# Patient Record
Sex: MALE | Race: WHITE | NOT HISPANIC OR LATINO | Employment: OTHER | URBAN - NONMETROPOLITAN AREA
[De-identification: names, ages, dates, MRNs, and addresses within clinical notes are randomized per-mention and may not be internally consistent; named-entity substitution may affect disease eponyms.]

---

## 2017-02-14 ENCOUNTER — CLINICAL SUPPORT (OUTPATIENT)
Dept: CARDIOLOGY | Facility: CLINIC | Age: 82
End: 2017-02-14

## 2017-02-14 ENCOUNTER — OFFICE VISIT (OUTPATIENT)
Dept: CARDIOLOGY | Facility: CLINIC | Age: 82
End: 2017-02-14

## 2017-02-14 VITALS
BODY MASS INDEX: 20.28 KG/M2 | WEIGHT: 153 LBS | HEIGHT: 73 IN | SYSTOLIC BLOOD PRESSURE: 106 MMHG | DIASTOLIC BLOOD PRESSURE: 58 MMHG | OXYGEN SATURATION: 98 % | HEART RATE: 82 BPM

## 2017-02-14 DIAGNOSIS — I50.9 CONGESTIVE HEART FAILURE, UNSPECIFIED CONGESTIVE HEART FAILURE CHRONICITY, UNSPECIFIED CONGESTIVE HEART FAILURE TYPE: ICD-10-CM

## 2017-02-14 DIAGNOSIS — I10 ESSENTIAL HYPERTENSION: ICD-10-CM

## 2017-02-14 DIAGNOSIS — I49.5 SSS (SICK SINUS SYNDROME) (HCC): ICD-10-CM

## 2017-02-14 DIAGNOSIS — Z95.810 AICD (AUTOMATIC CARDIOVERTER/DEFIBRILLATOR) PRESENT: Primary | ICD-10-CM

## 2017-02-14 DIAGNOSIS — Z95.0 PACEMAKER: ICD-10-CM

## 2017-02-14 DIAGNOSIS — I25.10 CORONARY ARTERIOSCLEROSIS: Primary | ICD-10-CM

## 2017-02-14 PROCEDURE — 93000 ELECTROCARDIOGRAM COMPLETE: CPT | Performed by: INTERNAL MEDICINE

## 2017-02-14 PROCEDURE — 99214 OFFICE O/P EST MOD 30 MIN: CPT | Performed by: INTERNAL MEDICINE

## 2017-02-14 PROCEDURE — 93289 INTERROG DEVICE EVAL HEART: CPT | Performed by: INTERNAL MEDICINE

## 2017-02-14 RX ORDER — ATORVASTATIN CALCIUM 20 MG/1
TABLET, FILM COATED ORAL
Status: ON HOLD | COMMUNITY
Start: 2012-04-19 | End: 2017-09-09

## 2017-02-14 RX ORDER — ROPINIROLE 0.5 MG/1
TABLET, FILM COATED ORAL
Status: ON HOLD | COMMUNITY
End: 2017-09-09

## 2017-02-14 RX ORDER — LISINOPRIL 5 MG/1
TABLET ORAL
Status: ON HOLD | COMMUNITY
Start: 2012-04-14 | End: 2017-09-09

## 2017-02-14 RX ORDER — SPIRONOLACTONE 25 MG/1
TABLET ORAL
COMMUNITY
Start: 2016-01-31 | End: 2017-09-08

## 2017-02-14 RX ORDER — FUROSEMIDE 40 MG/1
40 TABLET ORAL DAILY PRN
COMMUNITY
Start: 2012-04-19 | End: 2017-09-12 | Stop reason: HOSPADM

## 2017-02-14 RX ORDER — CARVEDILOL 3.12 MG/1
TABLET ORAL
COMMUNITY
End: 2017-03-13 | Stop reason: SDUPTHER

## 2017-02-14 RX ORDER — TAMSULOSIN HYDROCHLORIDE 0.4 MG/1
CAPSULE ORAL
Status: ON HOLD | COMMUNITY
End: 2017-09-09

## 2017-02-14 RX ORDER — DUTASTERIDE 0.5 MG/1
CAPSULE, LIQUID FILLED ORAL
Status: ON HOLD | COMMUNITY
Start: 2012-04-19 | End: 2017-09-09

## 2017-02-14 NOTE — PROGRESS NOTES
Bi-V AICD Evaluation Report  In office    February 14, 2017    Primary Cardiologist: Renae  : Guidant Model: Cognis  Implant date: July 8, 2010    Reason for evaluation:routine  Indication for AICD: sick sinus syndrome and congestive heart failure    Measurements  Atrial sensing:  P wave: 1.2 mV  Atrial threshold: 0.9 V @ 0.5 ms  Atrial lead impedance: 490 ohms  Ventricular sensing:  R wave: 11.5 mV  RV Threshold:  1.3V @ 0.5 ms  RV Impedance:  383 ohms  Shock impedance:  52 ohms  LV Threshold:  0.7V @ 0.5ms  LV Impedance:  885ohms      Diagnostic Data  Atrial paced: 80 %  Ventricular paced: RV-91, LV-99 %  Other: 10 a-fib episodes- longest 3 hours 36 min,  V rate well controlled   10 NSVT episodes- longest 6 seconds, max v rate 228 bpm   PMT episodes appear to be sinus tach at max tracking, PMT could not be reproduced in office  Battery status: satisfactory   Est 3.5 years      Final Parameters  Mode: DDDR     Lower rate: 70 bpm   Upper rate: 125 bpm  AV Delay: 170-180 ms paced    115-120 ms sensed   Atrial - Amplitude: 2.4 V   Pulse width: 0.5 ms   Sensitivity: 0.25 mV   Ventricular:  RV Amplitude: 2.4 V @ 0.5 ms   Sensitivity: 0.6 mV            LV Amplitude:  1.8V @ 0.5ms  Sensitivity:  1.5 mV  Changes made: none  Conclusions: normal AICD function, no therapy delivered and stable pacing and sensing thresholds    Follow up: 3 months, pt to call with monitor serial number so we can enroll in [facility]    Note:  LV sensing off.  Turned on for testing and then back off before ending interrogation.

## 2017-02-14 NOTE — PROGRESS NOTES
Wild Bravo  [id number]  1934  82 y.o.  male    Referring Provider: Eliel Ames MD    Reason for Follow-up Visit: CHF    Overall doing well  Denies any chest pain  No excessive shortness of breath  Compliant with medications    History of present illness:  Wild Bravo is a 82 y.o. yo male with history of CHF who presents today for   Chief Complaint   Patient presents with   • Congestive Heart Failure     6 mo f/u   .    History  Past Medical History   Diagnosis Date   • Abdominal aortic aneurysm    • Anxiety    • Biventricular ICD (implantable cardioverter-defibrillator) in place    • BPH (benign prostatic hypertrophy)    • Carotid artery stenosis    • CHF (congestive heart failure)    • Chronic kidney disease      Chronic kidney disease, stage 4 (severe)   • Chronic systolic (congestive) heart failure      limited echo 7/2016 EF was 40-45%. Patient has BiV AICD   • Coronary arteriosclerosis      MI x2   • Iron deficiency anemia    • Ischemic cardiomyopathy    • Mixed hyperlipidemia    ,   Past Surgical History   Procedure Laterality Date   • Cardiac ablation     • Cardioversion     • Mitral valve replacement     • Cardiac pacemaker placement     • Carotid endarterectomy     • Total knee arthroplasty     • Coronary artery bypass graft     • Cardiac catheterization     ,   Family History   Problem Relation Age of Onset   • Stroke Mother    • Heart disease Mother    • Diabetes Father    ,   Social History   Substance Use Topics   • Smoking status: Former Smoker   • Smokeless tobacco: None   • Alcohol use No   ,     Medications  Current Outpatient Prescriptions   Medication Sig Dispense Refill   • aspirin 81 MG tablet Take 81 mg by mouth daily.       • atorvastatin (LIPITOR) 20 MG tablet Take 20 mg by mouth daily.       • carvedilol (COREG) 3.125 MG tablet Take 3.125 mg by mouth 2 times daily (with meals)     • dutasteride (AVODART) 0.5 MG capsule Take 0.5 mg by mouth daily.       • furosemide  "(LASIX) 40 MG tablet Daily.     • HYDROcodone-acetaminophen (VICODIN) 5-500 MG per tablet Every 6 (Six) Hours.     • lisinopril (PRINIVIL,ZESTRIL) 5 MG tablet Take 2.5 mg by mouth daily      • rOPINIRole (REQUIP) 0.5 MG tablet 2 mg bid     • spironolactone (ALDACTONE) 25 MG tablet Take 1 tablet by mouth daily     • tamsulosin (FLOMAX) 0.4 MG capsule 24 hr capsule Take 0.4 mg by mouth daily       No current facility-administered medications for this visit.        Allergies:  Keflex [cephalexin]    Review of Systems  Review of Systems   Constitution: Positive for malaise/fatigue and weight loss.   HENT: Negative.    Eyes: Negative.    Cardiovascular: Positive for dyspnea on exertion. Negative for chest pain, claudication, cyanosis, irregular heartbeat, leg swelling, near-syncope, orthopnea, palpitations, paroxysmal nocturnal dyspnea and syncope.   Respiratory: Negative.    Endocrine: Negative.    Hematologic/Lymphatic: Negative.    Skin: Negative.    Gastrointestinal: Negative for anorexia.   Genitourinary: Negative.    Neurological: Negative.    Psychiatric/Behavioral: Negative.        Objective     Physical Exam:  Visit Vitals   • /58   • Pulse 82   • Ht 73\" (185.4 cm)   • Wt 153 lb (69.4 kg)   • SpO2 98%   • BMI 20.19 kg/m2     Physical Exam   Constitutional: He appears well-developed.   HENT:   Head: Normocephalic.   Neck: Normal carotid pulses and no JVD present. No tracheal tenderness present. Carotid bruit is not present. No tracheal deviation and no edema present.   Cardiovascular: Regular rhythm and normal pulses.    Murmur heard.   Systolic murmur is present with a grade of 2/6   Pulmonary/Chest: Effort normal. No stridor.   Abdominal: Soft.   Neurological: He is alert. He has normal strength. No cranial nerve deficit or sensory deficit.   Skin: Skin is warm.   Psychiatric: He has a normal mood and affect. His speech is normal and behavior is normal.       Results Review:       ECG 12 Lead  Date/Time: " 2/14/2017 10:42 AM  Performed by: JUAN LUIS OLIVA  Authorized by: JUAN LUIS OLIVA   Comparison: compared with previous ECG from 3/10/2016  Comparison to previous ECG: PVCs new  Comments: V paced             Assessment/Plan   Patient Active Problem List   Diagnosis   • Coronary arteriosclerosis   • CHF (congestive heart failure)   • SSS (sick sinus syndrome)   • Pacemaker   • Essential hypertension       No palpitations. No significant pedal edema. Compliant with medications and diet. Latest labs and medications reviewed.  Does 1000 (1 thousand sit ups daily) without problems  Last echo 7/16 moderate pulmonary hypertension. EF 40%    Plan:  He will talk to Dr Ames regarding weight loss due to nausea and poor oral intake  Close follow up with you as scheduled.  Intensive factor modifications.  See order list.    Counseled regarding disease appropriate diet, fluid, caffeine, stimulants and sodium intake as well as importance of compliance to diet, exercise and regular follow up.  Avoid NSAIDS and COX2 inhibitors. Use Acetaminophen PRN.  Monitor BiVICD function    Return in about 10 months (around 12/14/2017).

## 2017-02-15 NOTE — PROGRESS NOTES
I have reviewed the notes, assessments, and/or procedures performed by Chely Scruggs, I concur with her documentation of Wild Bravo.

## 2017-03-13 RX ORDER — CARVEDILOL 6.25 MG/1
6.25 TABLET ORAL 2 TIMES DAILY WITH MEALS
Qty: 180 TABLET | Refills: 2 | Status: ON HOLD | OUTPATIENT
Start: 2017-03-13 | End: 2017-09-09 | Stop reason: DRUGHIGH

## 2017-03-13 RX ORDER — CARVEDILOL 3.12 MG/1
TABLET ORAL
Qty: 360 TABLET | Refills: 3 | OUTPATIENT
Start: 2017-03-13

## 2017-09-08 ENCOUNTER — TELEPHONE (OUTPATIENT)
Dept: CARDIOLOGY | Facility: CLINIC | Age: 82
End: 2017-09-08

## 2017-09-08 ENCOUNTER — APPOINTMENT (OUTPATIENT)
Dept: GENERAL RADIOLOGY | Facility: HOSPITAL | Age: 82
End: 2017-09-08

## 2017-09-08 ENCOUNTER — HOSPITAL ENCOUNTER (OUTPATIENT)
Dept: CARDIOLOGY | Facility: HOSPITAL | Age: 82
Discharge: HOME | End: 2017-09-08

## 2017-09-08 ENCOUNTER — APPOINTMENT (OUTPATIENT)
Dept: ULTRASOUND IMAGING | Facility: HOSPITAL | Age: 82
End: 2017-09-08

## 2017-09-08 ENCOUNTER — DOCUMENTATION (OUTPATIENT)
Dept: CARDIOLOGY | Facility: CLINIC | Age: 82
End: 2017-09-08

## 2017-09-08 ENCOUNTER — APPOINTMENT (OUTPATIENT)
Dept: LAB | Facility: HOSPITAL | Age: 82
End: 2017-09-08

## 2017-09-08 ENCOUNTER — TRANSCRIBE ORDERS (OUTPATIENT)
Dept: ADMINISTRATIVE | Facility: HOSPITAL | Age: 82
End: 2017-09-08

## 2017-09-08 ENCOUNTER — CLINICAL SUPPORT (OUTPATIENT)
Dept: CARDIOLOGY | Facility: CLINIC | Age: 82
End: 2017-09-08

## 2017-09-08 ENCOUNTER — HOSPITAL ENCOUNTER (INPATIENT)
Facility: HOSPITAL | Age: 82
LOS: 2 days | Discharge: PSYCHIATRIC OR SUBSTANCE USE FACILITY | End: 2017-09-12
Attending: INTERNAL MEDICINE | Admitting: INTERNAL MEDICINE

## 2017-09-08 ENCOUNTER — APPOINTMENT (OUTPATIENT)
Dept: LAB | Facility: HOSPITAL | Age: 82
End: 2017-09-08
Attending: INTERNAL MEDICINE

## 2017-09-08 ENCOUNTER — APPOINTMENT (OUTPATIENT)
Dept: CT IMAGING | Facility: HOSPITAL | Age: 82
End: 2017-09-08

## 2017-09-08 ENCOUNTER — OFFICE VISIT (OUTPATIENT)
Dept: CARDIOLOGY | Facility: CLINIC | Age: 82
End: 2017-09-08

## 2017-09-08 VITALS
WEIGHT: 155.2 LBS | SYSTOLIC BLOOD PRESSURE: 90 MMHG | HEART RATE: 88 BPM | OXYGEN SATURATION: 98 % | HEIGHT: 74 IN | BODY MASS INDEX: 19.92 KG/M2 | DIASTOLIC BLOOD PRESSURE: 60 MMHG

## 2017-09-08 DIAGNOSIS — I48.19 PERSISTENT ATRIAL FIBRILLATION (HCC): ICD-10-CM

## 2017-09-08 DIAGNOSIS — I73.9 PAD (PERIPHERAL ARTERY DISEASE) (HCC): ICD-10-CM

## 2017-09-08 DIAGNOSIS — N18.30 CHRONIC KIDNEY DISEASE, STAGE III (MODERATE) (HCC): Primary | ICD-10-CM

## 2017-09-08 DIAGNOSIS — I10 ESSENTIAL HYPERTENSION, MALIGNANT: ICD-10-CM

## 2017-09-08 DIAGNOSIS — R46.89 COGNITIVE AND BEHAVIORAL CHANGES: Primary | ICD-10-CM

## 2017-09-08 DIAGNOSIS — I10 ESSENTIAL HYPERTENSION: ICD-10-CM

## 2017-09-08 DIAGNOSIS — Z74.09 IMPAIRED FUNCTIONAL MOBILITY, BALANCE, GAIT, AND ENDURANCE: ICD-10-CM

## 2017-09-08 DIAGNOSIS — I49.5 SSS (SICK SINUS SYNDROME) (HCC): Primary | ICD-10-CM

## 2017-09-08 DIAGNOSIS — I50.22 CHRONIC SYSTOLIC CONGESTIVE HEART FAILURE (HCC): ICD-10-CM

## 2017-09-08 DIAGNOSIS — I25.10 CORONARY ARTERIOSCLEROSIS: ICD-10-CM

## 2017-09-08 DIAGNOSIS — I50.22 CHRONIC SYSTOLIC CONGESTIVE HEART FAILURE (HCC): Primary | ICD-10-CM

## 2017-09-08 DIAGNOSIS — Z98.890 H/O MITRAL VALVE REPAIR: ICD-10-CM

## 2017-09-08 DIAGNOSIS — Z95.810 BIVENTRICULAR ICD (IMPLANTABLE CARDIOVERTER-DEFIBRILLATOR) IN PLACE: ICD-10-CM

## 2017-09-08 DIAGNOSIS — R41.89 COGNITIVE AND BEHAVIORAL CHANGES: Primary | ICD-10-CM

## 2017-09-08 PROBLEM — R07.9 CHEST PAIN: Status: ACTIVE | Noted: 2017-09-08

## 2017-09-08 LAB
ALBUMIN SERPL-MCNC: 4.7 G/DL (ref 3.5–5)
ALBUMIN/GLOB SERPL: 1.6 G/DL (ref 1.1–2.5)
ALP SERPL-CCNC: 83 U/L (ref 24–120)
ALT SERPL W P-5'-P-CCNC: 47 U/L (ref 0–54)
AMYLASE SERPL-CCNC: 100 U/L (ref 30–110)
ANION GAP SERPL CALCULATED.3IONS-SCNC: 11 MMOL/L (ref 4–13)
ANION GAP SERPL CALCULATED.3IONS-SCNC: 13 MMOL/L (ref 4–13)
AST SERPL-CCNC: 40 U/L (ref 7–45)
BASOPHILS # BLD AUTO: 0.04 10*3/MM3 (ref 0–0.2)
BASOPHILS NFR BLD AUTO: 0.7 % (ref 0–2)
BILIRUB SERPL-MCNC: 1.1 MG/DL (ref 0.1–1)
BUN BLD-MCNC: 52 MG/DL (ref 5–21)
BUN BLD-MCNC: 52 MG/DL (ref 5–21)
BUN/CREAT SERPL: 26.8 (ref 7–25)
BUN/CREAT SERPL: 29.4 (ref 7–25)
CALCIUM SPEC-SCNC: 9.3 MG/DL (ref 8.4–10.4)
CALCIUM SPEC-SCNC: 9.8 MG/DL (ref 8.4–10.4)
CHLORIDE SERPL-SCNC: 96 MMOL/L (ref 98–110)
CHLORIDE SERPL-SCNC: 97 MMOL/L (ref 98–110)
CO2 SERPL-SCNC: 29 MMOL/L (ref 24–31)
CO2 SERPL-SCNC: 31 MMOL/L (ref 24–31)
CREAT BLD-MCNC: 1.77 MG/DL (ref 0.5–1.4)
CREAT BLD-MCNC: 1.94 MG/DL (ref 0.5–1.4)
DEPRECATED RDW RBC AUTO: 50.6 FL (ref 40–54)
EOSINOPHIL # BLD AUTO: 0.64 10*3/MM3 (ref 0–0.7)
EOSINOPHIL NFR BLD AUTO: 11.4 % (ref 0–4)
ERYTHROCYTE [DISTWIDTH] IN BLOOD BY AUTOMATED COUNT: 15.1 % (ref 12–15)
GFR SERPL CREATININE-BSD FRML MDRD: 33 ML/MIN/1.73
GFR SERPL CREATININE-BSD FRML MDRD: 37 ML/MIN/1.73
GLOBULIN UR ELPH-MCNC: 3 GM/DL
GLUCOSE BLD-MCNC: 150 MG/DL (ref 70–100)
GLUCOSE BLD-MCNC: 98 MG/DL (ref 70–100)
HCT VFR BLD AUTO: 41.3 % (ref 40–52)
HGB BLD-MCNC: 12.9 G/DL (ref 14–18)
IMM GRANULOCYTES # BLD: 0.01 10*3/MM3 (ref 0–0.03)
IMM GRANULOCYTES NFR BLD: 0.2 % (ref 0–5)
LIPASE SERPL-CCNC: 66 U/L (ref 23–203)
LYMPHOCYTES # BLD AUTO: 1.12 10*3/MM3 (ref 0.72–4.86)
LYMPHOCYTES NFR BLD AUTO: 20 % (ref 15–45)
MAGNESIUM SERPL-MCNC: 2.1 MG/DL (ref 1.4–2.2)
MCH RBC QN AUTO: 28.4 PG (ref 28–32)
MCHC RBC AUTO-ENTMCNC: 31.2 G/DL (ref 33–36)
MCV RBC AUTO: 91 FL (ref 82–95)
MONOCYTES # BLD AUTO: 0.51 10*3/MM3 (ref 0.19–1.3)
MONOCYTES NFR BLD AUTO: 9.1 % (ref 4–12)
NEUTROPHILS # BLD AUTO: 3.28 10*3/MM3 (ref 1.87–8.4)
NEUTROPHILS NFR BLD AUTO: 58.6 % (ref 39–78)
NT-PROBNP SERPL-MCNC: 3970 PG/ML (ref 0–1800)
PLATELET # BLD AUTO: 177 10*3/MM3 (ref 130–400)
PMV BLD AUTO: 12.6 FL (ref 6–12)
POTASSIUM BLD-SCNC: 4 MMOL/L (ref 3.5–5.3)
POTASSIUM BLD-SCNC: 4.8 MMOL/L (ref 3.5–5.3)
PROT SERPL-MCNC: 7.7 G/DL (ref 6.3–8.7)
RBC # BLD AUTO: 4.54 10*6/MM3 (ref 4.8–5.9)
SODIUM BLD-SCNC: 138 MMOL/L (ref 135–145)
SODIUM BLD-SCNC: 139 MMOL/L (ref 135–145)
TROPONIN I SERPL-MCNC: 0.05 NG/ML (ref 0–0.03)
TROPONIN I SERPL-MCNC: 0.06 NG/ML (ref 0–0.03)
TROPONIN I SERPL-MCNC: 0.06 NG/ML (ref 0–0.03)
TSH SERPL DL<=0.05 MIU/L-ACNC: 2.43 MIU/ML (ref 0.47–4.68)
WBC NRBC COR # BLD: 5.6 10*3/MM3 (ref 4.8–10.8)

## 2017-09-08 PROCEDURE — 93880 EXTRACRANIAL BILAT STUDY: CPT | Performed by: SURGERY

## 2017-09-08 PROCEDURE — 83880 ASSAY OF NATRIURETIC PEPTIDE: CPT | Performed by: NURSE PRACTITIONER

## 2017-09-08 PROCEDURE — G0378 HOSPITAL OBSERVATION PER HR: HCPCS

## 2017-09-08 PROCEDURE — 93880 EXTRACRANIAL BILAT STUDY: CPT

## 2017-09-08 PROCEDURE — 83690 ASSAY OF LIPASE: CPT | Performed by: INTERNAL MEDICINE

## 2017-09-08 PROCEDURE — 36415 COLL VENOUS BLD VENIPUNCTURE: CPT | Performed by: INTERNAL MEDICINE

## 2017-09-08 PROCEDURE — 93281 PM DEVICE PROGR EVAL MULTI: CPT | Performed by: INTERNAL MEDICINE

## 2017-09-08 PROCEDURE — 25010000002 ENOXAPARIN PER 10 MG: Performed by: INTERNAL MEDICINE

## 2017-09-08 PROCEDURE — 83735 ASSAY OF MAGNESIUM: CPT | Performed by: INTERNAL MEDICINE

## 2017-09-08 PROCEDURE — 85025 COMPLETE CBC W/AUTO DIFF WBC: CPT | Performed by: INTERNAL MEDICINE

## 2017-09-08 PROCEDURE — 71020 HC CHEST PA AND LATERAL: CPT

## 2017-09-08 PROCEDURE — 84484 ASSAY OF TROPONIN QUANT: CPT | Performed by: INTERNAL MEDICINE

## 2017-09-08 PROCEDURE — 93306 TTE W/DOPPLER COMPLETE: CPT

## 2017-09-08 PROCEDURE — 99223 1ST HOSP IP/OBS HIGH 75: CPT | Performed by: PSYCHIATRY & NEUROLOGY

## 2017-09-08 PROCEDURE — 99215 OFFICE O/P EST HI 40 MIN: CPT | Performed by: NURSE PRACTITIONER

## 2017-09-08 PROCEDURE — 80053 COMPREHEN METABOLIC PANEL: CPT | Performed by: NURSE PRACTITIONER

## 2017-09-08 PROCEDURE — 82150 ASSAY OF AMYLASE: CPT | Performed by: INTERNAL MEDICINE

## 2017-09-08 PROCEDURE — 82043 UR ALBUMIN QUANTITATIVE: CPT | Performed by: INTERNAL MEDICINE

## 2017-09-08 PROCEDURE — 93306 TTE W/DOPPLER COMPLETE: CPT | Performed by: INTERNAL MEDICINE

## 2017-09-08 PROCEDURE — 93000 ELECTROCARDIOGRAM COMPLETE: CPT | Performed by: NURSE PRACTITIONER

## 2017-09-08 PROCEDURE — 70450 CT HEAD/BRAIN W/O DYE: CPT

## 2017-09-08 PROCEDURE — 82570 ASSAY OF URINE CREATININE: CPT | Performed by: INTERNAL MEDICINE

## 2017-09-08 PROCEDURE — 84443 ASSAY THYROID STIM HORMONE: CPT | Performed by: INTERNAL MEDICINE

## 2017-09-08 RX ORDER — HYDROCODONE BITARTRATE AND ACETAMINOPHEN 7.5; 325 MG/1; MG/1
1 TABLET ORAL EVERY 6 HOURS PRN
Status: DISCONTINUED | OUTPATIENT
Start: 2017-09-08 | End: 2017-09-12 | Stop reason: HOSPADM

## 2017-09-08 RX ORDER — AMIODARONE HYDROCHLORIDE 200 MG/1
200 TABLET ORAL
Status: DISCONTINUED | OUTPATIENT
Start: 2017-09-08 | End: 2017-09-12 | Stop reason: HOSPADM

## 2017-09-08 RX ORDER — LISINOPRIL 2.5 MG/1
2.5 TABLET ORAL
Status: DISCONTINUED | OUTPATIENT
Start: 2017-09-08 | End: 2017-09-12 | Stop reason: HOSPADM

## 2017-09-08 RX ORDER — NITROGLYCERIN 0.4 MG/1
TABLET SUBLINGUAL
Qty: 30 TABLET | Refills: 0 | Status: ON HOLD | OUTPATIENT
Start: 2017-09-08 | End: 2017-09-09

## 2017-09-08 RX ORDER — LORAZEPAM 2 MG/1
2 TABLET ORAL EVERY 8 HOURS PRN
COMMUNITY
End: 2017-09-12 | Stop reason: HOSPADM

## 2017-09-08 RX ORDER — CARVEDILOL 3.12 MG/1
3.12 TABLET ORAL 2 TIMES DAILY WITH MEALS
Status: DISCONTINUED | OUTPATIENT
Start: 2017-09-08 | End: 2017-09-12 | Stop reason: HOSPADM

## 2017-09-08 RX ORDER — SODIUM CHLORIDE 0.9 % (FLUSH) 0.9 %
1-10 SYRINGE (ML) INJECTION AS NEEDED
Status: DISCONTINUED | OUTPATIENT
Start: 2017-09-08 | End: 2017-09-12 | Stop reason: HOSPADM

## 2017-09-08 RX ORDER — FUROSEMIDE 40 MG/1
40 TABLET ORAL DAILY
Status: DISCONTINUED | OUTPATIENT
Start: 2017-09-08 | End: 2017-09-09

## 2017-09-08 RX ORDER — GABAPENTIN 300 MG/1
300 CAPSULE ORAL 2 TIMES DAILY
COMMUNITY
End: 2017-09-12 | Stop reason: HOSPADM

## 2017-09-08 RX ORDER — ATORVASTATIN CALCIUM 10 MG/1
10 TABLET, FILM COATED ORAL NIGHTLY
Status: DISCONTINUED | OUTPATIENT
Start: 2017-09-08 | End: 2017-09-12 | Stop reason: HOSPADM

## 2017-09-08 RX ORDER — FINASTERIDE 5 MG/1
5 TABLET, FILM COATED ORAL DAILY
Status: DISCONTINUED | OUTPATIENT
Start: 2017-09-09 | End: 2017-09-12 | Stop reason: HOSPADM

## 2017-09-08 RX ORDER — CARVEDILOL 6.25 MG/1
6.25 TABLET ORAL 2 TIMES DAILY WITH MEALS
Status: DISCONTINUED | OUTPATIENT
Start: 2017-09-08 | End: 2017-09-08

## 2017-09-08 RX ORDER — NITROGLYCERIN 0.4 MG/1
0.4 TABLET SUBLINGUAL
Status: DISCONTINUED | OUTPATIENT
Start: 2017-09-08 | End: 2017-09-12 | Stop reason: HOSPADM

## 2017-09-08 RX ORDER — AMIODARONE HYDROCHLORIDE 200 MG/1
200 TABLET ORAL DAILY
Qty: 30 TABLET | Refills: 1 | Status: SHIPPED | OUTPATIENT
Start: 2017-09-08 | End: 2017-10-06 | Stop reason: SDUPTHER

## 2017-09-08 RX ORDER — HYDROCODONE BITARTRATE AND ACETAMINOPHEN 5; 325 MG/1; MG/1
1 TABLET ORAL EVERY 6 HOURS PRN
COMMUNITY

## 2017-09-08 RX ORDER — LORAZEPAM 1 MG/1
2 TABLET ORAL EVERY 8 HOURS PRN
Status: DISCONTINUED | OUTPATIENT
Start: 2017-09-08 | End: 2017-09-09

## 2017-09-08 RX ORDER — AMIODARONE HYDROCHLORIDE 200 MG/1
200 TABLET ORAL DAILY
Status: DISCONTINUED | OUTPATIENT
Start: 2017-09-08 | End: 2017-09-08 | Stop reason: SDUPTHER

## 2017-09-08 RX ORDER — TAMSULOSIN HYDROCHLORIDE 0.4 MG/1
0.4 CAPSULE ORAL DAILY
Status: DISCONTINUED | OUTPATIENT
Start: 2017-09-09 | End: 2017-09-11

## 2017-09-08 RX ORDER — ROPINIROLE 2 MG/1
2 TABLET, FILM COATED ORAL 2 TIMES DAILY
COMMUNITY
End: 2017-09-12 | Stop reason: HOSPADM

## 2017-09-08 RX ORDER — ROPINIROLE 1 MG/1
2 TABLET, FILM COATED ORAL NIGHTLY
Status: DISCONTINUED | OUTPATIENT
Start: 2017-09-08 | End: 2017-09-09

## 2017-09-08 RX ORDER — GABAPENTIN 300 MG/1
300 CAPSULE ORAL NIGHTLY
Status: DISCONTINUED | OUTPATIENT
Start: 2017-09-08 | End: 2017-09-10

## 2017-09-08 RX ADMIN — ATORVASTATIN CALCIUM 10 MG: 10 TABLET, FILM COATED ORAL at 20:50

## 2017-09-08 RX ADMIN — ENOXAPARIN SODIUM 30 MG: 30 INJECTION SUBCUTANEOUS at 17:57

## 2017-09-08 RX ADMIN — CARVEDILOL 3.12 MG: 3.12 TABLET, FILM COATED ORAL at 20:51

## 2017-09-08 RX ADMIN — GABAPENTIN 300 MG: 300 CAPSULE ORAL at 20:50

## 2017-09-08 RX ADMIN — AMIODARONE HYDROCHLORIDE 200 MG: 200 TABLET ORAL at 20:51

## 2017-09-08 RX ADMIN — LORAZEPAM 2 MG: 1 TABLET ORAL at 19:58

## 2017-09-08 NOTE — TELEPHONE ENCOUNTER
Mr Bravo's daughter Audra called to say she thinks her dad needs to be admitted today after seeing Waleska in the office. Addis Shankar took a note to have Waleska to call the daughter because they were still here having test done that Dr Macdonald recommended having done. Waleska asked me to return the call to the patients daughter and let her know that Dr Macdonald wanted these test done but if they felt they needed to be admitted that was something they would have to go through the ER. That is not what we were recommending.the test that was ordered was what Dr Macdonald and Waleska both recommended.

## 2017-09-08 NOTE — PROGRESS NOTES
"    Subjective:     Encounter Date:09/08/2017      Patient ID: Wild Bravo is a 83 y.o. male.    Chief Complaint:  HPI Comments: Pt reports he has been having more shortness of breath over the past several months. He admits intermittent abdominal distention and pain as well. He denies lower extremity edema. His weight fluctuates but is currently stable. He has been having right sided, non exertional chest pains lasting 1-2 minutes without other associated symptoms, over the past several months.     Congestive Heart Failure   Presents for follow-up visit. Associated symptoms include abdominal pain, chest pain, fatigue and shortness of breath. Pertinent negatives include no claudication, edema, near-syncope or palpitations. The symptoms have been worsening.       The following portions of the patient's history were reviewed and updated as appropriate: allergies, current medications, past family history, past medical history, past social history, past surgical history and problem list.  BP 90/60 (BP Location: Right arm, Patient Position: Sitting, Cuff Size: Adult)  Pulse 88  Ht 73.5\" (186.7 cm)  Wt 155 lb 3.2 oz (70.4 kg)  SpO2 98%  BMI 20.2 kg/m2  Allergies   Allergen Reactions   • Keflex [Cephalexin]      CEPHALOSPORINS       Current Outpatient Prescriptions:   •  aspirin 81 MG tablet, Take 81 mg by mouth daily.  , Disp: , Rfl:   •  atorvastatin (LIPITOR) 20 MG tablet, Take 20 mg by mouth daily.  , Disp: , Rfl:   •  carvedilol (COREG) 6.25 MG tablet, Take 1 tablet by mouth 2 (Two) Times a Day With Meals. (Patient taking differently: Take 3.125 mg by mouth 2 (Two) Times a Day With Meals.), Disp: 180 tablet, Rfl: 2  •  dutasteride (AVODART) 0.5 MG capsule, Take 0.5 mg by mouth daily.  , Disp: , Rfl:   •  furosemide (LASIX) 40 MG tablet, As Needed., Disp: , Rfl:   •  gabapentin (NEURONTIN) 300 MG capsule, Take 300 mg by mouth 3 (Three) Times a Day., Disp: , Rfl:   •  HYDROcodone-acetaminophen (VICODIN) 5-500 MG " per tablet, Every 6 (Six) Hours., Disp: , Rfl:   •  lisinopril (PRINIVIL,ZESTRIL) 5 MG tablet, Take 2.5 mg by mouth daily , Disp: , Rfl:   •  rOPINIRole (REQUIP) 0.5 MG tablet, 2 mg bid, Disp: , Rfl:   •  tamsulosin (FLOMAX) 0.4 MG capsule 24 hr capsule, Take 0.4 mg by mouth daily, Disp: , Rfl:   •  amiodarone (PACERONE) 200 MG tablet, Take 1 tablet by mouth Daily., Disp: 30 tablet, Rfl: 1  •  nitroglycerin (NITROSTAT) 0.4 MG SL tablet, 1 under the tongue as needed for angina, may repeat q5mins for up three doses, Disp: 30 tablet, Rfl: 0  Past Medical History:   Diagnosis Date   • Abdominal aortic aneurysm    • Anxiety    • Biventricular ICD (implantable cardioverter-defibrillator) in place    • BPH (benign prostatic hypertrophy)    • Carotid artery stenosis    • CHF (congestive heart failure)    • Chronic kidney disease     Chronic kidney disease, stage 4 (severe)   • Chronic systolic (congestive) heart failure     limited echo 7/2016 EF was 40-45%. Patient has BiV AICD   • Coronary arteriosclerosis     MI x2   • Iron deficiency anemia    • Ischemic cardiomyopathy    • Mixed hyperlipidemia    • Myocardial infarction    • Stroke      Past Surgical History:   Procedure Laterality Date   • CARDIAC ABLATION     • CARDIAC CATHETERIZATION     • CARDIAC PACEMAKER PLACEMENT     • CARDIOVERSION     • CAROTID ENDARTERECTOMY     • CORONARY ARTERY BYPASS GRAFT     • MITRAL VALVE REPLACEMENT     • TOTAL KNEE ARTHROPLASTY       Social History     Social History   • Marital status:      Spouse name: N/A   • Number of children: N/A   • Years of education: N/A     Occupational History   • Not on file.     Social History Main Topics   • Smoking status: Former Smoker   • Smokeless tobacco: Never Used   • Alcohol use No   • Drug use: No   • Sexual activity: Defer     Other Topics Concern   • Not on file     Social History Narrative     Family History   Problem Relation Age of Onset   • Stroke Mother    • Heart disease Mother     • Diabetes Father        Review of Systems   Constitution: Positive for fatigue and malaise/fatigue. Negative for chills, diaphoresis, fever and weakness.   HENT: Negative for nosebleeds.    Eyes: Negative for visual disturbance.   Cardiovascular: Positive for chest pain. Negative for claudication, cyanosis, dyspnea on exertion, irregular heartbeat, leg swelling, near-syncope, orthopnea, palpitations, paroxysmal nocturnal dyspnea and syncope.   Respiratory: Positive for shortness of breath. Negative for cough, hemoptysis, sputum production and wheezing.    Hematologic/Lymphatic: Negative for bleeding problem.   Skin: Negative for color change and flushing.   Musculoskeletal: Negative for falls.   Gastrointestinal: Positive for abdominal pain. Negative for bloating, hematemesis, hematochezia, melena, nausea and vomiting.   Genitourinary: Negative for hematuria.   Neurological: Negative for dizziness and light-headedness.   Psychiatric/Behavioral: Negative for altered mental status.         ECG 12 Lead  Date/Time: 9/8/2017 12:58 PM  Performed by: PITA JAUREGUI  Authorized by: PITA JAUREGUI   Comparison: compared with previous ECG from 3/10/2016  Similar to previous ECG  Comparison to previous ECG: afib with V pacing   Rhythm: atrial fibrillation and paced               Objective:     Physical Exam   Constitutional: He is oriented to person, place, and time. He appears well-developed and well-nourished. No distress.   HENT:   Head: Normocephalic and atraumatic.   Eyes: Pupils are equal, round, and reactive to light.   Neck: Normal range of motion. Neck supple. No JVD present. No thyromegaly present.   Cardiovascular: Normal rate, regular rhythm, normal heart sounds and intact distal pulses.  Exam reveals no gallop and no friction rub.    No murmur heard.  Pulses:       Dorsalis pedis pulses are 2+ on the right side, and 2+ on the left side.   Pulmonary/Chest: Effort normal and breath sounds normal. No respiratory  distress. He has no wheezes. He has no rales. He exhibits no tenderness.   Abdominal: Soft. Bowel sounds are normal. He exhibits no distension. There is no tenderness.   Musculoskeletal: Normal range of motion. He exhibits no edema.   Neurological: He is alert and oriented to person, place, and time. No cranial nerve deficit.   Skin: Skin is warm and dry. He is not diaphoretic.   Psychiatric: He has a normal mood and affect. His behavior is normal.       Lab Review:       Assessment:          Diagnosis Plan   1. Chronic systolic congestive heart failure  Currently euvolemic on exam. Stage C, class II. Has had recent dyspnea, chest pain weight fluctuations. Check BNP. Continue to take lasix 40 mg PRN for symptoms- he states he usually has abdominal distension with fluid retention. Repeat echo due to recent symptoms. LVEF 7/2016 40%, moderate pulmonary htn           2. Coronary arteriosclerosis  S/p CABG, MV repair, and MAZE 2/2008, negative stress with old inferior MI 1/2016. Pt states his chest pain is not bothersome at this point and he declines further stress testing    3. Biventricular ICD (implantable cardioverter-defibrillator) in place  Interrogated today    4. Persistent atrial fibrillation  History of PAF, previously on warfarin- was dc'd due to bleeding complications. Pt also reports he has had recent falls and hit his head, therefore no anticoagulation. Currently rate controlled on low dose coreg, but ICD interrogation reveals  Persistent afib since June with multiple episode of ATP- discussed with Dr. Macdonald- add amiodarone 200 mg daily    5. Essential hypertension  Hypotension today, sbp 90 ; unable to titrate coreg    6. PAD (peripheral artery disease)  S/p cardotid endarterectomy    7. H/O mitral valve repair  2008   8. CKD- followed by nephrology- aldactone recently stopped and lasix decreased to PRN due to KRISTY  9. AAA - do have have records of previous scans, will order abdominal US given recent  abdominal pain and AAA history      Plan:         Case discussed with patient and daughter at length. Discussed with Dr. Macdonald as well. Add amio, check BNP, abdominal US, and echo.   Close follow up in 2 weeks with Dr. Macdonald, sooner with new or worsening symptoms.  Discussed s/s to report and when to report to ER.   Optivol not available feature to check on his ICD.    Reviewed signs and symptoms of CHF and what to report with the patient. Patient instructed to restrict sodium and fluid and weigh daily. Report weight gain of greater than 2 lbs overnight or 5 lbs in 1 week. Pt verbalized understanding of instructions and plan of care.     Face to face encounter with patient , duration 60 minutes

## 2017-09-08 NOTE — PLAN OF CARE
Problem: Patient Care Overview (Adult)  Goal: Plan of Care Review  Outcome: Ongoing (interventions implemented as appropriate)    09/08/17 2494   Coping/Psychosocial Response Interventions   Plan Of Care Reviewed With patient;daughter   Patient Care Overview   Progress no change   Outcome Evaluation   Outcome Summary/Follow up Plan PATIENT DIRECT ADMIT TO  FROM DR OLIVA'S OFFICE WITH MULTIPLE ISSUES. NO C/O PAIN. REPORTS WEBSTER. A/O. ADMISSION COMPLETED.  80'S ON TELE. VSS. FALL RISK, UP WITH ASSIST. CONTINUE TO MONITOR, NOTIFY MD OF CHANGES.       Goal: Adult Individualization and Mutuality  Outcome: Ongoing (interventions implemented as appropriate)  Goal: Discharge Needs Assessment  Outcome: Ongoing (interventions implemented as appropriate)    Problem: Cardiac: Heart Failure (Adult)  Goal: Signs and Symptoms of Listed Potential Problems Will be Absent or Manageable (Cardiac: Heart Failure)  Outcome: Ongoing (interventions implemented as appropriate)    Problem: Fall Risk (Adult)  Goal: Identify Related Risk Factors and Signs and Symptoms  Outcome: Ongoing (interventions implemented as appropriate)  Goal: Absence of Falls  Outcome: Ongoing (interventions implemented as appropriate)

## 2017-09-08 NOTE — PROGRESS NOTES
Bi-V AICD Evaluation Report  In office     September 8, 2017    Primary Cardiologist: Renae  : Guidant Model: Cognis  Implant date: July 08,2010    Reason for evaluation: provider requested  Indication for AICD: sick sinus syndrome and congestive heart failure    Measurements  Atrial sensing:  P wave: 0.5 mV  Atrial threshold: N/R  V @ N/R ms  Atrial lead impedance: 601 ohms  Ventricular sensing:  R wave: 9.1 mV  RV Threshold:  1.1V @ 0.5 ms  RV Impedance:  364 ohms  Shock impedance:45 ohms   LV Threshold:  0.7V @ 0.5ms  LV Impedance:  839ohms      Diagnostic Data  Atrial paced: 34 %  Ventricular paced: RV:76 % LV:94%  Other: Pt Has been in A-fib 23%, Currently in a-fib numerous ventricular high rates-    all appear to be a-fib with RVR, ATP delivered on 3 sperate episodes all successful.   Battery status: satisfactory   Est 2.5 Years       Final Parameters  Mode: DDDR     Lower rate: 70 bpm   Upper rate: 125 bpm  AV Delay: 170-180 ms paced    115-120 ms sensed   Atrial - Amplitude: 2.4 V   Pulse width: 0.5 ms   Sensitivity: 0.25 mV   Ventricular:  RV Amplitude: 2.2 V @ 0.5 ms   Sensitivity: 0.6 mV            LV Amplitude:  1.8V @ 0.5ms      Sensitivity 1.5 mV   Changes made: Rv output to 2.2V    Conclusions: normal AICD function and stable pacing and sensing thresholds    Follow up: 3 month follow. Told patient to hook up monitor      Note:  LV sensing off.  Turned on for testing and then back off before ending interrogation.     Interrogation done by company device rep.

## 2017-09-09 ENCOUNTER — APPOINTMENT (OUTPATIENT)
Dept: ULTRASOUND IMAGING | Facility: HOSPITAL | Age: 82
End: 2017-09-09

## 2017-09-09 ENCOUNTER — APPOINTMENT (OUTPATIENT)
Dept: GENERAL RADIOLOGY | Facility: HOSPITAL | Age: 82
End: 2017-09-09

## 2017-09-09 LAB
AMMONIA BLD-SCNC: <9 UMOL/L (ref 9–33)
ANION GAP SERPL CALCULATED.3IONS-SCNC: 7 MMOL/L (ref 4–13)
ARTICHOKE IGE QN: 49 MG/DL (ref 0–99)
BH CV ECHO MEAS - AI DEC SLOPE: 430 CM/SEC^2
BH CV ECHO MEAS - AI MAX PG: 34.3 MMHG
BH CV ECHO MEAS - AI MAX VEL: 293 CM/SEC
BH CV ECHO MEAS - AI P1/2T: 199.6 MSEC
BH CV ECHO MEAS - AO MAX PG (FULL): 3.4 MMHG
BH CV ECHO MEAS - AO MAX PG: 5.4 MMHG
BH CV ECHO MEAS - AO MEAN PG (FULL): 2 MMHG
BH CV ECHO MEAS - AO MEAN PG: 3 MMHG
BH CV ECHO MEAS - AO ROOT AREA (BSA CORRECTED): 2.4
BH CV ECHO MEAS - AO ROOT AREA: 16.6 CM^2
BH CV ECHO MEAS - AO ROOT DIAM: 4.6 CM
BH CV ECHO MEAS - AO V2 MAX: 116 CM/SEC
BH CV ECHO MEAS - AO V2 MEAN: 86.9 CM/SEC
BH CV ECHO MEAS - AO V2 VTI: 20.8 CM
BH CV ECHO MEAS - AVA(I,A): 1.8 CM^2
BH CV ECHO MEAS - AVA(I,D): 1.8 CM^2
BH CV ECHO MEAS - AVA(V,A): 1.9 CM^2
BH CV ECHO MEAS - AVA(V,D): 1.9 CM^2
BH CV ECHO MEAS - BSA(HAYCOCK): 1.9 M^2
BH CV ECHO MEAS - BSA: 1.9 M^2
BH CV ECHO MEAS - BZI_BMI: 20.4 KILOGRAMS/M^2
BH CV ECHO MEAS - BZI_METRIC_HEIGHT: 185.4 CM
BH CV ECHO MEAS - BZI_METRIC_WEIGHT: 70.3 KG
BH CV ECHO MEAS - CONTRAST EF 4CH: 34.7 ML/M^2
BH CV ECHO MEAS - EDV(CUBED): 288.8 ML
BH CV ECHO MEAS - EDV(MOD-SP4): 199 ML
BH CV ECHO MEAS - EDV(TEICH): 224.4 ML
BH CV ECHO MEAS - EF(CUBED): 23.7 %
BH CV ECHO MEAS - EF(MOD-SP4): 34.7 %
BH CV ECHO MEAS - EF(TEICH): 18.6 %
BH CV ECHO MEAS - ESV(CUBED): 220.3 ML
BH CV ECHO MEAS - ESV(MOD-SP4): 130 ML
BH CV ECHO MEAS - ESV(TEICH): 182.8 ML
BH CV ECHO MEAS - FS: 8.6 %
BH CV ECHO MEAS - IVS/LVPW: 1
BH CV ECHO MEAS - IVSD: 0.92 CM
BH CV ECHO MEAS - LA DIMENSION: 4.5 CM
BH CV ECHO MEAS - LA/AO: 0.98
BH CV ECHO MEAS - LAT PEAK E' VEL: 3.5 CM/SEC
BH CV ECHO MEAS - LV DIASTOLIC VOL/BSA (35-75): 103.1 ML/M^2
BH CV ECHO MEAS - LV MASS(C)D: 261.2 GRAMS
BH CV ECHO MEAS - LV MASS(C)DI: 135.3 GRAMS/M^2
BH CV ECHO MEAS - LV MAX PG: 2 MMHG
BH CV ECHO MEAS - LV MEAN PG: 1 MMHG
BH CV ECHO MEAS - LV SYSTOLIC VOL/BSA (12-30): 67.3 ML/M^2
BH CV ECHO MEAS - LV V1 MAX: 70.9 CM/SEC
BH CV ECHO MEAS - LV V1 MEAN: 41.3 CM/SEC
BH CV ECHO MEAS - LV V1 VTI: 12 CM
BH CV ECHO MEAS - LVIDD: 6.6 CM
BH CV ECHO MEAS - LVIDS: 6 CM
BH CV ECHO MEAS - LVLD AP4: 8.7 CM
BH CV ECHO MEAS - LVLS AP4: 8.4 CM
BH CV ECHO MEAS - LVOT AREA (M): 3.1 CM^2
BH CV ECHO MEAS - LVOT AREA: 3.1 CM^2
BH CV ECHO MEAS - LVOT DIAM: 2 CM
BH CV ECHO MEAS - LVPWD: 0.91 CM
BH CV ECHO MEAS - MED PEAK E' VEL: 3.7 CM/SEC
BH CV ECHO MEAS - MV DEC TIME: 0.18 SEC
BH CV ECHO MEAS - MV E MAX VEL: 151 CM/SEC
BH CV ECHO MEAS - RAP SYSTOLE: 10 MMHG
BH CV ECHO MEAS - RVSP: 40.7 MMHG
BH CV ECHO MEAS - SI(AO): 179 ML/M^2
BH CV ECHO MEAS - SI(CUBED): 35.5 ML/M^2
BH CV ECHO MEAS - SI(LVOT): 19.5 ML/M^2
BH CV ECHO MEAS - SI(MOD-SP4): 35.7 ML/M^2
BH CV ECHO MEAS - SI(TEICH): 21.6 ML/M^2
BH CV ECHO MEAS - SV(AO): 345.7 ML
BH CV ECHO MEAS - SV(CUBED): 68.5 ML
BH CV ECHO MEAS - SV(LVOT): 37.7 ML
BH CV ECHO MEAS - SV(MOD-SP4): 69 ML
BH CV ECHO MEAS - SV(TEICH): 41.6 ML
BH CV ECHO MEAS - TR MAX VEL: 277 CM/SEC
BUN BLD-MCNC: 48 MG/DL (ref 5–21)
BUN/CREAT SERPL: 28.1 (ref 7–25)
CALCIUM SPEC-SCNC: 9 MG/DL (ref 8.4–10.4)
CHLORIDE SERPL-SCNC: 98 MMOL/L (ref 98–110)
CHOLEST SERPL-MCNC: 126 MG/DL (ref 130–200)
CO2 SERPL-SCNC: 33 MMOL/L (ref 24–31)
CREAT 24H UR-MCNC: 86.4 MG/DL
CREAT BLD-MCNC: 1.71 MG/DL (ref 0.5–1.4)
FOLATE SERPL-MCNC: >20 NG/ML
GFR SERPL CREATININE-BSD FRML MDRD: 38 ML/MIN/1.73
GLUCOSE BLD-MCNC: 97 MG/DL (ref 70–100)
HDLC SERPL-MCNC: 62 MG/DL
LDLC/HDLC SERPL: 0.66 {RATIO}
LEFT ATRIUM VOLUME INDEX: 58 ML/M2
LEFT ATRIUM VOLUME: 112 CM3
LV EF 2D ECHO EST: 35 %
MICROALB/CRT. RATIO UR: 110.8 MG/G CREAT (ref 0–30)
MICROALBUMIN UR-MCNC: 95.7 UG/ML
POTASSIUM BLD-SCNC: 3.9 MMOL/L (ref 3.5–5.3)
SODIUM BLD-SCNC: 138 MMOL/L (ref 135–145)
TRIGL SERPL-MCNC: 114 MG/DL (ref 0–149)
VIT B12 BLD-MCNC: 890 PG/ML (ref 239–931)

## 2017-09-09 PROCEDURE — 80048 BASIC METABOLIC PNL TOTAL CA: CPT | Performed by: INTERNAL MEDICINE

## 2017-09-09 PROCEDURE — G0378 HOSPITAL OBSERVATION PER HR: HCPCS

## 2017-09-09 PROCEDURE — G8979 MOBILITY GOAL STATUS: HCPCS

## 2017-09-09 PROCEDURE — 76700 US EXAM ABDOM COMPLETE: CPT

## 2017-09-09 PROCEDURE — 25010000002 ENOXAPARIN PER 10 MG: Performed by: INTERNAL MEDICINE

## 2017-09-09 PROCEDURE — 82607 VITAMIN B-12: CPT | Performed by: PSYCHIATRY & NEUROLOGY

## 2017-09-09 PROCEDURE — 97161 PT EVAL LOW COMPLEX 20 MIN: CPT

## 2017-09-09 PROCEDURE — 82746 ASSAY OF FOLIC ACID SERUM: CPT | Performed by: PSYCHIATRY & NEUROLOGY

## 2017-09-09 PROCEDURE — G8978 MOBILITY CURRENT STATUS: HCPCS

## 2017-09-09 PROCEDURE — 80061 LIPID PANEL: CPT | Performed by: INTERNAL MEDICINE

## 2017-09-09 PROCEDURE — 82140 ASSAY OF AMMONIA: CPT | Performed by: PSYCHIATRY & NEUROLOGY

## 2017-09-09 PROCEDURE — 99233 SBSQ HOSP IP/OBS HIGH 50: CPT | Performed by: INTERNAL MEDICINE

## 2017-09-09 RX ORDER — BUMETANIDE 0.25 MG/ML
0.5 INJECTION INTRAMUSCULAR; INTRAVENOUS 2 TIMES DAILY
Status: DISCONTINUED | OUTPATIENT
Start: 2017-09-09 | End: 2017-09-12

## 2017-09-09 RX ORDER — TAMSULOSIN HYDROCHLORIDE 0.4 MG/1
1 CAPSULE ORAL NIGHTLY
COMMUNITY
End: 2019-12-13

## 2017-09-09 RX ORDER — DUTASTERIDE 0.5 MG/1
0.5 CAPSULE, LIQUID FILLED ORAL DAILY
COMMUNITY

## 2017-09-09 RX ORDER — LISINOPRIL 5 MG/1
2.5 TABLET ORAL DAILY
COMMUNITY
End: 2018-10-16

## 2017-09-09 RX ORDER — NITROGLYCERIN 0.4 MG/1
0.4 TABLET SUBLINGUAL
COMMUNITY
End: 2018-05-01

## 2017-09-09 RX ORDER — ROPINIROLE 1 MG/1
1 TABLET, FILM COATED ORAL NIGHTLY
Status: DISCONTINUED | OUTPATIENT
Start: 2017-09-09 | End: 2017-09-12 | Stop reason: HOSPADM

## 2017-09-09 RX ORDER — LORAZEPAM 1 MG/1
1 TABLET ORAL 3 TIMES DAILY
Status: DISCONTINUED | OUTPATIENT
Start: 2017-09-09 | End: 2017-09-11

## 2017-09-09 RX ORDER — ASPIRIN 81 MG/1
81 TABLET ORAL DAILY
COMMUNITY

## 2017-09-09 RX ORDER — ATORVASTATIN CALCIUM 20 MG/1
20 TABLET, FILM COATED ORAL DAILY
COMMUNITY
End: 2017-09-12 | Stop reason: HOSPADM

## 2017-09-09 RX ORDER — CARVEDILOL 3.12 MG/1
3.12 TABLET ORAL DAILY
COMMUNITY
End: 2017-10-02 | Stop reason: SDUPTHER

## 2017-09-09 RX ADMIN — TAMSULOSIN HYDROCHLORIDE 0.4 MG: 0.4 CAPSULE ORAL at 08:07

## 2017-09-09 RX ADMIN — LORAZEPAM 2 MG: 1 TABLET ORAL at 12:32

## 2017-09-09 RX ADMIN — ATORVASTATIN CALCIUM 10 MG: 10 TABLET, FILM COATED ORAL at 20:35

## 2017-09-09 RX ADMIN — AMIODARONE HYDROCHLORIDE 200 MG: 200 TABLET ORAL at 08:07

## 2017-09-09 RX ADMIN — HYDROCODONE BITARTRATE AND ACETAMINOPHEN 1 TABLET: 7.5; 325 TABLET ORAL at 11:44

## 2017-09-09 RX ADMIN — LISINOPRIL 2.5 MG: 2.5 TABLET ORAL at 08:06

## 2017-09-09 RX ADMIN — HYDROCODONE BITARTRATE AND ACETAMINOPHEN 1 TABLET: 7.5; 325 TABLET ORAL at 17:56

## 2017-09-09 RX ADMIN — BUMETANIDE 0.5 MG: 0.25 INJECTION INTRAMUSCULAR; INTRAVENOUS at 13:19

## 2017-09-09 RX ADMIN — CARVEDILOL 3.12 MG: 3.12 TABLET, FILM COATED ORAL at 17:56

## 2017-09-09 RX ADMIN — HYDROCODONE BITARTRATE AND ACETAMINOPHEN 1 TABLET: 7.5; 325 TABLET ORAL at 23:22

## 2017-09-09 RX ADMIN — ENOXAPARIN SODIUM 30 MG: 30 INJECTION SUBCUTANEOUS at 17:56

## 2017-09-09 RX ADMIN — FINASTERIDE 5 MG: 5 TABLET, FILM COATED ORAL at 08:07

## 2017-09-09 RX ADMIN — CARVEDILOL 3.12 MG: 3.12 TABLET, FILM COATED ORAL at 08:07

## 2017-09-09 RX ADMIN — BUMETANIDE 0.5 MG: 0.25 INJECTION INTRAMUSCULAR; INTRAVENOUS at 17:55

## 2017-09-09 RX ADMIN — LORAZEPAM 1 MG: 1 TABLET ORAL at 20:35

## 2017-09-09 RX ADMIN — GABAPENTIN 300 MG: 300 CAPSULE ORAL at 20:35

## 2017-09-09 RX ADMIN — FUROSEMIDE 40 MG: 40 TABLET ORAL at 08:07

## 2017-09-09 NOTE — PROGRESS NOTES
I have reviewed the notes, assessments, and/or procedures performed by Audrey Osei CMA , I concur with her  documentation of  Wild Bravo.

## 2017-09-09 NOTE — THERAPY EVALUATION
Acute Care - Physical Therapy Initial Evaluation  [facility]     Patient Name: Wild Bravo  : 1934  MRN: [id number]  Today's Date: 2017   Onset of Illness/Injury or Date of Surgery Date: 17  Date of Referral to PT: 17  Referring Physician: Dr Macdonald      Admit Date: 2017     Visit Dx:    ICD-10-CM ICD-9-CM   1. Impaired functional mobility, balance, gait, and endurance Z74.09 V49.89     Patient Active Problem List   Diagnosis   • Coronary arteriosclerosis   • CHF (congestive heart failure)   • SSS (sick sinus syndrome)   • Pacemaker   • Essential hypertension   • Biventricular ICD (implantable cardioverter-defibrillator) in place   • Persistent atrial fibrillation   • PAD (peripheral artery disease)   • H/O mitral valve repair   • ERRONEOUS ENCOUNTER--DISREGARD   • Chest pain     Past Medical History:   Diagnosis Date   • Abdominal aortic aneurysm    • Anxiety    • Atrial fibrillation    • Biventricular ICD (implantable cardioverter-defibrillator) in place    • BPH (benign prostatic hypertrophy)    • Carotid artery stenosis    • CHF (congestive heart failure)    • Chronic kidney disease     Chronic kidney disease, stage 4 (severe)   • Chronic systolic (congestive) heart failure     limited echo 2016 EF was 40-45%. Patient has BiV AICD   • Coronary arteriosclerosis     MI x2   • Hearing loss    • Iron deficiency anemia    • Ischemic cardiomyopathy    • Mixed hyperlipidemia    • Myocardial infarction    • Stroke      Past Surgical History:   Procedure Laterality Date   • CARDIAC ABLATION     • CARDIAC CATHETERIZATION     • CARDIAC PACEMAKER PLACEMENT     • CARDIOVERSION     • CAROTID ENDARTERECTOMY     • CORONARY ARTERY BYPASS GRAFT     • MITRAL VALVE REPLACEMENT     • TOTAL KNEE ARTHROPLASTY            PT ASSESSMENT (last 72 hours)      PT Evaluation       17 1027 17 1004    Rehab Evaluation    Document Type  evaluation   see MAR  -PB    Subjective Information  agree to  therapy;complains of;dyspnea;pain  -PB    Patient Effort, Rehab Treatment  good  -PB    General Information    Patient Profile Review  yes  -PB    Onset of Illness/Injury or Date of Surgery Date  09/08/17  -PB    Referring Physician  Dr Macdonald  -PB    General Observations  awake and alert in bed  -PB    Pertinent History Of Current Problem  SOB, abd distention and pain, R sided chest pain, fatigue, AMS, falls  -PB    Precautions/Limitations  fall precautions  -PB    Prior Level of Function  independent:;community mobility;all household mobility;ADL's   sleeps in recliner; falls; usteady at baseline  -PB    Equipment Currently Used at Home none  -LN none  -PB    Plans/Goals Discussed With  patient and family;agreed upon  -PB    Risks Reviewed  patient and family:;LOB;nausea/vomiting;dizziness;change in vital signs;increased discomfort  -PB    Benefits Reviewed  patient and family:;improve function;increase strength;increase independence;increase balance  -PB    Barriers to Rehab  cognitive status;previous functional deficit  -PB    Living Environment    Lives With alone  -LN alone  -PB    Living Arrangements  house  -PB    Home Accessibility no concerns  -LN stairs to enter home;tub/shower is not walk in  -PB    Number of Stairs to Enter Home  15  -PB    Stair Railings at Home none  -LN outside, present at both sides  -PB    Type of Financial/Environmental Concern none  -LN     Transportation Available car;family or friend will provide  -LN     Clinical Impression    Date of Referral to PT  09/08/17  -PB    PT Diagnosis  impaired balance, functional mobility, edurance  -PB    Patient/Family Goals Statement  return home  -PB    Criteria for Skilled Therapeutic Interventions Met  yes;treatment indicated  -PB    Impairments Found (describe specific impairments)  gait, locomotion, and balance  -PB    Rehab Potential  good, to achieve stated therapy goals  -PB    Predicted Duration of Therapy Intervention (days/wks)  until  D/C  -PB    Vital Signs    Intratreatment Heart Rate (beats/min)  130  -PB    Posttreatment Heart Rate (beats/min)  103  -PB    Pre SpO2 (%)  97  -PB    O2 Delivery Pre Treatment  room air  -PB    Intra SpO2 (%)  85  -PB    O2 Delivery Intra Treatment  room air  -PB    Post SpO2 (%)  98  -PB    O2 Delivery Post Treatment  room air  -PB    Pre Patient Position  Sitting  -PB    Intra Patient Position  Standing  -PB    Post Patient Position  Sitting  -PB    Pain Assessment    Pain Assessment  0-10  -PB    Pain Score  4  -PB    Pain Type  Acute pain  -PB    Pain Location  Chest  -PB    Pain Descriptors  Sore  -PB    Pain Frequency  Constant/continuous  -PB    Pain Intervention(s)  Repositioned  -PB    Response to Interventions  tolerated  -PB    Cognitive Assessment/Intervention    Current Cognitive/Communication Assessment  impaired  -PB    Follows Commands/Answers Questions  able to follow single-step instructions;100% of the time  -PB    Personal Safety  decreased awareness, need for safety;decreased insight to deficits;impulsive  -PB    Personal Safety Interventions  fall prevention program maintained;gait belt;nonskid shoes/slippers when out of bed;supervised activity  -PB    ROM (Range of Motion)    General ROM Detail  BLE AROM WFL; R shoulder impaired chronically  -PB    MMT (Manual Muscle Testing)    General MMT Assessment Detail  BLE functionally 4+/5; UE 3/5 within range  -PB    Bed Mobility, Assessment/Treatment    Bed Mobility, Assistive Device  bed rails;head of bed elevated  -PB    Bed Mob, Supine to Sit, [place]  conditional independence  -PB    Bed Mob, Sit to Supine, [place]  conditional independence  -PB    Transfer Assessment/Treatment    Transfers, Sit-Stand [place]  independent  -PB    Transfers, Stand-Sit [place]  independent  -PB    Transfer, Safety Issues  balance decreased during turns;impulsivity  -PB    Transfer, Impairments  impaired balance  -PB    Gait  Assessment/Treatment    Gait, [place] Level  contact guard assist;stand by assist  -PB    Gait, Distance (Feet)  125   with CGA, stand rest, 125 feet SBA  -PB    Gait, Gait Deviations  narrow base   path deviation x1 with SBA, base narrowed with SBA  -PB    Gait, Safety Issues  balance decreased during turns  -PB    Gait, Impairments  impaired balance  -PB    Motor Skills/Interventions    Additional Documentation  Balance Skills Training (Group)  -PB    Balance Skills Training    Sitting-Level of Assistance  Independent  -PB    Sitting-Balance Support  Feet supported  -PB    Standing-Level of Assistance  Close supervision  -PB    Static Standing Balance Support  No upper extremity supported  -PB    Gait Balance-Level of Assistance  Contact guard;Close supervision  -PB    Gait Balance Support  No upper extremity supported  -PB    Positioning and Restraints    Pre-Treatment Position  in bed  -PB    Post Treatment Position  bed  -PB    In Bed  fowlers;call light within reach;encouraged to call for assist;with family/caregiver;side rails up x2  -PB      09/09/17 0732 09/08/17 1629    Rehab Evaluation    Document Type evaluation   see MAR  -PB     General Information    Patient Profile Review yes  -PB     Onset of Illness/Injury or Date of Surgery Date 09/08/17  -PB     Referring Physician Dr Macdonald  -PB     Pertinent History Of Current Problem SOB, abd distention and pain, R sided chest pain, fatigue, AMS, falls  -PB     Living Environment    Transportation Available  car;family or friend will provide  -KU    Clinical Impression    Date of Referral to PT 09/08/17  -PB       09/08/17 1600 09/08/17 1540    General Information    Equipment Currently Used at Home  none  -KU    Living Environment    Lives With child(cecile), adult  -KU     Living Arrangements house  -KU     Home Accessibility no concerns  -KU     Stair Railings at Home none  -KU     Type of Financial/Environmental Concern --   DAUGHTER CONCERNED WITH PATIENT  BEING ALONE  -KU     Transportation Available car;family or friend will provide  -KU       User Key  (r) = Recorded By, (t) = Taken By, (c) = Cosigned By    Initials Name Provider Type    MANPREET Hawley, RN Registered Nurse    DUNIA Hernandez, PT DPT Physical Therapist    BREN BurchW           Physical Therapy Education     Title: PT OT SLP Therapies (Active)     Topic: Physical Therapy (Active)     Point: Mobility training (Done)    Learning Progress Summary    Learner Readiness Method Response Comment Documented by Status   Patient Acceptance E NR,VU benefits of activity, safety concerns  09/09/17 1054 Done   Family Acceptance E NR,VU benefits of activity, safety concerns  09/09/17 1054 Done                      User Key     Initials Effective Dates Name Provider Type Discipline     08/02/16 -  Fernando Hernandez, PT DPT Physical Therapist PT                PT Recommendation and Plan  Anticipated Discharge Disposition: home with 24/7 care, assisted living  Planned Therapy Interventions: balance training, gait training, patient/family education, stair training  PT Frequency: daily, 2 times/day, per priority policy  Plan of Care Review  Plan Of Care Reviewed With: patient, daughter  Outcome Summary/Follow up Plan: PT eval complete. pt able to perform bedmobility and transfers Independently. pt c/o SOA with donning socks and ambulating. pt able to ambulate 125 feet at time with CGA/SBA. pt demonstrate increased path excursion and narrowing of step width with decreased support. pt would benefit from continued skilled PT to address decreased activity tolerance, impaired functional mobility, and impaired balance. Anticipate D/C home with 24/7 supervision.          IP PT Goals       09/09/17 1051          Gait Training PT LTG    Gait Training Goal PT LTG, Date Established 09/09/17  -PB      Gait Training Goal PT LTG, Time to Achieve by discharge  -PB      Gait Training Goal PT  LTG, [place] Level supervision required  -PB      Gait Training Goal PT LTG, Distance to Achieve 300  -PB      Gait Training Goal PT LTG, Additional Goal maintain SpO2 equal to or above 90%  -PB      Stair Training PT LTG    Stair Training Goal PT LTG, Date Established 09/09/17  -PB      Stair Training Goal PT LTG, Time to Achieve by discharge  -PB      Stair Training Goal PT LTG, Number of Steps 15  -PB      Stair Training Goal PT LTG, [place] Level contact guard assist  -PB      Stair Training Goal PT LTG, Assist Device 1 handrail  -PB        User Key  (r) = Recorded By, (t) = Taken By, (c) = Cosigned By    Initials Name Provider Type    DUNIA Hernandez, PT DPT Physical Therapist                Outcome Measures       09/09/17 1004          How much help from another person do you currently need...    Turning from your back to your side while in flat bed without using bedrails? 4  -PB      Moving from lying on back to sitting on the side of a flat bed without bedrails? 4  -PB      Moving to and from a bed to a chair (including a wheelchair)? 4  -PB      Standing up from a chair using your arms (e.g., wheelchair, bedside chair)? 4  -PB      Climbing 3-5 steps with a railing? 3  -PB      To walk in hospital room? 3  -PB      AM-PAC 6 Clicks Score 22  -PB      Functional Assessment    Outcome Measure Options AM-PAC 6 Clicks Basic Mobility (PT)  -PB        User Key  (r) = Recorded By, (t) = Taken By, (c) = Cosigned By    Initials Name Provider Type    DUNIA Hernandez, PT DPT Physical Therapist           Time Calculation:         PT Charges       09/09/17 1055          Time Calculation    Start Time 1004   additional time 732-750  -PB      Stop Time 1035  -PB      Time Calculation (min) 31 min  -PB      PT Received On 09/09/17  -PB      PT Goal Re-Cert Due Date 09/19/17  -PB        User Key  (r) = Recorded By, (t) = Taken By, (c) = Cosigned By    Initials Name Provider Type    DUNIA Hernandez, PT  DPT Physical Therapist          Therapy Charges for Today     Code Description Service Date Service Provider Modifiers Qty    51402352928 HC PT MOBILITY CURRENT 9/9/2017 Fernando Hernandez, PT DPT GP, CJ 1    [id number] HC PT MOBILITY PROJECTED 9/9/2017 Fernando Hernandez, PT DPT GP, CI 1    73583125873 HC PT EVAL LOW COMPLEXITY 3 9/9/2017 Fernando Hernadnez, PT DPT GP 1          PT G-Codes  Outcome Measure Options: AM-PAC 6 Clicks Basic Mobility (PT)  Score: 22  Functional Limitation: Mobility: Walking and moving around  Mobility: Walking and Moving Around Current Status (): At least 20 percent but less than 40 percent impaired, limited or restricted  Mobility: Walking and Moving Around Goal Status (): At least 1 percent but less than 20 percent impaired, limited or restricted      Fernando Hernandez, PT DPT  9/9/2017

## 2017-09-09 NOTE — PROGRESS NOTES
Discharge Planning Assessment  [facility]     Patient Name: Wild Bravo  MRN: [id number]  Today's Date: 9/9/2017    Admit Date: 9/8/2017          Discharge Needs Assessment       09/09/17 1027    Living Environment    Lives With alone    Home Accessibility no concerns    Stair Railings at Home none    Type of Financial/Environmental Concern none    Transportation Available car;family or friend will provide    Living Environment    Provides Primary Care For no one, unable/limited ability to care for self    Quality Of Family Relationships supportive;involved;helpful    Able to Return to Prior Living Arrangements no    Discharge Needs Assessment    Concerns To Be Addressed basic needs concerns;discharge planning concerns;decision making concerns;cognitive/perceptual concerns;financial/insurance concerns;home safety concerns    Readmission Within The Last 30 Days no previous admission in last 30 days    Anticipated Changes Related to Illness inability to care for self    Equipment Currently Used at Home none    Discharge Facility/Level Of Care Needs nursing facility, skilled;psychiatric facility    Current Discharge Risk cognitively impaired;financial support inadequate;lives alone    Discharge Planning Comments --   Spoke to pt's daughter Munira [phone].  She is currently working with [employer] due to pt being scammed $100,000 plus.  Pt is confused, frequent falls, sending money via [employer] to via680, etc.      [facility] psychiatric process initiated --   Once medically cleared, family wants pt placed at [facility].            Discharge Plan     None        Discharge Placement     No information found                Demographic Summary     None            Functional Status     None            Psychosocial     None            Abuse/Neglect     None            Legal     None            Substance Abuse     None            Patient Forms     None          Stephanie LUI  JAMES Kearns

## 2017-09-09 NOTE — PROGRESS NOTES
Continued Stay Note  [facility]     Patient Name: Wild Bravo  MRN: [id number]  Today's Date: 9/9/2017    Admit Date: 9/8/2017          Discharge Plan       09/09/17 1043    Case Management/Social Work Plan    Plan GRADY has faxed referral to [facility] [phone].  The pt has no H&P in system at this time.  Once this is entered, GRADY will fax to [facility].  GRADY spoke to Ann at [facility] [phone] and she stated that they currently do not have bed openings but should in the next few days.  JAMES Pardo.    Patient/Family In Agreement With Plan yes      09/09/17 1037    Case Management/Social Work Plan    Patient/Family In Agreement With Plan yes              Discharge Codes     None            JAMES Soto

## 2017-09-09 NOTE — CONSULTS
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 be related to his peripheral arterial disease.  He notes pain more when he moves and it is better when he rests which therefore would not be related to restless leg syndrome improves when you move.  Perhaps another medication such as Pletal may be added.  I would defer that to the vascular physicians or to primary care.  10.  Fall risk.  .  Plan  1.  Obtain ferritin level.  Goal of ferritin to be greater than 100.  This may reduce his need for medication such as gabapentin and Requip as it would help treat the his restless legs syndrome or at least reduce the symptoms  2.  Discontinue Requip.  He should never be on Sinemet or Mirapex either in terms of treatment for his restless legs.  3.  He will need close monitoring of his medications and we can begin then to slowly wean him off of these medications--in particular his Ativan and hydrocodone and whatever the outcome of that may have to be treated in another fashion.   4.  PT to help with his balance and reduce his risk for falls.  In addition he should have stretching exercises shown to him to be formed on a nightly basis to help reduce the his restless legs.  He could soak in a warm tub while doing them.  Massage of his legs may help before bedtime.   5.  Recommend psychiatric evaluation   6. Will send copy of this to his  Primary Care Physician Dr. Ames to help manage some of these suggestions.   7. Reportedly daughter is attempting power of  with a court hearing this Wednesday.    Of note the bulk of the history above was provided by the daughter Audra and Mr. Bravo did not always agree with her.         Jessica Rivas MD  09/09/17  3:21 PM

## 2017-09-09 NOTE — PLAN OF CARE
Problem: Patient Care Overview (Adult)  Goal: Plan of Care Review  Outcome: Ongoing (interventions implemented as appropriate)    09/09/17 1417   Coping/Psychosocial Response Interventions   Plan Of Care Reviewed With patient   Patient Care Overview   Progress no change   Outcome Evaluation   Outcome Summary/Follow up Plan Initial RDN assessment. Pt reports fair appetite, however is a poor historian about his diet and weight history. RDN encouraged intake and educated on Low Na diet recommendations. Will continue to FU.

## 2017-09-09 NOTE — PLAN OF CARE
Problem: Patient Care Overview (Adult)  Goal: Plan of Care Review  Outcome: Ongoing (interventions implemented as appropriate)    09/09/17 1711   Coping/Psychosocial Response Interventions   Plan Of Care Reviewed With patient;daughter   Patient Care Overview   Progress progress toward functional goals is gradual   Outcome Evaluation   Outcome Summary/Follow up Plan Pt had his US of the abdomen today due to c/o abdominal pain, results found gallstones. Neuro and Nephro consulted today also. VSS. Vpacing 81-93 per tele. Pt c/o gen pain given pain med per order. Changed Lasix to bumex IV bid for diuresing. Cont to monitor pt.          Problem: Cardiac: Heart Failure (Adult)  Goal: Signs and Symptoms of Listed Potential Problems Will be Absent or Manageable (Cardiac: Heart Failure)  Outcome: Ongoing (interventions implemented as appropriate)    Problem: Fall Risk (Adult)  Goal: Identify Related Risk Factors and Signs and Symptoms  Outcome: Outcome(s) achieved Date Met:  09/09/17  Goal: Absence of Falls  Outcome: Ongoing (interventions implemented as appropriate)

## 2017-09-09 NOTE — CONSULTS
Nephrology ([facility]) Consult Note      Patient:  Wild Bravo  YOB: 1934  Date of Service: 9/9/2017  MRN: [id number]   Acct: [id number]   Primary Care Physician: Eliel Ames MD  Advance Directive: Full Code  Admit Date: 9/8/2017       Hospital Day: 0  Referring Provider: No ref. provider found      Patient personally seen and examined.  Complete chart including Consults, Notes, Operative Reports, Labs, Cardiology, and Radiology studies reviewed as able.        Subjective:  Wild Bravo is a 83 y.o. male  whom we were consulted for CKD.  Pt of Dr. Crawford with CKD  Prior creat 2.68, then 1.8 with decreased diuresis.  Admitted with fall.  Evaluated by cardiology and neurology.  Pt denies specific c/o aside from mild soa.  No f/c/n/v.  Family notes poor compliance with diet.  Daughter Audra (APRN from [place]) present.  Family very concerned about self care.    Allergies:  Keflex [cephalexin]    Home Meds:  Prescriptions Prior to Admission   Medication Sig Dispense Refill Last Dose   • amiodarone (PACERONE) 200 MG tablet Take 1 tablet by mouth Daily. 30 tablet 1 Past Week at Unknown time   • carvedilol (COREG) 3.125 MG tablet Take 3.125 mg by mouth 2 (Two) Times a Day With Meals.   Past Week at Unknown time   • furosemide (LASIX) 40 MG tablet Take 40 mg by mouth Daily As Needed (edema).   Past Month at Unknown time   • gabapentin (NEURONTIN) 300 MG capsule Take 300 mg by mouth 2 (Two) Times a Day.   Past Week at Unknown time   • HYDROcodone-acetaminophen (NORCO) 7.5-325 MG per tablet Take 1 tablet by mouth Every 8 (Eight) Hours As Needed for Moderate Pain .   Past Week at Unknown time   • LORazepam (ATIVAN) 2 MG tablet Take 2 mg by mouth Every 8 (Eight) Hours As Needed for Anxiety.   Past Week at Unknown time   • aspirin 81 MG EC tablet Take 81 mg by mouth Daily.   Unknown at Unknown time   • atorvastatin (LIPITOR) 20 MG tablet Take 20 mg by mouth Daily.   Unknown at  Unknown time   • dutasteride (AVODART) 0.5 MG capsule Take 0.5 mg by mouth Daily.   Unknown at Unknown time   • lisinopril (PRINIVIL,ZESTRIL) 5 MG tablet Take 2.5 mg by mouth Daily.   Unknown at Unknown time   • nitroglycerin (NITROSTAT) 0.4 MG SL tablet Place 0.4 mg under the tongue Every 5 (Five) Minutes As Needed for Chest Pain. Take no more than 3 doses in 15 minutes.   Unknown at Unknown time   • rOPINIRole (REQUIP) 2 MG tablet Take 2 mg by mouth 2 (Two) Times a Day.   Unknown at Unknown time   • tamsulosin (FLOMAX) 0.4 MG capsule 24 hr capsule Take 1 capsule by mouth Every Night.   Unknown at Unknown time       Medicines:  Current Facility-Administered Medications   Medication Dose Route Frequency Provider Last Rate Last Dose   • amiodarone (PACERONE) tablet 200 mg  200 mg Oral Q24H Karthik Macdonald MD   200 mg at 09/09/17 0807   • atorvastatin (LIPITOR) tablet 10 mg  10 mg Oral Nightly Karthik Macdonald MD   10 mg at 09/08/17 2050   • bumetanide (BUMEX) injection 0.5 mg  0.5 mg Intravenous BID Karthik Macdonald MD   0.5 mg at 09/09/17 1755   • carvedilol (COREG) tablet 3.125 mg  3.125 mg Oral BID With Meals Karthik Macdonald MD   3.125 mg at 09/09/17 1756   • enoxaparin (LOVENOX) syringe 30 mg  30 mg Subcutaneous Daily Karthik Macdonald MD   30 mg at 09/09/17 1756   • finasteride (PROSCAR) tablet 5 mg  5 mg Oral Daily Karthik Macdonald MD   5 mg at 09/09/17 0807   • gabapentin (NEURONTIN) capsule 300 mg  300 mg Oral Nightly Karthik Macdonald MD   300 mg at 09/08/17 2050   • HYDROcodone-acetaminophen (NORCO) 7.5-325 MG per tablet 1 tablet  1 tablet Oral Q6H PRN Karthik Macdonald MD   1 tablet at 09/09/17 1756   • lisinopril (PRINIVIL,ZESTRIL) tablet 2.5 mg  2.5 mg Oral Q24H Karthik Macdonald MD   2.5 mg at 09/09/17 0806   • LORazepam (ATIVAN) tablet 2 mg  2 mg Oral Q8H PRN Karthik Macdonlad MD   2 mg at 09/09/17 1232   • nitroglycerin (NITROSTAT) SL tablet 0.4 mg  0.4 mg Sublingual Q5 Min PRN Karthik Macdonald MD       • rOPINIRole (REQUIP) tablet 2 mg  2 mg Oral  Nightly Karthik Macdonald MD       • sodium chloride 0.9 % flush 1-10 mL  1-10 mL Intravenous PRN Karthik Macdonald MD       • tamsulosin (FLOMAX) 24 hr capsule 0.4 mg  0.4 mg Oral Daily Karthik Macdonald MD   0.4 mg at 09/09/17 0807       Past Medical History:  Past Medical History:   Diagnosis Date   • Abdominal aortic aneurysm    • Anxiety    • Atrial fibrillation    • Biventricular ICD (implantable cardioverter-defibrillator) in place    • BPH (benign prostatic hypertrophy)    • Carotid artery stenosis    • CHF (congestive heart failure)    • Chronic kidney disease     Chronic kidney disease, stage 4 (severe)   • Chronic systolic (congestive) heart failure     limited echo 7/2016 EF was 40-45%. Patient has BiV AICD   • Coronary arteriosclerosis     MI x2   • Hearing loss    • Iron deficiency anemia    • Ischemic cardiomyopathy    • Mixed hyperlipidemia    • Myocardial infarction    • Stroke        Past Surgical History:  Past Surgical History:   Procedure Laterality Date   • CARDIAC ABLATION     • CARDIAC CATHETERIZATION     • CARDIAC PACEMAKER PLACEMENT     • CARDIOVERSION     • CAROTID ENDARTERECTOMY     • CORONARY ARTERY BYPASS GRAFT     • MITRAL VALVE REPLACEMENT     • TOTAL KNEE ARTHROPLASTY         Family History  Family History   Problem Relation Age of Onset   • Stroke Mother    • Heart disease Mother    • Diabetes Father        Social History  Social History     Social History   • Marital status:      Spouse name: N/A   • Number of children: N/A   • Years of education: N/A     Occupational History   • Not on file.     Social History Main Topics   • Smoking status: Former Smoker   • Smokeless tobacco: Never Used   • Alcohol use No   • Drug use: No   • Sexual activity: Defer     Other Topics Concern   • Not on file     Social History Narrative         Review of Systems:  History obtained from chart review and the patient  General ROS: No fever or chills  Respiratory ROS: No cough, +shortness of  "breath  Cardiovascular ROS: no chest pain or dyspnea on exertion  Gastrointestinal ROS: No abdominal pain or melena  Genito-Urinary ROS: No dysuria or hematuria  14 point ROS reviewed with the patient and negative except as noted above and in the HPI unless unable to obtain.    Objective:  /64 (BP Location: Left arm, Patient Position: Lying)  Pulse 80  Temp 98.3 °F (36.8 °C) (Temporal Artery )   Resp 20  Ht 73.5\" (186.7 cm)  Wt 153 lb 3 oz (69.5 kg)  SpO2 96%  BMI 19.94 kg/m2    Intake/Output Summary (Last 24 hours) at 09/09/17 1829  Last data filed at 09/09/17 1300   Gross per 24 hour   Intake              240 ml   Output             1175 ml   Net             -935 ml     General: awake/alert   Chest:  clear to auscultation bilaterally without respiratory distress  CVS: regular rate and rhythm  Abdominal: soft, nontender, normal bowel sounds  Extremities: ble edema  Skin: warm and dry without rash      Labs:    Results from last 7 days  Lab Units 09/08/17  1300   WBC 10*3/mm3 5.60   HEMOGLOBIN g/dL 12.9*   HEMATOCRIT % 41.3   PLATELETS 10*3/mm3 177           Results from last 7 days  Lab Units 09/09/17  0318 09/08/17  1607 09/08/17  1300   SODIUM mmol/L 138 138 139   POTASSIUM mmol/L 3.9 4.0 4.8   CHLORIDE mmol/L 98 96* 97*   CO2 mmol/L 33.0* 29.0 31.0   BUN mg/dL 48* 52* 52*   CREATININE mg/dL 1.71* 1.77* 1.94*   CALCIUM mg/dL 9.0 9.3 9.8   BILIRUBIN mg/dL  --   --  1.1*   ALK PHOS U/L  --   --  83   ALT (SGPT) U/L  --   --  47   AST (SGOT) U/L  --   --  40   GLUCOSE mg/dL 97 150* 98       Radiology:   Imaging Results (last 72 hours)     Procedure Component Value Units Date/Time    XR Chest PA & Lateral [[id number]] Collected:  09/08/17 1622     Updated:  09/08/17 1627    Narrative:       EXAMINATION:   XR CHEST PA AND LATERAL-  9/8/2017 3:22 PM CST     HISTORY: Short of breath     PA and lateral views the chest obtained. Hyperinflation increase in AP  diameter chest is noted consistent with COPD.   "   Cardiac silhouette is mildly enlarged.     Prosthetic cardiac valve is present in the mitral position.     Pacing device is present projecting over the soft tissues of the left  chest. Wires are present median sternotomy. Vascular calcifications  present in the aortic arch.     Benign calcifications in the milagros are present bilaterally.       Impression:       Mild cardiomegaly no evidence of pulmonary vascular  congestion.  2. COPD  This report was finalized on 09/08/2017 16:24 by Dr. Yg Bowden MD.    CT Head Without Contrast [[id number]] Collected:  09/08/17 1633     Updated:  09/08/17 1639    Narrative:       CT HEAD WO CONTRAST- 9/8/2017 5:21 PM EDT     HISTORY: falls, alt mental status       Technique:   Axial CT of the brain without IV contrast. Sagittal and coronal  reformations are also provided for review. Soft tissue and bone kernels  are available for interpretation.     Comparison: None.     Findings:      There is no evidence of acute large vascular distribution infarct, mass  lesion or hemorrhage.  The ventricles, cortical sulci and basal cisterns  are symmetric and age appropriate. Mild generalized symmetric volume  loss is identified. There is atheromatous calcification in the  intracranial vertebral arteries as well as the bilateral paraclinoid  ICAs.  Normal brainstem and posterior fossa.  The scalp and calvarium  are unremarkable.        Impression:       Impression:    1. No acute intracranial process  This report was finalized on 09/08/2017 16:36 by Dr Jem Carreno, .    US Carotid Bilateral [[id number]] Updated:  09/08/17 1702    US Abdomen Complete [[id number]] Collected:  09/09/17 0942     Updated:  09/09/17 0949    Narrative:       ULTRASOUND ABDOMEN COMPLETE 9/9/2017 8:20 AM CDT     REASON FOR EXAM: abdominal pain, hx aaa       COMPARISON: None      TECHNIQUE: Multiple longitudinal and transverse real-time sonographic  images of the abdomen are obtained.      FINDINGS:       LIVER:  Normal in size, smooth in contour, and homogeneous in  echogenicity. No focal lesion is seen.     BILIARY: Echoes are noted in the gallbladder with distal shadowing  consistent with cholelithiasis.. The common bile duct measures 0.7 cm in  diameter. There is no evidence of intrahepatic ductal dilatation.       KIDNEYS: The right and left kidneys are normal in size, shape,  orientation, and position measuring 5.8 x 7.3 x 14.9 cm and 5.2 x 6.2 x  12.2 cm, respectively. The corticomedullary differentiation is  maintained. There is no evidence of nephrolithiasis, hydronephrosis or  perinephric fluid collection. Cysts are present associated right kidney  the largest is 5.7 cm. The largest left renal cyst is at the upper pole  and is 6.4 cm No hydroureter is seen.     PANCREAS: No focal lesion or abnormality.      SPLEEN: Normal in size and appearance.      OTHER: No ascites is present. The aorta is aneurysmal and measures 39 mm  in maximal diameter. Inferior vena cava is visualized.     The prostate is enlarged. The prostate is 7.2 x 9 cm.        Impression:       1. Cholelithiasis.        2. Enlarged prostate Yuliana graph  3. Renal cyst     This report was finalized on 09/09/2017 09:46 by Dr. Yg Bowden MD.          Culture:         Assessment   CKD 3  Recent KRISTY from diuretic use  Bilateral renal cysts  Persistent proteinuria  HTN  cognitive impairment    Plan:  Long d/w pt/family  Agree with neuro eval/need for psych eval  Continue diuretics for now  Monitor labs  Happy to see at [facility] if pt transferred as needed      Thank you for the consult, we appreciate the opportunity to provide care to your patients.  Feel free to contact me if I can be of any further assistance.      Daniele Sharpe MD  9/9/2017  6:29 PM

## 2017-09-09 NOTE — PLAN OF CARE
Problem: Patient Care Overview (Adult)  Goal: Plan of Care Review  Outcome: Ongoing (interventions implemented as appropriate)    09/09/17 1051   Coping/Psychosocial Response Interventions   Plan Of Care Reviewed With patient;daughter   Outcome Evaluation   Outcome Summary/Follow up Plan PT eval complete. pt able to perform bedmobility and transfers Independently. pt c/o SOA with donning socks and ambulating. pt able to ambulate 125 feet at time with CGA/SBA. pt demonstrate increased path excursion and narrowing of step width with decreased support. pt would benefit from continued skilled PT to address decreased activity tolerance, impaired functional mobility, and impaired balance. Anticipate D/C home with 24/7 supervision.         Problem: Inpatient Physical Therapy  Goal: Gait Training Goal LTG- PT  Outcome: Ongoing (interventions implemented as appropriate)    09/09/17 1051   Gait Training PT LTG   Gait Training Goal PT LTG, Date Established 09/09/17   Gait Training Goal PT LTG, Time to Achieve by discharge   Gait Training Goal PT LTG, [place] Level supervision required   Gait Training Goal PT LTG, Distance to Achieve 300   Gait Training Goal PT LTG, Additional Goal maintain SpO2 equal to or above 90%       Goal: Stair Training Goal LTG- PT  Outcome: Ongoing (interventions implemented as appropriate)    09/09/17 1051   Stair Training PT LTG   Stair Training Goal PT LTG, Date Established 09/09/17   Stair Training Goal PT LTG, Time to Achieve by discharge   Stair Training Goal PT LTG, Number of Steps 15   Stair Training Goal PT LTG, [place] Level contact guard assist   Stair Training Goal PT LTG, Assist Device 1 handrail

## 2017-09-09 NOTE — DISCHARGE PLACEMENT REQUEST
"To:   [facility]  From:  JAMES Pardo, [facility]  [phone]      Wild Bravo (83 y.o. Male)     Date of Birth Social Security Number Address Home Phone MRN    1934  [address] [place] [address] [phone] [id number]    Shinto Marital Status          Roman Catholic        Admission Date Admission Type Admitting Provider Attending Provider Department, Room/Bed    9/8/17 Urgent Karthik Macdonald MD Bose, Sanjay, MD [facility] 4B, 409/1    Discharge Date Discharge Disposition Discharge Destination                      Attending Provider: Karthik Macdonald MD     Allergies:  Keflex [Cephalexin]    Isolation:  None   Infection:  None   Code Status:  FULL    Ht:  73.5\" (186.7 cm)   Wt:  153 lb 3 oz (69.5 kg)    Admission Cmt:  None   Principal Problem:  None                Active Insurance as of 9/8/2017     Primary Coverage     Payor Plan Insurance Group Employer/Plan Group    MEDICARE MEDICARE A & B      Payor Plan Address Payor Plan Phone Number Effective From Effective To    [address] [phone] 7/1/1999     [place] [address]       Subscriber Name Subscriber Birth Date Member ID       WILD BRAVO 1934 [id number]                 Emergency Contacts      (Rel.) Home Phone Work Phone Mobile Phone    Munira Cai (Daughter) [phone] -- --        OUSMANE Pretty Nurse Practitioner Signed  Progress Notes Date of Service: 9/8/2017 11:00 AM   Related encounter: Office Visit from 9/8/2017 in [facility]     Procedure Orders:     ECG 12 Lead [[id number]] ordered by OUSMANE Pretty at 09/08/17 1258         Post-procedure Diagnoses:     Chronic systolic congestive heart failure [I50.22]     Coronary arteriosclerosis [I25.10]     Biventricular ICD (implantable cardioverter-defibrillator) in place [Z95.810]     Persistent atrial fibrillation [I48.1]        " "  Expand All Collapse All    []Hide copied text  []Hover for attribution information        Subjective:      Encounter Date:09/08/2017        Subjective    Patient ID: Wild Bravo is a 83 y.o. male.     Chief Complaint:  HPI Comments: Pt reports he has been having more shortness of breath over the past several months. He admits intermittent abdominal distention and pain as well. He denies lower extremity edema. His weight fluctuates but is currently stable. He has been having right sided, non exertional chest pains lasting 1-2 minutes without other associated symptoms, over the past several months.      Congestive Heart Failure   Presents for follow-up visit. Associated symptoms include abdominal pain, chest pain, fatigue and shortness of breath. Pertinent negatives include no claudication, edema, near-syncope or palpitations. The symptoms have been worsening.         The following portions of the patient's history were reviewed and updated as appropriate: allergies, current medications, past family history, past medical history, past social history, past surgical history and problem list.  BP 90/60 (BP Location: Right arm, Patient Position: Sitting, Cuff Size: Adult)  Pulse 88  Ht 73.5\" (186.7 cm)  Wt 155 lb 3.2 oz (70.4 kg)  SpO2 98%  BMI 20.2 kg/m2        Allergies   Allergen Reactions   • Keflex [Cephalexin]         CEPHALOSPORINS         Current Outpatient Prescriptions:   •  aspirin 81 MG tablet, Take 81 mg by mouth daily.  , Disp: , Rfl:   •  atorvastatin (LIPITOR) 20 MG tablet, Take 20 mg by mouth daily.  , Disp: , Rfl:   •  carvedilol (COREG) 6.25 MG tablet, Take 1 tablet by mouth 2 (Two) Times a Day With Meals. (Patient taking differently: Take 3.125 mg by mouth 2 (Two) Times a Day With Meals.), Disp: 180 tablet, Rfl: 2  •  dutasteride (AVODART) 0.5 MG capsule, Take 0.5 mg by mouth daily.  , Disp: , Rfl:   •  furosemide (LASIX) 40 MG tablet, As Needed., Disp: , Rfl:   •  gabapentin (NEURONTIN) 300 " MG capsule, Take 300 mg by mouth 3 (Three) Times a Day., Disp: , Rfl:   •  HYDROcodone-acetaminophen (VICODIN) 5-500 MG per tablet, Every 6 (Six) Hours., Disp: , Rfl:   •  lisinopril (PRINIVIL,ZESTRIL) 5 MG tablet, Take 2.5 mg by mouth daily , Disp: , Rfl:   •  rOPINIRole (REQUIP) 0.5 MG tablet, 2 mg bid, Disp: , Rfl:   •  tamsulosin (FLOMAX) 0.4 MG capsule 24 hr capsule, Take 0.4 mg by mouth daily, Disp: , Rfl:   •  amiodarone (PACERONE) 200 MG tablet, Take 1 tablet by mouth Daily., Disp: 30 tablet, Rfl: 1  •  nitroglycerin (NITROSTAT) 0.4 MG SL tablet, 1 under the tongue as needed for angina, may repeat q5mins for up three doses, Disp: 30 tablet, Rfl: 0   Medical History         Past Medical History:   Diagnosis Date   • Abdominal aortic aneurysm     • Anxiety     • Biventricular ICD (implantable cardioverter-defibrillator) in place     • BPH (benign prostatic hypertrophy)     • Carotid artery stenosis     • CHF (congestive heart failure)     • Chronic kidney disease       Chronic kidney disease, stage 4 (severe)   • Chronic systolic (congestive) heart failure       limited echo 7/2016 EF was 40-45%. Patient has BiV AICD   • Coronary arteriosclerosis       MI x2   • Iron deficiency anemia     • Ischemic cardiomyopathy     • Mixed hyperlipidemia     • Myocardial infarction     • Stroke            Surgical History          Past Surgical History:   Procedure Laterality Date   • CARDIAC ABLATION       • CARDIAC CATHETERIZATION       • CARDIAC PACEMAKER PLACEMENT       • CARDIOVERSION       • CAROTID ENDARTERECTOMY       • CORONARY ARTERY BYPASS GRAFT       • MITRAL VALVE REPLACEMENT       • TOTAL KNEE ARTHROPLASTY              Social History    Social History            Social History   • Marital status:        Spouse name: N/A   • Number of children: N/A   • Years of education: N/A          Occupational History   • Not on file.           Social History Main Topics   • Smoking status: Former Smoker   •  Smokeless tobacco: Never Used   • Alcohol use No   • Drug use: No   • Sexual activity: Defer           Other Topics Concern   • Not on file      Social History Narrative               Family History   Problem Relation Age of Onset   • Stroke Mother     • Heart disease Mother     • Diabetes Father           Review of Systems   Constitution: Positive for fatigue and malaise/fatigue. Negative for chills, diaphoresis, fever and weakness.   HENT: Negative for nosebleeds.    Eyes: Negative for visual disturbance.   Cardiovascular: Positive for chest pain. Negative for claudication, cyanosis, dyspnea on exertion, irregular heartbeat, leg swelling, near-syncope, orthopnea, palpitations, paroxysmal nocturnal dyspnea and syncope.   Respiratory: Positive for shortness of breath. Negative for cough, hemoptysis, sputum production and wheezing.    Hematologic/Lymphatic: Negative for bleeding problem.   Skin: Negative for color change and flushing.   Musculoskeletal: Negative for falls.   Gastrointestinal: Positive for abdominal pain. Negative for bloating, hematemesis, hematochezia, melena, nausea and vomiting.   Genitourinary: Negative for hematuria.   Neurological: Negative for dizziness and light-headedness.   Psychiatric/Behavioral: Negative for altered mental status.           ECG 12 Lead  Date/Time: 9/8/2017 12:58 PM  Performed by: PITA JAUREGUI  Authorized by: PITA JAUREGUI   Comparison: compared with previous ECG from 3/10/2016  Similar to previous ECG  Comparison to previous ECG: afib with V pacing   Rhythm: atrial fibrillation and paced               Objective:      Objective    Physical Exam   Constitutional: He is oriented to person, place, and time. He appears well-developed and well-nourished. No distress.   HENT:   Head: Normocephalic and atraumatic.   Eyes: Pupils are equal, round, and reactive to light.   Neck: Normal range of motion. Neck supple. No JVD present. No thyromegaly present.   Cardiovascular: Normal  rate, regular rhythm, normal heart sounds and intact distal pulses.  Exam reveals no gallop and no friction rub.    No murmur heard.  Pulses:       Dorsalis pedis pulses are 2+ on the right side, and 2+ on the left side.   Pulmonary/Chest: Effort normal and breath sounds normal. No respiratory distress. He has no wheezes. He has no rales. He exhibits no tenderness.   Abdominal: Soft. Bowel sounds are normal. He exhibits no distension. There is no tenderness.   Musculoskeletal: Normal range of motion. He exhibits no edema.   Neurological: He is alert and oriented to person, place, and time. No cranial nerve deficit.   Skin: Skin is warm and dry. He is not diaphoretic.   Psychiatric: He has a normal mood and affect. His behavior is normal.         Lab Review:         Assessment:      Assessment            Diagnosis Plan   1. Chronic systolic congestive heart failure  Currently euvolemic on exam. Stage C, class II. Has had recent dyspnea, chest pain weight fluctuations. Check BNP. Continue to take lasix 40 mg PRN for symptoms- he states he usually has abdominal distension with fluid retention. Repeat echo due to recent symptoms. LVEF 7/2016 40%, moderate pulmonary htn               2. Coronary arteriosclerosis  S/p CABG, MV repair, and MAZE 2/2008, negative stress with old inferior MI 1/2016. Pt states his chest pain is not bothersome at this point and he declines further stress testing    3. Biventricular ICD (implantable cardioverter-defibrillator) in place  Interrogated today    4. Persistent atrial fibrillation  History of PAF, previously on warfarin- was dc'd due to bleeding complications. Pt also reports he has had recent falls and hit his head, therefore no anticoagulation. Currently rate controlled on low dose coreg, but ICD interrogation reveals  Persistent afib since June with multiple episode of ATP- discussed with Dr. Macdonald- add amiodarone 200 mg daily    5. Essential hypertension  Hypotension today, sbp 90  ; unable to titrate coreg    6. PAD (peripheral artery disease)  S/p cardotid endarterectomy    7. H/O mitral valve repair  2008   8. CKD- followed by nephrology- aldactone recently stopped and lasix decreased to PRN due to KRISTY  9. AAA - do have have records of previous scans, will order abdominal US given recent abdominal pain and AAA history      Plan:      Plan          Case discussed with patient and daughter at length. Discussed with Dr. Macdonald as well. Add amio, check BNP, abdominal US, and echo.   Close follow up in 2 weeks with Dr. Macdonald, sooner with new or worsening symptoms.  Discussed s/s to report and when to report to ER.   Optivol not available feature to check on his ICD.     Reviewed signs and symptoms of CHF and what to report with the patient. Patient instructed to restrict sodium and fluid and weigh daily. Report weight gain of greater than 2 lbs overnight or 5 lbs in 1 week. Pt verbalized understanding of instructions and plan of care.      Face to face encounter with patient , duration 60 minutes           Consults  Unsigned   Date of Service: 9/8/2017 7:42 PM  Jessiac Rivas MD   Neurology   Consult Orders:   1. Inpatient Consult to Neurology [[id number]] ordered by Karthik Macdonald MD at 09/08/17 1551   Expand All Collapse All     []Hide copied text       Neurology Consult Note     Patient:  Wild Bravo   YOB: 1934  MRN:  [id number]  Date of Admission:  9/8/2017  3:02 PM     Date: 9/8/2017     Referring Provider: Karthik Macdonald MD  Reason for Consultation: altered mental status, recent falls        History of present illness:      This is a 83 y.o. year old right handed male.who is a retired dentist with H/O previous tobacco use, and current H/O hypertension, CKD, coronary arteriosclerosis, chronic systolic congestive heart failure and sick sinus syndrome s/p pacemaker on 10/16/2016 AICD since 2010 atrial fibrillation (was on warfarin but having falls and this was d/c'd)  "  H/O mitral valve repair,PAD (peripheral artery disease) S/p cardotid endarterectomy evaluated for \"mental status changes\".  In addition he has been having increasing falls.     The patient freely admits that he is having short-term memory problems that he feels is of approximately 1 year duration but does not getting worse.  He minimizes his falling history saying that he fell tripping on the steps and that his walking is always off balance and has been that way all his life to even when he played ball.     While I was in the room discussing this with him his daughter Audra, who is a nurse practitioner, called and she filled in the gaps.  She states that he has short-term memory loss that has seemingly been getting worse.  She is concerned that the medications that he is taking in particular is concerned about the hydrocodone and and that he was on 10 mg/325 120 month.  He recently took his early this more than his normally does that he was out of his minutes for 5 days.  He has been losing weight.  Last year he was scanned with people overseas were asymptomatic $100,000 believing that he will get $5 million and return.  Sold all of his antiques.  He is now $50,000 in that.  Had \"5 fiancé's\" where he goes and to the airport to pick them up and no one shows up.  As not have hallucinations but he is very agitated and combative at times states that he tacks these people all throughout the night that he is always have problems sleeping.  She reports that he has had significant trauma in his life that has never really been addressed.  He has anxiety and he has treated this with 2 mg of Ativan 3 times a day for years.     Audra reports that he does not remember to take his meds correctly     In terms of his irritability he went over to his other daughters house and was pounding on the door when she apparently was trying to get a hold of the people texting him in an effort to get them to stop.  Because of the scam the " "FBI are now involved     Of note patient is on Norco and ativan  He is on Requip for restless legs and Audra, his daughter, reports that he has considerable amount of pain at night that keeps him up.  He sometimes will take extra gabapentin and has fallen out of his lounge chair where he sleeps head over heels more than one occasion.  He had a recent fall where he tripped up the steps and \"his head balance couple times on the cement\"         Medical History         Past Medical History:   Diagnosis Date   • Abdominal aortic aneurysm     • Anxiety     • Atrial fibrillation     • Biventricular ICD (implantable cardioverter-defibrillator) in place     • BPH (benign prostatic hypertrophy)     • Carotid artery stenosis     • CHF (congestive heart failure)     • Chronic kidney disease       Chronic kidney disease, stage 4 (severe)   • Chronic systolic (congestive) heart failure       limited echo 7/2016 EF was 40-45%. Patient has BiV AICD   • Coronary arteriosclerosis       MI x2   • Hearing loss     • Iron deficiency anemia     • Ischemic cardiomyopathy     • Mixed hyperlipidemia     • Myocardial infarction     • Stroke               Surgical History          Past Surgical History:   Procedure Laterality Date   • CARDIAC ABLATION       • CARDIAC CATHETERIZATION       • CARDIAC PACEMAKER PLACEMENT       • CARDIOVERSION       • CAROTID ENDARTERECTOMY       • CORONARY ARTERY BYPASS GRAFT       • MITRAL VALVE REPLACEMENT       • TOTAL KNEE ARTHROPLASTY                        Prior to Admission medications    Medication Sig Start Date End Date Taking? Authorizing Provider   aspirin 81 MG tablet Take 81 mg by mouth daily.   4/19/12   Yes Historical Provider, MD   atorvastatin (LIPITOR) 20 MG tablet Take 20 mg by mouth daily.   4/19/12   Yes Historical Provider, MD   carvedilol (COREG) 6.25 MG tablet Take 1 tablet by mouth 2 (Two) Times a Day With Meals.  Patient taking differently: Take 3.125 mg by mouth 2 (Two) Times a Day " With Meals. 3/13/17   Yes Karthik Macdonald MD   dutasteride (AVODART) 0.5 MG capsule Take 0.5 mg by mouth daily.   4/19/12   Yes Historical Provider, MD   furosemide (LASIX) 40 MG tablet 40 mg Daily As Needed (edema). 4/19/12   Yes Historical Provider, MD   gabapentin (NEURONTIN) 300 MG capsule Take 300 mg by mouth At Night As Needed.     Yes Historical Provider, MD   HYDROcodone-acetaminophen (NORCO) 7.5-325 MG per tablet Take 1 tablet by mouth Every 6 (Six) Hours As Needed for Moderate Pain .     Yes Historical Provider, MD   lisinopril (PRINIVIL,ZESTRIL) 5 MG tablet Take 2.5 mg by mouth daily  4/14/12   Yes Historical Provider, MD   LORazepam (ATIVAN) 2 MG tablet Take 2 mg by mouth Every 8 (Eight) Hours As Needed for Anxiety.     Yes Historical Provider, MD   nitroglycerin (NITROSTAT) 0.4 MG SL tablet 1 under the tongue as needed for angina, may repeat q5mins for up three doses 9/8/17   Yes OUSMANE Pretty   rOPINIRole (REQUIP) 2 MG tablet Take 2 mg by mouth Every Night.     Yes Historical Provider, MD   tamsulosin (FLOMAX) 0.4 MG capsule 24 hr capsule Take 0.4 mg by mouth daily     Yes Historical Provider, MD   amiodarone (PACERONE) 200 MG tablet Take 1 tablet by mouth Daily. 9/8/17     Waleska Sifuentes APRRUPA   HYDROcodone-acetaminophen (VICODIN) 5-500 MG per tablet Every 6 (Six) Hours. 4/19/12     Historical Provider, MD   rOPINIRole (REQUIP) 0.5 MG tablet 2 mg bid       Historical Provider, MD   spironolactone (ALDACTONE) 25 MG tablet Take 1 tablet by mouth daily 1/31/16 9/8/17   Historical Provider, MD         Hospital scheduled medications:      amiodarone 200 mg Oral Q24H   atorvastatin 10 mg Oral Nightly   carvedilol 3.125 mg Oral BID With Meals   enoxaparin 30 mg Subcutaneous Daily   [START ON 9/9/2017] finasteride 5 mg Oral Daily   furosemide 40 mg Oral Daily   gabapentin 300 mg Oral Nightly   lisinopril 2.5 mg Oral Q24H   rOPINIRole 2 mg Oral Nightly   [START ON 9/9/2017] tamsulosin 0.4 mg Oral Daily       Hospital PRN medications:  HYDROcodone-acetaminophen  •  LORazepam  •  nitroglycerin  •  sodium chloride           Allergies   Allergen Reactions   • Keflex [Cephalexin]         CEPHALOSPORINS          Social History    Social History            Social History   • Marital status:        Spouse name: N/A   • Number of children: N/A   • Years of education: N/A          Occupational History   • Not on file.           Social History Main Topics   • Smoking status: Former Smoker   • Smokeless tobacco: Never Used   • Alcohol use No   • Drug use: No   • Sexual activity: Defer           Other Topics Concern   • Not on file      Social History Narrative               Family History   Problem Relation Age of Onset   • Stroke Mother     • Heart disease Mother     • Diabetes Father           Review of Systems  A 14 point review of systems was reviewed and was negative except for      Vital Signs   Temp:  [97.2 °F (36.2 °C)] 97.2 °F (36.2 °C)  Heart Rate:  [73-88] 73  Resp:  [20] 20  BP: ()/(60-73) 112/73     General Exam:  Head:  Normal cephalic, atraumatic  HEENT:  Neck supple  Fundoscopic Exam:  No signs of disc edema  CVS:  Regular rate and rhythm.  No murmurs  Carotid Examination:  No bruits  Lungs:  Clear to auscultation  Abdomen:  Non-tender, Non-distended  Extremities:  No signs of peripheral edema  Skin:  No rashes     Neurologic Exam:     Mental Status:    -Awake, Alert, Oriented X 3  -No word finding difficulties  -No aphasia  -No dysarthria  -Follows simple and complex commands     CN II:  Visual fields full.  Pupils equally reactive to light  CN III, IV, VI:  Extraocular Muscles full with no signs of nystagmus  CN V:  Facial sensory is symmetric   CN VII:  Facial motor symmetric  CN VIII:  Gross hearing intact bilaterally  CN IX:  Palate elevates symmetrically  CN X:  Palate elevates symmetrically  CN XI:  Shoulder shrug symmetric  CN XII:  Tongue is midline on protrusion     Motor: (strength out  of 5:  1= minimal movement, 2 = movement in plane of gravity, 3 = movement against gravity, 4 = movement against some resistance, 5 = full strength)     -Right Upper Ext: Proximal: 5           Distal: 5  -Left Upper Ext: Proximal: 5             Distal: 5     -Right Lower Ext: Proximal: 5           Distal: 5  -Left Lower Ext: Proximal: 5             Distal: 5     DTR:  -Right                        Bicep: 2+                   Triceps: 2+                Brachioradialis: 2+                        Patella: 2+                 Ankle: 2+                   Neg Babinski  -Left                        Bicep: 2+                   Triceps: 2+                Brachioradialis: 2+                        Patella: 2+                 Ankle: 2+                   Neg Babinski     Sensory:  -Intact to light touch, pinprick, temperature, pain, and proprioception     Coordination:  -Finger to nose intact  -Heel to shin intact  -No ataxia     Gait  -No signs of ataxia  -ambulates unassisted        Results Review:  Lab Results (last 7 days)     Procedure Component Value Units Date/Time             Magnesium [[id number]]  (Normal) Collected:  09/08/17 1607     Specimen:  Blood Updated:  09/08/17 1623       Magnesium 2.1 mg/dL       Basic Metabolic Panel [[id number]]  (Abnormal) Collected:  09/08/17 1607     Specimen:  Blood Updated:  09/08/17 1623       Glucose 150 (H) mg/dL         BUN 52 (H) mg/dL         Creatinine 1.77 (H) mg/dL         Sodium 138 mmol/L         Potassium 4.0 mmol/L         Chloride 96 (L) mmol/L         CO2 29.0 mmol/L         Calcium 9.3 mg/dL         eGFR Non African Amer 37 (L) mL/min/1.73         BUN/Creatinine Ratio 29.4 (H)       Anion Gap 13.0 mmol/L       Narrative:        The MDRD GFR formula is only valid for adults with stable renal function between ages 18 and 70.     Lipase [[id number]]  (Normal) Collected:  09/08/17 1607     Specimen:  Blood Updated:  09/08/17 1623       Lipase 66 U/L       Amylase [[id number]]   (Normal) Collected:  09/08/17 1607     Specimen:  Blood Updated:  09/08/17 1623       Amylase 100 U/L       Troponin [[id number]]  (Abnormal) Collected:  09/08/17 1607     Specimen:  Blood Updated:  09/08/17 1634       Troponin I 0.052 (H) ng/mL       TSH [[id number]]  (Normal) Collected:  09/08/17 1607     Specimen:  Blood Updated:  09/08/17 1705       TSH 2.430 mIU/mL              .  Imaging Results (last 24 hours)     Procedure Component Value Units Date/Time     XR Chest PA & Lateral [[id number]] Collected:  09/08/17 1622       Updated:  09/08/17 1627     Narrative:        EXAMINATION:   XR CHEST PA AND LATERAL-  9/8/2017 3:22 PM CST      HISTORY: Short of breath      PA and lateral views the chest obtained. Hyperinflation increase in AP  diameter chest is noted consistent with COPD.      Cardiac silhouette is mildly enlarged.      Prosthetic cardiac valve is present in the mitral position.      Pacing device is present projecting over the soft tissues of the left  chest. Wires are present median sternotomy. Vascular calcifications  present in the aortic arch.      Benign calcifications in the milagros are present bilaterally.         Impression:        Mild cardiomegaly no evidence of pulmonary vascular  congestion.  2. COPD  This report was finalized on 09/08/2017 16:24 by Dr. Yg Bowden MD.     CT Head Without Contrast [[id number]] Collected:  09/08/17 1633       Updated:  09/08/17 1639     Narrative:        CT HEAD WO CONTRAST- 9/8/2017 5:21 PM EDT      HISTORY: falls, alt mental status        Technique:   Axial CT of the brain without IV contrast. Sagittal and coronal  reformations are also provided for review. Soft tissue and bone kernels  are available for interpretation.      Comparison: None.      Findings:       There is no evidence of acute large vascular distribution infarct, mass  lesion or hemorrhage.  The ventricles, cortical sulci and basal cisterns  are symmetric and age appropriate. Mild  generalized symmetric volume  loss is identified. There is atheromatous calcification in the  intracranial vertebral arteries as well as the bilateral paraclinoid  ICAs.  Normal brainstem and posterior fossa.  The scalp and calvarium  are unremarkable.          Impression:        Impression:    1. No acute intracranial process  This report was finalized on 09/08/2017 16:36 by Dr Jem Carreno, .     US Carotid Bilateral [[id number]] Updated:  09/08/17 1702         Will return tomorrow to do Cognitive assessment with [facility].         Impression          Plan          I discussed the patients findings and my recommendations with patient     Jessica Rivas MD  09/08/17  7:42 PM

## 2017-09-09 NOTE — PLAN OF CARE
Problem: Patient Care Overview (Adult)  Goal: Plan of Care Review  Outcome: Ongoing (interventions implemented as appropriate)    09/09/17 0233   Coping/Psychosocial Response Interventions   Plan Of Care Reviewed With patient   Patient Care Overview   Progress no change   Outcome Evaluation   Outcome Summary/Follow up Plan no soa reported. no edema noted. bnp elevated on prn lasix at home. hx of renal failure. family concerned with meds pt is taking at home. meds such as norco, ativan, and neurontin. states he is becoming more forgetful and has had recent falls.  on tele       Goal: Adult Individualization and Mutuality  Outcome: Ongoing (interventions implemented as appropriate)  Goal: Discharge Needs Assessment  Outcome: Ongoing (interventions implemented as appropriate)    Problem: Cardiac: Heart Failure (Adult)  Goal: Signs and Symptoms of Listed Potential Problems Will be Absent or Manageable (Cardiac: Heart Failure)  Outcome: Ongoing (interventions implemented as appropriate)    Problem: Fall Risk (Adult)  Goal: Identify Related Risk Factors and Signs and Symptoms  Outcome: Ongoing (interventions implemented as appropriate)  Goal: Absence of Falls  Outcome: Ongoing (interventions implemented as appropriate)

## 2017-09-10 ENCOUNTER — APPOINTMENT (OUTPATIENT)
Dept: GENERAL RADIOLOGY | Facility: HOSPITAL | Age: 82
End: 2017-09-10

## 2017-09-10 PROBLEM — Z74.09 IMPAIRED FUNCTIONAL MOBILITY, BALANCE, GAIT, AND ENDURANCE: Status: ACTIVE | Noted: 2017-09-10

## 2017-09-10 LAB
25(OH)D3 SERPL-MCNC: 46.4 NG/ML (ref 30–100)
ANION GAP SERPL CALCULATED.3IONS-SCNC: 8 MMOL/L (ref 4–13)
BUN BLD-MCNC: 48 MG/DL (ref 5–21)
BUN/CREAT SERPL: 25.1 (ref 7–25)
CALCIUM SPEC-SCNC: 8.6 MG/DL (ref 8.4–10.4)
CHLORIDE SERPL-SCNC: 99 MMOL/L (ref 98–110)
CO2 SERPL-SCNC: 30 MMOL/L (ref 24–31)
CREAT BLD-MCNC: 1.91 MG/DL (ref 0.5–1.4)
FERRITIN SERPL-MCNC: 21.4 NG/ML (ref 17.9–464)
GFR SERPL CREATININE-BSD FRML MDRD: 34 ML/MIN/1.73
GLUCOSE BLD-MCNC: 93 MG/DL (ref 70–100)
IRON 24H UR-MRATE: 65 MCG/DL (ref 42–180)
IRON SATN MFR SERPL: 18 % (ref 20–45)
MAGNESIUM SERPL-MCNC: 2.1 MG/DL (ref 1.4–2.2)
NT-PROBNP SERPL-MCNC: 2180 PG/ML (ref 0–1800)
PHOSPHATE SERPL-MCNC: 4.2 MG/DL (ref 2.5–4.5)
POTASSIUM BLD-SCNC: 3.9 MMOL/L (ref 3.5–5.3)
PTH-INTACT SERPL-MCNC: 181.6 PG/ML (ref 7.5–53.5)
SODIUM BLD-SCNC: 137 MMOL/L (ref 135–145)
TIBC SERPL-MCNC: 370 MCG/DL (ref 225–420)
URATE SERPL-MCNC: 8.6 MG/DL (ref 3.5–8.5)

## 2017-09-10 PROCEDURE — 82728 ASSAY OF FERRITIN: CPT | Performed by: PSYCHIATRY & NEUROLOGY

## 2017-09-10 PROCEDURE — 84550 ASSAY OF BLOOD/URIC ACID: CPT | Performed by: INTERNAL MEDICINE

## 2017-09-10 PROCEDURE — 84100 ASSAY OF PHOSPHORUS: CPT | Performed by: INTERNAL MEDICINE

## 2017-09-10 PROCEDURE — 25010000002 ENOXAPARIN PER 10 MG: Performed by: INTERNAL MEDICINE

## 2017-09-10 PROCEDURE — 80048 BASIC METABOLIC PNL TOTAL CA: CPT | Performed by: INTERNAL MEDICINE

## 2017-09-10 PROCEDURE — 83880 ASSAY OF NATRIURETIC PEPTIDE: CPT | Performed by: INTERNAL MEDICINE

## 2017-09-10 PROCEDURE — 83735 ASSAY OF MAGNESIUM: CPT | Performed by: INTERNAL MEDICINE

## 2017-09-10 PROCEDURE — 99232 SBSQ HOSP IP/OBS MODERATE 35: CPT | Performed by: INTERNAL MEDICINE

## 2017-09-10 PROCEDURE — 82306 VITAMIN D 25 HYDROXY: CPT | Performed by: INTERNAL MEDICINE

## 2017-09-10 PROCEDURE — 83970 ASSAY OF PARATHORMONE: CPT | Performed by: INTERNAL MEDICINE

## 2017-09-10 PROCEDURE — 83550 IRON BINDING TEST: CPT | Performed by: PSYCHIATRY & NEUROLOGY

## 2017-09-10 PROCEDURE — 97116 GAIT TRAINING THERAPY: CPT

## 2017-09-10 PROCEDURE — 99232 SBSQ HOSP IP/OBS MODERATE 35: CPT | Performed by: PSYCHIATRY & NEUROLOGY

## 2017-09-10 PROCEDURE — 74220 X-RAY XM ESOPHAGUS 1CNTRST: CPT

## 2017-09-10 PROCEDURE — 83540 ASSAY OF IRON: CPT | Performed by: PSYCHIATRY & NEUROLOGY

## 2017-09-10 PROCEDURE — 25010000002 IRON SUCROSE PER 1 MG: Performed by: PSYCHIATRY & NEUROLOGY

## 2017-09-10 RX ORDER — GABAPENTIN 400 MG/1
400 CAPSULE ORAL DAILY
Status: DISCONTINUED | OUTPATIENT
Start: 2017-09-10 | End: 2017-09-12 | Stop reason: HOSPADM

## 2017-09-10 RX ORDER — GABAPENTIN 400 MG/1
400 CAPSULE ORAL NIGHTLY
Status: DISCONTINUED | OUTPATIENT
Start: 2017-09-10 | End: 2017-09-10

## 2017-09-10 RX ORDER — CALCITRIOL 0.25 UG/1
0.25 CAPSULE, LIQUID FILLED ORAL DAILY
Status: DISCONTINUED | OUTPATIENT
Start: 2017-09-10 | End: 2017-09-12 | Stop reason: HOSPADM

## 2017-09-10 RX ORDER — SENNA AND DOCUSATE SODIUM 50; 8.6 MG/1; MG/1
2 TABLET, FILM COATED ORAL 2 TIMES DAILY PRN
Status: DISCONTINUED | OUTPATIENT
Start: 2017-09-10 | End: 2017-09-12 | Stop reason: HOSPADM

## 2017-09-10 RX ORDER — DOCUSATE SODIUM 100 MG/1
100 CAPSULE, LIQUID FILLED ORAL 2 TIMES DAILY
Status: DISCONTINUED | OUTPATIENT
Start: 2017-09-10 | End: 2017-09-12 | Stop reason: HOSPADM

## 2017-09-10 RX ADMIN — FINASTERIDE 5 MG: 5 TABLET, FILM COATED ORAL at 08:12

## 2017-09-10 RX ADMIN — DOCUSATE SODIUM 100 MG: 100 CAPSULE ORAL at 13:12

## 2017-09-10 RX ADMIN — CALCITRIOL 0.25 MCG: 0.25 CAPSULE ORAL at 19:59

## 2017-09-10 RX ADMIN — ATORVASTATIN CALCIUM 10 MG: 10 TABLET, FILM COATED ORAL at 20:49

## 2017-09-10 RX ADMIN — TAMSULOSIN HYDROCHLORIDE 0.4 MG: 0.4 CAPSULE ORAL at 08:13

## 2017-09-10 RX ADMIN — ROPINIROLE HYDROCHLORIDE 1 MG: 1 TABLET, FILM COATED ORAL at 23:19

## 2017-09-10 RX ADMIN — LORAZEPAM 1 MG: 1 TABLET ORAL at 16:42

## 2017-09-10 RX ADMIN — ENOXAPARIN SODIUM 30 MG: 30 INJECTION SUBCUTANEOUS at 16:42

## 2017-09-10 RX ADMIN — HYDROCODONE BITARTRATE AND ACETAMINOPHEN 1 TABLET: 7.5; 325 TABLET ORAL at 20:50

## 2017-09-10 RX ADMIN — IRON SUCROSE 200 MG: 20 INJECTION, SOLUTION INTRAVENOUS at 13:13

## 2017-09-10 RX ADMIN — AMIODARONE HYDROCHLORIDE 200 MG: 200 TABLET ORAL at 08:12

## 2017-09-10 RX ADMIN — HYDROCODONE BITARTRATE AND ACETAMINOPHEN 1 TABLET: 7.5; 325 TABLET ORAL at 08:17

## 2017-09-10 RX ADMIN — LORAZEPAM 1 MG: 1 TABLET ORAL at 20:49

## 2017-09-10 RX ADMIN — Medication 10 ML: at 20:49

## 2017-09-10 RX ADMIN — DOCUSATE SODIUM 100 MG: 100 CAPSULE ORAL at 18:18

## 2017-09-10 RX ADMIN — GABAPENTIN 400 MG: 400 CAPSULE ORAL at 19:59

## 2017-09-10 RX ADMIN — LORAZEPAM 1 MG: 1 TABLET ORAL at 08:12

## 2017-09-10 RX ADMIN — HYDROCODONE BITARTRATE AND ACETAMINOPHEN 1 TABLET: 7.5; 325 TABLET ORAL at 14:33

## 2017-09-10 RX ADMIN — BUMETANIDE 0.5 MG: 0.25 INJECTION INTRAMUSCULAR; INTRAVENOUS at 08:12

## 2017-09-10 NOTE — PROGRESS NOTES
Continued Stay Note   Isaias     Patient Name: Wild Bravo  MRN: [id number]  Today's Date: 9/10/2017    Admit Date: 9/8/2017          Discharge Plan       09/10/17 1107    Case Management/Social Work Plan    Plan Charge nurse, Li called stating that the  recommended pt be transferred to [facility].  She requested that SW speak to pt's daughter regarding this possibility.  SW went to pt's room and his daughter had left.  Li stated she will call SW back when daughter returns.  JAMES Pardo.    Patient/Family In Agreement With Plan yes              Discharge Codes     None            JAMES Soto

## 2017-09-10 NOTE — THERAPY TREATMENT NOTE
Acute Care - Physical Therapy Treatment Note  [facility]     Patient Name: Wild Bravo  : 1934  MRN: [id number]  Today's Date: 9/10/2017  Onset of Illness/Injury or Date of Surgery Date: 17  Date of Referral to PT: 17  Referring Physician: Dr Macdonald    Admit Date: 2017    Visit Dx:    ICD-10-CM ICD-9-CM   1. Impaired functional mobility, balance, gait, and endurance Z74.09 V49.89     Patient Active Problem List   Diagnosis   • Coronary arteriosclerosis   • CHF (congestive heart failure)   • SSS (sick sinus syndrome)   • Pacemaker   • Essential hypertension   • Biventricular ICD (implantable cardioverter-defibrillator) in place   • Persistent atrial fibrillation   • PAD (peripheral artery disease)   • H/O mitral valve repair   • ERRONEOUS ENCOUNTER--DISREGARD   • Chest pain   • Impaired functional mobility, balance, gait, and endurance               Adult Rehabilitation Note       09/10/17 1105          Rehab Assessment/Intervention    Discipline physical therapy assistant  -LYRIC      Document Type therapy note (daily note)  -LYRIC      Subjective Information no complaints;agree to therapy  -LYRIC      Precautions/Limitations fall precautions  -LYRIC      Recorded by [LYRIC] Salvatore Watkins PTA      Pain Assessment    Pain Assessment No/denies pain  -LYRIC      Recorded by [LYRIC] Salvatore Watkins PTA      Bed Mobility, Assessment/Treatment    Bed Mobility, Comment up in room independent  -LYRIC      Recorded by [LYRIC] Salvatore Watkins PTA      Transfer Assessment/Treatment    Transfers, Sit-Stand [place] independent  -LYRIC      Transfers, Stand-Sit [place] independent  -LYRIC      Recorded by [LYRIC] Salvatore Watkins PTA      Gait Assessment/Treatment    Gait, [place] Level verbal cues required;contact guard assist;minimum assist (75% patient effort)  -LYRIC      Gait, Distance (Feet) 500  -LYRIC      Gait, Gait Deviations narrow base  -LYRIC      Gait, Impairments impaired balance  -LYRIC      Gait, Comment pt  veered to the right several times, required min assist to correct to prevent pt from running into the wall  -LYRIC      Recorded by [LYRIC] Salvatore Watkins PTA      Stairs Assessment/Treatment    Number of Stairs 10  -LYRIC      Stairs, Handrail Location both sides  -LYRIC      Stairs, [place] Level verbal cues required;contact guard assist;minimum assist (75% patient effort)  -LYRIC      Stairs, Comment pt is very impulsive, cues to slow down and perform safely  -LYRIC      Recorded by [LYRIC] Salvatore Watkins PTA      Positioning and Restraints    Pre-Treatment Position in bed  -LYRIC      Post Treatment Position bed  -LYRIC      In Bed sitting EOB;call light within reach;encouraged to call for assist;notified nsg  -LYRIC      Recorded by [LYRIC] Salvatore Watkins PTA        User Key  (r) = Recorded By, (t) = Taken By, (c) = Cosigned By    Initials Name Effective Dates    LYRIC Watkins PTA 12/08/16 -                 IP PT Goals       09/09/17 1051          Gait Training PT LTG    Gait Training Goal PT LTG, Date Established 09/09/17  -PB      Gait Training Goal PT LTG, Time to Achieve by discharge  -PB      Gait Training Goal PT LTG, [place] Level supervision required  -PB      Gait Training Goal PT LTG, Distance to Achieve 300  -PB      Gait Training Goal PT LTG, Additional Goal maintain SpO2 equal to or above 90%  -PB      Stair Training PT LTG    Stair Training Goal PT LTG, Date Established 09/09/17  -PB      Stair Training Goal PT LTG, Time to Achieve by discharge  -PB      Stair Training Goal PT LTG, Number of Steps 15  -PB      Stair Training Goal PT LTG, [place] Level contact guard assist  -PB      Stair Training Goal PT LTG, Assist Device 1 handrail  -PB        User Key  (r) = Recorded By, (t) = Taken By, (c) = Cosigned By    Initials Name Provider Type    DUNIA Hernandez PT DPT Physical Therapist          Physical Therapy Education     Title: PT OT SLP Therapies (Active)     Topic: Physical Therapy (Active)      Point: Mobility training (Done)    Learning Progress Summary    Learner Readiness Method Response Comment Documented by Status   Patient Acceptance E VU,NR safety with gait and stairs  09/10/17 1105 Done    Acceptance E NR,VU benefits of activity, safety concerns  09/09/17 1054 Done   Family Acceptance E NR,VU benefits of activity, safety concerns  09/09/17 1054 Done                      User Key     Initials Effective Dates Name Provider Type Discipline     12/08/16 -  Salvatore Watkins PTA Physical Therapy Assistant PT     08/02/16 -  Fernando Hernandez, PT DPT Physical Therapist PT                    PT Recommendation and Plan  Anticipated Discharge Disposition: home with 24/7 care, assisted living  Planned Therapy Interventions: balance training, gait training, patient/family education, stair training  PT Frequency: daily, 2 times/day, per priority policy  Plan of Care Review  Plan Of Care Reviewed With: patient  Progress: progress toward functional goals as expected  Outcome Summary/Follow up Plan: Pt is independent with bed mobility and transfers.  Amb 400' with CGA/min assist.  Pt struggled with balance during walk, required min assist to correct.  Ascended and descended 10 steps with HR with CGA/min assist.  Pt was very impulsive and required cues to perform slower and safer.  Will continue to work with pt to assist with his balance and safety.          Outcome Measures       09/10/17 1105 09/09/17 1004       How much help from another person do you currently need...    Turning from your back to your side while in flat bed without using bedrails? 4  -LYRIC 4  -PB     Moving from lying on back to sitting on the side of a flat bed without bedrails? 4  -LYRIC 4  -PB     Moving to and from a bed to a chair (including a wheelchair)? 4  -LYRIC 4  -PB     Standing up from a chair using your arms (e.g., wheelchair, bedside chair)? 4  -LYRIC 4  -PB     Climbing 3-5 steps with a railing? 3  -LYRIC 3  -PB     To walk in  hospital room? 3  -LYRIC 3  -PB     AM-PAC 6 Clicks Score 22  -LYRIC 22  -PB     Functional Assessment    Outcome Measure Options AM-PAC 6 Clicks Basic Mobility (PT)  -LYRIC AM-PAC 6 Clicks Basic Mobility (PT)  -PB       User Key  (r) = Recorded By, (t) = Taken By, (c) = Cosigned By    Initials Name Provider Type    LYRIC Watkins PTA Physical Therapy Assistant    PB Fernando Hernandez, PT DPT Physical Therapist           Time Calculation:         PT Charges       09/10/17 1105          Time Calculation    Start Time 1105  -LYRIC      Stop Time 1130  -LYRIC      Time Calculation (min) 25 min  -LYRIC      PT Received On 09/10/17  -LYRIC      PT Goal Re-Cert Due Date 09/19/17  -LYRIC      Time Calculation- PT    Total Timed Code Minutes- PT 25 minute(s)  -LYRIC        User Key  (r) = Recorded By, (t) = Taken By, (c) = Cosigned By    Initials Name Provider Type    LYRIC Watkins PTA Physical Therapy Assistant          Therapy Charges for Today     Code Description Service Date Service Provider Modifiers Qty    [id number] HC GAIT TRAINING EA 15 MIN 9/10/2017 Salvatore Watkins PTA GP 2          PT G-Codes  Outcome Measure Options: AM-PAC 6 Clicks Basic Mobility (PT)  Score: 22  Functional Limitation: Mobility: Walking and moving around  Mobility: Walking and Moving Around Current Status (): At least 20 percent but less than 40 percent impaired, limited or restricted  Mobility: Walking and Moving Around Goal Status (): At least 1 percent but less than 20 percent impaired, limited or restricted    Salvatore Watkins PTA  9/10/2017

## 2017-09-10 NOTE — PROGRESS NOTES
Continued Stay Note   Isaias     Patient Name: Wild Bravo  MRN: [id number]  Today's Date: 9/10/2017    Admit Date: 9/8/2017          Discharge Plan       09/10/17 1527    Case Management/Social Work Plan    Plan Pt's daughter, Munira left a message for SW.  SW attempted to return her call.  She did not answer and her voicemail was full.  SW will try again later.  JAMES Pardo.     Patient/Family In Agreement With Plan yes              Discharge Codes     None            JAMES Soto

## 2017-09-10 NOTE — PLAN OF CARE
Problem: Patient Care Overview (Adult)  Goal: Plan of Care Review    09/09/17 1711 09/10/17 0528   Coping/Psychosocial Response Interventions   Plan Of Care Reviewed With patient;daughter --    Patient Care Overview   Progress progress toward functional goals is gradual --    Outcome Evaluation   Outcome Summary/Follow up Plan --  only c/o has been, cramping in feet, with norco given. upset about medication changes. remaines , with PVC's, and 7 beat run of VT.         Problem: Fall Risk (Adult)  Goal: Absence of Falls    09/09/17 1711   Fall Risk (Adult)   Absence of Falls making progress toward outcome

## 2017-09-10 NOTE — PROGRESS NOTES
Continued Stay Note   Sharon     Patient Name: Wild Bravo  MRN: [id number]  Today's Date: 9/10/2017    Admit Date: 9/8/2017          Discharge Plan       09/10/17 1622    Case Management/Social Work Plan    Plan GRADY and SHARON Cool met with pt's daughters, Audra and Munira in the hallway.  They do not want a referral made to [facility].  They want [facility].  SW has faxed referral and is waiting to see if they can accept/a bed open.  Pt's dtr has POA.  Pt is unaware of psych placement.  Ludy spoke to Dr. Cary who stated Dr. Macdonald will speak to pt in the AM about having to go to psych placement.  JAMES Pardo.    Patient/Family In Agreement With Plan yes      09/10/17 1527    Case Management/Social Work Plan    Plan Pt's daughter, Munira left a message for GRADY.  SW attempted to return her call.  She did not answer and her voicemail was full.  GRADY will try again later.  JAMES Padro.     Patient/Family In Agreement With Plan yes              Discharge Codes     None            JAMES Soto

## 2017-09-10 NOTE — PROGRESS NOTES
Continued Stay Note   Isaias     Patient Name: Wild Bravo  MRN: [id number]  Today's Date: 9/10/2017    Admit Date: 9/8/2017          Discharge Plan       09/10/17 1116    Case Management/Social Work Plan    Plan GRADY faxed updated notes to [facility] as well as spoke to Ann who stated she isn't sure an exact day when they will have a bed opening.  JAMES Pardo.    Patient/Family In Agreement With Plan yes      09/10/17 1107    Case Management/Social Work Plan    Plan Charge nurse, Li called stating that the  recommended pt be transferred to [facility].  She requested that GRADY speak to pt's daughter regarding this possibility.  SW went to pt's room and his daughter had left.  iL stated she will call GRADY back when daughter returns.  JAMES Pardo.    Patient/Family In Agreement With Plan yes              Discharge Codes     None            JAMES Soto

## 2017-09-10 NOTE — PROGRESS NOTES
Continued Stay Note   Aroda     Patient Name: Wild Bravo  MRN: [id number]  Today's Date: 9/10/2017    Admit Date: 9/8/2017          Discharge Plan       09/10/17 1147    Case Management/Social Work Plan    Plan GRADY attempted to call pt's daughter Munira [phone].  She did not answer and her voicemail was full.  GRADY will try again later.  JAMES Pardo.    Patient/Family In Agreement With Plan yes      09/10/17 1116    Case Management/Social Work Plan    Plan GRADY faxed updated notes to [facility] as well as spoke to Ann who stated she isn't sure an exact day when they will have a bed opening.  JAMES Pardo.    Patient/Family In Agreement With Plan yes      09/10/17 1107    Case Management/Social Work Plan    Plan Charge nurse, Li called stating that the  recommended pt be transferred to [facility].  She requested that GRADY speak to pt's daughter regarding this possibility.  GRADY went to pt's room and his daughter had left.  Li stated she will call GRADY back when daughter returns.  JAMES Pardo.    Patient/Family In Agreement With Plan yes              Discharge Codes     None            JAMES Soto

## 2017-09-10 NOTE — PROGRESS NOTES
LOS: 0 days   Patient Care Team:  Eliel Ames MD as PCP - General  Eliel Ames MD as PCP - Family Medicine  Karthik Macdonald MD as Cardiologist (Cardiology)    Chief Complaint: Shortness of breath  Orthopnea  LV dysfunction  Elevated BNP  Altered mental status  Fall likely due to polypharmacy  Resistant to adhering to medical advice   Wants to go home  Subjective  Feeling  better  No chest pain   No excessive shortness of breath  No new complaints  Epigastric chronic pain  Interval History:OK overall    Patient Complaints:   Denies chest pain currently. Denies excessive shortness of breath. Denies abdominal pain, nausea vomiting or diarrhoea.    Telemetry: no malignant arrhythmia. No significant pauses.    Review of Systems:    The following systems were reviewed and negative; ENT,  integument, hematologic / lymphatic, neurological, behavioral/psych and allergies / immunologic    Labs:    WBC WBC   Date Value Ref Range Status   09/08/2017 5.60 4.80 - 10.80 10*3/mm3 Final      HGB Hemoglobin   Date Value Ref Range Status   09/08/2017 12.9 (L) 14.0 - 18.0 g/dL Final      HCT Hematocrit   Date Value Ref Range Status   09/08/2017 41.3 40.0 - 52.0 % Final      Platlets Platelets   Date Value Ref Range Status   09/08/2017 177 130 - 400 10*3/mm3 Final      MCV MCV   Date Value Ref Range Status   09/08/2017 91.0 82.0 - 95.0 fL Final        Results from last 7 days  Lab Units 09/09/17  0318 09/08/17  1607 09/08/17  1300   SODIUM mmol/L 138 138 139   POTASSIUM mmol/L 3.9 4.0 4.8   CHLORIDE mmol/L 98 96* 97*   CO2 mmol/L 33.0* 29.0 31.0   BUN mg/dL 48* 52* 52*   CREATININE mg/dL 1.71* 1.77* 1.94*   CALCIUM mg/dL 9.0 9.3 9.8   BILIRUBIN mg/dL  --   --  1.1*   ALK PHOS U/L  --   --  83   ALT (SGPT) U/L  --   --  47   AST (SGOT) U/L  --   --  40   GLUCOSE mg/dL 97 150* 98     Lab Results   Component Value Date    TROPONINI 0.057 (H) 09/08/2017     PT/INR:  No results found for: PROTIME/No results found for:  INR    Imaging Results (last 72 hours)     Procedure Component Value Units Date/Time    XR Chest PA & Lateral [[id number]] Collected:  09/08/17 1622     Updated:  09/08/17 1627    Narrative:       EXAMINATION:   XR CHEST PA AND LATERAL-  9/8/2017 3:22 PM CST     HISTORY: Short of breath     PA and lateral views the chest obtained. Hyperinflation increase in AP  diameter chest is noted consistent with COPD.     Cardiac silhouette is mildly enlarged.     Prosthetic cardiac valve is present in the mitral position.     Pacing device is present projecting over the soft tissues of the left  chest. Wires are present median sternotomy. Vascular calcifications  present in the aortic arch.     Benign calcifications in the milagros are present bilaterally.       Impression:       Mild cardiomegaly no evidence of pulmonary vascular  congestion.  2. COPD  This report was finalized on 09/08/2017 16:24 by Dr. Yg Bowden MD.    CT Head Without Contrast [[id number]] Collected:  09/08/17 1633     Updated:  09/08/17 1639    Narrative:       CT HEAD WO CONTRAST- 9/8/2017 5:21 PM EDT     HISTORY: falls, alt mental status       Technique:   Axial CT of the brain without IV contrast. Sagittal and coronal  reformations are also provided for review. Soft tissue and bone kernels  are available for interpretation.     Comparison: None.     Findings:      There is no evidence of acute large vascular distribution infarct, mass  lesion or hemorrhage.  The ventricles, cortical sulci and basal cisterns  are symmetric and age appropriate. Mild generalized symmetric volume  loss is identified. There is atheromatous calcification in the  intracranial vertebral arteries as well as the bilateral paraclinoid  ICAs.  Normal brainstem and posterior fossa.  The scalp and calvarium  are unremarkable.        Impression:       Impression:    1. No acute intracranial process  This report was finalized on 09/08/2017 16:36 by Dr Jem Carreno, .    US Carotid  Bilateral [[id number]] Updated:  09/08/17 1702    US Abdomen Complete [[id number]] Collected:  09/09/17 0942     Updated:  09/09/17 0949    Narrative:       ULTRASOUND ABDOMEN COMPLETE 9/9/2017 8:20 AM CDT     REASON FOR EXAM: abdominal pain, hx aaa       COMPARISON: None      TECHNIQUE: Multiple longitudinal and transverse real-time sonographic  images of the abdomen are obtained.      FINDINGS:       LIVER: Normal in size, smooth in contour, and homogeneous in  echogenicity. No focal lesion is seen.     BILIARY: Echoes are noted in the gallbladder with distal shadowing  consistent with cholelithiasis.. The common bile duct measures 0.7 cm in  diameter. There is no evidence of intrahepatic ductal dilatation.       KIDNEYS: The right and left kidneys are normal in size, shape,  orientation, and position measuring 5.8 x 7.3 x 14.9 cm and 5.2 x 6.2 x  12.2 cm, respectively. The corticomedullary differentiation is  maintained. There is no evidence of nephrolithiasis, hydronephrosis or  perinephric fluid collection. Cysts are present associated right kidney  the largest is 5.7 cm. The largest left renal cyst is at the upper pole  and is 6.4 cm No hydroureter is seen.     PANCREAS: No focal lesion or abnormality.      SPLEEN: Normal in size and appearance.      OTHER: No ascites is present. The aorta is aneurysmal and measures 39 mm  in maximal diameter. Inferior vena cava is visualized.     The prostate is enlarged. The prostate is 7.2 x 9 cm.        Impression:       1. Cholelithiasis.        2. Enlarged prostate Yuliana graph  3. Renal cyst     This report was finalized on 09/09/2017 09:46 by Dr. Yg Bowden MD.          Objective     Medication Review:   Current Facility-Administered Medications   Medication Dose Route Frequency Provider Last Rate Last Dose   • amiodarone (PACERONE) tablet 200 mg  200 mg Oral Q24H Karthik Macdonald MD   200 mg at 09/09/17 0807   • atorvastatin (LIPITOR) tablet 10 mg  10 mg Oral Nightly  "Karthik Macdonald MD   10 mg at 09/08/17 2050   • bumetanide (BUMEX) injection 0.5 mg  0.5 mg Intravenous BID Karthik Macdonald MD   0.5 mg at 09/09/17 1755   • carvedilol (COREG) tablet 3.125 mg  3.125 mg Oral BID With Meals Karthik Macdonald MD   3.125 mg at 09/09/17 1756   • enoxaparin (LOVENOX) syringe 30 mg  30 mg Subcutaneous Daily Karthik Macdonald MD   30 mg at 09/09/17 1756   • finasteride (PROSCAR) tablet 5 mg  5 mg Oral Daily Karthik Macdonald MD   5 mg at 09/09/17 0807   • gabapentin (NEURONTIN) capsule 300 mg  300 mg Oral Nightly Karthik Macdonald MD   300 mg at 09/08/17 2050   • HYDROcodone-acetaminophen (NORCO) 7.5-325 MG per tablet 1 tablet  1 tablet Oral Q6H PRN Karthik Macdonald MD   1 tablet at 09/09/17 1756   • lisinopril (PRINIVIL,ZESTRIL) tablet 2.5 mg  2.5 mg Oral Q24H Karthik Macdonald MD   2.5 mg at 09/09/17 0806   • LORazepam (ATIVAN) tablet 1 mg  1 mg Oral TID Jessica Rivas MD       • nitroglycerin (NITROSTAT) SL tablet 0.4 mg  0.4 mg Sublingual Q5 Min PRN Karthik Macdonald MD       • rOPINIRole (REQUIP) tablet 1 mg  1 mg Oral Nightly Jessica Rivas MD       • sodium chloride 0.9 % flush 1-10 mL  1-10 mL Intravenous PRN Karthik Macdonald MD       • tamsulosin (FLOMAX) 24 hr capsule 0.4 mg  0.4 mg Oral Daily Karthik Macdonald MD   0.4 mg at 09/09/17 0807       Vital Sign Min/Max for last 24 hours  Temp  Min: 96.7 °F (35.9 °C)  Max: 98.3 °F (36.8 °C)   BP  Min: 92/61  Max: 123/75   Pulse  Min: 80  Max: 111   Resp  Min: 16  Max: 20   SpO2  Min: 94 %  Max: 97 %   No Data Recorded   No Data Recorded     Flowsheet Rows         First Filed Value    Admission Height  73.5\" (186.7 cm) Documented at 09/08/2017 1526    Admission Weight  153 lb 3 oz (69.5 kg) Documented at 09/08/2017 1526          Physical Exam:  Ears ears appear intact with no abnormalities noted  Nose nares normal, septum midline, mucosa normal and no drainage  Neck supple, trachea midline, no thyromegaly, no carotid bruit and no JVD  Back no kyphosis present, no " scoliosis present, no skin lesions, erythema, or scars, no tenderness to percussion or palpation and range of motion normal  Lungs respirations regular, respirations even and respirations unlabored  Heart normal S1, S2,  2/6 pansystolic murmur in the left sternal border, , no rub and no click  Abdomen soft, mild epigastric tenderness  Skin no bleeding, bruising or rash     Results Review:   I reviewed the patient's new clinical results.  I reviewed the patient's new imaging results and agree with the interpretation.  I reviewed the patient's other test results and agree with the interpretation  I personally viewed and interpreted the patient's EKG/Telemetry data  Discussed with patient and daughter    Medication Review: Performed    Assessment/Plan     Active Problems:    Chest pain  Dyspnea on exertion  Polypharmacy  LV Dysfunction  Epigastric pain  Cholelithiasis    Plan  Discussed with Dr Rivas, sobia Brunner in patient rehab for trying to get off multiple medications  Long term prognosis guarded  Barium swallow  Start Bumex 0.5 mg IV BID  Monitor BNP BMP  Renal consult  He declined surgical evaluation for cholelithiasis and epigastric pain  Supportive care  Telemetry  Optimal medical therapy  Deep vein thrombosis prophylaxis/precautions  Counseled regarding disease appropriate diet, fluid, sodium, caffeine, stimulants intake   Stressed compliance to diet and medications   Avoid NSAIDS    Karthik Macdonald MD  09/09/17  8:19 PM

## 2017-09-10 NOTE — DISCHARGE PLACEMENT REQUEST
"Quoc Bravo (83 y.o. Male)     Date of Birth Social Security Number Address Home Phone MRN    [id number]  [id number] [place] [address] [phone] [id number]    [employer] Marital Status          Oriental orthodox        Admission Date Admission Type Admitting Provider Attending Provider Department, Room/Bed    9/8/17 Urgent Karthik Macdoanld MD Bose, Sanjay, MD [facility] 4B, 409/1    Discharge Date Discharge Disposition Discharge Destination                      Attending Provider: Karthik Macdonald MD     Allergies:  Keflex [Cephalexin]    Isolation:  None   Infection:  None   Code Status:  FULL    Ht:  73.5\" (186.7 cm)   Wt:  155 lb 2 oz (70.4 kg)    Admission Cmt:  None   Principal Problem:  None                Active Insurance as of 9/8/2017     Primary Coverage     Payor Plan Insurance Group Employer/Plan Group    MEDICARE MEDICARE A & B      Payor Plan Address Payor Plan Phone Number Effective From Effective To    [address] [phone] 7/1/1999     [place] [address]       Subscriber Name Subscriber Birth Date Member ID       QUOC BRAVO 1934 [id number]                 Emergency Contacts      (Rel.) Home Phone Work Phone Mobile Phone    Munira Cai (Daughter) [phone] -- --            History & Physical     No notes of this type exist for this encounter.        Prior to Admission Medications     Prescriptions Last Dose Informant Patient Reported? Taking?    amiodarone (PACERONE) 200 MG tablet Past Week Pharmacy No Yes    Take 1 tablet by mouth Daily.    carvedilol (COREG) 3.125 MG tablet Past Week Pharmacy Yes Yes    Take 3.125 mg by mouth 2 (Two) Times a Day With Meals.    furosemide (LASIX) 40 MG tablet Past Month Pharmacy Yes Yes    Take 40 mg by mouth Daily As Needed (edema).    gabapentin (NEURONTIN) 300 MG capsule Past Week Pharmacy Yes Yes    Take 300 mg by mouth 2 (Two) Times a Day.    HYDROcodone-acetaminophen (NORCO) 7.5-325 MG per tablet " Past Week Pharmacy Yes Yes    Take 1 tablet by mouth Every 8 (Eight) Hours As Needed for Moderate Pain .    LORazepam (ATIVAN) 2 MG tablet Past Week Pharmacy Yes Yes    Take 2 mg by mouth Every 8 (Eight) Hours As Needed for Anxiety.    aspirin 81 MG EC tablet Unknown  Yes No    Take 81 mg by mouth Daily.    atorvastatin (LIPITOR) 20 MG tablet Unknown Pharmacy Yes No    Take 20 mg by mouth Daily.    dutasteride (AVODART) 0.5 MG capsule Unknown Pharmacy Yes No    Take 0.5 mg by mouth Daily.    lisinopril (PRINIVIL,ZESTRIL) 5 MG tablet Unknown Pharmacy Yes No    Take 2.5 mg by mouth Daily.    nitroglycerin (NITROSTAT) 0.4 MG SL tablet Unknown  Yes No    Place 0.4 mg under the tongue Every 5 (Five) Minutes As Needed for Chest Pain. Take no more than 3 doses in 15 minutes.    rOPINIRole (REQUIP) 2 MG tablet Unknown Pharmacy Yes No    Take 2 mg by mouth 2 (Two) Times a Day.    tamsulosin (FLOMAX) 0.4 MG capsule 24 hr capsule Unknown Pharmacy Yes No    Take 1 capsule by mouth Every Night.          Hospital Medications (active)       Dose Frequency Start End    amiodarone (PACERONE) tablet 200 mg 200 mg Every 24 Hours Scheduled 9/8/2017     Sig - Route: Take 1 tablet by mouth Daily. - Oral    atorvastatin (LIPITOR) tablet 10 mg 10 mg Nightly 9/8/2017     Sig - Route: Take 1 tablet by mouth Every Night. - Oral    bumetanide (BUMEX) injection 0.5 mg 0.5 mg 2 Times Daily 9/9/2017     Sig - Route: Infuse 2 mL into a venous catheter 2 (Two) Times a Day. - Intravenous    carvedilol (COREG) tablet 3.125 mg 3.125 mg 2 Times Daily With Meals 9/8/2017     Sig - Route: Take 1 tablet by mouth 2 (Two) Times a Day With Meals. - Oral    docusate sodium (COLACE) capsule 100 mg 100 mg 2 Times Daily 9/10/2017     Sig - Route: Take 1 capsule by mouth 2 (Two) Times a Day. - Oral    enoxaparin (LOVENOX) syringe 30 mg 30 mg Daily 9/8/2017     Sig - Route: Inject 0.3 mL under the skin Daily. - Subcutaneous    finasteride (PROSCAR) tablet 5 mg 5  mg Daily 9/9/2017     Sig - Route: Take 1 tablet by mouth Daily. - Oral    gabapentin (NEURONTIN) capsule 300 mg 300 mg Nightly 9/8/2017     Sig - Route: Take 1 capsule by mouth Every Night. - Oral    HYDROcodone-acetaminophen (NORCO) 7.5-325 MG per tablet 1 tablet 1 tablet Every 6 Hours PRN 9/8/2017     Sig - Route: Take 1 tablet by mouth Every 6 (Six) Hours As Needed for Moderate Pain . - Oral    lisinopril (PRINIVIL,ZESTRIL) tablet 2.5 mg 2.5 mg Every 24 Hours Scheduled 9/8/2017     Sig - Route: Take 1 tablet by mouth Daily. - Oral    LORazepam (ATIVAN) tablet 1 mg 1 mg 3 Times Daily 9/9/2017 9/19/2017    Sig - Route: Take 1 tablet by mouth 3 (Three) Times a Day. - Oral    nitroglycerin (NITROSTAT) SL tablet 0.4 mg 0.4 mg Every 5 Minutes PRN 9/8/2017     Sig - Route: Place 1 tablet under the tongue Every 5 (Five) Minutes As Needed for Chest Pain. - Sublingual    rOPINIRole (REQUIP) tablet 1 mg 1 mg Nightly 9/9/2017     Sig - Route: Take 1 tablet by mouth Every Night. - Oral    sennosides-docusate sodium (SENOKOT-S) 8.6-50 MG tablet 2 tablet 2 tablet 2 Times Daily PRN 9/10/2017     Sig - Route: Take 2 tablets by mouth 2 (Two) Times a Day As Needed for Constipation. - Oral    sodium chloride 0.9 % flush 1-10 mL 1-10 mL As Needed 9/8/2017     Sig - Route: Infuse 1-10 mL into a venous catheter As Needed for Line Care. - Intravenous    tamsulosin (FLOMAX) 24 hr capsule 0.4 mg 0.4 mg Daily 9/9/2017     Sig - Route: Take 1 capsule by mouth Daily. - Oral    furosemide (LASIX) tablet 40 mg (Discontinued) 40 mg Daily 9/8/2017 9/9/2017    Sig - Route: Take 1 tablet by mouth Daily. - Oral    LORazepam (ATIVAN) tablet 2 mg (Discontinued) 2 mg Every 8 Hours PRN 9/8/2017 9/9/2017    Sig - Route: Take 2 tablets by mouth Every 8 (Eight) Hours As Needed for Anxiety. - Oral    rOPINIRole (REQUIP) tablet 2 mg (Discontinued) 2 mg Nightly 9/8/2017 9/9/2017    Sig - Route: Take 2 tablets by mouth Every Night. - Oral            "  Physician Progress Notes (most recent note)      Lyric Garcia PA-C at 9/10/2017  8:16 AM  Version 2 of 2         [facility], [facility] Progress Note     LOS: 0 days   Patient Care Team:  Eliel Ames MD as PCP - General  Eliel Ames MD as PCP - Family Medicine  Karthik Macdonald MD as Cardiologist (Cardiology)    Chief Complaint:  SOB    Subjective   Patient continues to have abdominal pain in the mid epigastrium today    Review of Systems:   A 10-point review of systems is obtained and negative except for otherwise mentioned above.    Objective     Vital Sign Min/Max for last 24 hours  Temp  Min: 97.4 °F (36.3 °C)  Max: 98.7 °F (37.1 °C)   BP  Min: 83/49  Max: 103/62   Pulse  Min: 73  Max: 87   Resp  Min: 18  Max: 20   SpO2  Min: 92 %  Max: 99 %   No Data Recorded   Weight  Min: 155 lb 2 oz (70.4 kg)  Max: 155 lb 2 oz (70.4 kg)     Flowsheet Rows         First Filed Value    Admission Height  73.5\" (186.7 cm) Documented at 09/08/2017 1526    Admission Weight  153 lb 3 oz (69.5 kg) Documented at 09/08/2017 1526          Physical Exam:   General Appearance: alert, appears stated age and cooperative  Lungs: clear to auscultation, respirations regular, respirations even and respirations unlabored  Heart: regular rhythm & normal rate, normal S1, S2, no murmur, no magdiel, no rub and no click  Abdomen: normal bowel sounds, no masses, no hepatomegaly, no splenomegaly, soft non-tender, no guarding and no rebound tenderness  Extremities: moves extremities well, no edema, no cyanosis and no redness    Results Review:     I reviewed the patient's new clinical results.      Results from last 7 days  Lab Units 09/08/17  1300   WBC 10*3/mm3 5.60   HEMOGLOBIN g/dL 12.9*   HEMATOCRIT % 41.3   PLATELETS 10*3/mm3 177       Results from last 7 days  Lab Units 09/10/17  0523   SODIUM mmol/L 137   POTASSIUM mmol/L 3.9   CHLORIDE mmol/L 99   CO2 mmol/L 30.0   BUN mg/dL 48* "   CREATININE mg/dL 1.91*   GLUCOSE mg/dL 93   CALCIUM mg/dL 8.6       Results from last 7 days  Lab Units 09/10/17  0523  09/08/17  1300   SODIUM mmol/L 137  < > 139   POTASSIUM mmol/L 3.9  < > 4.8   CHLORIDE mmol/L 99  < > 97*   CO2 mmol/L 30.0  < > 31.0   BUN mg/dL 48*  < > 52*   CREATININE mg/dL 1.91*  < > 1.94*   CALCIUM mg/dL 8.6  < > 9.8   BILIRUBIN mg/dL  --   --  1.1*   ALK PHOS U/L  --   --  83   ALT (SGPT) U/L  --   --  47   AST (SGOT) U/L  --   --  40   GLUCOSE mg/dL 93  < > 98   < > = values in this interval not displayed.    Results from last 7 days  Lab Units 09/08/17  2153 09/08/17  2041 09/08/17  1607   TROPONIN I ng/mL 0.057* 0.055* 0.052*       Results from last 7 days  Lab Units 09/08/17  1607   TSH mIU/mL 2.430       Results from last 7 days  Lab Units 09/09/17  0318   CHOLESTEROL mg/dL 126*   TRIGLYCERIDES mg/dL 114   HDL CHOL mg/dL 62       Medication Review: yes    Assessment/Plan     1.  CP  2.  WEBSTER  3.  Polypharmacy  4.  LV dysfunction  5.  Epigastric pain  6.  Cholelithiasis  7.  KRISTY/CKD    Barium swallow today.  Monitor labs. Possible d/c to [facility] in pt rehab for trying to wean from multiple meds.    Lyric Garcia PA-C  09/10/17  8:18 AM           Electronically signed by Lyric Garcia PA-C at 9/10/2017  8:24 AM      Lyric Garcia PA-C at 9/10/2017  8:16 AM  Version 1 of 2         [facility], [facility] Progress Note     LOS: 0 days   Patient Care Team:  Eliel Ames MD as PCP - General  Eliel Ames MD as PCP - Family Medicine  Karthik Macdonald MD as Cardiologist (Cardiology)    Chief Complaint:  SOB    Subjective   Patient continues to have abdominal pain in the mid epigastrium today    Review of Systems:   A 10-point review of systems is obtained and negative except for otherwise mentioned above.    Objective     Vital Sign Min/Max for last 24 hours  Temp  Min: 97.4 °F (36.3 °C)  Max: 98.7 °F (37.1 °C)   BP   "Min: 83/49  Max: 103/62   Pulse  Min: 73  Max: 87   Resp  Min: 18  Max: 20   SpO2  Min: 92 %  Max: 99 %   No Data Recorded   Weight  Min: 155 lb 2 oz (70.4 kg)  Max: 155 lb 2 oz (70.4 kg)     Flowsheet Rows         First Filed Value    Admission Height  73.5\" (186.7 cm) Documented at 09/08/2017 1526    Admission Weight  153 lb 3 oz (69.5 kg) Documented at 09/08/2017 1526          Physical Exam:   General Appearance: alert, appears stated age and cooperative  Lungs: clear to auscultation, respirations regular, respirations even and respirations unlabored  Heart: regular rhythm & normal rate, normal S1, S2, no murmur, no magdiel, no rub and no click  Abdomen: normal bowel sounds, no masses, no hepatomegaly, no splenomegaly, soft non-tender, no guarding and no rebound tenderness  Extremities: moves extremities well, no edema, no cyanosis and no redness    Results Review:     I reviewed the patient's new clinical results.      Results from last 7 days  Lab Units 09/08/17  1300   WBC 10*3/mm3 5.60   HEMOGLOBIN g/dL 12.9*   HEMATOCRIT % 41.3   PLATELETS 10*3/mm3 177       Results from last 7 days  Lab Units 09/10/17  0523   SODIUM mmol/L 137   POTASSIUM mmol/L 3.9   CHLORIDE mmol/L 99   CO2 mmol/L 30.0   BUN mg/dL 48*   CREATININE mg/dL 1.91*   GLUCOSE mg/dL 93   CALCIUM mg/dL 8.6       Results from last 7 days  Lab Units 09/10/17  0523  09/08/17  1300   SODIUM mmol/L 137  < > 139   POTASSIUM mmol/L 3.9  < > 4.8   CHLORIDE mmol/L 99  < > 97*   CO2 mmol/L 30.0  < > 31.0   BUN mg/dL 48*  < > 52*   CREATININE mg/dL 1.91*  < > 1.94*   CALCIUM mg/dL 8.6  < > 9.8   BILIRUBIN mg/dL  --   --  1.1*   ALK PHOS U/L  --   --  83   ALT (SGPT) U/L  --   --  47   AST (SGOT) U/L  --   --  40   GLUCOSE mg/dL 93  < > 98   < > = values in this interval not displayed.    Results from last 7 days  Lab Units 09/08/17  2153 09/08/17  2041 09/08/17  1607   TROPONIN I ng/mL 0.057* 0.055* 0.052*       Results from last 7 days  Lab Units " 09/08/17  1607   TSH mIU/mL 2.430       Results from last 7 days  Lab Units 09/09/17  0318   CHOLESTEROL mg/dL 126*   TRIGLYCERIDES mg/dL 114   HDL CHOL mg/dL 62       Medication Review: yes    Assessment/Plan     1.  CP  2.  WEBSTER  3.  Polypharmacy  4.  LV dysfunction  5.  Epigastric pain  6.  Cholelithiasis    Barium swallow today.  Possible d/c to [facility] in pt rehab for trying to wean from multiple meds.    Lyric Garcia PA-C  09/10/17  8:18 AM           Electronically signed by Lyric Garcia PA-C at 9/10/2017  8:21 AM           Consult Notes (most recent note)      Daniele Sharpe MD at 9/9/2017  6:29 PM  Version 1 of 1     Consult Orders:    1. Inpatient Consult to Nephrology [[id number]] ordered by Karthik Macdonald MD at 09/09/17 1131                Nephrology ([facility]) Consult Note      Patient:  Wild Bravo  YOB: 1934  Date of Service: 9/9/2017  MRN: [id number]   Acct: [id number]   Primary Care Physician: Eliel Ames MD  Advance Directive: Full Code  Admit Date: 9/8/2017       Hospital Day: 0  Referring Provider: No ref. provider found      Patient personally seen and examined.  Complete chart including Consults, Notes, Operative Reports, Labs, Cardiology, and Radiology studies reviewed as able.        Subjective:  Wild Bravo is a 83 y.o. male  whom we were consulted for CKD.  Pt of Dr. Crawford with CKD  Prior creat 2.68, then 1.8 with decreased diuresis.  Admitted with fall.  Evaluated by cardiology and neurology.  Pt denies specific c/o aside from mild soa.  No f/c/n/v.  Family notes poor compliance with diet.  Daughter Audra (APRN from [place]) present.  Family very concerned about self care.    Allergies:  Keflex [cephalexin]    Home Meds:  Prescriptions Prior to Admission   Medication Sig Dispense Refill Last Dose   • amiodarone (PACERONE) 200 MG tablet Take 1 tablet by mouth Daily. 30 tablet 1 Past Week at Unknown time   •  carvedilol (COREG) 3.125 MG tablet Take 3.125 mg by mouth 2 (Two) Times a Day With Meals.   Past Week at Unknown time   • furosemide (LASIX) 40 MG tablet Take 40 mg by mouth Daily As Needed (edema).   Past Month at Unknown time   • gabapentin (NEURONTIN) 300 MG capsule Take 300 mg by mouth 2 (Two) Times a Day.   Past Week at Unknown time   • HYDROcodone-acetaminophen (NORCO) 7.5-325 MG per tablet Take 1 tablet by mouth Every 8 (Eight) Hours As Needed for Moderate Pain .   Past Week at Unknown time   • LORazepam (ATIVAN) 2 MG tablet Take 2 mg by mouth Every 8 (Eight) Hours As Needed for Anxiety.   Past Week at Unknown time   • aspirin 81 MG EC tablet Take 81 mg by mouth Daily.   Unknown at Unknown time   • atorvastatin (LIPITOR) 20 MG tablet Take 20 mg by mouth Daily.   Unknown at Unknown time   • dutasteride (AVODART) 0.5 MG capsule Take 0.5 mg by mouth Daily.   Unknown at Unknown time   • lisinopril (PRINIVIL,ZESTRIL) 5 MG tablet Take 2.5 mg by mouth Daily.   Unknown at Unknown time   • nitroglycerin (NITROSTAT) 0.4 MG SL tablet Place 0.4 mg under the tongue Every 5 (Five) Minutes As Needed for Chest Pain. Take no more than 3 doses in 15 minutes.   Unknown at Unknown time   • rOPINIRole (REQUIP) 2 MG tablet Take 2 mg by mouth 2 (Two) Times a Day.   Unknown at Unknown time   • tamsulosin (FLOMAX) 0.4 MG capsule 24 hr capsule Take 1 capsule by mouth Every Night.   Unknown at Unknown time       Medicines:  Current Facility-Administered Medications   Medication Dose Route Frequency Provider Last Rate Last Dose   • amiodarone (PACERONE) tablet 200 mg  200 mg Oral Q24H Karthik Macdonald MD   200 mg at 09/09/17 0807   • atorvastatin (LIPITOR) tablet 10 mg  10 mg Oral Nightly Karthik Macdonald MD   10 mg at 09/08/17 2050   • bumetanide (BUMEX) injection 0.5 mg  0.5 mg Intravenous BID Karthik Macdonald MD   0.5 mg at 09/09/17 1755   • carvedilol (COREG) tablet 3.125 mg  3.125 mg Oral BID With Meals Karthik Macdonald MD   3.125 mg at 09/09/17  1756   • enoxaparin (LOVENOX) syringe 30 mg  30 mg Subcutaneous Daily Karthik Macdonald MD   30 mg at 09/09/17 1756   • finasteride (PROSCAR) tablet 5 mg  5 mg Oral Daily Karthik Macdonald MD   5 mg at 09/09/17 0807   • gabapentin (NEURONTIN) capsule 300 mg  300 mg Oral Nightly Karthik Macdonald MD   300 mg at 09/08/17 2050   • HYDROcodone-acetaminophen (NORCO) 7.5-325 MG per tablet 1 tablet  1 tablet Oral Q6H PRN Karthik Macdonald MD   1 tablet at 09/09/17 1756   • lisinopril (PRINIVIL,ZESTRIL) tablet 2.5 mg  2.5 mg Oral Q24H Karthik Macdonald MD   2.5 mg at 09/09/17 0806   • LORazepam (ATIVAN) tablet 2 mg  2 mg Oral Q8H PRN Karthik Macdonald MD   2 mg at 09/09/17 1232   • nitroglycerin (NITROSTAT) SL tablet 0.4 mg  0.4 mg Sublingual Q5 Min PRN Karthik Macdonald MD       • rOPINIRole (REQUIP) tablet 2 mg  2 mg Oral Nightly Karthik Macdonald MD       • sodium chloride 0.9 % flush 1-10 mL  1-10 mL Intravenous PRN Karthik Macdonald MD       • tamsulosin (FLOMAX) 24 hr capsule 0.4 mg  0.4 mg Oral Daily Karthik Macdonald MD   0.4 mg at 09/09/17 0807       Past Medical History:  Past Medical History:   Diagnosis Date   • Abdominal aortic aneurysm    • Anxiety    • Atrial fibrillation    • Biventricular ICD (implantable cardioverter-defibrillator) in place    • BPH (benign prostatic hypertrophy)    • Carotid artery stenosis    • CHF (congestive heart failure)    • Chronic kidney disease     Chronic kidney disease, stage 4 (severe)   • Chronic systolic (congestive) heart failure     limited echo 7/2016 EF was 40-45%. Patient has BiV AICD   • Coronary arteriosclerosis     MI x2   • Hearing loss    • Iron deficiency anemia    • Ischemic cardiomyopathy    • Mixed hyperlipidemia    • Myocardial infarction    • Stroke        Past Surgical History:  Past Surgical History:   Procedure Laterality Date   • CARDIAC ABLATION     • CARDIAC CATHETERIZATION     • CARDIAC PACEMAKER PLACEMENT     • CARDIOVERSION     • CAROTID ENDARTERECTOMY     • CORONARY ARTERY BYPASS GRAFT     • MITRAL  "VALVE REPLACEMENT     • TOTAL KNEE ARTHROPLASTY         Family History  Family History   Problem Relation Age of Onset   • Stroke Mother    • Heart disease Mother    • Diabetes Father        Social History  Social History     Social History   • Marital status:      Spouse name: N/A   • Number of children: N/A   • Years of education: N/A     Occupational History   • Not on file.     Social History Main Topics   • Smoking status: Former Smoker   • Smokeless tobacco: Never Used   • Alcohol use No   • Drug use: No   • Sexual activity: Defer     Other Topics Concern   • Not on file     Social History Narrative         Review of Systems:  History obtained from chart review and the patient  General ROS: No fever or chills  Respiratory ROS: No cough, +shortness of breath  Cardiovascular ROS: no chest pain or dyspnea on exertion  Gastrointestinal ROS: No abdominal pain or melena  Genito-Urinary ROS: No dysuria or hematuria  14 point ROS reviewed with the patient and negative except as noted above and in the HPI unless unable to obtain.    Objective:  /64 (BP Location: Left arm, Patient Position: Lying)  Pulse 80  Temp 98.3 °F (36.8 °C) (Temporal Artery )   Resp 20  Ht 73.5\" (186.7 cm)  Wt 153 lb 3 oz (69.5 kg)  SpO2 96%  BMI 19.94 kg/m2    Intake/Output Summary (Last 24 hours) at 09/09/17 1829  Last data filed at 09/09/17 1300   Gross per 24 hour   Intake              240 ml   Output             1175 ml   Net             -935 ml     General: awake/alert   Chest:  clear to auscultation bilaterally without respiratory distress  CVS: regular rate and rhythm  Abdominal: soft, nontender, normal bowel sounds  Extremities: ble edema  Skin: warm and dry without rash      Labs:    Results from last 7 days  Lab Units 09/08/17  1300   WBC 10*3/mm3 5.60   HEMOGLOBIN g/dL 12.9*   HEMATOCRIT % 41.3   PLATELETS 10*3/mm3 177           Results from last 7 days  Lab Units 09/09/17  0318 09/08/17  1607 09/08/17  1300 "   SODIUM mmol/L 138 138 139   POTASSIUM mmol/L 3.9 4.0 4.8   CHLORIDE mmol/L 98 96* 97*   CO2 mmol/L 33.0* 29.0 31.0   BUN mg/dL 48* 52* 52*   CREATININE mg/dL 1.71* 1.77* 1.94*   CALCIUM mg/dL 9.0 9.3 9.8   BILIRUBIN mg/dL  --   --  1.1*   ALK PHOS U/L  --   --  83   ALT (SGPT) U/L  --   --  47   AST (SGOT) U/L  --   --  40   GLUCOSE mg/dL 97 150* 98       Radiology:   Imaging Results (last 72 hours)     Procedure Component Value Units Date/Time    XR Chest PA & Lateral [[id number]] Collected:  09/08/17 1622     Updated:  09/08/17 1627    Narrative:       EXAMINATION:   XR CHEST PA AND LATERAL-  9/8/2017 3:22 PM CST     HISTORY: Short of breath     PA and lateral views the chest obtained. Hyperinflation increase in AP  diameter chest is noted consistent with COPD.     Cardiac silhouette is mildly enlarged.     Prosthetic cardiac valve is present in the mitral position.     Pacing device is present projecting over the soft tissues of the left  chest. Wires are present median sternotomy. Vascular calcifications  present in the aortic arch.     Benign calcifications in the milagros are present bilaterally.       Impression:       Mild cardiomegaly no evidence of pulmonary vascular  congestion.  2. COPD  This report was finalized on 09/08/2017 16:24 by Dr. Yg Bowden MD.    CT Head Without Contrast [[id number]] Collected:  09/08/17 1633     Updated:  09/08/17 1639    Narrative:       CT HEAD WO CONTRAST- 9/8/2017 5:21 PM EDT     HISTORY: falls, alt mental status       Technique:   Axial CT of the brain without IV contrast. Sagittal and coronal  reformations are also provided for review. Soft tissue and bone kernels  are available for interpretation.     Comparison: None.     Findings:      There is no evidence of acute large vascular distribution infarct, mass  lesion or hemorrhage.  The ventricles, cortical sulci and basal cisterns  are symmetric and age appropriate. Mild generalized symmetric volume  loss is  identified. There is atheromatous calcification in the  intracranial vertebral arteries as well as the bilateral paraclinoid  ICAs.  Normal brainstem and posterior fossa.  The scalp and calvarium  are unremarkable.        Impression:       Impression:    1. No acute intracranial process  This report was finalized on 09/08/2017 16:36 by Dr Jem Carreno, .    US Carotid Bilateral [[id number]] Updated:  09/08/17 1702    US Abdomen Complete [[id number]] Collected:  09/09/17 0942     Updated:  09/09/17 0949    Narrative:       ULTRASOUND ABDOMEN COMPLETE 9/9/2017 8:20 AM CDT     REASON FOR EXAM: abdominal pain, hx aaa       COMPARISON: None      TECHNIQUE: Multiple longitudinal and transverse real-time sonographic  images of the abdomen are obtained.      FINDINGS:       LIVER: Normal in size, smooth in contour, and homogeneous in  echogenicity. No focal lesion is seen.     BILIARY: Echoes are noted in the gallbladder with distal shadowing  consistent with cholelithiasis.. The common bile duct measures 0.7 cm in  diameter. There is no evidence of intrahepatic ductal dilatation.       KIDNEYS: The right and left kidneys are normal in size, shape,  orientation, and position measuring 5.8 x 7.3 x 14.9 cm and 5.2 x 6.2 x  12.2 cm, respectively. The corticomedullary differentiation is  maintained. There is no evidence of nephrolithiasis, hydronephrosis or  perinephric fluid collection. Cysts are present associated right kidney  the largest is 5.7 cm. The largest left renal cyst is at the upper pole  and is 6.4 cm No hydroureter is seen.     PANCREAS: No focal lesion or abnormality.      SPLEEN: Normal in size and appearance.      OTHER: No ascites is present. The aorta is aneurysmal and measures 39 mm  in maximal diameter. Inferior vena cava is visualized.     The prostate is enlarged. The prostate is 7.2 x 9 cm.        Impression:       1. Cholelithiasis.        2. Enlarged prostate Yuliana graph  3. Renal cyst     This report  was finalized on 2017 09:46 by Dr. Yg Bowden MD.          Culture:         Assessment   CKD 3  Recent KRISTY from diuretic use  Bilateral renal cysts  Persistent proteinuria  HTN  cognitive impairment    Plan:  Long d/w pt/family  Agree with neuro eval/need for psych eval  Continue diuretics for now  Monitor labs  Happy to see at [facility] if pt transferred as needed      Thank you for the consult, we appreciate the opportunity to provide care to your patients.  Feel free to contact me if I can be of any further assistance.      Daniele Sharpe MD  2017  6:29 PM       Electronically signed by Daniele Sharpe MD at 2017 10:20 PM           Nutrition Notes (most recent note)      Karen Gr at 2017  2:18 PM  Version 1 of 1         Problem: Patient Care Overview (Adult)  Goal: Plan of Care Review  Outcome: Ongoing (interventions implemented as appropriate)    17 1417   Coping/Psychosocial Response Interventions   Plan Of Care Reviewed With patient   Patient Care Overview   Progress no change   Outcome Evaluation   Outcome Summary/Follow up Plan Initial RDN assessment. Pt reports fair appetite, however is a poor historian about his diet and weight history. RDN encouraged intake and educated on Low Na diet recommendations. Will continue to FU.              Electronically signed by Karen Gr at 2017  2:18 PM           Physical Therapy Notes (most recent note)      Fernando Hernandez, PT DPT at 2017 10:58 AM  Version 1 of 1         Acute Care - Physical Therapy Initial Evaluation   [place]     Patient Name: Wild Bravo  : 1934  MRN: [id number]  Today's Date: 2017   Onset of Illness/Injury or Date of Surgery Date: 17  Date of Referral to PT: 17  Referring Physician: Dr Macdonald      Admit Date: 2017     Visit Dx:    ICD-10-CM ICD-9-CM   1. Impaired functional mobility, balance, gait, and endurance Z74.09 V49.89     Patient Active  Problem List   Diagnosis   • Coronary arteriosclerosis   • CHF (congestive heart failure)   • SSS (sick sinus syndrome)   • Pacemaker   • Essential hypertension   • Biventricular ICD (implantable cardioverter-defibrillator) in place   • Persistent atrial fibrillation   • PAD (peripheral artery disease)   • H/O mitral valve repair   • ERRONEOUS ENCOUNTER--DISREGARD   • Chest pain     Past Medical History:   Diagnosis Date   • Abdominal aortic aneurysm    • Anxiety    • Atrial fibrillation    • Biventricular ICD (implantable cardioverter-defibrillator) in place    • BPH (benign prostatic hypertrophy)    • Carotid artery stenosis    • CHF (congestive heart failure)    • Chronic kidney disease     Chronic kidney disease, stage 4 (severe)   • Chronic systolic (congestive) heart failure     limited echo 7/2016 EF was 40-45%. Patient has BiV AICD   • Coronary arteriosclerosis     MI x2   • Hearing loss    • Iron deficiency anemia    • Ischemic cardiomyopathy    • Mixed hyperlipidemia    • Myocardial infarction    • Stroke      Past Surgical History:   Procedure Laterality Date   • CARDIAC ABLATION     • CARDIAC CATHETERIZATION     • CARDIAC PACEMAKER PLACEMENT     • CARDIOVERSION     • CAROTID ENDARTERECTOMY     • CORONARY ARTERY BYPASS GRAFT     • MITRAL VALVE REPLACEMENT     • TOTAL KNEE ARTHROPLASTY            PT ASSESSMENT (last 72 hours)      PT Evaluation       09/09/17 1027 09/09/17 1004    Rehab Evaluation    Document Type  evaluation   see MAR  -PB    Subjective Information  agree to therapy;complains of;dyspnea;pain  -PB    Patient Effort, Rehab Treatment  good  -PB    General Information    Patient Profile Review  yes  -PB    Onset of Illness/Injury or Date of Surgery Date  09/08/17  -PB    Referring Physician  Dr Macdonald  -PB    General Observations  awake and alert in bed  -PB    Pertinent History Of Current Problem  SOB, abd distention and pain, R sided chest pain, fatigue, AMS, falls  -PB     Precautions/Limitations  fall precautions  -PB    Prior Level of Function  independent:;community mobility;all household mobility;ADL's   sleeps in recliner; falls; usteady at baseline  -PB    Equipment Currently Used at Home none  -LN none  -PB    Plans/Goals Discussed With  patient and family;agreed upon  -PB    Risks Reviewed  patient and family:;LOB;nausea/vomiting;dizziness;change in vital signs;increased discomfort  -PB    Benefits Reviewed  patient and family:;improve function;increase strength;increase independence;increase balance  -PB    Barriers to Rehab  cognitive status;previous functional deficit  -PB    Living Environment    Lives With alone  -LN alone  -PB    Living Arrangements  house  -PB    Home Accessibility no concerns  -LN stairs to enter home;tub/shower is not walk in  -PB    Number of Stairs to Enter Home  15  -PB    Stair Railings at Home none  -LN outside, present at both sides  -PB    Type of Financial/Environmental Concern none  -LN     Transportation Available car;family or friend will provide  -LN     Clinical Impression    Date of Referral to PT  09/08/17  -PB    PT Diagnosis  impaired balance, functional mobility, edurance  -PB    Patient/Family Goals Statement  return home  -PB    Criteria for Skilled Therapeutic Interventions Met  yes;treatment indicated  -PB    Impairments Found (describe specific impairments)  gait, locomotion, and balance  -PB    Rehab Potential  good, to achieve stated therapy goals  -PB    Predicted Duration of Therapy Intervention (days/wks)  until D/C  -PB    Vital Signs    Intratreatment Heart Rate (beats/min)  130  -PB    Posttreatment Heart Rate (beats/min)  103  -PB    Pre SpO2 (%)  97  -PB    O2 Delivery Pre Treatment  room air  -PB    Intra SpO2 (%)  85  -PB    O2 Delivery Intra Treatment  room air  -PB    Post SpO2 (%)  98  -PB    O2 Delivery Post Treatment  room air  -PB    Pre Patient Position  Sitting  -PB    Intra Patient Position  Standing  -PB     Post Patient Position  Sitting  -PB    Pain Assessment    Pain Assessment  0-10  -PB    Pain Score  4  -PB    Pain Type  Acute pain  -PB    Pain Location  Chest  -PB    Pain Descriptors  Sore  -PB    Pain Frequency  Constant/continuous  -PB    Pain Intervention(s)  Repositioned  -PB    Response to Interventions  tolerated  -PB    Cognitive Assessment/Intervention    Current Cognitive/Communication Assessment  impaired  -PB    Follows Commands/Answers Questions  able to follow single-step instructions;100% of the time  -PB    Personal Safety  decreased awareness, need for safety;decreased insight to deficits;impulsive  -PB    Personal Safety Interventions  fall prevention program maintained;gait belt;nonskid shoes/slippers when out of bed;supervised activity  -PB    ROM (Range of Motion)    General ROM Detail  BLE AROM WFL; R shoulder impaired chronically  -PB    MMT (Manual Muscle Testing)    General MMT Assessment Detail  BLE functionally 4+/5; UE 3/5 within range  -PB    Bed Mobility, Assessment/Treatment    Bed Mobility, Assistive Device  bed rails;head of bed elevated  -PB    Bed Mob, Supine to Sit, [place]  conditional independence  -PB    Bed Mob, Sit to Supine, [place]  conditional independence  -PB    Transfer Assessment/Treatment    Transfers, Sit-Stand [place]  independent  -PB    Transfers, Stand-Sit [place]  independent  -PB    Transfer, Safety Issues  balance decreased during turns;impulsivity  -PB    Transfer, Impairments  impaired balance  -PB    Gait Assessment/Treatment    Gait, [place] Level  contact guard assist;stand by assist  -PB    Gait, Distance (Feet)  125   with CGA, stand rest, 125 feet SBA  -PB    Gait, Gait Deviations  narrow base   path deviation x1 with SBA, base narrowed with SBA  -PB    Gait, Safety Issues  balance decreased during turns  -PB    Gait, Impairments  impaired balance  -PB    Motor Skills/Interventions    Additional Documentation  Balance Skills  Training (Group)  -PB    Balance Skills Training    Sitting-Level of Assistance  Independent  -PB    Sitting-Balance Support  Feet supported  -PB    Standing-Level of Assistance  Close supervision  -PB    Static Standing Balance Support  No upper extremity supported  -PB    Gait Balance-Level of Assistance  Contact guard;Close supervision  -PB    Gait Balance Support  No upper extremity supported  -PB    Positioning and Restraints    Pre-Treatment Position  in bed  -PB    Post Treatment Position  bed  -PB    In Bed  fowlers;call light within reach;encouraged to call for assist;with family/caregiver;side rails up x2  -PB      09/09/17 0732 09/08/17 1629    Rehab Evaluation    Document Type evaluation   see MAR  -PB     General Information    Patient Profile Review yes  -PB     Onset of Illness/Injury or Date of Surgery Date 09/08/17  -PB     Referring Physician Dr Macdonald  -PB     Pertinent History Of Current Problem SOB, abd distention and pain, R sided chest pain, fatigue, AMS, falls  -PB     Living Environment    Transportation Available  car;family or friend will provide  -KU    Clinical Impression    Date of Referral to PT 09/08/17  -PB       09/08/17 1600 09/08/17 1540    General Information    Equipment Currently Used at Home  none  -KU    Living Environment    Lives With child(cecile), adult  -KU     Living Arrangements house  -KU     Home Accessibility no concerns  -KU     Stair Railings at Home none  -KU     Type of Financial/Environmental Concern --   DAUGHTER CONCERNED WITH PATIENT BEING ALONE  -KU     Transportation Available car;family or friend will provide  -KU       User Key  (r) = Recorded By, (t) = Taken By, (c) = Cosigned By    Initials Name Provider Type     Sangeeta Hawley RN Registered Nurse    DUNIA Hernandez, PT DPT Physical Therapist    BREN BurchW           Physical Therapy Education     Title: PT OT SLP Therapies (Active)     Topic: Physical Therapy (Active)      Point: Mobility training (Done)    Learning Progress Summary    Learner Readiness Method Response Comment Documented by Status   Patient Acceptance E NR,VU benefits of activity, safety concerns  09/09/17 1054 Done   Family Acceptance E NR,VU benefits of activity, safety concerns  09/09/17 1054 Done                      User Key     Initials Effective Dates Name Provider Type Discipline     08/02/16 -  Fernando Hernandez, PT DPT Physical Therapist PT                PT Recommendation and Plan  Anticipated Discharge Disposition: home with 24/7 care, assisted living  Planned Therapy Interventions: balance training, gait training, patient/family education, stair training  PT Frequency: daily, 2 times/day, per priority policy  Plan of Care Review  Plan Of Care Reviewed With: patient, daughter  Outcome Summary/Follow up Plan: PT eval complete. pt able to perform bedmobility and transfers Independently. pt c/o SOA with donning socks and ambulating. pt able to ambulate 125 feet at time with CGA/SBA. pt demonstrate increased path excursion and narrowing of step width with decreased support. pt would benefit from continued skilled PT to address decreased activity tolerance, impaired functional mobility, and impaired balance. Anticipate D/C home with 24/7 supervision.          IP PT Goals       09/09/17 1051          Gait Training PT LTG    Gait Training Goal PT LTG, Date Established 09/09/17  -PB      Gait Training Goal PT LTG, Time to Achieve by discharge  -PB      Gait Training Goal PT LTG, [place] Level supervision required  -PB      Gait Training Goal PT LTG, Distance to Achieve 300  -PB      Gait Training Goal PT LTG, Additional Goal maintain SpO2 equal to or above 90%  -PB      Stair Training PT LTG    Stair Training Goal PT LTG, Date Established 09/09/17  -PB      Stair Training Goal PT LTG, Time to Achieve by discharge  -PB      Stair Training Goal PT LTG, Number of Steps 15  -PB      Stair Training Goal PT  LTG, [place] Level contact guard assist  -PB      Stair Training Goal PT LTG, Assist Device 1 handrail  -PB        User Key  (r) = Recorded By, (t) = Taken By, (c) = Cosigned By    Initials Name Provider Type    DUNIA Hernandez, PT DPT Physical Therapist                Outcome Measures       09/09/17 1004          How much help from another person do you currently need...    Turning from your back to your side while in flat bed without using bedrails? 4  -PB      Moving from lying on back to sitting on the side of a flat bed without bedrails? 4  -PB      Moving to and from a bed to a chair (including a wheelchair)? 4  -PB      Standing up from a chair using your arms (e.g., wheelchair, bedside chair)? 4  -PB      Climbing 3-5 steps with a railing? 3  -PB      To walk in hospital room? 3  -PB      AM-PAC 6 Clicks Score 22  -PB      Functional Assessment    Outcome Measure Options AM-PAC 6 Clicks Basic Mobility (PT)  -PB        User Key  (r) = Recorded By, (t) = Taken By, (c) = Cosigned By    Initials Name Provider Type    DUNIA Hernandez, PT DPT Physical Therapist           Time Calculation:         PT Charges       09/09/17 1055          Time Calculation    Start Time 1004   additional time 732-750  -PB      Stop Time 1035  -PB      Time Calculation (min) 31 min  -PB      PT Received On 09/09/17  -PB      PT Goal Re-Cert Due Date 09/19/17  -PB        User Key  (r) = Recorded By, (t) = Taken By, (c) = Cosigned By    Initials Name Provider Type    DUNIA Hernandez PT DPYANI Physical Therapist          Therapy Charges for Today     Code Description Service Date Service Provider Modifiers Qty    94589850852 HC PT MOBILITY CURRENT 9/9/2017 Fernando Hernandez, PT DPT GP, CJ 1    14752552367 HC PT MOBILITY PROJECTED 9/9/2017 Fernando Hernandez, PT DPT GP, CI 1    78811059025 HC PT EVAL LOW COMPLEXITY 3 9/9/2017 Fernando Hernandez, PT DPT GP 1          PT G-Codes  Outcome Measure Options: AM-PAC 6 Clicks Basic  Mobility (PT)  Score: 22  Functional Limitation: Mobility: Walking and moving around  Mobility: Walking and Moving Around Current Status (): At least 20 percent but less than 40 percent impaired, limited or restricted  Mobility: Walking and Moving Around Goal Status (): At least 1 percent but less than 20 percent impaired, limited or restricted      Fernando Hernandez, PT DPT  9/9/2017            Electronically signed by Fernando Hernandez PT DPT at 9/9/2017 10:58 AM        Occupational Therapy Notes (most recent note)     No notes of this type exist for this encounter.        Speech Language Pathology Notes (most recent note)     No notes of this type exist for this encounter.        Respiratory Therapy Notes (most recent note)     No notes of this type exist for this encounter.        Karthik Macdonald MD Physician Signed Cardiology Progress Notes Date of Service: 9/9/2017 12:20 PM      Expand All Collapse All    []Hide copied text  []Hover for attribution information          LOS: 0 days   Patient Care Team:  Eliel Ames MD as PCP - General  Eliel Ames MD as PCP - Family Medicine  Karthik Macdonald MD as Cardiologist (Cardiology)     Chief Complaint: Shortness of breath  Orthopnea  LV dysfunction  Elevated BNP  Altered mental status  Fall likely due to polypharmacy  Resistant to adhering to medical advice   Wants to go home  Subjective  Feeling  better  No chest pain   No excessive shortness of breath  No new complaints  Epigastric chronic pain  Interval History:OK overall     Patient Complaints:   Denies chest pain currently. Denies excessive shortness of breath. Denies abdominal pain, nausea vomiting or diarrhoea.     Telemetry: no malignant arrhythmia. No significant pauses.     Review of Systems:    The following systems were reviewed and negative; ENT,  integument, hematologic / lymphatic, neurological, behavioral/psych and allergies / immunologic     Labs:     WBC       WBC   Date Value Ref  Range Status   09/08/2017 5.60 4.80 - 10.80 10*3/mm3 Final       HGB       Hemoglobin   Date Value Ref Range Status   09/08/2017 12.9 (L) 14.0 - 18.0 g/dL Final       HCT       Hematocrit   Date Value Ref Range Status   09/08/2017 41.3 40.0 - 52.0 % Final       Platlets       Platelets   Date Value Ref Range Status   09/08/2017 177 130 - 400 10*3/mm3 Final       MCV       MCV   Date Value Ref Range Status   09/08/2017 91.0 82.0 - 95.0 fL Final          Results from last 7 days  Lab Units 09/09/17  0318 09/08/17  1607 09/08/17  1300   SODIUM mmol/L 138 138 139   POTASSIUM mmol/L 3.9 4.0 4.8   CHLORIDE mmol/L 98 96* 97*   CO2 mmol/L 33.0* 29.0 31.0   BUN mg/dL 48* 52* 52*   CREATININE mg/dL 1.71* 1.77* 1.94*   CALCIUM mg/dL 9.0 9.3 9.8   BILIRUBIN mg/dL  --   --  1.1*   ALK PHOS U/L  --   --  83   ALT (SGPT) U/L  --   --  47   AST (SGOT) U/L  --   --  40   GLUCOSE mg/dL 97 150* 98            Lab Results   Component Value Date     TROPONINI 0.057 (H) 09/08/2017      PT/INR:  No results found for: PROTIME/No results found for: INR     Imaging Results (last 72 hours)     Procedure Component Value Units Date/Time     XR Chest PA & Lateral [[id number]] Collected:  09/08/17 1622       Updated:  09/08/17 1627     Narrative:        EXAMINATION:   XR CHEST PA AND LATERAL-  9/8/2017 3:22 PM CST      HISTORY: Short of breath      PA and lateral views the chest obtained. Hyperinflation increase in AP  diameter chest is noted consistent with COPD.      Cardiac silhouette is mildly enlarged.      Prosthetic cardiac valve is present in the mitral position.      Pacing device is present projecting over the soft tissues of the left  chest. Wires are present median sternotomy. Vascular calcifications  present in the aortic arch.      Benign calcifications in the milagros are present bilaterally.         Impression:        Mild cardiomegaly no evidence of pulmonary vascular  congestion.  2. COPD  This report was finalized on 09/08/2017 16:24  by Dr. Yg Bowden MD.     CT Head Without Contrast [[id number]] Collected:  09/08/17 1633       Updated:  09/08/17 1639     Narrative:        CT HEAD WO CONTRAST- 9/8/2017 5:21 PM EDT      HISTORY: falls, alt mental status        Technique:   Axial CT of the brain without IV contrast. Sagittal and coronal  reformations are also provided for review. Soft tissue and bone kernels  are available for interpretation.      Comparison: None.      Findings:       There is no evidence of acute large vascular distribution infarct, mass  lesion or hemorrhage.  The ventricles, cortical sulci and basal cisterns  are symmetric and age appropriate. Mild generalized symmetric volume  loss is identified. There is atheromatous calcification in the  intracranial vertebral arteries as well as the bilateral paraclinoid  ICAs.  Normal brainstem and posterior fossa.  The scalp and calvarium  are unremarkable.          Impression:        Impression:    1. No acute intracranial process  This report was finalized on 09/08/2017 16:36 by Dr Jem Carreno, .     US Carotid Bilateral [[id number]] Updated:  09/08/17 1702     US Abdomen Complete [[id number]] Collected:  09/09/17 0942       Updated:  09/09/17 0949     Narrative:        ULTRASOUND ABDOMEN COMPLETE 9/9/2017 8:20 AM CDT      REASON FOR EXAM: abdominal pain, hx aaa        COMPARISON: None       TECHNIQUE: Multiple longitudinal and transverse real-time sonographic  images of the abdomen are obtained.       FINDINGS:        LIVER: Normal in size, smooth in contour, and homogeneous in  echogenicity. No focal lesion is seen.      BILIARY: Echoes are noted in the gallbladder with distal shadowing  consistent with cholelithiasis.. The common bile duct measures 0.7 cm in  diameter. There is no evidence of intrahepatic ductal dilatation.        KIDNEYS: The right and left kidneys are normal in size, shape,  orientation, and position measuring 5.8 x 7.3 x 14.9 cm and 5.2 x 6.2 x  12.2 cm,  respectively. The corticomedullary differentiation is  maintained. There is no evidence of nephrolithiasis, hydronephrosis or  perinephric fluid collection. Cysts are present associated right kidney  the largest is 5.7 cm. The largest left renal cyst is at the upper pole  and is 6.4 cm No hydroureter is seen.      PANCREAS: No focal lesion or abnormality.       SPLEEN: Normal in size and appearance.       OTHER: No ascites is present. The aorta is aneurysmal and measures 39 mm  in maximal diameter. Inferior vena cava is visualized.      The prostate is enlarged. The prostate is 7.2 x 9 cm.          Impression:        1. Cholelithiasis.          2. Enlarged prostate Yuliana graph  3. Renal cyst      This report was finalized on 09/09/2017 09:46 by Dr. Yg Bowden MD.               Objective         Medication Review:    Current Medications              Current Facility-Administered Medications   Medication Dose Route Frequency Provider Last Rate Last Dose   • amiodarone (PACERONE) tablet 200 mg  200 mg Oral Q24H Karthik Macdonald MD    200 mg at 09/09/17 0807   • atorvastatin (LIPITOR) tablet 10 mg  10 mg Oral Nightly Karthik Macdonald MD    10 mg at 09/08/17 2050   • bumetanide (BUMEX) injection 0.5 mg  0.5 mg Intravenous BID Karthik Macdonald MD    0.5 mg at 09/09/17 1755   • carvedilol (COREG) tablet 3.125 mg  3.125 mg Oral BID With Meals Karthik Macdonald MD    3.125 mg at 09/09/17 1756   • enoxaparin (LOVENOX) syringe 30 mg  30 mg Subcutaneous Daily Karthik Macdonald MD    30 mg at 09/09/17 1756   • finasteride (PROSCAR) tablet 5 mg  5 mg Oral Daily Karthik Macdonald MD    5 mg at 09/09/17 0807   • gabapentin (NEURONTIN) capsule 300 mg  300 mg Oral Nightly Karthik Macdonald MD    300 mg at 09/08/17 2050   • HYDROcodone-acetaminophen (NORCO) 7.5-325 MG per tablet 1 tablet  1 tablet Oral Q6H PRN Karthik Macdonald MD    1 tablet at 09/09/17 1756   • lisinopril (PRINIVIL,ZESTRIL) tablet 2.5 mg  2.5 mg Oral Q24H Karthik Macdonald MD    2.5 mg at 09/09/17 0806  "  • LORazepam (ATIVAN) tablet 1 mg  1 mg Oral TID Jessica Rivas MD         • nitroglycerin (NITROSTAT) SL tablet 0.4 mg  0.4 mg Sublingual Q5 Min PRN Karthik Macdonald MD         • rOPINIRole (REQUIP) tablet 1 mg  1 mg Oral Nightly Jessica Rivas MD         • sodium chloride 0.9 % flush 1-10 mL  1-10 mL Intravenous PRN Karthik Macdonald MD         • tamsulosin (FLOMAX) 24 hr capsule 0.4 mg  0.4 mg Oral Daily Karthik Macdonald MD    0.4 mg at 09/09/17 0807            Vital Sign Min/Max for last 24 hours  Temp  Min: 96.7 °F (35.9 °C)  Max: 98.3 °F (36.8 °C)   BP  Min: 92/61  Max: 123/75   Pulse  Min: 80  Max: 111   Resp  Min: 16  Max: 20   SpO2  Min: 94 %  Max: 97 %   No Data Recorded   No Data Recorded      Flowsheet Rows           First Filed Value     Admission Height   73.5\" (186.7 cm) Documented at 09/08/2017 1526     Admission Weight   153 lb 3 oz (69.5 kg) Documented at 09/08/2017 1526            Physical Exam:  Ears ears appear intact with no abnormalities noted  Nose nares normal, septum midline, mucosa normal and no drainage  Neck supple, trachea midline, no thyromegaly, no carotid bruit and no JVD  Back no kyphosis present, no scoliosis present, no skin lesions, erythema, or scars, no tenderness to percussion or palpation and range of motion normal  Lungs respirations regular, respirations even and respirations unlabored  Heart normal S1, S2,  2/6 pansystolic murmur in the left sternal border, , no rub and no click  Abdomen soft, mild epigastric tenderness  Skin no bleeding, bruising or rash      Results Review:   I reviewed the patient's new clinical results.  I reviewed the patient's new imaging results and agree with the interpretation.  I reviewed the patient's other test results and agree with the interpretation  I personally viewed and interpreted the patient's EKG/Telemetry data  Discussed with patient and daughter     Medication Review: Performed        Assessment/Plan          Active Problems:    " Chest pain  Dyspnea on exertion  Polypharmacy  LV Dysfunction  Epigastric pain  Cholelithiasis     Plan  Discussed with Dr Rivas, suggest Myesha in patient rehab for trying to get off multiple medications  Long term prognosis guarded  Barium swallow  Start Bumex 0.5 mg IV BID  Monitor BNP BMP  Renal consult  He declined surgical evaluation for cholelithiasis and epigastric pain  Supportive care  Telemetry  Optimal medical therapy  Deep vein thrombosis prophylaxis/precautions  Counseled regarding disease appropriate diet, fluid, sodium, caffeine, stimulants intake   Stressed compliance to diet and medications   Avoid NSAIDS     Karthik Macdonald MD  09/09/17  8:19 PM           Jessica Rivas MD Physician Signed Neurology Consults Date of Service: 9/8/2017  7:42 PM     Consult Orders:     Inpatient Consult to Neurology [[id number]] ordered by Karthik Macdonald MD at 09/08/17 1551          Expand All Collapse All    []Hide copied text       Neurology Consult Note     Patient:  Wild Bravo   YOB: 1934  MRN:  [id number]  Date of Admission:  9/8/2017  3:02 PM     Date: 9/9/2017     Referring Provider: Karthik Macdonald MD  Reason for Consultation: altered mental status, recent falls        History of present illness:      This is a 83 y.o. year old right handed male.who is evaluated for mental status changes and recurrent falls.      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 Sinemet or Mirapex either in terms of treatment for his restless legs.  3.  He will need close monitoring of his medications and we can begin then to slowly wean him off of these medications--in particular his Ativan and opioids that is hydrocodone and whatever the outcome of that may have to be treated in another fashion.   4.  PT to help with his balance and reduce his risk for falls.  In addition he should have stretching exercises shown to him to be formed on a nightly basis to help reduce the his restless legs.  He could soak in a warm tub while doing them.  Massage of his legs may help before bedtime.   5.  Recommend psychiatric evaluation   6. Will send copy of this to his Primary Care Physician Dr. Ames to help manage some of these suggestions.   7. Reportedly daughter is attempting power of  with a court hearing this Wednesday.     Of note the bulk of the history above was provided by the daughter Audra and Mr. Bravo did not always agree with her.            Jessica Rivas MD  09/09/17  3:21 PM

## 2017-09-10 NOTE — PROGRESS NOTES
Neurology Progress Note      Date of admission: 9/8/2017  3:02 PM  Date of visit: 9/10/2017    Chief Complaint:  Mental status changes and falls    Subjective     Subjective:    Patient upset over lower Ativan and then agreed to this dose.  Had significant problems with pain in the right foot especially last night and stated he could not sleep after 3 AM. Based on studies at [facility] he has decreased blood flow to the right foot due to PAD. He noted symptoms of RLS. He is found to have a low ferritin which would contribute to his RLS.   Medications:  Current Facility-Administered Medications   Medication Dose Route Frequency Provider Last Rate Last Dose   • amiodarone (PACERONE) tablet 200 mg  200 mg Oral Q24H Karthik Macdonald MD   200 mg at 09/10/17 0812   • atorvastatin (LIPITOR) tablet 10 mg  10 mg Oral Nightly Karthik Macdonald MD   10 mg at 09/09/17 2035   • bumetanide (BUMEX) injection 0.5 mg  0.5 mg Intravenous BID Karthik Macdonald MD   0.5 mg at 09/10/17 0812   • carvedilol (COREG) tablet 3.125 mg  3.125 mg Oral BID With Meals Karthik Macdonald MD   3.125 mg at 09/09/17 1756   • docusate sodium (COLACE) capsule 100 mg  100 mg Oral BID Lyric Garcia PA-C       • enoxaparin (LOVENOX) syringe 30 mg  30 mg Subcutaneous Daily Karthik Macdonald MD   30 mg at 09/09/17 1756   • finasteride (PROSCAR) tablet 5 mg  5 mg Oral Daily Karthik Macdonald MD   5 mg at 09/10/17 0812   • gabapentin (NEURONTIN) capsule 300 mg  300 mg Oral Nightly Karthik Macdonald MD   300 mg at 09/09/17 2035   • HYDROcodone-acetaminophen (NORCO) 7.5-325 MG per tablet 1 tablet  1 tablet Oral Q6H PRN Karthik Macdonald MD   1 tablet at 09/10/17 0817   • lisinopril (PRINIVIL,ZESTRIL) tablet 2.5 mg  2.5 mg Oral Q24H Karthik Macdonald MD   2.5 mg at 09/09/17 0806   • LORazepam (ATIVAN) tablet 1 mg  1 mg Oral TID Jessica Rivas MD   1 mg at 09/10/17 0812   • nitroglycerin (NITROSTAT) SL tablet 0.4 mg  0.4 mg Sublingual Q5 Min PRN Karthik Macdonald MD       • rOPINIRole (REQUIP)  tablet 1 mg  1 mg Oral Nightly Jessica Rivas MD       • sennosides-docusate sodium (SENOKOT-S) 8.6-50 MG tablet 2 tablet  2 tablet Oral BID PRN Lyric Garcia PA-C       • sodium chloride 0.9 % flush 1-10 mL  1-10 mL Intravenous PRN Karthik Macdonald MD       • tamsulosin (FLOMAX) 24 hr capsule 0.4 mg  0.4 mg Oral Daily Karthik Macdonald MD   0.4 mg at 09/10/17 0813       Review of Systems:   -A 14 point review of systems is completed and is negative except for not sleeping well due to right foot pain    Objective     Objective      Vital Signs  Temp:  [97.4 °F (36.3 °C)-98.7 °F (37.1 °C)] 97.4 °F (36.3 °C)  Heart Rate:  [73-87] 87  Resp:  [18-20] 20  BP: ()/(49-68) 99/68    Physical Exam:    HEENT:  Neck supple  CVS:  Regular rate and rhythm.  No murmurs  Carotid Examination:  No bruits  Lungs:  Clear to auscultation  Abdomen:  Non-tender, Non-distended  Extremities:  No signs of peripheral edema    Neurologic Exam:    -Awake, Alert, Speaking fluently today-No word finding difficulties  -No aphasia  -No dysarthria  -Follows simple and complex commands    Cranial nerves II through XII intact.EOMI  No facial weakness. Decreased hearing AU but can hear to finger rub bilaterally.    Motor: (strength out of 5:  1= minimal movement, 2 = movement in plane of gravity, 3 = movement against gravity, 4 = movement against some resistance, 5 = full strength)    -Right Upper Ext: Proximal: 5 Distal: 5  -Left Upper Ext: Proximal: 5 Distal: 5    -Right Lower Ext: Proximal: 5 Distal: 5  -Left Lower Ext: Proximal: 5 Distal: 5    DTR:  2+ throughout in all four extremities       Results Review:    I reviewed the patient's new clinical results.    Lab Results (last 24 hours)     Procedure Component Value Units Date/Time    Magnesium [[id number]]  (Normal) Collected:  09/10/17 0523    Specimen:  Blood Updated:  09/10/17 0553     Magnesium 2.1 mg/dL     Basic Metabolic Panel [[id number]]  (Abnormal) Collected:  09/10/17 0523     Specimen:  Blood Updated:  09/10/17 0553     Glucose 93 mg/dL      BUN 48 (H) mg/dL      Creatinine 1.91 (H) mg/dL      Sodium 137 mmol/L      Potassium 3.9 mmol/L      Chloride 99 mmol/L      CO2 30.0 mmol/L      Calcium 8.6 mg/dL      eGFR Non African Amer 34 (L) mL/min/1.73      BUN/Creatinine Ratio 25.1 (H)     Anion Gap 8.0 mmol/L     Narrative:       The MDRD GFR formula is only valid for adults with stable renal function between ages 18 and 70.    Phosphorus [[id number]]  (Normal) Collected:  09/10/17 0523    Specimen:  Blood Updated:  09/10/17 0553     Phosphorus 4.2 mg/dL     Uric Acid [[id number]]  (Abnormal) Collected:  09/10/17 0523    Specimen:  Blood Updated:  09/10/17 0553     Uric Acid 8.6 (H) mg/dL     Iron Profile [[id number]]  (Abnormal) Collected:  09/10/17 0523    Specimen:  Blood Updated:  09/10/17 0601     Iron 65 mcg/dL      TIBC 370 mcg/dL      Iron Saturation 18 (L) %     BNP [[id number]]  (Abnormal) Collected:  09/10/17 0523    Specimen:  Blood Updated:  09/10/17 0604     proBNP 2180.0 (H) pg/mL     PTH, Intact [[id number]]  (Abnormal) Collected:  09/10/17 0523    Specimen:  Blood Updated:  09/10/17 0607     PTH, Intact 181.6 (H) pg/mL     Vitamin D 25 Hydroxy [[id number]]  (Normal) Collected:  09/10/17 0523    Specimen:  Blood Updated:  09/10/17 0610     25 Hydroxy, Vitamin D 46.4 ng/ml     Ferritin [[id number]]  (Normal) Collected:  09/10/17 0523    Specimen:  Blood Updated:  09/10/17 0627     Ferritin 21.40 ng/mL         Imaging Results (last 24 hours)     Procedure Component Value Units Date/Time    FL Esophagram Complete [[id number]] Collected:  09/10/17 1050     Updated:  09/10/17 1055    Narrative:       EXAMINATION:   FL ESOPHAGRAM COMPLETE-  9/10/2017 10:50 AM CDT     HISTORY: Epigastric pain     1.2 minutes of fluoroscopic time was used during this exam. 13 images  were obtained.     Barium was swallowed without difficulty. The esophagus initiates normal  peristalsis. Mild spasms  present in the proximal third of the esophagus.  There was some stasis of barium in the proximal third of the esophagus  but eventually swallowing caused the area of stasis to peristalse. There  was no reflux.     IMPRESSION mild spasm in the mid esophagus  This report was finalized on 09/10/2017 10:52 by Dr. Yg Bowden MD.          Assessment/Plan     Hospital Problem List    Active Problems:    Chest pain    Impaired functional mobility, balance, gait, and endurance    Impression/Plan::  1. Cognitive impairment that may have a metabolic component due to his medication.  In process of weaning these if he tolerates.  May need to go slower.  2. Psychiatric issues as outlined in previous consultation note. Planning inpatient treatment.  3. RLS with very low ferritin  Will begin Venofer. Will need to get his ferritin level to > 100. Goal is to treat RLS with out using dopamine agonist such as Requip due to his compulsive behaviors. Because of increased pain in right foot will try a slightly higher dose of gabapentin (400 mg) 2 hours before bedtime.          Jessica Rivas MD  09/10/17  11:28 AM

## 2017-09-10 NOTE — PROGRESS NOTES
"[facility] Progress Note     LOS: 0 days   Patient Care Team:  Eliel Ames MD as PCP - General  Eliel Ames MD as PCP - Family Medicine  Karthik Macdonald MD as Cardiologist (Cardiology)    Chief Complaint:  SOB    Subjective   Patient continues to have abdominal pain in the mid epigastrium today    Review of Systems:   A 10-point review of systems is obtained and negative except for otherwise mentioned above.    Objective     Vital Sign Min/Max for last 24 hours  Temp  Min: 97.4 °F (36.3 °C)  Max: 98.7 °F (37.1 °C)   BP  Min: 83/49  Max: 103/62   Pulse  Min: 73  Max: 87   Resp  Min: 18  Max: 20   SpO2  Min: 92 %  Max: 99 %   No Data Recorded   Weight  Min: 155 lb 2 oz (70.4 kg)  Max: 155 lb 2 oz (70.4 kg)     Flowsheet Rows         First Filed Value    Admission Height  73.5\" (186.7 cm) Documented at 09/08/2017 1526    Admission Weight  153 lb 3 oz (69.5 kg) Documented at 09/08/2017 1526          Physical Exam:   General Appearance: alert, appears stated age and cooperative  Lungs: clear to auscultation, respirations regular, respirations even and respirations unlabored  Heart: regular rhythm & normal rate, normal S1, S2, no murmur, no magdiel, no rub and no click  Abdomen: normal bowel sounds, no masses, no hepatomegaly, no splenomegaly, soft non-tender, no guarding and no rebound tenderness  Extremities: moves extremities well, no edema, no cyanosis and no redness    Results Review:     I reviewed the patient's new clinical results.      Results from last 7 days  Lab Units 09/08/17  1300   WBC 10*3/mm3 5.60   HEMOGLOBIN g/dL 12.9*   HEMATOCRIT % 41.3   PLATELETS 10*3/mm3 177       Results from last 7 days  Lab Units 09/10/17  0523   SODIUM mmol/L 137   POTASSIUM mmol/L 3.9   CHLORIDE mmol/L 99   CO2 mmol/L 30.0   BUN mg/dL 48*   CREATININE mg/dL 1.91*   GLUCOSE mg/dL 93   CALCIUM mg/dL 8.6       Results from last 7 days  Lab Units 09/10/17  0523  " 09/08/17  1300   SODIUM mmol/L 137  < > 139   POTASSIUM mmol/L 3.9  < > 4.8   CHLORIDE mmol/L 99  < > 97*   CO2 mmol/L 30.0  < > 31.0   BUN mg/dL 48*  < > 52*   CREATININE mg/dL 1.91*  < > 1.94*   CALCIUM mg/dL 8.6  < > 9.8   BILIRUBIN mg/dL  --   --  1.1*   ALK PHOS U/L  --   --  83   ALT (SGPT) U/L  --   --  47   AST (SGOT) U/L  --   --  40   GLUCOSE mg/dL 93  < > 98   < > = values in this interval not displayed.    Results from last 7 days  Lab Units 09/08/17  2153 09/08/17  2041 09/08/17  1607   TROPONIN I ng/mL 0.057* 0.055* 0.052*       Results from last 7 days  Lab Units 09/08/17  1607   TSH mIU/mL 2.430       Results from last 7 days  Lab Units 09/09/17  0318   CHOLESTEROL mg/dL 126*   TRIGLYCERIDES mg/dL 114   HDL CHOL mg/dL 62       Medication Review: yes    Assessment/Plan     1.  CP  2.  WEBSTER  3.  Polypharmacy  4.  LV dysfunction  5.  Epigastric pain  6.  Cholelithiasis  7.  KRISTY/CKD    Barium swallow today.  Monitor labs. Possible d/c to [facility] in pt rehab for trying to wean from multiple meds.    Lyric Garcia PA-C  09/10/17  8:18 AM

## 2017-09-10 NOTE — PLAN OF CARE
Problem: Patient Care Overview (Adult)  Goal: Plan of Care Review  Outcome: Ongoing (interventions implemented as appropriate)    09/10/17 1105   Coping/Psychosocial Response Interventions   Plan Of Care Reviewed With patient   Patient Care Overview   Progress progress toward functional goals as expected   Outcome Evaluation   Outcome Summary/Follow up Plan Pt is independent with bed mobility and transfers. Amb 400' with CGA/min assist. Pt struggled with balance during walk, required min assist to correct. Ascended and descended 10 steps with HR with CGA/min assist. Pt was very impulsive and required cues to perform slower and safer. Will continue to work with pt to assist with his balance and safety.

## 2017-09-11 ENCOUNTER — APPOINTMENT (OUTPATIENT)
Dept: PULMONOLOGY | Facility: HOSPITAL | Age: 82
End: 2017-09-11

## 2017-09-11 LAB
ANION GAP SERPL CALCULATED.3IONS-SCNC: 9 MMOL/L (ref 4–13)
BUN BLD-MCNC: 47 MG/DL (ref 5–21)
BUN/CREAT SERPL: 26.1 (ref 7–25)
CALCIUM SPEC-SCNC: 8.9 MG/DL (ref 8.4–10.4)
CHLORIDE SERPL-SCNC: 99 MMOL/L (ref 98–110)
CO2 SERPL-SCNC: 30 MMOL/L (ref 24–31)
CREAT BLD-MCNC: 1.8 MG/DL (ref 0.5–1.4)
GFR SERPL CREATININE-BSD FRML MDRD: 36 ML/MIN/1.73
GLUCOSE BLD-MCNC: 95 MG/DL (ref 70–100)
POTASSIUM BLD-SCNC: 4 MMOL/L (ref 3.5–5.3)
SODIUM BLD-SCNC: 138 MMOL/L (ref 135–145)

## 2017-09-11 PROCEDURE — 97116 GAIT TRAINING THERAPY: CPT

## 2017-09-11 PROCEDURE — 99233 SBSQ HOSP IP/OBS HIGH 50: CPT | Performed by: INTERNAL MEDICINE

## 2017-09-11 PROCEDURE — G9175 SPEECH LANG GOAL STATUS: HCPCS

## 2017-09-11 PROCEDURE — 94727 GAS DIL/WSHOT DETER LNG VOL: CPT

## 2017-09-11 PROCEDURE — 92523 SPEECH SOUND LANG COMPREHEN: CPT

## 2017-09-11 PROCEDURE — 94729 DIFFUSING CAPACITY: CPT

## 2017-09-11 PROCEDURE — 25010000002 ENOXAPARIN PER 10 MG: Performed by: INTERNAL MEDICINE

## 2017-09-11 PROCEDURE — 94762 N-INVAS EAR/PLS OXIMTRY CONT: CPT

## 2017-09-11 PROCEDURE — 25010000002 IRON SUCROSE PER 1 MG: Performed by: PSYCHIATRY & NEUROLOGY

## 2017-09-11 PROCEDURE — 99232 SBSQ HOSP IP/OBS MODERATE 35: CPT | Performed by: PSYCHIATRY & NEUROLOGY

## 2017-09-11 PROCEDURE — 94620 HC PULMONARY STRESS TEST SIMPLE: CPT

## 2017-09-11 PROCEDURE — 80048 BASIC METABOLIC PNL TOTAL CA: CPT | Performed by: INTERNAL MEDICINE

## 2017-09-11 PROCEDURE — 94060 EVALUATION OF WHEEZING: CPT

## 2017-09-11 RX ORDER — LORAZEPAM 1 MG/1
1 TABLET ORAL ONCE
Status: COMPLETED | OUTPATIENT
Start: 2017-09-11 | End: 2017-09-11

## 2017-09-11 RX ORDER — LORAZEPAM 1 MG/1
1 TABLET ORAL 4 TIMES DAILY
Status: DISCONTINUED | OUTPATIENT
Start: 2017-09-11 | End: 2017-09-12 | Stop reason: HOSPADM

## 2017-09-11 RX ORDER — ALBUTEROL SULFATE 2.5 MG/3ML
2.5 SOLUTION RESPIRATORY (INHALATION) ONCE
Status: COMPLETED | OUTPATIENT
Start: 2017-09-11 | End: 2017-09-11

## 2017-09-11 RX ORDER — TAMSULOSIN HYDROCHLORIDE 0.4 MG/1
0.4 CAPSULE ORAL NIGHTLY
Status: DISCONTINUED | OUTPATIENT
Start: 2017-09-11 | End: 2017-09-12 | Stop reason: HOSPADM

## 2017-09-11 RX ORDER — GABAPENTIN 100 MG/1
200 CAPSULE ORAL DAILY
Status: DISCONTINUED | OUTPATIENT
Start: 2017-09-11 | End: 2017-09-12 | Stop reason: HOSPADM

## 2017-09-11 RX ADMIN — ENOXAPARIN SODIUM 30 MG: 30 INJECTION SUBCUTANEOUS at 18:29

## 2017-09-11 RX ADMIN — TAMSULOSIN HYDROCHLORIDE 0.4 MG: 0.4 CAPSULE ORAL at 21:31

## 2017-09-11 RX ADMIN — HYDROCODONE BITARTRATE AND ACETAMINOPHEN 1 TABLET: 7.5; 325 TABLET ORAL at 06:01

## 2017-09-11 RX ADMIN — DOCUSATE SODIUM 100 MG: 100 CAPSULE ORAL at 18:28

## 2017-09-11 RX ADMIN — CARVEDILOL 3.12 MG: 3.12 TABLET, FILM COATED ORAL at 18:28

## 2017-09-11 RX ADMIN — AMIODARONE HYDROCHLORIDE 200 MG: 200 TABLET ORAL at 09:16

## 2017-09-11 RX ADMIN — ALBUTEROL SULFATE 2.5 MG: 2.5 SOLUTION RESPIRATORY (INHALATION) at 16:30

## 2017-09-11 RX ADMIN — IRON SUCROSE 200 MG: 20 INJECTION, SOLUTION INTRAVENOUS at 18:29

## 2017-09-11 RX ADMIN — LISINOPRIL 2.5 MG: 2.5 TABLET ORAL at 09:16

## 2017-09-11 RX ADMIN — ATORVASTATIN CALCIUM 10 MG: 10 TABLET, FILM COATED ORAL at 21:31

## 2017-09-11 RX ADMIN — LORAZEPAM 1 MG: 1 TABLET ORAL at 12:38

## 2017-09-11 RX ADMIN — FINASTERIDE 5 MG: 5 TABLET, FILM COATED ORAL at 09:16

## 2017-09-11 RX ADMIN — BUMETANIDE 0.5 MG: 0.25 INJECTION INTRAMUSCULAR; INTRAVENOUS at 09:16

## 2017-09-11 RX ADMIN — CALCITRIOL 0.25 MCG: 0.25 CAPSULE ORAL at 09:16

## 2017-09-11 RX ADMIN — DOCUSATE SODIUM 100 MG: 100 CAPSULE ORAL at 09:16

## 2017-09-11 RX ADMIN — LORAZEPAM 1 MG: 1 TABLET ORAL at 18:28

## 2017-09-11 RX ADMIN — CARVEDILOL 3.12 MG: 3.12 TABLET, FILM COATED ORAL at 11:19

## 2017-09-11 RX ADMIN — HYDROCODONE BITARTRATE AND ACETAMINOPHEN 1 TABLET: 7.5; 325 TABLET ORAL at 18:28

## 2017-09-11 RX ADMIN — BUMETANIDE 0.5 MG: 0.25 INJECTION INTRAMUSCULAR; INTRAVENOUS at 18:28

## 2017-09-11 RX ADMIN — LORAZEPAM 1 MG: 1 TABLET ORAL at 07:57

## 2017-09-11 NOTE — PLAN OF CARE
Problem: Patient Care Overview (Adult)  Goal: Plan of Care Review  Outcome: Ongoing (interventions implemented as appropriate)    09/11/17 1059   Coping/Psychosocial Response Interventions   Plan Of Care Reviewed With patient   Patient Care Overview   Progress progress towards functional goals is fair   Outcome Evaluation   Outcome Summary/Follow up Plan PT tx completed. Pt supine in bed. States he doesn't need PT. Reports he can walk and doesn't fall. I with bed mobility, when he first stood he lost his balance backwards and fell back onto bed. Cueing needed to take his time. Amb 500' SBA, but pt veered left and right bumping into things. Has decreased safety insight and is risk for falls.

## 2017-09-11 NOTE — PROGRESS NOTES
Continued Stay Note   Isaias     Patient Name: Wild Bravo  MRN: [id number]  Today's Date: 9/11/2017    Admit Date: 9/8/2017          Discharge Plan       09/11/17 1102    Case Management/Social Work Plan    Plan [facility] has spoke with PT's daughters regarding potential cathi psych placement. They continue to want PT to go to [facility]. [facility] has called to verify that the referral has been received and whether or not they can admit PT. [facility] has been advised that the referral has been received, but they have not yet reviewed it. SW will await return call regarding possible placement.[facility] has also discussed this with neurology and cardiology to ask that they reinforce with PT the benefit of going to the behavioral unit to get his medications adjusted properly. Will continue to follow.               Discharge Codes     None            ALEXANDER Fang

## 2017-09-11 NOTE — CONSULTS
"      PULMONARY & CRITICAL CARE CONSULT - [facility]    17, 3:49 PM  Patient Care Team:  Eliel Ames MD as PCP - General  Eliel Ames MD as PCP - Family Medicine  Karthik Macdonald MD as Cardiologist (Cardiology)  Name: Wild Bravo  : 1934  MRN: [id number]  Contact Serial Number [id number]    Chief complaint: Dyspnea    HPI:  We have been consulted by Karthik Macdonald MD to see this 83 y.o. year old male born on 1934.  Patient complains of dyspnea in the chest for several years. Severity: moderate.  Aggravating factors: none.   Alleviating factors: medication(s) (none) Associated symptoms: chest pain and poor exercise tolerance. Sputum is absent.  Patient currently is not on home oxygen therapy.. Respiratory history: COPD  Patient reports that he has had SOA off and on for years. The onset has been gradual and intermittent. He reports that he was admitted for \"bloating and edema and fluid on my lungs\". He report that the swelling and SOA is much better today than it was when he got here. His daughter whom is a NP with cardiology in [place] reports that he does have a hx of COPD with PFTs a few years ago. She reports that he used to be on inhalers but hasn't taken them in quite some time. He is a previous smoker for over 40 years. She reports that he now lives here with her sister, his daughter. She reports that he has had to sleep sitting up for over 8 months and frequently reports waking up gasping for air which has been better since his admission here. She also expresses concerns that he is on so many sedating medications and controlled substances. She has discussed this with the neurologist who has seen him today. They are attempting to get him placed in a geriatric psych facility to make some adjustments to his medications. Currently he appears to be breathing comfortably on room air with a saturation of 98% at rest. RT at the bedside to walk him and take him down " for PFTs. No other aggravating, alleviating factors or associated symptoms.    Past Medical History:  Past Medical History:   Diagnosis Date   • Abdominal aortic aneurysm    • Anxiety    • Atrial fibrillation    • Biventricular ICD (implantable cardioverter-defibrillator) in place    • BPH (benign prostatic hypertrophy)    • Carotid artery stenosis    • CHF (congestive heart failure)    • Chronic kidney disease     Chronic kidney disease, stage 4 (severe)   • Chronic systolic (congestive) heart failure     limited echo 7/2016 EF was 40-45%. Patient has BiV AICD   • Coronary arteriosclerosis     MI x2   • Hearing loss    • Iron deficiency anemia    • Ischemic cardiomyopathy    • Mixed hyperlipidemia    • Myocardial infarction    • Stroke      Past Surgical History:   Procedure Laterality Date   • CARDIAC ABLATION     • CARDIAC CATHETERIZATION     • CARDIAC PACEMAKER PLACEMENT     • CARDIOVERSION     • CAROTID ENDARTERECTOMY     • CORONARY ARTERY BYPASS GRAFT     • MITRAL VALVE REPLACEMENT     • TOTAL KNEE ARTHROPLASTY       Allergies   Allergen Reactions   • Keflex [Cephalexin]      CEPHALOSPORINS     Medications:    amiodarone 200 mg Oral Q24H   atorvastatin 10 mg Oral Nightly   bumetanide 0.5 mg Intravenous BID   calcitriol 0.25 mcg Oral Daily   carvedilol 3.125 mg Oral BID With Meals   docusate sodium 100 mg Oral BID   enoxaparin 30 mg Subcutaneous Daily   finasteride 5 mg Oral Daily   gabapentin 200 mg Oral Daily   gabapentin 400 mg Oral Daily   iron sucrose (VENOFER) IVPB 200 mg Intravenous Q24H   lisinopril 2.5 mg Oral Q24H   LORazepam 1 mg Oral 4x Daily   rOPINIRole 1 mg Oral Nightly   tamsulosin 0.4 mg Oral Nightly        Family History:  Family History   Problem Relation Age of Onset   • Stroke Mother    • Heart disease Mother    • Diabetes Father      Social History:   reports that he has quit smoking. He has never used smokeless tobacco. He reports that he does not drink alcohol or use illicit  drugs.  Review of Systems:  Review of Systems   Constitutional: Positive for activity change and fatigue. Negative for appetite change, chills and unexpected weight change.   HENT: Negative for congestion, postnasal drip, rhinorrhea, sore throat and trouble swallowing.    Eyes: Negative for pain, discharge and itching.   Respiratory: Positive for apnea, cough, chest tightness and shortness of breath. Negative for wheezing.    Cardiovascular: Positive for chest pain, palpitations and leg swelling.   Gastrointestinal: Positive for abdominal distention. Negative for constipation, diarrhea and nausea.   Endocrine: Negative for polydipsia, polyphagia and polyuria.   Genitourinary: Negative for difficulty urinating, dysuria, frequency, hematuria and urgency.   Musculoskeletal: Negative for arthralgias, back pain, joint swelling and myalgias.   Skin: Negative for color change, pallor and rash.   Allergic/Immunologic: Negative for environmental allergies, food allergies and immunocompromised state.   Neurological: Negative for dizziness, speech difficulty and numbness.   Hematological: Negative for adenopathy. Does not bruise/bleed easily.   Psychiatric/Behavioral: Negative for agitation and confusion. The patient is not nervous/anxious.    All other systems reviewed and are negative.     Physical Exam:  Temp:  [97.4 °F (36.3 °C)-98.6 °F (37 °C)] 98.3 °F (36.8 °C)  Heart Rate:  [72-85] 85  Resp:  [18-20] 18  BP: ()/(58-62) 100/62  Intake/Output Summary (Last 24 hours) at 09/11/17 1549  Last data filed at 09/11/17 1300   Gross per 24 hour   Intake              960 ml   Output             1325 ml   Net             -365 ml     Last 3 weights    09/08/17  1526 09/09/17  2000 09/10/17  1952   Weight: 153 lb 3 oz (69.5 kg) 155 lb 2 oz (70.4 kg) 153 lb 6.4 oz (69.6 kg)     SpO2 Readings from Last 3 Encounters:   09/11/17 96%   09/08/17 98%   02/14/17 98%     Physical Exam   Constitutional: He is oriented to person, place,  and time. He appears well-developed and well-nourished.  Non-toxic appearance. He does not have a sickly appearance. No distress.   HENT:   Head: Normocephalic and atraumatic.   Right Ear: External ear normal.   Left Ear: External ear normal.   Nose: Nose normal.   Mouth/Throat: Oropharynx is clear and moist.   Eyes: Conjunctivae and EOM are normal. Pupils are equal, round, and reactive to light. Right eye exhibits no discharge. Left eye exhibits no discharge.   Neck: Normal range of motion. Neck supple. No JVD present.   Cardiovascular: Normal rate.  An irregularly irregular rhythm present.   No murmur heard.  Pulmonary/Chest: Effort normal and breath sounds normal. No respiratory distress.   Forced expiratory phase   Abdominal: Soft. Bowel sounds are normal. He exhibits no distension.   Musculoskeletal: Normal range of motion. He exhibits no edema or deformity.   Neurological: He is alert and oriented to person, place, and time. He displays normal reflexes. Coordination normal.   Skin: Skin is warm and dry. No rash noted. He is not diaphoretic. No erythema.   Psychiatric: His behavior is normal. Thought content normal. His mood appears anxious.   Nursing note and vitals reviewed.      Results from last 7 days  Lab Units 09/08/17  1300   WBC 10*3/mm3 5.60   HEMOGLOBIN g/dL 12.9*   PLATELETS 10*3/mm3 177       Results from last 7 days  Lab Units 09/11/17  0555 09/10/17  0523 09/09/17  0318 09/08/17  1607   SODIUM mmol/L 138 137 138 138   POTASSIUM mmol/L 4.0 3.9 3.9 4.0   CO2 mmol/L 30.0 30.0 33.0* 29.0   BUN mg/dL 47* 48* 48* 52*   CREATININE mg/dL 1.80* 1.91* 1.71* 1.77*   MAGNESIUM mg/dL  --  2.1  --  2.1   PHOSPHORUS mg/dL  --  4.2  --   --    GLUCOSE mg/dL 95 93 97 150*         Lab Results   Component Value Date    PROBNP 2180.0 (H) 09/10/2017        Recent radiology:   Imaging Results (last 72 hours)     Procedure Component Value Units Date/Time    XR Chest PA & Lateral [[id number]] Collected:  09/08/17 5462      Updated:  09/08/17 1627    Narrative:       EXAMINATION:   XR CHEST PA AND LATERAL-  9/8/2017 3:22 PM CST  HISTORY: Short of breath  PA and lateral views the chest obtained. Hyperinflation increase in AP  diameter chest is noted consistent with COPD.  Cardiac silhouette is mildly enlarged. Prosthetic cardiac valve is present in the mitral position.  Pacing device is present projecting over the soft tissues of the left  chest. Wires are present median sternotomy. Vascular calcifications  present in the aortic arch.  Benign calcifications in the milagros are present bilaterally.    Impression:       Mild cardiomegaly no evidence of pulmonary vascular  congestion.  2. COPD  This report was finalized on 09/08/2017 16:24 by Dr. Yg Bowden MD.    CT Head Without Contrast [[id number]] Collected:  09/08/17 1633     Updated:  09/08/17 1639    Narrative:       CT HEAD WO CONTRAST- 9/8/2017 5:21 PM EDT  HISTORY: falls, alt mental status       Technique:   Axial CT of the brain without IV contrast. Sagittal and coronal  reformations are also provided for review. Soft tissue and bone kernels  are available for interpretation.   Comparison: None.     Findings: There is no evidence of acute large vascular distribution infarct, mass  lesion or hemorrhage.  The ventricles, cortical sulci and basal cisterns  are symmetric and age appropriate. Mild generalized symmetric volume  loss is identified. There is atheromatous calcification in the  intracranial vertebral arteries as well as the bilateral paraclinoid  ICAs.  Normal brainstem and posterior fossa.  The scalp and calvarium  are unremarkable.        Impression:       Impression:    1. No acute intracranial process  This report was finalized on 09/08/2017 16:36 by Dr Jem Carreno, .    US Abdomen Complete [[id number]] Collected:  09/09/17 0942     Updated:  09/09/17 0949    Narrative:       ULTRASOUND ABDOMEN COMPLETE 9/9/2017 8:20 AM CDT  REASON FOR EXAM: abdominal pain, hx aaa     COMPARISON: None    TECHNIQUE: Multiple longitudinal and transverse real-time sonographic  images of the abdomen are obtained.   FINDINGS:    LIVER: Normal in size, smooth in contour, and homogeneous in  echogenicity. No focal lesion is seen.  BILIARY: Echoes are noted in the gallbladder with distal shadowing  consistent with cholelithiasis.. The common bile duct measures 0.7 cm in  diameter. There is no evidence of intrahepatic ductal dilatation.    KIDNEYS: The right and left kidneys are normal in size, shape,  orientation, and position measuring 5.8 x 7.3 x 14.9 cm and 5.2 x 6.2 x  12.2 cm, respectively. The corticomedullary differentiation is  maintained. There is no evidence of nephrolithiasis, hydronephrosis or  perinephric fluid collection. Cysts are present associated right kidney  the largest is 5.7 cm. The largest left renal cyst is at the upper pole  and is 6.4 cm No hydroureter is seen.  PANCREAS: No focal lesion or abnormality.   SPLEEN: Normal in size and appearance.   OTHER: No ascites is present. The aorta is aneurysmal and measures 39 mm  in maximal diameter. Inferior vena cava is visualized.  The prostate is enlarged. The prostate is 7.2 x 9 cm.     Impression:       1. Cholelithiasis.  2. Enlarged prostate Yuliana graph  3. Renal cyst  This report was finalized on 09/09/2017 09:46 by Dr. gY Bowden MD.    FL Esophagram Complete [[id number]] Collected:  09/10/17 1050     Updated:  09/10/17 1055    Narrative:       EXAMINATION:   FL ESOPHAGRAM COMPLETE-  9/10/2017 10:50 AM CDT  HISTORY: Epigastric pain  1.2 minutes of fluoroscopic time was used during this exam. 13 images  were obtained.     Barium was swallowed without difficulty. The esophagus initiates normal  peristalsis. Mild spasms present in the proximal third of the esophagus.  There was some stasis of barium in the proximal third of the esophagus  but eventually swallowing caused the area of stasis to peristalse. There  was no reflux.      IMPRESSION mild spasm in the mid esophagus  This report was finalized on 09/10/2017 10:52 by Dr. Yg Bowden MD.    US Carotid Bilateral [[id number]] Collected:  09/11/17 0739     Updated:  09/11/17 0742    Narrative:       History: Carotid occlusive disease       Impression:       Impression:  1. There is less than 50% stenosis of the right internal carotid artery.  2. There is 50-69% stenosis of the left internal carotid artery.  3. Antegrade flow is demonstrated in bilateral vertebral arteries.     Comments: Bilateral carotid vertebral arterial duplex scan was  performed.     Grayscale imaging shows intimal thickening and calcified elements at the  carotid bifurcation. The right internal carotid artery peak systolic  velocity is 48.3 cm/sec. The end-diastolic velocity is 19.3 cm/sec. The  right ICA/CCA ratio is approximately 0.83 . These findings correlate  with less than 50% stenosis of the right internal carotid artery.     Grayscale imaging shows intimal thickening and calcified elements at the  carotid bifurcation. The left internal carotid artery peak systolic  velocity is 148 cm/sec. The end-diastolic velocity is 60.9 cm/sec. The  left ICA/CCA ratio is approximately 4.3 . These findings correlate with  50-69% stenosis of the left internal carotid artery.     Antegrade flow is demonstrated in bilateral vertebral arteries.       This report was finalized on 09/11/2017 07:39 by Dr. Epi Rogers MD.        My radiograph interpretation/independent review of imaging: lungs show hyperinflation, no acute infiltrates or effusions, mild cardiomegaly    Other test results (not lab or imaging): echo showed EF 35%, some diastolic dysfunction and mild pulmonary hypertension    Independent review of ekg: atrial fibrillation, rate 85, paced  Patient Active Problem List   Diagnosis   • Coronary arteriosclerosis   • CHF (congestive heart failure)   • SSS (sick sinus syndrome)   • Pacemaker   • Essential hypertension    • Biventricular ICD (implantable cardioverter-defibrillator) in place   • Persistent atrial fibrillation   • PAD (peripheral artery disease)   • H/O mitral valve repair   • ERRONEOUS ENCOUNTER--DISREGARD   • Chest pain   • Impaired functional mobility, balance, gait, and endurance     Pulmonary Assessment:    New problem (to me), with additional workup planned: Dyspnea.  Diagnoses to consider include chf, fluid overload from renal failure, COPD, pneumonia, arrhythmia.  Suspect this is multifactorial    New problem (to me), no additional workup planned: Generalized Anxiety Disorder, will defer to attending    Other problems either stable, failing to improve or worsenin. Paroxsymal atrial fibrillation  2. COPD, uncertain severity  3. Cardiomyopathy  4. CKD  5. Diastolic Dysfunction  6. Esophageal spasms  7. Renal Cysts  8. Gallstones    Recommend/plan:     · Overnight pulse oximetry  · PFTs pending  · Rest/exercise o2 saturation pending  · Bronchodilators as needed  · Diuresis per attending  · Adjustment to psychiatric medications to attending  · Supplemental oxygen as needed for O2 level below 90%    Electronically signed by OUSMANE Lindsay, 17, 3:49 PM     Physician substantive contribution:  Pertinent symptoms/interval history include: Shortness of breath, subacute with some fluid overload in setting of chf, copd, ckd.  Respiratory exam shows pertinent findings of:wheezing, crackles.  Plan includes: PFT, oxygen as needed.  Will add daily long acting inhaler if FEV1 <60 % predicted.  I have seen and examined patient personally, performing a face-to-face diagnostic evaluation with plan of care reviewed and developed with APRN and nursing staff. I have addended and/or modified the above history of present illness, physical examination, and assessment and plan to reflect my findings and impressions. Essential elements of the care plan were discussed with APRN above.  Agree with findings and  assessment/plan as documented above.    Electronically signed by Ed Zhu MD, on 9/11/2017, 6:15 PM

## 2017-09-11 NOTE — PROGRESS NOTES
Neurology Progress Note      Date of admission: 9/8/2017  3:02 PM  Date of visit: 9/11/2017    Chief Complaint: Mental status and behavior changes.     Subjective     Subjective:  Patient is very unhappy that his Ativan is 3 times a day instead of 4 times a day and 1 mg 3 times a day  is more than a 50% reduction since he had been on 2 mg 4 times a day so we will go ahead and increase his Ativan to 1 mg 4 times a day.    He still having trouble falling asleep he attributes to his restless legs but I think his the pain in his feet is multifactorial as outlined in previous notes.  He really notes the most relief with Requip but due to his compulsiveness this is really not an ideal medication.  We have reduced the Requip to 1 mg and that seems to be effective with this medication really needs to be discontinued.  In addition his gabapentin has been increased to 400 mg at bedtime the goal of increasing this further.  He seems to be tolerating that.  He does have kidney disease so we have to be careful on the increments of that as well.    Both daughters are in the room with him and they both wanted me to discuss his need to go to a geriatric psych [facility] and try to get him slowly weaned off of his opioids and Ativan-- to be replaced by something else that would help his pain.  He is adamantly opposed to this.  Ativan for example might be able to be tolerated better for was switched to Klonopin which could help restless legs as well as anxiety but even Klonopin can cause cognitive problems in his age group    Also a lot of his behaviors in the past suggests bipolar with his history of depression as well as what may be manic episodes  as he has had numerous issues with financial difficulties and bankruptcies etc. according to his daughters.  Reticular having shopping sprees or sending large sums of money to strangers.    He is disputing the findings of the MoCA test where he scored a 19 out of 30,  telling me it was  scored wrong and I should have accepted some of his incorrect answers.  He started to become agitated over this and had to be calmed down a bit and then he was fine.    Medications:  Current Facility-Administered Medications   Medication Dose Route Frequency Provider Last Rate Last Dose   • amiodarone (PACERONE) tablet 200 mg  200 mg Oral Q24H Karthik Macdonald MD   200 mg at 09/11/17 0916   • atorvastatin (LIPITOR) tablet 10 mg  10 mg Oral Nightly Karthik Macdonald MD   10 mg at 09/10/17 2049   • bumetanide (BUMEX) injection 0.5 mg  0.5 mg Intravenous BID Karthik Macdonald MD   0.5 mg at 09/11/17 0916   • calcitriol (ROCALTROL) capsule 0.25 mcg  0.25 mcg Oral Daily Daniele Sharpe MD   0.25 mcg at 09/11/17 0916   • carvedilol (COREG) tablet 3.125 mg  3.125 mg Oral BID With Meals Karthik Macdonald MD   3.125 mg at 09/09/17 1756   • docusate sodium (COLACE) capsule 100 mg  100 mg Oral BID Lyric Garcia PA-C   100 mg at 09/11/17 0916   • enoxaparin (LOVENOX) syringe 30 mg  30 mg Subcutaneous Daily Karthik Macdonald MD   30 mg at 09/10/17 1642   • finasteride (PROSCAR) tablet 5 mg  5 mg Oral Daily Karthik Macdonald MD   5 mg at 09/11/17 0916   • gabapentin (NEURONTIN) capsule 400 mg  400 mg Oral Daily Daniele Sharpe MD   400 mg at 09/10/17 1959   • HYDROcodone-acetaminophen (NORCO) 7.5-325 MG per tablet 1 tablet  1 tablet Oral Q6H PRN Karthik Macdonald MD   1 tablet at 09/11/17 0601   • iron sucrose (VENOFER) 200 mg in sodium chloride 0.9 % 100 mL IVPB  200 mg Intravenous Q24H Jessica Rivas MD   Stopped at 09/10/17 1415   • lisinopril (PRINIVIL,ZESTRIL) tablet 2.5 mg  2.5 mg Oral Q24H Karthik Macdonald MD   2.5 mg at 09/11/17 0916   • LORazepam (ATIVAN) tablet 1 mg  1 mg Oral TID Jessica Rivas MD   1 mg at 09/11/17 0757   • nitroglycerin (NITROSTAT) SL tablet 0.4 mg  0.4 mg Sublingual Q5 Min PRN Karthik Macdonald MD       • rOPINIRole (REQUIP) tablet 1 mg  1 mg Oral Nightly Jessica Rivas MD   1 mg at  09/10/17 2319   • sennosides-docusate sodium (SENOKOT-S) 8.6-50 MG tablet 2 tablet  2 tablet Oral BID PRN Lyric Garcia PA-C       • sodium chloride 0.9 % flush 1-10 mL  1-10 mL Intravenous PRN Karthik Macdonald MD   10 mL at 09/10/17 2049   • tamsulosin (FLOMAX) 24 hr capsule 0.4 mg  0.4 mg Oral Nightly Karthik Macdonald MD           Review of Systems:   -A 14 point review of systems is completed and is negative except for leg pain    Objective     Objective      Vital Signs  Temp:  [97.3 °F (36.3 °C)-98.6 °F (37 °C)] 97.9 °F (36.6 °C)  Heart Rate:  [72-85] 72  Resp:  [18-20] 18  BP: ()/(58-67) 115/58    Physical Exam:    HEENT:  Neck supple  CVS:  Regular rate and rhythm.  No murmurs  Carotid Examination:  No bruits  Lungs:  Clear to auscultation  Abdomen:  Non-tender, Non-distended  Extremities:  No signs of peripheral edema    Neurologic Exam:    -Awake, Alert, Some mild word finding difficulties.-Follows simple and complex commands    Cranial nerves II through XII intact.    Motor: He has normal tone and strength is 5 over 5       Results Review:    I reviewed the patient's new clinical results.    Lab Results (last 24 hours)     Procedure Component Value Units Date/Time    Basic Metabolic Panel [[id number]]  (Abnormal) Collected:  09/11/17 0555    Specimen:  Blood Updated:  09/11/17 0650     Glucose 95 mg/dL      BUN 47 (H) mg/dL      Creatinine 1.80 (H) mg/dL      Sodium 138 mmol/L      Potassium 4.0 mmol/L      Chloride 99 mmol/L      CO2 30.0 mmol/L      Calcium 8.9 mg/dL      eGFR Non African Amer 36 (L) mL/min/1.73      BUN/Creatinine Ratio 26.1 (H)     Anion Gap 9.0 mmol/L     Narrative:       The MDRD GFR formula is only valid for adults with stable renal function between ages 18 and 70.        Imaging Results (last 24 hours)     Procedure Component Value Units Date/Time    FL Esophagram Complete [[id number]] Collected:  09/10/17 1050     Updated:  09/10/17 1055    Narrative:       EXAMINATION:   FL  ESOPHAGRAM COMPLETE-  9/10/2017 10:50 AM CDT     HISTORY: Epigastric pain     1.2 minutes of fluoroscopic time was used during this exam. 13 images  were obtained.     Barium was swallowed without difficulty. The esophagus initiates normal  peristalsis. Mild spasms present in the proximal third of the esophagus.  There was some stasis of barium in the proximal third of the esophagus  but eventually swallowing caused the area of stasis to peristalse. There  was no reflux.     IMPRESSION mild spasm in the mid esophagus  This report was finalized on 09/10/2017 10:52 by Dr. Yg Bowden MD.    US Carotid Bilateral [[id number]] Collected:  09/11/17 0739     Updated:  09/11/17 0742    Narrative:       History: Carotid occlusive disease       Impression:       Impression:  1. There is less than 50% stenosis of the right internal carotid artery.  2. There is 50-69% stenosis of the left internal carotid artery.  3. Antegrade flow is demonstrated in bilateral vertebral arteries.     Comments: Bilateral carotid vertebral arterial duplex scan was  performed.     Grayscale imaging shows intimal thickening and calcified elements at the  carotid bifurcation. The right internal carotid artery peak systolic  velocity is 48.3 cm/sec. The end-diastolic velocity is 19.3 cm/sec. The  right ICA/CCA ratio is approximately 0.83 . These findings correlate  with less than 50% stenosis of the right internal carotid artery.     Grayscale imaging shows intimal thickening and calcified elements at the  carotid bifurcation. The left internal carotid artery peak systolic  velocity is 148 cm/sec. The end-diastolic velocity is 60.9 cm/sec. The  left ICA/CCA ratio is approximately 4.3 . These findings correlate with  50-69% stenosis of the left internal carotid artery.     Antegrade flow is demonstrated in bilateral vertebral arteries.       This report was finalized on 09/11/2017 07:39 by Dr. Epi Rogers MD.          Assessment/Plan      Hospital Problem List    Active Problems:    Chest pain    Impaired functional mobility, balance, gait, and endurance    Impression:  1.  Cognitive impairment.  There likely is a metabolic component to this with his kidney disease and the fact that he is  on multiple medications that can contribute including his opioids and benzo's.  His underlying psychiatric problems seem to be contributing as well. He is not sleeping well in addition which may be contributing  2. RLS.  He has a low ferritin and is now receiving Venofer.  3. Anxiety  In process of weaning down from Ativan but he feels this is too much too soon  .     Plan:  Increase Gabapentin to a total of 600 mg /day--200 mg at 5 PM and 400 mg at bedtime.   Increase Ativan to 1 mg 4 times a day  Goal of Ferritin to be >100.  Venofer today and check ferritin tomorrow.   Patient would benefit from geriatric psych stay if he would be agreeable to this.       Jessica Rivas MD  09/11/17  10:37 AM

## 2017-09-11 NOTE — THERAPY TREATMENT NOTE
Acute Care - Physical Therapy Treatment Note  [facility]     Patient Name: Wild Bravo  : 1934  MRN: [id number]  Today's Date: 2017  Onset of Illness/Injury or Date of Surgery Date: 17  Date of Referral to PT: 17  Referring Physician: Dr Macdonald    Admit Date: 2017    Visit Dx:    ICD-10-CM ICD-9-CM   1. Impaired functional mobility, balance, gait, and endurance Z74.09 V49.89     Patient Active Problem List   Diagnosis   • Coronary arteriosclerosis   • CHF (congestive heart failure)   • SSS (sick sinus syndrome)   • Pacemaker   • Essential hypertension   • Biventricular ICD (implantable cardioverter-defibrillator) in place   • Persistent atrial fibrillation   • PAD (peripheral artery disease)   • H/O mitral valve repair   • ERRONEOUS ENCOUNTER--DISREGARD   • Chest pain   • Impaired functional mobility, balance, gait, and endurance               Adult Rehabilitation Note       17 1059 09/10/17 1105       Rehab Assessment/Intervention    Discipline physical therapy assistant  -KJ physical therapy assistant  -LYRIC     Document Type therapy note (daily note)  -KJ therapy note (daily note)  -LYRIC     Subjective Information agree to therapy  -KJ no complaints;agree to therapy  -LYRIC     Precautions/Limitations fall precautions  -KJ fall precautions  -LYRIC     Recorded by [KJ] Audra Swift PTA [LYRIC] Salvatore Watkins PTA     Pain Assessment    Pain Assessment No/denies pain  -KJ No/denies pain  -LYRIC     Recorded by [KJ] Audra Swift PTA [LYRIC] Salvatore Watkins PTA     Cognitive Assessment/Intervention    Follows Commands/Answers Questions able to follow multi-step instructions;100% of the time  -KJ      Personal Safety decreased awareness, need for safety  -KJ      Recorded by [KJ] Audra Swift PTA      Bed Mobility, Assessment/Treatment    Bed Mob, Supine to Sit, [place] independent  -KJ      Bed Mob, Sit to Supine, [place] independent  -KJ      Bed Mobility, Comment  up in  room independent  -LYRIC     Recorded by [KJ] Audra Swift PTA [LYRIC] Salvatore Watkins PTA     Transfer Assessment/Treatment    Transfers, Sit-Stand [place] verbal cues required;stand by assist   fell backwards when he first stood, cueing to slow down  -KJ independent  -LYRIC     Transfers, Stand-Sit [place] independent  -KJ independent  -LYRIC     Recorded by [KJ] Audra Swift PTA [LYRIC] Salvatore Watkins PTA     Gait Assessment/Treatment    Gait, [place] Level verbal cues required;stand by assist  -KJ verbal cues required;contact guard assist;minimum assist (75% patient effort)  -LYRIC     Gait, Distance (Feet) 500  -  -LYRIC     Gait, Gait Deviations bilateral:   stride length increased, deviates from path  -KJ narrow base  -LYRIC     Gait, Safety Issues balance decreased during turns;loses balance backward  -KJ      Gait, Impairments impaired balance  -KJ impaired balance  -LYRIC     Gait, Comment veered all different directions, mainly with turns and if he got off task. Pt is impulsive and has decreased safety awarness  -KJ pt veered to the right several times, required min assist to correct to prevent pt from running into the wall  -LYRIC     Recorded by [KJ] Audra Swift PTA [LYRIC] Salvatore Watkins PTA     Stairs Assessment/Treatment    Number of Stairs  10  -LYRIC     Stairs, Handrail Location  both sides  -LYRIC     Stairs, [place] Level  verbal cues required;contact guard assist;minimum assist (75% patient effort)  -LYRIC     Stairs, Comment  pt is very impulsive, cues to slow down and perform safely  -LYRIC     Recorded by  [LYRIC] Salvatore Watkins PTA     Positioning and Restraints    Pre-Treatment Position in bed  -KJ in bed  -LYRIC     Post Treatment Position bed  -KJ bed  -LYRIC     In Bed  sitting EOB;call light within reach;encouraged to call for assist;notified nsg  -LYRIC     Recorded by [KJ] Audra Swift PTA [LYRIC] Salvatore Watkins PTA       User Key  (r) = Recorded By, (t) = Taken By, (c) = Cosigned By     Initials Name Effective Dates    FELICIANO Audra Swift, PTA 08/02/16 -     LYRIC Salvatore DAMASO Roland, PTA 12/08/16 -                 IP PT Goals       09/09/17 1051          Gait Training PT LTG    Gait Training Goal PT LTG, Date Established 09/09/17  -PB      Gait Training Goal PT LTG, Time to Achieve by discharge  -PB      Gait Training Goal PT LTG, [place] Level supervision required  -PB      Gait Training Goal PT LTG, Distance to Achieve 300  -PB      Gait Training Goal PT LTG, Additional Goal maintain SpO2 equal to or above 90%  -PB      Stair Training PT LTG    Stair Training Goal PT LTG, Date Established 09/09/17  -PB      Stair Training Goal PT LTG, Time to Achieve by discharge  -PB      Stair Training Goal PT LTG, Number of Steps 15  -PB      Stair Training Goal PT LTG, [place] Level contact guard assist  -PB      Stair Training Goal PT LTG, Assist Device 1 handrail  -PB        User Key  (r) = Recorded By, (t) = Taken By, (c) = Cosigned By    Initials Name Provider Type    DUNIA Hernandez, PT DPT Physical Therapist          Physical Therapy Education     Title: PT OT SLP Therapies (Active)     Topic: Physical Therapy (Active)     Point: Mobility training (Active)    Learning Progress Summary    Learner Readiness Method Response Comment Documented by Status   Patient Acceptance E NR benefits of activity, safety awareness,  09/11/17 1121 Active    Acceptance E VU,NR safety with gait and stairs  09/10/17 1105 Done    Acceptance E NR,VU benefits of activity, safety concerns PB 09/09/17 1054 Done   Family Acceptance E NR,VU benefits of activity, safety concerns PB 09/09/17 1054 Done               Point: Precautions (Active)    Learning Progress Summary    Learner Readiness Method Response Comment Documented by Status   Patient Acceptance E NR benefits of activity, safety awareness,  09/11/17 1121 Active                      User Key     Initials Effective Dates Name Provider Type Cora WIGGINS  08/02/16 -  Audra Swift PTA Physical Therapy Assistant PT    LYRIC 12/08/16 -  Salvatore Watkins PTA Physical Therapy Assistant PT    DUNIA 08/02/16 -  Fernando Hernandez, PT DPT Physical Therapist PT                    PT Recommendation and Plan  Anticipated Discharge Disposition: home with 24/7 care, assisted living  Planned Therapy Interventions: balance training, gait training, patient/family education, stair training  PT Frequency: daily, 2 times/day, per priority policy  Plan of Care Review  Plan Of Care Reviewed With: patient  Progress: progress towards functional goals is fair  Outcome Summary/Follow up Plan: PT tx completed. Pt supine in bed. States he doesn't need PT. Reports he can walk and doesn't fall. I with bed mobility, when he first stood he lost his balance backwards and fell back onto bed. Cueing needed to take his time. Amb 500' SBA, but pt veered left and right bumping into things. Has decreased safety insight and is risk for falls.          Outcome Measures       09/10/17 1105 09/09/17 1004       How much help from another person do you currently need...    Turning from your back to your side while in flat bed without using bedrails? 4  -LYRIC 4  -PB     Moving from lying on back to sitting on the side of a flat bed without bedrails? 4  -LYRIC 4  -PB     Moving to and from a bed to a chair (including a wheelchair)? 4  -LYRIC 4  -PB     Standing up from a chair using your arms (e.g., wheelchair, bedside chair)? 4  -LYRIC 4  -PB     Climbing 3-5 steps with a railing? 3  -LYRIC 3  -PB     To walk in hospital room? 3  -LYRIC 3  -PB     AM-PAC 6 Clicks Score 22  -LYRIC 22  -PB     Functional Assessment    Outcome Measure Options AM-PAC 6 Clicks Basic Mobility (PT)  -LYRIC AM-PAC 6 Clicks Basic Mobility (PT)  -PB       User Key  (r) = Recorded By, (t) = Taken By, (c) = Cosigned By    Initials Name Provider Type    LYRIC Watkins PTA Physical Therapy Assistant    DUNIA Hernandez, PT DPT Physical Therapist            Time Calculation:           PT G-Codes  Outcome Measure Options: AM-PAC 6 Clicks Basic Mobility (PT)  Score: 22  Functional Limitation: Mobility: Walking and moving around  Mobility: Walking and Moving Around Current Status (): At least 20 percent but less than 40 percent impaired, limited or restricted  Mobility: Walking and Moving Around Goal Status (): At least 1 percent but less than 20 percent impaired, limited or restricted    Audra Swift, PTA  9/11/2017

## 2017-09-11 NOTE — PROGRESS NOTES
Exercise Oximetry    Patient Name:Wild Bravo   MRN: [id number]   Date: 09/11/17             ROOM AIR BASELINE   SpO2% 98   Heart Rate 68   Blood Pressure      EXERCISE ON ROOM AIR SpO2% EXERCISE ON O2 @  LPM SpO2%   1 MINUTE  1 MINUTE    2 MINUTES  2 MINUTES    3 MINUTES  3 MINUTES    4 MINUTES  4 MINUTES    5 MINUTES  5 MINUTES    6 MINUTES  6 MINUTES               Distance Walked  275 ft. Distance Walked   Dyspnea (Omkar Scale)   Dyspnea (Omkar Scale)   Fatigue (Omkar Scale)   Fatigue (Omkar Scale)   SpO2% Post Exercise  99 SpO2% Post Exercise   HR Post Exercise  101 HR Post Exercise   Time to Recovery   Time to Recovery     Comments:

## 2017-09-11 NOTE — PROGRESS NOTES
[facility] -  Progress Note     LOS: 1 day   Patient Care Team:  Eliel Ames MD as PCP - General  Eliel Ames MD as PCP - Family Medicine  Karthik Macdonald MD as Cardiologist (Cardiology)    Chief Complaint: dyspnea, abdominal discomfort     Subjective     Interval History:     Patient Complaints: Pt reports he is feeling well overall. He denies pain. He had an episode of mild dyspnea this am. He reports also that he has abdominal discomfort and distension after eating, that may contributing to his dyspnea. He reports his RLS and anxiety/tapering of Ativan may have contributed to the episode of dyspnea as well.         Review of Systems:     Review of Systems   Constitutional: Negative for diaphoresis, fatigue, fever and unexpected weight change.   HENT: Negative for nosebleeds.    Respiratory: Positive for shortness of breath (mild episode this am ). Negative for apnea, cough, chest tightness and wheezing.    Cardiovascular: Negative for chest pain, palpitations and leg swelling.   Gastrointestinal: Positive for abdominal distention and abdominal pain. Negative for nausea and vomiting.   Genitourinary: Negative for hematuria.   Musculoskeletal: Negative for gait problem.   Skin: Negative for color change.   Neurological: Negative for dizziness, syncope, weakness and light-headedness.     Objective     Vital Sign Min/Max for last 24 hours  Temp  Min: 97.4 °F (36.3 °C)  Max: 98.6 °F (37 °C)   BP  Min: 96/59  Max: 115/58   Pulse  Min: 72  Max: 85   Resp  Min: 18  Max: 20   SpO2  Min: 96 %  Max: 100 %   No Data Recorded   Weight  Min: 153 lb 6.4 oz (69.6 kg)  Max: 153 lb 6.4 oz (69.6 kg)     Last Weight    09/10/17  1952   Weight: 153 lb 6.4 oz (69.6 kg)       Physical Exam:    Physical Exam   Constitutional: He is oriented to person, place, and time. He appears well-developed and well-nourished. No distress.   HENT:   Head: Normocephalic and atraumatic.   Eyes: Pupils are equal,  round, and reactive to light.   Neck: Normal range of motion. Neck supple. No JVD present. No thyromegaly present.   Cardiovascular: Normal rate, normal heart sounds and intact distal pulses.  An irregular rhythm present. Exam reveals no gallop and no friction rub.    No murmur heard.  Pulses:       Dorsalis pedis pulses are 2+ on the right side, and 2+ on the left side.   Pulmonary/Chest: Effort normal. No respiratory distress. He has decreased breath sounds. He has no wheezes. He has no rales. He exhibits no tenderness.   Abdominal: Soft. Bowel sounds are normal. He exhibits no distension. There is no tenderness.   Musculoskeletal: Normal range of motion. He exhibits no edema.   Neurological: He is alert and oriented to person, place, and time. No cranial nerve deficit.   Skin: Skin is warm and dry. He is not diaphoretic.   Psychiatric: He has a normal mood and affect. His behavior is normal.     Results Review:   Lab Results (last 72 hours)     Procedure Component Value Units Date/Time    Magnesium [[id number]]  (Normal) Collected:  09/08/17 1607    Specimen:  Blood Updated:  09/08/17 1623     Magnesium 2.1 mg/dL     Basic Metabolic Panel [[id number]]  (Abnormal) Collected:  09/08/17 1607    Specimen:  Blood Updated:  09/08/17 1623     Glucose 150 (H) mg/dL      BUN 52 (H) mg/dL      Creatinine 1.77 (H) mg/dL      Sodium 138 mmol/L      Potassium 4.0 mmol/L      Chloride 96 (L) mmol/L      CO2 29.0 mmol/L      Calcium 9.3 mg/dL      eGFR Non African Amer 37 (L) mL/min/1.73      BUN/Creatinine Ratio 29.4 (H)     Anion Gap 13.0 mmol/L     Narrative:       The MDRD GFR formula is only valid for adults with stable renal function between ages 18 and 70.    Lipase [[id number]]  (Normal) Collected:  09/08/17 1607    Specimen:  Blood Updated:  09/08/17 1623     Lipase 66 U/L     Amylase [[id number]]  (Normal) Collected:  09/08/17 1607    Specimen:  Blood Updated:  09/08/17 1623     Amylase 100 U/L     Troponin [[id number]]   (Abnormal) Collected:  09/08/17 1607    Specimen:  Blood Updated:  09/08/17 1634     Troponin I 0.052 (H) ng/mL     TSH [[id number]]  (Normal) Collected:  09/08/17 1607    Specimen:  Blood Updated:  09/08/17 1705     TSH 2.430 mIU/mL     Troponin [[id number]]  (Abnormal) Collected:  09/08/17 2041    Specimen:  Blood Updated:  09/08/17 2121     Troponin I 0.055 (H) ng/mL     Troponin [[id number]]  (Abnormal) Collected:  09/08/17 2153    Specimen:  Blood Updated:  09/08/17 2304     Troponin I 0.057 (H) ng/mL     Ammonia [[id number]]  (Abnormal) Collected:  09/09/17 0318    Specimen:  Blood Updated:  09/09/17 0401     Ammonia <9 (L) umol/L     Basic Metabolic Panel [[id number]]  (Abnormal) Collected:  09/09/17 0318    Specimen:  Blood Updated:  09/09/17 0403     Glucose 97 mg/dL      BUN 48 (H) mg/dL      Creatinine 1.71 (H) mg/dL      Sodium 138 mmol/L      Potassium 3.9 mmol/L      Chloride 98 mmol/L      CO2 33.0 (H) mmol/L      Calcium 9.0 mg/dL      eGFR Non African Amer 38 (L) mL/min/1.73      BUN/Creatinine Ratio 28.1 (H)     Anion Gap 7.0 mmol/L     Narrative:       The MDRD GFR formula is only valid for adults with stable renal function between ages 18 and 70.    Lipid Panel [[id number]]  (Abnormal) Collected:  09/09/17 0318    Specimen:  Blood Updated:  09/09/17 0414     Total Cholesterol 126 (L) mg/dL      Triglycerides 114 mg/dL      HDL Cholesterol 62 mg/dL      LDL Cholesterol  49 mg/dL      LDL/HDL Ratio 0.66    Vitamin B12 [[id number]]  (Normal) Collected:  09/09/17 0318    Specimen:  Blood Updated:  09/09/17 0509     Vitamin B-12 890 pg/mL     Folate [[id number]] Collected:  09/09/17 0318    Specimen:  Blood Updated:  09/09/17 0509     Folate >20.00 ng/mL     Magnesium [[id number]]  (Normal) Collected:  09/10/17 0523    Specimen:  Blood Updated:  09/10/17 0553     Magnesium 2.1 mg/dL     Basic Metabolic Panel [[id number]]  (Abnormal) Collected:  09/10/17 0523    Specimen:  Blood Updated:  09/10/17 0553      Glucose 93 mg/dL      BUN 48 (H) mg/dL      Creatinine 1.91 (H) mg/dL      Sodium 137 mmol/L      Potassium 3.9 mmol/L      Chloride 99 mmol/L      CO2 30.0 mmol/L      Calcium 8.6 mg/dL      eGFR Non African Amer 34 (L) mL/min/1.73      BUN/Creatinine Ratio 25.1 (H)     Anion Gap 8.0 mmol/L     Narrative:       The MDRD GFR formula is only valid for adults with stable renal function between ages 18 and 70.    Phosphorus [[id number]]  (Normal) Collected:  09/10/17 0523    Specimen:  Blood Updated:  09/10/17 0553     Phosphorus 4.2 mg/dL     Uric Acid [[id number]]  (Abnormal) Collected:  09/10/17 0523    Specimen:  Blood Updated:  09/10/17 0553     Uric Acid 8.6 (H) mg/dL     Iron Profile [[id number]]  (Abnormal) Collected:  09/10/17 0523    Specimen:  Blood Updated:  09/10/17 0601     Iron 65 mcg/dL      TIBC 370 mcg/dL      Iron Saturation 18 (L) %     BNP [[id number]]  (Abnormal) Collected:  09/10/17 0523    Specimen:  Blood Updated:  09/10/17 0604     proBNP 2180.0 (H) pg/mL     PTH, Intact [[id number]]  (Abnormal) Collected:  09/10/17 0523    Specimen:  Blood Updated:  09/10/17 0607     PTH, Intact 181.6 (H) pg/mL     Vitamin D 25 Hydroxy [[id number]]  (Normal) Collected:  09/10/17 0523    Specimen:  Blood Updated:  09/10/17 0610     25 Hydroxy, Vitamin D 46.4 ng/ml     Ferritin [[id number]]  (Normal) Collected:  09/10/17 0523    Specimen:  Blood Updated:  09/10/17 0627     Ferritin 21.40 ng/mL     Basic Metabolic Panel [[id number]]  (Abnormal) Collected:  09/11/17 0555    Specimen:  Blood Updated:  09/11/17 0650     Glucose 95 mg/dL      BUN 47 (H) mg/dL      Creatinine 1.80 (H) mg/dL      Sodium 138 mmol/L      Potassium 4.0 mmol/L      Chloride 99 mmol/L      CO2 30.0 mmol/L      Calcium 8.9 mg/dL      eGFR Non African Amer 36 (L) mL/min/1.73      BUN/Creatinine Ratio 26.1 (H)     Anion Gap 9.0 mmol/L     Narrative:       The MDRD GFR formula is only valid for adults with stable renal function between ages 18 and 70.               Echo EF Estimated  Lab Results   Component Value Date    ECHOEFEST 35 09/08/2017         Cath Ejection Fraction Quantitative  No results found for: CATHEF        Medication Review: yes  Current Facility-Administered Medications   Medication Dose Route Frequency Provider Last Rate Last Dose   • amiodarone (PACERONE) tablet 200 mg  200 mg Oral Q24H Karthik Macdonald MD   200 mg at 09/11/17 0916   • atorvastatin (LIPITOR) tablet 10 mg  10 mg Oral Nightly Karthik Macdonald MD   10 mg at 09/10/17 2049   • bumetanide (BUMEX) injection 0.5 mg  0.5 mg Intravenous BID Karthik Macdonald MD   0.5 mg at 09/11/17 0916   • calcitriol (ROCALTROL) capsule 0.25 mcg  0.25 mcg Oral Daily Daniele Sharpe MD   0.25 mcg at 09/11/17 0916   • carvedilol (COREG) tablet 3.125 mg  3.125 mg Oral BID With Meals Karthik Macdonald MD   3.125 mg at 09/11/17 1119   • docusate sodium (COLACE) capsule 100 mg  100 mg Oral BID Lyric Garcia PA-C   100 mg at 09/11/17 0916   • enoxaparin (LOVENOX) syringe 30 mg  30 mg Subcutaneous Daily Karthik Macdonald MD   30 mg at 09/10/17 1642   • finasteride (PROSCAR) tablet 5 mg  5 mg Oral Daily Karthik Macdonald MD   5 mg at 09/11/17 0916   • gabapentin (NEURONTIN) capsule 200 mg  200 mg Oral Daily Jessica Rivas MD       • gabapentin (NEURONTIN) capsule 400 mg  400 mg Oral Daily Daniele Sharpe MD   400 mg at 09/10/17 1959   • HYDROcodone-acetaminophen (NORCO) 7.5-325 MG per tablet 1 tablet  1 tablet Oral Q6H PRN Karthik Macdonald MD   1 tablet at 09/11/17 0601   • iron sucrose (VENOFER) 200 mg in sodium chloride 0.9 % 100 mL IVPB  200 mg Intravenous Q24H Jessica Rivas MD   Stopped at 09/10/17 1415   • lisinopril (PRINIVIL,ZESTRIL) tablet 2.5 mg  2.5 mg Oral Q24H Karthik Macdonald MD   2.5 mg at 09/11/17 0916   • LORazepam (ATIVAN) tablet 1 mg  1 mg Oral 4x Daily Jessica Rivas MD       • nitroglycerin (NITROSTAT) SL tablet 0.4 mg  0.4 mg Sublingual Q5 Min PRN Karthik Macdonald MD       •  rOPINIRole (REQUIP) tablet 1 mg  1 mg Oral Nightly Jessica Rivas MD   1 mg at 09/10/17 2310   • sennosides-docusate sodium (SENOKOT-S) 8.6-50 MG tablet 2 tablet  2 tablet Oral BID PRN Lyric Garcia PA-C       • sodium chloride 0.9 % flush 1-10 mL  1-10 mL Intravenous PRN Karthik Macdonald MD   10 mL at 09/10/17 2049   • tamsulosin (FLOMAX) 24 hr capsule 0.4 mg  0.4 mg Oral Nightly Karthik Macdonald MD             Assessment/Plan     Acute on chronic systolic CHF  Ischemic cardiomyopathy s/p BIV ICD  CKD 3  Recent KRISTY from diuretic use  Chest pain- resolved  Dyspnea   Persistent atrial fibrillation- heart rates 80s-90s, not anticoagulated secondary to history of falls, and bleeding on warfarin in past   Proteinuria   Cognitive impairment   Hypertension   RLS   Cholelithiasis, epigastric pain   Polypharmacy   Anxiety   CAD  PAD  Secondary hyperparathyroidism     Plan-  Discussed with Dr. Macdonald  Continue gentle diuresis- monitor intake, output, renal function, electrolytes, daily weights    Adjustment of gabapentin, ativan and venefor per neurology   Walking oximetry  PFTs  Consult pulmonology  Consult Dr. Ames   Possible transfer to geriatric psych unit in [place] for psych evaluation and continued adjustment of medications , however the patient it somewhat resistant to the this. The patient's daughter states her sister plans to obtain guardianship on Wednesday  Appreciate others assistance     Waleska Sifuentes, APRN  09/11/17  2:16 PM

## 2017-09-11 NOTE — PLAN OF CARE
Problem: Patient Care Overview (Adult)  Goal: Plan of Care Review  Outcome: Ongoing (interventions implemented as appropriate)    09/11/17 1419   Coping/Psychosocial Response Interventions   Plan Of Care Reviewed With patient;daughter   Patient Care Overview   Progress (Eval)   Outcome Evaluation   Outcome Summary/Follow up Plan Cognitive eval completed. Pt. was functional on the cognitive linguistic evaluation. Pt. had odd cognitive responses to problem solving: what would you do if questions. Pt. had difficutly formulating multiple solutions to verbalized problems. Pt.'s daughter verbalized concern with pt. ability to recognize problems and carry out problem solving scenarios adequately, as she stated pt. has been scammed several times and pt. has poor insight into problem. Pt.'s daughter verbalized concern with current medications.         Problem: Inpatient SLP  Goal: Cognitive-linguistic-Patient will improve Cognitive-linguistic skills to allow optimal participation in care  Outcome: Ongoing (interventions implemented as appropriate)    09/11/17 1419   Cognitive Linguistic- Optimal Participation in Care   Cognitive Linguistic Optimal Participation in Care- SLP, Date Established 09/11/17   Cognitive Linguistic Optimal Participation in Care- SLP, Time to Achieve by discharge   Cognitive Linguistic Optimal Participation in Care- SLP, Activity Level Patient will improve functional problem solving skills for increased safety in environment   Cognitive Linguistic Optimal Participation in Care- SLP, Date Goal Reviewed 09/11/17   Cognitive Linguistic Optimal Participation in Care- SLP, Reason Goal Not Met (Eval)

## 2017-09-11 NOTE — PLAN OF CARE
Problem: Patient Care Overview (Adult)  Goal: Plan of Care Review    09/10/17 1105 09/11/17 0444   Coping/Psychosocial Response Interventions   Plan Of Care Reviewed With patient --    Patient Care Overview   Progress progress toward functional goals as expected --    Outcome Evaluation   Outcome Summary/Follow up Plan --  has rested better tonight, c/o RLS, and was given his Requip, and Norco both this shift. cont to monitor patient.       Goal: Adult Individualization and Mutuality    09/08/17 1629   Individualization   Patient Specific Goals NO SOA       Goal: Discharge Needs Assessment    09/08/17 1629 09/09/17 1027   Discharge Needs Assessment   Concerns To Be Addressed --  basic needs concerns;discharge planning concerns;decision making concerns;cognitive/perceptual concerns;financial/insurance concerns;home safety concerns   Concerns Comments PATIENTS DAUGHTER CONCERNED WITH PATIENT BEING AT HOME BY HIMSELF-REPORTS MULTIPLE FALLS LATELY, CONFUSION, ETC. --    Readmission Within The Last 30 Days --  no previous admission in last 30 days   Discharge Facility/Level Of Care Needs --  nursing facility, skilled;psychiatric facility   Current Discharge Risk --  cognitively impaired;financial support inadequate;lives alone   Discharge Planning Comments --  (Spoke to pt's daughter Munira [phone]. She is currently working with [employer] due to pt being scammed $100,000 plus. Pt is confused, frequent falls, sending money via [employer] to [employer], etc. )   Living Environment   Transportation Available --  car;family or friend will provide   Current Health   Anticipated Changes Related to Illness --  inability to care for self   Self-Care   Equipment Currently Used at Home --  none         Problem: Cardiac: Heart Failure (Adult)  Goal: Signs and Symptoms of Listed Potential Problems Will be Absent or Manageable (Cardiac: Heart Failure)    09/09/17 1711   Cardiac: Heart Failure   Problems Assessed (Heart Failure) all          Problem: Fall Risk (Adult)  Goal: Absence of Falls    09/09/17 3151   Fall Risk (Adult)   Absence of Falls making progress toward outcome

## 2017-09-11 NOTE — PROGRESS NOTES
Nephrology ([facility]) Progress Note      Patient:  Wild Bravo  YOB: 1934  Date of Service: 9/10/2017  MRN: [id number]   Acct: [id number]   Primary Care Physician: Eliel Ames MD  Advance Directive: Full Code  Admit Date: 9/8/2017       Hospital Day: 0  Referring Provider: No ref. provider found      Patient personally seen and examined.  Complete chart including Consults, Notes, Operative Reports, Labs, Cardiology, and Radiology studies reviewed as able.        Subjective:  Wild Bravo is a 83 y.o. male  whom we were consulted for CKD.  Pt of Dr. Crawford with CKD  Prior creat 2.68, then 1.8 with decreased diuresis.  Admitted with fall.  Evaluated by cardiology and neurology.  Pt denies specific c/o aside from mild soa.  No f/c/n/v.  Family notes poor compliance with diet.  Daughter Audra (APRN from [place]) present.  Family very concerned about self care.  No overnight events.  RN present. Pt remains anxious for d/c.    Allergies:  Keflex [cephalexin]    Home Meds:  Prescriptions Prior to Admission   Medication Sig Dispense Refill Last Dose   • amiodarone (PACERONE) 200 MG tablet Take 1 tablet by mouth Daily. 30 tablet 1 Past Week at Unknown time   • carvedilol (COREG) 3.125 MG tablet Take 3.125 mg by mouth 2 (Two) Times a Day With Meals.   Past Week at Unknown time   • furosemide (LASIX) 40 MG tablet Take 40 mg by mouth Daily As Needed (edema).   Past Month at Unknown time   • gabapentin (NEURONTIN) 300 MG capsule Take 300 mg by mouth 2 (Two) Times a Day.   Past Week at Unknown time   • HYDROcodone-acetaminophen (NORCO) 7.5-325 MG per tablet Take 1 tablet by mouth Every 8 (Eight) Hours As Needed for Moderate Pain .   Past Week at Unknown time   • LORazepam (ATIVAN) 2 MG tablet Take 2 mg by mouth Every 8 (Eight) Hours As Needed for Anxiety.   Past Week at Unknown time   • aspirin 81 MG EC tablet Take 81 mg by mouth Daily.   Unknown at Unknown time   •  atorvastatin (LIPITOR) 20 MG tablet Take 20 mg by mouth Daily.   Unknown at Unknown time   • dutasteride (AVODART) 0.5 MG capsule Take 0.5 mg by mouth Daily.   Unknown at Unknown time   • lisinopril (PRINIVIL,ZESTRIL) 5 MG tablet Take 2.5 mg by mouth Daily.   Unknown at Unknown time   • nitroglycerin (NITROSTAT) 0.4 MG SL tablet Place 0.4 mg under the tongue Every 5 (Five) Minutes As Needed for Chest Pain. Take no more than 3 doses in 15 minutes.   Unknown at Unknown time   • rOPINIRole (REQUIP) 2 MG tablet Take 2 mg by mouth 2 (Two) Times a Day.   Unknown at Unknown time   • tamsulosin (FLOMAX) 0.4 MG capsule 24 hr capsule Take 1 capsule by mouth Every Night.   Unknown at Unknown time       Medicines:  Current Facility-Administered Medications   Medication Dose Route Frequency Provider Last Rate Last Dose   • amiodarone (PACERONE) tablet 200 mg  200 mg Oral Q24H Karthik Macdonald MD   200 mg at 09/10/17 0812   • atorvastatin (LIPITOR) tablet 10 mg  10 mg Oral Nightly Karthik Macdonald MD   10 mg at 09/10/17 2049   • bumetanide (BUMEX) injection 0.5 mg  0.5 mg Intravenous BID Karthik Macdonald MD   0.5 mg at 09/10/17 0812   • calcitriol (ROCALTROL) capsule 0.25 mcg  0.25 mcg Oral Daily Daniele Sharpe MD   0.25 mcg at 09/10/17 1959   • carvedilol (COREG) tablet 3.125 mg  3.125 mg Oral BID With Meals Karthik Macdonald MD   3.125 mg at 09/09/17 1756   • docusate sodium (COLACE) capsule 100 mg  100 mg Oral BID Lyric Garcia PA-C   100 mg at 09/10/17 1818   • enoxaparin (LOVENOX) syringe 30 mg  30 mg Subcutaneous Daily Karthik Macdonald MD   30 mg at 09/10/17 1642   • finasteride (PROSCAR) tablet 5 mg  5 mg Oral Daily Karthik Macdonald MD   5 mg at 09/10/17 0812   • gabapentin (NEURONTIN) capsule 400 mg  400 mg Oral Daily Daniele Sharpe MD   400 mg at 09/10/17 1959   • HYDROcodone-acetaminophen (NORCO) 7.5-325 MG per tablet 1 tablet  1 tablet Oral Q6H PRN Karthik Macdonald MD   1 tablet at 09/10/17 2050   • iron sucrose  (VENOFER) 200 mg in sodium chloride 0.9 % 100 mL IVPB  200 mg Intravenous Q24H Jessica Rivas MD   Stopped at 09/10/17 1415   • lisinopril (PRINIVIL,ZESTRIL) tablet 2.5 mg  2.5 mg Oral Q24H Karthik Macdonald MD   2.5 mg at 09/09/17 0806   • LORazepam (ATIVAN) tablet 1 mg  1 mg Oral TID Jessica Rivas MD   1 mg at 09/10/17 2049   • nitroglycerin (NITROSTAT) SL tablet 0.4 mg  0.4 mg Sublingual Q5 Min PRN Karthik Macdonald MD       • rOPINIRole (REQUIP) tablet 1 mg  1 mg Oral Nightly Jessica Rivas MD   1 mg at 09/10/17 2319   • sennosides-docusate sodium (SENOKOT-S) 8.6-50 MG tablet 2 tablet  2 tablet Oral BID PRN Lyric Garcia PA-C       • sodium chloride 0.9 % flush 1-10 mL  1-10 mL Intravenous PRN Karthik Macdonald MD   10 mL at 09/10/17 2049   • tamsulosin (FLOMAX) 24 hr capsule 0.4 mg  0.4 mg Oral Daily Karthik Macdonald MD   0.4 mg at 09/10/17 0813       Past Medical History:  Past Medical History:   Diagnosis Date   • Abdominal aortic aneurysm    • Anxiety    • Atrial fibrillation    • Biventricular ICD (implantable cardioverter-defibrillator) in place    • BPH (benign prostatic hypertrophy)    • Carotid artery stenosis    • CHF (congestive heart failure)    • Chronic kidney disease     Chronic kidney disease, stage 4 (severe)   • Chronic systolic (congestive) heart failure     limited echo 7/2016 EF was 40-45%. Patient has BiV AICD   • Coronary arteriosclerosis     MI x2   • Hearing loss    • Iron deficiency anemia    • Ischemic cardiomyopathy    • Mixed hyperlipidemia    • Myocardial infarction    • Stroke        Past Surgical History:  Past Surgical History:   Procedure Laterality Date   • CARDIAC ABLATION     • CARDIAC CATHETERIZATION     • CARDIAC PACEMAKER PLACEMENT     • CARDIOVERSION     • CAROTID ENDARTERECTOMY     • CORONARY ARTERY BYPASS GRAFT     • MITRAL VALVE REPLACEMENT     • TOTAL KNEE ARTHROPLASTY         Family History  Family History   Problem Relation Age of Onset   • Stroke  "Mother    • Heart disease Mother    • Diabetes Father        Social History  Social History     Social History   • Marital status:      Spouse name: N/A   • Number of children: N/A   • Years of education: N/A     Occupational History   • Not on file.     Social History Main Topics   • Smoking status: Former Smoker   • Smokeless tobacco: Never Used   • Alcohol use No   • Drug use: No   • Sexual activity: Defer     Other Topics Concern   • Not on file     Social History Narrative         Review of Systems:  History obtained from chart review and the patient  General ROS: No fever or chills  Respiratory ROS: No cough, + shortness of breath, wheezing  Cardiovascular ROS: no chest pain or dyspnea on exertion  Gastrointestinal ROS: No abdominal pain or melena  Genito-Urinary ROS: No dysuria or hematuria      Objective:  /59 (BP Location: Left arm, Patient Position: Sitting)  Pulse 85  Temp 98.1 °F (36.7 °C) (Temporal Artery )   Resp 18  Ht 73.5\" (186.7 cm)  Wt 153 lb 6.4 oz (69.6 kg)  SpO2 98%  BMI 19.96 kg/m2    Intake/Output Summary (Last 24 hours) at 09/10/17 2329  Last data filed at 09/10/17 1911   Gross per 24 hour   Intake              960 ml   Output             4050 ml   Net            -3090 ml     General: awake/alert   Chest:  clear to auscultation bilaterally without respiratory distress  CVS: regular rate and rhythm  Abdominal: soft, nontender, normal bowel sounds  Extremities: tr ble edema  Skin: warm and dry without rash      Labs:    Results from last 7 days  Lab Units 09/08/17  1300   WBC 10*3/mm3 5.60   HEMOGLOBIN g/dL 12.9*   HEMATOCRIT % 41.3   PLATELETS 10*3/mm3 177           Results from last 7 days  Lab Units 09/10/17  0523 09/09/17  0318 09/08/17  1607 09/08/17  1300   SODIUM mmol/L 137 138 138 139   POTASSIUM mmol/L 3.9 3.9 4.0 4.8   CHLORIDE mmol/L 99 98 96* 97*   CO2 mmol/L 30.0 33.0* 29.0 31.0   BUN mg/dL 48* 48* 52* 52*   CREATININE mg/dL 1.91* 1.71* 1.77* 1.94*   CALCIUM " mg/dL 8.6 9.0 9.3 9.8   BILIRUBIN mg/dL  --   --   --  1.1*   ALK PHOS U/L  --   --   --  83   ALT (SGPT) U/L  --   --   --  47   AST (SGOT) U/L  --   --   --  40   GLUCOSE mg/dL 93 97 150* 98       Radiology:   Imaging Results (last 72 hours)     Procedure Component Value Units Date/Time    XR Chest PA & Lateral [[id number]] Collected:  09/08/17 1622     Updated:  09/08/17 1627    Narrative:       EXAMINATION:   XR CHEST PA AND LATERAL-  9/8/2017 3:22 PM CST     HISTORY: Short of breath     PA and lateral views the chest obtained. Hyperinflation increase in AP  diameter chest is noted consistent with COPD.     Cardiac silhouette is mildly enlarged.     Prosthetic cardiac valve is present in the mitral position.     Pacing device is present projecting over the soft tissues of the left  chest. Wires are present median sternotomy. Vascular calcifications  present in the aortic arch.     Benign calcifications in the milagros are present bilaterally.       Impression:       Mild cardiomegaly no evidence of pulmonary vascular  congestion.  2. COPD  This report was finalized on 09/08/2017 16:24 by Dr. Yg Bowden MD.    CT Head Without Contrast [[id number]] Collected:  09/08/17 1633     Updated:  09/08/17 1639    Narrative:       CT HEAD WO CONTRAST- 9/8/2017 5:21 PM EDT     HISTORY: falls, alt mental status       Technique:   Axial CT of the brain without IV contrast. Sagittal and coronal  reformations are also provided for review. Soft tissue and bone kernels  are available for interpretation.     Comparison: None.     Findings:      There is no evidence of acute large vascular distribution infarct, mass  lesion or hemorrhage.  The ventricles, cortical sulci and basal cisterns  are symmetric and age appropriate. Mild generalized symmetric volume  loss is identified. There is atheromatous calcification in the  intracranial vertebral arteries as well as the bilateral paraclinoid  ICAs.  Normal brainstem and posterior fossa.   The scalp and calvarium  are unremarkable.        Impression:       Impression:    1. No acute intracranial process  This report was finalized on 09/08/2017 16:36 by Dr Jem Carreno, .    US Carotid Bilateral [[id number]] Updated:  09/08/17 1702    US Abdomen Complete [[id number]] Collected:  09/09/17 0942     Updated:  09/09/17 0949    Narrative:       ULTRASOUND ABDOMEN COMPLETE 9/9/2017 8:20 AM CDT     REASON FOR EXAM: abdominal pain, hx aaa       COMPARISON: None      TECHNIQUE: Multiple longitudinal and transverse real-time sonographic  images of the abdomen are obtained.      FINDINGS:       LIVER: Normal in size, smooth in contour, and homogeneous in  echogenicity. No focal lesion is seen.     BILIARY: Echoes are noted in the gallbladder with distal shadowing  consistent with cholelithiasis.. The common bile duct measures 0.7 cm in  diameter. There is no evidence of intrahepatic ductal dilatation.       KIDNEYS: The right and left kidneys are normal in size, shape,  orientation, and position measuring 5.8 x 7.3 x 14.9 cm and 5.2 x 6.2 x  12.2 cm, respectively. The corticomedullary differentiation is  maintained. There is no evidence of nephrolithiasis, hydronephrosis or  perinephric fluid collection. Cysts are present associated right kidney  the largest is 5.7 cm. The largest left renal cyst is at the upper pole  and is 6.4 cm No hydroureter is seen.     PANCREAS: No focal lesion or abnormality.      SPLEEN: Normal in size and appearance.      OTHER: No ascites is present. The aorta is aneurysmal and measures 39 mm  in maximal diameter. Inferior vena cava is visualized.     The prostate is enlarged. The prostate is 7.2 x 9 cm.        Impression:       1. Cholelithiasis.        2. Enlarged prostate Yuliana graph  3. Renal cyst     This report was finalized on 09/09/2017 09:46 by Dr. Yg Bowden MD.    FL Esophagram Complete [[id number]] Collected:  09/10/17 1050     Updated:  09/10/17 1055    Narrative:        EXAMINATION:   FL ESOPHAGRAM COMPLETE-  9/10/2017 10:50 AM CDT     HISTORY: Epigastric pain     1.2 minutes of fluoroscopic time was used during this exam. 13 images  were obtained.     Barium was swallowed without difficulty. The esophagus initiates normal  peristalsis. Mild spasms present in the proximal third of the esophagus.  There was some stasis of barium in the proximal third of the esophagus  but eventually swallowing caused the area of stasis to peristalse. There  was no reflux.     IMPRESSION mild spasm in the mid esophagus  This report was finalized on 09/10/2017 10:52 by Dr. Yg Bowden MD.          Culture:         Assessment   CKD 3  Recent KRISTY from diuretic use  Bilateral renal cysts  Persistent proteinuria  HTN  cognitive impairment  Secondary Hyperparathyoroidism     Plan:  Long d/w pt/family  Agree with neuro eval/need for psych eval  Continue diuretics for now, cr increased a bit as expected  Monitor labs  Calcitriol  Adjust timing of gabapentin dosing per pt request      Daniele Sharpe MD  9/10/2017  11:29 PM

## 2017-09-11 NOTE — THERAPY EVALUATION
Acute Care - Speech Language Pathology Initial Evaluation  [facility]     Patient Name: Wild Bravo  : 1934  MRN: [id number]  Today's Date: 2017  Onset of Illness/Injury or Date of Surgery Date: 17  Date of Referral to SLP: (P) 17         Admit Date: 2017   Cognitive eval completed. Pt. was functional on the cognitive linguistic evaluation. Pt. had odd cognitive responses to problem solving: what would you do if questions. Pt. had difficutly formulating multiple solutions to verbalized problems. Pt.'s daughter verbalized concern with pt. ability to recognize problems and carry out problem solving scenarios adequately, as she stated pt. has been scammed several times and pt. has poor insight into problem. Pt.'s daughter verbalized concern with current medications. ST to begin to follow and tx for above cognitive/reasoning concerns. Thanks!  Manisha Kowalski, Speech Therapy Student 2017 2:41 PM  Visit Dx:    ICD-10-CM ICD-9-CM   1. Cognitive and behavioral changes R41.89 799.59    R46.89 312.9   2. Impaired functional mobility, balance, gait, and endurance Z74.09 V49.89     Patient Active Problem List   Diagnosis   • Coronary arteriosclerosis   • CHF (congestive heart failure)   • SSS (sick sinus syndrome)   • Pacemaker   • Essential hypertension   • Biventricular ICD (implantable cardioverter-defibrillator) in place   • Persistent atrial fibrillation   • PAD (peripheral artery disease)   • H/O mitral valve repair   • ERRONEOUS ENCOUNTER--DISREGARD   • Chest pain   • Impaired functional mobility, balance, gait, and endurance     Past Medical History:   Diagnosis Date   • Abdominal aortic aneurysm    • Anxiety    • Atrial fibrillation    • Biventricular ICD (implantable cardioverter-defibrillator) in place    • BPH (benign prostatic hypertrophy)    • Carotid artery stenosis    • CHF (congestive heart failure)    • Chronic kidney disease     Chronic kidney disease, stage 4 (severe)    • Chronic systolic (congestive) heart failure     limited echo 7/2016 EF was 40-45%. Patient has BiV AICD   • Coronary arteriosclerosis     MI x2   • Hearing loss    • Iron deficiency anemia    • Ischemic cardiomyopathy    • Mixed hyperlipidemia    • Myocardial infarction    • Stroke      Past Surgical History:   Procedure Laterality Date   • CARDIAC ABLATION     • CARDIAC CATHETERIZATION     • CARDIAC PACEMAKER PLACEMENT     • CARDIOVERSION     • CAROTID ENDARTERECTOMY     • CORONARY ARTERY BYPASS GRAFT     • MITRAL VALVE REPLACEMENT     • TOTAL KNEE ARTHROPLASTY            SLP EVALUATION (last 72 hours)      SLP Evaluation       09/11/17 1245                Rehab Evaluation    Document Type (P)  evaluation  -AM        Subjective Information (P)  no complaints;agree to therapy  -AM        General Information    Patient Profile Review (P)  yes  -AM        Onset of Illness/Injury (P)  09/08/17  -AM        Subjective Patient Observations (P)  Pt. was alert and cooperative.  -AM        Pertinent History Of Current Problem (P)  CT of head 9-08-17, no evidence of   -AM        Precautions/Limitations, Vision (P)  WFL  -AM        Precautions/Limitations, Hearing (P)  hearing impairment, bilaterally  -AM        Prior Level of Function- Communication (P)  functional in all spheres  -AM        Plans/Goals Discussed With (P)  patient;family  -AM        Barriers to Rehab (P)  none identified  -AM        Living Environment    Lives With (P)  alone  -AM        Living Arrangements (P)  house  -AM        Clinical Impression    Date of Referral to SLP (P)  09/11/17  -AM        SLP Diagnosis (P)  mild cognitive impairment  -AM        Prognosis (P)  Good  -AM        Functional Level At Time Of Evaluation (P)  WFL  -AM        Patient's Goals For Discharge (P)  return home  -AM        Rehab Potential (P)  good, to achieve stated therapy goals  -AM        Therapy Frequency (P)  PRN  -AM        Predicted Duration of Therapy Intervention  "(days/wks) (P)  until discharge  -AM        Expected Duration of Therapy Session (min) (P)  15-30 minutes  -AM        Demonstrates Need for Referral to Another Service (P)  psychology services   Family voice concerns that may warrant an eval.   -AM        Cognitive Assessment/Intervention    Current Cognitive/Communication Assessment (P)  functional  -AM        Orientation Status (P)  oriented x 4  -AM        Communication Treatment Objective and Progress    Cognitive Linguistic Treatment Objectives (P)  Improve functional problem solving  -AM        Improve functional problem solving    Improve functional problem solving through: (P)  complete high level reasoning task;determine solutions to complex problems;answer \"what if\" questions  -AM        Status: Improve functional problem solving (P)  New  -AM        Functional Problem Solving Progress (P)  continue to address  -AM          User Key  (r) = Recorded By, (t) = Taken By, (c) = Cosigned By    Initials Name Effective Dates    AM Manisha Kowalski, Speech Therapy Student 08/08/17 -            EDUCATION  The patient has been educated in the following areas:   Cognitive Impairment.    SLP Recommendation and Plan  SLP Diagnosis: (P) mild cognitive impairment  Prognosis: (P) Good  Rehab Potential: (P) good, to achieve stated therapy goals        Demonstrates Need for Referral to Another Service: (P) psychology services (Family voice concerns that may warrant an eval. )  Therapy Frequency: (P) PRN  Predicted Duration of Therapy Intervention (days/wks): (P) until discharge  Expected Duration of Therapy Session (min): (P) 15-30 minutes    Plan of Care Review  Plan Of Care Reviewed With: (P) patient, daughter  Progress: (P)  (Eval)  Outcome Summary/Follow up Plan: (P) Cognitive eval completed. Pt. was functional on the cognitive linguistic evaluation without observed deficits.  Pt. had odd cognitive responses to problem solving: what would you do if questions.  Pt. had " difficutly formulating multipile solutions to verbalize problems. Pt.'s daughter verbalized concern with pt. ability to recognize problems and carry out problem solving scenarios adequately, as she stated pt. has been scammed several times and pt. has poor insite into problem.  Pt.'s daughter verbalized concern with current medications.            SLP Goals       09/11/17 1419          Cognitive Linguistic- Optimal Participation in Care    Cognitive Linguistic Optimal Participation in Care- SLP, Date Established (P)  09/11/17  -AM      Cognitive Linguistic Optimal Participation in Care- SLP, Time to Achieve (P)  by discharge  -AM      Cognitive Linguistic Optimal Participation in Care- SLP, Activity Level (P)  Patient will improve functional problem solving skills for increased safety in environment  -AM      Cognitive Linguistic Optimal Participation in Care- SLP, Date Goal Reviewed (P)  09/11/17  -AM      Cognitive Linguistic Optimal Participation in Care- SLP, Reason Goal Not Met (P)  --   Eval  -AM        User Key  (r) = Recorded By, (t) = Taken By, (c) = Cosigned By    Initials Name Provider Type    IRVING Kowalski Speech Therapy Student Speech Therapy Student             SLP Outcome Measures (last 72 hours)      SLP Outcome Measures       09/11/17 1400          SLP Outcome Measures    Outcome Measure Used? (P)  Adult NOMS  -AM      FCM Scores    FCM Chosen (P)  Problem Solving  -AM      Problem Solving FCM Score (P)  5  -AM        User Key  (r) = Recorded By, (t) = Taken By, (c) = Cosigned By    Initials Name Effective Dates    AM Manisha Kowalski, Speech Therapy Student 08/08/17 -           Time Calculation:         Time Calculation- SLP       09/11/17 1432          Time Calculation- SLP    SLP Start Time (P)  1255  -AM      SLP Stop Time (P)  1443  -AM      SLP Time Calculation (min) (P)  108 min  -AM        User Key  (r) = Recorded By, (t) = Taken By, (c) = Cosigned By    Initials Name Provider Type     AM Manisha Kowalski Speech Therapy Student Speech Therapy Student          Therapy Charges for Today     Code Description Service Date Service Provider Modifiers Qty    [id number] HC ST EVAL SPEECH AND PROD W LANG  7 9/11/2017 Manisha Kowalski Speech Therapy Student GN 1             ADULT NOMS (last 72 hours)      Adult NOMS       09/11/17 1400                FCM Scores    FCM Chosen (P)  Problem Solving  -AM        Problem Solving FCM Score (P)  5  -AM          User Key  (r) = Recorded By, (t) = Taken By, (c) = Cosigned By    Initials Name Effective Dates    AM Manisha Kowalski Speech Therapy Student 08/08/17 -         SLP G-Codes  SLP NOMS Used?: (P) Yes  Functional Limitations: (P) Other Speech Language Pathology (Problem solving)  Other Speech-Language Pathology Functional Limitation Goal Status (): (P) At least 60 percent but less than 80 percent impaired, limited or restricted      Manisha Kowalski Speech Therapy Student  9/11/2017

## 2017-09-11 NOTE — PROGRESS NOTES
Continued Stay Note  [facility]     Patient Name: Wild Bravo  MRN: [id number]  Today's Date: 9/11/2017    Admit Date: 9/8/2017          Discharge Plan       09/11/17 1545    Case Management/Social Work Plan    Plan SW has spoke with Shayy Penny, admissions coordinator for [facility]. She has advised that they can accept PT tomorrow, (9/12/17). It will be after 1pm before they can accept PT. [phone] phone [phone] fax.     Patient/Family In Agreement With Plan yes              Discharge Codes     None            ALEXANDER Fang

## 2017-09-12 VITALS
TEMPERATURE: 97.9 F | DIASTOLIC BLOOD PRESSURE: 61 MMHG | HEIGHT: 74 IN | BODY MASS INDEX: 19.71 KG/M2 | HEART RATE: 79 BPM | SYSTOLIC BLOOD PRESSURE: 102 MMHG | RESPIRATION RATE: 18 BRPM | WEIGHT: 153.56 LBS | OXYGEN SATURATION: 96 %

## 2017-09-12 LAB
ANION GAP SERPL CALCULATED.3IONS-SCNC: 8 MMOL/L (ref 4–13)
BUN BLD-MCNC: 41 MG/DL (ref 5–21)
BUN/CREAT SERPL: 24.8 (ref 7–25)
CALCIUM SPEC-SCNC: 9.1 MG/DL (ref 8.4–10.4)
CHLORIDE SERPL-SCNC: 102 MMOL/L (ref 98–110)
CO2 SERPL-SCNC: 29 MMOL/L (ref 24–31)
CREAT BLD-MCNC: 1.65 MG/DL (ref 0.5–1.4)
FERRITIN SERPL-MCNC: 111 NG/ML (ref 17.9–464)
GFR SERPL CREATININE-BSD FRML MDRD: 40 ML/MIN/1.73
GLUCOSE BLD-MCNC: 87 MG/DL (ref 70–100)
POTASSIUM BLD-SCNC: 3.9 MMOL/L (ref 3.5–5.3)
SODIUM BLD-SCNC: 139 MMOL/L (ref 135–145)

## 2017-09-12 PROCEDURE — 82728 ASSAY OF FERRITIN: CPT | Performed by: PSYCHIATRY & NEUROLOGY

## 2017-09-12 PROCEDURE — 25010000002 IRON SUCROSE PER 1 MG: Performed by: PSYCHIATRY & NEUROLOGY

## 2017-09-12 PROCEDURE — 99232 SBSQ HOSP IP/OBS MODERATE 35: CPT | Performed by: PSYCHIATRY & NEUROLOGY

## 2017-09-12 PROCEDURE — 94799 UNLISTED PULMONARY SVC/PX: CPT

## 2017-09-12 PROCEDURE — 92507 TX SP LANG VOICE COMM INDIV: CPT | Performed by: SPEECH-LANGUAGE PATHOLOGIST

## 2017-09-12 PROCEDURE — 80048 BASIC METABOLIC PNL TOTAL CA: CPT | Performed by: INTERNAL MEDICINE

## 2017-09-12 PROCEDURE — 99239 HOSP IP/OBS DSCHRG MGMT >30: CPT | Performed by: INTERNAL MEDICINE

## 2017-09-12 RX ORDER — LORAZEPAM 1 MG/1
1 TABLET ORAL 4 TIMES DAILY
Qty: 30 TABLET | Status: ON HOLD
Start: 2017-09-12 | End: 2020-01-27

## 2017-09-12 RX ORDER — GABAPENTIN 400 MG/1
400 CAPSULE ORAL DAILY
Status: ON HOLD
Start: 2017-09-12 | End: 2020-01-27

## 2017-09-12 RX ORDER — ALBUTEROL SULFATE 1.25 MG/3ML
1.25 SOLUTION RESPIRATORY (INHALATION) EVERY 6 HOURS PRN
Status: DISCONTINUED | OUTPATIENT
Start: 2017-09-12 | End: 2017-09-12 | Stop reason: HOSPADM

## 2017-09-12 RX ORDER — ATORVASTATIN CALCIUM 10 MG/1
10 TABLET, FILM COATED ORAL NIGHTLY
Start: 2017-09-12

## 2017-09-12 RX ORDER — BUMETANIDE 0.5 MG/1
0.5 TABLET ORAL 2 TIMES DAILY
Status: DISCONTINUED | OUTPATIENT
Start: 2017-09-12 | End: 2017-09-12 | Stop reason: HOSPADM

## 2017-09-12 RX ORDER — CALCITRIOL 0.25 UG/1
0.25 CAPSULE, LIQUID FILLED ORAL DAILY
Start: 2017-09-12 | End: 2017-10-02 | Stop reason: ALTCHOICE

## 2017-09-12 RX ORDER — GABAPENTIN 100 MG/1
200 CAPSULE ORAL DAILY
Start: 2017-09-12 | End: 2017-10-02 | Stop reason: SDUPTHER

## 2017-09-12 RX ORDER — ALBUTEROL SULFATE 1.25 MG/3ML
1.25 SOLUTION RESPIRATORY (INHALATION) EVERY 6 HOURS PRN
Refills: 12
Start: 2017-09-12 | End: 2019-12-11

## 2017-09-12 RX ORDER — ROPINIROLE 1 MG/1
1 TABLET, FILM COATED ORAL NIGHTLY
Start: 2017-09-12 | End: 2018-01-03

## 2017-09-12 RX ORDER — BUMETANIDE 1 MG/1
0.5 TABLET ORAL 2 TIMES DAILY
Qty: 30 TABLET | Refills: 11 | Status: ON HOLD
Start: 2017-09-12 | End: 2020-01-26

## 2017-09-12 RX ADMIN — LORAZEPAM 1 MG: 1 TABLET ORAL at 11:38

## 2017-09-12 RX ADMIN — BUMETANIDE 0.5 MG: 0.25 INJECTION INTRAMUSCULAR; INTRAVENOUS at 09:18

## 2017-09-12 RX ADMIN — GABAPENTIN 400 MG: 400 CAPSULE ORAL at 00:55

## 2017-09-12 RX ADMIN — LORAZEPAM 1 MG: 1 TABLET ORAL at 00:00

## 2017-09-12 RX ADMIN — IPRATROPIUM BROMIDE 0.5 MG: 0.5 SOLUTION RESPIRATORY (INHALATION) at 11:02

## 2017-09-12 RX ADMIN — CALCITRIOL 0.25 MCG: 0.25 CAPSULE ORAL at 09:18

## 2017-09-12 RX ADMIN — DOCUSATE SODIUM 100 MG: 100 CAPSULE ORAL at 09:18

## 2017-09-12 RX ADMIN — IRON SUCROSE 200 MG: 20 INJECTION, SOLUTION INTRAVENOUS at 13:22

## 2017-09-12 RX ADMIN — FINASTERIDE 5 MG: 5 TABLET, FILM COATED ORAL at 09:18

## 2017-09-12 RX ADMIN — HYDROCODONE BITARTRATE AND ACETAMINOPHEN 1 TABLET: 7.5; 325 TABLET ORAL at 06:20

## 2017-09-12 RX ADMIN — CARVEDILOL 3.12 MG: 3.12 TABLET, FILM COATED ORAL at 09:19

## 2017-09-12 RX ADMIN — GABAPENTIN 200 MG: 100 CAPSULE ORAL at 00:56

## 2017-09-12 RX ADMIN — LISINOPRIL 2.5 MG: 2.5 TABLET ORAL at 09:18

## 2017-09-12 RX ADMIN — HYDROCODONE BITARTRATE AND ACETAMINOPHEN 1 TABLET: 7.5; 325 TABLET ORAL at 11:38

## 2017-09-12 RX ADMIN — AMIODARONE HYDROCHLORIDE 200 MG: 200 TABLET ORAL at 09:18

## 2017-09-12 RX ADMIN — ROPINIROLE HYDROCHLORIDE 1 MG: 1 TABLET, FILM COATED ORAL at 00:00

## 2017-09-12 RX ADMIN — LORAZEPAM 1 MG: 1 TABLET ORAL at 06:21

## 2017-09-12 NOTE — PLAN OF CARE
Problem: Inpatient SLP  Goal: Cognitive-linguistic-Patient will improve Cognitive-linguistic skills to allow optimal participation in care  Outcome: Ongoing (interventions implemented as appropriate)    09/11/17 1419 09/12/17 1504   Cognitive Linguistic- Optimal Participation in Care   Cognitive Linguistic Optimal Participation in Care- SLP, Date Established 09/11/17 --    Cognitive Linguistic Optimal Participation in Care- SLP, Time to Achieve by discharge --    Cognitive Linguistic Optimal Participation in Care- SLP, Activity Level Patient will improve functional problem solving skills for increased safety in environment --    Cognitive Linguistic Optimal Participation in Care- SLP, Date Goal Reviewed --  09/12/17   Cognitive Linguistic Optimal Participation in Care- SLP, Outcome --  goal ongoing   Cognitive Linguistic Optimal Participation in Care- SLP, Reason Goal Not Met (Eval) --

## 2017-09-12 NOTE — PROGRESS NOTES
I stopped by to see patient twice this morning.  He and his daughter are both asleep in the room.  Per nursing he did not sleep well last night, so I did not awaken him.  I will attempt to see him at noon unless he has already been discharged to geriatric psych facility.    I had multiple phone conversations with the patient's family as well as an extended visit in my office in the last week to address issues of his involvement in a financial scam which has cost him around $100,000 per his daughter.  On that day we did a mini-Cog test and he recalled 2/3 items and had a perfect clock draw test.  He seemed cognitively appropriate other than the issue of the money he had given to people he had never met in hopes of receiving $5 million.  Even after extended discussion of this issue he still seemed to think it was possible that it might happen.    On that day we discussed stopping his Requip (which he stated he was not taking twice daily any longer) as well as decreasing his dose of hydrocodone to 3 times daily rather than 4 times daily.  We discussed gradually weaning down all of the meds that he had been on for years that may be adversely affecting him.  He has admitted since I first saw him that he felt he had some dependence but had been unsuccessful in attempts to wean off the meds.   We also discussed voluntary admission to a psychiatric unit for medication adjustment.  He was adamantly opposed to this.  I have completed paper work to help his daughter obtain court ordered guardianship.    At this point I would agree with inpatient psychiatric care if this can be arranged.  I think continued attempts at weaning his medications is ideal, but this is already proving difficult.

## 2017-09-12 NOTE — PLAN OF CARE
Problem: Patient Care Overview (Adult)  Goal: Plan of Care Review  Outcome: Ongoing (interventions implemented as appropriate)    09/12/17 1036   Coping/Psychosocial Response Interventions   Plan Of Care Reviewed With patient   Patient Care Overview   Progress improving   Outcome Evaluation   Outcome Summary/Follow up Plan Pt sleeping at time of RD visit. Intake has improved since last review, 80% of the last 5 meals. Weight has been stable in the hospital. Pt is likely to d/c to another facility today.

## 2017-09-12 NOTE — DISCHARGE SUMMARY
[facility] DISCHARGE    Date of Discharge:  9/12/2017    Discharge Diagnosis:     Acute on chronic systolic CHF- now compensated   Ischemic cardiomyopathy s/p BIV ICD  CKD 3  COPD   Persistent atrial fibrillation-rate controlled, not anticoagulated secondary to history of falls, and bleeding on warfarin in past   Proteinuria   Cognitive impairment   Hypertension - controlled   RLS   Cholelithiasis  Polypharmacy   Anxiety   CAD- stable, ACS ruled out   PAD- Moderate left internal carotid artery stenosis   Secondary hyperparathyroidism   Esophageal spasms   Renal cysts      Presenting Problem/History of Present Illness  Chest pain [R07.9]  Impaired functional mobility, balance, gait, and endurance [Z74.09]    This patient was admitted per Dr. Macdonald following an office visit with me on the 9/8 due to multiple complaints including shortness of breath, abdominal discomfort, and intermittent chest pain. Interrogation of his BIV ICD revealed persistent atrial fibrillation since June with multiple episodes of rapid ventricular rates and subsequent anti tachycardia pacing. The patient declined stress testing as part of further evaluation for his chest pain. The case was discussed with Dr. Macdonald. Amiodarone was added due to persistent afib with fast rates, BNP and echo were ordered for further evaluation of his dyspnea. Abdominal ultrasound was ordered due to his abdominal discomfort and history of AAA. The patient's daughter, an APRN from [place], who is in visiting, expressed her concerns regarding her father's recent behavior. She reported he has been involved in a financial scam that has cost him approximately $100, 000, in hopes of receiving 5 million. He underwent some cognitive testing in Dr. Ames's office recently as well. Dr. Ames has completed paperwork to his other daughter obtain court ordered guardianship. Per report from his daughter, guardianship will be obtained tomorrow. Dr. Macdonald  later saw the patient on the date of admission and discussed the case with his daughter as well, while he was having his echo completed, and the decision was made to admit him to [facility].     Hospital Course  ACS was ruled out. Amiodarone and coreg were continued for afib and he has been rate controlled with V-pacing. He is not anticoagulated due to history of falls/hitting his hit and reported history of bleeding on warfarin in the past. Repeat echo revealed an LVEF  35% (he has known ischemic cardiomyopathy) , grade 1 diastolic dysfunction, mild to moderate AR, and mild pulmonary hypertension.  Abdominal US revealed cholelithiasis (pt refused surgical evaluation for this), enlarged, prostate, renal cysts, and stable aneurysmal aorta at 39 mm. CT of the head was negative. CXR revealed COPD. Carotid US revealed moderate stenosis on the left. Esophagram revealed mild spasm in the mid esophagus. BNP was elevated on admission. He has been diuresed with IV bumex for CHF and his symptoms have improved, he has now been transitioned to PO bumex. Nephrology has been following the patient due to CKD, recent KRISTY with diuretic use, renal cysts, and secondary hyperparathyroidism. His creatinine is stable and has improved this admission. Pulmonology has also seen the patient. Oximetry testing did not qualify the patient for oxygen at this time. PFTs revealed severe COPD and he has been started on Spiriva. Neurology has followed the patient for cognitive impairment, RLS, and anxiety. His ativan has been decreased and his Neurontin dosing has been adjusted as well. He has received Venofer and his ferritin levels have been monitored by neurology. A combination of his polypharmacy, specifically his benzos and opioids, as well as psychiatric problems are felt to be contributing to his cognitive impairment and unusual behaviors. Ultimately, the decision has been made to transfer the patient to [facility], for  further care and psych evaluation. Again the patient's daughter has arranged to obtain guardianship tomorrow. The patient has been reluctant to agree to transfer, but he is now is agreeable to transfer today. Dr. Ames has also seen the patient and is in agreement with the plan to wean his benzos and opioids and inpatient psychiatric care.  The patient has been discussed with Dr. Macdonald and he is felt to be stable for transfer. His chest pain has resolved and his dyspnea and abdominal pain are significantly improved.    Addendum- After speaking with Dr. Rivas with neurology, it has been brought to my attention that the patient is being non compliant with his Neurontin dosing. Instead of taking the medication as prescribed he took 600 mg at 1 am and states the medication is not working for him.  He has been counseled on compliance. The ultimate goal is to increase the Neurontin for his RLS as his renal function will tolerate, and wean and discontinued the Requip, as this drug could be causing his compulsive and addictive behaviors. His goal ferritin, per Dr. Rivas, is greater than 100, as low ferritin could be contributing to his restless leg syndrome.          Consults:   Consults     Date and Time Order Name Status Description    9/11/2017 1451 Inpatient Consult to Pulmonology Completed     9/11/2017 1430 Inpatient Consult to Internal Medicine      9/9/2017 1131 Inpatient Consult to Nephrology Completed     9/8/2017 1551 Inpatient Consult to Neurology Completed           Lab Results (last 72 hours)     Procedure Component Value Units Date/Time    Magnesium [[id number]]  (Normal) Collected:  09/10/17 0523    Specimen:  Blood Updated:  09/10/17 0553     Magnesium 2.1 mg/dL     Basic Metabolic Panel [[id number]]  (Abnormal) Collected:  09/10/17 0523    Specimen:  Blood Updated:  09/10/17 0553     Glucose 93 mg/dL      BUN 48 (H) mg/dL      Creatinine 1.91 (H) mg/dL      Sodium 137 mmol/L      Potassium 3.9 mmol/L       Chloride 99 mmol/L      CO2 30.0 mmol/L      Calcium 8.6 mg/dL      eGFR Non African Amer 34 (L) mL/min/1.73      BUN/Creatinine Ratio 25.1 (H)     Anion Gap 8.0 mmol/L     Narrative:       The MDRD GFR formula is only valid for adults with stable renal function between ages 18 and 70.    Phosphorus [[id number]]  (Normal) Collected:  09/10/17 0523    Specimen:  Blood Updated:  09/10/17 0553     Phosphorus 4.2 mg/dL     Uric Acid [[id number]]  (Abnormal) Collected:  09/10/17 0523    Specimen:  Blood Updated:  09/10/17 0553     Uric Acid 8.6 (H) mg/dL     Iron Profile [[id number]]  (Abnormal) Collected:  09/10/17 0523    Specimen:  Blood Updated:  09/10/17 0601     Iron 65 mcg/dL      TIBC 370 mcg/dL      Iron Saturation 18 (L) %     BNP [[id number]]  (Abnormal) Collected:  09/10/17 0523    Specimen:  Blood Updated:  09/10/17 0604     proBNP 2180.0 (H) pg/mL     PTH, Intact [[id number]]  (Abnormal) Collected:  09/10/17 0523    Specimen:  Blood Updated:  09/10/17 0607     PTH, Intact 181.6 (H) pg/mL     Vitamin D 25 Hydroxy [[id number]]  (Normal) Collected:  09/10/17 0523    Specimen:  Blood Updated:  09/10/17 0610     25 Hydroxy, Vitamin D 46.4 ng/ml     Ferritin [[id number]]  (Normal) Collected:  09/10/17 0523    Specimen:  Blood Updated:  09/10/17 0627     Ferritin 21.40 ng/mL     Basic Metabolic Panel [[id number]]  (Abnormal) Collected:  09/11/17 0555    Specimen:  Blood Updated:  09/11/17 0650     Glucose 95 mg/dL      BUN 47 (H) mg/dL      Creatinine 1.80 (H) mg/dL      Sodium 138 mmol/L      Potassium 4.0 mmol/L      Chloride 99 mmol/L      CO2 30.0 mmol/L      Calcium 8.9 mg/dL      eGFR Non African Amer 36 (L) mL/min/1.73      BUN/Creatinine Ratio 26.1 (H)     Anion Gap 9.0 mmol/L     Narrative:       The MDRD GFR formula is only valid for adults with stable renal function between ages 18 and 70.    Basic Metabolic Panel [[id number]]  (Abnormal) Collected:  09/12/17 0708    Specimen:  Blood Updated:  09/12/17 0803      Glucose 87 mg/dL      BUN 41 (H) mg/dL      Creatinine 1.65 (H) mg/dL      Sodium 139 mmol/L      Potassium 3.9 mmol/L      Chloride 102 mmol/L      CO2 29.0 mmol/L      Calcium 9.1 mg/dL      eGFR Non African Amer 40 (L) mL/min/1.73      BUN/Creatinine Ratio 24.8     Anion Gap 8.0 mmol/L     Narrative:       The MDRD GFR formula is only valid for adults with stable renal function between ages 18 and 70.        Echo EF Estimated  Lab Results   Component Value Date    ECHOEFEST 35 09/08/2017       Condition on Discharge:  Stable     Physical Exam at Discharge  General: alert and oriented  Card: Irregular rhythm   Resp: CTA  Extrem: no edema     Vital Signs  Temp:  [97 °F (36.1 °C)-98.3 °F (36.8 °C)] 97.9 °F (36.6 °C)  Heart Rate:  [79-85] 79  Resp:  [18] 18  BP: (100-106)/(61-82) 102/61      Discharge Disposition  Psychiatric Hospital or Unit (DC - External)    Discharge Medications   Wild Bravo   Home Medication Instructions RENATE:[id number]    Printed on:09/12/17 1041   Medication Information                      albuterol (ACCUNEB) 1.25 MG/3ML nebulizer solution  Take 3 mL by nebulization Every 6 (Six) Hours As Needed for Wheezing or Shortness of Air.             amiodarone (PACERONE) 200 MG tablet  Take 1 tablet by mouth Daily.             aspirin 81 MG EC tablet  Take 81 mg by mouth Daily.             atorvastatin (LIPITOR) 10 MG tablet  Take 1 tablet by mouth Every Night.             bumetanide (BUMEX) 1 MG tablet  Take 0.5 tablets by mouth 2 (Two) Times a Day.             calcitriol (ROCALTROL) 0.25 MCG capsule  Take 1 capsule by mouth Daily.             carvedilol (COREG) 3.125 MG tablet  Take 3.125 mg by mouth 2 (Two) Times a Day With Meals.             dutasteride (AVODART) 0.5 MG capsule  Take 0.5 mg by mouth Daily.             gabapentin (NEURONTIN) 200 MG capsule  Take 1 capsules by mouth Daily at 1700.             gabapentin (NEURONTIN) 400 MG capsule  Take 1 capsule by mouth Daily at  bedtime.             HYDROcodone-acetaminophen (NORCO) 7.5-325 MG per tablet  Take 1 tablet by mouth Every 8 (Eight) Hours As Needed for Moderate Pain .             lisinopril (PRINIVIL,ZESTRIL) 5 MG tablet  Take 2.5 mg by mouth Daily.             LORazepam (ATIVAN) 1 MG tablet  Take 1 tablet by mouth 4 (Four) Times a Day.             nitroglycerin (NITROSTAT) 0.4 MG SL tablet  Place 0.4 mg under the tongue Every 5 (Five) Minutes As Needed for Chest Pain. Take no more than 3 doses in 15 minutes.             rOPINIRole (REQUIP) 1 MG tablet  Take 1 tablet by mouth Every Night. Take 1 hour before bedtime.             tamsulosin (FLOMAX) 0.4 MG capsule 24 hr capsule  Take 1 capsule by mouth Every Night.             tiotropium (SPIRIVA HANDIHALER) 18 MCG per inhalation capsule  Place 1 capsule into inhaler and inhale Daily.                 Discharge Diet: cardiac, low sodium     Activity at Discharge: gradually resume normal activity     Follow-up Appointments   Dr. Ames 1-2 weeks  Dr. Macdonald 4-6 weeks  Dr. Erazo per his recommendations  Dr. Zhu per his recommendations  Neurology follow up per Dr. Rivas's recommendations     Future Appointments  Date Time Provider Department Center   12/15/2017 10:00 AM Karthik Macdonald MD [facility] None       The patient has been instructed on the importance of compliance with medications and signs and symptoms to report. The patient voices understanding of these instructions.        Waleska Sifuentes, OUSMANE  09/12/17  10:41 AM

## 2017-09-12 NOTE — THERAPY TREATMENT NOTE
"Acute Care - Speech Language Pathology Treatment Note  [facility]     Patient Name: Wild Bravo  : 1934  MRN: [id number]  Today's Date: 2017         Admit Date: 2017  ST spoke with pt dtr. Pt is to get d/c this date. Pt is reluctant to go to rehab center but has agreed for 2 days. Pt dtr stated he will receive ongoing tx. Pt dtr feels pt was \"brainwashed\" and has impulsive behaviors.   Nathalie Martinez CCC-SLP 2017 3:07 PM      Visit Dx:      ICD-10-CM ICD-9-CM   1. Cognitive and behavioral changes R41.89 799.59    R46.89 312.9   2. Impaired functional mobility, balance, gait, and endurance Z74.09 V49.89     Patient Active Problem List   Diagnosis   • Coronary arteriosclerosis   • CHF (congestive heart failure)   • SSS (sick sinus syndrome)   • Pacemaker   • Essential hypertension   • Biventricular ICD (implantable cardioverter-defibrillator) in place   • Persistent atrial fibrillation   • PAD (peripheral artery disease)   • H/O mitral valve repair   • ERRONEOUS ENCOUNTER--DISREGARD   • Chest pain   • Impaired functional mobility, balance, gait, and endurance              Adult Rehabilitation Note       17 1430 17 1546 17 1059    Rehab Assessment/Intervention    Discipline speech language pathologist  -BN  physical therapy assistant  -KJ    Document Type therapy note (daily note)  -BN  therapy note (daily note)  -KJ    Subjective Information no complaints  -BN  agree to therapy  -KJ    Treatment Not Performed, Comment  amb with respiratory  -KJ     Precautions/Limitations   fall precautions  -KJ    Recorded by [BN] Nathalie Martinez CCC-SLP [KJ] Audra Swift PTA [KJ] Audra Swift PTA    Pain Assessment    Pain Assessment   No/denies pain  -KJ    Recorded by   [KJ] Audra Swift PTA    Cognitive Assessment/Intervention    Follows Commands/Answers Questions   able to follow multi-step instructions;100% of the time  -KJ    Personal Safety   decreased " "awareness, need for safety  -KJ    Recorded by   [KJ] Audra Swift PTA    Improve functional problem solving    Comments: Improve functional problem solving ST spoke with pt dtr. Pt is to get d/c this date. Pt is reluctant to go to rehab center but has agreed for 2 days. Pt dtr stated he will receive ongoing tx. Pt dtr feels pt was \"brainwashed\" and has impulsive behaviors.   -BN      Recorded by [BN] Nathalie Martinez, CCC-SLP      Bed Mobility, Assessment/Treatment    Bed Mob, Supine to Sit, [place]   independent  -KJ    Bed Mob, Sit to Supine, [place]   independent  -KJ    Recorded by   [KJ] Audra Swift PTA    Transfer Assessment/Treatment    Transfers, Sit-Stand [place]   verbal cues required;stand by assist   fell backwards when he first stood, cueing to slow down  -KJ    Transfers, Stand-Sit [place]   independent  -KJ    Recorded by   [KJ] Audra Swift PTA    Gait Assessment/Treatment    Gait, [place] Level   verbal cues required;stand by assist  -KJ    Gait, Distance (Feet)   500  -KJ    Gait, Gait Deviations   bilateral:   stride length increased, deviates from path  -KJ    Gait, Safety Issues   balance decreased during turns;loses balance backward  -KJ    Gait, Impairments   impaired balance  -KJ    Gait, Comment   veered all different directions, mainly with turns and if he got off task. Pt is impulsive and has decreased safety awarness  -KJ    Recorded by   [KJ] Audra Swift PTA    Positioning and Restraints    Pre-Treatment Position   in bed  -KJ    Post Treatment Position   bed  -KJ    Recorded by   [KJ] Audra Swift PTA      09/10/17 1105          Rehab Assessment/Intervention    Discipline physical therapy assistant  -LYRIC      Document Type therapy note (daily note)  -LYRIC      Subjective Information no complaints;agree to therapy  -LYRIC      Precautions/Limitations fall precautions  -LYRIC      Recorded by [LYRIC] Salvatore Watkins PTA      Pain Assessment    Pain " Assessment No/denies pain  -LYRIC      Recorded by [LYRIC] Salvatore Watkins PTA      Bed Mobility, Assessment/Treatment    Bed Mobility, Comment up in room independent  -LYRIC      Recorded by [LYRIC] Salvatore Watkins PTA      Transfer Assessment/Treatment    Transfers, Sit-Stand [place] independent  -LYRIC      Transfers, Stand-Sit [place] independent  -LYRIC      Recorded by [LYRIC] Salvatore Watkins PTA      Gait Assessment/Treatment    Gait, [place] Level verbal cues required;contact guard assist;minimum assist (75% patient effort)  -LYRIC      Gait, Distance (Feet) 500  -LYRIC      Gait, Gait Deviations narrow base  -LYRIC      Gait, Impairments impaired balance  -LYRIC      Gait, Comment pt veered to the right several times, required min assist to correct to prevent pt from running into the wall  -LYRIC      Recorded by [LYRIC] Salvatore Watkins PTA      Stairs Assessment/Treatment    Number of Stairs 10  -LYRIC      Stairs, Handrail Location both sides  -LYRIC      Stairs, [place] Level verbal cues required;contact guard assist;minimum assist (75% patient effort)  -LYRIC      Stairs, Comment pt is very impulsive, cues to slow down and perform safely  -LYRIC      Recorded by [LYRIC] Salvatore Watkins PTA      Positioning and Restraints    Pre-Treatment Position in bed  -LYRIC      Post Treatment Position bed  -LYRIC      In Bed sitting EOB;call light within reach;encouraged to call for assist;notified nsg  -LYRIC      Recorded by [LYRIC] Salvatore Watkins PTA        User Key  (r) = Recorded By, (t) = Taken By, (c) = Cosigned By    Initials Name Effective Dates    FELICIANO Swift, hospitals 08/02/16 -     LYRIC Salvatore Watkins, hospitals 12/08/16 -     BN Natahlie Martinez CCC-SLP 08/02/16 -               IP SLP Goals       09/12/17 1504 09/11/17 1419       Cognitive Linguistic- Optimal Participation in Care    Cognitive Linguistic Optimal Participation in Care- SLP, Date Established  09/11/17  -TM (r) AM (t) TM (c)     Cognitive Linguistic Optimal  Participation in Care- SLP, Time to Achieve  by discharge  -TM (r) AM (t) TM (c)     Cognitive Linguistic Optimal Participation in Care- SLP, Activity Level  Patient will improve functional problem solving skills for increased safety in environment  -TM (r) AM (t) TM (c)     Cognitive Linguistic Optimal Participation in Care- SLP, Date Goal Reviewed 09/12/17  -BN 09/11/17  -TM (r) AM (t) TM (c)     Cognitive Linguistic Optimal Participation in Care- SLP, Outcome goal ongoing  -BN      Cognitive Linguistic Optimal Participation in Care- SLP, Reason Goal Not Met  --   Eval  -TM (r) AM (t) TM (c)       User Key  (r) = Recorded By, (t) = Taken By, (c) = Cosigned By    Initials Name Provider Type    MAGDA Mendez CCC-SLP Speech and Language Pathologist    AMARI Martinez CCC-SLP Speech and Language Pathologist    IRVING Kowalski, Speech Therapy Student Speech Therapy Student          EDUCATION  The patient has been educated in the following areas:   Cognitive Impairment.    SLP Recommendation and Plan                                         SLP Outcome Measures (last 72 hours)      SLP Outcome Measures       09/11/17 1400          SLP Outcome Measures    Outcome Measure Used? Adult NOMS  -TM (r) AM (t) TM (c)      FCM Scores    FCM Chosen Problem Solving  -TM (r) AM (t) TM (c)      Problem Solving FCM Score 5  -TM (r) AM (t) TM (c)        User Key  (r) = Recorded By, (t) = Taken By, (c) = Cosigned By    Initials Name Effective Dates     Pratibha Mendez CCC-[facility] 08/02/16 -     IRVING Kowalski, Speech Therapy Student 08/08/17 -           Time Calculation:         Time Calculation- SLP       09/12/17 1506          Time Calculation- SLP    SLP Start Time 1430  -      SLP Stop Time 1455  -      SLP Time Calculation (min) 25 min  -      SLP Received On 09/12/17  -        User Key  (r) = Recorded By, (t) = Taken By, (c) = Cosigned By    Initials Name Provider Type    AMARI Martinez,  CCC-SLP Speech and Language Pathologist          Therapy Charges for Today     Code Description Service Date Service Provider Modifiers Qty    [id number]  ST TREATMENT SPEECH 2 9/12/2017 Nathalie Martinez CCC-SLP GN 1          SLP G-Codes  SLP NOMS Used?: Yes  Functional Limitations: Other Speech Language Pathology (Problem solving)  Other Speech-Language Pathology Functional Limitation Goal Status (): At least 60 percent but less than 80 percent impaired, limited or restricted    WENDY Mccarthy  9/12/2017

## 2017-09-12 NOTE — PLAN OF CARE
Problem: Patient Care Overview (Adult)  Goal: Plan of Care Review  Outcome: Ongoing (interventions implemented as appropriate)    09/12/17 0517   Coping/Psychosocial Response Interventions   Plan Of Care Reviewed With patient;daughter   Patient Care Overview   Progress no change   Outcome Evaluation   Outcome Summary/Follow up Plan denies pain. vss. pt daughter @ bedside. poss d/c today to ED [facility]. anxious @ x's. ativan tolerated.         Problem: Cardiac: Heart Failure (Adult)  Goal: Signs and Symptoms of Listed Potential Problems Will be Absent or Manageable (Cardiac: Heart Failure)  Outcome: Ongoing (interventions implemented as appropriate)    Problem: Fall Risk (Adult)  Goal: Absence of Falls  Outcome: Ongoing (interventions implemented as appropriate)

## 2017-09-12 NOTE — PROGRESS NOTES
PULMONARY AND CRITICAL CARE PROGRESS NOTE - [facility]    Patient: Wild Bravo  1934   MR# [id number]   Acct# [id number]  09/12/17   8:33 AM  Referring Provider: Karthik Macdonald MD    Chief Complaint: Dyspnea    Interval history: Patient is resting in the bed and appears to be breathing comfortably on room air. He was easily awakened and seems to be more calm today than the previous day. He complains that he did not rest well last night because his legs were bothering him. His daughter is present at the bedside this morning and indicated that he frequently sat up on the side of the bed during the night and didn't rest well. Overnight on room air indicated that his saturation stayed above 90% all night. He did not desaturate with walking yesterday and his PFTs indicated severe COPD with normal diffusion and appeared to be moderate post bronchodilator administration. No other aggravating, alleviating factors or associated symptoms.    Meds:    amiodarone 200 mg Oral Q24H   atorvastatin 10 mg Oral Nightly   bumetanide 0.5 mg Intravenous BID   calcitriol 0.25 mcg Oral Daily   carvedilol 3.125 mg Oral BID With Meals   docusate sodium 100 mg Oral BID   enoxaparin 30 mg Subcutaneous Daily   finasteride 5 mg Oral Daily   gabapentin 200 mg Oral Daily   gabapentin 400 mg Oral Daily   lisinopril 2.5 mg Oral Q24H   LORazepam 1 mg Oral 4x Daily   rOPINIRole 1 mg Oral Nightly   tamsulosin 0.4 mg Oral Nightly        Review of Systems:   Review of Systems:    Constitutional: Positive for activity change and fatigue. Negative for appetite change, chills and unexpected weight change.   HENT: Negative for congestion, postnasal drip, rhinorrhea, sore throat and trouble swallowing.    Eyes: Negative for pain, discharge and itching.   Respiratory: Positive for apnea, cough, chest tightness and shortness of breath. Negative for wheezing.    Cardiovascular: Positive for chest pain, palpitations and leg swelling.    Gastrointestinal: Positive for abdominal distention. Negative for constipation, diarrhea and nausea.   Endocrine: Negative for polydipsia, polyphagia and polyuria.   Genitourinary: Negative for difficulty urinating, dysuria, frequency, hematuria and urgency.   Musculoskeletal: Negative for arthralgias, back pain, joint swelling and myalgias.   Skin: Negative for color change, pallor and rash.   Allergic/Immunologic: Negative for environmental allergies, food allergies and immunocompromised state.   Neurological: Negative for dizziness, speech difficulty and numbness.   Hematological: Negative for adenopathy. Does not bruise/bleed easily.   Psychiatric/Behavioral: Negative for agitation and confusion. The patient is not nervous/anxious.    All other systems reviewed and are negative.    Physical Exam:  SpO2 Readings from Last 3 Encounters:   09/12/17 97%   09/08/17 98%   02/14/17 98%     Temp:  [97 °F (36.1 °C)-98.3 °F (36.8 °C)] 97.4 °F (36.3 °C)  Heart Rate:  [79-85] 79  Resp:  [18] 18  BP: (100-106)/(62-82) 104/64  Intake/Output Summary (Last 24 hours) at 09/12/17 0833  Last data filed at 09/12/17 0600   Gross per 24 hour   Intake             1190 ml   Output             1300 ml   Net             -110 ml     Physical Exam:    Constitutional: He is oriented to person, place, and time. He appears well-developed and well-nourished.  Non-toxic appearance. He does not have a sickly appearance. No distress.   HENT:   Head: Normocephalic and atraumatic.   Right Ear: External ear normal.   Left Ear: External ear normal.   Nose: Nose normal.   Mouth/Throat: Oropharynx is clear and moist.   Eyes: Conjunctivae and EOM are normal. Pupils are equal, round, and reactive to light. Right eye exhibits no discharge. Left eye exhibits no discharge.   Neck: Normal range of motion. Neck supple. No JVD present.   Cardiovascular: Normal rate.  An irregularly irregular rhythm present.   No murmur heard.  Pulmonary/Chest: Effort normal  "and breath sounds normal. No respiratory distress.   Forced expiratory phase   Abdominal: Soft. Bowel sounds are normal. He exhibits no distension.   Musculoskeletal: Normal range of motion. He exhibits no edema or deformity.   Neurological: He is alert and oriented to person, place, and time. He displays normal reflexes. Coordination normal.   Skin: Skin is warm and dry. No rash noted. He is not diaphoretic. No erythema.   Psychiatric: His behavior is normal. Thought content normal. His mood appears relaxed today   Nursing note and vitals reviewed.    Laboratory Data:    Results from last 7 days  Lab Units 09/08/17  1300   WBC 10*3/mm3 5.60   HEMOGLOBIN g/dL 12.9*   PLATELETS 10*3/mm3 177       Results from last 7 days  Lab Units 09/12/17  0708 09/11/17  0555 09/10/17  0523   SODIUM mmol/L 139 138 137   POTASSIUM mmol/L 3.9 4.0 3.9   BUN mg/dL 41* 47* 48*   CREATININE mg/dL 1.65* 1.80* 1.91*         Pulmonary Assessment:    1. Dyspnea, multi-factorial  2. Generalized anxiety disorder  3. Paroxsymal atrial fibrillation  4. COPD, uncertain severity  5. Cardiomyopathy  6. CKD  7. Diastolic Dysfunction  8. Esophageal spasms  9. Renal Cysts  10. Gallstones    Recommend:     · Apparently had a rough night and was \"up and down\" per the daughter. Not sure however reliable the overnight was but unfortunately he does not qualify for home oxygen at night, at rest or with ambulation  · Does have severe COPD per the PFTs, will start him on spiriva while he is here today and see how he tolerates it. Recommend that he continue this outpatient as well  · Will schedule f/u in the office in 4-6 weeks to reassess oxygen and make any changes to COPD meds at that point if needed.   · Ok for discharge from a pulmonary standpoint    Electronically signed by OUSMANE Lindsay, 09/12/17, 8:33 AM     Physician substantive contribution:  Pertinent symptoms/interval history include: bad night restless.    Respiratory exam shows pertinent " findings of:diminished breath sounds, wheezing.  Plan includes: pft reviewed showing severe obstruction.  Overnight sat ok but I don't think he was sleeping much.  Will add spiriva which is indicated given his spirometry and symptoms.  I have seen and examined patient personally, performing a face-to-face diagnostic evaluation with plan of care reviewed and developed with APRN and nursing staff. I have addended and/or modified the above history of present illness, physical examination, and assessment and plan to reflect my findings and impressions. Essential elements of the care plan were discussed with APRN above.  Agree with findings and assessment/plan as documented above.    Electronically signed by Ed Zhu MD, on 9/12/2017, 9:08 AM

## 2017-09-12 NOTE — PROGRESS NOTES
Neurology Progress Note      Date of admission: 9/8/2017  3:02 PM  Date of visit: 9/12/2017    Chief Complaint:  Altered mental status and RLS and LE pain    Subjective     Subjective:    Patient to be discharge [facility]. He refused venofer which was given to him due to low iron and low ferritin in an effort to reduce his RLS symptoms so he would not need Requip as this latter may be contributing to his behavioral issues since it can worsen addictive behavior and compulsiveness. .    He states he had a terrible night for his legs last night but he had refused the gabapentin and did not take any and tell about 1 AM.  is chances of side effects    Medications:  Current Facility-Administered Medications   Medication Dose Route Frequency Provider Last Rate Last Dose   • albuterol (PROVENTIL) nebulizer solution 0.042% 1.25 mg/3mL  1.25 mg Nebulization Q6H PRN OUSMANE Dsouza       • amiodarone (PACERONE) tablet 200 mg  200 mg Oral Q24H Karthik Macdonald MD   200 mg at 09/12/17 0918   • atorvastatin (LIPITOR) tablet 10 mg  10 mg Oral Nightly Karthik Macdonald MD   10 mg at 09/11/17 2131   • bumetanide (BUMEX) tablet 0.5 mg  0.5 mg Oral BID Helio Ariza, APRN       • calcitriol (ROCALTROL) capsule 0.25 mcg  0.25 mcg Oral Daily Daniele Sharpe MD   0.25 mcg at 09/12/17 0918   • carvedilol (COREG) tablet 3.125 mg  3.125 mg Oral BID With Meals Karthik Macdonald MD   3.125 mg at 09/12/17 0919   • docusate sodium (COLACE) capsule 100 mg  100 mg Oral BID Lyric Garcia PA-C   100 mg at 09/12/17 0918   • enoxaparin (LOVENOX) syringe 30 mg  30 mg Subcutaneous Daily Karthik Macdonald MD   30 mg at 09/11/17 1829   • finasteride (PROSCAR) tablet 5 mg  5 mg Oral Daily Karthik Macdonald MD   5 mg at 09/12/17 0918   • gabapentin (NEURONTIN) capsule 200 mg  200 mg Oral Daily Jessica Rivas MD   200 mg at 09/12/17 0056   • gabapentin (NEURONTIN) capsule 400 mg  400 mg Oral Daily Daniele  Amauri Sharpe MD   400 mg at 09/12/17 0055   • HYDROcodone-acetaminophen (NORCO) 7.5-325 MG per tablet 1 tablet  1 tablet Oral Q6H PRN Karthik Macdonald MD   1 tablet at 09/12/17 1138   • ipratropium (ATROVENT) nebulizer solution 0.5 mg  0.5 mg Nebulization 4x Daily - RT Karthik Macdonald MD   0.5 mg at 09/12/17 1102   • iron sucrose (VENOFER) 200 mg in sodium chloride 0.9 % 100 mL IVPB  200 mg Intravenous Once Jessica Rivas MD       • lisinopril (PRINIVIL,ZESTRIL) tablet 2.5 mg  2.5 mg Oral Q24H Karthik Macdonald MD   2.5 mg at 09/12/17 0918   • LORazepam (ATIVAN) tablet 1 mg  1 mg Oral 4x Daily Jessica Rivas MD   1 mg at 09/12/17 1138   • nitroglycerin (NITROSTAT) SL tablet 0.4 mg  0.4 mg Sublingual Q5 Min PRN Karthik Macdonald MD       • rOPINIRole (REQUIP) tablet 1 mg  1 mg Oral Nightly Jessica Rivas MD   1 mg at 09/12/17 0000   • sennosides-docusate sodium (SENOKOT-S) 8.6-50 MG tablet 2 tablet  2 tablet Oral BID PRN Lyric Garcia PA-C       • sodium chloride 0.9 % flush 1-10 mL  1-10 mL Intravenous PRN Karthik Macdonald MD   10 mL at 09/10/17 2049   • tamsulosin (FLOMAX) 24 hr capsule 0.4 mg  0.4 mg Oral Nightly Karthik Macdonald MD   0.4 mg at 09/11/17 2131       Review of Systems:   -A 14 point review of systems is completed and is negative except for Pain in his legs overnight this time the pain is worse of if he sits still better if he gets up and walks and worse if he wiggles his toes    Objective     Objective      Vital Signs  Temp:  [97 °F (36.1 °C)-97.9 °F (36.6 °C)] 97.9 °F (36.6 °C)  Heart Rate:  [79-80] 79  Resp:  [18] 18  BP: (102-106)/(61-82) 102/61    Physical Exam:    HEENT:  Neck supple  CVS:  Regular rate and rhythm.  No murmurs  Carotid Examination:  No bruits  Lungs:  Clear to auscultation  Abdomen:  Non-tender, Non-distended  Extremities:  No signs of peripheral edema    Neurologic Exam:    -Awake, Alert, Oriented  -No word finding difficulties  -No aphasia  -No  dysarthria  -Follows simple and complex commands    Cranial nerves II through XII intact.    Motor: (strength out of 5:  1= minimal movement, 2 = movement in plane of gravity, 3 = movement against gravity, 4 = movement against some resistance, 5 = full strength)    -Right Upper Ext: Proximal: 5 Distal: 5  -Left Upper Ext: Proximal: 5 Distal: 5    -Right Lower Ext: Proximal: 5 Distal: 5  -Left Lower Ext: Proximal: 5 Distal: 5       Results Review:    I reviewed the patient's new clinical results.    Lab Results (last 24 hours)     Procedure Component Value Units Date/Time    Basic Metabolic Panel [[id number]]  (Abnormal) Collected:  09/12/17 0708    Specimen:  Blood Updated:  09/12/17 0803     Glucose 87 mg/dL      BUN 41 (H) mg/dL      Creatinine 1.65 (H) mg/dL      Sodium 139 mmol/L      Potassium 3.9 mmol/L      Chloride 102 mmol/L      CO2 29.0 mmol/L      Calcium 9.1 mg/dL      eGFR Non African Amer 40 (L) mL/min/1.73      BUN/Creatinine Ratio 24.8     Anion Gap 8.0 mmol/L     Narrative:       The MDRD GFR formula is only valid for adults with stable renal function between ages 18 and 70.    Ferritin [[id number]] Collected:  09/12/17 1146    Specimen:  Blood Updated:  09/12/17 1201        Imaging Results (last 24 hours)     ** No results found for the last 24 hours. **          Assessment/Plan     Hospital Problem List    Active Problems:    Chest pain    Impaired functional mobility, balance, gait, and endurance    Impression:  1.  Cognitive impairment which may be a combination of metabolic plus underlying psychiatric issues.  He had a mocha of 19 out of 30  2.  Poor sleep because of lower extremity pain which is a combination of restless legs, peripheral arterial disease, and right knee pain in particular.  3.  Noncompliant with medications as they are prescribed.  4.  Restless leg syndrome.  Has a low ferritin level which may be a contributing factor.  In addition he is not taking his gabapentin as prescribed.  " For example last night he refused any gabapentin and did not take any all night until 1 AM this morning had a \"terrible night\" with his lower extremity pain and required Ativan and opioids this morning to treat it    Plan:  1. Very difficult patient to treat as he is noncompliant with his medications takes them  in a manner that would cause the maximum amount of side effects and in addition not allow them to work.  By not allowing his medications to work he then requires Ativan and opioids which in turn causes cognitive impairment.  He is also requiring Requip which causes compulsive side effects and makes addictive problems  worse    2. I will not change any of his medications at this point in time as there is really no reason to do so.  He needs to  take them properly in order to get them to work and to reduce the side effects    3.  Await ferritin level goal is to have this greater than 100.        Jessica Rivas MD  09/12/17  12:46 PM    "

## 2017-09-12 NOTE — PROGRESS NOTES
Nephrology ([facility]) Progress Note      Patient:  Wild Bravo  YOB: 1934  Date of Service: 9/12/2017  MRN: [id number]   Acct: [id number]   Primary Care Physician: Eliel Ames MD  Advance Directive: Full Code  Admit Date: 9/8/2017       Hospital Day: 2  Referring Provider: No ref. provider found      Patient personally seen and examined.  Complete chart including Consults, Notes, Operative Reports, Labs, Cardiology, and Radiology studies reviewed as able.        Subjective:  Wild Bravo is a 83 y.o. male  whom we were consulted for chronic kidney disease management.  Follows with Dr Crawford; creatinine had recently been 2.6 mg; improved to 1.8 mg after decreasing diuretic dose.  Admitted after a fall at home with acute on chronic diastolic heart failure.  Has been improving; feeling better today with minimal dyspnea.  Remains very anxious to go home.  Urine output nonoliguric.    Allergies:  Keflex [cephalexin]    Home Meds:  Prescriptions Prior to Admission   Medication Sig Dispense Refill Last Dose   • amiodarone (PACERONE) 200 MG tablet Take 1 tablet by mouth Daily. 30 tablet 1 Past Week at Unknown time   • carvedilol (COREG) 3.125 MG tablet Take 3.125 mg by mouth 2 (Two) Times a Day With Meals.   Past Week at Unknown time   • furosemide (LASIX) 40 MG tablet Take 40 mg by mouth Daily As Needed (edema).   Past Month at Unknown time   • gabapentin (NEURONTIN) 300 MG capsule Take 300 mg by mouth 2 (Two) Times a Day.   Past Week at Unknown time   • HYDROcodone-acetaminophen (NORCO) 7.5-325 MG per tablet Take 1 tablet by mouth Every 8 (Eight) Hours As Needed for Moderate Pain .   Past Week at Unknown time   • LORazepam (ATIVAN) 2 MG tablet Take 2 mg by mouth Every 8 (Eight) Hours As Needed for Anxiety.   Past Week at Unknown time   • aspirin 81 MG EC tablet Take 81 mg by mouth Daily.   Unknown at Unknown time   • atorvastatin (LIPITOR) 20 MG tablet Take 20 mg  by mouth Daily.   Unknown at Unknown time   • dutasteride (AVODART) 0.5 MG capsule Take 0.5 mg by mouth Daily.   Unknown at Unknown time   • lisinopril (PRINIVIL,ZESTRIL) 5 MG tablet Take 2.5 mg by mouth Daily.   Unknown at Unknown time   • nitroglycerin (NITROSTAT) 0.4 MG SL tablet Place 0.4 mg under the tongue Every 5 (Five) Minutes As Needed for Chest Pain. Take no more than 3 doses in 15 minutes.   Unknown at Unknown time   • rOPINIRole (REQUIP) 2 MG tablet Take 2 mg by mouth 2 (Two) Times a Day.   Unknown at Unknown time   • tamsulosin (FLOMAX) 0.4 MG capsule 24 hr capsule Take 1 capsule by mouth Every Night.   Unknown at Unknown time       Medicines:  Current Facility-Administered Medications   Medication Dose Route Frequency Provider Last Rate Last Dose   • albuterol (PROVENTIL) nebulizer solution 0.042% 1.25 mg/3mL  1.25 mg Nebulization Q6H PRN OUSMANE Dsouza       • amiodarone (PACERONE) tablet 200 mg  200 mg Oral Q24H Karthik Macdonald MD   200 mg at 09/12/17 0918   • atorvastatin (LIPITOR) tablet 10 mg  10 mg Oral Nightly Karthik Macdonald MD   10 mg at 09/11/17 2131   • bumetanide (BUMEX) injection 0.5 mg  0.5 mg Intravenous BID Karthik Macdonald MD   0.5 mg at 09/12/17 0918   • calcitriol (ROCALTROL) capsule 0.25 mcg  0.25 mcg Oral Daily Daniele Sharpe MD   0.25 mcg at 09/12/17 0918   • carvedilol (COREG) tablet 3.125 mg  3.125 mg Oral BID With Meals Karthik Macdonald MD   3.125 mg at 09/12/17 0919   • docusate sodium (COLACE) capsule 100 mg  100 mg Oral BID Lyric Garcia PA-C   100 mg at 09/12/17 0918   • enoxaparin (LOVENOX) syringe 30 mg  30 mg Subcutaneous Daily Karthik Macdonald MD   30 mg at 09/11/17 1829   • finasteride (PROSCAR) tablet 5 mg  5 mg Oral Daily Karthik Macdonald MD   5 mg at 09/12/17 0918   • gabapentin (NEURONTIN) capsule 200 mg  200 mg Oral Daily Jessica Rivas MD   200 mg at 09/12/17 0056   • gabapentin (NEURONTIN) capsule 400 mg  400 mg Oral Daily Daniele Sharpe,  MD   400 mg at 09/12/17 0055   • HYDROcodone-acetaminophen (NORCO) 7.5-325 MG per tablet 1 tablet  1 tablet Oral Q6H PRN Karthik Macdonald MD   1 tablet at 09/12/17 0620   • ipratropium (ATROVENT) nebulizer solution 0.5 mg  0.5 mg Nebulization 4x Daily - RT Karthik Macdonald MD       • lisinopril (PRINIVIL,ZESTRIL) tablet 2.5 mg  2.5 mg Oral Q24H Karthik Macdonald MD   2.5 mg at 09/12/17 0918   • LORazepam (ATIVAN) tablet 1 mg  1 mg Oral 4x Daily Jessica Rivas MD   1 mg at 09/12/17 0621   • nitroglycerin (NITROSTAT) SL tablet 0.4 mg  0.4 mg Sublingual Q5 Min PRN Karthik Macdonald MD       • rOPINIRole (REQUIP) tablet 1 mg  1 mg Oral Nightly Jessica Rivas MD   1 mg at 09/12/17 0000   • sennosides-docusate sodium (SENOKOT-S) 8.6-50 MG tablet 2 tablet  2 tablet Oral BID PRN Lyric Garcia PA-C       • sodium chloride 0.9 % flush 1-10 mL  1-10 mL Intravenous PRN Karthik Macdonald MD   10 mL at 09/10/17 2049   • tamsulosin (FLOMAX) 24 hr capsule 0.4 mg  0.4 mg Oral Nightly Karthik Macdonald MD   0.4 mg at 09/11/17 2131       Past Medical History:  Past Medical History:   Diagnosis Date   • Abdominal aortic aneurysm    • Anxiety    • Atrial fibrillation    • Biventricular ICD (implantable cardioverter-defibrillator) in place    • BPH (benign prostatic hypertrophy)    • Carotid artery stenosis    • CHF (congestive heart failure)    • Chronic kidney disease     Chronic kidney disease, stage 4 (severe)   • Chronic systolic (congestive) heart failure     limited echo 7/2016 EF was 40-45%. Patient has BiV AICD   • Coronary arteriosclerosis     MI x2   • Hearing loss    • Iron deficiency anemia    • Ischemic cardiomyopathy    • Mixed hyperlipidemia    • Myocardial infarction    • Stroke        Past Surgical History:  Past Surgical History:   Procedure Laterality Date   • CARDIAC ABLATION     • CARDIAC CATHETERIZATION     • CARDIAC PACEMAKER PLACEMENT     • CARDIOVERSION     • CAROTID ENDARTERECTOMY     • CORONARY ARTERY BYPASS  "GRAFT     • MITRAL VALVE REPLACEMENT     • TOTAL KNEE ARTHROPLASTY         Family History  Family History   Problem Relation Age of Onset   • Stroke Mother    • Heart disease Mother    • Diabetes Father        Social History  Social History     Social History   • Marital status:      Spouse name: N/A   • Number of children: N/A   • Years of education: N/A     Occupational History   • Not on file.     Social History Main Topics   • Smoking status: Former Smoker   • Smokeless tobacco: Never Used   • Alcohol use No   • Drug use: No   • Sexual activity: Defer     Other Topics Concern   • Not on file     Social History Narrative         Review of Systems:  History obtained from chart review and the patient  General ROS: No fever or chills  Respiratory ROS: No cough, shortness of breath, wheezing  Cardiovascular ROS: positive for - dyspnea on exertion  negative for - chest pain  Gastrointestinal ROS: No abdominal pain or melena  Genito-Urinary ROS: No dysuria or hematuria  14 point ROS reviewed with the patient and negative except as noted above and in the HPI unless unable to obtain.    Objective:  /61 (BP Location: Left arm, Patient Position: Lying)  Pulse 79  Temp 97.9 °F (36.6 °C) (Temporal Artery )   Resp 18  Ht 73.5\" (186.7 cm)  Wt 153 lb 9 oz (69.7 kg)  SpO2 96%  BMI 19.99 kg/m2    Intake/Output Summary (Last 24 hours) at 09/12/17 1102  Last data filed at 09/12/17 0900   Gross per 24 hour   Intake             1070 ml   Output             1300 ml   Net             -230 ml     General: awake/alert    Neck: supple, no JVD  Chest:  clear to auscultation bilaterally without respiratory distress  CVS: regular rate and rhythm  Abdominal: soft, nontender, normal bowel sounds  Extremities: no cyanosis or edema  Skin: warm and dry without rash      Labs:    Results from last 7 days  Lab Units 09/08/17  1300   WBC 10*3/mm3 5.60   HEMOGLOBIN g/dL 12.9*   HEMATOCRIT % 41.3   PLATELETS 10*3/mm3 177 "           Results from last 7 days  Lab Units 09/12/17  0708 09/11/17  0555 09/10/17  0523  09/08/17  1300   SODIUM mmol/L 139 138 137  < > 139   POTASSIUM mmol/L 3.9 4.0 3.9  < > 4.8   CHLORIDE mmol/L 102 99 99  < > 97*   CO2 mmol/L 29.0 30.0 30.0  < > 31.0   BUN mg/dL 41* 47* 48*  < > 52*   CREATININE mg/dL 1.65* 1.80* 1.91*  < > 1.94*   CALCIUM mg/dL 9.1 8.9 8.6  < > 9.8   BILIRUBIN mg/dL  --   --   --   --  1.1*   ALK PHOS U/L  --   --   --   --  83   ALT (SGPT) U/L  --   --   --   --  47   AST (SGOT) U/L  --   --   --   --  40   GLUCOSE mg/dL 87 95 93  < > 98   < > = values in this interval not displayed.    Radiology:   Imaging Results (last 72 hours)     Procedure Component Value Units Date/Time    XR Chest PA & Lateral [[id number]] Collected:  09/08/17 1622     Updated:  09/08/17 1627    Narrative:       EXAMINATION:   XR CHEST PA AND LATERAL-  9/8/2017 3:22 PM CST     HISTORY: Short of breath     PA and lateral views the chest obtained. Hyperinflation increase in AP  diameter chest is noted consistent with COPD.     Cardiac silhouette is mildly enlarged.     Prosthetic cardiac valve is present in the mitral position.     Pacing device is present projecting over the soft tissues of the left  chest. Wires are present median sternotomy. Vascular calcifications  present in the aortic arch.     Benign calcifications in the milagros are present bilaterally.       Impression:       Mild cardiomegaly no evidence of pulmonary vascular  congestion.  2. COPD  This report was finalized on 09/08/2017 16:24 by Dr. Yg Bowden MD.    CT Head Without Contrast [[id number]] Collected:  09/08/17 1633     Updated:  09/08/17 1639    Narrative:       CT HEAD WO CONTRAST- 9/8/2017 5:21 PM EDT     HISTORY: falls, alt mental status       Technique:   Axial CT of the brain without IV contrast. Sagittal and coronal  reformations are also provided for review. Soft tissue and bone kernels  are available for interpretation.     Comparison:  None.     Findings:      There is no evidence of acute large vascular distribution infarct, mass  lesion or hemorrhage.  The ventricles, cortical sulci and basal cisterns  are symmetric and age appropriate. Mild generalized symmetric volume  loss is identified. There is atheromatous calcification in the  intracranial vertebral arteries as well as the bilateral paraclinoid  ICAs.  Normal brainstem and posterior fossa.  The scalp and calvarium  are unremarkable.        Impression:       Impression:    1. No acute intracranial process  This report was finalized on 09/08/2017 16:36 by Dr Jem Carreno, .    US Abdomen Complete [[id number]] Collected:  09/09/17 0942     Updated:  09/09/17 0949    Narrative:       ULTRASOUND ABDOMEN COMPLETE 9/9/2017 8:20 AM CDT     REASON FOR EXAM: abdominal pain, hx aaa       COMPARISON: None      TECHNIQUE: Multiple longitudinal and transverse real-time sonographic  images of the abdomen are obtained.      FINDINGS:       LIVER: Normal in size, smooth in contour, and homogeneous in  echogenicity. No focal lesion is seen.     BILIARY: Echoes are noted in the gallbladder with distal shadowing  consistent with cholelithiasis.. The common bile duct measures 0.7 cm in  diameter. There is no evidence of intrahepatic ductal dilatation.       KIDNEYS: The right and left kidneys are normal in size, shape,  orientation, and position measuring 5.8 x 7.3 x 14.9 cm and 5.2 x 6.2 x  12.2 cm, respectively. The corticomedullary differentiation is  maintained. There is no evidence of nephrolithiasis, hydronephrosis or  perinephric fluid collection. Cysts are present associated right kidney  the largest is 5.7 cm. The largest left renal cyst is at the upper pole  and is 6.4 cm No hydroureter is seen.     PANCREAS: No focal lesion or abnormality.      SPLEEN: Normal in size and appearance.      OTHER: No ascites is present. The aorta is aneurysmal and measures 39 mm  in maximal diameter. Inferior vena  cava is visualized.     The prostate is enlarged. The prostate is 7.2 x 9 cm.        Impression:       1. Cholelithiasis.        2. Enlarged prostate Yuliana graph  3. Renal cyst     This report was finalized on 09/09/2017 09:46 by Dr. Yg Bowden MD.    FL Esophagram Complete [[id number]] Collected:  09/10/17 1050     Updated:  09/10/17 1055    Narrative:       EXAMINATION:   FL ESOPHAGRAM COMPLETE-  9/10/2017 10:50 AM CDT     HISTORY: Epigastric pain     1.2 minutes of fluoroscopic time was used during this exam. 13 images  were obtained.     Barium was swallowed without difficulty. The esophagus initiates normal  peristalsis. Mild spasms present in the proximal third of the esophagus.  There was some stasis of barium in the proximal third of the esophagus  but eventually swallowing caused the area of stasis to peristalse. There  was no reflux.     IMPRESSION mild spasm in the mid esophagus  This report was finalized on 09/10/2017 10:52 by Dr. Yg Bowden MD.    US Carotid Bilateral [[id number]] Collected:  09/11/17 0739     Updated:  09/11/17 0742    Narrative:       History: Carotid occlusive disease       Impression:       Impression:  1. There is less than 50% stenosis of the right internal carotid artery.  2. There is 50-69% stenosis of the left internal carotid artery.  3. Antegrade flow is demonstrated in bilateral vertebral arteries.     Comments: Bilateral carotid vertebral arterial duplex scan was  performed.     Grayscale imaging shows intimal thickening and calcified elements at the  carotid bifurcation. The right internal carotid artery peak systolic  velocity is 48.3 cm/sec. The end-diastolic velocity is 19.3 cm/sec. The  right ICA/CCA ratio is approximately 0.83 . These findings correlate  with less than 50% stenosis of the right internal carotid artery.     Grayscale imaging shows intimal thickening and calcified elements at the  carotid bifurcation. The left internal carotid artery peak  systolic  velocity is 148 cm/sec. The end-diastolic velocity is 60.9 cm/sec. The  left ICA/CCA ratio is approximately 4.3 . These findings correlate with  50-69% stenosis of the left internal carotid artery.     Antegrade flow is demonstrated in bilateral vertebral arteries.       This report was finalized on 09/11/2017 07:39 by Dr. Epi Rogers MD.          Culture:         Assessment   1.  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  Recent acute kidney injury from diuretic use  3.  Benign hypertension  4.  Status post fall at home  5.  Bilateral renal cysts  6.  Cognitive impairment  7.  Secondary hyperparathyroidism    Plan:  1.  Change Bumex to oral dosing  2.  Ok for discharge from renal standpoint      Helio Ariza, APRN  9/12/2017  11:02 AM

## 2017-09-13 NOTE — THERAPY DISCHARGE NOTE
Acute Care - Physical Therapy Discharge Summary  [facility]       Patient Name: Wild Bravo  : 1934  MRN: [id number]    Today's Date: 2017  Onset of Illness/Injury or Date of Surgery Date: 17    Date of Referral to PT: 17  Referring Physician: Dr Macdonald      Admit Date: 2017      PT Recommendation and Plan    Visit Dx:    ICD-10-CM ICD-9-CM   1. Cognitive and behavioral changes R41.89 799.59    R46.89 312.9   2. Impaired functional mobility, balance, gait, and endurance Z74.09 V49.89             Outcome Measures       09/10/17 1105          How much help from another person do you currently need...    Turning from your back to your side while in flat bed without using bedrails? 4  -LYRIC      Moving from lying on back to sitting on the side of a flat bed without bedrails? 4  -LYRIC      Moving to and from a bed to a chair (including a wheelchair)? 4  -LYRIC      Standing up from a chair using your arms (e.g., wheelchair, bedside chair)? 4  -LYRIC      Climbing 3-5 steps with a railing? 3  -LYRIC      To walk in hospital room? 3  -LYRIC      AM-PAC 6 Clicks Score 22  -LYRIC      Functional Assessment    Outcome Measure Options AM-PAC 6 Clicks Basic Mobility (PT)  -LYRIC        User Key  (r) = Recorded By, (t) = Taken By, (c) = Cosigned By    Initials Name Provider Type    LYRIC Watkins PTA Physical Therapy Assistant                      IP PT Goals       17 0808 17 1051       Gait Training PT LTG    Gait Training Goal PT LTG, Date Established  17  -PB     Gait Training Goal PT LTG, Time to Achieve  by discharge  -PB     Gait Training Goal PT LTG, [place] Level  supervision required  -PB     Gait Training Goal PT LTG, Distance to Achieve  300  -PB     Gait Training Goal PT LTG, Additional Goal  maintain SpO2 equal to or above 90%  -PB     Gait Training Goal PT LTG, Date Goal Reviewed 17  -AH      Gait Training Goal PT LTG, Outcome goal not met  -AH      Gait Training Goal PT  LTG, Reason Goal Not Met discharged from facility  -      Stair Training PT LTG    Stair Training Goal PT LTG, Date Established  09/09/17  -PB     Stair Training Goal PT LTG, Time to Achieve  by discharge  -     Stair Training Goal PT LTG, Number of Steps  15  -PB     Stair Training Goal PT LTG, [place] Level  contact guard assist  -PB     Stair Training Goal PT LTG, Assist Device  1 handrail  -     Stair Training Goal PT LTG, Date Goal Reviewed 09/13/17  -      Stair Training Goal PT LTG, Outcome goal not met  -      Stair Training Goal PT LTG, Reason Goal Not Met discharged from facility  -        User Key  (r) = Recorded By, (t) = Taken By, (c) = Cosigned By    Initials Name Provider Type     Navya Peres, PTA Physical Therapy Assistant    DUNIA Hernandez, PT DPT Physical Therapist              PT Discharge Summary  Reason for Discharge: Discharge from facility  Outcomes Achieved: Refer to plan of care for updates on goals achieved  Discharge Destination: other (comment) ([facility] hospital)      Navya Peres PTA   9/13/2017

## 2017-09-13 NOTE — PLAN OF CARE
Problem: Inpatient Physical Therapy  Goal: Gait Training Goal LTG- PT  Outcome: Unable to achieve outcome(s) by discharge Date Met:  09/13/17 09/09/17 1051 09/13/17 0808   Gait Training PT LTG   Gait Training Goal PT LTG, Date Established 09/09/17 --    Gait Training Goal PT LTG, Time to Achieve by discharge --    Gait Training Goal PT LTG, [place] Level supervision required --    Gait Training Goal PT LTG, Distance to Achieve 300 --    Gait Training Goal PT LTG, Additional Goal maintain SpO2 equal to or above 90% --    Gait Training Goal PT LTG, Date Goal Reviewed --  09/13/17   Gait Training Goal PT LTG, Outcome --  goal not met   Gait Training Goal PT LTG, Reason Goal Not Met --  discharged from facility       Goal: Stair Training Goal LTG- PT  Outcome: Unable to achieve outcome(s) by discharge Date Met:  09/13/17 09/09/17 1051 09/13/17 0808   Stair Training PT LTG   Stair Training Goal PT LTG, Date Established 09/09/17 --    Stair Training Goal PT LTG, Time to Achieve by discharge --    Stair Training Goal PT LTG, Number of Steps 15 --    Stair Training Goal PT LTG, [place] Level contact guard assist --    Stair Training Goal PT LTG, Assist Device 1 handrail --    Stair Training Goal PT LTG, Date Goal Reviewed --  09/13/17   Stair Training Goal PT LTG, Outcome --  goal not met   Stair Training Goal PT LTG, Reason Goal Not Met --  discharged from facility

## 2017-09-15 NOTE — PLAN OF CARE
Problem: Inpatient SLP  Goal: Cognitive-linguistic-Patient will improve Cognitive-linguistic skills to allow optimal participation in care    09/11/17 1419 09/12/17 1504 09/15/17 1517   Cognitive Linguistic- Optimal Participation in Care   Cognitive Linguistic Optimal Participation in Care- SLP, Date Established 09/11/17 --  --    Cognitive Linguistic Optimal Participation in Care- SLP, Time to Achieve by discharge --  --    Cognitive Linguistic Optimal Participation in Care- SLP, Activity Level Patient will improve functional problem solving skills for increased safety in environment --  --    Cognitive Linguistic Optimal Participation in Care- SLP, Date Goal Reviewed --  09/12/17 --    Cognitive Linguistic Optimal Participation in Care- SLP, Outcome --  --  goal not met   Cognitive Linguistic Optimal Participation in Care- SLP, Reason Goal Not Met --  --  discharged from facility

## 2017-09-15 NOTE — PLAN OF CARE
Problem: Inpatient SLP  Goal: Cognitive-linguistic-Patient will improve Cognitive-linguistic skills to allow optimal participation in care  Outcome: Unable to achieve outcome(s) by discharge Date Met:  09/15/17    09/11/17 1419 09/12/17 1504 09/15/17 1518   Cognitive Linguistic- Optimal Participation in Care   Cognitive Linguistic Optimal Participation in Care- SLP, Date Established 09/11/17 --  --    Cognitive Linguistic Optimal Participation in Care- SLP, Time to Achieve by discharge --  --    Cognitive Linguistic Optimal Participation in Care- SLP, Activity Level Patient will improve functional problem solving skills for increased safety in environment --  --    Cognitive Linguistic Optimal Participation in Care- SLP, Date Goal Reviewed --  09/12/17 --    Cognitive Linguistic Optimal Participation in Care- SLP, Outcome --  --  goal not met   Cognitive Linguistic Optimal Participation in Care- SLP, Reason Goal Not Met --  --  discharged from facility

## 2017-09-15 NOTE — THERAPY DISCHARGE NOTE
Acute Care - Speech Language Pathology Discharge Summary  [facility]       Patient Name: Wild Bravo  : 1934  MRN: [id number]    Today's Date: 9/15/2017  Onset of Illness/Injury or Date of Surgery Date: 17    Date of Referral to SLP: 17           Admit Date: 2017      SLP Recommendation and Plan    Visit Dx:    ICD-10-CM ICD-9-CM   1. Cognitive and behavioral changes R41.89 799.59    R46.89 312.9   2. Impaired functional mobility, balance, gait, and endurance Z74.09 V49.89                     IP SLP Goals       09/15/17 1518 09/15/17 1517 17 1504    Cognitive Linguistic- Optimal Participation in Care    Cognitive Linguistic Optimal Participation in Care- SLP, Date Goal Reviewed   17  -BN    Cognitive Linguistic Optimal Participation in Care- SLP, Outcome goal not met  -CS goal not met  -CS goal ongoing  -BN    Cognitive Linguistic Optimal Participation in Care- SLP, Reason Goal Not Met discharged from facility  -CS discharged from facility  -CS       17 1419          Cognitive Linguistic- Optimal Participation in Care    Cognitive Linguistic Optimal Participation in Care- SLP, Date Established 17  -TM (r) AM (t) TM (c)      Cognitive Linguistic Optimal Participation in Care- SLP, Time to Achieve by discharge  -TM (r) AM (t) TM (c)      Cognitive Linguistic Optimal Participation in Care- SLP, Activity Level Patient will improve functional problem solving skills for increased safety in environment  -TM (r) AM (t) TM (c)      Cognitive Linguistic Optimal Participation in Care- SLP, Date Goal Reviewed 17  -TM (r) AM (t) TM (c)      Cognitive Linguistic Optimal Participation in Care- SLP, Reason Goal Not Met --   Eval  -TM (r) AM (t) TM (c)        User Key  (r) = Recorded By, (t) = Taken By, (c) = Cosigned By    Initials Name Provider Type    TM Pratibha Mendez, CCC-SLP Speech and Language Pathologist    AMARI Martinez, CCC-SLP Speech and Language  Pathologist    CS Jerry Carranza MS CCC-SLP Speech and Language Pathologist    AM Manisha Kowalski, Speech Therapy Student Speech Therapy Student                  SLP Discharge Summary  Anticipated Discharge Disposition: other (see comments)  Reason for Discharge: Discharge from facility  Outcomes Achieved: Unable to make functional progress toward goals at this time  Discharge Destination: other (comment)      Jerry Carranza MS CCC-SLP  9/15/2017

## 2017-09-27 ENCOUNTER — TELEPHONE (OUTPATIENT)
Dept: INTERNAL MEDICINE | Age: 82
End: 2017-09-27

## 2017-09-28 ENCOUNTER — TELEPHONE (OUTPATIENT)
Dept: CARDIOLOGY | Facility: CLINIC | Age: 82
End: 2017-09-28

## 2017-10-02 ENCOUNTER — OFFICE VISIT (OUTPATIENT)
Dept: CARDIOLOGY | Facility: CLINIC | Age: 82
End: 2017-10-02

## 2017-10-02 ENCOUNTER — CLINICAL SUPPORT (OUTPATIENT)
Dept: CARDIOLOGY | Facility: CLINIC | Age: 82
End: 2017-10-02

## 2017-10-02 VITALS
HEIGHT: 73 IN | HEART RATE: 86 BPM | DIASTOLIC BLOOD PRESSURE: 68 MMHG | BODY MASS INDEX: 21.47 KG/M2 | OXYGEN SATURATION: 97 % | SYSTOLIC BLOOD PRESSURE: 110 MMHG | WEIGHT: 162 LBS

## 2017-10-02 DIAGNOSIS — R06.02 SHORTNESS OF BREATH: ICD-10-CM

## 2017-10-02 DIAGNOSIS — I48.91 ATRIAL FIBRILLATION, UNSPECIFIED TYPE (HCC): ICD-10-CM

## 2017-10-02 DIAGNOSIS — I10 ESSENTIAL HYPERTENSION: ICD-10-CM

## 2017-10-02 DIAGNOSIS — I73.9 PAD (PERIPHERAL ARTERY DISEASE) (HCC): ICD-10-CM

## 2017-10-02 DIAGNOSIS — Z98.890 H/O MITRAL VALVE REPAIR: ICD-10-CM

## 2017-10-02 DIAGNOSIS — I50.9 CONGESTIVE HEART FAILURE, UNSPECIFIED CONGESTIVE HEART FAILURE CHRONICITY, UNSPECIFIED CONGESTIVE HEART FAILURE TYPE: ICD-10-CM

## 2017-10-02 DIAGNOSIS — Z74.09 IMPAIRED FUNCTIONAL MOBILITY, BALANCE, GAIT, AND ENDURANCE: ICD-10-CM

## 2017-10-02 DIAGNOSIS — I49.5 SSS (SICK SINUS SYNDROME) (HCC): ICD-10-CM

## 2017-10-02 DIAGNOSIS — I50.41 ACUTE COMBINED SYSTOLIC AND DIASTOLIC CONGESTIVE HEART FAILURE (HCC): ICD-10-CM

## 2017-10-02 DIAGNOSIS — Z95.0 PACEMAKER: ICD-10-CM

## 2017-10-02 DIAGNOSIS — Z95.810 BIVENTRICULAR ICD (IMPLANTABLE CARDIOVERTER-DEFIBRILLATOR) IN PLACE: ICD-10-CM

## 2017-10-02 DIAGNOSIS — I48.19 PERSISTENT ATRIAL FIBRILLATION (HCC): ICD-10-CM

## 2017-10-02 DIAGNOSIS — I25.10 CORONARY ARTERIOSCLEROSIS: Primary | ICD-10-CM

## 2017-10-02 PROBLEM — R07.9 CHEST PAIN: Status: RESOLVED | Noted: 2017-09-08 | Resolved: 2017-10-02

## 2017-10-02 PROCEDURE — 93296 REM INTERROG EVL PM/IDS: CPT | Performed by: PHYSICIAN ASSISTANT

## 2017-10-02 PROCEDURE — 93000 ELECTROCARDIOGRAM COMPLETE: CPT | Performed by: INTERNAL MEDICINE

## 2017-10-02 PROCEDURE — 93295 DEV INTERROG REMOTE 1/2/MLT: CPT | Performed by: PHYSICIAN ASSISTANT

## 2017-10-02 PROCEDURE — 99214 OFFICE O/P EST MOD 30 MIN: CPT | Performed by: INTERNAL MEDICINE

## 2017-10-02 RX ORDER — CARVEDILOL 6.25 MG/1
6.25 TABLET ORAL 2 TIMES DAILY
Qty: 180 TABLET | Refills: 3 | Status: SHIPPED | OUTPATIENT
Start: 2017-10-02 | End: 2018-01-03

## 2017-10-02 RX ORDER — CARVEDILOL 3.12 MG/1
6.25 TABLET ORAL DAILY
Qty: 180 TABLET | Refills: 3 | Status: SHIPPED | OUTPATIENT
Start: 2017-10-02 | End: 2017-10-02 | Stop reason: SDUPTHER

## 2017-10-02 RX ORDER — CARVEDILOL 3.12 MG/1
6.25 TABLET ORAL DAILY
Qty: 180 TABLET | Refills: 3 | Status: SHIPPED | OUTPATIENT
Start: 2017-10-02 | End: 2017-10-02

## 2017-10-04 ENCOUNTER — TELEPHONE (OUTPATIENT)
Dept: CARDIOLOGY | Facility: CLINIC | Age: 82
End: 2017-10-04

## 2017-10-04 NOTE — PROGRESS NOTES
Bi-V AICD Evaluation Report  [facility]    October 3, 2017    Primary Cardiologist: Renae  : Guidant Model: Cognis  Implant date: 7/8/10    Reason for evaluation: patient reported symptoms  Indication for AICD: sick sinus syndrome and congestive heart failure    Measurements  Atrial sensing:  P wave: 0.4 mV  Atrial threshold: n/r  Atrial lead impedance: 579 ohms  Ventricular sensing:  R wave: 11.8 mV  RV Threshold:  1.0 V @ 0.5 ms  RV Impedance:  371 ohms  Shock impedance:  45 ohms   LV Threshold:  0.6 V @ 0.5 ms  LV Impedance: 839 ohms      Diagnostic Data  Atrial paced: <1 %  Ventricular paced: >90%  Other: atrial flutter noted  Battery status: satisfactory         Final Parameters  Mode: DDDR     Lower rate: 70 bpm   Upper rate: 125 bpm  AV Delay: 170-180 ms paced    115-120 ms sensed   Atrial - Amplitude: 2.4 V   Pulse width: 0.5 ms   Sensitivity: 0.25 mV   Ventricular:  RV Amplitude: 2.2 V @ 0.5 ms   Sensitivity: 0.6 mV            LV Amplitude:  1.8V @ 0.5ms  Changes made: n/a  Conclusions: normal AICD function    Follow up: 3 months

## 2017-10-04 NOTE — TELEPHONE ENCOUNTER
[facility] CALLED NEEDS CLARIFICATION ON AMIODARONE, SHE STATES PT SAID YOU CUT HIS AMOUNT IN HALF    IF THIS IS CORRECT THEY WILL NEED A NEW ORDER    PLEASE ADVISE

## 2017-10-06 RX ORDER — AMIODARONE HYDROCHLORIDE 200 MG/1
100 TABLET ORAL DAILY
Qty: 30 TABLET | Refills: 1 | Status: SHIPPED | OUTPATIENT
Start: 2017-10-06 | End: 2017-10-27 | Stop reason: ALTCHOICE

## 2017-10-14 NOTE — PROGRESS NOTES
I have reviewed the notes, assessments, and/or procedures performed by  Lyric Garcia PA-C  , I concur with her  documentation of   Wild Bravo.

## 2017-10-24 ENCOUNTER — TELEPHONE (OUTPATIENT)
Dept: INTERNAL MEDICINE | Age: 82
End: 2017-10-24

## 2017-10-26 ENCOUNTER — TELEPHONE (OUTPATIENT)
Dept: CARDIOLOGY | Facility: CLINIC | Age: 82
End: 2017-10-26

## 2017-10-26 NOTE — TELEPHONE ENCOUNTER
[facility] CALLED.  STATED DAUGHTER JING HAD CALLED CONFUSED ABOUT THE PT WAS BEING REFERRED TO THEM.    TRIED TO CALL JING BACK

## 2017-10-27 ENCOUNTER — OFFICE VISIT (OUTPATIENT)
Dept: INTERNAL MEDICINE | Age: 82
End: 2017-10-27
Payer: MEDICARE

## 2017-10-27 ENCOUNTER — OFFICE VISIT (OUTPATIENT)
Dept: CARDIOLOGY | Facility: CLINIC | Age: 82
End: 2017-10-27

## 2017-10-27 VITALS
OXYGEN SATURATION: 98 % | HEART RATE: 88 BPM | SYSTOLIC BLOOD PRESSURE: 98 MMHG | HEIGHT: 74 IN | BODY MASS INDEX: 21.05 KG/M2 | DIASTOLIC BLOOD PRESSURE: 56 MMHG | RESPIRATION RATE: 18 BRPM | WEIGHT: 164 LBS

## 2017-10-27 VITALS
HEART RATE: 95 BPM | WEIGHT: 162 LBS | SYSTOLIC BLOOD PRESSURE: 96 MMHG | OXYGEN SATURATION: 96 % | DIASTOLIC BLOOD PRESSURE: 62 MMHG | HEIGHT: 74 IN | BODY MASS INDEX: 20.79 KG/M2

## 2017-10-27 DIAGNOSIS — I25.119 CORONARY ARTERY DISEASE INVOLVING NATIVE HEART WITH ANGINA PECTORIS, UNSPECIFIED VESSEL OR LESION TYPE (HCC): Primary | ICD-10-CM

## 2017-10-27 DIAGNOSIS — N18.4 STAGE 4 CHRONIC KIDNEY DISEASE (HCC): ICD-10-CM

## 2017-10-27 DIAGNOSIS — Z23 INFLUENZA VACCINE NEEDED: ICD-10-CM

## 2017-10-27 DIAGNOSIS — Z98.890 H/O MITRAL VALVE REPAIR: ICD-10-CM

## 2017-10-27 DIAGNOSIS — Z95.810 BIVENTRICULAR ICD (IMPLANTABLE CARDIOVERTER-DEFIBRILLATOR) IN PLACE: ICD-10-CM

## 2017-10-27 DIAGNOSIS — I10 ESSENTIAL HYPERTENSION: ICD-10-CM

## 2017-10-27 DIAGNOSIS — I25.10 CORONARY ARTERIOSCLEROSIS: ICD-10-CM

## 2017-10-27 DIAGNOSIS — E78.2 MIXED HYPERLIPIDEMIA: ICD-10-CM

## 2017-10-27 DIAGNOSIS — I25.9 CHEST PAIN DUE TO MYOCARDIAL ISCHEMIA, UNSPECIFIED ISCHEMIC CHEST PAIN TYPE: ICD-10-CM

## 2017-10-27 DIAGNOSIS — I77.9 BILATERAL CAROTID ARTERY DISEASE (HCC): ICD-10-CM

## 2017-10-27 DIAGNOSIS — I73.9 PAD (PERIPHERAL ARTERY DISEASE) (HCC): ICD-10-CM

## 2017-10-27 DIAGNOSIS — Z87.19 HISTORY OF GI BLEED: ICD-10-CM

## 2017-10-27 DIAGNOSIS — I48.19 PERSISTENT ATRIAL FIBRILLATION (HCC): ICD-10-CM

## 2017-10-27 DIAGNOSIS — I49.5 SSS (SICK SINUS SYNDROME) (HCC): ICD-10-CM

## 2017-10-27 DIAGNOSIS — N18.30 STAGE 3 CHRONIC KIDNEY DISEASE (HCC): ICD-10-CM

## 2017-10-27 DIAGNOSIS — G89.4 CHRONIC PAIN SYNDROME: ICD-10-CM

## 2017-10-27 DIAGNOSIS — I50.22 CHRONIC SYSTOLIC CONGESTIVE HEART FAILURE (HCC): Primary | ICD-10-CM

## 2017-10-27 DIAGNOSIS — Z86.73 HISTORY OF CVA (CEREBROVASCULAR ACCIDENT): ICD-10-CM

## 2017-10-27 PROCEDURE — 4040F PNEUMOC VAC/ADMIN/RCVD: CPT | Performed by: INTERNAL MEDICINE

## 2017-10-27 PROCEDURE — 1123F ACP DISCUSS/DSCN MKR DOCD: CPT | Performed by: INTERNAL MEDICINE

## 2017-10-27 PROCEDURE — G0008 ADMIN INFLUENZA VIRUS VAC: HCPCS | Performed by: INTERNAL MEDICINE

## 2017-10-27 PROCEDURE — 99204 OFFICE O/P NEW MOD 45 MIN: CPT | Performed by: INTERNAL MEDICINE

## 2017-10-27 PROCEDURE — G8427 DOCREV CUR MEDS BY ELIG CLIN: HCPCS | Performed by: INTERNAL MEDICINE

## 2017-10-27 PROCEDURE — 90662 IIV NO PRSV INCREASED AG IM: CPT | Performed by: INTERNAL MEDICINE

## 2017-10-27 PROCEDURE — G8598 ASA/ANTIPLAT THER USED: HCPCS | Performed by: INTERNAL MEDICINE

## 2017-10-27 PROCEDURE — G8484 FLU IMMUNIZE NO ADMIN: HCPCS | Performed by: INTERNAL MEDICINE

## 2017-10-27 PROCEDURE — 99214 OFFICE O/P EST MOD 30 MIN: CPT | Performed by: NURSE PRACTITIONER

## 2017-10-27 PROCEDURE — 1036F TOBACCO NON-USER: CPT | Performed by: INTERNAL MEDICINE

## 2017-10-27 PROCEDURE — G8420 CALC BMI NORM PARAMETERS: HCPCS | Performed by: INTERNAL MEDICINE

## 2017-10-27 RX ORDER — BUMETANIDE 1 MG/1
0.5 TABLET ORAL
COMMUNITY
Start: 2017-09-12 | End: 2018-03-13 | Stop reason: SDUPTHER

## 2017-10-27 RX ORDER — HYDROCODONE BITARTRATE AND ACETAMINOPHEN 5; 325 MG/1; MG/1
1 TABLET ORAL 2 TIMES DAILY PRN
COMMUNITY
End: 2017-11-03 | Stop reason: SDUPTHER

## 2017-10-27 RX ORDER — ATORVASTATIN CALCIUM 10 MG/1
10 TABLET, FILM COATED ORAL
COMMUNITY
Start: 2017-09-12 | End: 2017-11-22 | Stop reason: SDUPTHER

## 2017-10-27 RX ORDER — LORAZEPAM 1 MG/1
0.5 TABLET ORAL 4 TIMES DAILY
COMMUNITY
Start: 2017-09-12 | End: 2017-11-07

## 2017-10-27 RX ORDER — CARVEDILOL 12.5 MG/1
12.5 TABLET ORAL EVERY MORNING
COMMUNITY
End: 2018-01-03 | Stop reason: SDUPTHER

## 2017-10-27 RX ORDER — CARVEDILOL 6.25 MG/1
6.25 TABLET ORAL EVERY EVENING
COMMUNITY
Start: 2017-10-02 | End: 2017-10-27 | Stop reason: DRUGHIGH

## 2017-10-27 RX ORDER — NITROGLYCERIN 0.4 MG/1
0.4 TABLET SUBLINGUAL
COMMUNITY
End: 2021-09-23 | Stop reason: SDUPTHER

## 2017-10-27 RX ORDER — CARVEDILOL 12.5 MG/1
12.5 TABLET ORAL
COMMUNITY
End: 2017-10-27 | Stop reason: DRUGHIGH

## 2017-10-27 RX ORDER — HYDROCODONE BITARTRATE AND ACETAMINOPHEN 7.5; 325 MG/1; MG/1
1 TABLET ORAL EVERY EVENING
COMMUNITY
End: 2017-11-03 | Stop reason: SDUPTHER

## 2017-10-27 RX ORDER — GABAPENTIN 400 MG/1
200 CAPSULE ORAL
COMMUNITY
Start: 2017-09-12 | End: 2017-11-03 | Stop reason: DRUGHIGH

## 2017-10-27 RX ORDER — CARVEDILOL 12.5 MG/1
6.25 TABLET ORAL 2 TIMES DAILY
COMMUNITY
End: 2018-10-24 | Stop reason: CLARIF

## 2017-10-27 ASSESSMENT — PATIENT HEALTH QUESTIONNAIRE - PHQ9
SUM OF ALL RESPONSES TO PHQ9 QUESTIONS 1 & 2: 0
1. LITTLE INTEREST OR PLEASURE IN DOING THINGS: 0
2. FEELING DOWN, DEPRESSED OR HOPELESS: 0
SUM OF ALL RESPONSES TO PHQ QUESTIONS 1-9: 0

## 2017-10-27 NOTE — PROGRESS NOTES
Chief Complaint   Patient presents with   [facility] Establish Care     Patient here to establish care. Patient was at [facility] and [facility] [employer] recently. HPI: Patient is here today with his daughter who is a physician's assistant. He is here to establish a new primary care giver with a complicated and extended medical history. He was recently in the hospital over time he had ended up on large doses of Xanax and hydrocodone. He was on 2 mg of Xanax 3 times a day and 10 mg of hydrocodone 4 times a day. He was recently in the hospital these meds have been tapered rapidly and it is a plan to continue to taper these after another month or so. He has pain from arthritis but he is active for his medical problems. He walks routinely and is used to being very active. He is a retired dentist.  He has been an athlete. He is frustrated with not feeling well and with not being able to be as active. He has had some recent chest tightness with exertion and dyspnea with exertion. He was recently in [facility] with multiple medical problems including atrial fib/ overmedication/ chf.  He has a complicated history. It has been felt not a good candidate for stronger anticoagulation based on GFR and bleeding risk. Past Medical History:   Diagnosis Date    AICD (automatic cardioverter/defibrillator) present     [place] in [place]    Arm pain 2/3/2012    Atrial fibrillation ([address]     Benign prostatic hypertrophy     CAD (coronary artery disease)     Chronic kidney disease     Chronic pain 11/7/2017    Gallstones     Hyperlipidemia     Hypertension     Osteoarthritis of right knee     Right rotator cuff tear     Ventricular tachycardia ([address]        Past Surgical History:   Procedure Laterality Date    APPENDECTOMY      CARDIAC PACEMAKER PLACEMENT      Bi ventricular pacemaker. [facility] CARDIAC SURGERY      mitral valve, maze procedure, 1 vessel bypass -2008    CAROTID ENDARTERECTOMY      Right carotid endarterectomy.  COLONOSCOPY      CORONARY ARTERY BYPASS GRAFT      1 vessel 2008    MOUTH SURGERY      Oral implants.  PACEMAKER INSERTION      biventricular pacemaker ([employer])       Family History   Problem Relation Age of Onset    Stroke Mother     Heart Attack Mother 80    Diabetes Father     COPD Sister     Arthritis Sister     Stroke Brother     Heart Disease Brother        Social History     Social History    Marital status:      Spouse name: N/A    Number of children: N/A    Years of education: N/A     Occupational History    Not on file. Social History Main Topics    Smoking status: Former Smoker    Smokeless tobacco: Never Used    Alcohol use No    Drug use: No    Sexual activity: No      Comment: Marital status - . Other Topics Concern    Not on file     Social History Narrative    No narrative on file       Allergies   Allergen Reactions    Keflex [Cephalexin] Rash       Current Outpatient Prescriptions   Medication Sig Dispense Refill    carvedilol (COREG) 12.5 MG tablet Take 12.5 mg by mouth See Admin Instructions 12.5 mg every morning, 6.25 mg every evening.  bumetanide (BUMEX) 1 MG tablet Take 0.5 mg by mouth      LORazepam (ATIVAN) 1 MG tablet Take 0.5 mg by mouth 4 times daily      nitroGLYCERIN (NITROSTAT) 0.4 MG SL tablet Place 0.4 mg under the tongue      tiotropium (SPIRIVA) 18 MCG inhalation capsule Inhale 1 capsule into the lungs      atorvastatin (LIPITOR) 10 MG tablet Take 10 mg by mouth      tamsulosin (FLOMAX) 0.4 MG capsule Take 0.4 mg by mouth daily      aspirin 81 MG tablet Take 81 mg by mouth daily.  dutasteride (AVODART) 0.5 MG capsule Take 0.5 mg by mouth daily.         lisinopril (PRINIVIL;ZESTRIL) 5 MG tablet Take 2.5 mg by mouth daily       LORazepam (ATIVAN) 0.5 MG tablet Take 1 tablet by mouth 4 times daily 120 tablet 0    HYDROcodone-acetaminophen (NORCO) 5-325 MG per tablet Take 1 tablet by mouth 2 times daily as needed for Pain . 60 tablet 0    HYDROcodone-acetaminophen (NORCO) 7.5-325 MG per tablet Take 1 tablet by mouth every evening . 30 tablet 0    gabapentin (NEURONTIN) 100 MG capsule Take 2 capsules by mouth daily At 5 pm 60 capsule 3    isosorbide mononitrate (IMDUR) 30 MG extended release tablet Take 1 tablet by mouth daily CAN YOU PLEASE ADD THIS TO PATIENTS PILL BOX STARTING TOMORROW - DELIVER TO PATIENT - THANK YOU (HE GETS PREFILLED PILL BOX ON mONDAY- WOULD NEED ADDED FOR Friday/SAT/ SUN) 30 tablet 3    rOPINIRole (REQUIP) 0.5 MG tablet Take 0.5 mg by mouth nightly       No current facility-administered medications for this visit. Review of Systems   Constitutional: Positive for fatigue. Negative for chills and fever. HENT: Negative for congestion and sinus pressure. Eyes: Negative for discharge and redness. Respiratory: Positive for chest tightness and shortness of breath. Negative for cough. Cardiovascular: Positive for chest pain. Negative for palpitations and leg swelling. Gastrointestinal: Negative for abdominal distention and abdominal pain. Genitourinary: Negative for dysuria, frequency and urgency. Musculoskeletal: Positive for arthralgias, back pain and gait problem. Skin: Negative for rash and wound. Neurological: Positive for weakness. Negative for dizziness, light-headedness and headaches. Psychiatric/Behavioral: Positive for dysphoric mood. The patient is not nervous/anxious. BP 96/62   Pulse 95   Ht 6' 2\" (1.88 m)   Wt 162 lb (73.5 kg)   SpO2 96%   BMI 20.80 kg/m²     Physical Exam  Patient is thin and cachectic appearing he is able to get up and down on the exam table sclera anicteric TMs are dull oropharynx benign no supra clavicular cervical lymphadenopathy heart S1-S2 are regular lungs with decreased breath sounds abdomen soft nontender extremities without edema. Mood is okay. ASSESSMENT/ PLAN:  1.  Coronary artery disease involving native heart

## 2017-10-27 NOTE — PROGRESS NOTES
Subjective:     Encounter Date:10/27/2017      Patient ID: Wild Bravo is a 83 y.o. male. He presents today for follow up of chronic systolic congestive heart failure, persistent atrial fibrillation not on anticoagulation due to history of GI bleed, sick sinus syndrome s/p BIV-AICD, coronary artery disease, carotid artery disease, stage IV chronic kidney disease, hyperlipidemia, hypertension and history of stroke. He reports chronic intermittent shortness of breath and mild bilateral lower extremity edema but states these are at baseline. He denies any chest pain, palpitations, dizziness, syncope, orthopnea or PND. He was recently seen by Dr. Wright and taken off of amiodarone. His coreg dose was increased to 12.5 mg by mouth in the morning and 6.25 mg by mouth in the evening. He states that overall he has been doing well since hospital discharge on 9/12/17.     Chief Complaint:  Congestive Heart Failure   Presents for follow-up visit. Associated symptoms include edema and shortness of breath. Pertinent negatives include no abdominal pain, chest pain, chest pressure, claudication, fatigue, muscle weakness, near-syncope, nocturia, orthopnea, palpitations, paroxysmal nocturnal dyspnea or unexpected weight change. The symptoms have been stable.   Atrial Fibrillation   Presents for follow-up visit. Symptoms include shortness of breath. Symptoms are negative for an AICD problem, bradycardia, chest pain, dizziness, hemodynamic instability, hypertension, hypotension, pacemaker problem, palpitations, syncope, tachycardia and weakness. The symptoms have been stable. Past medical history includes atrial fibrillation, CHF and hyperlipidemia.   Hypertension   This is a chronic problem. The current episode started more than 1 year ago. The problem is controlled. Associated symptoms include peripheral edema and shortness of breath. Pertinent negatives include no anxiety, blurred vision, chest pain, headaches,  malaise/fatigue, neck pain, orthopnea, palpitations, PND or sweats. Risk factors for coronary artery disease include dyslipidemia and male gender. Hypertensive end-organ damage includes kidney disease, CAD/MI, heart failure and PVD.   Hyperlipidemia   This is a chronic problem. The current episode started more than 1 year ago. The problem is controlled. Recent lipid tests were reviewed and are normal. Associated symptoms include shortness of breath. Pertinent negatives include no chest pain, focal sensory loss, focal weakness, leg pain or myalgias. Current antihyperlipidemic treatment includes statins. The current treatment provides significant improvement of lipids. There are no compliance problems.  Risk factors for coronary artery disease include dyslipidemia, hypertension and male sex.       The following portions of the patient's history were reviewed and updated as appropriate: allergies, current medications, past family history, past medical history, past social history, past surgical history and problem list.     Allergies   Allergen Reactions   • Keflex [Cephalexin]      CEPHALOSPORINS       Current Outpatient Prescriptions:   •  albuterol (ACCUNEB) 1.25 MG/3ML nebulizer solution, Take 3 mL by nebulization Every 6 (Six) Hours As Needed for Wheezing or Shortness of Air., Disp: , Rfl: 12  •  aspirin 81 MG EC tablet, Take 81 mg by mouth Daily., Disp: , Rfl:   •  atorvastatin (LIPITOR) 10 MG tablet, Take 1 tablet by mouth Every Night., Disp: , Rfl:   •  bumetanide (BUMEX) 1 MG tablet, Take 0.5 tablets by mouth 2 (Two) Times a Day., Disp: 30 tablet, Rfl: 11  •  carvedilol (COREG) 12.5 MG tablet, Take 12.5 mg by mouth Every Morning., Disp: , Rfl:   •  carvedilol (COREG) 6.25 MG tablet, Take 1 tablet by mouth 2 (Two) Times a Day. (Patient taking differently: Take 6.25 mg by mouth Every Evening.), Disp: 180 tablet, Rfl: 3  •  dutasteride (AVODART) 0.5 MG capsule, Take 0.5 mg by mouth Daily., Disp: , Rfl:   •   gabapentin (NEURONTIN) 400 MG capsule, Take 1 capsule by mouth Daily., Disp: , Rfl:   •  HYDROcodone-acetaminophen (NORCO) 7.5-325 MG per tablet, Take 1 tablet by mouth Every 8 (Eight) Hours As Needed for Moderate Pain ., Disp: , Rfl:   •  lisinopril (PRINIVIL,ZESTRIL) 5 MG tablet, Take 2.5 mg by mouth Daily., Disp: , Rfl:   •  LORazepam (ATIVAN) 1 MG tablet, Take 1 tablet by mouth 4 (Four) Times a Day., Disp: 30 tablet, Rfl:   •  nitroglycerin (NITROSTAT) 0.4 MG SL tablet, Place 0.4 mg under the tongue Every 5 (Five) Minutes As Needed for Chest Pain. Take no more than 3 doses in 15 minutes., Disp: , Rfl:   •  rOPINIRole (REQUIP) 1 MG tablet, Take 1 tablet by mouth Every Night. Take 1 hour before bedtime. (Patient taking differently: Take 0.5 mg by mouth Every Night. Take 1 hour before bedtime.), Disp: , Rfl:   •  tamsulosin (FLOMAX) 0.4 MG capsule 24 hr capsule, Take 1 capsule by mouth Every Night., Disp: , Rfl:   •  tiotropium (SPIRIVA HANDIHALER) 18 MCG per inhalation capsule, Place 1 capsule into inhaler and inhale Daily., Disp: , Rfl:   Past Medical History:   Diagnosis Date   • Abdominal aortic aneurysm    • Anxiety    • Atrial fibrillation    • Biventricular ICD (implantable cardioverter-defibrillator) in place    • BPH (benign prostatic hypertrophy)    • Carotid artery stenosis    • CHF (congestive heart failure)    • Chronic kidney disease     Chronic kidney disease, stage 4 (severe)   • Chronic systolic (congestive) heart failure     limited echo 7/2016 EF was 40-45%. Patient has BiV AICD   • Coronary arteriosclerosis     MI x2   • Hearing loss    • Iron deficiency anemia    • Ischemic cardiomyopathy    • Mixed hyperlipidemia    • Myocardial infarction    • Stroke      Past Surgical History:   Procedure Laterality Date   • CARDIAC ABLATION     • CARDIAC CATHETERIZATION     • CARDIAC PACEMAKER PLACEMENT     • CARDIOVERSION     • CAROTID ENDARTERECTOMY     • CORONARY ARTERY BYPASS GRAFT     • MITRAL VALVE  "REPLACEMENT     • TOTAL KNEE ARTHROPLASTY       Family History   Problem Relation Age of Onset   • Stroke Mother    • Heart disease Mother    • Diabetes Father      Social History     Social History   • Marital status:      Spouse name: N/A   • Number of children: N/A   • Years of education: N/A     Occupational History   • Not on file.     Social History Main Topics   • Smoking status: Former Smoker   • Smokeless tobacco: Never Used   • Alcohol use No   • Drug use: No   • Sexual activity: Defer     Other Topics Concern   • Not on file     Social History Narrative           Review of Systems   Constitution: Negative for chills, decreased appetite, fatigue, fever, weakness, malaise/fatigue, night sweats, unexpected weight change, weight gain and weight loss.   HENT: Negative for nosebleeds.    Eyes: Negative for blurred vision and visual disturbance.   Cardiovascular: Positive for dyspnea on exertion and leg swelling. Negative for chest pain, claudication, near-syncope, orthopnea, palpitations, paroxysmal nocturnal dyspnea and syncope.   Respiratory: Positive for shortness of breath. Negative for cough, hemoptysis, snoring and wheezing.    Endocrine: Negative for cold intolerance and heat intolerance.   Hematologic/Lymphatic: Does not bruise/bleed easily.   Skin: Negative for rash.   Musculoskeletal: Negative for back pain, falls, muscle weakness, myalgias and neck pain.   Gastrointestinal: Negative for abdominal pain, change in bowel habit, constipation, diarrhea, dysphagia, heartburn, nausea and vomiting.   Genitourinary: Negative for hematuria and nocturia.   Neurological: Negative for dizziness, focal weakness, headaches and light-headedness.   Psychiatric/Behavioral: Negative for altered mental status.   Allergic/Immunologic: Negative for persistent infections.       Procedures      BP 98/56 (BP Location: Right arm, Patient Position: Sitting, Cuff Size: Adult)  Pulse 88  Resp 18  Ht 74\" (188 cm)  Wt " 164 lb (74.4 kg)  SpO2 98%  BMI 21.06 kg/m2    Objective:     Physical Exam   Constitutional: He is oriented to person, place, and time. Vital signs are normal. He appears well-developed and well-nourished. No distress.   HENT:   Head: Normocephalic and atraumatic.   Right Ear: External ear normal.   Left Ear: External ear normal.   Eyes: Conjunctivae are normal. Pupils are equal, round, and reactive to light. Right eye exhibits no discharge. Left eye exhibits no discharge.   Neck: Normal range of motion. Neck supple. No JVD present. Carotid bruit is not present. No thyromegaly present.   Cardiovascular: Normal rate, regular rhythm, normal heart sounds and intact distal pulses.  PMI is not displaced.  Exam reveals no gallop, no friction rub and no decreased pulses.    No murmur heard.  Pulses:       Radial pulses are 2+ on the right side, and 2+ on the left side.        Dorsalis pedis pulses are 2+ on the right side, and 2+ on the left side.        Posterior tibial pulses are 2+ on the right side, and 2+ on the left side.   Pulmonary/Chest: Effort normal. No respiratory distress. He has no decreased breath sounds. He has no wheezes. He has no rhonchi. He has rales in the right lower field and the left lower field. He exhibits no tenderness.   Abdominal: Soft. Bowel sounds are normal. He exhibits no distension. There is no tenderness.   Musculoskeletal: Normal range of motion. He exhibits no edema.   Neurological: He is alert and oriented to person, place, and time.   Skin: Skin is warm and dry. No rash noted. He is not diaphoretic. No erythema. No pallor.   Psychiatric: He has a normal mood and affect. His behavior is normal. Judgment and thought content normal.   Vitals reviewed.      Lab Review:   Lab Results   Component Value Date    WBC 5.60 09/08/2017    HGB 12.9 (L) 09/08/2017    HCT 41.3 09/08/2017    MCV 91.0 09/08/2017     09/08/2017     Lab Results   Component Value Date    GLUCOSE 87 09/12/2017     BUN 41 (H) 09/12/2017    CREATININE 1.65 (H) 09/12/2017    EGFRIFNONA 40 (L) 09/12/2017    BCR 24.8 09/12/2017    K 3.9 09/12/2017    CO2 29.0 09/12/2017    CALCIUM 9.1 09/12/2017    ALBUMIN 4.70 09/08/2017    LABIL2 1.6 09/08/2017    AST 40 09/08/2017    ALT 47 09/08/2017     Lab Results   Component Value Date    CHOL 126 (L) 09/09/2017     Lab Results   Component Value Date    TRIG 114 09/09/2017     Lab Results   Component Value Date    HDL 62 09/09/2017     Lab Results   Component Value Date    LDLDIRECT 49 09/09/2017           Assessment:          Diagnosis Plan   1. Chronic systolic congestive heart failure  Compensated.    2. Coronary arteriosclerosis  No clinical signs of ischemia.    3. Persistent atrial fibrillation  Rate controlled.    4. History of GI bleed  Not on anticoagulation.    5. PAD (peripheral artery disease)     6. SSS (sick sinus syndrome)     7. Biventricular ICD (implantable cardioverter-defibrillator) in place  Interrogated 10/3/17, Follow-up in 01/18.    8. Essential hypertension  Well controlled.    9. H/O mitral valve repair     10. Bilateral carotid artery disease     11. Stage 4 chronic kidney disease     12. Mixed hyperlipidemia  Well controlled.    13. History of CVA (cerebrovascular accident)            Plan:       1. Continue medications as previously prescribed.  2. Report any worsening symptoms.  3. Reviewed signs and symptoms of CHF and what to report with the patient. Patient instructed to restrict sodium and weigh daily. Report weight gain of greater than 2 lbs overnight or 5 lbs in 1 week. Pt verbalized understanding of instructions and plan of care.   4. Follow up with PCP for blood pressure and cholesterol management and routine lab work.  5. Follow up with pacemaker clinic in January.   6. Keep scheduled follow up with Dr. Macdonald on 12/4/17, or sooner if needed.

## 2017-11-01 ENCOUNTER — PATIENT MESSAGE (OUTPATIENT)
Dept: INTERNAL MEDICINE | Age: 82
End: 2017-11-01

## 2017-11-01 ENCOUNTER — CLINICAL SUPPORT (OUTPATIENT)
Dept: CARDIOLOGY | Facility: CLINIC | Age: 82
End: 2017-11-01

## 2017-11-01 ENCOUNTER — TELEPHONE (OUTPATIENT)
Dept: CARDIOLOGY | Facility: CLINIC | Age: 82
End: 2017-11-01

## 2017-11-01 DIAGNOSIS — Z95.810 BIVENTRICULAR ICD (IMPLANTABLE CARDIOVERTER-DEFIBRILLATOR) IN PLACE: ICD-10-CM

## 2017-11-01 DIAGNOSIS — I25.119 CORONARY ARTERY DISEASE INVOLVING NATIVE HEART WITH ANGINA PECTORIS, UNSPECIFIED VESSEL OR LESION TYPE (HCC): Primary | ICD-10-CM

## 2017-11-01 NOTE — TELEPHONE ENCOUNTER
Pharmacist called from [facility] for clarification of patients meds as they do his pill box.    He stated off of Amiodarone. I show where it is D/C'd  Coreg is the bigger question. Pt and Pts daughter is stating that it was increased from 6.25. I don't show that it was and there was not script done with any new dosing.    He also told them that he was starting to take a thinner. There is not a thinner listed and there was not a script done. I told her that it might have been discussed but had a HX of GI Bleed so he probably misunderstood.  Can you please clarify.

## 2017-11-02 ENCOUNTER — PATIENT MESSAGE (OUTPATIENT)
Dept: INTERNAL MEDICINE | Age: 82
End: 2017-11-02

## 2017-11-02 RX ORDER — ISOSORBIDE MONONITRATE 30 MG/1
30 TABLET, EXTENDED RELEASE ORAL DAILY
Qty: 30 TABLET | Refills: 3 | Status: SHIPPED | OUTPATIENT
Start: 2017-11-02 | End: 2018-02-23 | Stop reason: SDUPTHER

## 2017-11-02 NOTE — TELEPHONE ENCOUNTER
From: Adore Solano  To: Cash Vega MD  Sent: 11/2/2017 12:12 PM CDT  Subject: Visit Madeline Baron,   Also considering changing cardiologist, my father not thrilled with Dr. Mynor Harrington,     Do you have someone you prefer?      We really need a plan from him on the afib,    thanks so much  Tonyola Yuliana

## 2017-11-03 DIAGNOSIS — D64.9 ANEMIA, UNSPECIFIED TYPE: ICD-10-CM

## 2017-11-03 DIAGNOSIS — I25.119 CORONARY ARTERY DISEASE INVOLVING NATIVE HEART WITH ANGINA PECTORIS, UNSPECIFIED VESSEL OR LESION TYPE (HCC): Primary | ICD-10-CM

## 2017-11-03 DIAGNOSIS — I48.19 PERSISTENT ATRIAL FIBRILLATION (HCC): ICD-10-CM

## 2017-11-03 RX ORDER — GABAPENTIN 100 MG/1
200 CAPSULE ORAL NIGHTLY
COMMUNITY
End: 2017-11-03

## 2017-11-03 RX ORDER — LORAZEPAM 0.5 MG/1
0.5 TABLET ORAL 4 TIMES DAILY
Qty: 120 TABLET | Refills: 0 | Status: SHIPPED | OUTPATIENT
Start: 2017-11-03 | End: 2017-11-30 | Stop reason: SDUPTHER

## 2017-11-03 RX ORDER — GABAPENTIN 100 MG/1
200 CAPSULE ORAL DAILY
Qty: 60 CAPSULE | Refills: 3 | Status: SHIPPED | OUTPATIENT
Start: 2017-11-03 | End: 2017-11-09

## 2017-11-03 RX ORDER — HYDROCODONE BITARTRATE AND ACETAMINOPHEN 5; 325 MG/1; MG/1
1 TABLET ORAL 2 TIMES DAILY PRN
Qty: 60 TABLET | Refills: 0 | Status: SHIPPED | OUTPATIENT
Start: 2017-11-03 | End: 2017-11-30 | Stop reason: SDUPTHER

## 2017-11-03 RX ORDER — HYDROCODONE BITARTRATE AND ACETAMINOPHEN 7.5; 325 MG/1; MG/1
1 TABLET ORAL EVERY EVENING
Qty: 30 TABLET | Refills: 0 | Status: SHIPPED | OUTPATIENT
Start: 2017-11-03 | End: 2017-11-30 | Stop reason: SDUPTHER

## 2017-11-03 RX ORDER — GABAPENTIN 400 MG/1
400 CAPSULE ORAL DAILY
COMMUNITY
End: 2017-11-03

## 2017-11-03 NOTE — TELEPHONE ENCOUNTER
His daughter is asking if they can bring him in for labs on Monday. I did not see any orders for labs?    [phone]

## 2017-11-03 NOTE — TELEPHONE ENCOUNTER
Dr. Adriana Perry had increased this pts coreg dose prior to me seeing him. It was increased from 6.25 BID to 12.5 mg in the morning and 6.25 mg at night. You were correct in saying that he has not been put on a blood thinner due to history of GI bleed. Thanks.    Isabel

## 2017-11-04 NOTE — PROGRESS NOTES
Bi-V AICD Evaluation Report  [facility]    November 4, 2017    Primary Cardiologist: Renae  : Guidant Model: [employer]  Implant date: 7/8/10    Reason for evaluation: routine  Indication for AICD: congestive heart failure    Measurements  Atrial sensing:  P wave: 0.6 mV  Atrial threshold: n/r  Atrial lead impedance: 568 ohms  Ventricular sensing:  R wave: n/r  RV Threshold:  n/r  RV Impedance:  360 ohms  Shock impedance:  RV 48 ohms     LV Threshold:  n/r  LV Impedance:  837 ohms      Diagnostic Data  Atrial paced: 0 %  Ventricular paced:  %  Other: atrial flutter/fibrillation noted; pt not anticoagulated d/t h/o GIB.  Battery status: satisfactory       Final Parameters  Mode: DDDR     Lower rate: 70 bpm   Upper rate: 125 bpm  AV Delay: 170-180 ms paced    115-120 ms sensed   Atrial - Amplitude: 2.4 V   Pulse width: 0.5 ms   Sensitivity: 0.15 mV   Ventricular:  RV Amplitude: 2.2 V @ 0.5 ms   Sensitivity: 0.6 mV            LV Amplitude:  1.8V @ 0.5ms  Changes made: 1.5  Conclusions: normal AICD function    Follow up: 3 months

## 2017-11-06 DIAGNOSIS — I25.119 CORONARY ARTERY DISEASE INVOLVING NATIVE HEART WITH ANGINA PECTORIS, UNSPECIFIED VESSEL OR LESION TYPE (HCC): ICD-10-CM

## 2017-11-06 DIAGNOSIS — D64.9 ANEMIA, UNSPECIFIED TYPE: ICD-10-CM

## 2017-11-06 DIAGNOSIS — I48.19 PERSISTENT ATRIAL FIBRILLATION (HCC): ICD-10-CM

## 2017-11-06 LAB
ALBUMIN SERPL-MCNC: 4.2 G/DL (ref 3.5–5.2)
ALP BLD-CCNC: 77 U/L (ref 40–130)
ALT SERPL-CCNC: 12 U/L (ref 5–41)
ANION GAP SERPL CALCULATED.3IONS-SCNC: 16 MMOL/L (ref 7–19)
AST SERPL-CCNC: 15 U/L (ref 5–40)
BILIRUB SERPL-MCNC: 0.9 MG/DL (ref 0.2–1.2)
BUN BLDV-MCNC: 35 MG/DL (ref 8–23)
CALCIUM SERPL-MCNC: 9.3 MG/DL (ref 8.8–10.2)
CHLORIDE BLD-SCNC: 100 MMOL/L (ref 98–111)
CHOLESTEROL, TOTAL: 137 MG/DL (ref 160–199)
CO2: 27 MMOL/L (ref 22–29)
CREAT SERPL-MCNC: 2.2 MG/DL (ref 0.5–1.2)
FOLATE: 19.3 NG/ML (ref 4.5–32.2)
GFR NON-AFRICAN AMERICAN: 29
GLUCOSE BLD-MCNC: 103 MG/DL (ref 74–109)
HCT VFR BLD CALC: 38.3 % (ref 42–52)
HDLC SERPL-MCNC: 67 MG/DL (ref 55–121)
HEMOGLOBIN: 12 G/DL (ref 14–18)
IRON: 106 UG/DL (ref 59–158)
LDL CHOLESTEROL CALCULATED: 51 MG/DL
MCH RBC QN AUTO: 29.1 PG (ref 27–31)
MCHC RBC AUTO-ENTMCNC: 31.3 G/DL (ref 33–37)
MCV RBC AUTO: 92.7 FL (ref 80–94)
PDW BLD-RTO: 15.5 % (ref 11.5–14.5)
PLATELET # BLD: 153 K/UL (ref 130–400)
PMV BLD AUTO: 11.5 FL (ref 9.4–12.4)
POTASSIUM SERPL-SCNC: 4.7 MMOL/L (ref 3.5–5)
RBC # BLD: 4.13 M/UL (ref 4.7–6.1)
SODIUM BLD-SCNC: 143 MMOL/L (ref 136–145)
TOTAL PROTEIN: 7 G/DL (ref 6.6–8.7)
TRIGL SERPL-MCNC: 94 MG/DL (ref 0–149)
TSH SERPL DL<=0.05 MIU/L-ACNC: 5.88 UIU/ML (ref 0.27–4.2)
VITAMIN B-12: 803 PG/ML (ref 211–946)
WBC # BLD: 5.5 K/UL (ref 4.8–10.8)

## 2017-11-06 RX ORDER — NITROGLYCERIN 0.4 MG/1
TABLET SUBLINGUAL
Qty: 25 TABLET | Refills: 3 | Status: SHIPPED | OUTPATIENT
Start: 2017-11-06

## 2017-11-07 PROBLEM — G89.29 CHRONIC PAIN: Status: ACTIVE | Noted: 2017-11-07

## 2017-11-07 ASSESSMENT — ENCOUNTER SYMPTOMS
COUGH: 0
SINUS PRESSURE: 0
EYE REDNESS: 0
ABDOMINAL DISTENTION: 0
EYE DISCHARGE: 0
BACK PAIN: 1
CHEST TIGHTNESS: 1
ABDOMINAL PAIN: 0
SHORTNESS OF BREATH: 1

## 2017-11-09 ENCOUNTER — OFFICE VISIT (OUTPATIENT)
Dept: INTERNAL MEDICINE | Age: 82
End: 2017-11-09
Payer: MEDICARE

## 2017-11-09 VITALS
DIASTOLIC BLOOD PRESSURE: 58 MMHG | SYSTOLIC BLOOD PRESSURE: 118 MMHG | WEIGHT: 163 LBS | OXYGEN SATURATION: 96 % | HEART RATE: 76 BPM | BODY MASS INDEX: 20.92 KG/M2 | RESPIRATION RATE: 18 BRPM | HEIGHT: 74 IN

## 2017-11-09 DIAGNOSIS — I48.19 PERSISTENT ATRIAL FIBRILLATION (HCC): ICD-10-CM

## 2017-11-09 DIAGNOSIS — N18.4 STAGE 4 CHRONIC KIDNEY DISEASE (HCC): ICD-10-CM

## 2017-11-09 DIAGNOSIS — Z91.81 AT HIGH RISK FOR FALLS: ICD-10-CM

## 2017-11-09 DIAGNOSIS — I25.9 CHEST PAIN DUE TO MYOCARDIAL ISCHEMIA, UNSPECIFIED ISCHEMIC CHEST PAIN TYPE: ICD-10-CM

## 2017-11-09 DIAGNOSIS — I25.119 CORONARY ARTERY DISEASE INVOLVING NATIVE HEART WITH ANGINA PECTORIS, UNSPECIFIED VESSEL OR LESION TYPE (HCC): Primary | ICD-10-CM

## 2017-11-09 PROCEDURE — G8427 DOCREV CUR MEDS BY ELIG CLIN: HCPCS | Performed by: INTERNAL MEDICINE

## 2017-11-09 PROCEDURE — G8420 CALC BMI NORM PARAMETERS: HCPCS | Performed by: INTERNAL MEDICINE

## 2017-11-09 PROCEDURE — 1123F ACP DISCUSS/DSCN MKR DOCD: CPT | Performed by: INTERNAL MEDICINE

## 2017-11-09 PROCEDURE — 1036F TOBACCO NON-USER: CPT | Performed by: INTERNAL MEDICINE

## 2017-11-09 PROCEDURE — 4040F PNEUMOC VAC/ADMIN/RCVD: CPT | Performed by: INTERNAL MEDICINE

## 2017-11-09 PROCEDURE — 99214 OFFICE O/P EST MOD 30 MIN: CPT | Performed by: INTERNAL MEDICINE

## 2017-11-09 PROCEDURE — G8484 FLU IMMUNIZE NO ADMIN: HCPCS | Performed by: INTERNAL MEDICINE

## 2017-11-09 PROCEDURE — G8598 ASA/ANTIPLAT THER USED: HCPCS | Performed by: INTERNAL MEDICINE

## 2017-11-09 RX ORDER — GABAPENTIN 300 MG/1
CAPSULE ORAL
Qty: 60 CAPSULE | Refills: 3 | Status: SHIPPED | OUTPATIENT
Start: 2017-11-09 | End: 2018-03-13 | Stop reason: SDUPTHER

## 2017-11-09 ASSESSMENT — ENCOUNTER SYMPTOMS
CHEST TIGHTNESS: 1
COUGH: 0
EYE DISCHARGE: 0
SINUS PRESSURE: 0
SHORTNESS OF BREATH: 1
BACK PAIN: 1
ABDOMINAL DISTENTION: 1
EYE REDNESS: 0
ABDOMINAL PAIN: 1

## 2017-11-09 NOTE — PROGRESS NOTES
Chief Complaint   Patient presents with    Coronary Artery Disease     Mr. Marylyn Castleman is here today for a follow up for CAD. HPI: Patient is here today to follow-up atrial fibrillation chronic pain syndrome chronic right rotator cuff tear recent issues with ckd, atrial fib, cad. He c/o burning in his feet/ restless in his feet at night. Legs hurt low his knees mainly in his feet and lower leg with some burning numbness tingling restlessness. Affecting his sleep to the last 3 nights he did not sleep much at all he says he had an episode of lightheadedness dizziness today while he was at breakfast. He did not faint but felt like he might. The episodes of abdominal  tightness have gone away today he had a little bit of a tightness in the chest and neck but this too has improved since adding Imdur and increasing Coreg. Past Medical History:   Diagnosis Date    AICD (automatic cardioverter/defibrillator) present     [place]    Arm pain 2/3/2012    Atrial fibrillation ([address]     Benign prostatic hypertrophy     CAD (coronary artery disease)     Chronic kidney disease     Chronic pain 11/7/2017    Gallstones     Hyperlipidemia     Hypertension     Osteoarthritis of right knee     Right rotator cuff tear     Ventricular tachycardia ([address]        Past Surgical History:   Procedure Laterality Date    APPENDECTOMY      CARDIAC PACEMAKER PLACEMENT      Bi ventricular pacemaker. [facility] CARDIAC SURGERY      mitral valve, maze procedure, 1 vessel bypass -2008    CAROTID ENDARTERECTOMY      Right carotid endarterectomy.  COLONOSCOPY      CORONARY ARTERY BYPASS GRAFT      1 vessel 2008    MOUTH SURGERY      Oral implants.     PACEMAKER INSERTION      biventricular pacemaker ([employer])       Family History   Problem Relation Age of Onset    Stroke Mother     Heart Attack Mother 80    Diabetes Father     COPD Sister     Arthritis Sister     Stroke Brother     Heart Disease Imdur. He says that has not happened except maybe a little bit this morning when he had an episode of lightheadedness and slight chest tightness. He was also having upper abdominal pain this too has resolved. Ideally he should have a stress test and/or cardiac cath but due to his chronic kidney disease and COPD medical management seems most appropriate. 2. At high risk for falls  Caution against falling   3. Persistent atrial fibrillation (HCC)  Pt at high risk for anticoagulation but need to consult with cardiology also- he is new to me-- His chart indicates history of GI bleed but he says he has not had that. He did have a lot of trouble with hematuria due to his prostate a few years ago. He is also a fall risk. He is also not a good candidate for the newer anticoagulant therapy due to his CKD. For now cont with aspirin therapy- risk with any plan. 4. Chest pain due to myocardial ischemia, unspecified ischemic chest pain type ([address]  Continue to medically manage based on hip bp control also (some hypotension)   5. Stage 4 chronic kidney disease (HCC)  Stable to slight worsening - f/u - hydrate               On the basis of positive falls risk screening, assessment and plan is as follows: caution and use railings/ walker/ cane as needed.

## 2017-11-16 ENCOUNTER — TELEPHONE (OUTPATIENT)
Dept: INTERNAL MEDICINE | Age: 82
End: 2017-11-16

## 2017-11-16 DIAGNOSIS — R35.0 BENIGN PROSTATIC HYPERPLASIA WITH URINARY FREQUENCY: ICD-10-CM

## 2017-11-16 DIAGNOSIS — N40.1 BENIGN PROSTATIC HYPERPLASIA WITH URINARY FREQUENCY: ICD-10-CM

## 2017-11-16 RX ORDER — TAMSULOSIN HYDROCHLORIDE 0.4 MG/1
0.4 CAPSULE ORAL DAILY
Qty: 90 CAPSULE | Refills: 3 | Status: SHIPPED | OUTPATIENT
Start: 2017-11-16 | End: 2018-08-20 | Stop reason: SINTOL

## 2017-11-16 NOTE — TELEPHONE ENCOUNTER
[facility] needs a rx for flomax. 4.  They state this is a new Dr. Princess Cummins patient and they received the requests from another doctor & the pharmacy needs it from Dr. Princess Cummins

## 2017-11-22 RX ORDER — ATORVASTATIN CALCIUM 10 MG/1
TABLET, FILM COATED ORAL
Qty: 90 TABLET | Refills: 2 | Status: SHIPPED | OUTPATIENT
Start: 2017-11-22 | End: 2018-08-17 | Stop reason: SDUPTHER

## 2017-11-30 ENCOUNTER — OFFICE VISIT (OUTPATIENT)
Dept: INTERNAL MEDICINE | Age: 82
End: 2017-11-30
Payer: MEDICARE

## 2017-11-30 VITALS
SYSTOLIC BLOOD PRESSURE: 100 MMHG | WEIGHT: 168 LBS | BODY MASS INDEX: 21.57 KG/M2 | HEART RATE: 79 BPM | DIASTOLIC BLOOD PRESSURE: 70 MMHG | OXYGEN SATURATION: 96 %

## 2017-11-30 DIAGNOSIS — R35.0 BENIGN PROSTATIC HYPERPLASIA WITH URINARY FREQUENCY: ICD-10-CM

## 2017-11-30 DIAGNOSIS — F41.9 ANXIETY: ICD-10-CM

## 2017-11-30 DIAGNOSIS — G25.81 RESTLESS LEGS: ICD-10-CM

## 2017-11-30 DIAGNOSIS — I25.9 CHEST PAIN DUE TO MYOCARDIAL ISCHEMIA, UNSPECIFIED ISCHEMIC CHEST PAIN TYPE: ICD-10-CM

## 2017-11-30 DIAGNOSIS — G60.9 IDIOPATHIC NEUROPATHY: ICD-10-CM

## 2017-11-30 DIAGNOSIS — I25.119 CORONARY ARTERY DISEASE INVOLVING NATIVE HEART WITH ANGINA PECTORIS, UNSPECIFIED VESSEL OR LESION TYPE (HCC): ICD-10-CM

## 2017-11-30 DIAGNOSIS — F51.01 PRIMARY INSOMNIA: ICD-10-CM

## 2017-11-30 DIAGNOSIS — I48.19 PERSISTENT ATRIAL FIBRILLATION (HCC): ICD-10-CM

## 2017-11-30 DIAGNOSIS — N40.1 BENIGN PROSTATIC HYPERPLASIA WITH URINARY FREQUENCY: ICD-10-CM

## 2017-11-30 DIAGNOSIS — G89.4 CHRONIC PAIN SYNDROME: Primary | ICD-10-CM

## 2017-11-30 PROCEDURE — 1123F ACP DISCUSS/DSCN MKR DOCD: CPT | Performed by: INTERNAL MEDICINE

## 2017-11-30 PROCEDURE — 4040F PNEUMOC VAC/ADMIN/RCVD: CPT | Performed by: INTERNAL MEDICINE

## 2017-11-30 PROCEDURE — 1036F TOBACCO NON-USER: CPT | Performed by: INTERNAL MEDICINE

## 2017-11-30 PROCEDURE — G8484 FLU IMMUNIZE NO ADMIN: HCPCS | Performed by: INTERNAL MEDICINE

## 2017-11-30 PROCEDURE — G8420 CALC BMI NORM PARAMETERS: HCPCS | Performed by: INTERNAL MEDICINE

## 2017-11-30 PROCEDURE — 99214 OFFICE O/P EST MOD 30 MIN: CPT | Performed by: INTERNAL MEDICINE

## 2017-11-30 PROCEDURE — G8598 ASA/ANTIPLAT THER USED: HCPCS | Performed by: INTERNAL MEDICINE

## 2017-11-30 PROCEDURE — 93000 ELECTROCARDIOGRAM COMPLETE: CPT | Performed by: INTERNAL MEDICINE

## 2017-11-30 PROCEDURE — G8427 DOCREV CUR MEDS BY ELIG CLIN: HCPCS | Performed by: INTERNAL MEDICINE

## 2017-11-30 NOTE — PROGRESS NOTES
Chief Complaint   Patient presents with    1 Month Follow-Up     follow up on cad       HPI: Patient is here today to follow-up medical problems he gets a lower chest upper abdomen tightness at times. It is happened several times after doing sit ups spelled he says he does a 1000 (or so)  when I ask him how he could do this or how long it takes him he says it takes 20 minutes or so and it sounds like he doesn't with some sort of machine or holding onto a bar. He walks somewhere he lives. He says he doesn't sleep for several nights in a row. His legs hurt they're burning at times. Restless. He is up and down through the night because of his legs. His chest pain mainly occurs at current after doing the sit ups. No recent palpitations when he checks his blood pressure his heart rate has tended to be lower than 100 recently. Past Medical History:   Diagnosis Date    AICD (automatic cardioverter/defibrillator) present     [place]    Arm pain 2/3/2012    Atrial fibrillation ([address]     Benign prostatic hyperplasia with lower urinary tract symptoms 11/16/2017    Benign prostatic hypertrophy     CAD (coronary artery disease)     Chronic kidney disease     Chronic pain 11/7/2017    Gallstones     Hyperlipidemia     Hypertension     Idiopathic neuropathy     Osteoarthritis of right knee     Restless legs     Right rotator cuff tear     Ventricular tachycardia ([address]        Past Surgical History:   Procedure Laterality Date    APPENDECTOMY      CARDIAC PACEMAKER PLACEMENT      Bi ventricular pacemaker. [facility] CARDIAC SURGERY      mitral valve, maze procedure, 1 vessel bypass -2008    CAROTID ENDARTERECTOMY      Right carotid endarterectomy.  COLONOSCOPY      CORONARY ARTERY BYPASS GRAFT      1 vessel 2008    MOUTH SURGERY      Oral implants.     PACEMAKER INSERTION      biventricular pacemaker ([employer])       Family History   Problem Relation Age of Onset    Stroke Mother    [facility] Heart Attack Mother 80    Diabetes Father     COPD Sister     Arthritis Sister     Stroke Brother     Heart Disease Brother        Social History     Social History    Marital status:      Spouse name: N/A    Number of children: N/A    Years of education: N/A     Occupational History    Not on file. Social History Main Topics    Smoking status: Former Smoker    Smokeless tobacco: Never Used    Alcohol use No    Drug use: No    Sexual activity: No      Comment: Marital status - . Other Topics Concern    Not on file     Social History Narrative    No narrative on file       Allergies   Allergen Reactions    Keflex [Cephalexin] Rash       Current Outpatient Prescriptions   Medication Sig Dispense Refill    HYDROcodone-acetaminophen (NORCO) 5-325 MG per tablet TAKE ONE TABLET BY MOUTH TWICE A DAY AS NEEDED    **MAY MAKE DROWSY** 60 tablet 0    HYDROcodone-acetaminophen (NORCO) 7.5-325 MG per tablet TAKE ONE TABLET BY MOUTH EVERY EVENING **MAY MAKE DROWSY** 30 tablet 0    LORazepam (ATIVAN) 0.5 MG tablet TAKE ONE TABLET BY MOUTH FOUR TIMES A DAY **MAY   MAKE DROWSY** 120 tablet 0    lisinopril (PRINIVIL;ZESTRIL) 5 MG tablet Take 0.5 tablets by mouth daily 30 tablet 5    atorvastatin (LIPITOR) 10 MG tablet TAKE ONE TABLET BY MOUTH EVERY DAY 90 tablet 2    tamsulosin (FLOMAX) 0.4 MG capsule Take 1 capsule by mouth daily 90 capsule 3    gabapentin (NEURONTIN) 300 MG capsule Take 300mg at 5 pm and take 300mg at bedtime 60 capsule 3    isosorbide mononitrate (IMDUR) 30 MG extended release tablet Take 1 tablet by mouth daily CAN YOU PLEASE ADD THIS TO PATIENTS PILL BOX STARTING TOMORROW - DELIVER TO PATIENT - THANK YOU (HE GETS PREFILLED PILL BOX ON mONDAY- WOULD NEED ADDED FOR Friday/SAT/ SUN) 30 tablet 3    carvedilol (COREG) 12.5 MG tablet Take 12.5 mg by mouth See Admin Instructions 12.5 mg every morning, 6.25 mg every evening.       bumetanide (BUMEX) 1 MG tablet Take 0.5 mg by mouth      nitroGLYCERIN (NITROSTAT) 0.4 MG SL tablet Place 0.4 mg under the tongue      tiotropium (SPIRIVA) 18 MCG inhalation capsule Inhale 1 capsule into the lungs      rOPINIRole (REQUIP) 0.5 MG tablet Take 0.5 mg by mouth nightly      aspirin 81 MG tablet Take 81 mg by mouth daily.  dutasteride (AVODART) 0.5 MG capsule Take 0.5 mg by mouth daily. No current facility-administered medications for this visit. Review of Systems   Constitutional: Positive for fatigue. Negative for chills and fever. HENT: Negative for congestion and sinus pressure. Eyes: Negative for discharge and redness. Respiratory: Positive for chest tightness and shortness of breath. Negative for cough. Cardiovascular: Positive for chest pain. Negative for palpitations and leg swelling. Gastrointestinal: Negative for abdominal distention and abdominal pain. Genitourinary: Positive for frequency. Musculoskeletal: Positive for arthralgias and myalgias. Negative for back pain. Skin: Negative for rash and wound. Neurological: Negative for dizziness, light-headedness and headaches. Psychiatric/Behavioral: Positive for sleep disturbance. Negative for dysphoric mood. The patient is nervous/anxious and is hyperactive. /70   Pulse 79   Wt 168 lb (76.2 kg)   SpO2 96%   BMI 21.57 kg/m²     Physical Exam patient is very dense sclera anicteric neck is supple heart S1 is to lungs are distant EKG was sinus rhythm with a pacer. Extremities without edema. Abdomen soft nontender. ASSESSMENT/ PLAN:  1. Chronic pain. Patient was on a large amount of narcotics and benzodiazepines. He was admitted to the hospital and these were tapered back and then saw him as a new patient we have not proceeded with further tapering as his meds have been cut back by 1/2 or more (and he is still taking more than at times than he is supposed to per his own admission).  A lot of his symptoms seem more consistent with restless legs but his family do not want him on Requip or that class of medication. It sounds like he had some issues with this medicine. We are continuing his current meds. There is some confusion about the gabapentin he thinks it's causing the burning in his feet but that is why he is taking the medicine --however we are fine with him cutting it back to just 1 pill at bedtime--as many pills as we can get rid of the better. We would like to decrease his narcotics but we will wait a little longer on that as he is already having trouble occasionally taking more than we have prescribed which he admits. 2. Restless legs- cont acitivity during day and hold off on meds   3. Idiopathic neuropathy - follow ok to decrease gabapentin if he feels better with that  4. paroxysmal atrial fibrillation EKG today sinus rhythm paced. 5 insomnia again I think treatment of restless legs would help but there is some issue with him taking those meds. 6. Chest pain -----due to chronic kidney disease and other issues not a good candidate for cath or surgery and does not want surgery we are trying to treat things medically. Some of his chest pain is musculoskeletal to some degree he does an excessive amount of exercise (?800 situps-they must be some sort of partial sit up but nevertheless I told him he should pace himself and cut back on this)-- we are glad he exercises but he needs to adjust things if body hurts - needs to pace himself. Patient also Commented that he was overweight. I strongly told him he is not overweight. Complicated new patient we are trying to manage things reasonably with as little medication and intervention and follow.

## 2017-12-01 RX ORDER — HYDROCODONE BITARTRATE AND ACETAMINOPHEN 5; 325 MG/1; MG/1
TABLET ORAL
Qty: 60 TABLET | Refills: 0 | Status: SHIPPED | OUTPATIENT
Start: 2017-12-01 | End: 2017-12-27 | Stop reason: SDUPTHER

## 2017-12-01 RX ORDER — HYDROCODONE BITARTRATE AND ACETAMINOPHEN 7.5; 325 MG/1; MG/1
TABLET ORAL
Qty: 30 TABLET | Refills: 0 | Status: SHIPPED | OUTPATIENT
Start: 2017-12-01 | End: 2018-01-05 | Stop reason: SDUPTHER

## 2017-12-01 RX ORDER — LISINOPRIL 5 MG/1
2.5 TABLET ORAL DAILY
Qty: 30 TABLET | Refills: 5 | Status: SHIPPED | OUTPATIENT
Start: 2017-12-01 | End: 2018-11-09 | Stop reason: SDUPTHER

## 2017-12-01 RX ORDER — LORAZEPAM 0.5 MG/1
TABLET ORAL
Qty: 120 TABLET | Refills: 0 | Status: SHIPPED | OUTPATIENT
Start: 2017-12-01 | End: 2018-01-05 | Stop reason: SDUPTHER

## 2017-12-03 ASSESSMENT — ENCOUNTER SYMPTOMS
ABDOMINAL PAIN: 0
EYE DISCHARGE: 0
BACK PAIN: 0
COUGH: 0
CHEST TIGHTNESS: 1
SHORTNESS OF BREATH: 1
EYE REDNESS: 0
SINUS PRESSURE: 0
ABDOMINAL DISTENTION: 0

## 2017-12-04 ENCOUNTER — TELEPHONE (OUTPATIENT)
Dept: INTERNAL MEDICINE | Age: 82
End: 2017-12-04

## 2017-12-05 ENCOUNTER — TELEPHONE (OUTPATIENT)
Dept: CARDIOLOGY | Facility: CLINIC | Age: 82
End: 2017-12-05

## 2017-12-05 NOTE — TELEPHONE ENCOUNTER
Anastasia called and left a message from Dr Yun office about Mr Bravo. She did not say what she needed she just said it was regarding Wild Bravo.I tried to call the number back that she gave me but it went to her voicemail. Please call Anastasia back to see what she needed this is a Dr Macdonald patient.    Thanks    [phone] / Anastasia

## 2017-12-28 RX ORDER — HYDROCODONE BITARTRATE AND ACETAMINOPHEN 5; 325 MG/1; MG/1
TABLET ORAL
Qty: 60 TABLET | Refills: 0 | Status: SHIPPED | OUTPATIENT
Start: 2017-12-28 | End: 2018-01-29 | Stop reason: SDUPTHER

## 2018-01-03 ENCOUNTER — OFFICE VISIT (OUTPATIENT)
Dept: CARDIOLOGY | Facility: CLINIC | Age: 83
End: 2018-01-03

## 2018-01-03 VITALS
HEART RATE: 113 BPM | WEIGHT: 169 LBS | BODY MASS INDEX: 21.69 KG/M2 | SYSTOLIC BLOOD PRESSURE: 112 MMHG | DIASTOLIC BLOOD PRESSURE: 60 MMHG | HEIGHT: 74 IN

## 2018-01-03 DIAGNOSIS — E78.2 MIXED HYPERLIPIDEMIA: ICD-10-CM

## 2018-01-03 DIAGNOSIS — Z95.810 BIVENTRICULAR ICD (IMPLANTABLE CARDIOVERTER-DEFIBRILLATOR) IN PLACE: ICD-10-CM

## 2018-01-03 DIAGNOSIS — I10 ESSENTIAL HYPERTENSION: ICD-10-CM

## 2018-01-03 DIAGNOSIS — Z98.890 H/O MITRAL VALVE REPAIR: ICD-10-CM

## 2018-01-03 DIAGNOSIS — I25.810 CORONARY ARTERY DISEASE INVOLVING CORONARY BYPASS GRAFT OF NATIVE HEART WITHOUT ANGINA PECTORIS: ICD-10-CM

## 2018-01-03 DIAGNOSIS — I48.19 PERSISTENT ATRIAL FIBRILLATION (HCC): Primary | ICD-10-CM

## 2018-01-03 DIAGNOSIS — I50.22 CHRONIC SYSTOLIC CONGESTIVE HEART FAILURE (HCC): ICD-10-CM

## 2018-01-03 PROCEDURE — 93000 ELECTROCARDIOGRAM COMPLETE: CPT | Performed by: INTERNAL MEDICINE

## 2018-01-03 PROCEDURE — 99215 OFFICE O/P EST HI 40 MIN: CPT | Performed by: INTERNAL MEDICINE

## 2018-01-03 RX ORDER — CARVEDILOL 12.5 MG/1
12.5 TABLET ORAL 2 TIMES DAILY WITH MEALS
Qty: 60 TABLET | Refills: 11 | Status: SHIPPED | OUTPATIENT
Start: 2018-01-03 | End: 2018-08-08

## 2018-01-03 NOTE — PROGRESS NOTES
[facility] - CARDIOLOGY  New Patient Initial Outpatient [facility]    Primary Care Physician: Alison Hercules MD    Subjective     Chief Complaint: Routine follow-up of coronary artery disease, atrial fibrillation    Mr. Bravo is a very pleasant 83-year-old male with an extensive cardiac history, who was previously been followed by my partner, Dr. Macdonald.  The patient seeks to transfer his care, but has no specific complaints today other than intermittent right upper quadrant pain that is associated with dyspnea and improved after up-titration of coreg recently.  Coronary Artery Disease   Presents for initial visit. The disease course has been stable. Symptoms include chest tightness. Pertinent negatives include no chest pain, chest pressure, dizziness, leg swelling, palpitations or shortness of breath. Past treatments include aspirin, ACE inhibitors, nitrates and statins. The treatment provided significant relief. Compliance with prior treatments has been good. His past medical history is significant for CHF and past myocardial infarction. Past surgical history includes CABG.   Atrial Fibrillation   Presents for initial visit. The condition has lasted for 10 years. Symptoms are negative for chest pain, dizziness, hemodynamic instability, palpitations, shortness of breath and syncope. The symptoms have been stable (though he does note that his heart rate is a little faster of late (does not bother him)). Compliance with prior treatments has been good. Past compliance problems: Had gross hematuria, not GI blood.  Never required transfusions.  Got close once in [place], [place], about 4-5 years ago.  Has had intermittent hematuria since due to BPH but nothing severe.  Past medical history includes atrial fibrillation, AICD, CABG/stent, CAD and CHF.   Hypertension   This is a chronic problem. The problem is controlled. Pertinent negatives include no chest pain, orthopnea, palpitations, PND or shortness of breath.       Review  of Systems   HENT: Negative for nosebleeds.    Cardiovascular: Positive for dyspnea on exertion (stable). Negative for chest pain, claudication, irregular heartbeat, leg swelling, near-syncope, orthopnea, palpitations, paroxysmal nocturnal dyspnea and syncope.   Respiratory: Positive for chest tightness. Negative for cough, shortness of breath and wheezing.    Hematologic/Lymphatic: Negative for bleeding problem. Does not bruise/bleed easily.   Gastrointestinal: Negative for dysphagia, hematemesis, hematochezia and melena.   Genitourinary: Negative for hematuria and non-menstrual bleeding.   Neurological: Negative for dizziness.    Otherwise complete ROS reviewed and negative except as mentioned in the HPI.    Past Medical History:   Past Medical History:   Diagnosis Date   • Abdominal aortic aneurysm    • Anxiety    • Atrial fibrillation    • Biventricular ICD (implantable cardioverter-defibrillator) in place    • BPH (benign prostatic hypertrophy)    • Carotid artery stenosis    • CHF (congestive heart failure)    • Chronic kidney disease     Chronic kidney disease, stage 4 (severe)   • Chronic systolic (congestive) heart failure     limited echo 7/2016 EF was 40-45%. Patient has BiV AICD   • Coronary arteriosclerosis     MI x2   • Hearing loss    • Iron deficiency anemia    • Ischemic cardiomyopathy    • Mixed hyperlipidemia    • Myocardial infarction    • Stroke        Past Surgical History:  Past Surgical History:   Procedure Laterality Date   • ABLATION OF DYSRHYTHMIC FOCUS  2008    MAZE at time of CABG/MVR   • CARDIAC ABLATION     • CARDIAC CATHETERIZATION     • CARDIAC DEFIBRILLATOR PLACEMENT     • CARDIAC PACEMAKER PLACEMENT     • CARDIOVERSION     • CAROTID ENDARTERECTOMY     • CAROTID ENDARTERECTOMY     • CORONARY ARTERY BYPASS GRAFT  2008   • CORONARY STENT PLACEMENT     • MITRAL VALVE REPAIR/REPLACEMENT  2008   • TOTAL KNEE ARTHROPLASTY         Family History: family history includes Diabetes in his  father; Heart disease in his mother; Stroke in his mother.    Social History:  reports that he has quit smoking. He has never used smokeless tobacco. He reports that he does not drink alcohol or use illicit drugs.    Medications:  Prior to Admission medications    Medication Sig Start Date End Date Taking? Authorizing Provider   albuterol (ACCUNEB) 1.25 MG/3ML nebulizer solution Take 3 mL by nebulization Every 6 (Six) Hours As Needed for Wheezing or Shortness of Air. 9/12/17   OUSMANE Pretty   aspirin 81 MG EC tablet Take 81 mg by mouth Daily.    Historical Provider, MD   atorvastatin (LIPITOR) 10 MG tablet Take 1 tablet by mouth Every Night. 9/12/17   OUSMANE Pretty   bumetanide (BUMEX) 1 MG tablet Take 0.5 tablets by mouth 2 (Two) Times a Day. 9/12/17   OUSMANE Pretty   carvedilol (COREG) 12.5 MG tablet Take 12.5 mg by mouth Every Morning.    Historical Provider, MD   carvedilol (COREG) 6.25 MG tablet Take 1 tablet by mouth 2 (Two) Times a Day.  Patient taking differently: Take 6.25 mg by mouth Every Evening. 10/2/17   La Nena Hairston PA-C   dutasteride (AVODART) 0.5 MG capsule Take 0.5 mg by mouth Daily.    Historical Provider, MD   gabapentin (NEURONTIN) 400 MG capsule Take 1 capsule by mouth Daily. 9/12/17   OUSMANE Pretty   HYDROcodone-acetaminophen (NORCO) 7.5-325 MG per tablet Take 1 tablet by mouth Every 8 (Eight) Hours As Needed for Moderate Pain .    Historical Provider, MD   lisinopril (PRINIVIL,ZESTRIL) 5 MG tablet Take 2.5 mg by mouth Daily.    Historical Provider, MD   LORazepam (ATIVAN) 1 MG tablet Take 1 tablet by mouth 4 (Four) Times a Day. 9/12/17   OUSMANE Pretty   nitroglycerin (NITROSTAT) 0.4 MG SL tablet Place 0.4 mg under the tongue Every 5 (Five) Minutes As Needed for Chest Pain. Take no more than 3 doses in 15 minutes.    Historical Provider, MD   nitroglycerin (NITROSTAT) 0.4 MG SL tablet PLACE 1 TABLET UNDER THE TONGUE AS NEEDED FOR ANGINA, MAY REPEAT EVERY 5 MINS FOR UP  "THREE DOSES 11/6/17   Karthik Macdonald MD   rOPINIRole (REQUIP) 1 MG tablet Take 1 tablet by mouth Every Night. Take 1 hour before bedtime.  Patient taking differently: Take 0.5 mg by mouth Every Night. Take 1 hour before bedtime. 9/12/17   OUSMANE Pretty   tamsulosin (FLOMAX) 0.4 MG capsule 24 hr capsule Take 1 capsule by mouth Every Night.    Historical Provider, MD   tiotropium (SPIRIVA HANDIHALER) 18 MCG per inhalation capsule Place 1 capsule into inhaler and inhale Daily. 9/12/17   OUSMANE Pretty     Allergies:  Allergies   Allergen Reactions   • Keflex [Cephalexin]      CEPHALOSPORINS       Objective     Vital Signs: /60 (BP Location: Right arm, Patient Position: Sitting)  Pulse 113  Ht 188 cm (74\")  Wt 76.7 kg (169 lb)  BMI 21.7 kg/m2    Physical Exam   Constitutional: No distress.   Neck: No JVD present.   Cardiovascular: S1 normal, S2 normal, normal heart sounds, intact distal pulses and normal pulses.  An irregularly irregular rhythm present. Tachycardia present.    No murmur heard.  Pulmonary/Chest: Effort normal and breath sounds normal.   Abdominal: Soft. There is no tenderness.   Neurological: He is alert and oriented to person, place, and time.   Skin: Skin is warm and dry.       Results Reviewed:      ECG 12 Lead  Date/Time: 1/3/2018 4:46 PM  Performed by: NANDO GARCIA  Authorized by: NANDO GARCIA   Rhythm: atrial fibrillation and paced  Clinical impression: abnormal ECG                  Results for orders placed during the hospital encounter of 09/08/17   Adult Transthoracic Echo Complete    Narrative · Left ventricular systolic function is moderately decreased. Estimated EF   = 35%.  · Left ventricular diastolic dysfunction (grade I) consistent with   impaired relaxation.  · Mild to moderate aortic valve regurgitation is present.  · Mild pulmonary hypertension is present.        I reviewed multiple old records, including last visits with Dr. Macdonald from 10/2/17 and with Isabel" OUSMANE Patel, on 10/27/17.  From the visit on 10/27/17  I learned that the patient had seen Dr. Wright with electrophysiology just prior to that and was taken off of amiodarone.  He had increased his carvedilol dose to 12.5 mg in the morning to achieve better rate control with his atrial fibrillation.  No other changes in his medical therapy were made at the time of his visit on 10/27/17.    Assessment / Plan        Problem List Items Addressed This Visit        Cardiovascular and Mediastinum    Coronary artery disease involving coronary bypass graft of native heart without angina pectoris    Overview     MI x2  Single vessel CABG 2008         Current Assessment & Plan     Stable.  Continue aspirin, statin, beta blocker, and ACE inhibitor.         Chronic systolic congestive heart failure    Current Assessment & Plan     Congestive heart failure due to coronary artery disease (CAD).  Heart failure is stable.  NYHA Class II.  Continue current treatment regimen.  Heart failure will be reassessed in 3 months.         Relevant Medications    carvedilol (COREG) 12.5 MG tablet    Essential hypertension    Current Assessment & Plan     Hypertension is well controlled.  Continue current treatment regimen.  Blood pressure will be reassessed at the next regular appointment.         Relevant Medications    carvedilol (COREG) 12.5 MG tablet    Biventricular ICD (implantable cardioverter-defibrillator) in place    Persistent atrial fibrillation - Primary    Current Assessment & Plan     Having mild rapid ventricular response.  Has been taking 12.5 of Coreg in the morning and 6.25 mg at night; I advised him to increase the nighttime dose to match the morning dose and take 12.5 mg twice daily.    Patient is not on anticoagulation due to history of genitourinary bleedings. He has had gross hematuria intermittently and decision had been made between he and prior cardiologist to forego full anticoagulation indefinitely.    Since he is  mostly asymptomatic regardless of his ventricular rate, I see no indication for referring for consideration of ablation at this time.    Would be a good candidate for Watchman device.         Relevant Medications    carvedilol (COREG) 12.5 MG tablet    Mixed hyperlipidemia    Current Assessment & Plan     LDL well controlled. Goal LDL <70, so if this cannot be achieved on current statin therapy, increase to high potency statin. If still cannot be achieved, please consider initiating PCSK9 inhibitor.              Other    H/O mitral valve repair        F/u 3 months    Raleigh Enciso MD   01/05/18   1:18 PM

## 2018-01-05 ENCOUNTER — CLINICAL SUPPORT (OUTPATIENT)
Dept: CARDIOLOGY | Facility: CLINIC | Age: 83
End: 2018-01-05

## 2018-01-05 DIAGNOSIS — Z95.810 BIVENTRICULAR ICD (IMPLANTABLE CARDIOVERTER-DEFIBRILLATOR) IN PLACE: ICD-10-CM

## 2018-01-05 PROBLEM — Z87.19 HISTORY OF GI BLEED: Status: RESOLVED | Noted: 2017-10-27 | Resolved: 2018-01-05

## 2018-01-05 PROCEDURE — 93294 REM INTERROG EVL PM/LDLS PM: CPT | Performed by: PHYSICIAN ASSISTANT

## 2018-01-05 PROCEDURE — 93296 REM INTERROG EVL PM/IDS: CPT | Performed by: PHYSICIAN ASSISTANT

## 2018-01-05 RX ORDER — HYDROCODONE BITARTRATE AND ACETAMINOPHEN 7.5; 325 MG/1; MG/1
TABLET ORAL
Qty: 30 TABLET | Refills: 0 | Status: SHIPPED | OUTPATIENT
Start: 2018-01-05 | End: 2018-01-29 | Stop reason: SDUPTHER

## 2018-01-05 RX ORDER — LORAZEPAM 0.5 MG/1
TABLET ORAL
Qty: 120 TABLET | Refills: 0 | Status: SHIPPED | OUTPATIENT
Start: 2018-01-05 | End: 2018-01-29 | Stop reason: SDUPTHER

## 2018-01-05 NOTE — ASSESSMENT & PLAN NOTE
Having mild rapid ventricular response.  Has been taking 12.5 of Coreg in the morning and 6.25 mg at night; I advised him to increase the nighttime dose to match the morning dose and take 12.5 mg twice daily.    Patient is not on anticoagulation due to history of genitourinary bleedings. He has had gross hematuria intermittently and decision had been made between he and prior cardiologist to forego full anticoagulation indefinitely.    Since he is mostly asymptomatic regardless of his ventricular rate, I see no indication for referring for consideration of ablation at this time.    Would be a good candidate for Watchman device.

## 2018-01-05 NOTE — ASSESSMENT & PLAN NOTE
LDL well controlled. Goal LDL <70, so if this cannot be achieved on current statin therapy, increase to high potency statin. If still cannot be achieved, please consider initiating PCSK9 inhibitor.

## 2018-01-05 NOTE — ASSESSMENT & PLAN NOTE
Congestive heart failure due to coronary artery disease (CAD).  Heart failure is stable.  NYHA Class II.  Continue current treatment regimen.  Heart failure will be reassessed in 3 months.

## 2018-01-07 NOTE — PROGRESS NOTES
Bi-V AICD Evaluation Report  [place]    January 6, 2018    Primary Cardiologist: Zaria  : Guidant Model: Cognis  Implant date: 7/8/10    Reason for evaluation: routine  Indication for AICD: congestive heart failure    Measurements  Atrial sensing:  P wave: 0.5 mV  Atrial threshold: n/r  Atrial lead impedance: 546 ohms  Ventricular sensing:  R wave: 19.9 mV  RV Threshold: n/r  RV Impedance:  366 ohms  Shock impedance:  47 ohms     LV Threshold:  n/r  LV Impedance:  829 ohms      Diagnostic Data  Atrial paced: 0 %  Ventricular paced: 86-98 %  Other: AF noted.  Few high rates noted.  BB recently increased at OV.  Pt not anticoagulated d/t h/o GUB.  Several episodes of NSVT noted.  Successful ATP tx delivered x 1.  Battery status: satisfactory         Final Parameters  Mode: DDDR     Lower rate: 70 bpm   Upper rate: 120 bpm  AV Delay: 170-180 ms paced    115-120 ms sensed   Atrial - Amplitude: 2.4 V   Pulse width: 0.5 ms   Sensitivity: 0.15 mV   Ventricular:  RV Amplitude: 2.2 V @ 0.5 ms   Sensitivity: 0.6 mV            LV Amplitude:  1.8 V @ 0.5 ms  Changes made: none  Conclusions: normal AICD function    Follow up: 3 months

## 2018-01-09 NOTE — PROGRESS NOTES
I have reviewed the notes, assessments, and/or procedures performed by PATRICK Gannon, I concur with her/his documentation of Wild Bravo.

## 2018-02-12 PROBLEM — I50.22 CHRONIC SYSTOLIC CONGESTIVE HEART FAILURE (HCC): Status: ACTIVE | Noted: 2018-02-12

## 2018-02-12 PROBLEM — I48.20 CHRONIC ATRIAL FIBRILLATION (HCC): Status: ACTIVE | Noted: 2018-02-12

## 2018-02-23 DIAGNOSIS — F41.9 ANXIETY: ICD-10-CM

## 2018-02-23 DIAGNOSIS — G89.4 CHRONIC PAIN SYNDROME: ICD-10-CM

## 2018-02-23 DIAGNOSIS — G60.9 IDIOPATHIC NEUROPATHY: ICD-10-CM

## 2018-02-23 DIAGNOSIS — I25.119 CORONARY ARTERY DISEASE INVOLVING NATIVE HEART WITH ANGINA PECTORIS, UNSPECIFIED VESSEL OR LESION TYPE (HCC): ICD-10-CM

## 2018-02-23 RX ORDER — LORAZEPAM 0.5 MG/1
TABLET ORAL
Qty: 120 TABLET | Refills: 0 | Status: SHIPPED | OUTPATIENT
Start: 2018-02-23 | End: 2018-03-28 | Stop reason: SDUPTHER

## 2018-02-23 RX ORDER — HYDROCODONE BITARTRATE AND ACETAMINOPHEN 7.5; 325 MG/1; MG/1
TABLET ORAL
Qty: 30 TABLET | Refills: 0 | Status: SHIPPED | OUTPATIENT
Start: 2018-02-23 | End: 2018-03-28 | Stop reason: SDUPTHER

## 2018-02-23 RX ORDER — ISOSORBIDE MONONITRATE 30 MG/1
TABLET, EXTENDED RELEASE ORAL
Qty: 90 TABLET | Refills: 3 | Status: SHIPPED | OUTPATIENT
Start: 2018-02-23 | End: 2018-10-24 | Stop reason: CLARIF

## 2018-02-23 RX ORDER — HYDROCODONE BITARTRATE AND ACETAMINOPHEN 5; 325 MG/1; MG/1
TABLET ORAL
Qty: 60 TABLET | Refills: 0 | Status: SHIPPED | OUTPATIENT
Start: 2018-02-23 | End: 2018-03-28 | Stop reason: SDUPTHER

## 2018-02-26 ENCOUNTER — OFFICE VISIT (OUTPATIENT)
Dept: INTERNAL MEDICINE | Age: 83
End: 2018-02-26
Payer: MEDICARE

## 2018-02-26 VITALS
DIASTOLIC BLOOD PRESSURE: 60 MMHG | OXYGEN SATURATION: 94 % | HEIGHT: 74 IN | WEIGHT: 161 LBS | BODY MASS INDEX: 20.66 KG/M2 | HEART RATE: 86 BPM | SYSTOLIC BLOOD PRESSURE: 90 MMHG

## 2018-02-26 DIAGNOSIS — N18.4 STAGE 4 CHRONIC KIDNEY DISEASE (HCC): ICD-10-CM

## 2018-02-26 DIAGNOSIS — I50.22 CHRONIC SYSTOLIC CONGESTIVE HEART FAILURE (HCC): ICD-10-CM

## 2018-02-26 DIAGNOSIS — I50.22 CHRONIC SYSTOLIC CONGESTIVE HEART FAILURE (HCC): Primary | ICD-10-CM

## 2018-02-26 DIAGNOSIS — I48.20 CHRONIC ATRIAL FIBRILLATION (HCC): ICD-10-CM

## 2018-02-26 LAB
ALBUMIN SERPL-MCNC: 4.4 G/DL (ref 3.5–5.2)
ALP BLD-CCNC: 98 U/L (ref 40–130)
ALT SERPL-CCNC: 10 U/L (ref 5–41)
ANION GAP SERPL CALCULATED.3IONS-SCNC: 14 MMOL/L (ref 7–19)
AST SERPL-CCNC: 15 U/L (ref 5–40)
BILIRUB SERPL-MCNC: 1.6 MG/DL (ref 0.2–1.2)
BUN BLDV-MCNC: 32 MG/DL (ref 8–23)
CALCIUM SERPL-MCNC: 9.6 MG/DL (ref 8.8–10.2)
CHLORIDE BLD-SCNC: 96 MMOL/L (ref 98–111)
CO2: 33 MMOL/L (ref 22–29)
CREAT SERPL-MCNC: 2 MG/DL (ref 0.5–1.2)
GFR NON-AFRICAN AMERICAN: 32
GLUCOSE BLD-MCNC: 114 MG/DL (ref 74–109)
HCT VFR BLD CALC: 42.9 % (ref 42–52)
HEMOGLOBIN: 13.5 G/DL (ref 14–18)
MCH RBC QN AUTO: 29.2 PG (ref 27–31)
MCHC RBC AUTO-ENTMCNC: 31.5 G/DL (ref 33–37)
MCV RBC AUTO: 92.9 FL (ref 80–94)
PDW BLD-RTO: 14.5 % (ref 11.5–14.5)
PLATELET # BLD: 140 K/UL (ref 130–400)
PMV BLD AUTO: 11.6 FL (ref 9.4–12.4)
POTASSIUM SERPL-SCNC: 4.1 MMOL/L (ref 3.5–5)
PRO-BNP: 2454 PG/ML (ref 0–1800)
RBC # BLD: 4.62 M/UL (ref 4.7–6.1)
SODIUM BLD-SCNC: 143 MMOL/L (ref 136–145)
TOTAL PROTEIN: 7.5 G/DL (ref 6.6–8.7)
TSH SERPL DL<=0.05 MIU/L-ACNC: 7.12 UIU/ML (ref 0.27–4.2)
WBC # BLD: 6 K/UL (ref 4.8–10.8)

## 2018-02-26 PROCEDURE — 1123F ACP DISCUSS/DSCN MKR DOCD: CPT | Performed by: INTERNAL MEDICINE

## 2018-02-26 PROCEDURE — 4040F PNEUMOC VAC/ADMIN/RCVD: CPT | Performed by: INTERNAL MEDICINE

## 2018-02-26 PROCEDURE — G8427 DOCREV CUR MEDS BY ELIG CLIN: HCPCS | Performed by: INTERNAL MEDICINE

## 2018-02-26 PROCEDURE — 1036F TOBACCO NON-USER: CPT | Performed by: INTERNAL MEDICINE

## 2018-02-26 PROCEDURE — G8420 CALC BMI NORM PARAMETERS: HCPCS | Performed by: INTERNAL MEDICINE

## 2018-02-26 PROCEDURE — 99214 OFFICE O/P EST MOD 30 MIN: CPT | Performed by: INTERNAL MEDICINE

## 2018-02-26 PROCEDURE — G8484 FLU IMMUNIZE NO ADMIN: HCPCS | Performed by: INTERNAL MEDICINE

## 2018-02-26 PROCEDURE — G8598 ASA/ANTIPLAT THER USED: HCPCS | Performed by: INTERNAL MEDICINE

## 2018-02-26 NOTE — PROGRESS NOTES
Heart Attack Mother 80    Diabetes Father     COPD Sister     Arthritis Sister     Stroke Brother     Heart Disease Brother        Social History     Social History    Marital status:      Spouse name: N/A    Number of children: N/A    Years of education: N/A     Occupational History    Not on file. Social History Main Topics    Smoking status: Former Smoker    Smokeless tobacco: Never Used    Alcohol use No    Drug use: No    Sexual activity: No      Comment: Marital status - . Other Topics Concern    Not on file     Social History Narrative    No narrative on file       Allergies   Allergen Reactions    Keflex [Cephalexin] Rash       Current Outpatient Prescriptions   Medication Sig Dispense Refill    LORazepam (ATIVAN) 0.5 MG tablet TAKE ONE TABLET BY MOUTH FOUR TIMES A DAY **MAY MAKE DROWSY**. 120 tablet 0    isosorbide mononitrate (IMDUR) 30 MG extended release tablet TAKE ONE TABLET BY MOUTH DAILY 90 tablet 3    HYDROcodone-acetaminophen (NORCO) 5-325 MG per tablet TAKE ONE TABLET BY MOUTH TWICE A DAY AS NEEDED **MAY MAKE DROWSY** 60 tablet 0    HYDROcodone-acetaminophen (NORCO) 7.5-325 MG per tablet TAKE ONE TABLET BY MOUTH EVERY EVENING **MAY MAKE DROWSY**. 30 tablet 0    lisinopril (PRINIVIL;ZESTRIL) 5 MG tablet Take 0.5 tablets by mouth daily 30 tablet 5    atorvastatin (LIPITOR) 10 MG tablet TAKE ONE TABLET BY MOUTH EVERY DAY 90 tablet 2    tamsulosin (FLOMAX) 0.4 MG capsule Take 1 capsule by mouth daily 90 capsule 3    gabapentin (NEURONTIN) 300 MG capsule Take 300mg at 5 pm and take 300mg at bedtime 60 capsule 3    carvedilol (COREG) 12.5 MG tablet Take 12.5 mg by mouth See Admin Instructions 12.5 mg every morning, 6.25 mg every evening.       bumetanide (BUMEX) 1 MG tablet Take 0.5 mg by mouth      nitroGLYCERIN (NITROSTAT) 0.4 MG SL tablet Place 0.4 mg under the tongue      tiotropium (SPIRIVA) 18 MCG inhalation capsule Inhale 1 capsule into the lungs  rOPINIRole (REQUIP) 0.5 MG tablet Take 0.5 mg by mouth nightly      aspirin 81 MG tablet Take 81 mg by mouth daily.  dutasteride (AVODART) 0.5 MG capsule Take 0.5 mg by mouth daily. No current facility-administered medications for this visit. Review of Systems   Constitutional: Positive for fatigue. Negative for chills and fever. HENT: Negative for congestion and sinus pressure. Eyes: Negative for discharge and redness. Respiratory: Positive for chest tightness and shortness of breath. Negative for cough. Cardiovascular: Positive for palpitations. Negative for chest pain and leg swelling. Gastrointestinal: Negative for abdominal distention and abdominal pain. Genitourinary: Negative for frequency and urgency. Musculoskeletal: Positive for arthralgias and back pain. Skin: Negative for rash and wound. Neurological: Positive for light-headedness. Negative for dizziness and headaches. Psychiatric/Behavioral: Positive for sleep disturbance. Negative for dysphoric mood. The patient is nervous/anxious. BP 90/60 (Site: Left Arm)   Pulse 86   Ht 6' 2\" (1.88 m)   Wt 161 lb (73 kg)   SpO2 94%   BMI 20.67 kg/m²     Physical Exam neck is supple sclera anicteric she has a bit of a suntanned dark appearance gets up and down walks a little bit in the room heart S1-S2 lungs are clear extremities without any pitting edema abdomen soft nontender.     Results for orders placed or performed in visit on 11/06/17   CBC   Result Value Ref Range    WBC 5.5 4.8 - 10.8 K/uL    RBC 4.13 (L) 4.70 - 6.10 M/uL    Hemoglobin 12.0 (L) 14.0 - 18.0 g/dL    Hematocrit 38.3 (L) 42.0 - 52.0 %    MCV 92.7 80.0 - 94.0 fL    MCH 29.1 27.0 - 31.0 pg    MCHC 31.3 (L) 33.0 - 37.0 g/dL    RDW 15.5 (H) 11.5 - 14.5 %    Platelets 149 031 - 931 K/uL    MPV 11.5 9.4 - 12.4 fL   Comprehensive Metabolic Panel   Result Value Ref Range    Sodium 143 136 - 145 mmol/L    Potassium 4.7 3.5 - 5.0 mmol/L medications he is aware that he needs to be very cautious and continue to try to take only as prescribed and if possible less than prescribed. We will always be reassessing this in hopes to decrease the medications    Patient is relatively new to me. Complicated patient. We encourage his continued exercise but needs to be consistent and reasonable (excessive at times) and also emphasized that does not need to lose weight. We will call his daughter with labs and he has trouble hearing on the phone.

## 2018-03-04 ASSESSMENT — ENCOUNTER SYMPTOMS
EYE REDNESS: 0
COUGH: 0
SHORTNESS OF BREATH: 1
BACK PAIN: 1
SINUS PRESSURE: 0
ABDOMINAL DISTENTION: 0
CHEST TIGHTNESS: 1
EYE DISCHARGE: 0
ABDOMINAL PAIN: 0

## 2018-03-09 DIAGNOSIS — I50.22 CHRONIC SYSTOLIC CONGESTIVE HEART FAILURE (HCC): ICD-10-CM

## 2018-03-09 LAB
ALBUMIN SERPL-MCNC: 4.2 G/DL (ref 3.5–5.2)
ALP BLD-CCNC: 110 U/L (ref 40–130)
ALT SERPL-CCNC: 10 U/L (ref 5–41)
ANION GAP SERPL CALCULATED.3IONS-SCNC: 13 MMOL/L (ref 7–19)
AST SERPL-CCNC: 25 U/L (ref 5–40)
BILIRUB SERPL-MCNC: 0.8 MG/DL (ref 0.2–1.2)
BUN BLDV-MCNC: 31 MG/DL (ref 8–23)
CALCIUM SERPL-MCNC: 9.6 MG/DL (ref 8.8–10.2)
CHLORIDE BLD-SCNC: 97 MMOL/L (ref 98–111)
CO2: 31 MMOL/L (ref 22–29)
CREAT SERPL-MCNC: 2.1 MG/DL (ref 0.5–1.2)
GFR NON-AFRICAN AMERICAN: 30
GLUCOSE BLD-MCNC: 116 MG/DL (ref 74–109)
POTASSIUM SERPL-SCNC: 3.8 MMOL/L (ref 3.5–5)
PRO-BNP: 4614 PG/ML (ref 0–1800)
SODIUM BLD-SCNC: 141 MMOL/L (ref 136–145)
T4 FREE: 0.9 NG/DL (ref 0.9–1.7)
TOTAL PROTEIN: 7.5 G/DL (ref 6.6–8.7)
TSH SERPL DL<=0.05 MIU/L-ACNC: 4.88 UIU/ML (ref 0.27–4.2)

## 2018-03-26 ENCOUNTER — CLINICAL SUPPORT NO REQUIREMENTS (OUTPATIENT)
Dept: CARDIOLOGY | Facility: CLINIC | Age: 83
End: 2018-03-26

## 2018-03-26 DIAGNOSIS — Z95.810 BIVENTRICULAR ICD (IMPLANTABLE CARDIOVERTER-DEFIBRILLATOR) IN PLACE: Primary | ICD-10-CM

## 2018-03-26 DIAGNOSIS — I50.22 CHRONIC SYSTOLIC CONGESTIVE HEART FAILURE (HCC): ICD-10-CM

## 2018-03-26 DIAGNOSIS — I48.19 PERSISTENT ATRIAL FIBRILLATION (HCC): ICD-10-CM

## 2018-03-27 ENCOUNTER — TELEPHONE (OUTPATIENT)
Dept: FAMILY MEDICINE CLINIC | Age: 83
End: 2018-03-27

## 2018-03-27 DIAGNOSIS — G60.9 IDIOPATHIC NEUROPATHY: ICD-10-CM

## 2018-03-27 DIAGNOSIS — F41.9 ANXIETY: ICD-10-CM

## 2018-03-27 DIAGNOSIS — G89.4 CHRONIC PAIN SYNDROME: ICD-10-CM

## 2018-03-28 RX ORDER — DUTASTERIDE 0.5 MG/1
0.5 CAPSULE, LIQUID FILLED ORAL DAILY
Qty: 90 CAPSULE | Refills: 1 | Status: SHIPPED | OUTPATIENT
Start: 2018-03-28 | End: 2018-09-22 | Stop reason: SDUPTHER

## 2018-03-28 RX ORDER — LORAZEPAM 0.5 MG/1
0.5 TABLET ORAL 4 TIMES DAILY
Qty: 120 TABLET | Refills: 0
Start: 2018-03-28 | End: 2018-07-26

## 2018-03-28 RX ORDER — HYDROCODONE BITARTRATE AND ACETAMINOPHEN 7.5; 325 MG/1; MG/1
TABLET ORAL
Qty: 30 TABLET | Refills: 0
Start: 2018-03-28 | End: 2018-04-26 | Stop reason: SDUPTHER

## 2018-03-28 RX ORDER — HYDROCODONE BITARTRATE AND ACETAMINOPHEN 5; 325 MG/1; MG/1
1 TABLET ORAL 2 TIMES DAILY
Qty: 60 TABLET | Refills: 0
Start: 2018-03-28 | End: 2018-04-26 | Stop reason: SDUPTHER

## 2018-03-28 NOTE — TELEPHONE ENCOUNTER
I really cant safely say he can drive as he has too many sedating medications and controlled substances

## 2018-04-03 DIAGNOSIS — F41.9 ANXIETY: ICD-10-CM

## 2018-04-03 DIAGNOSIS — G60.9 IDIOPATHIC NEUROPATHY: ICD-10-CM

## 2018-04-03 DIAGNOSIS — G89.4 CHRONIC PAIN SYNDROME: ICD-10-CM

## 2018-04-03 RX ORDER — HYDROCODONE BITARTRATE AND ACETAMINOPHEN 7.5; 325 MG/1; MG/1
TABLET ORAL
Qty: 30 TABLET | Refills: 0 | Status: SHIPPED | OUTPATIENT
Start: 2018-04-03 | End: 2018-04-26 | Stop reason: SDUPTHER

## 2018-04-03 RX ORDER — HYDROCODONE BITARTRATE AND ACETAMINOPHEN 5; 325 MG/1; MG/1
TABLET ORAL
Qty: 60 TABLET | Refills: 0 | Status: SHIPPED | OUTPATIENT
Start: 2018-04-03 | End: 2018-04-26 | Stop reason: SDUPTHER

## 2018-04-03 RX ORDER — LORAZEPAM 0.5 MG/1
TABLET ORAL
Qty: 120 TABLET | Refills: 0 | Status: SHIPPED | OUTPATIENT
Start: 2018-04-03 | End: 2018-04-26 | Stop reason: SDUPTHER

## 2018-04-11 ENCOUNTER — OFFICE VISIT (OUTPATIENT)
Dept: CARDIOLOGY | Facility: CLINIC | Age: 83
End: 2018-04-11

## 2018-04-11 VITALS
HEIGHT: 73 IN | BODY MASS INDEX: 22.05 KG/M2 | HEART RATE: 100 BPM | SYSTOLIC BLOOD PRESSURE: 108 MMHG | OXYGEN SATURATION: 98 % | WEIGHT: 166.4 LBS | DIASTOLIC BLOOD PRESSURE: 70 MMHG

## 2018-04-11 DIAGNOSIS — E78.2 MIXED HYPERLIPIDEMIA: ICD-10-CM

## 2018-04-11 DIAGNOSIS — I10 ESSENTIAL HYPERTENSION: ICD-10-CM

## 2018-04-11 DIAGNOSIS — I50.22 CHRONIC SYSTOLIC CONGESTIVE HEART FAILURE (HCC): Primary | ICD-10-CM

## 2018-04-11 DIAGNOSIS — I48.19 PERSISTENT ATRIAL FIBRILLATION (HCC): ICD-10-CM

## 2018-04-11 DIAGNOSIS — I25.810 CORONARY ARTERY DISEASE INVOLVING CORONARY BYPASS GRAFT OF NATIVE HEART WITHOUT ANGINA PECTORIS: ICD-10-CM

## 2018-04-11 PROCEDURE — 99214 OFFICE O/P EST MOD 30 MIN: CPT | Performed by: INTERNAL MEDICINE

## 2018-04-11 PROCEDURE — 93000 ELECTROCARDIOGRAM COMPLETE: CPT | Performed by: INTERNAL MEDICINE

## 2018-04-11 NOTE — ASSESSMENT & PLAN NOTE
Though it is difficult to tell by his history or by examination, I suspect his worsening symptoms of shortness of breath may be due to decompensation of heart failure.  We will investigate the laboratory data including CMP and BNP, as well as a 2 view chest x-ray.    Continue Coreg, lisinopril, and Bumex.  May need to adjust Bumex dosage based on the above workup.

## 2018-04-11 NOTE — ASSESSMENT & PLAN NOTE
Stable.  Continue aspirin.  Provided information regarding Watchman procedures the patient would be a good candidate.

## 2018-04-11 NOTE — PROGRESS NOTES
Subjective:     Encounter Date:04/11/2018      Patient ID: Wild Bravo is a 83 y.o. male.    Chief Complaint: F/u CAD, AF, CHF  Mr. Lambert is a pleasant 83-year-old male with extensive cardiac history who was previously followed by Dr. Macdonald.  He transferred his care to me and I saw him once in the office on 1/3/18.  His coronary artery disease, hypertension, and chronic systolic congestive heart failure all seemed stable at that time, and the only change that was made was increasing his nighttime dose of carvedilol what sounded like worsening palpitations and possible rapid ventricular response to atrial fibrillation at night.  He returns today for reassessment of these chronic issues.      Atrial Fibrillation   Symptoms include chest pain and shortness of breath. Symptoms are negative for palpitations and syncope. Past medical history includes atrial fibrillation, CAD and CHF.   Congestive Heart Failure   Presents for follow-up visit. Associated symptoms include chest pain, orthopnea and shortness of breath. Pertinent negatives include no claudication, near-syncope or palpitations. The symptoms have been worsening. His past medical history is significant for CAD.   Coronary Artery Disease   Symptoms include chest pain and shortness of breath. Pertinent negatives include no leg swelling or palpitations. His past medical history is significant for CHF.       The following portions of the patient's history were reviewed and updated as appropriate: allergies, current medications, past family history, past medical history, past social history, past surgical history and problem list.    Review of Systems   Constitution: Negative for malaise/fatigue.   Cardiovascular: Positive for chest pain. Negative for claudication, dyspnea on exertion, leg swelling, near-syncope, orthopnea, palpitations, paroxysmal nocturnal dyspnea and syncope.   Respiratory: Positive for shortness of breath.    Hematologic/Lymphatic: Does  not bruise/bleed easily.          Objective:      Vitals:    04/11/18 1330   BP: 108/70   Pulse: 100   SpO2: 98%     Physical Exam   Constitutional: He is oriented to person, place, and time. He appears well-developed and well-nourished.   Neck: No JVD present.   Cardiovascular: Normal rate, regular rhythm, normal heart sounds and intact distal pulses.    No murmur heard.  Pulmonary/Chest: Effort normal.   +diminished at bases   Musculoskeletal: He exhibits no edema.   Neurological: He is alert and oriented to person, place, and time.   Skin: Skin is warm and dry.       Lab Review:         ECG 12 Lead  Date/Time: 4/11/2018 1:47 PM  Performed by: NANDO GARCIA  Authorized by: NANDO GARCIA   Comparison: compared with previous ECG from 1/3/2018  Similar to previous ECG  Rhythm: paced  Ectopy: infrequent PVCs  BPM: 100            Assessment/Plan:     Problem List Items Addressed This Visit        Cardiovascular and Mediastinum    Coronary artery disease involving coronary bypass graft of native heart without angina pectoris    Overview     MI x2  Single vessel CABG 2008         Current Assessment & Plan     I do not think his current complaints are that of angina, but if workup for heart failure proves unyielding, may consider ischemic evaluation.  In the meantime, continue aspirin, statin, beta blocker, and ACE inhibitor.         Chronic systolic congestive heart failure - Primary    Current Assessment & Plan     Though it is difficult to tell by his history or by examination, I suspect his worsening symptoms of shortness of breath may be due to decompensation of heart failure.  We will investigate the laboratory data including CMP and BNP, as well as a 2 view chest x-ray.    Continue Coreg, lisinopril, and Bumex.  May need to adjust Bumex dosage based on the above workup.         Relevant Orders    XR Chest PA Lateral With Both Oblique Projections    BNP    Comprehensive Metabolic Panel    Essential hypertension     Current Assessment & Plan     Well-controlled; continue current regimen.         Persistent atrial fibrillation    Current Assessment & Plan     Stable.  Continue aspirin.  Provided information regarding Watchman procedures the patient would be a good candidate.         Relevant Orders    ECG 12 Lead    Mixed hyperlipidemia     F/u 2 weeks    Raleigh Enciso MD  04/11/2018  6:50 PM

## 2018-04-11 NOTE — ASSESSMENT & PLAN NOTE
I do not think his current complaints are that of angina, but if workup for heart failure proves unyielding, may consider ischemic evaluation.  In the meantime, continue aspirin, statin, beta blocker, and ACE inhibitor.

## 2018-04-16 NOTE — PROGRESS NOTES
I have reviewed the notes, assessments, and/or procedures performed by Nahomy Reynoso RN, and I concur with her documentation of Wild Bravo.

## 2018-04-24 ENCOUNTER — LAB (OUTPATIENT)
Dept: LAB | Facility: HOSPITAL | Age: 83
End: 2018-04-24
Attending: INTERNAL MEDICINE

## 2018-04-24 ENCOUNTER — HOSPITAL ENCOUNTER (OUTPATIENT)
Dept: GENERAL RADIOLOGY | Facility: HOSPITAL | Age: 83
Discharge: HOME | End: 2018-04-24
Attending: INTERNAL MEDICINE | Admitting: INTERNAL MEDICINE

## 2018-04-24 DIAGNOSIS — I50.22 CHRONIC SYSTOLIC CONGESTIVE HEART FAILURE (HCC): ICD-10-CM

## 2018-04-24 DIAGNOSIS — I25.119 CORONARY ARTERY DISEASE INVOLVING NATIVE HEART WITH ANGINA PECTORIS, UNSPECIFIED VESSEL OR LESION TYPE (HCC): Primary | ICD-10-CM

## 2018-04-24 LAB
ALBUMIN SERPL-MCNC: 4.3 G/DL (ref 3.5–5)
ALBUMIN/GLOB SERPL: 1.4 G/DL (ref 1.1–2.5)
ALP SERPL-CCNC: 94 U/L (ref 24–120)
ALT SERPL W P-5'-P-CCNC: 23 U/L (ref 0–54)
ANION GAP SERPL CALCULATED.3IONS-SCNC: 12 MMOL/L (ref 4–13)
AST SERPL-CCNC: 25 U/L (ref 7–45)
BILIRUB SERPL-MCNC: 1.3 MG/DL (ref 0.1–1)
BUN BLD-MCNC: 36 MG/DL (ref 5–21)
BUN/CREAT SERPL: 17.7 (ref 7–25)
CALCIUM SPEC-SCNC: 9.3 MG/DL (ref 8.4–10.4)
CHLORIDE SERPL-SCNC: 96 MMOL/L (ref 98–110)
CO2 SERPL-SCNC: 35 MMOL/L (ref 24–31)
CREAT BLD-MCNC: 2.03 MG/DL (ref 0.5–1.4)
GFR SERPL CREATININE-BSD FRML MDRD: 32 ML/MIN/1.73
GLOBULIN UR ELPH-MCNC: 3.1 GM/DL
GLUCOSE BLD-MCNC: 80 MG/DL (ref 70–100)
POTASSIUM BLD-SCNC: 3.7 MMOL/L (ref 3.5–5.3)
PROT SERPL-MCNC: 7.4 G/DL (ref 6.3–8.7)
SODIUM BLD-SCNC: 143 MMOL/L (ref 135–145)

## 2018-04-24 PROCEDURE — 83880 ASSAY OF NATRIURETIC PEPTIDE: CPT | Performed by: INTERNAL MEDICINE

## 2018-04-24 PROCEDURE — 36415 COLL VENOUS BLD VENIPUNCTURE: CPT

## 2018-04-24 PROCEDURE — 80053 COMPREHEN METABOLIC PANEL: CPT | Performed by: INTERNAL MEDICINE

## 2018-04-24 PROCEDURE — 71046 X-RAY EXAM CHEST 2 VIEWS: CPT

## 2018-04-25 ENCOUNTER — TELEPHONE (OUTPATIENT)
Dept: CARDIOLOGY | Facility: CLINIC | Age: 83
End: 2018-04-25

## 2018-04-25 DIAGNOSIS — I25.119 CORONARY ARTERY DISEASE INVOLVING NATIVE HEART WITH ANGINA PECTORIS, UNSPECIFIED VESSEL OR LESION TYPE (HCC): ICD-10-CM

## 2018-04-25 LAB
ALBUMIN SERPL-MCNC: 4.1 G/DL (ref 3.5–5.2)
ALBUMIN SERPL-MCNC: 4.2 G/DL (ref 3.5–5.2)
ALP BLD-CCNC: 94 U/L (ref 40–130)
ALT SERPL-CCNC: 11 U/L (ref 5–41)
ANION GAP SERPL CALCULATED.3IONS-SCNC: 13 MMOL/L (ref 7–19)
ANION GAP SERPL CALCULATED.3IONS-SCNC: 14 MMOL/L (ref 7–19)
AST SERPL-CCNC: 15 U/L (ref 5–40)
BILIRUB SERPL-MCNC: 1 MG/DL (ref 0.2–1.2)
BILIRUBIN URINE: NEGATIVE
BLOOD, URINE: NEGATIVE
BUN BLDV-MCNC: 36 MG/DL (ref 8–23)
BUN BLDV-MCNC: 37 MG/DL (ref 8–23)
CALCIUM SERPL-MCNC: 9.3 MG/DL (ref 8.8–10.2)
CALCIUM SERPL-MCNC: 9.4 MG/DL (ref 8.8–10.2)
CHLORIDE BLD-SCNC: 96 MMOL/L (ref 98–111)
CHLORIDE BLD-SCNC: 98 MMOL/L (ref 98–111)
CLARITY: CLEAR
CO2: 31 MMOL/L (ref 22–29)
CO2: 32 MMOL/L (ref 22–29)
COLOR: YELLOW
CREAT SERPL-MCNC: 2 MG/DL (ref 0.5–1.2)
CREAT SERPL-MCNC: 2 MG/DL (ref 0.5–1.2)
CREATININE URINE: 88.7 MG/DL (ref 4.2–622)
GFR NON-AFRICAN AMERICAN: 32
GFR NON-AFRICAN AMERICAN: 32
GLUCOSE BLD-MCNC: 81 MG/DL (ref 74–109)
GLUCOSE BLD-MCNC: 82 MG/DL (ref 74–109)
GLUCOSE URINE: NEGATIVE MG/DL
HCT VFR BLD CALC: 40.4 % (ref 42–52)
HEMOGLOBIN: 12.6 G/DL (ref 14–18)
KETONES, URINE: NEGATIVE MG/DL
LEUKOCYTE ESTERASE, URINE: NEGATIVE
MAGNESIUM: 2.1 MG/DL (ref 1.6–2.4)
MCH RBC QN AUTO: 29.3 PG (ref 27–31)
MCHC RBC AUTO-ENTMCNC: 31.2 G/DL (ref 33–37)
MCV RBC AUTO: 94 FL (ref 80–94)
MICROALBUMIN UR-MCNC: 2.3 MG/DL (ref 0–19)
MICROALBUMIN/CREAT UR-RTO: 25.9 MG/G
NITRITE, URINE: NEGATIVE
NT-PROBNP SERPL-MCNC: 2430 PG/ML (ref 0–1800)
PARATHYROID HORMONE INTACT: 170.3 PG/ML (ref 15–65)
PDW BLD-RTO: 14.5 % (ref 11.5–14.5)
PH UA: 5.5
PHOSPHORUS: 4.2 MG/DL (ref 2.5–4.5)
PLATELET # BLD: 133 K/UL (ref 130–400)
PMV BLD AUTO: 12.3 FL (ref 9.4–12.4)
POTASSIUM SERPL-SCNC: 3.8 MMOL/L (ref 3.5–5)
POTASSIUM SERPL-SCNC: 3.9 MMOL/L (ref 3.5–5)
PROTEIN UA: NEGATIVE MG/DL
RBC # BLD: 4.3 M/UL (ref 4.7–6.1)
SODIUM BLD-SCNC: 141 MMOL/L (ref 136–145)
SODIUM BLD-SCNC: 143 MMOL/L (ref 136–145)
SPECIFIC GRAVITY UA: 1.01
TOTAL PROTEIN: 6.7 G/DL (ref 6.6–8.7)
URIC ACID, SERUM: 9.2 MG/DL (ref 3.4–7)
UROBILINOGEN, URINE: 0.2 E.U./DL
VITAMIN D 25-HYDROXY: 14.4 NG/ML
WBC # BLD: 4.9 K/UL (ref 4.8–10.8)

## 2018-04-26 ENCOUNTER — DOCUMENTATION (OUTPATIENT)
Dept: CARDIOLOGY | Facility: CLINIC | Age: 83
End: 2018-04-26

## 2018-04-26 DIAGNOSIS — F41.9 ANXIETY: ICD-10-CM

## 2018-04-26 RX ORDER — GABAPENTIN 300 MG/1
CAPSULE ORAL
Qty: 60 CAPSULE | Refills: 0 | Status: SHIPPED | OUTPATIENT
Start: 2018-04-26 | End: 2018-06-22 | Stop reason: SDUPTHER

## 2018-04-26 RX ORDER — HYDROCODONE BITARTRATE AND ACETAMINOPHEN 5; 325 MG/1; MG/1
TABLET ORAL
Qty: 60 TABLET | Refills: 0 | Status: SHIPPED | OUTPATIENT
Start: 2018-04-26 | End: 2018-05-24 | Stop reason: SDUPTHER

## 2018-04-26 RX ORDER — HYDROCODONE BITARTRATE AND ACETAMINOPHEN 7.5; 325 MG/1; MG/1
TABLET ORAL
Qty: 30 TABLET | Refills: 0 | Status: SHIPPED | OUTPATIENT
Start: 2018-04-26 | End: 2018-05-24 | Stop reason: SDUPTHER

## 2018-04-26 RX ORDER — LORAZEPAM 0.5 MG/1
TABLET ORAL
Qty: 120 TABLET | Refills: 0 | Status: SHIPPED | OUTPATIENT
Start: 2018-04-26 | End: 2018-05-24 | Stop reason: SDUPTHER

## 2018-04-26 NOTE — PROGRESS NOTES
Dr Enciso sent results to me about patient lab work that stated that if he is still having SOB to increase Bumex to 1 mg BID.   I called patient daughter, and told her these results per Dr. Enciso, and she was wanting to know exactly what was abnormal and why it needed to be changed. I told her I would message Dr. Enciso to find out exactly what was going on and then I would call her back to update her.       Patient daughter left  stating that her father ( Wild Bravo) needs a script sent in to the pharmacy for a 1 mg BID of Bumex, because with the script he has now, he wont make it past 3 days.       Daughter called back and left  that states that she does not want anything sent in to pharmacy, states father (patient) is not having any SOB and is just bloated, states that he took an extra 80 mg lasix today to help with that. And states she will talk to Dr. Enciso on Tuesday at his appointment about the lab results and changing the Bumex.

## 2018-04-26 NOTE — PROGRESS NOTES
Patient daughter wondering what was abnormal about his labs that is making you want to increase his Bumex. Please advise.

## 2018-04-27 ENCOUNTER — OFFICE VISIT (OUTPATIENT)
Dept: INTERNAL MEDICINE | Age: 83
End: 2018-04-27
Payer: MEDICARE

## 2018-04-27 ENCOUNTER — CARE COORDINATION (OUTPATIENT)
Dept: CARE COORDINATION | Age: 83
End: 2018-04-27

## 2018-04-27 VITALS
BODY MASS INDEX: 21.17 KG/M2 | HEIGHT: 74 IN | HEART RATE: 94 BPM | OXYGEN SATURATION: 97 % | DIASTOLIC BLOOD PRESSURE: 56 MMHG | SYSTOLIC BLOOD PRESSURE: 88 MMHG | WEIGHT: 165 LBS

## 2018-04-27 DIAGNOSIS — I50.22 CHRONIC SYSTOLIC CONGESTIVE HEART FAILURE (HCC): ICD-10-CM

## 2018-04-27 DIAGNOSIS — F51.01 PRIMARY INSOMNIA: ICD-10-CM

## 2018-04-27 DIAGNOSIS — G89.4 CHRONIC PAIN SYNDROME: ICD-10-CM

## 2018-04-27 DIAGNOSIS — D64.9 ANEMIA, UNSPECIFIED TYPE: ICD-10-CM

## 2018-04-27 DIAGNOSIS — F41.9 ANXIETY: ICD-10-CM

## 2018-04-27 DIAGNOSIS — G60.9 IDIOPATHIC NEUROPATHY: ICD-10-CM

## 2018-04-27 DIAGNOSIS — I25.119 CORONARY ARTERY DISEASE INVOLVING NATIVE HEART WITH ANGINA PECTORIS, UNSPECIFIED VESSEL OR LESION TYPE (HCC): ICD-10-CM

## 2018-04-27 DIAGNOSIS — I48.0 PAROXYSMAL ATRIAL FIBRILLATION (HCC): Primary | ICD-10-CM

## 2018-04-27 DIAGNOSIS — N18.4 STAGE 4 CHRONIC KIDNEY DISEASE (HCC): ICD-10-CM

## 2018-04-27 DIAGNOSIS — G25.81 RESTLESS LEGS: ICD-10-CM

## 2018-04-27 PROCEDURE — 99214 OFFICE O/P EST MOD 30 MIN: CPT | Performed by: INTERNAL MEDICINE

## 2018-04-27 PROCEDURE — G8598 ASA/ANTIPLAT THER USED: HCPCS | Performed by: INTERNAL MEDICINE

## 2018-04-27 PROCEDURE — 4040F PNEUMOC VAC/ADMIN/RCVD: CPT | Performed by: INTERNAL MEDICINE

## 2018-04-27 PROCEDURE — G8420 CALC BMI NORM PARAMETERS: HCPCS | Performed by: INTERNAL MEDICINE

## 2018-04-27 PROCEDURE — 1123F ACP DISCUSS/DSCN MKR DOCD: CPT | Performed by: INTERNAL MEDICINE

## 2018-04-27 PROCEDURE — 1036F TOBACCO NON-USER: CPT | Performed by: INTERNAL MEDICINE

## 2018-04-27 PROCEDURE — G8427 DOCREV CUR MEDS BY ELIG CLIN: HCPCS | Performed by: INTERNAL MEDICINE

## 2018-05-01 ENCOUNTER — OFFICE VISIT (OUTPATIENT)
Dept: CARDIOLOGY | Facility: CLINIC | Age: 83
End: 2018-05-01

## 2018-05-01 VITALS
HEIGHT: 74 IN | SYSTOLIC BLOOD PRESSURE: 110 MMHG | HEART RATE: 86 BPM | BODY MASS INDEX: 21.97 KG/M2 | OXYGEN SATURATION: 96 % | DIASTOLIC BLOOD PRESSURE: 70 MMHG | WEIGHT: 171.2 LBS

## 2018-05-01 DIAGNOSIS — I25.810 CORONARY ARTERY DISEASE INVOLVING CORONARY BYPASS GRAFT OF NATIVE HEART WITHOUT ANGINA PECTORIS: ICD-10-CM

## 2018-05-01 DIAGNOSIS — Z95.0 PACEMAKER: ICD-10-CM

## 2018-05-01 DIAGNOSIS — N18.4 STAGE 4 CHRONIC KIDNEY DISEASE (HCC): ICD-10-CM

## 2018-05-01 DIAGNOSIS — Z98.890 H/O MITRAL VALVE REPAIR: ICD-10-CM

## 2018-05-01 DIAGNOSIS — I48.19 PERSISTENT ATRIAL FIBRILLATION (HCC): ICD-10-CM

## 2018-05-01 DIAGNOSIS — I50.22 CHRONIC SYSTOLIC CONGESTIVE HEART FAILURE (HCC): Primary | ICD-10-CM

## 2018-05-01 PROCEDURE — 99215 OFFICE O/P EST HI 40 MIN: CPT | Performed by: INTERNAL MEDICINE

## 2018-05-01 PROCEDURE — 93000 ELECTROCARDIOGRAM COMPLETE: CPT | Performed by: INTERNAL MEDICINE

## 2018-05-01 RX ORDER — FUROSEMIDE 40 MG/1
40 TABLET ORAL AS NEEDED
COMMUNITY
End: 2018-05-01 | Stop reason: SDUPTHER

## 2018-05-01 RX ORDER — FUROSEMIDE 40 MG/1
40 TABLET ORAL AS NEEDED
Qty: 30 TABLET | Refills: 11 | Status: SHIPPED | OUTPATIENT
Start: 2018-05-01 | End: 2018-11-01 | Stop reason: SDUPTHER

## 2018-05-01 NOTE — PROGRESS NOTES
"     Subjective:     Encounter Date:05/01/2018      Patient ID: Wild Bravo is a 83 y.o. male.    Chief Complaint: CHF follow-up  Mr. Bravo is a pleasant 83-year-old male with extensive cardiac history who was previously followed by Dr. Macdonald.  He transferred his care to me and I have seen him in the office on 1/3/18 and most recently on 4/11/18.  At that time, he was complaining of worsened \"bloating\" with associated worsened shortness of breath. I was concerned his known systolic heart failure was decompensating, so ordered labs and a CXR with plans to increase diuretics if needed.  He ended up not getting the labs and CXR until 13 days later, and today reports that his symptoms actually resolved within a few days of seeing me on 4/11/18 and have been doing well since. He continues taking bumex 0.5mg PO BID with occasional extra doses of lasix (40mg) when his symptoms worsen, and this regimen appears to be working for him lately.  Overall, he states he has \"more good days than bad days.\"      Atrial Fibrillation   Presents for follow-up visit. Symptoms include shortness of breath. Symptoms are negative for chest pain, hemodynamic instability, palpitations, syncope and tachycardia. The symptoms have been stable. Past medical history includes atrial fibrillation, CAD and CHF.   Congestive Heart Failure   Presents for follow-up visit. Associated symptoms include shortness of breath. Pertinent negatives include no chest pain, chest pressure, claudication, near-syncope, orthopnea or palpitations. The symptoms have been improving. His past medical history is significant for CAD.   Coronary Artery Disease   Presents for follow-up visit. Symptoms include shortness of breath. Pertinent negatives include no chest pain, chest pressure, chest tightness, leg swelling or palpitations. His past medical history is significant for CHF. The symptoms have been stable. Compliance with medications is good.       The following " portions of the patient's history were reviewed and updated as appropriate: allergies, current medications, past family history, past medical history, past social history, past surgical history and problem list.    Review of Systems   Constitution: Positive for malaise/fatigue.   Cardiovascular: Positive for dyspnea on exertion. Negative for chest pain, claudication, leg swelling, near-syncope, orthopnea, palpitations, paroxysmal nocturnal dyspnea and syncope.   Respiratory: Positive for shortness of breath. Negative for chest tightness.    Hematologic/Lymphatic: Does not bruise/bleed easily.        Objective:      Vitals:    05/01/18 1347   BP: 110/70   Pulse: 86   SpO2: 96%     Physical Exam   Constitutional: He is oriented to person, place, and time. He appears well-developed and well-nourished.   Neck: No JVD present.   Cardiovascular: Normal rate, regular rhythm, normal heart sounds and intact distal pulses.    No murmur heard.  Pulmonary/Chest: Effort normal and breath sounds normal.   Musculoskeletal: He exhibits no edema.   Neurological: He is alert and oriented to person, place, and time.   Skin: Skin is warm and dry.       Lab Review:         ECG 12 Lead  Date/Time: 5/1/2018 1:57 PM  Performed by: NANDO GARCIA  Authorized by: NANDO GARCIA   Comparison: compared with previous ECG from 4/11/2018  Similar to previous ECG  Rhythm: paced  Clinical impression: abnormal ECG            Results for orders placed during the hospital encounter of 09/08/17   Adult Transthoracic Echo Complete    Narrative · Left ventricular systolic function is moderately decreased. Estimated EF   = 35%.  · Left ventricular diastolic dysfunction (grade I) consistent with   impaired relaxation.  · Mild to moderate aortic valve regurgitation is present.  · Mild pulmonary hypertension is present.        I personally visualized the 2v CXR from 4/24/18 - normal cardiac dize. BiV/ICD leads all in appropriate position. No overt  pulmonary edema or pleural effusions.    I personally reviewed the latest PPM/ICD interrogation (remote transmission, from 3/26/18): persistent AF noted    LABS REVIEWED: BNP 2,430 (was 2,180 when discharged from hospital in Sept '17).  CMP: na 143, K 3.7, Cr 2.03, Cl 96, CO2 35, AST/ALT normal but bilirubin 1.3      OLD RECORDS REVIEWED: labs from [facility] showed BNP there had been 4,614 on 3/9/18 and 2,454 on 2/26/18, all recent Cr there had been 2.0-2.2    Assessment/Plan:     Problem List Items Addressed This Visit        Cardiovascular and Mediastinum    Coronary artery disease involving coronary bypass graft of native heart without angina pectoris    Overview     MI x2  Single vessel CABG 2008         Current Assessment & Plan     Stable; no angina. Continue ASA, statin, BB, and ACE         Chronic systolic congestive heart failure - Primary    Overview     LVEF 35% on last echo; has BiV-ICD         Current Assessment & Plan     Congestive heart failure due to coronary artery disease (CAD).  Heart failure is improving with treatment.  NYHA Class II.  Continue current treatment regimen.  This includes coreg 12.5mg BID, lisinopril 2.5mg dialy, and bumex 0.5mg PO BID (with lasix 40mg PRN).  Heart failure will be reassessed in 3 months.    We did discuss CardioMEMs and I provided a brochure - if he continues to struggle with fluctuating fluid status and symptoms, this could offer refined outpatient management of his heart failure.    Also, I did review his latest pacemaker interrogations and it does appear he is in persistent atrial fibrillation with 100% V-pacing. This could be contributing to his HF symptoms, but he is not interested in another ablation. We did discuss anti-arrhythmic therapy for NSR restoration, but with his low EF amiodarone would be best option. He reports having been on this previously and not tolerating it.  If his HF symptoms remain stable, then we could consider dronedarone (Multaq) at f/u  for rhythm control.         Pacemaker    Persistent atrial fibrillation    Overview     CHADS-VASc Risk Assessment            7       Total Score        1 CHF    1 Hypertension    2 Age >/= 75    2 PRIOR STROKE/TIA/THROMBO    1 Vascular Disease        Criteria that do not apply:    DM    Age 65-74    Sex: Female        Reports at least one prior ablation attempt and prior intolerance to amiodarone         Current Assessment & Plan     On ASA alone for CVA prophylaxis given prior  bleeding.  Information provided at last appt re: Watchman and patient remains interested.     Considering anti-arrhythmic therapy as per above in efforts to improve HF symptoms.            Genitourinary    Stage 4 chronic kidney disease    Current Assessment & Plan     Creatinine was stable/within his recent range on labs drawn here 4/24/18         Relevant Medications    furosemide (LASIX) 40 MG tablet       Other    H/O mitral valve repair     F/u 3 months, but will call back next week to see if he is interested in CHERRY for Watchman consideration    Raleigh Enciso MD  05/01/2018  9:25 PM

## 2018-05-02 ENCOUNTER — CLINICAL SUPPORT (OUTPATIENT)
Dept: CARDIOLOGY | Facility: CLINIC | Age: 83
End: 2018-05-02

## 2018-05-02 DIAGNOSIS — I49.5 SSS (SICK SINUS SYNDROME) (HCC): ICD-10-CM

## 2018-05-02 DIAGNOSIS — Z95.810 BIVENTRICULAR ICD (IMPLANTABLE CARDIOVERTER-DEFIBRILLATOR) IN PLACE: Primary | ICD-10-CM

## 2018-05-02 DIAGNOSIS — I50.22 CHRONIC SYSTOLIC CONGESTIVE HEART FAILURE (HCC): ICD-10-CM

## 2018-05-02 NOTE — ASSESSMENT & PLAN NOTE
Congestive heart failure due to coronary artery disease (CAD).  Heart failure is improving with treatment.  NYHA Class II.  Continue current treatment regimen.  This includes coreg 12.5mg BID, lisinopril 2.5mg dialy, and bumex 0.5mg PO BID (with lasix 40mg PRN).  Heart failure will be reassessed in 3 months.    We did discuss CardioMEMs and I provided a brochure - if he continues to struggle with fluctuating fluid status and symptoms, this could offer refined outpatient management of his heart failure.    Also, I did review his latest pacemaker interrogations and it does appear he is in persistent atrial fibrillation with 100% V-pacing. This could be contributing to his HF symptoms, but he is not interested in another ablation. We did discuss anti-arrhythmic therapy for NSR restoration, but with his low EF amiodarone would be best option. He reports having been on this previously and not tolerating it.  If his HF symptoms remain stable, then we could consider dronedarone (Multaq) at f/u for rhythm control.

## 2018-05-02 NOTE — ASSESSMENT & PLAN NOTE
On ASA alone for CVA prophylaxis given prior  bleeding.  Information provided at last appt re: Watchman and patient remains interested.     Considering anti-arrhythmic therapy as per above in efforts to improve HF symptoms.

## 2018-05-02 NOTE — PROGRESS NOTES
Bi-V AICD Evaluation Report  REMOTE/LATITUDE    May 2, 2018    Primary Cardiologist: Zaria  : Guidant Model: Cognis  Implant date: 07/08/2010    Reason for evaluation: remote transmission indicating AF with RVR received 05/01/2018  Indication for AICD: chronic systolic congestive heart failure and persistent atrial fibrillation    Measurements  Atrial sensing:  P wave: 0.4 mV  Atrial threshold: N/R  Atrial lead impedance: 541 ohms  Ventricular sensing:  R wave: 11.5 mV  RV Threshold:  N/R  RV Impedance:  361 ohms  Shock impedance:  RV 46 ohms   LV Threshold:  N/R  LV Impedance:  810 ohms      Diagnostic Data  Atrial paced: 2  %  Ventricular paced: RV 87 %  LV 98% (10/26/2017-05/01/2018)    Episodes recorded since 03/26/2018  Numerous AF episodes recorded:  Longest duration 51 hours 5 minutes.  Not anticoagulated s/t  bleeding history.  Numerous NonSustV episodes recorded.  All appear to be AF with RVR.  Max ventricular rate ranges from 150-204 bpm, longest duration 7 seconds.  Several PMT episodes recorded at max tracking rate.  EGMs appear to be sinus tachycardia at max tracking rate.    Battery status: satisfactory   Estimated 2 years from now      Final Parameters  Mode: DDDR     Lower rate: 70 bpm   Upper rate: 125 bpm  AV Delay: 170-180 ms paced    115-120 ms sensed   Atrial - Amplitude: 2.4 V   Pulse width: 0.5 ms   Sensitivity: 0.15 mV   Ventricular:  RV Amplitude: 2.2 V @ 0.5 ms   Sensitivity: 0.6 mV            LV Amplitude:  1.8V @ 0.5ms  Changes made: N/A--Remote transmission  Conclusions: normal AICD function, no therapy delivered, adequate battery reserve and AF with RVR noted    Follow up: Next remote transmission in June 2018    Not a billable transmission.

## 2018-05-06 ASSESSMENT — ENCOUNTER SYMPTOMS
SINUS PRESSURE: 0
ABDOMINAL PAIN: 1
COUGH: 0
ABDOMINAL DISTENTION: 0
EYE DISCHARGE: 0
BACK PAIN: 1
SHORTNESS OF BREATH: 1
EYE REDNESS: 0

## 2018-05-08 ENCOUNTER — TELEPHONE (OUTPATIENT)
Dept: CARDIOLOGY | Facility: CLINIC | Age: 83
End: 2018-05-08

## 2018-05-08 NOTE — TELEPHONE ENCOUNTER
Called patient to find out about watchman procedure, and he states he has not made up his mind about it yet.

## 2018-05-08 NOTE — TELEPHONE ENCOUNTER
Ok, thanks. Please set a reminder to check back with him in a week, and ask then if he has further questions he needs answered to help decide.

## 2018-05-08 NOTE — PROGRESS NOTES
I have reviewed the notes, assessments, and/or procedures performed by Nahomy Reynoso, I concur with her documentation of Wild Bravo.

## 2018-05-16 NOTE — TELEPHONE ENCOUNTER
Called patient, left message to give me a call back.     Munira his daughter called back and stated that he is not wanting to do it right away, but possible is the near future, he still is wanting to talk to his other daughter about it before he does anything. States they will call us when he has decided.

## 2018-05-24 DIAGNOSIS — F41.9 ANXIETY: ICD-10-CM

## 2018-05-25 ENCOUNTER — CLINICAL SUPPORT (OUTPATIENT)
Dept: CARDIOLOGY | Facility: CLINIC | Age: 83
End: 2018-05-25

## 2018-05-25 DIAGNOSIS — I50.22 CHRONIC SYSTOLIC CONGESTIVE HEART FAILURE (HCC): ICD-10-CM

## 2018-05-25 DIAGNOSIS — Z95.810 BIVENTRICULAR ICD (IMPLANTABLE CARDIOVERTER-DEFIBRILLATOR) IN PLACE: Primary | ICD-10-CM

## 2018-05-25 DIAGNOSIS — I48.19 PERSISTENT ATRIAL FIBRILLATION (HCC): ICD-10-CM

## 2018-05-25 RX ORDER — HYDROCODONE BITARTRATE AND ACETAMINOPHEN 7.5; 325 MG/1; MG/1
TABLET ORAL
Qty: 30 TABLET | Refills: 0 | Status: SHIPPED | OUTPATIENT
Start: 2018-05-25 | End: 2018-06-22 | Stop reason: SDUPTHER

## 2018-05-25 RX ORDER — HYDROCODONE BITARTRATE AND ACETAMINOPHEN 5; 325 MG/1; MG/1
TABLET ORAL
Qty: 60 TABLET | Refills: 0 | Status: SHIPPED | OUTPATIENT
Start: 2018-05-25 | End: 2018-06-22 | Stop reason: SDUPTHER

## 2018-05-25 RX ORDER — LORAZEPAM 0.5 MG/1
TABLET ORAL
Qty: 120 TABLET | Refills: 0 | Status: SHIPPED | OUTPATIENT
Start: 2018-05-25 | End: 2018-06-25 | Stop reason: SDUPTHER

## 2018-06-21 ENCOUNTER — LAB (OUTPATIENT)
Dept: LAB | Facility: HOSPITAL | Age: 83
End: 2018-06-21

## 2018-06-21 ENCOUNTER — OFFICE VISIT (OUTPATIENT)
Dept: CARDIOLOGY | Facility: CLINIC | Age: 83
End: 2018-06-21

## 2018-06-21 VITALS
HEART RATE: 113 BPM | BODY MASS INDEX: 21.34 KG/M2 | SYSTOLIC BLOOD PRESSURE: 110 MMHG | WEIGHT: 161 LBS | OXYGEN SATURATION: 98 % | DIASTOLIC BLOOD PRESSURE: 60 MMHG | HEIGHT: 73 IN | RESPIRATION RATE: 18 BRPM

## 2018-06-21 DIAGNOSIS — I48.19 PERSISTENT ATRIAL FIBRILLATION (HCC): ICD-10-CM

## 2018-06-21 DIAGNOSIS — I50.22 CHRONIC SYSTOLIC CONGESTIVE HEART FAILURE (HCC): ICD-10-CM

## 2018-06-21 DIAGNOSIS — E78.2 MIXED HYPERLIPIDEMIA: ICD-10-CM

## 2018-06-21 DIAGNOSIS — I25.810 CORONARY ARTERY DISEASE INVOLVING CORONARY BYPASS GRAFT OF NATIVE HEART WITHOUT ANGINA PECTORIS: ICD-10-CM

## 2018-06-21 DIAGNOSIS — Z95.1 HX OF CABG: ICD-10-CM

## 2018-06-21 DIAGNOSIS — N18.9 CHRONIC KIDNEY DISEASE, UNSPECIFIED CKD STAGE: ICD-10-CM

## 2018-06-21 DIAGNOSIS — I50.22 CHRONIC SYSTOLIC CONGESTIVE HEART FAILURE (HCC): Primary | ICD-10-CM

## 2018-06-21 DIAGNOSIS — I10 ESSENTIAL HYPERTENSION: ICD-10-CM

## 2018-06-21 DIAGNOSIS — Z95.810 BIVENTRICULAR ICD (IMPLANTABLE CARDIOVERTER-DEFIBRILLATOR) IN PLACE: ICD-10-CM

## 2018-06-21 LAB
ANION GAP SERPL CALCULATED.3IONS-SCNC: 14 MMOL/L (ref 4–13)
BUN BLD-MCNC: 29 MG/DL (ref 5–21)
BUN/CREAT SERPL: 13.6 (ref 7–25)
CALCIUM SPEC-SCNC: 9.7 MG/DL (ref 8.4–10.4)
CHLORIDE SERPL-SCNC: 97 MMOL/L (ref 98–110)
CO2 SERPL-SCNC: 32 MMOL/L (ref 24–31)
CREAT BLD-MCNC: 2.14 MG/DL (ref 0.5–1.4)
GFR SERPL CREATININE-BSD FRML MDRD: 30 ML/MIN/1.73
GLUCOSE BLD-MCNC: 102 MG/DL (ref 70–100)
NT-PROBNP SERPL-MCNC: 4640 PG/ML (ref 0–1800)
POTASSIUM BLD-SCNC: 4.2 MMOL/L (ref 3.5–5.3)
SODIUM BLD-SCNC: 143 MMOL/L (ref 135–145)

## 2018-06-21 PROCEDURE — 80048 BASIC METABOLIC PNL TOTAL CA: CPT | Performed by: NURSE PRACTITIONER

## 2018-06-21 PROCEDURE — 99214 OFFICE O/P EST MOD 30 MIN: CPT | Performed by: NURSE PRACTITIONER

## 2018-06-21 PROCEDURE — 93000 ELECTROCARDIOGRAM COMPLETE: CPT | Performed by: NURSE PRACTITIONER

## 2018-06-21 PROCEDURE — 83880 ASSAY OF NATRIURETIC PEPTIDE: CPT | Performed by: NURSE PRACTITIONER

## 2018-06-21 PROCEDURE — 36415 COLL VENOUS BLD VENIPUNCTURE: CPT

## 2018-06-21 NOTE — PATIENT INSTRUCTIONS
Heart Failure  Heart failure means your heart has trouble pumping blood. This makes it hard for your body to work well. Heart failure is usually a long-term (chronic) condition. You must take good care of yourself and follow your doctor's treatment plan.  Follow these instructions at home:  · Take your heart medicine as told by your doctor.  ? Do not stop taking medicine unless your doctor tells you to.  ? Do not skip any dose of medicine.  ? Refill your medicines before they run out.  ? Take other medicines only as told by your doctor or pharmacist.  · Stay active if told by your doctor. The elderly and people with severe heart failure should talk with a doctor about physical activity.  · Eat heart-healthy foods. Choose foods that are without trans fat and are low in saturated fat, cholesterol, and salt (sodium). This includes fresh or frozen fruits and vegetables, fish, lean meats, fat-free or low-fat dairy foods, whole grains, and high-fiber foods. Lentils and dried peas and beans (legumes) are also good choices.  · Limit salt if told by your doctor.  · Cook in a healthy way. Roast, grill, broil, bake, poach, steam, or stir-freedman foods.  · Limit fluids as told by your doctor.  · Weigh yourself every morning. Do this after you pee (urinate) and before you eat breakfast. Write down your weight to give to your doctor.  · Take your blood pressure and write it down if your doctor tells you to.  · Ask your doctor how to check your pulse. Check your pulse as told.  · Lose weight if told by your doctor.  · Stop smoking or chewing tobacco. Do not use gum or patches that help you quit without your doctor's approval.  · Schedule and go to doctor visits as told.  · Nonpregnant women should have no more than 1 drink a day. Men should have no more than 2 drinks a day. Talk to your doctor about drinking alcohol.  · Stop illegal drug use.  · Stay current with shots (immunizations).  · Manage your health conditions as told by your  doctor.  · Learn to manage your stress.  · Rest when you are tired.  · If it is really hot outside:  ? Avoid intense activities.  ? Use air conditioning or fans, or get in a cooler place.  ? Avoid caffeine and alcohol.  ? Wear loose-fitting, lightweight, and light-colored clothing.  · If it is really cold outside:  ? Avoid intense activities.  ? Layer your clothing.  ? Wear mittens or gloves, a hat, and a scarf when going outside.  ? Avoid alcohol.  · Learn about heart failure and get support as needed.  · Get help to maintain or improve your quality of life and your ability to care for yourself as needed.  Contact a doctor if:  · You gain weight quickly.  · You are more short of breath than usual.  · You cannot do your normal activities.  · You tire easily.  · You cough more than normal, especially with activity.  · You have any or more puffiness (swelling) in areas such as your hands, feet, ankles, or belly (abdomen).  · You cannot sleep because it is hard to breathe.  · You feel like your heart is beating fast (palpitations).  · You get dizzy or light-headed when you stand up.  Get help right away if:  · You have trouble breathing.  · There is a change in mental status, such as becoming less alert or not being able to focus.  · You have chest pain or discomfort.  · You faint.  This information is not intended to replace advice given to you by your health care provider. Make sure you discuss any questions you have with your health care provider.  Document Released: 09/26/2009 Document Revised: 05/25/2017 Document Reviewed: 02/03/2014  [employer] Patient Education © 2017 [employer]  BMI for Adults  Body mass index (BMI) is a number that is calculated from a person's weight and height. In most adults, the number is used to find how much of an adult's weight is made up of fat. BMI is not as accurate as a direct measure of body fat.  How is BMI calculated?  BMI is calculated by dividing weight in  kilograms by height in meters squared. It can also be calculated by dividing weight in pounds by height in inches squared, then multiplying the resulting number by 703. Charts are available to help you find your BMI quickly and easily without doing this calculation.  How is BMI interpreted?  Health care professionals use BMI charts to identify whether an adult is underweight, at a normal weight, or overweight based on the following guidelines:  · Underweight: BMI less than 18.5.  · Normal weight: BMI between 18.5 and 24.9.  · Overweight: BMI between 25 and 29.9.  · Obese: BMI of 30 and above.    BMI is usually interpreted the same for males and females.  Weight includes both fat and muscle, so someone with a muscular build, such as an athlete, may have a BMI that is higher than 24.9. In cases like these, BMI may not accurately depict body fat. To determine if excess body fat is the cause of a BMI of 25 or higher, further assessments may need to be done by a health care provider.  Why is BMI a useful tool?  BMI is used to identify a possible weight problem that may be related to a medical problem or may increase the risk for medical problems. BMI can also be used to promote changes to reach a healthy weight.  This information is not intended to replace advice given to you by your health care provider. Make sure you discuss any questions you have with your health care provider.  Document Released: 08/29/2005 Document Revised: 04/27/2017 Document Reviewed: 05/15/2015  [employer] Patient Education © 2018 [employer]

## 2018-06-21 NOTE — PROGRESS NOTES
"    Subjective:     Encounter Date:06/21/2018      Patient ID: Wild Bravo is a 83 y.o. male with a history CAD s/p CABG, HTN, HLD, persistent AF, Chronic systolic heart failure last EF noted to be 35%, BiV ICD in place, CKD, and  valvular disease s/p MV repair. He presents to office today due to recent hypotension at home.     Chief Complaint: Hypotension    History of Present Illness    Complaints of recent: He notes that he remains bloated after eating. He endorses orthopnea, however states \"I will never be able to lie flat, I haven't in over 3 years.\" He notes dizziness, fatigue. Denies edema or weight changes. He has abdominal pain that is chronic across his upper abdomen and attributes this to his \"bnad gallbladder.\" He has a poor appetite but won't eat large meals due to bloating and gas. He takes his medications but has some uncertainty of the recent regimen. He was to be on PRN lasix and Bumex 0.5 mg BID.  He is unsure of how long he has been taking his diuretics incorrectly but does know that since he saw Dr. Enciso 05.01 he has been taking lasix daily along with Bumex.  He is a poor historian as it relates to specifics on how long he has done this. He notes that he feels ok today, but a few days ago he had a \"low Blood pressure in the 60s\"       The following portions of the patient's history were reviewed and updated as appropriate: allergies, current medications, past family history, past medical history, past social history and past surgical history.     Allergies   Allergen Reactions   • Keflex [Cephalexin]      CEPHALOSPORINS       Current Outpatient Prescriptions:   •  aspirin 81 MG EC tablet, Take 81 mg by mouth Daily., Disp: , Rfl:   •  atorvastatin (LIPITOR) 10 MG tablet, Take 1 tablet by mouth Every Night., Disp: , Rfl:   •  bumetanide (BUMEX) 1 MG tablet, Take 0.5 tablets by mouth 2 (Two) Times a Day., Disp: 30 tablet, Rfl: 11  •  carvedilol (COREG) 12.5 MG tablet, Take 1 tablet by mouth 2 " (Two) Times a Day With Meals., Disp: 60 tablet, Rfl: 11  •  Cholecalciferol (VITAMIN D PO), Take  by mouth., Disp: , Rfl:   •  dutasteride (AVODART) 0.5 MG capsule, Take 0.5 mg by mouth Daily., Disp: , Rfl:   •  furosemide (LASIX) 40 MG tablet, Take 1 tablet by mouth As Needed (fluid retention)., Disp: 30 tablet, Rfl: 11  •  gabapentin (NEURONTIN) 400 MG capsule, Take 1 capsule by mouth Daily., Disp: , Rfl:   •  HYDROcodone-acetaminophen (NORCO) 7.5-325 MG per tablet, Take 1 tablet by mouth Every 8 (Eight) Hours As Needed for Moderate Pain ., Disp: , Rfl:   •  lisinopril (PRINIVIL,ZESTRIL) 5 MG tablet, Take 2.5 mg by mouth Daily., Disp: , Rfl:   •  LORazepam (ATIVAN) 1 MG tablet, Take 1 tablet by mouth 4 (Four) Times a Day. (Patient taking differently: Take 0.5 mg by mouth 4 (Four) Times a Day.), Disp: 30 tablet, Rfl:   •  nitroglycerin (NITROSTAT) 0.4 MG SL tablet, PLACE 1 TABLET UNDER THE TONGUE AS NEEDED FOR ANGINA, MAY REPEAT EVERY 5 MINS FOR UP THREE DOSES, Disp: 25 tablet, Rfl: 3  •  tamsulosin (FLOMAX) 0.4 MG capsule 24 hr capsule, Take 1 capsule by mouth Every Night., Disp: , Rfl:   •  tiotropium (SPIRIVA HANDIHALER) 18 MCG per inhalation capsule, Place 1 capsule into inhaler and inhale Daily., Disp: , Rfl:   •  albuterol (ACCUNEB) 1.25 MG/3ML nebulizer solution, Take 3 mL by nebulization Every 6 (Six) Hours As Needed for Wheezing or Shortness of Air., Disp: , Rfl: 12    Past Medical History:   Diagnosis Date   • Abdominal aortic aneurysm    • Anxiety    • Atrial fibrillation    • Biventricular ICD (implantable cardioverter-defibrillator) in place    • BPH (benign prostatic hypertrophy)    • Carotid artery stenosis    • CHF (congestive heart failure)    • Chronic kidney disease     Chronic kidney disease, stage 4 (severe)   • Chronic systolic (congestive) heart failure     limited echo 7/2016 EF was 40-45%. Patient has BiV AICD   • Coronary arteriosclerosis     MI x2   • Hearing loss    • Iron deficiency  anemia    • Ischemic cardiomyopathy    • Mixed hyperlipidemia    • Myocardial infarction    • Stroke      Family History   Problem Relation Age of Onset   • Stroke Mother    • Heart disease Mother    • Diabetes Father      Social History     Social History   • Marital status:      Spouse name: N/A   • Number of children: N/A   • Years of education: N/A     Occupational History   • Not on file.     Social History Main Topics   • Smoking status: Former Smoker   • Smokeless tobacco: Never Used   • Alcohol use No   • Drug use: No   • Sexual activity: Defer     Other Topics Concern   • Not on file     Social History Narrative   • No narrative on file     Past Surgical History:   Procedure Laterality Date   • ABLATION OF DYSRHYTHMIC FOCUS  2008    MAZE at time of CABG/MVR   • CARDIAC ABLATION     • CARDIAC CATHETERIZATION     • CARDIAC DEFIBRILLATOR PLACEMENT     • CARDIAC PACEMAKER PLACEMENT     • CARDIOVERSION     • CAROTID ENDARTERECTOMY     • CAROTID ENDARTERECTOMY     • CORONARY ARTERY BYPASS GRAFT  2008   • CORONARY STENT PLACEMENT     • MITRAL VALVE REPAIR/REPLACEMENT  2008   • TOTAL KNEE ARTHROPLASTY         Review of Systems   Constitution: Positive for malaise/fatigue. Negative for chills, diaphoresis, fever and weight gain.   HENT: Negative for congestion.    Eyes: Negative for visual disturbance.   Cardiovascular: Positive for orthopnea. Negative for chest pain, dyspnea on exertion, leg swelling, near-syncope, palpitations, paroxysmal nocturnal dyspnea and syncope.   Respiratory: Positive for cough and sputum production. Negative for shortness of breath and wheezing.    Hematologic/Lymphatic: Negative for bleeding problem.   Skin: Negative for rash.   Gastrointestinal: Positive for bloating and abdominal pain. Negative for nausea and vomiting.   Neurological: Positive for light-headedness. Negative for dizziness, headaches, loss of balance and numbness.   Psychiatric/Behavioral: Negative for altered  "mental status. The patient is nervous/anxious.    All other systems reviewed and are negative.        ECG 12 Lead  Date/Time: 2018 5:26 PM  Performed by: GONZALEZ AIKEN  Authorized by: GONZALEZ AIKEN   Comparison: compared with previous ECG from 2018  Similar to previous ECG  Rhythm: paced  Rate: tachycardic  BPM: 113  Pacin% capture                 Objective:     Physical Exam   Constitutional: He is oriented to person, place, and time. He appears well-developed and well-nourished. No distress.   HENT:   Head: Normocephalic and atraumatic.   Mouth/Throat: Oropharynx is clear and moist.   Eyes: Conjunctivae are normal. No scleral icterus.   Neck: Normal range of motion. Neck supple.   Cardiovascular: Tachycardia present.  Exam reveals no gallop and no friction rub.    No murmur heard.  Paced underlying AF   Abdominal: Soft. Normal appearance. He exhibits no distension. There is no tenderness.   Musculoskeletal: Normal range of motion. He exhibits no edema.   Neurological: He is alert and oriented to person, place, and time.   Skin: Skin is warm, dry and intact. No rash noted. He is not diaphoretic. No erythema. No pallor.   Psychiatric: He has a normal mood and affect. His behavior is normal.   Vitals reviewed.      Lab Review:       Assessment:          Diagnosis Plan   1. Chronic systolic congestive heart failure  proBNP   2. Coronary artery disease involving coronary bypass graft of native heart without angina pectoris     3. Persistent atrial fibrillation     4. Chronic kidney disease, unspecified CKD stage  Basic Metabolic Panel    proBNP   5. Essential hypertension     6. Mixed hyperlipidemia     7. Biventricular ICD (implantable cardioverter-defibrillator) in place     8. Hx of CABG            Plan:        Will check labs due to patient having patient not being sure about when he has taken the combination of bumex and lasix.  Will recheck BNP as he notes to be \"more bad days than good\" and he " "is constantly feeling \"bloated\"  Will call patient and advise of med changes after results available. Continue to monitor blood pressure. Verify medications are correctly dispensed at home as this could be causing low blood pressure results as well if it is being taken more frequently than prescribed. Follow up TBD keep appt in August with Dr. Enciso.     "

## 2018-06-22 ENCOUNTER — TELEPHONE (OUTPATIENT)
Dept: CARDIOLOGY | Facility: CLINIC | Age: 83
End: 2018-06-22

## 2018-06-22 DIAGNOSIS — G89.4 CHRONIC PAIN SYNDROME: Primary | ICD-10-CM

## 2018-06-22 DIAGNOSIS — G60.9 IDIOPATHIC NEUROPATHY: ICD-10-CM

## 2018-06-22 DIAGNOSIS — G25.81 RESTLESS LEGS: ICD-10-CM

## 2018-06-22 RX ORDER — HYDROCODONE BITARTRATE AND ACETAMINOPHEN 5; 325 MG/1; MG/1
TABLET ORAL
Qty: 60 TABLET | Refills: 0 | Status: SHIPPED | OUTPATIENT
Start: 2018-06-22 | End: 2018-07-22

## 2018-06-22 RX ORDER — HYDROCODONE BITARTRATE AND ACETAMINOPHEN 7.5; 325 MG/1; MG/1
TABLET ORAL
Qty: 30 TABLET | Refills: 0 | Status: SHIPPED | OUTPATIENT
Start: 2018-06-22 | End: 2018-07-22

## 2018-06-22 RX ORDER — GABAPENTIN 300 MG/1
CAPSULE ORAL
Qty: 60 CAPSULE | Refills: 0 | Status: SHIPPED | OUTPATIENT
Start: 2018-06-22 | End: 2018-08-02 | Stop reason: SDUPTHER

## 2018-06-22 NOTE — TELEPHONE ENCOUNTER
"Spoke with patient regarding his labs. Continue to hold lasix as we had planned in office and continue BID bumex scheduled.     Patient verbalized understanding. Keep follow up as is.    Of note, patient stated, \"I am feeling great today.\"    Rhianna Burns, APRN  3:36 PM  06/22/18    "

## 2018-06-25 ENCOUNTER — TELEPHONE (OUTPATIENT)
Dept: CARDIOLOGY | Facility: CLINIC | Age: 83
End: 2018-06-25

## 2018-06-25 DIAGNOSIS — F41.9 ANXIETY: ICD-10-CM

## 2018-06-25 RX ORDER — LORAZEPAM 0.5 MG/1
TABLET ORAL
Qty: 120 TABLET | Refills: 0 | Status: SHIPPED | OUTPATIENT
Start: 2018-06-25 | End: 2018-08-02 | Stop reason: SDUPTHER

## 2018-06-25 NOTE — TELEPHONE ENCOUNTER
"RN called patient to evaluate how he has been feeling.  ATP delivered for rapid ventricular response episode lasting approximately 6 seconds on 6/24/2018; appears to be AF with RVR (in VF rate zone, max ventricular rate 240 bpm).  No shock required.  Per patient, \"during the last three days I've been feeling pretty good\".  Patient reports that he \"never\" feels rapid heart rates.  RN will pass report along to Dr. Enciso.  Patient instructed to notify staff of any new symptoms or issues.  He verbalized understanding.  "

## 2018-06-28 ENCOUNTER — TELEPHONE (OUTPATIENT)
Dept: CARDIOLOGY | Facility: CLINIC | Age: 83
End: 2018-06-28

## 2018-07-27 DIAGNOSIS — G60.9 IDIOPATHIC NEUROPATHY: Primary | ICD-10-CM

## 2018-07-27 DIAGNOSIS — G89.4 CHRONIC PAIN SYNDROME: ICD-10-CM

## 2018-07-27 RX ORDER — HYDROCODONE BITARTRATE AND ACETAMINOPHEN 5; 325 MG/1; MG/1
1 TABLET ORAL 2 TIMES DAILY PRN
Qty: 60 TABLET | Refills: 0 | Status: SHIPPED | OUTPATIENT
Start: 2018-07-27 | End: 2018-08-20

## 2018-07-27 RX ORDER — HYDROCODONE BITARTRATE AND ACETAMINOPHEN 7.5; 325 MG/1; MG/1
1 TABLET ORAL NIGHTLY PRN
Qty: 30 TABLET | Refills: 0 | Status: SHIPPED | OUTPATIENT
Start: 2018-07-27 | End: 2018-08-27 | Stop reason: SDUPTHER

## 2018-07-27 NOTE — TELEPHONE ENCOUNTER
Son in [place], Neeta Crooks is here requesting medication refill on Rohith Lin's Hydrocodone 7.5  (evening) and Hydrocodone Acetominophen 5-325 MG (twice daily). Patient will be out on 8/2/18. Refills needed. Family will be out of town wanted to make sure he had his meds.

## 2018-08-02 DIAGNOSIS — G60.9 IDIOPATHIC NEUROPATHY: ICD-10-CM

## 2018-08-02 DIAGNOSIS — F41.9 ANXIETY: ICD-10-CM

## 2018-08-02 DIAGNOSIS — G89.4 CHRONIC PAIN SYNDROME: ICD-10-CM

## 2018-08-02 RX ORDER — GABAPENTIN 300 MG/1
CAPSULE ORAL
Qty: 60 CAPSULE | Refills: 2 | Status: SHIPPED | OUTPATIENT
Start: 2018-08-02 | End: 2018-11-07 | Stop reason: SDUPTHER

## 2018-08-02 RX ORDER — LORAZEPAM 0.5 MG/1
TABLET ORAL
Qty: 120 TABLET | Refills: 0 | Status: SHIPPED | OUTPATIENT
Start: 2018-08-02 | End: 2018-09-04 | Stop reason: SDUPTHER

## 2018-08-08 ENCOUNTER — OFFICE VISIT (OUTPATIENT)
Dept: CARDIOLOGY | Facility: CLINIC | Age: 83
End: 2018-08-08

## 2018-08-08 VITALS
WEIGHT: 165 LBS | SYSTOLIC BLOOD PRESSURE: 94 MMHG | HEART RATE: 101 BPM | DIASTOLIC BLOOD PRESSURE: 71 MMHG | BODY MASS INDEX: 21.17 KG/M2 | HEIGHT: 74 IN

## 2018-08-08 DIAGNOSIS — I95.2 HYPOTENSION DUE TO DRUGS: Primary | ICD-10-CM

## 2018-08-08 DIAGNOSIS — I48.21 PERMANENT ATRIAL FIBRILLATION (HCC): ICD-10-CM

## 2018-08-08 DIAGNOSIS — I25.810 CORONARY ARTERY DISEASE INVOLVING CORONARY BYPASS GRAFT OF NATIVE HEART WITHOUT ANGINA PECTORIS: ICD-10-CM

## 2018-08-08 DIAGNOSIS — I50.22 CHRONIC SYSTOLIC CONGESTIVE HEART FAILURE (HCC): ICD-10-CM

## 2018-08-08 PROCEDURE — 99214 OFFICE O/P EST MOD 30 MIN: CPT | Performed by: INTERNAL MEDICINE

## 2018-08-08 PROCEDURE — 93000 ELECTROCARDIOGRAM COMPLETE: CPT | Performed by: INTERNAL MEDICINE

## 2018-08-08 RX ORDER — ISOSORBIDE MONONITRATE 30 MG/1
30 TABLET, EXTENDED RELEASE ORAL DAILY
COMMUNITY
End: 2019-12-13

## 2018-08-08 RX ORDER — CARVEDILOL 6.25 MG/1
6.25 TABLET ORAL 2 TIMES DAILY WITH MEALS
Qty: 60 TABLET | Refills: 11 | Status: SHIPPED | OUTPATIENT
Start: 2018-08-08 | End: 2018-10-16

## 2018-08-08 NOTE — PROGRESS NOTES
"     Subjective:     Encounter Date:08/08/2018      Patient ID: Wild Bravo is a 84 y.o. male.    Chief Complaint: chf f/u  Mr. Bravo is a pleasant 84-year-old male with extensive cardiac history who was previously followed by Dr. Macdonald.  He transferred his care to me and I have seen him in the office on 1/3/18, 4/11/18, and 5/1/18.  He was last seen in our office by OUSMANE Morales, on 6/21/18.  Currently, he reports having lots of \"bad days\" lately- characterized by low bp but also soa and abdominal bloating/pain. Takes bumex 1mg bid (two 0.5mg tabs BID) and then takes lasix pill intermittently for worsened symptoms.       Atrial Fibrillation   Presents for follow-up visit. Symptoms are negative for chest pain, hemodynamic instability, palpitations, shortness of breath, syncope and tachycardia. The symptoms have been stable. Past medical history includes atrial fibrillation, CAD and CHF.   Congestive Heart Failure   Presents for follow-up visit. Pertinent negatives include no chest pain, chest pressure, claudication, near-syncope, orthopnea, palpitations or shortness of breath. The symptoms have been stable. His past medical history is significant for CAD.   Coronary Artery Disease   Presents for follow-up visit. Pertinent negatives include no chest pain, chest pressure, chest tightness, leg swelling, palpitations or shortness of breath. His past medical history is significant for CHF. The symptoms have been stable. Compliance with medications is good.         The following portions of the patient's history were reviewed and updated as appropriate: allergies, current medications, past family history, past medical history, past social history, past surgical history and problem list.    Review of Systems   Constitution: Negative for malaise/fatigue.   Cardiovascular: Positive for dyspnea on exertion. Negative for chest pain, claudication, leg swelling, near-syncope, orthopnea, palpitations, paroxysmal " nocturnal dyspnea and syncope.   Respiratory: Negative for chest tightness and shortness of breath.    Hematologic/Lymphatic: Does not bruise/bleed easily.           Current Outpatient Prescriptions:   •  albuterol (ACCUNEB) 1.25 MG/3ML nebulizer solution, Take 3 mL by nebulization Every 6 (Six) Hours As Needed for Wheezing or Shortness of Air., Disp: , Rfl: 12  •  aspirin 81 MG EC tablet, Take 81 mg by mouth Daily., Disp: , Rfl:   •  atorvastatin (LIPITOR) 10 MG tablet, Take 1 tablet by mouth Every Night., Disp: , Rfl:   •  bumetanide (BUMEX) 1 MG tablet, Take 0.5 tablets by mouth 2 (Two) Times a Day., Disp: 30 tablet, Rfl: 11  •  carvedilol (COREG) 6.25 MG tablet, Take 1 tablet by mouth 2 (Two) Times a Day With Meals., Disp: 60 tablet, Rfl: 11  •  Cholecalciferol (VITAMIN D PO), Take  by mouth., Disp: , Rfl:   •  dutasteride (AVODART) 0.5 MG capsule, Take 0.5 mg by mouth Daily., Disp: , Rfl:   •  furosemide (LASIX) 40 MG tablet, Take 1 tablet by mouth As Needed (fluid retention)., Disp: 30 tablet, Rfl: 11  •  gabapentin (NEURONTIN) 400 MG capsule, Take 1 capsule by mouth Daily. (Patient taking differently: Take 300 mg by mouth Daily.), Disp: , Rfl:   •  HYDROcodone-acetaminophen (NORCO) 7.5-325 MG per tablet, Take 1 tablet by mouth Every 8 (Eight) Hours As Needed for Moderate Pain ., Disp: , Rfl:   •  isosorbide mononitrate (IMDUR) 30 MG 24 hr tablet, Take 30 mg by mouth Daily., Disp: , Rfl:   •  lisinopril (PRINIVIL,ZESTRIL) 5 MG tablet, Take 2.5 mg by mouth Daily., Disp: , Rfl:   •  LORazepam (ATIVAN) 1 MG tablet, Take 1 tablet by mouth 4 (Four) Times a Day. (Patient taking differently: Take 0.5 mg by mouth 4 (Four) Times a Day.), Disp: 30 tablet, Rfl:   •  nitroglycerin (NITROSTAT) 0.4 MG SL tablet, PLACE 1 TABLET UNDER THE TONGUE AS NEEDED FOR ANGINA, MAY REPEAT EVERY 5 MINS FOR UP THREE DOSES, Disp: 25 tablet, Rfl: 3  •  tamsulosin (FLOMAX) 0.4 MG capsule 24 hr capsule, Take 1 capsule by mouth Every Night.,  Disp: , Rfl:   •  tiotropium (SPIRIVA HANDIHALER) 18 MCG per inhalation capsule, Place 1 capsule into inhaler and inhale Daily., Disp: , Rfl:        Objective:      Vitals:    08/08/18 1548   BP: 94/71   Pulse: 101     Physical Exam   Constitutional: He is oriented to person, place, and time. He appears well-developed and well-nourished.   Neck: No JVD present.   Cardiovascular: Normal rate, regular rhythm, normal heart sounds and intact distal pulses.    No murmur heard.  Pulmonary/Chest: Effort normal and breath sounds normal.   Musculoskeletal: He exhibits no edema.   Neurological: He is alert and oriented to person, place, and time.   Skin: Skin is warm and dry.       Lab Review:         ECG 12 Lead  Date/Time: 8/8/2018 3:59 PM  Performed by: NANDO GARCIA  Authorized by: NANDO GARCIA   Comparison: compared with previous ECG from 6/21/2018  Similar to previous ECG  Rhythm: paced            Results for orders placed during the hospital encounter of 09/08/17   Adult Transthoracic Echo Complete    Narrative · Left ventricular systolic function is moderately decreased. Estimated EF   = 35%.  · Left ventricular diastolic dysfunction (grade I) consistent with   impaired relaxation.  · Mild to moderate aortic valve regurgitation is present.  · Mild pulmonary hypertension is present.            Assessment/Plan:     Problem List Items Addressed This Visit        Cardiovascular and Mediastinum    Coronary artery disease involving coronary bypass graft of native heart without angina pectoris    Overview     MI x2  Single vessel CABG 2008         Current Assessment & Plan     Stable; no angina. Continue ASA, statin, BB, and ACE         Chronic systolic congestive heart failure (CMS/HCC)    Overview     LVEF 35% on last echo; has BiV-ICD         Current Assessment & Plan     NYHA 2; stable. Continue current meds (taking bumex 0.5mg BID with occasional Lasix PRN), coreg (decrease as per above), and lisinopril 2.5mg  daily.         Relevant Medications    isosorbide mononitrate (IMDUR) 30 MG 24 hr tablet    carvedilol (COREG) 6.25 MG tablet    Permanent atrial fibrillation (CMS/HCC)    Overview     CHADS-VASc Risk Assessment            7       Total Score        1 CHF    1 Hypertension    2 Age >/= 75    2 PRIOR STROKE/TIA/THROMBO    1 Vascular Disease        Criteria that do not apply:    DM    Age 65-74    Sex: Female        Reports at least one prior ablation attempt and prior intolerance to amiodarone         Current Assessment & Plan     During my last visit with the patient in May, I wondered whether the fact that he was in atrial fibrillation and 100% V pacing was responsible for his heart failure-like symptoms.  He reported previous intolerances to amiodarone, and was not interested in other ablation attempts.  I discussed possibly starting Multaq depending upon how things went.  However, today, as he reports having some good days and some bad days but is in atrial fibrillation 100% of the time, I am not sure that atrial fibrillation is in any way responsible for his current symptoms.     Therefore, continue current treatment with aspirin for CVA prophylaxis (prior  bleeding precluding anticoagulation).         Relevant Medications    isosorbide mononitrate (IMDUR) 30 MG 24 hr tablet    carvedilol (COREG) 6.25 MG tablet    Hypotension due to drugs - Primary    Current Assessment & Plan     Reduce Coreg to 6.25 mg twice a day             F/u 3 months    Raleigh Enciso MD  08/08/2018  3:38 PM

## 2018-08-10 ENCOUNTER — DOCUMENTATION (OUTPATIENT)
Dept: CARDIOLOGY | Facility: CLINIC | Age: 83
End: 2018-08-10

## 2018-08-10 PROBLEM — I48.21 PERMANENT ATRIAL FIBRILLATION (HCC): Status: ACTIVE | Noted: 2017-09-08

## 2018-08-10 PROBLEM — I95.2 HYPOTENSION DUE TO DRUGS: Status: ACTIVE | Noted: 2018-08-10

## 2018-08-10 NOTE — ASSESSMENT & PLAN NOTE
NYHA 2; stable. Continue current meds (taking bumex 0.5mg BID with occasional Lasix PRN), coreg (decrease as per above), and lisinopril 2.5mg daily.

## 2018-08-10 NOTE — ASSESSMENT & PLAN NOTE
During my last visit with the patient in May, I wondered whether the fact that he was in atrial fibrillation and 100% V pacing was responsible for his heart failure-like symptoms.  He reported previous intolerances to amiodarone, and was not interested in other ablation attempts.  I discussed possibly starting Multaq depending upon how things went.  However, today, as he reports having some good days and some bad days but is in atrial fibrillation 100% of the time, I am not sure that atrial fibrillation is in any way responsible for his current symptoms.     Therefore, continue current treatment with aspirin for CVA prophylaxis (prior  bleeding precluding anticoagulation).

## 2018-08-16 ENCOUNTER — HOSPITAL ENCOUNTER (EMERGENCY)
Facility: HOSPITAL | Age: 83
Discharge: HOME | End: 2018-08-16
Attending: EMERGENCY MEDICINE | Admitting: EMERGENCY MEDICINE

## 2018-08-16 VITALS
SYSTOLIC BLOOD PRESSURE: 93 MMHG | RESPIRATION RATE: 19 BRPM | TEMPERATURE: 98.2 F | WEIGHT: 156 LBS | HEIGHT: 74 IN | DIASTOLIC BLOOD PRESSURE: 74 MMHG | BODY MASS INDEX: 20.02 KG/M2 | OXYGEN SATURATION: 97 % | HEART RATE: 111 BPM

## 2018-08-16 DIAGNOSIS — R31.0 GROSS HEMATURIA: ICD-10-CM

## 2018-08-16 DIAGNOSIS — R33.9 URINARY RETENTION: Primary | ICD-10-CM

## 2018-08-16 LAB
ALBUMIN SERPL-MCNC: 4.6 G/DL (ref 3.5–5)
ALBUMIN/GLOB SERPL: 1.4 G/DL (ref 1.1–2.5)
ALP SERPL-CCNC: 107 U/L (ref 24–120)
ALT SERPL W P-5'-P-CCNC: 23 U/L (ref 0–54)
ANION GAP SERPL CALCULATED.3IONS-SCNC: 11 MMOL/L (ref 4–13)
AST SERPL-CCNC: 21 U/L (ref 7–45)
BACTERIA UR QL AUTO: ABNORMAL /HPF
BASOPHILS # BLD AUTO: 0.07 10*3/MM3 (ref 0–0.2)
BASOPHILS NFR BLD AUTO: 0.9 % (ref 0–2)
BILIRUB SERPL-MCNC: 2 MG/DL (ref 0.1–1)
BILIRUB UR QL STRIP: NEGATIVE
BUN BLD-MCNC: 36 MG/DL (ref 5–21)
BUN/CREAT SERPL: 17.1 (ref 7–25)
CALCIUM SPEC-SCNC: 9.8 MG/DL (ref 8.4–10.4)
CHLORIDE SERPL-SCNC: 99 MMOL/L (ref 98–110)
CLARITY UR: CLEAR
CO2 SERPL-SCNC: 33 MMOL/L (ref 24–31)
COLOR UR: ABNORMAL
CREAT BLD-MCNC: 2.1 MG/DL (ref 0.5–1.4)
DEPRECATED RDW RBC AUTO: 47.1 FL (ref 40–54)
EOSINOPHIL # BLD AUTO: 0.46 10*3/MM3 (ref 0–0.7)
EOSINOPHIL NFR BLD AUTO: 5.6 % (ref 0–4)
ERYTHROCYTE [DISTWIDTH] IN BLOOD BY AUTOMATED COUNT: 14 % (ref 12–15)
GFR SERPL CREATININE-BSD FRML MDRD: 30 ML/MIN/1.73
GLOBULIN UR ELPH-MCNC: 3.2 GM/DL
GLUCOSE BLD-MCNC: 114 MG/DL (ref 70–100)
GLUCOSE UR STRIP-MCNC: NEGATIVE MG/DL
HCT VFR BLD AUTO: 42.8 % (ref 40–52)
HGB BLD-MCNC: 13.6 G/DL (ref 14–18)
HGB UR QL STRIP.AUTO: ABNORMAL
HYALINE CASTS UR QL AUTO: ABNORMAL /LPF
IMM GRANULOCYTES # BLD: 0.03 10*3/MM3 (ref 0–0.03)
IMM GRANULOCYTES NFR BLD: 0.4 % (ref 0–5)
KETONES UR QL STRIP: NEGATIVE
LEUKOCYTE ESTERASE UR QL STRIP.AUTO: ABNORMAL
LYMPHOCYTES # BLD AUTO: 1.25 10*3/MM3 (ref 0.72–4.86)
LYMPHOCYTES NFR BLD AUTO: 15.2 % (ref 15–45)
MCH RBC QN AUTO: 29.1 PG (ref 28–32)
MCHC RBC AUTO-ENTMCNC: 31.8 G/DL (ref 33–36)
MCV RBC AUTO: 91.6 FL (ref 82–95)
MONOCYTES # BLD AUTO: 0.71 10*3/MM3 (ref 0.19–1.3)
MONOCYTES NFR BLD AUTO: 8.6 % (ref 4–12)
NEUTROPHILS # BLD AUTO: 5.7 10*3/MM3 (ref 1.87–8.4)
NEUTROPHILS NFR BLD AUTO: 69.3 % (ref 39–78)
NITRITE UR QL STRIP: NEGATIVE
NRBC BLD MANUAL-RTO: 0 /100 WBC (ref 0–0)
PH UR STRIP.AUTO: 7 [PH] (ref 5–8)
PLATELET # BLD AUTO: 155 10*3/MM3 (ref 130–400)
PMV BLD AUTO: 11.8 FL (ref 6–12)
POTASSIUM BLD-SCNC: 4 MMOL/L (ref 3.5–5.3)
PROT SERPL-MCNC: 7.8 G/DL (ref 6.3–8.7)
PROT UR QL STRIP: ABNORMAL
RBC # BLD AUTO: 4.67 10*6/MM3 (ref 4.8–5.9)
RBC # UR: ABNORMAL /HPF
REF LAB TEST METHOD: ABNORMAL
SODIUM BLD-SCNC: 143 MMOL/L (ref 135–145)
SP GR UR STRIP: 1.01 (ref 1–1.03)
SQUAMOUS #/AREA URNS HPF: ABNORMAL /HPF
UROBILINOGEN UR QL STRIP: ABNORMAL
WBC NRBC COR # BLD: 8.22 10*3/MM3 (ref 4.8–10.8)
WBC UR QL AUTO: ABNORMAL /HPF

## 2018-08-16 PROCEDURE — 93005 ELECTROCARDIOGRAM TRACING: CPT | Performed by: EMERGENCY MEDICINE

## 2018-08-16 PROCEDURE — 85025 COMPLETE CBC W/AUTO DIFF WBC: CPT | Performed by: EMERGENCY MEDICINE

## 2018-08-16 PROCEDURE — 93010 ELECTROCARDIOGRAM REPORT: CPT | Performed by: INTERNAL MEDICINE

## 2018-08-16 PROCEDURE — 80053 COMPREHEN METABOLIC PANEL: CPT | Performed by: EMERGENCY MEDICINE

## 2018-08-16 PROCEDURE — 99284 EMERGENCY DEPT VISIT MOD MDM: CPT

## 2018-08-16 PROCEDURE — 51702 INSERT TEMP BLADDER CATH: CPT

## 2018-08-16 PROCEDURE — 87086 URINE CULTURE/COLONY COUNT: CPT | Performed by: EMERGENCY MEDICINE

## 2018-08-16 PROCEDURE — 81001 URINALYSIS AUTO W/SCOPE: CPT | Performed by: EMERGENCY MEDICINE

## 2018-08-16 PROCEDURE — 51798 US URINE CAPACITY MEASURE: CPT

## 2018-08-16 RX ORDER — SODIUM CHLORIDE 0.9 % (FLUSH) 0.9 %
10 SYRINGE (ML) INJECTION AS NEEDED
Status: DISCONTINUED | OUTPATIENT
Start: 2018-08-16 | End: 2018-08-16

## 2018-08-16 RX ADMIN — LIDOCAINE HYDROCHLORIDE 5 ML: 20 JELLY TOPICAL at 08:28

## 2018-08-16 NOTE — DISCHARGE INSTRUCTIONS
Hematuria, Adult  Hematuria is blood in your urine. It can be caused by a bladder infection, kidney infection, prostate infection, kidney stone, or cancer of your urinary tract. Infections can usually be treated with medicine, and a kidney stone usually will pass through your urine. If neither of these is the cause of your hematuria, further workup to find out the reason may be needed.  It is very important that you tell your health care provider about any blood you see in your urine, even if the blood stops without treatment or happens without causing pain. Blood in your urine that happens and then stops and then happens again can be a symptom of a very serious condition. Also, pain is not a symptom in the initial stages of many urinary cancers.  Follow these instructions at home:  · Drink lots of fluid, 3-4 quarts a day. If you have been diagnosed with an infection, cranberry juice is especially recommended, in addition to large amounts of water.  · Avoid caffeine, tea, and carbonated beverages because they tend to irritate the bladder.  · Avoid alcohol because it may irritate the prostate.  · Take all medicines as directed by your health care provider.  · If you were prescribed an antibiotic medicine, finish it all even if you start to feel better.  · If you have been diagnosed with a kidney stone, follow your health care provider's instructions regarding straining your urine to catch the stone.  · Empty your bladder often. Avoid holding urine for long periods of time.  · After a bowel movement, women should cleanse front to back. Use each tissue only once.  · Empty your bladder before and after sexual intercourse if you are a female.  Contact a health care provider if:  · You develop back pain.  · You have a fever.  · You have a feeling of sickness in your stomach (nausea) or vomiting.  · Your symptoms are not better in 3 days. Return sooner if you are getting worse.  Get help right away if:  · You develop  severe vomiting and are unable to keep the medicine down.  · You develop severe back or abdominal pain despite taking your medicines.  · You begin passing a large amount of blood or clots in your urine.  · You feel extremely weak or faint, or you pass out.  This information is not intended to replace advice given to you by your health care provider. Make sure you discuss any questions you have with your health care provider.  Document Released: 12/18/2006 Document Revised: 05/25/2017 Document Reviewed: 08/18/2014  [employer] Patient Education © 2017 [employer]  Acute Urinary Retention, Male  Acute urinary retention is the temporary inability to urinate.  This is a common problem in older men. As men age their prostates become larger and block the flow of urine from the bladder. This is usually a problem that has come on gradually.  Follow these instructions at home:  If you are sent home with a Loera catheter and a drainage system, you will need to discuss the best course of action with your health care provider. While the catheter is in, maintain a good intake of fluids. Keep the drainage bag emptied and lower than your catheter. This is so that contaminated urine will not flow back into your bladder, which could lead to a urinary tract infection.  There are two main types of drainage bags. One is a large bag that usually is used at night. It has a good capacity that will allow you to sleep through the night without having to empty it. The second type is called a leg bag. It has a smaller capacity, so it needs to be emptied more frequently. However, the main advantage is that it can be attached by a leg strap and can go underneath your clothing, allowing you the freedom to move about or leave your home.  Only take over-the-counter or prescription medicines for pain, discomfort, or fever as directed by your health care provider.  Contact a health care provider if:  · You develop a low-grade  fever.  · You experience spasms or leakage of urine with the spasms.  Get help right away if:  · You develop chills or fever.  · Your catheter stops draining urine.  · Your catheter falls out.  · You start to develop increased bleeding that does not respond to rest and increased fluid intake.  This information is not intended to replace advice given to you by your health care provider. Make sure you discuss any questions you have with your health care provider.  Document Released: 03/26/2002 Document Revised: 05/31/2017 Document Reviewed: 05/29/2014  [employer] Interactive Patient Education © 2017 [employer]      Indwelling Urinary Catheter Insertion, Care After  This sheet gives you information about how to care for yourself after your procedure. Your health care provider may also give you more specific instructions. If you have problems or questions, contact your health care provider.  What can I expect after the procedure?  After the procedure, it is common to have:  · Slight discomfort around your urethra where the catheter enters your body.    Follow these instructions at home:  · Keep the drainage bag at or below the level of your bladder. Doing this ensures that urine can only drain out, not back into your body.  · Secure the catheter tubing and drainage bag to your leg or thigh to keep it from moving.  · Check the catheter tubing regularly to make sure there are no kinks or blockages.  · Take showers daily to keep the catheter clean. Do not take a bath.  · Do not pull on your catheter or try to remove it.  · Disconnect the tubing and drainage bag as little as possible.  · Empty the drainage bag every 2-4 hours, or more often if needed. Do not let the bag get completely full.  · Wash your hands with soap and water before and after touching the catheter, tubing, or drainage bag.  · Do not let the drainage bag or catheter tubing touch the floor.  · Drink enough fluids to keep your urine clear or pale yellow,  or as told by your health care provider.  Contact a health care provider if:  · Urine stops flowing into the drainage bag.  · You feel pain or pressure in the bladder area.  · You have back pain.  · Your catheter gets clogged.  · Your catheter starts to leak.  · Your urine looks cloudy.  · Your drainage bag or tubing looks dirty.  · You notice a bad smell when emptying your drainage bag.  Get help right away if:  · You have a fever or chills.  · You have severe pain in your back or your lower abdomen.  · You have warmth, redness, swelling, or pain in the urethra area.  · You notice blood in your urine.  · Your catheter gets pulled out.  Summary  · Do not pull on your catheter or try to remove it.  · Keep the drainage bag at or below the level of your bladder, but do not let the drainage bag or catheter tubing touch the floor.  · Wash your hands with soap and water before and after touching the catheter, tubing, or drainage bag.  · Contact your health care provider if you have a fever, chills, or any other signs of infection.  This information is not intended to replace advice given to you by your health care provider. Make sure you discuss any questions you have with your health care provider.  Document Released: 01/27/2018 Document Revised: 01/27/2018 Document Reviewed: 01/27/2018  [employer] Interactive Patient Education © 2018 [employer]

## 2018-08-16 NOTE — ED PROVIDER NOTES
Subjective   History of Present Illness  84 y.o. M p/w urinary retention.   - Pt notes history of BPH but never with urinary retention  - 2 days ago, increasing difficulty with urination, tried to self cath (son in law works with urology group) but unable to do so. After attempt developed hematuria  - Now with lower abdominal distention and pain  - Denies fevers, chills, n/v, chest pain, dyspnea, syncope      Review of Systems   Constitutional: Negative for fever.   HENT: Negative for nosebleeds.    Eyes: Negative for redness.   Respiratory: Negative for shortness of breath.    Cardiovascular: Negative for chest pain.   Gastrointestinal: Positive for abdominal distention and abdominal pain.   Genitourinary: Positive for decreased urine volume, difficulty urinating, dysuria, hematuria and urgency. Negative for flank pain.   Musculoskeletal: Negative for gait problem.   Skin: Negative for wound.   Neurological: Negative for syncope and weakness.   Psychiatric/Behavioral: The patient is not hyperactive.        Past Medical History:   Diagnosis Date   • Abdominal aortic aneurysm (CMS/HCC)    • Anxiety    • Atrial fibrillation (CMS/HCC)    • Biventricular ICD (implantable cardioverter-defibrillator) in place    • BPH (benign prostatic hypertrophy)    • Carotid artery stenosis    • CHF (congestive heart failure) (CMS/HCC)    • Chronic kidney disease     Chronic kidney disease, stage 4 (severe)   • Chronic systolic (congestive) heart failure     limited echo 7/2016 EF was 40-45%. Patient has BiV AICD   • Coronary arteriosclerosis     MI x2   • Hearing loss    • Iron deficiency anemia    • Ischemic cardiomyopathy    • Mixed hyperlipidemia    • Myocardial infarction    • Stroke (CMS/HCC)        Allergies   Allergen Reactions   • Keflex [Cephalexin]      CEPHALOSPORINS       Past Surgical History:   Procedure Laterality Date   • ABLATION OF DYSRHYTHMIC FOCUS  2008    MAZE at time of CABG/MVR   • CARDIAC ABLATION     • CARDIAC  CATHETERIZATION     • CARDIAC DEFIBRILLATOR PLACEMENT     • CARDIAC PACEMAKER PLACEMENT     • CARDIOVERSION     • CAROTID ENDARTERECTOMY     • CAROTID ENDARTERECTOMY     • CORONARY ARTERY BYPASS GRAFT  2008   • CORONARY STENT PLACEMENT     • MITRAL VALVE REPAIR/REPLACEMENT  2008   • TOTAL KNEE ARTHROPLASTY         Family History   Problem Relation Age of Onset   • Stroke Mother    • Heart disease Mother    • Diabetes Father        Social History     Social History   • Marital status:      Social History Main Topics   • Smoking status: Former Smoker   • Smokeless tobacco: Never Used   • Alcohol use No   • Drug use: No   • Sexual activity: Defer     Other Topics Concern   • Not on file           Objective   Physical Exam   Constitutional: He is oriented to person, place, and time. He appears well-developed and well-nourished.   HENT:   Head: Normocephalic and atraumatic.   Eyes: Conjunctivae are normal.   Neck: Normal range of motion.   Cardiovascular: Intact distal pulses.    Pulmonary/Chest: Effort normal and breath sounds normal. No respiratory distress.   Abdominal: Soft. He exhibits distension (suprapubic). There is tenderness (suprapubic).   Musculoskeletal: He exhibits no edema.   Neurological: He is alert and oriented to person, place, and time.   Skin: Skin is warm and dry.   Psychiatric: He has a normal mood and affect.   Vitals reviewed.      Procedures           ED Course      Pt with transient hypotension, likely due to overdiuresis, pt took double his dose of Lasix last night and appears hypovolemic on exam. IV fluids ordered, but pt and family preferred oral rehydration. BP improved with PO fluids. Pt and family note that BP is at baseline between 90 - 110 systolic which is consistent with readings today.            MDM  Number of Diagnoses or Management Options  Gross hematuria: new and requires workup  Urinary retention: new and requires workup  Diagnosis management comments: 84 y.o. M p/w  hematuria and urinary retention likely 2/2 BPH. New onset, will need close urologic follow up. No evidence of infection on UA, no fevers, no recent trauma. Hematuria present prior to alvarado insertion.  - Alvarado placed 1.3 L out immediately, family told to watch for POD of > 200cc/hr  - Family will arrange urology follow up on Monday  - Return precautions given       Amount and/or Complexity of Data Reviewed  Clinical lab tests: reviewed  Tests in the medicine section of CPT®: reviewed  Decide to obtain previous medical records or to obtain history from someone other than the patient: yes  Obtain history from someone other than the patient: yes    Patient Progress  Patient progress: improved        Final diagnoses:   Urinary retention   Gross hematuria            Rajesh Wade MD  08/16/18 1125       Rajesh Wade MD  08/16/18 6297

## 2018-08-17 RX ORDER — ATORVASTATIN CALCIUM 10 MG/1
TABLET, FILM COATED ORAL
Qty: 90 TABLET | Refills: 2 | Status: SHIPPED | OUTPATIENT
Start: 2018-08-17 | End: 2019-05-23 | Stop reason: SDUPTHER

## 2018-08-18 LAB — BACTERIA SPEC AEROBE CULT: NORMAL

## 2018-08-20 ENCOUNTER — OFFICE VISIT (OUTPATIENT)
Dept: UROLOGY | Age: 83
End: 2018-08-20
Payer: MEDICARE

## 2018-08-20 VITALS
HEIGHT: 74 IN | WEIGHT: 164 LBS | HEART RATE: 110 BPM | DIASTOLIC BLOOD PRESSURE: 70 MMHG | BODY MASS INDEX: 21.05 KG/M2 | SYSTOLIC BLOOD PRESSURE: 105 MMHG | TEMPERATURE: 97 F

## 2018-08-20 DIAGNOSIS — R33.9 RETENTION OF URINE: Primary | ICD-10-CM

## 2018-08-20 DIAGNOSIS — N40.1 BENIGN PROSTATIC HYPERPLASIA WITH URINARY RETENTION: ICD-10-CM

## 2018-08-20 DIAGNOSIS — R31.0 GROSS HEMATURIA: Primary | ICD-10-CM

## 2018-08-20 DIAGNOSIS — R31.0 GROSS HEMATURIA: ICD-10-CM

## 2018-08-20 DIAGNOSIS — R33.9 RETENTION OF URINE: ICD-10-CM

## 2018-08-20 DIAGNOSIS — R33.8 BENIGN PROSTATIC HYPERPLASIA WITH URINARY RETENTION: ICD-10-CM

## 2018-08-20 LAB
ANION GAP SERPL CALCULATED.3IONS-SCNC: 11 MMOL/L (ref 7–19)
BUN BLDV-MCNC: 34 MG/DL (ref 8–23)
CALCIUM SERPL-MCNC: 9.4 MG/DL (ref 8.8–10.2)
CHLORIDE BLD-SCNC: 98 MMOL/L (ref 98–111)
CO2: 31 MMOL/L (ref 22–29)
CREAT SERPL-MCNC: 1.8 MG/DL (ref 0.5–1.2)
GFR NON-AFRICAN AMERICAN: 36
GLUCOSE BLD-MCNC: 134 MG/DL (ref 74–109)
HCT VFR BLD CALC: 38.7 % (ref 42–52)
HEMOGLOBIN: 12.1 G/DL (ref 14–18)
MCH RBC QN AUTO: 29.6 PG (ref 27–31)
MCHC RBC AUTO-ENTMCNC: 31.3 G/DL (ref 33–37)
MCV RBC AUTO: 94.6 FL (ref 80–94)
PDW BLD-RTO: 13.6 % (ref 11.5–14.5)
PLATELET # BLD: 134 K/UL (ref 130–400)
PMV BLD AUTO: 11.6 FL (ref 9.4–12.4)
POTASSIUM SERPL-SCNC: 3.7 MMOL/L (ref 3.5–5)
RBC # BLD: 4.09 M/UL (ref 4.7–6.1)
SODIUM BLD-SCNC: 140 MMOL/L (ref 136–145)
WBC # BLD: 6.3 K/UL (ref 4.8–10.8)

## 2018-08-20 PROCEDURE — G8420 CALC BMI NORM PARAMETERS: HCPCS | Performed by: UROLOGY

## 2018-08-20 PROCEDURE — 99204 OFFICE O/P NEW MOD 45 MIN: CPT | Performed by: UROLOGY

## 2018-08-20 PROCEDURE — 1036F TOBACCO NON-USER: CPT | Performed by: UROLOGY

## 2018-08-20 PROCEDURE — G8427 DOCREV CUR MEDS BY ELIG CLIN: HCPCS | Performed by: UROLOGY

## 2018-08-20 PROCEDURE — 51700 IRRIGATION OF BLADDER: CPT | Performed by: UROLOGY

## 2018-08-20 PROCEDURE — G8598 ASA/ANTIPLAT THER USED: HCPCS | Performed by: UROLOGY

## 2018-08-20 PROCEDURE — 1101F PT FALLS ASSESS-DOCD LE1/YR: CPT | Performed by: UROLOGY

## 2018-08-20 PROCEDURE — 1123F ACP DISCUSS/DSCN MKR DOCD: CPT | Performed by: UROLOGY

## 2018-08-20 PROCEDURE — 4040F PNEUMOC VAC/ADMIN/RCVD: CPT | Performed by: UROLOGY

## 2018-08-20 RX ORDER — SILODOSIN 8 MG/1
8 CAPSULE ORAL EVERY EVENING
Qty: 30 CAPSULE | Refills: 11 | Status: ON HOLD | OUTPATIENT
Start: 2018-08-20 | End: 2018-08-23

## 2018-08-20 RX ORDER — ROPINIROLE 0.5 MG/1
0.5 TABLET, FILM COATED ORAL 3 TIMES DAILY
COMMUNITY
End: 2018-08-22 | Stop reason: ALTCHOICE

## 2018-08-20 RX ORDER — FUROSEMIDE 40 MG/1
40 TABLET ORAL 2 TIMES DAILY PRN
COMMUNITY
End: 2019-02-15

## 2018-08-20 NOTE — PROGRESS NOTES
Medical History:   Diagnosis Date    AICD (automatic cardioverter/defibrillator) present     followed by doctor in [address] Arm pain 2/3/2012    Atrial fibrillation ([facility] [address]     Benign prostatic hyperplasia with lower urinary tract symptoms 11/16/2017    Benign prostatic hypertrophy     CAD (coronary artery disease)     Chronic kidney disease     Chronic pain 11/7/2017    Gallstones     Hyperlipidemia     Hypertension     Idiopathic neuropathy     Insomnia     Osteoarthritis of right knee     Restless legs     Right rotator cuff tear     Ventricular tachycardia ([address]        Past Surgical History:   Procedure Laterality Date    APPENDECTOMY      CARDIAC PACEMAKER PLACEMENT      Bi ventricular pacemaker. Mey Piggs CARDIAC SURGERY      mitral valve, maze procedure, 1 vessel bypass -2008    CAROTID ENDARTERECTOMY      Right carotid endarterectomy.  COLONOSCOPY      CORONARY ARTERY BYPASS GRAFT      1 vessel 2008    MOUTH SURGERY      Oral implants.  PACEMAKER INSERTION      biventricular pacemaker ([employer])       No current facility-administered medications for this visit. No current outpatient prescriptions on file.      Facility-Administered Medications Ordered in Other Visits   Medication Dose Route Frequency Provider Last Rate Last Dose    lidocaine (XYLOCAINE) 2 % jelly   Topical Once Noam Mtz MD        aspirin EC tablet 81 mg  81 mg Oral Daily Vernie Curling, MD   81 mg at 08/23/18 2706    atorvastatin (LIPITOR) tablet 10 mg  10 mg Oral Daily Vernie Curling, MD   10 mg at 08/23/18 0770    bumetanide (BUMEX) tablet 0.5 mg  0.5 mg Oral BID Vernie Curling, MD   0.5 mg at 08/23/18 7831    carvedilol (COREG) tablet 6.25 mg  6.25 mg Oral BID Vernie Curling, MD   6.25 mg at 08/23/18 1889    finasteride (PROSCAR) tablet 5 mg  5 mg Oral Daily Vernie Curling, MD   5 mg at 08/23/18 9949    furosemide (LASIX) tablet 40 mg  40 mg Oral BID PRN Vernie Curling, MD        gabapentin (NEURONTIN) capsule 300 mg  300 mg Oral Dinner Viviane Ruiz MD        HYDROcodone-acetaminophen [facility]) 5-325 MG per tablet 1 tablet  1 tablet Oral BID- 8&2 Viviane Ruiz MD   1 tablet at 08/23/18 1177    HYDROcodone-acetaminophen (NORCO) 7.5-325 MG per tablet 1 tablet  1 tablet Oral Nightly PRN Viviane Ruiz MD   1 tablet at 08/22/18 2258    isosorbide mononitrate (IMDUR) extended release tablet 30 mg  30 mg Oral Daily Viviane Ruiz MD   30 mg at 08/23/18 2452    LORazepam (ATIVAN) tablet 0.5 mg  0.5 mg Oral 4x Daily PRN Viviane Ruiz MD   0.5 mg at 08/23/18 5248    nitroGLYCERIN (NITROSTAT) SL tablet 0.4 mg  0.4 mg Sublingual Q5 Min PRN Viviane Ruiz MD        vitamin D (CHOLECALCIFEROL) tablet 1,000 Units  1,000 Units Oral Daily Viviane Ruiz MD   1,000 Units at 08/23/18 0837    tamsulosin (FLOMAX) capsule 0.4 mg  0.4 mg Oral Daily Viviane Ruiz MD   0.4 mg at 08/23/18 7447    gabapentin (NEURONTIN) capsule 300 mg  300 mg Oral Nightly Viviane Ruiz MD   300 mg at 08/22/18 2258    [START ON 8/24/2018] levofloxacin (LEVAQUIN) 750 MG/150ML infusion 750 mg  750 mg Intravenous Q48H Viviane Ruiz MD        sodium chloride flush 0.9 % injection 10 mL  10 mL Intravenous 2 times per day Viviane Ruiz MD   10 mL at 08/22/18 2258    sodium chloride flush 0.9 % injection 10 mL  10 mL Intravenous PRN Viviane Ruiz MD        ondansetron [facility]) injection 4 mg  4 mg Intravenous Q6H PRN Viviane Ruiz MD           Allergies   Allergen Reactions    Keflex [Cephalexin] Rash     pruritius       Social History     Social History    Marital status:      Spouse name: N/A    Number of children: N/A    Years of education: N/A     Social History Main Topics    Smoking status: Former Smoker    Smokeless tobacco: Never Used    Alcohol use No    Drug use: No    Sexual activity: No      Comment: Marital status - .      Other Topics Concern    None     Social History Narrative    None Family History   Problem Relation Age of Onset    Stroke Mother     Heart Attack Mother 80    Diabetes Father     COPD Sister     Arthritis Sister     Stroke Brother     Heart Disease Brother        REVIEW OF SYSTEMS:  Review of Systems   Constitutional: Positive for malaise/fatigue. Negative for chills and fever. HENT: Positive for hearing loss. Negative for ear discharge and tinnitus. Eyes: Negative. Respiratory: Positive for shortness of breath. Negative for cough. Cardiovascular: Positive for orthopnea, leg swelling and PND. Negative for chest pain. Gastrointestinal: Negative for abdominal pain, nausea and vomiting. Genitourinary: Positive for frequency and hematuria. Negative for dysuria, flank pain and urgency. Musculoskeletal: Positive for back pain and joint pain. Skin: Negative. Neurological: Positive for dizziness and weakness. Negative for speech change, focal weakness and seizures. Endo/Heme/Allergies: Negative. Psychiatric/Behavioral: Negative. PHYSICAL EXAM:  /70 (Site: Left Arm, Position: Sitting, Cuff Size: Medium Adult)   Pulse 110   Temp 97 °F (36.1 °C) (Temporal)   Ht 6' 2\" (1.88 m)   Wt 164 lb (74.4 kg)   BMI 21.06 kg/m²   Physical Exam   Constitutional: He is oriented to person, place, and time. He appears well-developed and well-nourished. No distress. HENT:   Head: Normocephalic and atraumatic. Nose: Nose normal.   Eyes: Pupils are equal, round, and reactive to light. Conjunctivae and EOM are normal. No scleral icterus. Neck: Normal range of motion. Neck supple. No tracheal deviation present. Cardiovascular: Normal rate, regular rhythm and intact distal pulses. Pulmonary/Chest: Effort normal and breath sounds normal. No stridor. He exhibits no tenderness. Abdominal: Soft. Bowel sounds are normal. He exhibits no distension and no mass. There is no tenderness. Musculoskeletal: Normal range of motion.  He exhibits no edema or Scheduling Instructions:      Next available        Return for Cystoscopy on next visit.

## 2018-08-21 ENCOUNTER — NURSE ONLY (OUTPATIENT)
Dept: UROLOGY | Age: 83
End: 2018-08-21
Payer: MEDICARE

## 2018-08-21 DIAGNOSIS — R31.0 GROSS HEMATURIA: ICD-10-CM

## 2018-08-21 DIAGNOSIS — R33.9 RETENTION OF URINE: Primary | ICD-10-CM

## 2018-08-21 PROCEDURE — 99999 PR OFFICE/OUTPT VISIT,PROCEDURE ONLY: CPT | Performed by: PHYSICIAN ASSISTANT

## 2018-08-21 PROCEDURE — 51798 US URINE CAPACITY MEASURE: CPT | Performed by: PHYSICIAN ASSISTANT

## 2018-08-21 PROCEDURE — 99999 IRRIGATION OF BLADDER: CPT | Performed by: PHYSICIAN ASSISTANT

## 2018-08-21 PROCEDURE — 51700 IRRIGATION OF BLADDER: CPT | Performed by: PHYSICIAN ASSISTANT

## 2018-08-21 PROCEDURE — 99999 INSERT,TEMP INDWELLING BLAD CATH,SIMPLE: CPT | Performed by: PHYSICIAN ASSISTANT

## 2018-08-21 PROCEDURE — 99999 PR MEASUREMENT,POST-VOID RESIDUAL VOLUME BY US,NON-IMAGING: CPT | Performed by: PHYSICIAN ASSISTANT

## 2018-08-22 ENCOUNTER — HOSPITAL ENCOUNTER (INPATIENT)
Age: 83
LOS: 4 days | Discharge: HOME | DRG: 690 | End: 2018-08-26
Attending: EMERGENCY MEDICINE | Admitting: HOSPITALIST
Payer: MEDICARE

## 2018-08-22 ENCOUNTER — APPOINTMENT (OUTPATIENT)
Dept: GENERAL RADIOLOGY | Age: 83
DRG: 690 | End: 2018-08-22
Payer: MEDICARE

## 2018-08-22 DIAGNOSIS — R53.1 GENERALIZED WEAKNESS: ICD-10-CM

## 2018-08-22 DIAGNOSIS — R07.9 ACUTE CHEST PAIN: ICD-10-CM

## 2018-08-22 DIAGNOSIS — N30.01 ACUTE CYSTITIS WITH HEMATURIA: ICD-10-CM

## 2018-08-22 DIAGNOSIS — R33.9 URINARY RETENTION: Primary | ICD-10-CM

## 2018-08-22 PROBLEM — N39.0 COMPLICATED UTI (URINARY TRACT INFECTION): Status: ACTIVE | Noted: 2018-08-22

## 2018-08-22 LAB
ALBUMIN SERPL-MCNC: 4 G/DL (ref 3.5–5.2)
ALP BLD-CCNC: 107 U/L (ref 40–130)
ALT SERPL-CCNC: 10 U/L (ref 5–41)
ANION GAP SERPL CALCULATED.3IONS-SCNC: 11 MMOL/L (ref 7–19)
APTT: 31.6 SEC (ref 26–36.2)
AST SERPL-CCNC: 18 U/L (ref 5–40)
BASOPHILS ABSOLUTE: 0.1 K/UL (ref 0–0.2)
BASOPHILS RELATIVE PERCENT: 0.8 % (ref 0–1)
BILIRUB SERPL-MCNC: 0.7 MG/DL (ref 0.2–1.2)
BILIRUBIN URINE: ABNORMAL
BLOOD, URINE: ABNORMAL
BUN BLDV-MCNC: 41 MG/DL (ref 8–23)
CALCIUM SERPL-MCNC: 9 MG/DL (ref 8.8–10.2)
CHLORIDE BLD-SCNC: 97 MMOL/L (ref 98–111)
CLARITY: ABNORMAL
CO2: 29 MMOL/L (ref 22–29)
COLOR: ABNORMAL
CREAT SERPL-MCNC: 1.8 MG/DL (ref 0.5–1.2)
EOSINOPHILS ABSOLUTE: 0.7 K/UL (ref 0–0.6)
EOSINOPHILS RELATIVE PERCENT: 10.8 % (ref 0–5)
GFR NON-AFRICAN AMERICAN: 36
GLUCOSE BLD-MCNC: 104 MG/DL (ref 74–109)
GLUCOSE URINE: NEGATIVE MG/DL
HCT VFR BLD CALC: 39.1 % (ref 42–52)
HEMOGLOBIN: 12.1 G/DL (ref 14–18)
INR BLD: 1 (ref 0.88–1.18)
KETONES, URINE: 15 MG/DL
LEUKOCYTE ESTERASE, URINE: ABNORMAL
LYMPHOCYTES ABSOLUTE: 0.9 K/UL (ref 1.1–4.5)
LYMPHOCYTES RELATIVE PERCENT: 14.7 % (ref 20–40)
MCH RBC QN AUTO: 29.8 PG (ref 27–31)
MCHC RBC AUTO-ENTMCNC: 30.9 G/DL (ref 33–37)
MCV RBC AUTO: 96.3 FL (ref 80–94)
MONOCYTES ABSOLUTE: 0.6 K/UL (ref 0–0.9)
MONOCYTES RELATIVE PERCENT: 8.6 % (ref 0–10)
NEUTROPHILS ABSOLUTE: 4.2 K/UL (ref 1.5–7.5)
NEUTROPHILS RELATIVE PERCENT: 64.9 % (ref 50–65)
NITRITE, URINE: POSITIVE
PDW BLD-RTO: 13.7 % (ref 11.5–14.5)
PERFORMED ON: NORMAL
PH UA: 5
PLATELET # BLD: 158 K/UL (ref 130–400)
PMV BLD AUTO: 11.9 FL (ref 9.4–12.4)
POC TROPONIN I: 0.02 NG/ML (ref 0–0.08)
POTASSIUM SERPL-SCNC: 5.1 MMOL/L (ref 3.5–5)
PRO-BNP: 3487 PG/ML (ref 0–1800)
PROTEIN UA: 300 MG/DL
PROTHROMBIN TIME: 13.1 SEC (ref 12–14.6)
RBC # BLD: 4.06 M/UL (ref 4.7–6.1)
RBC UA: ABNORMAL /HPF (ref 0–2)
SODIUM BLD-SCNC: 137 MMOL/L (ref 136–145)
SPECIFIC GRAVITY UA: 1.02
TOTAL PROTEIN: 7.4 G/DL (ref 6.6–8.7)
TROPONIN: <0.01 NG/ML (ref 0–0.03)
URINE REFLEX TO CULTURE: YES
UROBILINOGEN, URINE: 1 E.U./DL
WBC # BLD: 6.4 K/UL (ref 4.8–10.8)

## 2018-08-22 PROCEDURE — 99285 EMERGENCY DEPT VISIT HI MDM: CPT

## 2018-08-22 PROCEDURE — 1210000000 HC MED SURG R&B

## 2018-08-22 PROCEDURE — 85730 THROMBOPLASTIN TIME PARTIAL: CPT

## 2018-08-22 PROCEDURE — 51701 INSERT BLADDER CATHETER: CPT | Performed by: EMERGENCY MEDICINE

## 2018-08-22 PROCEDURE — 81001 URINALYSIS AUTO W/SCOPE: CPT

## 2018-08-22 PROCEDURE — 85025 COMPLETE CBC W/AUTO DIFF WBC: CPT

## 2018-08-22 PROCEDURE — 87077 CULTURE AEROBIC IDENTIFY: CPT

## 2018-08-22 PROCEDURE — 96365 THER/PROPH/DIAG IV INF INIT: CPT

## 2018-08-22 PROCEDURE — 85610 PROTHROMBIN TIME: CPT

## 2018-08-22 PROCEDURE — 80053 COMPREHEN METABOLIC PANEL: CPT

## 2018-08-22 PROCEDURE — 6360000002 HC RX W HCPCS: Performed by: EMERGENCY MEDICINE

## 2018-08-22 PROCEDURE — 87086 URINE CULTURE/COLONY COUNT: CPT

## 2018-08-22 PROCEDURE — 99223 1ST HOSP IP/OBS HIGH 75: CPT | Performed by: HOSPITALIST

## 2018-08-22 PROCEDURE — 83880 ASSAY OF NATRIURETIC PEPTIDE: CPT

## 2018-08-22 PROCEDURE — 93005 ELECTROCARDIOGRAM TRACING: CPT

## 2018-08-22 PROCEDURE — 6370000000 HC RX 637 (ALT 250 FOR IP): Performed by: HOSPITALIST

## 2018-08-22 PROCEDURE — 36415 COLL VENOUS BLD VENIPUNCTURE: CPT

## 2018-08-22 PROCEDURE — 2580000003 HC RX 258: Performed by: HOSPITALIST

## 2018-08-22 PROCEDURE — 84484 ASSAY OF TROPONIN QUANT: CPT

## 2018-08-22 PROCEDURE — 6360000002 HC RX W HCPCS: Performed by: HOSPITALIST

## 2018-08-22 PROCEDURE — 87186 SC STD MICRODIL/AGAR DIL: CPT

## 2018-08-22 PROCEDURE — 71045 X-RAY EXAM CHEST 1 VIEW: CPT

## 2018-08-22 PROCEDURE — 99285 EMERGENCY DEPT VISIT HI MDM: CPT | Performed by: EMERGENCY MEDICINE

## 2018-08-22 RX ORDER — NITROGLYCERIN 0.4 MG/1
0.4 TABLET SUBLINGUAL EVERY 5 MIN PRN
Status: DISCONTINUED | OUTPATIENT
Start: 2018-08-22 | End: 2018-08-26 | Stop reason: HOSPADM

## 2018-08-22 RX ORDER — BUMETANIDE 0.5 MG/1
0.5 TABLET ORAL 2 TIMES DAILY
Status: DISCONTINUED | OUTPATIENT
Start: 2018-08-23 | End: 2018-08-26 | Stop reason: HOSPADM

## 2018-08-22 RX ORDER — FINASTERIDE 5 MG/1
5 TABLET, FILM COATED ORAL DAILY
Status: DISCONTINUED | OUTPATIENT
Start: 2018-08-23 | End: 2018-08-26 | Stop reason: HOSPADM

## 2018-08-22 RX ORDER — FUROSEMIDE 40 MG/1
40 TABLET ORAL 2 TIMES DAILY PRN
Status: DISCONTINUED | OUTPATIENT
Start: 2018-08-22 | End: 2018-08-26 | Stop reason: HOSPADM

## 2018-08-22 RX ORDER — GABAPENTIN 300 MG/1
300 CAPSULE ORAL NIGHTLY
Status: DISCONTINUED | OUTPATIENT
Start: 2018-08-22 | End: 2018-08-26 | Stop reason: HOSPADM

## 2018-08-22 RX ORDER — TAMSULOSIN HYDROCHLORIDE 0.4 MG/1
0.4 CAPSULE ORAL DAILY
Status: DISCONTINUED | OUTPATIENT
Start: 2018-08-23 | End: 2018-08-26 | Stop reason: HOSPADM

## 2018-08-22 RX ORDER — ASPIRIN 81 MG/1
81 TABLET ORAL DAILY
Status: DISCONTINUED | OUTPATIENT
Start: 2018-08-23 | End: 2018-08-26 | Stop reason: HOSPADM

## 2018-08-22 RX ORDER — BUMETANIDE 0.5 MG/1
0.5 TABLET ORAL 2 TIMES DAILY
COMMUNITY

## 2018-08-22 RX ORDER — LORAZEPAM 0.5 MG/1
0.5 TABLET ORAL 4 TIMES DAILY PRN
Status: DISCONTINUED | OUTPATIENT
Start: 2018-08-22 | End: 2018-08-26 | Stop reason: HOSPADM

## 2018-08-22 RX ORDER — LEVOFLOXACIN 5 MG/ML
250 INJECTION, SOLUTION INTRAVENOUS ONCE
Status: COMPLETED | OUTPATIENT
Start: 2018-08-22 | End: 2018-08-23

## 2018-08-22 RX ORDER — CARVEDILOL 6.25 MG/1
6.25 TABLET ORAL 2 TIMES DAILY
Status: DISCONTINUED | OUTPATIENT
Start: 2018-08-22 | End: 2018-08-26 | Stop reason: HOSPADM

## 2018-08-22 RX ORDER — LEVOFLOXACIN 5 MG/ML
750 INJECTION, SOLUTION INTRAVENOUS
Status: DISCONTINUED | OUTPATIENT
Start: 2018-08-24 | End: 2018-08-24

## 2018-08-22 RX ORDER — ONDANSETRON 2 MG/ML
4 INJECTION INTRAMUSCULAR; INTRAVENOUS EVERY 6 HOURS PRN
Status: DISCONTINUED | OUTPATIENT
Start: 2018-08-22 | End: 2018-08-26 | Stop reason: HOSPADM

## 2018-08-22 RX ORDER — SODIUM CHLORIDE 0.9 % (FLUSH) 0.9 %
10 SYRINGE (ML) INJECTION EVERY 12 HOURS SCHEDULED
Status: DISCONTINUED | OUTPATIENT
Start: 2018-08-22 | End: 2018-08-26 | Stop reason: HOSPADM

## 2018-08-22 RX ORDER — GABAPENTIN 300 MG/1
300 CAPSULE ORAL
Status: DISCONTINUED | OUTPATIENT
Start: 2018-08-23 | End: 2018-08-26 | Stop reason: HOSPADM

## 2018-08-22 RX ORDER — ISOSORBIDE MONONITRATE 30 MG/1
30 TABLET, EXTENDED RELEASE ORAL DAILY
Status: DISCONTINUED | OUTPATIENT
Start: 2018-08-23 | End: 2018-08-26 | Stop reason: HOSPADM

## 2018-08-22 RX ORDER — HYDROCODONE BITARTRATE AND ACETAMINOPHEN 5; 325 MG/1; MG/1
1 TABLET ORAL 2 TIMES DAILY
Status: DISCONTINUED | OUTPATIENT
Start: 2018-08-23 | End: 2018-08-26 | Stop reason: HOSPADM

## 2018-08-22 RX ORDER — LEVOFLOXACIN 5 MG/ML
500 INJECTION, SOLUTION INTRAVENOUS ONCE
Status: COMPLETED | OUTPATIENT
Start: 2018-08-22 | End: 2018-08-22

## 2018-08-22 RX ORDER — HYDROCODONE BITARTRATE AND ACETAMINOPHEN 7.5; 325 MG/1; MG/1
1 TABLET ORAL NIGHTLY PRN
Status: DISCONTINUED | OUTPATIENT
Start: 2018-08-22 | End: 2018-08-24

## 2018-08-22 RX ORDER — TAMSULOSIN HYDROCHLORIDE 0.4 MG/1
0.4 CAPSULE ORAL DAILY
COMMUNITY
End: 2018-11-29 | Stop reason: SDUPTHER

## 2018-08-22 RX ORDER — ATORVASTATIN CALCIUM 10 MG/1
10 TABLET, FILM COATED ORAL DAILY
Status: DISCONTINUED | OUTPATIENT
Start: 2018-08-23 | End: 2018-08-26 | Stop reason: HOSPADM

## 2018-08-22 RX ORDER — HYDROCODONE BITARTRATE AND ACETAMINOPHEN 5; 325 MG/1; MG/1
1 TABLET ORAL 2 TIMES DAILY
COMMUNITY
End: 2018-10-24 | Stop reason: CLARIF

## 2018-08-22 RX ORDER — SODIUM CHLORIDE 0.9 % (FLUSH) 0.9 %
10 SYRINGE (ML) INJECTION PRN
Status: DISCONTINUED | OUTPATIENT
Start: 2018-08-22 | End: 2018-08-26 | Stop reason: HOSPADM

## 2018-08-22 RX ADMIN — LEVOFLOXACIN 250 MG: 5 INJECTION, SOLUTION INTRAVENOUS at 23:45

## 2018-08-22 RX ADMIN — LORAZEPAM 0.5 MG: 0.5 TABLET ORAL at 22:58

## 2018-08-22 RX ADMIN — CARVEDILOL 6.25 MG: 6.25 TABLET, FILM COATED ORAL at 22:58

## 2018-08-22 RX ADMIN — LEVOFLOXACIN 500 MG: 5 INJECTION, SOLUTION INTRAVENOUS at 20:09

## 2018-08-22 RX ADMIN — GABAPENTIN 300 MG: 300 CAPSULE ORAL at 22:58

## 2018-08-22 RX ADMIN — HYDROCODONE BITARTRATE AND ACETAMINOPHEN 1 TABLET: 7.5; 325 TABLET ORAL at 22:58

## 2018-08-22 RX ADMIN — Medication 10 ML: at 22:58

## 2018-08-22 ASSESSMENT — ENCOUNTER SYMPTOMS
DIARRHEA: 0
SHORTNESS OF BREATH: 1
ABDOMINAL PAIN: 0
RHINORRHEA: 0
COUGH: 0
NAUSEA: 0
BACK PAIN: 0
VOMITING: 0
SORE THROAT: 0

## 2018-08-22 ASSESSMENT — PAIN SCALES - GENERAL
PAINLEVEL_OUTOF10: 6
PAINLEVEL_OUTOF10: 0

## 2018-08-22 NOTE — ED PROVIDER NOTES
[facility] 4 ONCOLOGY UNIT  eMERGENCY dEPARTMENT eNCOUnter      Pt Name: Vianca Doyle  MRN: [id number]  [place] 1934  Date of evaluation: 8/22/2018  Provider: Lam Jimenez MD    [address] [place] [address]       Chief Complaint   Patient presents with    Urinary Retention    Fatigue         HISTORY OF PRESENT ILLNESS   (Location/Symptom, Timing/Onset, Context/Setting, Quality, Duration, Modifying Factors, Severity)  Note limiting factors. Vianca Doyle is a 80 y.o. male who presents to the emergency department For urinary retention generalized weakness and fatigue. Patient has had urinary retention over the past one week and MRIs with a Kovacs catheter in place. She was seen at [facility] for same complaint on August 16. Patient is also been seen He has a known history of BPH and is followed by Dr. Anju Mclean. He states today he was urinating around the catheter and there is concern that there is some obstruction. Dr. Anju Mclean called earlier this afternoon and spoke with me and recommended we go ahead and place a 20 [place] 3-way catheter for continuous bladder irrigation and admit the patient to the hospitalist service and he will see his consult. Patient also reports earlier today he had several minutes of shortness of breath and some vague chest discomfort. This was not during exertion. He had no diaphoresis. Does admit to a history of congestive heart failure as well as atrial fibrillation. Had no increased leg swelling and states he has been compliant with his medications. He is only on a baby aspirin. HPI    Nursing Notes were reviewed. REVIEW OF SYSTEMS    (2-9 systems for level 4, 10 or more for level 5)     Review of Systems   Constitutional: Positive for fatigue. Negative for chills, diaphoresis and fever. HENT: Negative for rhinorrhea and sore throat. Respiratory: Positive for shortness of breath. Negative for cough. Cardiovascular: Positive for chest pain.  Negative for palpitations and leg swelling. Gastrointestinal: Negative for abdominal pain, diarrhea, nausea and vomiting. Genitourinary: Positive for difficulty urinating and hematuria. Negative for dysuria, frequency and testicular pain. Musculoskeletal: Negative for back pain and neck pain. Neurological: Negative for dizziness and headaches. All other systems reviewed and are negative. PAST MEDICAL HISTORY     Past Medical History:   Diagnosis Date    AICD (automatic cardioverter/defibrillator) present     followed by doctor in [address] [place] [address] Str Arm pain 2/3/2012    Atrial fibrillation ([address]     Benign prostatic hyperplasia with lower urinary tract symptoms 11/16/2017    Benign prostatic hypertrophy     CAD (coronary artery disease)     Chronic kidney disease     Chronic pain 11/7/2017    Gallstones     Hyperlipidemia     Hypertension     Idiopathic neuropathy     Insomnia     Osteoarthritis of right knee     Restless legs     Right rotator cuff tear     Ventricular tachycardia ([address]          SURGICAL HISTORY       Past Surgical History:   Procedure Laterality Date    APPENDECTOMY      CARDIAC PACEMAKER PLACEMENT      Bi ventricular pacemaker. Don Jim CARDIAC SURGERY      mitral valve, maze procedure, 1 vessel bypass -2008    CAROTID ENDARTERECTOMY      Right carotid endarterectomy.  COLONOSCOPY      CORONARY ARTERY BYPASS GRAFT      1 vessel 2008    MOUTH SURGERY      Oral implants.  PACEMAKER INSERTION      biventricular pacemaker ([employer])         CURRENT MEDICATIONS       Current Discharge Medication List      CONTINUE these medications which have NOT CHANGED    Details   bumetanide (BUMEX) 0.5 MG tablet Take 0.5 mg by mouth 2 times daily      tamsulosin (FLOMAX) 0.4 MG capsule Take 0.4 mg by mouth daily      HYDROcodone-acetaminophen (NORCO) 5-325 MG per tablet Take 1 tablet by mouth 2 times daily. .      furosemide (LASIX) 40 MG tablet Take 40 mg by mouth 2 times daily      vitamin D (CHOLECALCIFEROL) 1000 UNIT TABS tablet Take 1,000 Units by mouth daily      atorvastatin (LIPITOR) 10 MG tablet TAKE ONE TABLET BY MOUTH EVERY DAY  Qty: 90 tablet, Refills: 2      LORazepam (ATIVAN) 0.5 MG tablet TAKE ONE TABLET BY MOUTH FOUR TIMES A DAY **MAY MAKE DROWSY**.  Qty: 120 tablet, Refills: 0    Associated Diagnoses: Anxiety      gabapentin (NEURONTIN) 300 MG capsule TAKE ONE CAPSULE AT 5 PM AND 1 CAPSULE AT BEDTIME  Qty: 60 capsule, Refills: 2    Associated Diagnoses: Chronic pain syndrome; Idiopathic neuropathy      HYDROcodone-acetaminophen (NORCO) 7.5-325 MG per tablet Take 1 tablet by mouth nightly as needed for Pain for up to 30 days. Intended supply: 30 days. Qty: 30 tablet, Refills: 0    Associated Diagnoses: Idiopathic neuropathy; Chronic pain syndrome      dutasteride (AVODART) 0.5 MG capsule Take 1 capsule by mouth daily  Qty: 90 capsule, Refills: 1      isosorbide mononitrate (IMDUR) 30 MG extended release tablet TAKE ONE TABLET BY MOUTH DAILY  Qty: 90 tablet, Refills: 3    Associated Diagnoses: Coronary artery disease involving native heart with angina pectoris, unspecified vessel or lesion type ([facility])      lisinopril (PRINIVIL;ZESTRIL) 5 MG tablet Take 0.5 tablets by mouth daily  Qty: 30 tablet, Refills: 5      carvedilol (COREG) 12.5 MG tablet Take 12.5 mg by mouth See Admin Instructions 12.5 mg every morning, 6.25 mg every evening. aspirin 81 MG tablet Take 81 mg by mouth daily. silodosin (RAPAFLO) 8 MG CAPS Take 1 capsule by mouth every evening  Qty: 30 capsule, Refills: 11    Associated Diagnoses: Retention of urine;  Benign prostatic hyperplasia with urinary retention      nitroGLYCERIN (NITROSTAT) 0.4 MG SL tablet Place 0.4 mg under the tongue             ALLERGIES     Keflex [cephalexin]    FAMILY HISTORY       Family History   Problem Relation Age of Onset    Stroke Mother     Heart Attack Mother 80    Diabetes Father     COPD Sister     Arthritis Sister     Stroke Brother     Heart Disease Brother           SOCIAL HISTORY       Social History     Social History    Marital status:      Spouse name: N/A    Number of children: N/A    Years of education: N/A     Social History Main Topics    Smoking status: Former Smoker    Smokeless tobacco: Never Used    Alcohol use No    Drug use: No    Sexual activity: No      Comment: Marital status - . Other Topics Concern    None     Social History Narrative    None       SCREENINGS             PHYSICAL EXAM    (up to 7 for level 4, 8 or more for level 5)     ED Triage Vitals [08/22/18 1803]   BP Temp Temp src Pulse Resp SpO2 Height Weight   111/66 98 °F (36.7 °C) -- 183 -- 90 % 6' 2\" (1.88 m) 160 lb (72.6 kg)       Physical Exam   Constitutional: He is oriented to person, place, and time. He appears well-developed and well-nourished. HENT:   Head: Normocephalic and atraumatic. Eyes: Conjunctivae and EOM are normal.   Neck: Normal range of motion. Neck supple. No tracheal deviation present. Cardiovascular: Normal rate and normal heart sounds. An irregularly irregular rhythm present. No murmur heard. Pulmonary/Chest: Breath sounds normal. He has no wheezes. He has no rales. Abdominal: Soft. There is no tenderness. There is no rebound and no guarding. Genitourinary:   Genitourinary Comments: Small amount of brown colored urine in Kovacs bag   Musculoskeletal: Normal range of motion. He exhibits no edema. Neurological: He is alert and oriented to person, place, and time. Skin: Skin is warm and dry. Vitals reviewed. DIAGNOSTIC RESULTS     EKG: All EKG's are interpreted by the Emergency Department Physician who either signs or Co-signs this chart in the absence of a cardiologist.    102, ventricular paced rhythm, ST depression noted in lead V2, similar to old EKG January 2016, nondiagnostic EKG          XR CHEST PORTABLE   Final Result   1. Generous heart size.    2. No acute cardiopulmonary process. Signed by Dr Jose A Mckeon on 8/22/2018 7:37 PM              LABS:  Labs Reviewed   CBC WITH AUTO DIFFERENTIAL - Abnormal; Notable for the following:        Result Value    RBC 4.06 (*)     Hemoglobin 12.1 (*)     Hematocrit 39.1 (*)     MCV 96.3 (*)     MCHC 30.9 (*)     Lymphocytes % 14.7 (*)     Eosinophils % 10.8 (*)     Lymphocytes # 0.9 (*)     Eosinophils # 0.70 (*)     All other components within normal limits   COMPREHENSIVE METABOLIC PANEL - Abnormal; Notable for the following:     Potassium 5.1 (*)     Chloride 97 (*)     BUN 41 (*)     CREATININE 1.8 (*)     GFR Non- 36 (*)     All other components within normal limits   BRAIN NATRIURETIC PEPTIDE - Abnormal; Notable for the following:     Pro-BNP 3,487 (*)     All other components within normal limits   URINE RT REFLEX TO CULTURE - Abnormal; Notable for the following:     Color, UA RED (*)     Clarity, UA TURBID (*)     Bilirubin Urine LARGE (*)     Ketones, Urine 15 (*)     Blood, Urine LARGE (*)     Nitrite, Urine POSITIVE (*)     Leukocyte Esterase, Urine LARGE (*)     All other components within normal limits   MICROSCOPIC URINALYSIS - Abnormal; Notable for the following:     RBC, UA TNTC (*)     All other components within normal limits   URINE CULTURE   APTT   PROTIME-INR   TROPONIN   BASIC METABOLIC PANEL W/ REFLEX TO MG FOR LOW K    CBC WITH AUTO DIFFERENTIAL   POCT TROPONIN   POCT TROPONIN   POCT VENOUS       All other labs were within normal range or not returned as of this dictation.     EMERGENCY DEPARTMENT COURSE and DIFFERENTIAL DIAGNOSIS/MDM:   Vitals:    Vitals:    08/22/18 2102 08/22/18 2113 08/22/18 2131 08/22/18 2202   BP: 96/77  103/78 118/79   Pulse: 92 91 93 92   Resp:       Temp:       SpO2: 90% 100% 98% 97%   Weight:       Height:           MDM  Number of Diagnoses or Management Options     Amount and/or Complexity of Data Reviewed  Clinical lab tests: ordered and reviewed  Discuss the patient with other

## 2018-08-22 NOTE — ED NOTES
Bed: 21  Expected date:   Expected time:   Means of arrival:   Comments:     Kush Cottrell RN  08/22/18 7957

## 2018-08-22 NOTE — Clinical Note
Patient Class: Inpatient [101]   REQUIRED: Diagnosis: Urinary retention [645207]   Estimated Length of Stay: Estimated stay of more than 2 midnights   Future Attending Provider: Ella Huang [[id number]]   Admitting Provider: Earlene Pretty [[id number]]   Telemetry Bed Required?: Yes

## 2018-08-23 LAB
ANION GAP SERPL CALCULATED.3IONS-SCNC: 12 MMOL/L (ref 7–19)
BASOPHILS ABSOLUTE: 0 K/UL (ref 0–0.2)
BASOPHILS RELATIVE PERCENT: 0.7 % (ref 0–1)
BUN BLDV-MCNC: 38 MG/DL (ref 8–23)
CALCIUM SERPL-MCNC: 8.8 MG/DL (ref 8.8–10.2)
CHLORIDE BLD-SCNC: 100 MMOL/L (ref 98–111)
CO2: 30 MMOL/L (ref 22–29)
CREAT SERPL-MCNC: 1.7 MG/DL (ref 0.5–1.2)
EOSINOPHILS ABSOLUTE: 0.6 K/UL (ref 0–0.6)
EOSINOPHILS RELATIVE PERCENT: 9.6 % (ref 0–5)
GFR NON-AFRICAN AMERICAN: 39
GLUCOSE BLD-MCNC: 94 MG/DL (ref 74–109)
HCT VFR BLD CALC: 36.3 % (ref 42–52)
HEMOGLOBIN: 11.1 G/DL (ref 14–18)
LYMPHOCYTES ABSOLUTE: 1 K/UL (ref 1.1–4.5)
LYMPHOCYTES RELATIVE PERCENT: 16.5 % (ref 20–40)
MCH RBC QN AUTO: 29.5 PG (ref 27–31)
MCHC RBC AUTO-ENTMCNC: 30.6 G/DL (ref 33–37)
MCV RBC AUTO: 96.5 FL (ref 80–94)
MONOCYTES ABSOLUTE: 0.6 K/UL (ref 0–0.9)
MONOCYTES RELATIVE PERCENT: 10.7 % (ref 0–10)
NEUTROPHILS ABSOLUTE: 3.6 K/UL (ref 1.5–7.5)
NEUTROPHILS RELATIVE PERCENT: 62.3 % (ref 50–65)
PDW BLD-RTO: 13.7 % (ref 11.5–14.5)
PLATELET # BLD: 144 K/UL (ref 130–400)
PMV BLD AUTO: 11.9 FL (ref 9.4–12.4)
POTASSIUM REFLEX MAGNESIUM: 4.1 MMOL/L (ref 3.5–5)
RBC # BLD: 3.76 M/UL (ref 4.7–6.1)
SODIUM BLD-SCNC: 142 MMOL/L (ref 136–145)
WBC # BLD: 5.8 K/UL (ref 4.8–10.8)

## 2018-08-23 PROCEDURE — 85025 COMPLETE CBC W/AUTO DIFF WBC: CPT

## 2018-08-23 PROCEDURE — 80048 BASIC METABOLIC PNL TOTAL CA: CPT

## 2018-08-23 PROCEDURE — 1210000000 HC MED SURG R&B

## 2018-08-23 PROCEDURE — 99232 SBSQ HOSP IP/OBS MODERATE 35: CPT | Performed by: HOSPITALIST

## 2018-08-23 PROCEDURE — 6370000000 HC RX 637 (ALT 250 FOR IP): Performed by: HOSPITALIST

## 2018-08-23 PROCEDURE — 99222 1ST HOSP IP/OBS MODERATE 55: CPT | Performed by: UROLOGY

## 2018-08-23 PROCEDURE — 36415 COLL VENOUS BLD VENIPUNCTURE: CPT

## 2018-08-23 RX ORDER — SIMETHICONE 80 MG
80 TABLET,CHEWABLE ORAL EVERY 6 HOURS PRN
Status: DISCONTINUED | OUTPATIENT
Start: 2018-08-23 | End: 2018-08-26 | Stop reason: HOSPADM

## 2018-08-23 RX ORDER — POLYETHYLENE GLYCOL 3350 17 G/17G
17 POWDER, FOR SOLUTION ORAL DAILY
Status: DISCONTINUED | OUTPATIENT
Start: 2018-08-23 | End: 2018-08-26 | Stop reason: HOSPADM

## 2018-08-23 RX ORDER — CALCIUM CARBONATE 200(500)MG
500 TABLET,CHEWABLE ORAL 3 TIMES DAILY PRN
Status: DISCONTINUED | OUTPATIENT
Start: 2018-08-23 | End: 2018-08-26 | Stop reason: HOSPADM

## 2018-08-23 RX ORDER — LACTOBACILLUS RHAMNOSUS GG 10B CELL
1 CAPSULE ORAL DAILY
Status: DISCONTINUED | OUTPATIENT
Start: 2018-08-23 | End: 2018-08-26 | Stop reason: HOSPADM

## 2018-08-23 RX ADMIN — POLYETHYLENE GLYCOL 3350 17 G: 17 POWDER, FOR SOLUTION ORAL at 18:40

## 2018-08-23 RX ADMIN — ASPIRIN 81 MG: 81 TABLET, COATED ORAL at 08:37

## 2018-08-23 RX ADMIN — VITAMIN D, TAB 1000IU (100/BT) 1000 UNITS: 25 TAB at 08:37

## 2018-08-23 RX ADMIN — LORAZEPAM 0.5 MG: 0.5 TABLET ORAL at 06:22

## 2018-08-23 RX ADMIN — HYDROCODONE BITARTRATE AND ACETAMINOPHEN 1 TABLET: 5; 325 TABLET ORAL at 06:21

## 2018-08-23 RX ADMIN — Medication 1 CAPSULE: at 18:40

## 2018-08-23 RX ADMIN — CARVEDILOL 6.25 MG: 6.25 TABLET, FILM COATED ORAL at 19:52

## 2018-08-23 RX ADMIN — TAMSULOSIN HYDROCHLORIDE 0.4 MG: 0.4 CAPSULE ORAL at 08:37

## 2018-08-23 RX ADMIN — GABAPENTIN 300 MG: 300 CAPSULE ORAL at 16:23

## 2018-08-23 RX ADMIN — LORAZEPAM 0.5 MG: 0.5 TABLET ORAL at 19:52

## 2018-08-23 RX ADMIN — FINASTERIDE 5 MG: 5 TABLET, FILM COATED ORAL at 08:37

## 2018-08-23 RX ADMIN — ATORVASTATIN CALCIUM 10 MG: 10 TABLET, FILM COATED ORAL at 08:37

## 2018-08-23 RX ADMIN — BUMETANIDE 0.5 MG: 0.5 TABLET ORAL at 16:23

## 2018-08-23 RX ADMIN — BUMETANIDE 0.5 MG: 0.5 TABLET ORAL at 08:37

## 2018-08-23 RX ADMIN — ISOSORBIDE MONONITRATE 30 MG: 30 TABLET, EXTENDED RELEASE ORAL at 08:37

## 2018-08-23 RX ADMIN — HYDROCODONE BITARTRATE AND ACETAMINOPHEN 1 TABLET: 5; 325 TABLET ORAL at 12:18

## 2018-08-23 RX ADMIN — GABAPENTIN 300 MG: 300 CAPSULE ORAL at 19:51

## 2018-08-23 RX ADMIN — CARVEDILOL 6.25 MG: 6.25 TABLET, FILM COATED ORAL at 08:37

## 2018-08-23 RX ADMIN — HYDROCODONE BITARTRATE AND ACETAMINOPHEN 1 TABLET: 7.5; 325 TABLET ORAL at 19:52

## 2018-08-23 ASSESSMENT — ENCOUNTER SYMPTOMS
BACK PAIN: 1
NAUSEA: 0
EYES NEGATIVE: 1
ABDOMINAL PAIN: 0
SHORTNESS OF BREATH: 1
VOMITING: 0
ORTHOPNEA: 1
COUGH: 0

## 2018-08-23 ASSESSMENT — PAIN SCALES - GENERAL
PAINLEVEL_OUTOF10: 9
PAINLEVEL_OUTOF10: 6
PAINLEVEL_OUTOF10: 6

## 2018-08-23 NOTE — CONSULTS
Inpatient consult to Urology  Consult performed by: Nikkie Jalloh  Consult ordered by: Nanette Frankel      [id number]

## 2018-08-23 NOTE — PROGRESS NOTES
4 Eyes Skin Assessment    Celia Claude is being assessed upon: Admission    I agree that Lj Harper, along with Fern Parsons RN (either 2 RN's or 1 LPN and 1 RN) have performed a thorough Head to Toe Skin Assessment on the patient. ALL assessment sites listed below have been assessed. Areas assessed by both nurses:     [x]   Head, Face, and Ears   [x]   Shoulders, Back, and Chest  [x]   Arms, Elbows, and Hands   [x]   Coccyx, Sacrum, and Ischium  [x]   Legs, Feet, and Heels    Does the Patient have Skin Breakdown?  No    Umer Prevention initiated: No  Wound Care Orders initiated: No    WO nurse consulted for Pressure Injury (Stage 3,4, Unstageable, DTI, NWPT, and Complex wounds) and New or Established Ostomies: No        Primary Nurse eSignature: Janee Aguillon RN on 8/23/2018 at 12:17 AM      Co-Signer eSignature: Electronically signed by Franklin Culp RN on 8/23/18 at 12:19 AM

## 2018-08-23 NOTE — ED NOTES
Increased irrigation rate due to urine appearing to have more blood in tubing.      Kristal Craven RN  08/22/18 1032

## 2018-08-23 NOTE — ED NOTES
Attempted to call report on pt, 4th floor nurse with another pt and unable to take report at this time. Will call back.      Rosette Christiansen RN  08/22/18 8460

## 2018-08-23 NOTE — H&P
tablet 5 mg  5 mg Oral Daily Carlos Eduardo Garay MD        furosemide (LASIX) tablet 40 mg  40 mg Oral BID PRN Carlos Eduardo Garay MD        gabapentin (NEURONTIN) capsule 300 mg  300 mg Oral Dinner Carlos Eduardo Garay MD        HYDROcodone-acetaminophen [facility]) 5-325 MG per tablet 1 tablet  1 tablet Oral BID Carlos Eduardo Garay MD        [START ON 8/23/2018] HYDROcodone-acetaminophen ([facility]) 7.5-325 MG per tablet 1 tablet  1 tablet Oral Nightly PRN Carlos Eduardo Garay MD       Yusef Riggs Amos Plan ON 8/23/2018] isosorbide mononitrate (IMDUR) extended release tablet 30 mg  1 tablet Oral Daily Carlos Eduardo Garay MD        LORazepam (ATIVAN) tablet 0.5 mg  0.5 mg Oral 4x Daily PRN Carlos Eduardo Garay MD        nitroGLYCERIN (NITROSTAT) SL tablet 0.4 mg  0.4 mg Sublingual Q5 Min PRN Carlos Eduardo Garay MD        [START ON 8/23/2018] vitamin D (CHOLECALCIFEROL) tablet 1,000 Units  1,000 Units Oral Daily Carlos Eduardo Garay MD        [START ON 8/23/2018] tamsulosin (FLOMAX) capsule 0.4 mg  0.4 mg Oral Daily Carlos Eduardo Garay MD        gabapentin (NEURONTIN) tablet 300 mg  300 mg Oral Nightly Carlos Eduardo Garay MD        levofloxacin [facility]) 250 MG/50ML infusion 250 mg  250 mg Intravenous Once Carlos Eduardo Garay MD        [START ON 8/24/2018] levofloxacin (LEVAQUIN) 750 MG/150ML infusion 750 mg  750 mg Intravenous Q48H Carlos Eduardo Garay MD        sodium chloride flush 0.9 % injection 10 mL  10 mL Intravenous 2 times per day Carlos Eduardo Garay MD        sodium chloride flush 0.9 % injection 10 mL  10 mL Intravenous PRN Carlos Eduardo Garay MD        ondansetron [facility]) injection 4 mg  4 mg Intravenous Q6H PRN Carlos Eduardo Garay MD         Current Outpatient Prescriptions   Medication Sig Dispense Refill    bumetanide (BUMEX) 0.5 MG tablet Take 0.5 mg by mouth 2 times daily      tamsulosin (FLOMAX) 0.4 MG capsule Take 0.4 mg by mouth daily      HYDROcodone-acetaminophen (NORCO) 5-325 MG per tablet Take 1 tablet by mouth 2 times daily. Conny Reges furosemide (LASIX) 40 MG tablet Take 40 mg by mouth 2 times daily      vitamin D (CHOLECALCIFEROL) 1000 UNIT TABS tablet Take 1,000 Units by mouth daily      atorvastatin (LIPITOR) 10 MG tablet TAKE ONE TABLET BY MOUTH EVERY DAY 90 tablet 2    LORazepam (ATIVAN) 0.5 MG tablet TAKE ONE TABLET BY MOUTH FOUR TIMES A DAY **MAY MAKE DROWSY**. 120 tablet 0    gabapentin (NEURONTIN) 300 MG capsule TAKE ONE CAPSULE AT 5 PM AND 1 CAPSULE AT BEDTIME 60 capsule 2    HYDROcodone-acetaminophen (NORCO) 7.5-325 MG per tablet Take 1 tablet by mouth nightly as needed for Pain for up to 30 days. Intended supply: 30 days. 30 tablet 0    dutasteride (AVODART) 0.5 MG capsule Take 1 capsule by mouth daily 90 capsule 1    isosorbide mononitrate (IMDUR) 30 MG extended release tablet TAKE ONE TABLET BY MOUTH DAILY 90 tablet 3    lisinopril (PRINIVIL;ZESTRIL) 5 MG tablet Take 0.5 tablets by mouth daily 30 tablet 5    carvedilol (COREG) 12.5 MG tablet Take 12.5 mg by mouth See Admin Instructions 12.5 mg every morning, 6.25 mg every evening.  aspirin 81 MG tablet Take 81 mg by mouth daily.         silodosin (RAPAFLO) 8 MG CAPS Take 1 capsule by mouth every evening 30 capsule 11    nitroGLYCERIN (NITROSTAT) 0.4 MG SL tablet Place 0.4 mg under the tongue           OBJECTIVE:    Vitals:    08/22/18 2131   BP: 103/78   Pulse: 93   Resp:    Temp:    SpO2: 98%   RR 27 and SpO2 of 95% on bedside monitor - does NOT appear tachypneic    Physical Exam  VSS as per above  GA: alert, NAD,thin, appears younger than stated age  Head: NC, AT, No LAD  Neck: Supple, Trachea appears midline, No Bruit, No LAD  PUL: CTA anterolaterally, No W/R/R nor rubs breathing RA, no use of accessory MM  COR: RRR, No M/R/G  ABD: bowel sounds present, No G/R/T, scaphoid  MSK: has reduced but not wasted msucle and fat stores, no pretibial edema  Skin: warm, non-diaphortic, has dry skin on legs with flaking  PSY: appropriate affect, answers questions appropriately  -  NOTE: has pacer on left anterior % 0.8      Neutrophils # Latest Ref Range: 1.5 - 7.5 K/uL 4.2      Lymphocytes # Latest Ref Range: 1.1 - 4.5 K/uL 0.9 (L)      Monocytes # Latest Ref Range: 0.00 - 0.90 K/uL 0.60      Eosinophils # Latest Ref Range: 0.00 - 0.60 K/uL 0.70 (H)      Basophils # Latest Ref Range: 0.00 - 0.20 K/uL 0.10      Prothrombin Time Latest Ref Range: 12.0 - 14.6 sec 13.1      INR Latest Ref Range: 0.88 - 1.18  1.00      aPTT Latest Ref Range: 26.0 - 36.2 sec 31.6      URINE CULTURE Unknown   Rpt    Urine Reflex to Culture Unknown   YES    Color, UA Latest Ref Range: Straw/Yellow    RED (A)    Clarity, UA Latest Ref Range: Clear    TURBID (A)    Glucose, UA Latest Ref Range: Negative mg/dL   Negative    Bilirubin, Urine Latest Ref Range: Negative    LARGE (A)    Ketones, Urine Latest Ref Range: Negative mg/dL   15 (A)    Specific [place], UA Latest Ref Range: 1.005 - 1.030    1.018    Blood, Urine Latest Ref Range: Negative    LARGE (A)    pH, UA Latest Ref Range: 5.0 - 8.0    5.0    Protein, UA Latest Ref Range: Negative mg/dL   300    Urobilinogen, Urine Latest Ref Range: <2.0 E.U./dL   1.0    Nitrite, Urine Latest Ref Range: Negative    POSITIVE (A)    Leukocyte Esterase, Urine Latest Ref Range: Negative    LARGE (A)    RBC, UA Latest Ref Range: 0 - 2 /HPF   TNTC (A)    URINE RT REFLEX TO CULTURE Unknown   Rpt (A)        ASESSMENTS & PLANS:    ADMIT: medical osuna with tele under hospitlaist service  ADMITTING DIAGNOSIS #1:  Continuous Bladder Irrigation for Hematuria secondary to recent rapid drainage of urinary bladder  ADMITTING DIAGNOSIS #2: Acute Cathetera Associated Urinary Tract Infection complicating pope catheter  CONDITION: Good  CODE STATUS: Full Code  Health Care Proxy: his son in law, Mr. Dale Watkins, he is one of the [facility], [phone]  VITAL SIGNS: as per unit protocol  ATIVITY: as tolerated  NURSING: intake and output, daily weights, CBI  DIET: Cardiac Diet  IVF: not indicated  SPECIAL ORDERS: urology consulted  MEDICATIONS:  ondansetron (ZOFRAN) injection 4 mg  4 mg Intravenous Q6H PRN     [START ON 8/24/2018] levofloxacin (LEVAQUIN) 750 MG/150ML infusion 750 mg  750 mg Intravenous Q48H   (got 500in ED, gave additional 250 then stat, will be on 750 Q48h)  [START ON 8/23/2018] aspirin tablet 81 mg  81 mg Oral Daily   atorvastatin (LIPITOR) tablet 10 mg  1 tablet Oral Nightly   bumetanide (BUMEX) tablet 0.5 mg  0.5 mg Oral BID   carvedilol (COREG) tablet 6.25 mg  6.25 mg Oral BID   [START ON 8/23/2018] finasteride (PROSCAR) tablet 5 mg  5 mg Oral Daily   furosemide (LASIX) tablet 40 mg  40 mg Oral BID PRN   gabapentin (NEURONTIN) capsule 300 mg  300 mg Oral Dinner   HYDROcodone-acetaminophen (NORCO) 5-325 MG per tablet 1 tablet  1 tablet Oral BID   [START ON 8/23/2018] HYDROcodone-acetaminophen (NORCO) 7.5-325 MG per tablet 1 tablet  1 tablet Oral Nightly PRN   [START ON 8/23/2018] isosorbide mononitrate (IMDUR) extended release tablet 30 mg  1 tablet Oral Daily   LORazepam (ATIVAN) tablet 0.5 mg  0.5 mg Oral 4x Daily PRN   nitroGLYCERIN (NITROSTAT) SL tablet 0.4 mg  0.4 mg Sublingual Q5 Min PRN   [START ON 8/23/2018] vitamin D (CHOLECALCIFEROL) tablet 1,000 Units  1,000 Units Oral Daily   [START ON 8/23/2018] tamsulosin (FLOMAX) capsule 0.4 mg  0.4 mg Oral Daily   gabapentin (NEURONTIN) tablet 300 mg  300 mg Oral Nightly   ASA will not be held as per a fib  ALLERGY ALERTS:  As per above  LABS: CBC and BMP    SUPPORTIVE AND PROPHYLACTIC Txx:   DVT PPx: SCDs only as per hematuria  GI (PUD) PPxL: not indicated  PT: declined by pt, indicated as per age >71 to prevent deconditiioning    Patient Active Problem List   Diagnosis    Arm pain    Paroxysmal atrial fibrillation ([address]    Ventricular tachycardia ([address]    CAD (coronary artery disease)    Chest pain    Preop cardiovascular exam    Chest pain    Volume overload    Stage 4 chronic kidney disease (HCC)    Anemia    Chronic pain    Benign prostatic hyperplasia with lower urinary tract symptoms    Restless legs    Idiopathic neuropathy    Insomnia    Anxiety    Chronic atrial fibrillation (HCC)    Chronic systolic congestive heart failure (HCC)    Urinary retention    Complicated UTI (urinary tract infection)

## 2018-08-23 NOTE — PROGRESS NOTES
Azar Corado arrived to room # 425. Presented with:urinary retention  Mental Status: Patient is oriented, alert, coherent, logical, thought processes intact and able to concentrate and follow conversation. Vitals:    08/22/18 2230   BP: 131/89   Pulse: 97   Resp: 18   Temp: 97.6 °F (36.4 °C)   SpO2: 98%     Patient safety contract and falls prevention contract reviewed with patient Yes. Oriented Patient and Family to room. Call light within reach.  No.  Needs, issues or concerns expressed at this time: no      Electronically signed by Rom Alexandre RN on 8/23/2018 at 12:15 AM

## 2018-08-23 NOTE — PROGRESS NOTES
hematuria- urology following- 3 way pope in - cytoscopy as OP  · UTI- on ab  · BPH-with urinary retention- will need pope most likely   · CKD- stable  · PAF- home meds  · Chronic systolic CHF  · Anemia - monitor       Disposition: home when cleared by urology    Brad Yousif

## 2018-08-23 NOTE — PROGRESS NOTES
SCD's placed on pt as ordered. Pt kept them on approx. 10 minutes and then had staff to remove. States he can't stand them on.

## 2018-08-23 NOTE — ED NOTES
1st and 2nd bags of irrigation complete, hung 3rd and 4th bags of bladder irrigation.       Margaux Starkey RN  08/22/18 2340

## 2018-08-23 NOTE — ED NOTES
Dr. Cara Montes De Oca continues at bedside.  Will move pt to floor after MD exam.     Mino Johnson RN  08/22/18 9412

## 2018-08-24 ENCOUNTER — TELEPHONE (OUTPATIENT)
Dept: CARDIOLOGY | Facility: CLINIC | Age: 83
End: 2018-08-24

## 2018-08-24 PROBLEM — I50.23 ACUTE ON CHRONIC SYSTOLIC CONGESTIVE HEART FAILURE (HCC): Status: ACTIVE | Noted: 2018-02-12

## 2018-08-24 LAB
ANION GAP SERPL CALCULATED.3IONS-SCNC: 9 MMOL/L (ref 7–19)
BASOPHILS ABSOLUTE: 0 K/UL (ref 0–0.2)
BASOPHILS RELATIVE PERCENT: 0.7 % (ref 0–1)
BUN BLDV-MCNC: 33 MG/DL (ref 8–23)
CALCIUM SERPL-MCNC: 9.4 MG/DL (ref 8.8–10.2)
CHLORIDE BLD-SCNC: 96 MMOL/L (ref 98–111)
CO2: 32 MMOL/L (ref 22–29)
CREAT SERPL-MCNC: 1.9 MG/DL (ref 0.5–1.2)
EKG P AXIS: NORMAL DEGREES
EKG P AXIS: NORMAL DEGREES
EKG P-R INTERVAL: NORMAL MS
EKG P-R INTERVAL: NORMAL MS
EKG Q-T INTERVAL: 344 MS
EKG Q-T INTERVAL: 358 MS
EKG QRS DURATION: 134 MS
EKG QRS DURATION: 136 MS
EKG QTC CALCULATION (BAZETT): 419 MS
EKG QTC CALCULATION (BAZETT): 456 MS
EKG T AXIS: -118 DEGREES
EKG T AXIS: -167 DEGREES
EOSINOPHILS ABSOLUTE: 0.5 K/UL (ref 0–0.6)
EOSINOPHILS RELATIVE PERCENT: 9 % (ref 0–5)
GFR NON-AFRICAN AMERICAN: 34
GLUCOSE BLD-MCNC: 109 MG/DL (ref 74–109)
HCT VFR BLD CALC: 37.6 % (ref 42–52)
HEMOGLOBIN: 11.7 G/DL (ref 14–18)
LV EF: 25 %
LVEF MODALITY: NORMAL
LYMPHOCYTES ABSOLUTE: 1.1 K/UL (ref 1.1–4.5)
LYMPHOCYTES RELATIVE PERCENT: 19.6 % (ref 20–40)
MAGNESIUM: 2.2 MG/DL (ref 1.6–2.4)
MCH RBC QN AUTO: 29.3 PG (ref 27–31)
MCHC RBC AUTO-ENTMCNC: 31.1 G/DL (ref 33–37)
MCV RBC AUTO: 94.2 FL (ref 80–94)
MONOCYTES ABSOLUTE: 0.5 K/UL (ref 0–0.9)
MONOCYTES RELATIVE PERCENT: 9.7 % (ref 0–10)
NEUTROPHILS ABSOLUTE: 3.3 K/UL (ref 1.5–7.5)
NEUTROPHILS RELATIVE PERCENT: 61 % (ref 50–65)
PDW BLD-RTO: 13.7 % (ref 11.5–14.5)
PLATELET # BLD: 150 K/UL (ref 130–400)
PMV BLD AUTO: 10.8 FL (ref 9.4–12.4)
POTASSIUM SERPL-SCNC: 4.5 MMOL/L (ref 3.5–5)
PRO-BNP: 5025 PG/ML (ref 0–1800)
RBC # BLD: 3.99 M/UL (ref 4.7–6.1)
SODIUM BLD-SCNC: 137 MMOL/L (ref 136–145)
TROPONIN: 0.03 NG/ML (ref 0–0.03)
TSH SERPL DL<=0.05 MIU/L-ACNC: 4.34 UIU/ML (ref 0.27–4.2)
WBC # BLD: 5.4 K/UL (ref 4.8–10.8)

## 2018-08-24 PROCEDURE — 6370000000 HC RX 637 (ALT 250 FOR IP): Performed by: HOSPITALIST

## 2018-08-24 PROCEDURE — 6360000002 HC RX W HCPCS: Performed by: CLINICAL NURSE SPECIALIST

## 2018-08-24 PROCEDURE — 99232 SBSQ HOSP IP/OBS MODERATE 35: CPT | Performed by: UROLOGY

## 2018-08-24 PROCEDURE — 6370000000 HC RX 637 (ALT 250 FOR IP): Performed by: UROLOGY

## 2018-08-24 PROCEDURE — 85025 COMPLETE CBC W/AUTO DIFF WBC: CPT

## 2018-08-24 PROCEDURE — 2700000000 HC OXYGEN THERAPY PER DAY

## 2018-08-24 PROCEDURE — 84443 ASSAY THYROID STIM HORMONE: CPT

## 2018-08-24 PROCEDURE — 2580000003 HC RX 258: Performed by: HOSPITALIST

## 2018-08-24 PROCEDURE — 6370000000 HC RX 637 (ALT 250 FOR IP): Performed by: PHYSICIAN ASSISTANT

## 2018-08-24 PROCEDURE — 93306 TTE W/DOPPLER COMPLETE: CPT

## 2018-08-24 PROCEDURE — 99232 SBSQ HOSP IP/OBS MODERATE 35: CPT | Performed by: HOSPITALIST

## 2018-08-24 PROCEDURE — 84484 ASSAY OF TROPONIN QUANT: CPT

## 2018-08-24 PROCEDURE — 2500000003 HC RX 250 WO HCPCS: Performed by: HOSPITALIST

## 2018-08-24 PROCEDURE — 83735 ASSAY OF MAGNESIUM: CPT

## 2018-08-24 PROCEDURE — 99223 1ST HOSP IP/OBS HIGH 75: CPT | Performed by: INTERNAL MEDICINE

## 2018-08-24 PROCEDURE — 80048 BASIC METABOLIC PNL TOTAL CA: CPT

## 2018-08-24 PROCEDURE — 83880 ASSAY OF NATRIURETIC PEPTIDE: CPT

## 2018-08-24 PROCEDURE — 36415 COLL VENOUS BLD VENIPUNCTURE: CPT

## 2018-08-24 PROCEDURE — 51798 US URINE CAPACITY MEASURE: CPT

## 2018-08-24 PROCEDURE — 1210000000 HC MED SURG R&B

## 2018-08-24 RX ORDER — MORPHINE SULFATE 1 MG/ML
INJECTION, SOLUTION EPIDURAL; INTRATHECAL; INTRAVENOUS
Status: DISCONTINUED
Start: 2018-08-24 | End: 2018-08-24

## 2018-08-24 RX ORDER — MORPHINE SULFATE 1 MG/ML
1 INJECTION, SOLUTION EPIDURAL; INTRATHECAL; INTRAVENOUS ONCE
Status: COMPLETED | OUTPATIENT
Start: 2018-08-24 | End: 2018-08-24

## 2018-08-24 RX ORDER — HYDROCODONE BITARTRATE AND ACETAMINOPHEN 7.5; 325 MG/1; MG/1
1 TABLET ORAL NIGHTLY
Status: DISCONTINUED | OUTPATIENT
Start: 2018-08-24 | End: 2018-08-26 | Stop reason: HOSPADM

## 2018-08-24 RX ORDER — NITROFURANTOIN 25; 75 MG/1; MG/1
100 CAPSULE ORAL EVERY 12 HOURS SCHEDULED
Status: DISCONTINUED | OUTPATIENT
Start: 2018-08-24 | End: 2018-08-24

## 2018-08-24 RX ADMIN — LORAZEPAM 0.5 MG: 0.5 TABLET ORAL at 06:10

## 2018-08-24 RX ADMIN — CARVEDILOL 6.25 MG: 6.25 TABLET, FILM COATED ORAL at 09:23

## 2018-08-24 RX ADMIN — Medication 1 MG: at 03:35

## 2018-08-24 RX ADMIN — AZTREONAM 1 G: 1 INJECTION, SOLUTION INTRAVENOUS at 13:01

## 2018-08-24 RX ADMIN — BUMETANIDE 0.5 MG: 0.5 TABLET ORAL at 09:24

## 2018-08-24 RX ADMIN — HYDROCODONE BITARTRATE AND ACETAMINOPHEN 1 TABLET: 7.5; 325 TABLET ORAL at 19:22

## 2018-08-24 RX ADMIN — LORAZEPAM 0.5 MG: 0.5 TABLET ORAL at 12:59

## 2018-08-24 RX ADMIN — FUROSEMIDE 40 MG: 40 TABLET ORAL at 17:03

## 2018-08-24 RX ADMIN — NITROFURANTOIN (MONOHYDRATE/MACROCRYSTALS) 100 MG: 75; 25 CAPSULE ORAL at 20:01

## 2018-08-24 RX ADMIN — GABAPENTIN 300 MG: 300 CAPSULE ORAL at 20:00

## 2018-08-24 RX ADMIN — DOXYCYCLINE 100 MG: 100 INJECTION, POWDER, LYOPHILIZED, FOR SOLUTION INTRAVENOUS at 19:22

## 2018-08-24 RX ADMIN — ATORVASTATIN CALCIUM 10 MG: 10 TABLET, FILM COATED ORAL at 09:25

## 2018-08-24 RX ADMIN — ISOSORBIDE MONONITRATE 30 MG: 30 TABLET, EXTENDED RELEASE ORAL at 09:23

## 2018-08-24 RX ADMIN — HYDROCODONE BITARTRATE AND ACETAMINOPHEN 1 TABLET: 5; 325 TABLET ORAL at 06:10

## 2018-08-24 RX ADMIN — GABAPENTIN 300 MG: 300 CAPSULE ORAL at 17:03

## 2018-08-24 RX ADMIN — VITAMIN D, TAB 1000IU (100/BT) 1000 UNITS: 25 TAB at 09:25

## 2018-08-24 RX ADMIN — TAMSULOSIN HYDROCHLORIDE 0.4 MG: 0.4 CAPSULE ORAL at 09:25

## 2018-08-24 RX ADMIN — ASPIRIN 81 MG: 81 TABLET, COATED ORAL at 09:24

## 2018-08-24 RX ADMIN — BUMETANIDE 0.5 MG: 0.5 TABLET ORAL at 17:03

## 2018-08-24 RX ADMIN — CARVEDILOL 6.25 MG: 6.25 TABLET, FILM COATED ORAL at 20:01

## 2018-08-24 RX ADMIN — HYDROCODONE BITARTRATE AND ACETAMINOPHEN 1 TABLET: 5; 325 TABLET ORAL at 13:00

## 2018-08-24 RX ADMIN — FINASTERIDE 5 MG: 5 TABLET, FILM COATED ORAL at 09:23

## 2018-08-24 RX ADMIN — LORAZEPAM 0.5 MG: 0.5 TABLET ORAL at 20:01

## 2018-08-24 RX ADMIN — Medication 10 ML: at 09:23

## 2018-08-24 RX ADMIN — NITROGLYCERIN 0.4 MG: 0.4 TABLET, ORALLY DISINTEGRATING SUBLINGUAL at 03:26

## 2018-08-24 ASSESSMENT — PAIN SCALES - GENERAL
PAINLEVEL_OUTOF10: 2
PAINLEVEL_OUTOF10: 4
PAINLEVEL_OUTOF10: 0
PAINLEVEL_OUTOF10: 1
PAINLEVEL_OUTOF10: 4
PAINLEVEL_OUTOF10: 8
PAINLEVEL_OUTOF10: 1
PAINLEVEL_OUTOF10: 9
PAINLEVEL_OUTOF10: 6

## 2018-08-24 ASSESSMENT — PAIN DESCRIPTION - PAIN TYPE: TYPE: CHRONIC PAIN

## 2018-08-24 ASSESSMENT — ENCOUNTER SYMPTOMS
SHORTNESS OF BREATH: 1
COUGH: 0
GASTROINTESTINAL NEGATIVE: 1
EYES NEGATIVE: 1

## 2018-08-24 NOTE — PROGRESS NOTES
catheterization versus indwelling catheter. 3. Atrial fibrillation, CHF, further management evaluation per hospitalist and cardiologist. Andreea Haskins for diuresis from  point of view if indicated if he has escalation of his residuals as above will just simply place a catheter. Echo final report pending await further recommendations    4. Urine culture growing skin niurka coag-negative staph.  Since he has had catheter in place we'll go ahead and empirically treat this with Macrobid 100 mg twice a day ×10 days    Jacquelin Mcgovern MD

## 2018-08-24 NOTE — PROGRESS NOTES
Pt c/o chest tightness and SOA. Rapid response chest pain called. See rapid response documentation for vitals and details. [id number] placed.

## 2018-08-24 NOTE — TELEPHONE ENCOUNTER
Sorry I'm out today. Can call him MOnday but if he's admitted to [facility] I can't do anything anyways - they will consult the [facility] hospitalist if he need cardiac assistance while there

## 2018-08-24 NOTE — TELEPHONE ENCOUNTER
"PATRICK Franks has called in today to report that patient is currently admitted to [facility] with retention. Patient had some \"cardiac pain\" last night. He wanted to make sure you were aware that he was on board and if you could give him a call @ [phone]  "

## 2018-08-24 NOTE — PROGRESS NOTES
Pt concerned about taking proscar. States this is new drug for him and he doesn't know why he is on it. Advised to speak with hospitalist today. Remains DTV.

## 2018-08-24 NOTE — CONSULTS
Smoking status: Former Smoker    Smokeless tobacco: Never Used    Alcohol use No    Drug use: No    Sexual activity: No      Comment: Marital status - . Other Topics Concern    Not on file     Social History Narrative    No narrative on file       Family History:     Family History   Problem Relation Age of Onset    Stroke Mother     Heart Attack Mother 80    Diabetes Father     COPD Sister     Arthritis Sister     Stroke Brother     Heart Disease Brother          REVIEW OF SYSTEMS:     Except as noted in the HPI, all other systems are negative        PHYSICAL EXAMINATION:     /78   Pulse 111   Temp 98.3 °F (36.8 °C) (Temporal)   Resp 18   Ht 6' 2\" (1.88 m)   Wt 160 lb (72.6 kg)   SpO2 96%   BMI 20.54 kg/m²     GENERAL - well developed and well nourished, in no amount of generalized distress; is an active participant in this examination  HEENT   PERRLA, Hearing appears normal, conjunctiva and lids are normal, ears and nose appear normal  NECK - no thyromegaly, no JVD, trachea is in the midline  CARDIOVASCULAR  PMI is in the left mid line clavicular position, Normal S1 and S2 with a grade 2/6 systolic murmur. No S3 or S4    PULMONARY  No respiratory distress. No wheezes and rales.   Breath sounds in both  lung fields are Normal  ABDOMEN   soft, non tender, no rebound, no hepatomegaly or splenomegaly  MUSCULOSKELETAL   Sitting, digitals and nails are without clubbing or cyanosis  EXTREMITIES - No edema  NEUROLOGIC - cranial nerves, II-XII, are normal  SKIN - turgor is normal, no rash  PSYCHIATRIC - normal mood and affect, alert and orientated x 3, judgement and insight appear appropriate      LABORATORY EVALUATION & TESTING:    I have personally reviewed and interpreted the results of the following diagnostic testing      EKG and or Telemetry:  which was personally reviewed me:  Ventricular paced rhythm, 86 bpm    Troponin:  negative myocardial necrosis; the creatinine is Procedures Ordered Management Options Selected           1. Presenting problem / symptom    Atrial flutter/fibrillation  initial encounter   Currently rate controlled. TSH 4.340    Not on anticoagulation due to past  bleeding complications. Yes: Echocardiogram Continue current medications:     Yes: Continue rate control medications    Thyroid medication per hospitalist recommendations           2. Congestive heart failure Initial presentation during this evaluation   BNP increased from 3487 to 5025    Review and summation of old records:    9/10/2017 echo [facility])- estimated EF 35%, estimated RVSP 40.7 mmHg, mild-mod aortic regurgitation     Yes: Echo Continue current medications:    Yes: Continue diuretics, may need additional if ok with urology due to  issues with bladder retention. 3. Chest pain Initial presentation during this evaluation Troponin negative    Interrogate ICD    Denies current chest pain, pressure, and tightness. Yes: Echocardiogram, trend troponin Continue current medications:       Yes: Medical management         PLAN:    1. Continue present medications except for changes as noted above  2. Continue to monitor rhythm  3. Further orders per clinical course. Discussed with patient and family and nursing. I greatly appreciate the opportunity and your confidence in allowing me to participate in the care of Hilary Green    Electronically signed by GIANCARLO Ojeda CNP on 8/24/18     [facility]    I have reviewed and discussed Mr. Hilary Green with the above Nurse practitioner. I have seen and examined patient and agree with above assessment and plan. Subjective: chest discomfort      Objective: known ischemic cardiomyopathy, chronic atrial fibrillation now with rapid ventricular response. Plan: rate control of anticoagulation, aggressive antianginal and CHF treatment. Electronically signed:  CYDNEY Calderon MD [place]

## 2018-08-24 NOTE — PROGRESS NOTES
Objective:   Vitals: /78   Pulse 111   Temp 98.3 °F (36.8 °C) (Temporal)   Resp 18   Ht 6' 2\" (1.88 m)   Wt 160 lb (72.6 kg)   SpO2 96%   BMI 20.54 kg/m²   General appearance: alert, appears stated age and cooperative  Skin: Skin color, texture, turgor normal.   HEENT: Head: Normocephalic, no lesions, without obvious abnormality.   Neck: no adenopathy, no carotid bruit, no JVD and supple, symmetrical, trachea midline  Lungs: clear to auscultation bilaterally  Heart: irregularly irregular rhythm  Abdomen: soft, non-tender; bowel sounds normal; no masses,  no organomegaly  Extremities: extremities normal, atraumatic, no cyanosis or edema  Lymphatic: No significant lymph node enlargement papable  Neurologic: Mental status: Alert, oriented, thought content appropriate        Assessment & Plan:    · Gross hematuria- urology following- pope out- cytoscopy as OP  · Staph UTI- on doxycyline- awaiting final culture   · BPH-with urinary retention- will need pope most likely   · CKD  · PAF with RVR- cardiology consulted- echo pending- home meds- currently in sinus per telemetry  · Acute on chronic systolic CHF- on bumex  · Anemia - monitor   · CAD (coronary artery disease)- stable  · Pacemaker - to be interrogated             Disposition: home when cleared by cardiology and urology     [employer]

## 2018-08-25 LAB
ANION GAP SERPL CALCULATED.3IONS-SCNC: 10 MMOL/L (ref 7–19)
BUN BLDV-MCNC: 36 MG/DL (ref 8–23)
CALCIUM SERPL-MCNC: 9.3 MG/DL (ref 8.8–10.2)
CHLORIDE BLD-SCNC: 97 MMOL/L (ref 98–111)
CO2: 31 MMOL/L (ref 22–29)
CREAT SERPL-MCNC: 1.7 MG/DL (ref 0.5–1.2)
GFR NON-AFRICAN AMERICAN: 39
GLUCOSE BLD-MCNC: 129 MG/DL (ref 74–109)
HCT VFR BLD CALC: 35.8 % (ref 42–52)
HEMOGLOBIN: 11.2 G/DL (ref 14–18)
MCH RBC QN AUTO: 29.6 PG (ref 27–31)
MCHC RBC AUTO-ENTMCNC: 31.3 G/DL (ref 33–37)
MCV RBC AUTO: 94.5 FL (ref 80–94)
ORGANISM: ABNORMAL
PDW BLD-RTO: 13.4 % (ref 11.5–14.5)
PLATELET # BLD: 144 K/UL (ref 130–400)
PMV BLD AUTO: 11.6 FL (ref 9.4–12.4)
POTASSIUM SERPL-SCNC: 3.9 MMOL/L (ref 3.5–5)
PRO-BNP: 5905 PG/ML (ref 0–1800)
RBC # BLD: 3.79 M/UL (ref 4.7–6.1)
SODIUM BLD-SCNC: 138 MMOL/L (ref 136–145)
T4 FREE: 1 NG/DL (ref 0.9–1.7)
URINE CULTURE, ROUTINE: ABNORMAL
URINE CULTURE, ROUTINE: ABNORMAL
WBC # BLD: 4.5 K/UL (ref 4.8–10.8)

## 2018-08-25 PROCEDURE — 80048 BASIC METABOLIC PNL TOTAL CA: CPT

## 2018-08-25 PROCEDURE — 99232 SBSQ HOSP IP/OBS MODERATE 35: CPT | Performed by: INTERNAL MEDICINE

## 2018-08-25 PROCEDURE — 2580000003 HC RX 258: Performed by: HOSPITALIST

## 2018-08-25 PROCEDURE — 2500000003 HC RX 250 WO HCPCS: Performed by: HOSPITALIST

## 2018-08-25 PROCEDURE — 36415 COLL VENOUS BLD VENIPUNCTURE: CPT

## 2018-08-25 PROCEDURE — 6370000000 HC RX 637 (ALT 250 FOR IP): Performed by: NURSE PRACTITIONER

## 2018-08-25 PROCEDURE — 83880 ASSAY OF NATRIURETIC PEPTIDE: CPT

## 2018-08-25 PROCEDURE — 1210000000 HC MED SURG R&B

## 2018-08-25 PROCEDURE — 85027 COMPLETE CBC AUTOMATED: CPT

## 2018-08-25 PROCEDURE — 99232 SBSQ HOSP IP/OBS MODERATE 35: CPT | Performed by: HOSPITALIST

## 2018-08-25 PROCEDURE — 51798 US URINE CAPACITY MEASURE: CPT

## 2018-08-25 PROCEDURE — 6370000000 HC RX 637 (ALT 250 FOR IP): Performed by: PHYSICIAN ASSISTANT

## 2018-08-25 PROCEDURE — 6370000000 HC RX 637 (ALT 250 FOR IP): Performed by: HOSPITALIST

## 2018-08-25 PROCEDURE — 84439 ASSAY OF FREE THYROXINE: CPT

## 2018-08-25 RX ADMIN — VITAMIN D, TAB 1000IU (100/BT) 1000 UNITS: 25 TAB at 07:58

## 2018-08-25 RX ADMIN — HYDROCODONE BITARTRATE AND ACETAMINOPHEN 1 TABLET: 5; 325 TABLET ORAL at 12:57

## 2018-08-25 RX ADMIN — FUROSEMIDE 40 MG: 40 TABLET ORAL at 06:20

## 2018-08-25 RX ADMIN — BUMETANIDE 0.5 MG: 0.5 TABLET ORAL at 07:58

## 2018-08-25 RX ADMIN — CARVEDILOL 6.25 MG: 6.25 TABLET, FILM COATED ORAL at 19:30

## 2018-08-25 RX ADMIN — GABAPENTIN 300 MG: 300 CAPSULE ORAL at 19:30

## 2018-08-25 RX ADMIN — HYDROCODONE BITARTRATE AND ACETAMINOPHEN 1 TABLET: 7.5; 325 TABLET ORAL at 19:30

## 2018-08-25 RX ADMIN — FINASTERIDE 5 MG: 5 TABLET, FILM COATED ORAL at 07:58

## 2018-08-25 RX ADMIN — LORAZEPAM 0.5 MG: 0.5 TABLET ORAL at 12:57

## 2018-08-25 RX ADMIN — HYDROCODONE BITARTRATE AND ACETAMINOPHEN 1 TABLET: 5; 325 TABLET ORAL at 06:20

## 2018-08-25 RX ADMIN — ASPIRIN 81 MG: 81 TABLET, COATED ORAL at 07:58

## 2018-08-25 RX ADMIN — BUMETANIDE 0.5 MG: 0.5 TABLET ORAL at 16:59

## 2018-08-25 RX ADMIN — Medication 1 CAPSULE: at 07:58

## 2018-08-25 RX ADMIN — ATORVASTATIN CALCIUM 10 MG: 10 TABLET, FILM COATED ORAL at 07:58

## 2018-08-25 RX ADMIN — LORAZEPAM 0.5 MG: 0.5 TABLET ORAL at 07:11

## 2018-08-25 RX ADMIN — TAMSULOSIN HYDROCHLORIDE 0.4 MG: 0.4 CAPSULE ORAL at 07:58

## 2018-08-25 RX ADMIN — ISOSORBIDE MONONITRATE 30 MG: 30 TABLET, EXTENDED RELEASE ORAL at 07:58

## 2018-08-25 RX ADMIN — LIDOCAINE HYDROCHLORIDE: 20 JELLY TOPICAL at 12:57

## 2018-08-25 RX ADMIN — CARVEDILOL 6.25 MG: 6.25 TABLET, FILM COATED ORAL at 07:58

## 2018-08-25 RX ADMIN — DOXYCYCLINE 100 MG: 100 INJECTION, POWDER, LYOPHILIZED, FOR SOLUTION INTRAVENOUS at 06:20

## 2018-08-25 RX ADMIN — DOXYCYCLINE 100 MG: 100 INJECTION, POWDER, LYOPHILIZED, FOR SOLUTION INTRAVENOUS at 16:59

## 2018-08-25 RX ADMIN — GABAPENTIN 300 MG: 300 CAPSULE ORAL at 16:59

## 2018-08-25 ASSESSMENT — PAIN SCALES - GENERAL
PAINLEVEL_OUTOF10: 6
PAINLEVEL_OUTOF10: 6
PAINLEVEL_OUTOF10: 1
PAINLEVEL_OUTOF10: 1
PAINLEVEL_OUTOF10: 6
PAINLEVEL_OUTOF10: 1

## 2018-08-25 NOTE — PLAN OF CARE
Problem: Falls - Risk of:  Goal: Will remain free from falls  Will remain free from falls   Outcome: Met This Shift    Goal: Absence of physical injury  Absence of physical injury   Outcome: Met This Shift      Problem: Urinary Retention:  Goal: Urinary elimination within specified parameters  Urinary elimination within specified parameters   Outcome: Met This Shift    Goal: Able to perform urinary catheter care  Able to perform urinary catheter care   Outcome: Met This Shift    Goal: Able to perform urinary self-catheterization  Able to perform urinary self-catheterization   Outcome: Ongoing    Goal: Ability to reestablish a normal urinary elimination pattern will improve - after catheter removal  Ability to reestablish a normal urinary elimination pattern will improve - after catheter removal   Outcome: Met This Shift    Goal: Ability to recognize the need to void and respond appropriately will improve  Ability to recognize the need to void and respond appropriately will improve   Outcome: Met This Shift    Goal: Absence of postvoid residual urine  Absence of postvoid residual urine   Outcome: Not Met This Shift

## 2018-08-25 NOTE — PROGRESS NOTES
PFSH:  No change    ROSS:  No change      Physical Examination:  /73   Pulse 109   Temp 97.6 °F (36.4 °C) (Temporal)   Resp 16   Ht 6' 2\" (1.88 m)   Wt 160 lb (72.6 kg)   SpO2 94%   BMI 20.54 kg/m²     CONSTITUTIONAL:  Awake, alert, cooperative, no apparent distress, and appears stated age. HEENT: Normal jugular venous pulsations, no carotid bruits. Head is atraumatic, normocephalic. Eyes and oral mucosa are normal.  LUNGS: Respiratory effort for the work of breathing is normal.  On auscultation: decreased breath sounds  CARDIOVASCULAR:  Normal apical impulse, irregular rate and rhythm, normal S1 and S2, no S3 or S4, and 2/6 systolic ejection murmur or rub is noted. ABDOMEN: Soft, nontender, nondistended. Bowel sounds are present. No masses or tenderness. SKIN: Warm and dry. EXTREMITIES: no lower extremity edema. No cyanosis or clubbing. NEUROLOGY:  Motor movement grossly intact. Focal signs are not identified. Current Inpatient Medications:   doxycycline (VIBRAMYCIN) IV  100 mg Intravenous Q12H    HYDROcodone-acetaminophen  1 tablet Oral Nightly    lactobacillus  1 capsule Oral Daily    polyethylene glycol  17 g Oral Daily    aspirin  81 mg Oral Daily    atorvastatin  10 mg Oral Daily    bumetanide  0.5 mg Oral BID    carvedilol  6.25 mg Oral BID    finasteride  5 mg Oral Daily    gabapentin  300 mg Oral Dinner    HYDROcodone-acetaminophen  1 tablet Oral BID- 8&2    isosorbide mononitrate  30 mg Oral Daily    vitamin D  1,000 Units Oral Daily    tamsulosin  0.4 mg Oral Daily    gabapentin  300 mg Oral Nightly    sodium chloride flush  10 mL Intravenous 2 times per day       IV Infusions (if any):      Diagnostics:    EKG: atrial fibrillation, rate 109, unchanged from previous tracings. ECHO: reviewed. Left ventricular ejection fraction 25%. .   Stress Test: not obtained. Cardiac Angiography: not obtained.     ASSESSMENT:    Patient Active Problem List

## 2018-08-26 VITALS
WEIGHT: 160.8 LBS | OXYGEN SATURATION: 95 % | TEMPERATURE: 97.5 F | BODY MASS INDEX: 20.64 KG/M2 | HEART RATE: 118 BPM | SYSTOLIC BLOOD PRESSURE: 112 MMHG | HEIGHT: 74 IN | DIASTOLIC BLOOD PRESSURE: 77 MMHG | RESPIRATION RATE: 16 BRPM

## 2018-08-26 LAB
ANION GAP SERPL CALCULATED.3IONS-SCNC: 11 MMOL/L (ref 7–19)
BUN BLDV-MCNC: 36 MG/DL (ref 8–23)
CALCIUM SERPL-MCNC: 9 MG/DL (ref 8.8–10.2)
CHLORIDE BLD-SCNC: 98 MMOL/L (ref 98–111)
CO2: 31 MMOL/L (ref 22–29)
CREAT SERPL-MCNC: 1.7 MG/DL (ref 0.5–1.2)
GFR NON-AFRICAN AMERICAN: 39
GLUCOSE BLD-MCNC: 102 MG/DL (ref 74–109)
HCT VFR BLD CALC: 37 % (ref 42–52)
HEMOGLOBIN: 11.5 G/DL (ref 14–18)
MCH RBC QN AUTO: 29.2 PG (ref 27–31)
MCHC RBC AUTO-ENTMCNC: 31.1 G/DL (ref 33–37)
MCV RBC AUTO: 93.9 FL (ref 80–94)
PDW BLD-RTO: 13.4 % (ref 11.5–14.5)
PLATELET # BLD: 152 K/UL (ref 130–400)
PMV BLD AUTO: 11.9 FL (ref 9.4–12.4)
POTASSIUM SERPL-SCNC: 4.1 MMOL/L (ref 3.5–5)
RBC # BLD: 3.94 M/UL (ref 4.7–6.1)
SODIUM BLD-SCNC: 140 MMOL/L (ref 136–145)
WBC # BLD: 5.1 K/UL (ref 4.8–10.8)

## 2018-08-26 PROCEDURE — 85027 COMPLETE CBC AUTOMATED: CPT

## 2018-08-26 PROCEDURE — 6370000000 HC RX 637 (ALT 250 FOR IP): Performed by: HOSPITALIST

## 2018-08-26 PROCEDURE — 99239 HOSP IP/OBS DSCHRG MGMT >30: CPT | Performed by: HOSPITALIST

## 2018-08-26 PROCEDURE — 36415 COLL VENOUS BLD VENIPUNCTURE: CPT

## 2018-08-26 PROCEDURE — 2580000003 HC RX 258: Performed by: HOSPITALIST

## 2018-08-26 PROCEDURE — 80048 BASIC METABOLIC PNL TOTAL CA: CPT

## 2018-08-26 PROCEDURE — 2500000003 HC RX 250 WO HCPCS: Performed by: HOSPITALIST

## 2018-08-26 PROCEDURE — 51798 US URINE CAPACITY MEASURE: CPT

## 2018-08-26 RX ORDER — DOXYCYCLINE HYCLATE 100 MG
100 TABLET ORAL 2 TIMES DAILY
Qty: 14 TABLET | Refills: 0 | Status: SHIPPED | OUTPATIENT
Start: 2018-08-26 | End: 2018-09-02

## 2018-08-26 RX ADMIN — TAMSULOSIN HYDROCHLORIDE 0.4 MG: 0.4 CAPSULE ORAL at 09:20

## 2018-08-26 RX ADMIN — DOXYCYCLINE 100 MG: 100 INJECTION, POWDER, LYOPHILIZED, FOR SOLUTION INTRAVENOUS at 06:26

## 2018-08-26 RX ADMIN — ISOSORBIDE MONONITRATE 30 MG: 30 TABLET, EXTENDED RELEASE ORAL at 09:20

## 2018-08-26 RX ADMIN — FINASTERIDE 5 MG: 5 TABLET, FILM COATED ORAL at 09:19

## 2018-08-26 RX ADMIN — HYDROCODONE BITARTRATE AND ACETAMINOPHEN 1 TABLET: 5; 325 TABLET ORAL at 06:25

## 2018-08-26 RX ADMIN — LORAZEPAM 0.5 MG: 0.5 TABLET ORAL at 09:24

## 2018-08-26 RX ADMIN — ATORVASTATIN CALCIUM 10 MG: 10 TABLET, FILM COATED ORAL at 09:20

## 2018-08-26 RX ADMIN — Medication 1 CAPSULE: at 09:20

## 2018-08-26 RX ADMIN — CARVEDILOL 6.25 MG: 6.25 TABLET, FILM COATED ORAL at 09:20

## 2018-08-26 RX ADMIN — ASPIRIN 81 MG: 81 TABLET, COATED ORAL at 09:20

## 2018-08-26 RX ADMIN — HYDROCODONE BITARTRATE AND ACETAMINOPHEN 1 TABLET: 5; 325 TABLET ORAL at 11:47

## 2018-08-26 RX ADMIN — BUMETANIDE 0.5 MG: 0.5 TABLET ORAL at 09:20

## 2018-08-26 RX ADMIN — VITAMIN D, TAB 1000IU (100/BT) 1000 UNITS: 25 TAB at 09:19

## 2018-08-26 RX ADMIN — Medication 10 ML: at 09:21

## 2018-08-26 ASSESSMENT — PAIN SCALES - GENERAL
PAINLEVEL_OUTOF10: 9
PAINLEVEL_OUTOF10: 5
PAINLEVEL_OUTOF10: 0

## 2018-08-26 NOTE — PROGRESS NOTES
Preparing patient for discharge. Pt agreeable to using in and out cath kits at home. Teaching provided and patient demonstrated understanding of use of kits. Pt refuses to be in and out cathed before leaving. Pt supplied with 2 in and out cath kits to go home with. Pt's son-in-law at bedside and states he will be able to supply more in and out cath kits at home for the pt.      Electronically signed by Aguila Terrell RN on 8/26/18 at 12:01 PM

## 2018-08-26 NOTE — PROGRESS NOTES
Late entry                                          HOSPITAL MEDICINE  - PROGRESS NOTE    Admit Date: 8/22/2018         CC: gross hematuria     Subjective: gross hematuria resolved, still retention, urinates some at the time    Objective: mild distress    Diet: DIET CARDIAC;  Pain is:None  Nausea:None  Bowel Movement/Flatus yes    Data:   Scheduled Meds: Reviewed  Current Facility-Administered Medications   Medication Dose Route Frequency Provider Last Rate Last Dose    doxycycline (VIBRAMYCIN) 100 mg in dextrose 5 % 100 mL IVPB  100 mg Intravenous Q12H Mel Romero MD   Stopped at 08/26/18 7216    lidocaine (XYLOCAINE) 2% uro-jet   Topical PRN GIANCARLO Benito        HYDROcodone-acetaminophen ([facility]) 7.5-325 MG per tablet 1 tablet  1 tablet Oral Nightly Niko Martines PA-C   1 tablet at 08/25/18 1930    calcium carbonate (TUMS) chewable tablet 500 mg  500 mg Oral TID PRN Mel Romero MD        [facility]) chewable tablet 80 mg  80 mg Oral Q6H PRN Mel Romero MD        lactobacillus (CULTURELLE) capsule 1 capsule  1 capsule Oral Daily Mel Romero MD   1 capsule at 08/26/18 0920    polyethylene glycol (GLYCOLAX) packet 17 g  17 g Oral Daily Mel Romero MD   17 g at 08/23/18 1840    aspirin EC tablet 81 mg  81 mg Oral Daily Yamel Hager MD   81 mg at 08/26/18 0920    atorvastatin (LIPITOR) tablet 10 mg  10 mg Oral Daily Yamel Hager MD   10 mg at 08/26/18 0920    bumetanide (BUMEX) tablet 0.5 mg  0.5 mg Oral BID Yamel Hager MD   0.5 mg at 08/26/18 0920    carvedilol (COREG) tablet 6.25 mg  6.25 mg Oral BID Yamel Hager MD   6.25 mg at 08/26/18 0920    finasteride (PROSCAR) tablet 5 mg  5 mg Oral Daily Yamel Hager MD   5 mg at 08/26/18 0919    furosemide (LASIX) tablet 40 mg  40 mg Oral BID PRN Yamel Hager MD   40 mg at 08/25/18 9966    gabapentin (NEURONTIN) capsule 300 mg  300 mg Oral Loc Jennings MD kg)   SpO2 95%   BMI 20.65 kg/m²   General appearance: alert, appears stated age and cooperative  Skin: Skin color, texture, turgor normal.   HEENT: Head: Normocephalic, no lesions, without obvious abnormality.   Neck: no adenopathy, no carotid bruit, no JVD and supple, symmetrical, trachea midline  Lungs: clear to auscultation bilaterally  Heart: regular rate and rhythm, S1, S2 normal, no murmur, click, rub or gallop  Abdomen: soft, non-tender; bowel sounds normal; no masses,  no organomegaly  Extremities: extremities normal, atraumatic, no cyanosis or edema  Lymphatic: No significant lymph node enlargement papable  Neurologic: Mental status: Alert, oriented, thought content appropriate        Assessment & Plan:      · Gross hematuria- resolved- cytoscopy as OP  · Staph UTI- on doxycyline per cultures   · BPH-with urinary retention- will need pope as OP most likely   · CKD  · PAF with RVR- resolved - not a candidate for dig  · Chronic systolic CHF- on bumex  · Anemia - monitor   · CAD (coronary artery disease)- stable            Disposition: home when cleared by urology- tomorrow    Eusebia Nielsen

## 2018-08-26 NOTE — DISCHARGE SUMMARY
Physician Discharge Summary     Patient ID:  Xu Bo  [id number]  40 y.o.  1934    Admit date: 8/22/2018    Discharge date and time: 8/26/2018    Admitting Physician: Alycia Womack MD     Discharge Physician: Alycia Womack MD    Discharge Diagnoses:     · Gross hematuria-resolved- cytoscopy as OP  · Staph UTI- on doxycyline per cultures   · BPH-with urinary retention  · CKD  · PAF with RVR-resolved  · Chronic systolic CHF with EF 09%  · Anemia - stable   · CAD (coronary artery disease)- stable    Discharged Condition: stable    Indication for Admission:     Hospital Course:     81 yo male presented with gross hematuria. He had a three way in place for both hematuria and urinary retention. He was evaluated by urology, his hematuria resolved but he still has urinary retention for which he needs to self catherize at home. He had an episode of atrial fib with RVR and was evaluated by cardiologists, no changes were made to his medicine as the patient prefers to consult with his own cardiologist as OP. His afib with RVR resolved. He was found to have staph UTI treated with doxycyline per urine cultures. Consults: cardiology and urology    Significant Diagnostic Studies:     Echo 8/24/2018   Summary   Mild mitral regurgitation is present.   Mitral annular calcification is present.   Moderately decreased mobility of posterior mitral valve leaflet.   The aortic valve leaflets were not well visualized.   No evidence of aortic stenosis; moderate aortic regurgitation.   Thickened aortic valve leaflets noted.   Mild tricuspid regurgitation with estimated RVSP of 34 mm Hg.   Generalized hypokinesis of the left ventricle with overallsevere   impairment of LV systolic function.   Left ventricular ejection fraction is visually estimated at 25%.   Akinetic posterior wall of left ventricle.     XR CHEST PORTABLE [[id number]] Resulted: 08/22/18 2037      Order Status: Completed Updated: 08/22/18 1939

## 2018-08-26 NOTE — PLAN OF CARE
Problem: Falls - Risk of:  Goal: Will remain free from falls  Will remain free from falls   Outcome: Ongoing    Goal: Absence of physical injury  Absence of physical injury   Outcome: Ongoing      Problem: Urinary Retention:  Goal: Urinary elimination within specified parameters  Urinary elimination within specified parameters   Outcome: Ongoing    Goal: Able to perform urinary catheter care  Able to perform urinary catheter care   Outcome: Ongoing    Goal: Able to perform urinary self-catheterization  Able to perform urinary self-catheterization   Outcome: Ongoing    Goal: Ability to reestablish a normal urinary elimination pattern will improve - after catheter removal  Ability to reestablish a normal urinary elimination pattern will improve - after catheter removal   Outcome: Ongoing    Goal: Ability to recognize the need to void and respond appropriately will improve  Ability to recognize the need to void and respond appropriately will improve   Outcome: Ongoing    Goal: Absence of postvoid residual urine  Absence of postvoid residual urine   Outcome: Ongoing

## 2018-08-26 NOTE — PROGRESS NOTES
[facility] Urology   Progress Note      SUBJECTIVE:  Felling good, ready to go home. Does not like self catheterization. OBJECTIVE:  Sitting up in bed, in no acute distress. REVIEW OF SYSTEMS:   Review of Systems   Constitutional: Negative for chills and fever. Cardiovascular: Negative for chest pain and palpitations. Genitourinary: Negative for dysuria and hematuria. All other systems reviewed and are negative. Physical  VITALS:  /77   Pulse 118   Temp 97.5 °F (36.4 °C) (Temporal)   Resp 16   Ht 6' 2\" (1.88 m)   Wt 160 lb 12.8 oz (72.9 kg)   SpO2 95%   BMI 20.65 kg/m²   TEMPERATURE:  Current - Temp: 97.5 °F (36.4 °C); Max - Temp  Av.8 °F (36.6 °C)  Min: 97.1 °F (36.2 °C)  Max: 98.1 °F (36.7 °C)   24 HR I&O   Intake/Output Summary (Last 24 hours) at 18 1036  Last data filed at 18 0959   Gross per 24 hour   Intake             1140 ml   Output             2685 ml   Net            -1545 ml   HENT: Head normocephalic,atruamatic, pupils NEGIN  BACK: not done and no tenderness in spine or flanks  ABDOMEN:  No scars, normal bowel sounds, soft, non-distended, non-tender, no masses palpated, no hepatosplenomegally  HEART:  Systolic murmur 2/6, normal rate  CHEST:  Clear Auscultation  GENITAL/URINARY:  Pelvic tenderness, no pope in place. SKIN: Warm, dry, intact, without lesion or rash.     Data       CBC:   Recent Labs      18   0332  18   0302  18   0317   WBC  5.4  4.5*  5.1   HGB  11.7*  11.2*  11.5*   HCT  37.6*  35.8*  37.0*   PLT  150  144  152     BMP:  Recent Labs      18   0332  18   0302  18   0317   NA  137  138  140   K  4.5  3.9  4.1   CL  96*  97*  98   CO2  32*  31*  31*   BUN  33*  36*  36*   CREATININE  1.9*  1.7*  1.7*   GLUCOSE  109  129*  102       ASSESSMENT AND PLAN  1. Gross hematuria- resolved after CBI, three bottle routine showing clear urine.     2.  BPH with urinary retention-  Self catheterization teaching has been

## 2018-08-26 NOTE — PLAN OF CARE
Problem: Falls - Risk of:  Goal: Will remain free from falls  Will remain free from falls   Outcome: Ongoing      Problem: Urinary Retention:  Goal: Urinary elimination within specified parameters  Urinary elimination within specified parameters   Outcome: Ongoing

## 2018-08-26 NOTE — PROGRESS NOTES
Pt has voided 500ml this am since 7am. Bladder scan shows 440ml    Electronically signed by Pierre Villanueva RN on 8/26/18 at 9:44 AM

## 2018-08-27 ENCOUNTER — CARE COORDINATION (OUTPATIENT)
Dept: CASE MANAGEMENT | Age: 83
End: 2018-08-27

## 2018-08-27 ENCOUNTER — TELEPHONE (OUTPATIENT)
Dept: INTERNAL MEDICINE | Age: 83
End: 2018-08-27

## 2018-08-27 DIAGNOSIS — G60.9 IDIOPATHIC NEUROPATHY: ICD-10-CM

## 2018-08-27 DIAGNOSIS — G89.4 CHRONIC PAIN SYNDROME: ICD-10-CM

## 2018-08-28 RX ORDER — HYDROCODONE BITARTRATE AND ACETAMINOPHEN 5; 325 MG/1; MG/1
TABLET ORAL
Qty: 60 TABLET | Refills: 0 | Status: SHIPPED | OUTPATIENT
Start: 2018-08-28 | End: 2018-09-22 | Stop reason: SDUPTHER

## 2018-08-28 RX ORDER — HYDROCODONE BITARTRATE AND ACETAMINOPHEN 7.5; 325 MG/1; MG/1
TABLET ORAL
Qty: 30 TABLET | Refills: 0 | Status: SHIPPED | OUTPATIENT
Start: 2018-08-28 | End: 2018-09-22 | Stop reason: SDUPTHER

## 2018-08-29 LAB
EKG P AXIS: 105 DEGREES
EKG P-R INTERVAL: 218 MS
EKG Q-T INTERVAL: 388 MS
EKG QRS DURATION: 184 MS
EKG QTC CALCULATION (BAZETT): 465 MS
EKG T AXIS: -81 DEGREES

## 2018-09-04 DIAGNOSIS — F41.9 ANXIETY: ICD-10-CM

## 2018-09-04 RX ORDER — LORAZEPAM 0.5 MG/1
TABLET ORAL
Qty: 120 TABLET | Refills: 0 | Status: SHIPPED | OUTPATIENT
Start: 2018-09-04 | End: 2018-09-28 | Stop reason: SDUPTHER

## 2018-09-19 ENCOUNTER — CLINICAL SUPPORT (OUTPATIENT)
Dept: CARDIOLOGY | Facility: CLINIC | Age: 83
End: 2018-09-19

## 2018-09-19 DIAGNOSIS — I50.22 CHRONIC SYSTOLIC CONGESTIVE HEART FAILURE (HCC): ICD-10-CM

## 2018-09-19 PROCEDURE — 93295 DEV INTERROG REMOTE 1/2/MLT: CPT | Performed by: PHYSICIAN ASSISTANT

## 2018-09-19 PROCEDURE — 93296 REM INTERROG EVL PM/IDS: CPT | Performed by: PHYSICIAN ASSISTANT

## 2018-09-22 DIAGNOSIS — G89.4 CHRONIC PAIN SYNDROME: ICD-10-CM

## 2018-09-22 DIAGNOSIS — G60.9 IDIOPATHIC NEUROPATHY: ICD-10-CM

## 2018-09-24 RX ORDER — DUTASTERIDE 0.5 MG/1
0.5 CAPSULE, LIQUID FILLED ORAL DAILY
Qty: 90 CAPSULE | Refills: 1 | Status: SHIPPED | OUTPATIENT
Start: 2018-09-24 | End: 2019-03-27 | Stop reason: SDUPTHER

## 2018-09-24 RX ORDER — HYDROCODONE BITARTRATE AND ACETAMINOPHEN 5; 325 MG/1; MG/1
TABLET ORAL
Qty: 60 TABLET | Refills: 0 | Status: SHIPPED | OUTPATIENT
Start: 2018-09-24 | End: 2018-10-25 | Stop reason: SDUPTHER

## 2018-09-24 RX ORDER — HYDROCODONE BITARTRATE AND ACETAMINOPHEN 7.5; 325 MG/1; MG/1
TABLET ORAL
Qty: 30 TABLET | Refills: 0 | Status: SHIPPED | OUTPATIENT
Start: 2018-09-24 | End: 2018-10-25 | Stop reason: SDUPTHER

## 2018-09-28 DIAGNOSIS — F41.9 ANXIETY: ICD-10-CM

## 2018-10-01 RX ORDER — LORAZEPAM 0.5 MG/1
TABLET ORAL
Qty: 120 TABLET | Refills: 0 | Status: SHIPPED | OUTPATIENT
Start: 2018-10-01 | End: 2018-10-31 | Stop reason: SDUPTHER

## 2018-10-02 ENCOUNTER — TELEPHONE (OUTPATIENT)
Dept: INTERNAL MEDICINE | Age: 83
End: 2018-10-02

## 2018-10-02 NOTE — TELEPHONE ENCOUNTER
We last saw him on April 27,  He has upcoming appt. On oct. 18,  Will he need to see a PA before that to be admitted to [facility]?

## 2018-10-03 NOTE — TELEPHONE ENCOUNTER
Yes, patient will need to be seen before [address] can admit. Needs to have Face 2 Face within last 90 days.

## 2018-10-04 NOTE — TELEPHONE ENCOUNTER
I have left several messages letting them know that if want [facility] before the 18th appt. With Dr. Linda Centeno to call to get in with one of the PA's here.

## 2018-10-05 ENCOUNTER — TELEPHONE (OUTPATIENT)
Dept: CARDIOLOGY | Facility: CLINIC | Age: 83
End: 2018-10-05

## 2018-10-05 NOTE — TELEPHONE ENCOUNTER
Patient son - in - law calling in again, stating he would like to speak with you about this patient coming in to see you.   I have left a note on your desk to call him from before.   Could you please call him when you get a chance.

## 2018-10-05 NOTE — TELEPHONE ENCOUNTER
Please put this pt on for last appt of day on 10/16 - either at 4 or if there's not an Epic slot there then double book the 330. Thanks

## 2018-10-08 ENCOUNTER — CLINICAL SUPPORT (OUTPATIENT)
Dept: CARDIOLOGY | Facility: CLINIC | Age: 83
End: 2018-10-08

## 2018-10-08 DIAGNOSIS — Z95.810 BIVENTRICULAR ICD (IMPLANTABLE CARDIOVERTER-DEFIBRILLATOR) IN PLACE: Primary | ICD-10-CM

## 2018-10-08 DIAGNOSIS — I50.22 CHRONIC SYSTOLIC CONGESTIVE HEART FAILURE (HCC): ICD-10-CM

## 2018-10-08 NOTE — PROGRESS NOTES
Bi-V AICD Evaluation Report  REMOTE/LATITUDE    October 8, 2018     Interrogation Date:  10/6/2018    Primary Cardiologist: Zaria  : Guidant Model: COGNIS 100-D N119  Implant date: 7/8/2010    Reason for evaluation: remote transmission indicating ATP therapy for AF with RVR  Indication for AICD: chronic systolic congestive heart failure    Measurements  Atrial sensing:  P wave: 0.5 mV  Atrial threshold: N/R  Atrial lead impedance: 530 ohms  Ventricular sensing:  R wave: 15.5 mV  RV Threshold:  N/R  RV Impedance:  352 ohms  Shock impedance:  RV 47 ohms  LV Threshold:  N/R  LV Impedance:  840 ohms      Diagnostic Data  Atrial paced: 1 %  Ventricular paced: RV 85% LV 96%    Episodes recorded since 9/19/2018:  Permanent AF; Patient is not anticoagulated s/t hx of  bleed.  AF with RVR noted (max ventricular rate 256 bpm).  Longest episode approximately 17 seconds long treated successfully with 1 sequence of ATP.  No shocks.      Battery status: satisfactory, estimated 1.5 years remaining      Final Parameters  Mode: DDDR     Lower rate: 70 bpm   Upper rate: 125 bpm  AV Delay: 170-180 ms paced    115-120 ms sensed   Atrial - Amplitude: 2.4 V   Pulse width: 0.5 ms   Sensitivity: 0.15 mV   Ventricular:  RV Amplitude: 2.2 V @ 0.5 ms   Sensitivity: 0.6 mV            LV Amplitude:  1.8V @ 0.5 ms  Changes made: N/A-remote transmission  Conclusions: normal AICD function, adequate battery reserve and ATP administered for AF with RVR    Follow up: every 3 months via latitude, annually in office

## 2018-10-11 ENCOUNTER — CARE COORDINATION (OUTPATIENT)
Dept: CARE COORDINATION | Age: 83
End: 2018-10-11

## 2018-10-11 DIAGNOSIS — I48.91 ATRIAL FIBRILLATION WITH RVR (HCC): Primary | ICD-10-CM

## 2018-10-11 DIAGNOSIS — I47.20 VENTRICULAR TACHYCARDIA: ICD-10-CM

## 2018-10-12 ENCOUNTER — TELEPHONE (OUTPATIENT)
Dept: CARDIOLOGY | Facility: CLINIC | Age: 83
End: 2018-10-12

## 2018-10-12 ENCOUNTER — TELEPHONE (OUTPATIENT)
Dept: CARDIOLOGY | Age: 83
End: 2018-10-12

## 2018-10-12 ENCOUNTER — CARE COORDINATION (OUTPATIENT)
Dept: CARE COORDINATION | Age: 83
End: 2018-10-12

## 2018-10-12 NOTE — TELEPHONE ENCOUNTER
Patient daughter calling in stating that that when she came into town her father was doing very bad his BP has been running 70/40's and 's, very low quality and wanting to do something. She states they have a CHF clinic at [facility] and wondering if they should get him to be seen there, and states he wants him seen sooner with Dr Enciso. And wondering if his Bumex is doing anything for him or not.     Advised patients daughter that we have him at the earliest appointment we can get him in because Dr Enciso.   She states he has to be put in cardiac rehab or something because he cannot continue to live like this.   I advised her she could talk to dr enciso about this at his appointment on Tuesday and she states there is no talking about it that he will do one of those, or she will take him to [place] to get fixed up.

## 2018-10-12 NOTE — TELEPHONE ENCOUNTER
Yes I understand, I advised her there was nothing else that we could do until he comes in on Tuesday to be seen.

## 2018-10-12 NOTE — TELEPHONE ENCOUNTER
"Not sure what to say or do in response to that.  If one our NPs can see him today or Monday then that's fine - but we got him an appt the first day I'm back from vacation.  They should be advised that the \"CHF\" clinic at [facility] is nothing different - it's staffed by their nurse practitioners.  If he is that symptomatic he should go to ER for evaluation.  They are welcome to take him wherever they like - from what I'm told his daughter (presumably the one that is calling in) is a cardiology nurse practitioner in [place].  He has so many variables that could possibly be responsible for why he's feeling bad (his afib with occasional RVR, underlying low EF, etc.), there's no way I can give any treatment advice without seeing him and evaluating in person.   "

## 2018-10-15 NOTE — PROGRESS NOTES
"     Subjective:     Encounter Date:10/16/2018      Patient ID: Wild Bravo is a 84 y.o. male.    Chief Complaint: \"can't live like this\"  Mr. Bravo is a pleasant 84-year-old male with extensive cardiac history who was previously followed by Dr. Macdonald.  He transferred his care to me and I have seen him in the office on 1/3/18, 4/11/18, and 5/1/18, and most recently on 8/8/18.  Since then, he was hospitalized for several days at [facility] due to hematuria, receiving CBI. He was seen while there once by Dr. Mehta, but by review of records it appears as though no changes in cardiac therapies were made and the patient was felt to be relatively stable from a cardiovascular standpoint..  He is accompanied today by his daughter Audra, who is a nurse practitioner in [place].  She is quite worried about his overall condition, stating \"he cannot go on living like this.\"  Major concerns include persistently low blood pressure, occasionally last heart rates, severe exertional dyspnea, and overall fatigue.      Atrial Fibrillation   Presents for follow-up visit. Symptoms include dizziness, hypotension and shortness of breath. Symptoms are negative for chest pain, hemodynamic instability, palpitations, syncope and tachycardia. The symptoms have been worsening. Past medical history includes atrial fibrillation, CAD and CHF.   Congestive Heart Failure   Presents for follow-up visit. Associated symptoms include fatigue and shortness of breath. Pertinent negatives include no chest pain, chest pressure, claudication, edema, near-syncope, orthopnea, palpitations, paroxysmal nocturnal dyspnea or unexpected weight change. The symptoms have been worsening. His past medical history is significant for CAD.   Coronary Artery Disease   Presents for follow-up visit. Symptoms include dizziness and shortness of breath. Pertinent negatives include no chest pain, chest pressure, chest tightness, leg swelling or palpitations. His past " medical history is significant for CHF. The symptoms have been stable. Compliance with medications is good.       CHF - taking bumex 0.5mg PO BID with occasional lasix on top of that  Avg SBP 80s/DPB 40s, HR 97-110s;     Takes lisinopril 2.5mg in afternoon    The following portions of the patient's history were reviewed and updated as appropriate: allergies, current medications, past family history, past medical history, past social history, past surgical history and problem list.    Review of Systems   Constitution: Positive for fatigue and malaise/fatigue. Negative for unexpected weight change.   Cardiovascular: Positive for dyspnea on exertion. Negative for chest pain, claudication, leg swelling, near-syncope, orthopnea, palpitations, paroxysmal nocturnal dyspnea and syncope.   Respiratory: Positive for shortness of breath. Negative for chest tightness.    Hematologic/Lymphatic: Does not bruise/bleed easily.   Genitourinary: Positive for hematuria.   Neurological: Positive for dizziness.              Objective:      Vitals:    10/16/18 1605   BP: 110/62   Pulse: 87   SpO2: 98%     Physical Exam   Constitutional: He is oriented to person, place, and time. He appears well-developed and well-nourished.   Neck: JVD (but JVP at normal height (4cm above RA level)) present.   Cardiovascular: Normal rate, regular rhythm, normal heart sounds and intact distal pulses.    No murmur heard.  Pulmonary/Chest: Effort normal and breath sounds normal.   Musculoskeletal: He exhibits no edema.   Neurological: He is alert and oriented to person, place, and time.   Skin: Skin is warm and dry.       Lab Review:         ECG 12 Lead  Date/Time: 10/16/2018 9:05 PM  Performed by: NANDO GARCIA  Authorized by: NANDO GARCIA   Comparison: compared with previous ECG from 8/16/2018  Similar to previous ECG  Rhythm: paced  Rate: normal  BPM: 100  Clinical impression: abnormal ECG          LABS REVIEWED:      Old records reviewed: I  reviewed Dr. San's consultation note during the patient's August admission at [facility]; from this I learned that his cardiovascular conditions were viewed as stable and no changes were made in medical therapy    Assessment/Plan:     Problem List Items Addressed This Visit        Cardiovascular and Mediastinum    Coronary artery disease involving coronary bypass graft of native heart without angina pectoris    Overview     MI x2  Single vessel CABG 2008         Current Assessment & Plan     Stable; no angina. Continue statin, ASA, Imdur         Chronic systolic congestive heart failure (CMS/HCC) - Primary    Overview     LVEF 35% on last echo here in 2017, but was reported as 25% on echocardiogram performed during recent admission at [facility] in September 2018; has BiV-ICD         Current Assessment & Plan     NYHA 3; worsening.  D/c lisinopril; start Entresto 24/26 PO BID in 36 hours  D/c coreg and start Toprol XL 25mg nightly (more to help with rate control with AF without dropping BP further)  Continue daily weights  Continue current Bumex (0.5 mg tablet by mouth twice daily)  Referral placed for home health for medication review, CHF teaching  I again presented the option of CardioMEMs implantation as a way of remote monitoring of PA pressures; the patient's daughter is very interested in this though the patient himself is still hesitant regarding any potential invasive procedures.  I will also discuss this with the patient's former cardiologist in [place]; meanwhile Mr. Bravo and his daughter will discuss and get back to us if they think he would like to proceed with this.         Relevant Medications    sacubitril-valsartan (ENTRESTO) 24-26 MG tablet    metoprolol succinate XL (TOPROL XL) 25 MG 24 hr tablet    Other Relevant Orders    Ambulatory Referral to Home Health    Basic Metabolic Panel    Permanent atrial fibrillation (CMS/HCC)    Overview     CHADS-VASc Risk Assessment            7       Total  Score        1 CHF    1 Hypertension    2 Age >/= 75    2 PRIOR STROKE/TIA/THROMBO    1 Vascular Disease        Criteria that do not apply:    DM    Age 65-74    Sex: Female        MAZE done at time of MV repair, also with one subsequent attempt at catheter-based ablation.  Also reports prior intolerance to amiodarone - it is unclear what intolerance was.             Current Assessment & Plan     Worsening with recent device interrogation showing episodes of rapid ventricular response; these also been associated, per patient's daughter's report, with hypotension.  We will attempt to achieve better rate control without compromising blood pressure by switching carvedilol to Toprol-XL.  If blood pressure will allow, can titrate up this dose of follow-up.    Also discussed CVA risk reduction; patient currently on aspirin 81 mg daily as monotherapy.  Anticoagulation currently under indicated due to  bleeding.  Provided information regarding Watchman; patient has requested that I speak with his former cardiologist in [place], so I will give Dr.George Bryan a call.         Relevant Medications    sacubitril-valsartan (ENTRESTO) 24-26 MG tablet    metoprolol succinate XL (TOPROL XL) 25 MG 24 hr tablet    Other Relevant Orders    ECG 12 Lead       Genitourinary    Stage 4 chronic kidney disease (CMS/HCC)       Other    H/O mitral valve repair        Total time in room: 52 minutes (of which >90% of time was spent in counseling)    Raleigh Enciso MD  10/16/2018  9:15 PM

## 2018-10-16 ENCOUNTER — OFFICE VISIT (OUTPATIENT)
Dept: CARDIOLOGY | Facility: CLINIC | Age: 83
End: 2018-10-16

## 2018-10-16 VITALS
OXYGEN SATURATION: 98 % | SYSTOLIC BLOOD PRESSURE: 110 MMHG | DIASTOLIC BLOOD PRESSURE: 62 MMHG | BODY MASS INDEX: 20.92 KG/M2 | HEART RATE: 87 BPM | HEIGHT: 74 IN | WEIGHT: 163 LBS

## 2018-10-16 DIAGNOSIS — I50.22 CHRONIC SYSTOLIC CONGESTIVE HEART FAILURE (HCC): Primary | ICD-10-CM

## 2018-10-16 DIAGNOSIS — I48.21 PERMANENT ATRIAL FIBRILLATION (HCC): ICD-10-CM

## 2018-10-16 DIAGNOSIS — I25.810 CORONARY ARTERY DISEASE INVOLVING CORONARY BYPASS GRAFT OF NATIVE HEART WITHOUT ANGINA PECTORIS: ICD-10-CM

## 2018-10-16 DIAGNOSIS — N18.4 STAGE 4 CHRONIC KIDNEY DISEASE (HCC): ICD-10-CM

## 2018-10-16 DIAGNOSIS — Z98.890 H/O MITRAL VALVE REPAIR: ICD-10-CM

## 2018-10-16 PROCEDURE — 93000 ELECTROCARDIOGRAM COMPLETE: CPT | Performed by: INTERNAL MEDICINE

## 2018-10-16 PROCEDURE — 99215 OFFICE O/P EST HI 40 MIN: CPT | Performed by: INTERNAL MEDICINE

## 2018-10-16 RX ORDER — METOPROLOL SUCCINATE 25 MG/1
25 TABLET, EXTENDED RELEASE ORAL NIGHTLY
Qty: 30 TABLET | Refills: 11 | Status: SHIPPED | OUTPATIENT
Start: 2018-10-16 | End: 2019-10-09 | Stop reason: SDUPTHER

## 2018-10-16 NOTE — PATIENT INSTRUCTIONS
**CARDIOMEMS device ([employer]) is implantable Pulmonary Artery sensor to detect pressures/fluid build-up and transmit data remotely to help guide heart failures treatment

## 2018-10-17 NOTE — ASSESSMENT & PLAN NOTE
Worsening with recent device interrogation showing episodes of rapid ventricular response; these also been associated, per patient's daughter's report, with hypotension.  We will attempt to achieve better rate control without compromising blood pressure by switching carvedilol to Toprol-XL.  If blood pressure will allow, can titrate up this dose of follow-up.    Also discussed CVA risk reduction; patient currently on aspirin 81 mg daily as monotherapy.  Anticoagulation currently under indicated due to  bleeding.  Provided information regarding Watchman; patient has requested that I speak with his former cardiologist in [place], so I will give Dr.George Bryan a call.

## 2018-10-17 NOTE — ASSESSMENT & PLAN NOTE
NYHA 3; worsening.  D/c lisinopril; start Entresto 24/26 PO BID in 36 hours  D/c coreg and start Toprol XL 25mg nightly (more to help with rate control with AF without dropping BP further)  Continue daily weights  Continue current Bumex (0.5 mg tablet by mouth twice daily)  Referral placed for home health for medication review, CHF teaching  I again presented the option of CardioMEMs implantation as a way of remote monitoring of PA pressures; the patient's daughter is very interested in this though the patient himself is still hesitant regarding any potential invasive procedures.  I will also discuss this with the patient's former cardiologist in [place]; meanwhile Mr. Bravo and his daughter will discuss and get back to us if they think he would like to proceed with this.

## 2018-10-19 ENCOUNTER — TELEPHONE (OUTPATIENT)
Dept: CARDIOLOGY | Facility: CLINIC | Age: 83
End: 2018-10-19

## 2018-10-19 NOTE — TELEPHONE ENCOUNTER
----- Message from Raleigh Enciso MD sent at 10/16/2018  9:15 PM CDT -----  Please let patient or his daughter noted that I did order a BMP that needs to be drawn proximally one week after he starts Entresto.  Thank you

## 2018-10-24 ENCOUNTER — OFFICE VISIT (OUTPATIENT)
Dept: INTERNAL MEDICINE | Age: 83
End: 2018-10-24
Payer: MEDICARE

## 2018-10-24 VITALS
BODY MASS INDEX: 21.05 KG/M2 | HEART RATE: 84 BPM | DIASTOLIC BLOOD PRESSURE: 66 MMHG | SYSTOLIC BLOOD PRESSURE: 94 MMHG | WEIGHT: 164 LBS | OXYGEN SATURATION: 95 % | HEIGHT: 74 IN

## 2018-10-24 DIAGNOSIS — I50.23 ACUTE ON CHRONIC SYSTOLIC CONGESTIVE HEART FAILURE (HCC): Primary | ICD-10-CM

## 2018-10-24 DIAGNOSIS — D64.9 ANEMIA, UNSPECIFIED TYPE: ICD-10-CM

## 2018-10-24 DIAGNOSIS — I50.23 ACUTE ON CHRONIC SYSTOLIC CONGESTIVE HEART FAILURE (HCC): ICD-10-CM

## 2018-10-24 DIAGNOSIS — I25.119 CORONARY ARTERY DISEASE INVOLVING NATIVE HEART WITH ANGINA PECTORIS, UNSPECIFIED VESSEL OR LESION TYPE (HCC): ICD-10-CM

## 2018-10-24 DIAGNOSIS — I25.5 ISCHEMIC CARDIOMYOPATHY: ICD-10-CM

## 2018-10-24 DIAGNOSIS — N18.4 STAGE 4 CHRONIC KIDNEY DISEASE (HCC): ICD-10-CM

## 2018-10-24 DIAGNOSIS — I48.91 ATRIAL FIBRILLATION WITH RVR (HCC): ICD-10-CM

## 2018-10-24 DIAGNOSIS — G89.4 CHRONIC PAIN SYNDROME: ICD-10-CM

## 2018-10-24 DIAGNOSIS — F41.9 ANXIETY: ICD-10-CM

## 2018-10-24 DIAGNOSIS — Z23 NEEDS FLU SHOT: ICD-10-CM

## 2018-10-24 LAB
ALBUMIN SERPL-MCNC: 4 G/DL (ref 3.5–5.2)
ALP BLD-CCNC: 114 U/L (ref 40–130)
ALT SERPL-CCNC: 10 U/L (ref 5–41)
ANION GAP SERPL CALCULATED.3IONS-SCNC: 12 MMOL/L (ref 7–19)
AST SERPL-CCNC: 14 U/L (ref 5–40)
BILIRUB SERPL-MCNC: 0.7 MG/DL (ref 0.2–1.2)
BUN BLDV-MCNC: 35 MG/DL (ref 8–23)
CALCIUM SERPL-MCNC: 9.8 MG/DL (ref 8.8–10.2)
CHLORIDE BLD-SCNC: 101 MMOL/L (ref 98–111)
CHOLESTEROL, TOTAL: 133 MG/DL (ref 160–199)
CO2: 31 MMOL/L (ref 22–29)
CREAT SERPL-MCNC: 1.9 MG/DL (ref 0.5–1.2)
FERRITIN: 55.1 NG/ML (ref 30–400)
FOLATE: 9.4 NG/ML (ref 4.5–32.2)
GFR NON-AFRICAN AMERICAN: 34
GLUCOSE BLD-MCNC: 108 MG/DL (ref 74–109)
HCT VFR BLD CALC: 44.3 % (ref 42–52)
HDLC SERPL-MCNC: 57 MG/DL (ref 55–121)
HEMOGLOBIN: 13.3 G/DL (ref 14–18)
IRON: 70 UG/DL (ref 59–158)
LDL CHOLESTEROL CALCULATED: 60 MG/DL
MCH RBC QN AUTO: 28.5 PG (ref 27–31)
MCHC RBC AUTO-ENTMCNC: 30 G/DL (ref 33–37)
MCV RBC AUTO: 94.9 FL (ref 80–94)
PDW BLD-RTO: 13.7 % (ref 11.5–14.5)
PLATELET # BLD: 181 K/UL (ref 130–400)
PMV BLD AUTO: 12.1 FL (ref 9.4–12.4)
POTASSIUM SERPL-SCNC: 5.1 MMOL/L (ref 3.5–5)
PRO-BNP: 2999 PG/ML (ref 0–1800)
RBC # BLD: 4.67 M/UL (ref 4.7–6.1)
SODIUM BLD-SCNC: 144 MMOL/L (ref 136–145)
TOTAL PROTEIN: 7.3 G/DL (ref 6.6–8.7)
TRIGL SERPL-MCNC: 82 MG/DL (ref 0–149)
TSH SERPL DL<=0.05 MIU/L-ACNC: 4.02 UIU/ML (ref 0.27–4.2)
VITAMIN B-12: 651 PG/ML (ref 211–946)
WBC # BLD: 6.7 K/UL (ref 4.8–10.8)

## 2018-10-24 PROCEDURE — 1123F ACP DISCUSS/DSCN MKR DOCD: CPT | Performed by: INTERNAL MEDICINE

## 2018-10-24 PROCEDURE — 1101F PT FALLS ASSESS-DOCD LE1/YR: CPT | Performed by: INTERNAL MEDICINE

## 2018-10-24 PROCEDURE — 90662 IIV NO PRSV INCREASED AG IM: CPT | Performed by: INTERNAL MEDICINE

## 2018-10-24 PROCEDURE — G8482 FLU IMMUNIZE ORDER/ADMIN: HCPCS | Performed by: INTERNAL MEDICINE

## 2018-10-24 PROCEDURE — G8420 CALC BMI NORM PARAMETERS: HCPCS | Performed by: INTERNAL MEDICINE

## 2018-10-24 PROCEDURE — G8598 ASA/ANTIPLAT THER USED: HCPCS | Performed by: INTERNAL MEDICINE

## 2018-10-24 PROCEDURE — G0008 ADMIN INFLUENZA VIRUS VAC: HCPCS | Performed by: INTERNAL MEDICINE

## 2018-10-24 PROCEDURE — 99214 OFFICE O/P EST MOD 30 MIN: CPT | Performed by: INTERNAL MEDICINE

## 2018-10-24 PROCEDURE — 1036F TOBACCO NON-USER: CPT | Performed by: INTERNAL MEDICINE

## 2018-10-24 PROCEDURE — 4040F PNEUMOC VAC/ADMIN/RCVD: CPT | Performed by: INTERNAL MEDICINE

## 2018-10-24 PROCEDURE — G8427 DOCREV CUR MEDS BY ELIG CLIN: HCPCS | Performed by: INTERNAL MEDICINE

## 2018-10-24 RX ORDER — METOPROLOL SUCCINATE 25 MG/1
25 TABLET, EXTENDED RELEASE ORAL DAILY
COMMUNITY
End: 2021-03-29 | Stop reason: SDUPTHER

## 2018-10-24 ASSESSMENT — PATIENT HEALTH QUESTIONNAIRE - PHQ9
DEPRESSION UNABLE TO ASSESS: FUNCTIONAL CAPACITY MOTIVATION LIMITS ACCURACY
SUM OF ALL RESPONSES TO PHQ9 QUESTIONS 1 & 2: 0
SUM OF ALL RESPONSES TO PHQ QUESTIONS 1-9: 0
SUM OF ALL RESPONSES TO PHQ QUESTIONS 1-9: 0
1. LITTLE INTEREST OR PLEASURE IN DOING THINGS: 0
2. FEELING DOWN, DEPRESSED OR HOPELESS: 0

## 2018-10-24 NOTE — PROGRESS NOTES
education: N/A     Occupational History    Not on file. Social History Main Topics    Smoking status: Former Smoker    Smokeless tobacco: Never Used    Alcohol use No    Drug use: No    Sexual activity: No      Comment: Marital status - . Other Topics Concern    Not on file     Social History Narrative    No narrative on file       Allergies   Allergen Reactions    Keflex [Cephalexin] Rash     pruritius       Current Outpatient Prescriptions   Medication Sig Dispense Refill    sacubitril-valsartan (ENTRESTO) 24-26 MG per tablet Take 1 tablet by mouth 2 times daily      metoprolol succinate (TOPROL XL) 25 MG extended release tablet Take 25 mg by mouth daily      LORazepam (ATIVAN) 0.5 MG tablet TAKE ONE TABLET BY MOUTH FOUR TIMES A DAY **MAY MAKE DROWSY**. 120 tablet 0    HYDROcodone-acetaminophen (NORCO) 7.5-325 MG per tablet TAKE ONE TABLET BY MOUTH ONCE NIGHTLY AS NEEDED FOR PAIN **MAY MAKE DROWSY** 30 tablet 0    HYDROcodone-acetaminophen (NORCO) 5-325 MG per tablet TAKE ONE TABLET BY MOUTH TWICE A DAY AS NEEDED FOR PAIN **MAY MAKE DROWSY** 60 tablet 0    dutasteride (AVODART) 0.5 MG capsule TAKE 1 CAPSULE BY MOUTH DAILY 90 capsule 1    bumetanide (BUMEX) 0.5 MG tablet Take 0.5 mg by mouth 2 times daily      tamsulosin (FLOMAX) 0.4 MG capsule Take 0.4 mg by mouth daily      furosemide (LASIX) 40 MG tablet Take 40 mg by mouth 2 times daily as needed       vitamin D (CHOLECALCIFEROL) 1000 UNIT TABS tablet Take 1,000 Units by mouth daily      atorvastatin (LIPITOR) 10 MG tablet TAKE ONE TABLET BY MOUTH EVERY DAY 90 tablet 2    gabapentin (NEURONTIN) 300 MG capsule TAKE ONE CAPSULE AT 5 PM AND 1 CAPSULE AT BEDTIME 60 capsule 2    lisinopril (PRINIVIL;ZESTRIL) 5 MG tablet Take 0.5 tablets by mouth daily 30 tablet 5    nitroGLYCERIN (NITROSTAT) 0.4 MG SL tablet Place 0.4 mg under the tongue      aspirin 81 MG tablet Take 81 mg by mouth daily.          No current (L) 14.0 - 18.0 g/dL    Hematocrit 39.1 (L) 42.0 - 52.0 %    MCV 96.3 (H) 80.0 - 94.0 fL    MCH 29.8 27.0 - 31.0 pg    MCHC 30.9 (L) 33.0 - 37.0 g/dL    RDW 13.7 11.5 - 14.5 %    Platelets 179 261 - 328 K/uL    MPV 11.9 9.4 - 12.4 fL    Neutrophils % 64.9 50.0 - 65.0 %    Lymphocytes % 14.7 (L) 20.0 - 40.0 %    Monocytes % 8.6 0.0 - 10.0 %    Eosinophils % 10.8 (H) 0.0 - 5.0 %    Basophils % 0.8 0.0 - 1.0 %    Neutrophils # 4.2 1.5 - 7.5 K/uL    Lymphocytes # 0.9 (L) 1.1 - 4.5 K/uL    Monocytes # 0.60 0.00 - 0.90 K/uL    Eosinophils # 0.70 (H) 0.00 - 0.60 K/uL    Basophils # 0.10 0.00 - 0.20 K/uL   Comprehensive Metabolic Panel   Result Value Ref Range    Sodium 137 136 - 145 mmol/L    Potassium 5.1 (H) 3.5 - 5.0 mmol/L    Chloride 97 (L) 98 - 111 mmol/L    CO2 29 22 - 29 mmol/L    Anion Gap 11 7 - 19 mmol/L    Glucose 104 74 - 109 mg/dL    BUN 41 (H) 8 - 23 mg/dL    CREATININE 1.8 (H) 0.5 - 1.2 mg/dL    GFR Non- 36 (A) >60    Calcium 9.0 8.8 - 10.2 mg/dL    Total Protein 7.4 6.6 - 8.7 g/dL    Alb 4.0 3.5 - 5.2 g/dL    Total Bilirubin 0.7 0.2 - 1.2 mg/dL    Alkaline Phosphatase 107 40 - 130 U/L    ALT 10 5 - 41 U/L    AST 18 5 - 40 U/L   Protime-INR   Result Value Ref Range    Protime 13.1 12.0 - 14.6 sec    INR 1.00 0.88 - 1.18   Brain Natriuretic Peptide   Result Value Ref Range    Pro-BNP 3,487 (H) 0 - 1,800 pg/mL   Urinalysis Reflex to Culture   Result Value Ref Range    Color, UA RED (A) Straw/Yellow    Clarity, UA TURBID (A) Clear    Glucose, Ur Negative Negative mg/dL    Bilirubin Urine LARGE (A) Negative    Ketones, Urine 15 (A) Negative mg/dL    Specific Gravity, UA 1.018 1.005 - 1.030    Blood, Urine LARGE (A) Negative    pH, UA 5.0 5.0 - 8.0    Protein,  Negative mg/dL    Urobilinogen, Urine 1.0 <2.0 E.U./dL    Nitrite, Urine POSITIVE (A) Negative    Leukocyte Esterase, Urine LARGE (A) Negative    Urine Reflex to Culture YES    TROPONIN   Result Value Ref Range    Troponin <0.01 0.00

## 2018-10-25 ENCOUNTER — DOCUMENTATION (OUTPATIENT)
Dept: CARDIOLOGY | Facility: CLINIC | Age: 83
End: 2018-10-25

## 2018-10-25 DIAGNOSIS — G89.4 CHRONIC PAIN SYNDROME: ICD-10-CM

## 2018-10-25 DIAGNOSIS — G60.9 IDIOPATHIC NEUROPATHY: ICD-10-CM

## 2018-10-25 RX ORDER — HYDROCODONE BITARTRATE AND ACETAMINOPHEN 7.5; 325 MG/1; MG/1
TABLET ORAL
Qty: 30 TABLET | Refills: 0 | Status: SHIPPED | OUTPATIENT
Start: 2018-10-25 | End: 2018-11-23 | Stop reason: SDUPTHER

## 2018-10-25 RX ORDER — HYDROCODONE BITARTRATE AND ACETAMINOPHEN 5; 325 MG/1; MG/1
TABLET ORAL
Qty: 60 TABLET | Refills: 0 | Status: SHIPPED | OUTPATIENT
Start: 2018-10-25 | End: 2018-11-23 | Stop reason: SDUPTHER

## 2018-10-25 NOTE — PROGRESS NOTES
AICD remote transmission received on 10/25/2018 shows normal device function, no shocks, and the following episodes recorded since previous transmission on 10/8/2018:    AF burden 100%.  Not anticoagulated s/t hx of  bleed.  6 AF with RVR episodes:  Longest duration approximately 5 seconds.  Ventricular rates range from 165-200 bpm.  No ATP therapy administered.

## 2018-10-26 DIAGNOSIS — N18.4 STAGE 4 CHRONIC KIDNEY DISEASE (HCC): ICD-10-CM

## 2018-10-26 DIAGNOSIS — I50.23 ACUTE ON CHRONIC SYSTOLIC CONGESTIVE HEART FAILURE (HCC): Primary | ICD-10-CM

## 2018-10-31 DIAGNOSIS — F41.9 ANXIETY: ICD-10-CM

## 2018-11-01 RX ORDER — FUROSEMIDE 40 MG/1
40 TABLET ORAL AS NEEDED
Qty: 30 TABLET | Refills: 11 | Status: SHIPPED | OUTPATIENT
Start: 2018-11-01 | End: 2020-02-10 | Stop reason: HOSPADM

## 2018-11-01 RX ORDER — LORAZEPAM 0.5 MG/1
TABLET ORAL
Qty: 120 TABLET | Refills: 0 | Status: SHIPPED | OUTPATIENT
Start: 2018-11-01 | End: 2018-11-29 | Stop reason: SDUPTHER

## 2018-11-04 ASSESSMENT — ENCOUNTER SYMPTOMS
SHORTNESS OF BREATH: 1
SINUS PRESSURE: 0
COUGH: 1
BACK PAIN: 1
EYE DISCHARGE: 0
ABDOMINAL DISTENTION: 1
ABDOMINAL PAIN: 0
EYE REDNESS: 0

## 2018-11-07 DIAGNOSIS — G89.4 CHRONIC PAIN SYNDROME: ICD-10-CM

## 2018-11-07 DIAGNOSIS — G60.9 IDIOPATHIC NEUROPATHY: ICD-10-CM

## 2018-11-08 ENCOUNTER — TELEPHONE (OUTPATIENT)
Dept: CARDIOLOGY | Facility: CLINIC | Age: 83
End: 2018-11-08

## 2018-11-08 NOTE — TELEPHONE ENCOUNTER
Genet from [facility] calling In stating she wants to extend her home health nurse visits for 2 more weeks due to patients BP being very low, BP was 79/55 and when she got there to check it, it was 96/54. She states patients daughter has a lot of questions for dr babb and would like him to call her, (JING)   Nurse states his pulse today was 88 and he has gained 1.6 lbs in the last 24 hours/    Please advise if your okay with extending his nursing visits.

## 2018-11-09 RX ORDER — LISINOPRIL 5 MG/1
2.5 TABLET ORAL DAILY
Qty: 30 TABLET | Refills: 5 | Status: SHIPPED | OUTPATIENT
Start: 2018-11-09 | End: 2019-02-15

## 2018-11-09 RX ORDER — GABAPENTIN 300 MG/1
CAPSULE ORAL
Qty: 60 CAPSULE | Refills: 2 | Status: SHIPPED | OUTPATIENT
Start: 2018-11-09 | End: 2018-11-09 | Stop reason: SDUPTHER

## 2018-11-09 RX ORDER — GABAPENTIN 300 MG/1
CAPSULE ORAL
Qty: 60 CAPSULE | Refills: 2 | Status: SHIPPED | OUTPATIENT
Start: 2018-11-09 | End: 2019-02-07 | Stop reason: SDUPTHER

## 2018-11-09 NOTE — TELEPHONE ENCOUNTER
Genet has been notified. Patient daughter Audra still wanting to speak with you, she has been asking Genet a lot of questions that she did not know how to answer.

## 2018-11-12 ENCOUNTER — TELEPHONE (OUTPATIENT)
Dept: INTERNAL MEDICINE | Age: 83
End: 2018-11-12

## 2018-11-13 ENCOUNTER — DOCUMENTATION (OUTPATIENT)
Dept: CARDIOLOGY | Facility: CLINIC | Age: 83
End: 2018-11-13

## 2018-11-13 NOTE — PROGRESS NOTES
Bi-V AICD Evaluation Report  REMOTE/LATITUDE    November 13, 2018    Primary Cardiologist: Zaria  : Guidant Model: COGNIS 100-D N119  Implant date: 7/8/2010    Reason for evaluation: remote transmission with AF with RVR  Indication for AICD: chronic systolic congestive heart failure    Measurements  Atrial sensing:  P wave: 0.5 mV  Atrial threshold: N/R  Atrial lead impedance: 561 ohms  Ventricular sensing:  R wave: 11.3 mV  RV Threshold:  N/R  RV Impedance:  373 ohms  Shock impedance:  RV 47 ohms  LV Threshold:  N/R  LV Impedance:  847 ohms      Diagnostic Data  Atrial paced: 1 %  Ventricular paced: RV 85%  LV 96%    Episodes recorded since 10/25/2018  AF with RVR:  Longest duration 2.5 seconds, rate 225 bpm, no ATP or shocks administered  4 VHR episodes with durations < 5 seconds, rates 155-179 bpm    Battery status: intensify follow up, estimated 1 year remaining      Final Parameters  Mode: DDDR     Lower rate: 70 bpm   Upper rate: 125 bpm  AV Delay: 170-180 ms paced    115-120 ms sensed   Atrial - Amplitude: 2.4 V   Pulse width: 0.5 ms   Sensitivity: 0.15 mV   Ventricular:  RV Amplitude: 2.2 V @ 0.5 ms   Sensitivity: 0.6 mV            LV Amplitude:  1.8V @ 0.5ms  Changes made: No changes made.  Conclusions: normal AICD function, no therapy delivered, stable sensing thresholds and battery at one year remaining, AF with RVR noted    Follow up: 3 months

## 2018-11-16 ENCOUNTER — TELEPHONE (OUTPATIENT)
Dept: CARDIOLOGY | Facility: CLINIC | Age: 83
End: 2018-11-16

## 2018-11-16 NOTE — TELEPHONE ENCOUNTER
Genet calling in stating that she went and seen patient yesterday, and he has refused to use his Tele [employer] equipment, so she removed all it it yesterday while at the visit with him.   She states they are planning on d/c him next week, he is doing good.

## 2018-11-21 ENCOUNTER — OFFICE VISIT (OUTPATIENT)
Dept: CARDIOLOGY | Facility: CLINIC | Age: 83
End: 2018-11-21

## 2018-11-21 VITALS
HEART RATE: 115 BPM | HEIGHT: 72 IN | WEIGHT: 166 LBS | OXYGEN SATURATION: 95 % | SYSTOLIC BLOOD PRESSURE: 99 MMHG | DIASTOLIC BLOOD PRESSURE: 72 MMHG | BODY MASS INDEX: 22.48 KG/M2

## 2018-11-21 DIAGNOSIS — I50.22 CHRONIC SYSTOLIC CONGESTIVE HEART FAILURE (HCC): Primary | ICD-10-CM

## 2018-11-21 DIAGNOSIS — I25.810 CORONARY ARTERY DISEASE INVOLVING CORONARY BYPASS GRAFT OF NATIVE HEART WITHOUT ANGINA PECTORIS: ICD-10-CM

## 2018-11-21 DIAGNOSIS — I48.21 PERMANENT ATRIAL FIBRILLATION (HCC): ICD-10-CM

## 2018-11-21 DIAGNOSIS — I10 ESSENTIAL HYPERTENSION: ICD-10-CM

## 2018-11-21 DIAGNOSIS — Z95.0 PACEMAKER: ICD-10-CM

## 2018-11-21 DIAGNOSIS — Z95.810 BIVENTRICULAR ICD (IMPLANTABLE CARDIOVERTER-DEFIBRILLATOR) IN PLACE: ICD-10-CM

## 2018-11-21 PROCEDURE — 99214 OFFICE O/P EST MOD 30 MIN: CPT | Performed by: INTERNAL MEDICINE

## 2018-11-21 NOTE — PROGRESS NOTES
Subjective:     Encounter Date:11/21/2018      Patient ID: Wild Bravo is a 84 y.o. male.    Chief Complaint: f/u CHF, AF  Mr. Bravo is a pleasant 84-year-old male with extensive cardiac history who was previously followed by Dr. Macdonald.  He transferred his care to me and I have seen him in the office on 1/3/18, 4/11/18, and 5/1/18, 8/18/18, and most recently on 10/16/18.  At the last visit, he was started on low-dose Entresto, and returns today for f/u.  Overall, he and his daughter state he is doing better since last visit. He continues to complain of upper abdominal discomfort after eating, but has no orthopnea, PND, LE edema, or weight change.  No associated chest pain.      Atrial Fibrillation   Presents for follow-up visit. Symptoms are negative for chest pain, dizziness, hemodynamic instability, hypotension, palpitations, shortness of breath, syncope and tachycardia. The symptoms have been worsening. Past medical history includes atrial fibrillation, CAD and CHF.   Congestive Heart Failure   Presents for follow-up visit. Pertinent negatives include no chest pain, chest pressure, claudication, edema, fatigue, near-syncope, orthopnea, palpitations, paroxysmal nocturnal dyspnea, shortness of breath or unexpected weight change. The symptoms have been improving. His past medical history is significant for CAD.   Coronary Artery Disease   Presents for follow-up visit. Pertinent negatives include no chest pain, chest pressure, chest tightness, dizziness, leg swelling, palpitations or shortness of breath. His past medical history is significant for CHF. The symptoms have been stable. Compliance with medications is good.       The following portions of the patient's history were reviewed and updated as appropriate: allergies, current medications, past family history, past medical history, past social history, past surgical history and problem list.    Review of Systems   Constitution: Negative for fatigue,  malaise/fatigue and unexpected weight change.   Cardiovascular: Negative for chest pain, claudication, dyspnea on exertion, leg swelling, near-syncope, orthopnea, palpitations, paroxysmal nocturnal dyspnea and syncope.   Respiratory: Negative for chest tightness and shortness of breath.    Hematologic/Lymphatic: Does not bruise/bleed easily.   Neurological: Negative for dizziness.          Objective:      Vitals:    11/21/18 1418   BP: 99/72   Pulse: 115   SpO2: 95%     Physical Exam   Constitutional: He is oriented to person, place, and time. He appears well-developed and well-nourished.   Neck: No JVD present.   Cardiovascular: Normal rate, regular rhythm, normal heart sounds and intact distal pulses.   No murmur heard.  Pulmonary/Chest: Effort normal and breath sounds normal.   Musculoskeletal: He exhibits no edema.   Neurological: He is alert and oriented to person, place, and time.   Skin: Skin is warm and dry.       Lab Review:       Procedures      Assessment/Plan:     Problem List Items Addressed This Visit        Cardiovascular and Mediastinum    Coronary artery disease involving coronary bypass graft of native heart without angina pectoris: stable    Overview     MI x2  Single vessel CABG 2008  Reports at least 1-2 stents         Chronic systolic congestive heart failure (CMS/HCC) - Primary: improved    Overview     LVEF 35% on last echo here in 2017, but was reported as 25% on echocardiogram performed during recent admission at [facility] in September 2018; has BiV-ICD         Relevant Medications    sacubitril-valsartan (ENTRESTO) 49-51 MG tablet    Pacemaker    Essential hypertension: stable    Biventricular ICD (implantable cardioverter-defibrillator) in place    Permanent atrial fibrillation (CMS/HCC): improved; rate is controlled    Overview     CHADS-VASc Risk Assessment            7       Total Score        1 CHF    1 Hypertension    2 Age >/= 75    2 PRIOR STROKE/TIA/THROMBO    1 Vascular Disease         Criteria that do not apply:    DM    Age 65-74    Sex: Female        MAZE done at time of MV repair, also with one subsequent attempt at catheter-based ablation.  Also reports prior intolerance to amiodarone - it is unclear what intolerance was.             Relevant Medications    sacubitril-valsartan (ENTRESTO) 49-51 MG tablet        Plans:  -increase Entresto to 49/51 PO BID; if BP drops and patient is symptomatic, will decrease back to 24/26 bid (as patient has improved since starting this)  -continue current diuretic regimen (bumex 0.5mg BID, extra lasix PRN)  -continue ASA, statin  -not anticoagulated for CVA risk reduction for AF given hx of  bleeding; we have previously discussed Watchman as an alternative and patient is not interested unless his former cardiologist in [place] (Dr. Quang Walton) approves.  I have called Dr. Walton and left a message but have never heard back.  -again offered CardioMEMs to better manage CHF from home  -agree with continuing home health for at least a few more weeks, especially since increasing Entresto dosage    Raleigh Enciso MD  11/21/2018  11:02 PM

## 2018-11-23 DIAGNOSIS — G89.4 CHRONIC PAIN SYNDROME: ICD-10-CM

## 2018-11-23 DIAGNOSIS — G60.9 IDIOPATHIC NEUROPATHY: ICD-10-CM

## 2018-11-26 RX ORDER — HYDROCODONE BITARTRATE AND ACETAMINOPHEN 7.5; 325 MG/1; MG/1
TABLET ORAL
Qty: 30 TABLET | Refills: 0 | Status: SHIPPED | OUTPATIENT
Start: 2018-11-26 | End: 2018-12-21 | Stop reason: SDUPTHER

## 2018-11-26 RX ORDER — HYDROCODONE BITARTRATE AND ACETAMINOPHEN 5; 325 MG/1; MG/1
TABLET ORAL
Qty: 60 TABLET | Refills: 0 | Status: SHIPPED | OUTPATIENT
Start: 2018-11-26 | End: 2018-12-21 | Stop reason: SDUPTHER

## 2018-11-29 DIAGNOSIS — F41.9 ANXIETY: ICD-10-CM

## 2018-11-30 RX ORDER — LORAZEPAM 0.5 MG/1
TABLET ORAL
Qty: 120 TABLET | Refills: 0 | Status: SHIPPED | OUTPATIENT
Start: 2018-11-30 | End: 2019-01-02 | Stop reason: SDUPTHER

## 2018-11-30 RX ORDER — TAMSULOSIN HYDROCHLORIDE 0.4 MG/1
CAPSULE ORAL
Qty: 90 CAPSULE | Refills: 3 | Status: SHIPPED | OUTPATIENT
Start: 2018-11-30 | End: 2019-11-30 | Stop reason: SDUPTHER

## 2018-12-19 ENCOUNTER — CLINICAL SUPPORT (OUTPATIENT)
Dept: CARDIOLOGY | Facility: CLINIC | Age: 83
End: 2018-12-19

## 2018-12-19 DIAGNOSIS — Z95.810 BIVENTRICULAR ICD (IMPLANTABLE CARDIOVERTER-DEFIBRILLATOR) IN PLACE: ICD-10-CM

## 2018-12-19 PROCEDURE — 93295 DEV INTERROG REMOTE 1/2/MLT: CPT | Performed by: PHYSICIAN ASSISTANT

## 2018-12-19 PROCEDURE — 93296 REM INTERROG EVL PM/IDS: CPT | Performed by: PHYSICIAN ASSISTANT

## 2018-12-21 DIAGNOSIS — G60.9 IDIOPATHIC NEUROPATHY: ICD-10-CM

## 2018-12-21 DIAGNOSIS — G89.4 CHRONIC PAIN SYNDROME: ICD-10-CM

## 2018-12-26 NOTE — PROGRESS NOTES
Bi-V AICD Evaluation Report  Latitude    December 26, 2018    Primary Cardiologist: Zaria  : Guidant Model: Cognis  Implant date: 7/8/10    Reason for evaluation: routine  Indication for AICD: chronic systolic heart failure    Measurements  Atrial sensing:  P wave: 0.5 mV  Atrial threshold: n/r  Atrial lead impedance: 536 ohms  Ventricular sensing:  R wave: 13.8 mV  RV Threshold:  n/r  RV Impedance:  362 ohms  Shock impedance:  RV 46 ohms     LV Threshold:  n/r  LV Impedance:  815 ohms      Diagnostic Data  Atrial paced: 1 %  Ventricular paced: 85-96 %  Other: PMT alerts noted.  No true PMT identified.  Few episodes of NSVT.  Most are brief, longest is 17 sec.  No therapy was delivered.  Chronic AF.  PT is not anticoagulated d/t h/o  bleeding.    Battery status: satisfactory       Final Parameters  Mode: DDDR     Lower rate: 70 bpm   Upper rate: 125 bpm  AV Delay: 170-180 ms paced    115-120 ms sensed   Atrial - Amplitude: 2.4 V   Pulse width: 0.5 ms   Sensitivity: 0.15 mV   Ventricular:  RV Amplitude: 2.2 V @ 0.5 ms   Sensitivity: 0.6 mV            LV Amplitude:  1.8 V @ 0.5 ms  Changes made: n/a  Conclusions: normal AICD function    Follow up: 3 months

## 2018-12-27 RX ORDER — HYDROCODONE BITARTRATE AND ACETAMINOPHEN 5; 325 MG/1; MG/1
TABLET ORAL
Qty: 60 TABLET | Refills: 0 | Status: SHIPPED | OUTPATIENT
Start: 2018-12-27 | End: 2019-01-26 | Stop reason: SDUPTHER

## 2018-12-27 RX ORDER — HYDROCODONE BITARTRATE AND ACETAMINOPHEN 7.5; 325 MG/1; MG/1
TABLET ORAL
Qty: 30 TABLET | Refills: 0 | Status: SHIPPED | OUTPATIENT
Start: 2018-12-27 | End: 2019-01-26 | Stop reason: SDUPTHER

## 2019-01-02 DIAGNOSIS — F41.9 ANXIETY: ICD-10-CM

## 2019-01-02 RX ORDER — LORAZEPAM 0.5 MG/1
TABLET ORAL
Qty: 120 TABLET | Refills: 0 | Status: SHIPPED | OUTPATIENT
Start: 2019-01-02 | End: 2019-01-26 | Stop reason: SDUPTHER

## 2019-01-26 DIAGNOSIS — G60.9 IDIOPATHIC NEUROPATHY: ICD-10-CM

## 2019-01-26 DIAGNOSIS — F41.9 ANXIETY: ICD-10-CM

## 2019-01-26 DIAGNOSIS — G89.4 CHRONIC PAIN SYNDROME: ICD-10-CM

## 2019-01-28 RX ORDER — LORAZEPAM 0.5 MG/1
TABLET ORAL
Qty: 120 TABLET | Refills: 0 | Status: SHIPPED | OUTPATIENT
Start: 2019-01-28 | End: 2019-02-27 | Stop reason: SDUPTHER

## 2019-01-28 RX ORDER — HYDROCODONE BITARTRATE AND ACETAMINOPHEN 5; 325 MG/1; MG/1
TABLET ORAL
Qty: 60 TABLET | Refills: 0 | Status: SHIPPED | OUTPATIENT
Start: 2019-01-28 | End: 2019-02-27 | Stop reason: SDUPTHER

## 2019-01-28 RX ORDER — HYDROCODONE BITARTRATE AND ACETAMINOPHEN 7.5; 325 MG/1; MG/1
TABLET ORAL
Qty: 30 TABLET | Refills: 0 | Status: SHIPPED | OUTPATIENT
Start: 2019-01-28 | End: 2019-02-27 | Stop reason: SDUPTHER

## 2019-02-07 ENCOUNTER — TELEPHONE (OUTPATIENT)
Dept: CARDIOLOGY | Facility: CLINIC | Age: 84
End: 2019-02-07

## 2019-02-07 ENCOUNTER — OFFICE VISIT (OUTPATIENT)
Dept: INTERNAL MEDICINE | Age: 84
End: 2019-02-07
Payer: MEDICARE

## 2019-02-07 VITALS
BODY MASS INDEX: 21.44 KG/M2 | DIASTOLIC BLOOD PRESSURE: 76 MMHG | OXYGEN SATURATION: 97 % | SYSTOLIC BLOOD PRESSURE: 120 MMHG | HEART RATE: 79 BPM | WEIGHT: 167 LBS

## 2019-02-07 DIAGNOSIS — N18.30 CHRONIC KIDNEY DISEASE, STAGE III (MODERATE) (HCC): ICD-10-CM

## 2019-02-07 DIAGNOSIS — N18.4 STAGE 4 CHRONIC KIDNEY DISEASE (HCC): ICD-10-CM

## 2019-02-07 DIAGNOSIS — I48.91 ATRIAL FIBRILLATION WITH RVR (HCC): ICD-10-CM

## 2019-02-07 DIAGNOSIS — I25.119 CORONARY ARTERY DISEASE INVOLVING NATIVE HEART WITH ANGINA PECTORIS, UNSPECIFIED VESSEL OR LESION TYPE (HCC): ICD-10-CM

## 2019-02-07 DIAGNOSIS — D64.9 ANEMIA, UNSPECIFIED TYPE: ICD-10-CM

## 2019-02-07 DIAGNOSIS — I50.23 ACUTE ON CHRONIC SYSTOLIC CONGESTIVE HEART FAILURE (HCC): ICD-10-CM

## 2019-02-07 DIAGNOSIS — G60.9 IDIOPATHIC NEUROPATHY: ICD-10-CM

## 2019-02-07 DIAGNOSIS — G89.4 CHRONIC PAIN SYNDROME: ICD-10-CM

## 2019-02-07 DIAGNOSIS — I50.22 CHRONIC SYSTOLIC CHF (CONGESTIVE HEART FAILURE) (HCC): Primary | ICD-10-CM

## 2019-02-07 LAB
ALBUMIN SERPL-MCNC: 4.5 G/DL (ref 3.5–5.2)
ALP BLD-CCNC: 105 U/L (ref 40–130)
ALT SERPL-CCNC: 10 U/L (ref 5–41)
ANION GAP SERPL CALCULATED.3IONS-SCNC: 17 MMOL/L (ref 7–19)
AST SERPL-CCNC: 13 U/L (ref 5–40)
BILIRUB SERPL-MCNC: 1.1 MG/DL (ref 0.2–1.2)
BUN BLDV-MCNC: 56 MG/DL (ref 8–23)
CALCIUM SERPL-MCNC: 9.3 MG/DL (ref 8.8–10.2)
CHLORIDE BLD-SCNC: 97 MMOL/L (ref 98–111)
CO2: 28 MMOL/L (ref 22–29)
CREAT SERPL-MCNC: 3 MG/DL (ref 0.5–1.2)
FERRITIN: 149.5 NG/ML (ref 30–400)
GFR NON-AFRICAN AMERICAN: 20
GLUCOSE BLD-MCNC: 112 MG/DL (ref 74–109)
HCT VFR BLD CALC: 43.8 % (ref 42–52)
HEMOGLOBIN: 13.7 G/DL (ref 14–18)
IRON: 76 UG/DL (ref 59–158)
MCH RBC QN AUTO: 29.4 PG (ref 27–31)
MCHC RBC AUTO-ENTMCNC: 31.3 G/DL (ref 33–37)
MCV RBC AUTO: 94 FL (ref 80–94)
PDW BLD-RTO: 15.5 % (ref 11.5–14.5)
PLATELET # BLD: 148 K/UL (ref 130–400)
PMV BLD AUTO: 12.3 FL (ref 9.4–12.4)
POTASSIUM SERPL-SCNC: 4.6 MMOL/L (ref 3.5–5)
PRO-BNP: 2403 PG/ML (ref 0–1800)
RBC # BLD: 4.66 M/UL (ref 4.7–6.1)
SODIUM BLD-SCNC: 142 MMOL/L (ref 136–145)
TOTAL PROTEIN: 7.9 G/DL (ref 6.6–8.7)
TSH SERPL DL<=0.05 MIU/L-ACNC: 4.72 UIU/ML (ref 0.27–4.2)
WBC # BLD: 7.6 K/UL (ref 4.8–10.8)

## 2019-02-07 PROCEDURE — 1036F TOBACCO NON-USER: CPT | Performed by: INTERNAL MEDICINE

## 2019-02-07 PROCEDURE — G8482 FLU IMMUNIZE ORDER/ADMIN: HCPCS | Performed by: INTERNAL MEDICINE

## 2019-02-07 PROCEDURE — G8598 ASA/ANTIPLAT THER USED: HCPCS | Performed by: INTERNAL MEDICINE

## 2019-02-07 PROCEDURE — G8427 DOCREV CUR MEDS BY ELIG CLIN: HCPCS | Performed by: INTERNAL MEDICINE

## 2019-02-07 PROCEDURE — 1101F PT FALLS ASSESS-DOCD LE1/YR: CPT | Performed by: INTERNAL MEDICINE

## 2019-02-07 PROCEDURE — 4040F PNEUMOC VAC/ADMIN/RCVD: CPT | Performed by: INTERNAL MEDICINE

## 2019-02-07 PROCEDURE — G8420 CALC BMI NORM PARAMETERS: HCPCS | Performed by: INTERNAL MEDICINE

## 2019-02-07 PROCEDURE — 1123F ACP DISCUSS/DSCN MKR DOCD: CPT | Performed by: INTERNAL MEDICINE

## 2019-02-07 PROCEDURE — 99214 OFFICE O/P EST MOD 30 MIN: CPT | Performed by: INTERNAL MEDICINE

## 2019-02-07 ASSESSMENT — PATIENT HEALTH QUESTIONNAIRE - PHQ9
SUM OF ALL RESPONSES TO PHQ QUESTIONS 1-9: 0
SUM OF ALL RESPONSES TO PHQ QUESTIONS 1-9: 0
2. FEELING DOWN, DEPRESSED OR HOPELESS: 0
SUM OF ALL RESPONSES TO PHQ9 QUESTIONS 1 & 2: 0
1. LITTLE INTEREST OR PLEASURE IN DOING THINGS: 0

## 2019-02-07 NOTE — TELEPHONE ENCOUNTER
Patient daughter called in stating that patient was not feeling good. I advised her the earliest we could get him in was on 02/28/19 with Dr Enciso, she states he is going to see Dr. Hercules today and will call me back and let me know what they think he should do.     I asked what all was going on, she states he is having some breathing problems, and some swelling in his legs and tingling which could possibly be his gout.     I advised her of the time we could get him in on 02/28/19   Will still wait for call from Munira Cai (daughter) to see what Diomedes says.

## 2019-02-08 ENCOUNTER — TELEPHONE (OUTPATIENT)
Dept: CARDIOLOGY | Facility: CLINIC | Age: 84
End: 2019-02-08

## 2019-02-08 RX ORDER — GABAPENTIN 300 MG/1
CAPSULE ORAL
Qty: 90 CAPSULE | Refills: 5 | Status: SHIPPED | OUTPATIENT
Start: 2019-02-08 | End: 2019-09-07 | Stop reason: SDUPTHER

## 2019-02-08 NOTE — TELEPHONE ENCOUNTER
Munira called stating they would like to be seen on 03/07/19 at 11:00 AM     Saw Dr. Hercules yesterday and was doing a little bit better, so thinks he would be okay to wait until then.     - will schedule appointment for 03/07/19 at 11 AM

## 2019-02-15 DIAGNOSIS — I50.23 ACUTE ON CHRONIC SYSTOLIC CONGESTIVE HEART FAILURE (HCC): Primary | ICD-10-CM

## 2019-02-16 ASSESSMENT — ENCOUNTER SYMPTOMS
EYE DISCHARGE: 0
ABDOMINAL PAIN: 0
COUGH: 0
SINUS PRESSURE: 0
ABDOMINAL DISTENTION: 0
EYE REDNESS: 0
SHORTNESS OF BREATH: 1
BACK PAIN: 1

## 2019-02-26 ENCOUNTER — TELEPHONE (OUTPATIENT)
Dept: INTERNAL MEDICINE | Age: 84
End: 2019-02-26

## 2019-02-26 DIAGNOSIS — H91.8X3 OTHER SPECIFIED HEARING LOSS OF BOTH EARS: ICD-10-CM

## 2019-02-26 DIAGNOSIS — H91.93 BILATERAL HEARING LOSS, UNSPECIFIED HEARING LOSS TYPE: Primary | ICD-10-CM

## 2019-02-27 DIAGNOSIS — G60.9 IDIOPATHIC NEUROPATHY: ICD-10-CM

## 2019-02-27 DIAGNOSIS — G89.4 CHRONIC PAIN SYNDROME: ICD-10-CM

## 2019-02-27 DIAGNOSIS — F41.9 ANXIETY: ICD-10-CM

## 2019-02-28 RX ORDER — BUMETANIDE 0.5 MG/1
TABLET ORAL
Qty: 180 TABLET | Refills: 3 | Status: SHIPPED | OUTPATIENT
Start: 2019-02-28 | End: 2019-06-07

## 2019-02-28 RX ORDER — LORAZEPAM 0.5 MG/1
TABLET ORAL
Qty: 120 TABLET | Refills: 0 | Status: SHIPPED | OUTPATIENT
Start: 2019-02-28 | End: 2019-03-29 | Stop reason: SDUPTHER

## 2019-02-28 RX ORDER — HYDROCODONE BITARTRATE AND ACETAMINOPHEN 7.5; 325 MG/1; MG/1
TABLET ORAL
Qty: 30 TABLET | Refills: 0 | Status: SHIPPED | OUTPATIENT
Start: 2019-02-28 | End: 2019-03-29 | Stop reason: SDUPTHER

## 2019-02-28 RX ORDER — HYDROCODONE BITARTRATE AND ACETAMINOPHEN 5; 325 MG/1; MG/1
TABLET ORAL
Qty: 60 TABLET | Refills: 0 | Status: SHIPPED | OUTPATIENT
Start: 2019-02-28 | End: 2019-03-29 | Stop reason: SDUPTHER

## 2019-03-25 ENCOUNTER — DOCUMENTATION (OUTPATIENT)
Dept: CARDIOLOGY | Facility: CLINIC | Age: 84
End: 2019-03-25

## 2019-03-25 DIAGNOSIS — Z95.810 BIVENTRICULAR ICD (IMPLANTABLE CARDIOVERTER-DEFIBRILLATOR) IN PLACE: Primary | ICD-10-CM

## 2019-03-25 NOTE — PROGRESS NOTES
Bi-V AICD Evaluation Report  REMOTE/LATITUDE    March 25, 2019    Primary Cardiologist: Zaria  : Guidant Model: COGNIS 100-D N119  Implant date: 7/8/2010    Reason for evaluation: routine  Indication for AICD: sick sinus syndrome and chronic systolic heart failure    Measurements  Atrial sensing:  P wave: 0.9 mV  Atrial threshold: N/R  Atrial lead impedance: 496 ohms  Ventricular sensing:  R wave: 19.6 mV  RV Threshold:  N/R  RV Impedance:  381 ohms  Shock impedance:  RV 52 ohms  LV Threshold:  N/R  LV Impedance:  880 ohms      Diagnostic Data  Atrial paced: 6 %  Ventricular paced: RV 84%, LV 96%    Episodes recorded since 12/19/2019:  Paroxysmal AF noted:  Longest duration 2m:43s, burden appears to be < 1 % since mid-December 2018.  Not anticoagulated s/t hx of  bleeding.  NS-VT noted, longest duration recorded is 11 seconds.   PMT noted, will evaluate at next in office visit.    Battery status: satisfactory   Estimated 1 year remaining      Final Parameters  Mode: DDDR     Lower rate: 70 bpm   Upper rate: 125 bpm  AV Delay: 170-180 ms paced    115-120 ms sensed   Atrial - Amplitude: 2.4 V   Pulse width: 0.5 ms   Sensitivity: 0.15 mV   Ventricular:  RV Amplitude: 2.2 V @ 0.5 ms   Sensitivity: 0.6 mV            LV Amplitude:  1.8V @ 0.5ms  Changes made: No changes made.  Conclusions: normal AICD function, no therapy delivered, stable sensing thresholds and battery at 1 year remaining    Follow up: Every 3 months via latitude, annually in office (5/28/2019)

## 2019-03-27 RX ORDER — DUTASTERIDE 0.5 MG/1
0.5 CAPSULE, LIQUID FILLED ORAL DAILY
Qty: 90 CAPSULE | Refills: 1 | Status: SHIPPED | OUTPATIENT
Start: 2019-03-27 | End: 2019-09-27 | Stop reason: SDUPTHER

## 2019-03-29 DIAGNOSIS — G60.9 IDIOPATHIC NEUROPATHY: ICD-10-CM

## 2019-03-29 DIAGNOSIS — G89.4 CHRONIC PAIN SYNDROME: ICD-10-CM

## 2019-03-29 DIAGNOSIS — F41.9 ANXIETY: ICD-10-CM

## 2019-03-29 RX ORDER — HYDROCODONE BITARTRATE AND ACETAMINOPHEN 5; 325 MG/1; MG/1
TABLET ORAL
Qty: 60 TABLET | Refills: 0 | Status: SHIPPED | OUTPATIENT
Start: 2019-03-29 | End: 2019-05-03 | Stop reason: SDUPTHER

## 2019-03-29 RX ORDER — HYDROCODONE BITARTRATE AND ACETAMINOPHEN 7.5; 325 MG/1; MG/1
TABLET ORAL
Qty: 30 TABLET | Refills: 0 | Status: SHIPPED | OUTPATIENT
Start: 2019-03-29 | End: 2019-05-03 | Stop reason: SDUPTHER

## 2019-03-29 RX ORDER — LORAZEPAM 0.5 MG/1
TABLET ORAL
Qty: 120 TABLET | Refills: 0 | Status: SHIPPED | OUTPATIENT
Start: 2019-03-29 | End: 2019-05-03 | Stop reason: SDUPTHER

## 2019-05-03 DIAGNOSIS — G89.4 CHRONIC PAIN SYNDROME: ICD-10-CM

## 2019-05-03 DIAGNOSIS — G60.9 IDIOPATHIC NEUROPATHY: ICD-10-CM

## 2019-05-03 DIAGNOSIS — F41.9 ANXIETY: ICD-10-CM

## 2019-05-03 RX ORDER — HYDROCODONE BITARTRATE AND ACETAMINOPHEN 7.5; 325 MG/1; MG/1
TABLET ORAL
Qty: 30 TABLET | Refills: 0 | Status: SHIPPED | OUTPATIENT
Start: 2019-05-03 | End: 2019-06-06 | Stop reason: SDUPTHER

## 2019-05-03 RX ORDER — LORAZEPAM 0.5 MG/1
TABLET ORAL
Qty: 120 TABLET | Refills: 0 | Status: SHIPPED | OUTPATIENT
Start: 2019-05-03 | End: 2019-06-06 | Stop reason: SDUPTHER

## 2019-05-03 RX ORDER — HYDROCODONE BITARTRATE AND ACETAMINOPHEN 5; 325 MG/1; MG/1
TABLET ORAL
Qty: 60 TABLET | Refills: 0 | Status: SHIPPED | OUTPATIENT
Start: 2019-05-03 | End: 2019-06-06 | Stop reason: SDUPTHER

## 2019-05-24 RX ORDER — ATORVASTATIN CALCIUM 10 MG/1
TABLET, FILM COATED ORAL
Qty: 90 TABLET | Refills: 2 | Status: SHIPPED | OUTPATIENT
Start: 2019-05-24 | End: 2020-03-02

## 2019-05-28 ENCOUNTER — TELEPHONE (OUTPATIENT)
Dept: CARDIOLOGY | Facility: CLINIC | Age: 84
End: 2019-05-28

## 2019-06-04 NOTE — TELEPHONE ENCOUNTER
Realizing this is very late notice, if they can get him here tomorrow (6/5) at 12:15 I can see him then.  Otherwise, I don't have office on 6/7 and am on vacation 6/20 so neither of those will work.

## 2019-06-05 ENCOUNTER — OFFICE VISIT (OUTPATIENT)
Dept: CARDIOLOGY | Facility: CLINIC | Age: 84
End: 2019-06-05

## 2019-06-05 ENCOUNTER — CLINICAL SUPPORT NO REQUIREMENTS (OUTPATIENT)
Dept: CARDIOLOGY | Facility: CLINIC | Age: 84
End: 2019-06-05

## 2019-06-05 VITALS
HEART RATE: 94 BPM | BODY MASS INDEX: 23.3 KG/M2 | DIASTOLIC BLOOD PRESSURE: 52 MMHG | WEIGHT: 172 LBS | HEIGHT: 72 IN | SYSTOLIC BLOOD PRESSURE: 98 MMHG

## 2019-06-05 DIAGNOSIS — Z95.810 BIVENTRICULAR ICD (IMPLANTABLE CARDIOVERTER-DEFIBRILLATOR) IN PLACE: Primary | ICD-10-CM

## 2019-06-05 DIAGNOSIS — Z95.810 BIVENTRICULAR ICD (IMPLANTABLE CARDIOVERTER-DEFIBRILLATOR) IN PLACE: ICD-10-CM

## 2019-06-05 DIAGNOSIS — N18.4 STAGE 4 CHRONIC KIDNEY DISEASE (HCC): ICD-10-CM

## 2019-06-05 DIAGNOSIS — I50.22 CHRONIC SYSTOLIC CONGESTIVE HEART FAILURE (HCC): Primary | ICD-10-CM

## 2019-06-05 DIAGNOSIS — I10 ESSENTIAL HYPERTENSION: ICD-10-CM

## 2019-06-05 DIAGNOSIS — I49.5 SSS (SICK SINUS SYNDROME) (HCC): ICD-10-CM

## 2019-06-05 DIAGNOSIS — I48.0 PAROXYSMAL ATRIAL FIBRILLATION (HCC): ICD-10-CM

## 2019-06-05 DIAGNOSIS — Z98.890 H/O MITRAL VALVE REPAIR: ICD-10-CM

## 2019-06-05 DIAGNOSIS — I25.810 CORONARY ARTERY DISEASE INVOLVING CORONARY BYPASS GRAFT OF NATIVE HEART WITHOUT ANGINA PECTORIS: ICD-10-CM

## 2019-06-05 DIAGNOSIS — I50.22 CHRONIC SYSTOLIC CONGESTIVE HEART FAILURE (HCC): ICD-10-CM

## 2019-06-05 PROCEDURE — 93284 PRGRMG EVAL IMPLANTABLE DFB: CPT | Performed by: INTERNAL MEDICINE

## 2019-06-05 PROCEDURE — 99214 OFFICE O/P EST MOD 30 MIN: CPT | Performed by: INTERNAL MEDICINE

## 2019-06-05 PROCEDURE — 93000 ELECTROCARDIOGRAM COMPLETE: CPT | Performed by: INTERNAL MEDICINE

## 2019-06-05 NOTE — TELEPHONE ENCOUNTER
Daughter notified to bring patient in at 11:45 for pacemaker check before her 12:15 appointment with Dr. Enciso.  That actually works better for them than coming at 10:00 and waiting until 12:15 to see MD.

## 2019-06-05 NOTE — TELEPHONE ENCOUNTER
I called and left a vm to pt daughter Munira stating that we will add this pt today at 12:15 appt. He does have a pacemaker appt  Which I told them to keep at 10 am then maybe grab a lunch after pacemaker and come back to see Dr. Enciso at 12:15.

## 2019-06-05 NOTE — PROGRESS NOTES
Subjective:     Encounter Date: 06/05/19      Patient ID: Wild Bravo is a 84 y.o. male.    Chief Complaint: f/u CHF, AF  Mr. Bravo is a pleasant 84-year-old male with extensive cardiac history who was previously followed by Dr. Macdonald.  He transferred his care to me and I have seen him in the office on 1/3/18, 4/11/18, and 5/1/18, 8/18/18, 10/16/18, and most recently on 11/18/19.  At the last visit, he reported improvement in symptoms since starting low-dose Entresto (no orthopnea, PND, LE edema, or weight change).  I increased his Entresto to 49/51 PO BID, and he returns today for follow-up.  He reports no significant change in symptoms thereafter, but does still intermittently deal with severe abdominal bloating, particularly worse after eating at night.  He cites drinking water also makes it worse.  Has been compliant with Entresto, but also is taking Bumex 0.5 mg twice daily every day.  On occasion, he ends up taking an extra 40 mg of Lasix.  He reports he did this once recently but it was the first time in a week. His trigger for more taking the lasix is the bloating sensation; he does not weigh himself routinely.    He did have a bump in his creatinine several months back and is following with OUSMANE Hernandez, with the nephrology group. He reports he was told to drink more water, and reports his creatinine did improve somewhat thereafter.      Atrial Fibrillation   Presents for follow-up visit. Symptoms are negative for chest pain, dizziness, hemodynamic instability, hypotension, palpitations, shortness of breath, syncope and tachycardia. The symptoms have been worsening. Past medical history includes atrial fibrillation, CAD and CHF.   Congestive Heart Failure   Presents for follow-up visit. Pertinent negatives include no chest pain, chest pressure, claudication, edema, fatigue, near-syncope, orthopnea, palpitations, paroxysmal nocturnal dyspnea, shortness of breath or unexpected weight change.  The symptoms have been improving. His past medical history is significant for CAD.   Coronary Artery Disease   Presents for follow-up visit. Pertinent negatives include no chest pain, chest pressure, chest tightness, dizziness, leg swelling, palpitations or shortness of breath. His past medical history is significant for CHF. The symptoms have been stable. Compliance with medications is good.       The following portions of the patient's history were reviewed and updated as appropriate: allergies, current medications, past family history, past medical history, past social history, past surgical history and problem list.    Review of Systems   Constitution: Negative for fatigue, malaise/fatigue and unexpected weight change.   Cardiovascular: Negative for chest pain, claudication, dyspnea on exertion, leg swelling, near-syncope, orthopnea, palpitations, paroxysmal nocturnal dyspnea and syncope.   Respiratory: Negative for chest tightness and shortness of breath.    Hematologic/Lymphatic: Does not bruise/bleed easily.   Gastrointestinal: Positive for bloating.   Neurological: Negative for dizziness.       Current Outpatient Medications:   •  albuterol (ACCUNEB) 1.25 MG/3ML nebulizer solution, Take 3 mL by nebulization Every 6 (Six) Hours As Needed for Wheezing or Shortness of Air., Disp: , Rfl: 12  •  aspirin 81 MG EC tablet, Take 81 mg by mouth Daily., Disp: , Rfl:   •  atorvastatin (LIPITOR) 10 MG tablet, Take 1 tablet by mouth Every Night., Disp: , Rfl:   •  bumetanide (BUMEX) 1 MG tablet, Take 0.5 tablets by mouth 2 (Two) Times a Day., Disp: 30 tablet, Rfl: 11  •  Cholecalciferol (VITAMIN D PO), Take  by mouth., Disp: , Rfl:   •  dutasteride (AVODART) 0.5 MG capsule, Take 0.5 mg by mouth Daily., Disp: , Rfl:   •  furosemide (LASIX) 40 MG tablet, Take 1 tablet by mouth As Needed (fluid retention)., Disp: 30 tablet, Rfl: 11  •  gabapentin (NEURONTIN) 400 MG capsule, Take 1 capsule by mouth Daily. (Patient taking  differently: Take 300 mg by mouth Daily.), Disp: , Rfl:   •  HYDROcodone-acetaminophen (NORCO) 7.5-325 MG per tablet, Take 1 tablet by mouth Every 8 (Eight) Hours As Needed for Moderate Pain ., Disp: , Rfl:   •  isosorbide mononitrate (IMDUR) 30 MG 24 hr tablet, Take 30 mg by mouth Daily., Disp: , Rfl:   •  LORazepam (ATIVAN) 1 MG tablet, Take 1 tablet by mouth 4 (Four) Times a Day. (Patient taking differently: Take 0.5 mg by mouth 4 (Four) Times a Day.), Disp: 30 tablet, Rfl:   •  metoprolol succinate XL (TOPROL XL) 25 MG 24 hr tablet, Take 1 tablet by mouth Every Night., Disp: 30 tablet, Rfl: 11  •  nitroglycerin (NITROSTAT) 0.4 MG SL tablet, PLACE 1 TABLET UNDER THE TONGUE AS NEEDED FOR ANGINA, MAY REPEAT EVERY 5 MINS FOR UP THREE DOSES, Disp: 25 tablet, Rfl: 3  •  sacubitril-valsartan (ENTRESTO) 49-51 MG tablet, Take 1 tablet by mouth 2 (Two) Times a Day., Disp: 60 tablet, Rfl: 11  •  tamsulosin (FLOMAX) 0.4 MG capsule 24 hr capsule, Take 1 capsule by mouth Every Night., Disp: , Rfl:   •  tiotropium (SPIRIVA HANDIHALER) 18 MCG per inhalation capsule, Place 1 capsule into inhaler and inhale Daily., Disp: , Rfl:          Objective:      Vitals:    06/05/19 1234   BP: 98/52   Pulse: 94     Physical Exam   Constitutional: He is oriented to person, place, and time. He appears well-developed and well-nourished.   Neck: No JVD present.   Cardiovascular: Normal rate, regular rhythm, normal heart sounds and intact distal pulses.   No murmur heard.  Pulmonary/Chest: Effort normal and breath sounds normal.   Musculoskeletal: He exhibits no edema.   Neurological: He is alert and oriented to person, place, and time.   Skin: Skin is warm and dry.       Lab Review:         ECG 12 Lead  Date/Time: 6/5/2019 6:11 PM  Performed by: Raleigh Enciso MD  Authorized by: Raleigh Enciso MD   Comparison: compared with previous ECG from 10/16/2018  Similar to previous ECG  Rhythm: paced  Ectopy: infrequent PVCs  Pacing: ventricular  paced rhythm          Pacemaker interrogation reviewed - patient is having very low frequency and burden of A. Fib.  He did have several short episodes in May; none lasting greater than 1 minute.  He also had one episode of nonsustained ventricular tachycardia lasting 7 seconds they required no therapy.  He has had other multiple shorter runs of nonsustained VT.  He also had some pacemaker mediated tachycardia and his PVARP was adjusted accordingly today.    LABS REVIEWED:        Assessment/Plan:     Problem List Items Addressed This Visit        Cardiovascular and Mediastinum    Coronary artery disease involving coronary bypass graft of native heart without angina pectoris: est, stable    Overview     MI x2  Single vessel CABG 2008         Chronic systolic congestive heart failure (CMS/HCC) - Primary: est, stable    Overview     LVEF 35% on last echo here in 2017, but was reported as 25% on echocardiogram performed during recent admission at [facility] in September 2018; has BiV-ICD         Essential hypertension: well controlled    Biventricular ICD (implantable cardioverter-defibrillator) in place    Paroxysmal atrial fibrillation (CMS/HCC): improved; now paroxysmal (had previously been deemed permanent). Not anticoagulated due to prior  bleeding    Overview     CHADS-VASc Risk Assessment            7       Total Score        1 CHF    1 Hypertension    2 Age >/= 75    2 PRIOR STROKE/TIA/THROMBO    1 Vascular Disease        Criteria that do not apply:    DM    Age 65-74    Sex: Female        MAZE done at time of MV repair, also with one subsequent attempt at catheter-based ablation.  Also reports prior intolerance to amiodarone - it is unclear what intolerance was.                Genitourinary    Stage 4 chronic kidney disease (CMS/HCC): cr typically ranges 1.5-2 but was 3 on last labs I can see today (from February)       Other    H/O mitral valve repair           Recommendations and plans: Think the patient is  "doing quite well from a heart failure standpoint.  I am concerned that his creatinine increased significantly several months back.  This was a few months after the increase of his Entresto, but I do not think that this is a \"adverse event\" of the medication but rather reflects the natriuretic component provided by neprilysin, in conjunction with the patient's ongoing use of his other loop diuretics.  He does appear dry on exam to me today.  He also reports that his trigger for taking additional Lasix on top of his Bumex are his symptoms of abdominal bloating, without necessarily weighing himself or looking for other signs or symptoms of volume overload.  Therefore, I suspect he is actually hypovolemic.  I do not have access to any recent labs, so I hesitate to make any changes in medications today.  However, I will send a copy of this note to OUSMANE Hernandez, whom I am told is repeating the patient's lab work soon and will be seeing him in 12 days.  If his kidney function is still above his baseline, then I would advise that Ms. Perez consider telling him to take Bumex only once every day as opposed to twice, and to only take an additional dose of any diuretic (whether that be Bumex or Lasix) if he notices abrupt gains in weight associated with worsened breathing and leg swelling.    For now, continue Entresto to 49/51 PO BID, ASA, statin  -not anticoagulated for CVA risk reduction for AF given hx of  bleeding; we have previously discussed Watchman as an alternative and patient is not interested unless his former cardiologist in [place] (Dr. Quang Walton) approves.  I had previouisly called Dr. Walton and left a message but have never heard back.  -again offered [employer] to better manage CHF from home; patient and family will consider    F/u 6 months    Raleigh Enciso MD  06/05/19  9:30 PM  "

## 2019-06-05 NOTE — PROGRESS NOTES
Bi-V AICD Evaluation Report  IN OFFICE INTERROGATION BY COMPANY DEVICE REP    June 5, 2019    Primary Cardiologist: Zaria  : Guidant Model: [employer] 100-D N119  Implant date: 7/8/2010    Reason for evaluation: routine  Indication for AICD: sick sinus syndrome and chronic systolic congestive heart failure    Measurements  Atrial sensing:  P wave: 0.7 mV  Atrial threshold: 1.1 V @ 0.5 ms  Atrial lead impedance: 512 ohms  Ventricular sensing:  R wave: 8.4 mV  RV Threshold:  1.1V @ 0.5 ms  RV Impedance:  380 ohms  Shock impedance:  RV 47 ohms  LV Threshold:  0.6V @ 0.5ms  LV Impedance:  883 ohms  Note:  LV sensing is off.  Was turned on for testing purposes and turned off again at end of interrogation.  Per tech services, on March 14, 2014, [facility] called [employer] and it was determined that turning off LV sensing would be the best way to optimize BIV Pacing.      Diagnostic Data  Atrial paced: 8 %  Ventricular paced: RV 84%, LV 96%    Episodes recorded since 3/25/2019:  PMT noted; PVARP adjusted to prevent recurrence.  P-AF noted:  Longest duration 46 minutes, ventricular rates controlled.  Patient is not anticoagulated s/t hx of  bleeding.  NS-VT noted up to 7 seconds duration:  Rates 137-183 bpm.    No ATP or shocks.    Battery status: intensify follow up   Estimated 1 year remaining      Final Parameters  Mode: DDDR     Lower rate: 70 bpm   Upper rate: 125 bpm  AV Delay: 170-180 ms paced    115-120 ms sensed   Atrial - Amplitude: 2.2 V   Pulse width: 0.5 ms   Sensitivity: 0.15 mV   Ventricular:  RV Amplitude: 2.2 V @ 0.5 ms   Sensitivity: 0.6 mV            LV Amplitude:  1.5V @ 0.5ms  Changes made: Normal jl atrial output changed to 2.2V; normal jl LV output changed to 1.5V; normal jl maximum a-refractory (PVARP) extended from 320ms to 400ms; normal jl minimum a-refractory (PVARP) extended from 200ms to 260 ms; tracking preference turned OFF  Conclusions: normal AICD function, no  therapy delivered, stable pacing and sensing thresholds and LV sensing turned off and battery at 1 year remaining    Follow up: 3 month battery check via latitude     Interrogation performed by Chely Scruggs RN, [employer].

## 2019-06-06 DIAGNOSIS — G89.4 CHRONIC PAIN SYNDROME: ICD-10-CM

## 2019-06-06 DIAGNOSIS — F41.9 ANXIETY: ICD-10-CM

## 2019-06-06 DIAGNOSIS — G60.9 IDIOPATHIC NEUROPATHY: ICD-10-CM

## 2019-06-07 ENCOUNTER — OFFICE VISIT (OUTPATIENT)
Dept: INTERNAL MEDICINE | Age: 84
End: 2019-06-07
Payer: MEDICARE

## 2019-06-07 VITALS
BODY MASS INDEX: 22.94 KG/M2 | OXYGEN SATURATION: 97 % | DIASTOLIC BLOOD PRESSURE: 68 MMHG | SYSTOLIC BLOOD PRESSURE: 126 MMHG | HEIGHT: 72 IN | WEIGHT: 169.4 LBS | HEART RATE: 58 BPM

## 2019-06-07 DIAGNOSIS — G89.4 CHRONIC PAIN SYNDROME: ICD-10-CM

## 2019-06-07 DIAGNOSIS — Z00.00 MEDICARE ANNUAL WELLNESS VISIT, SUBSEQUENT: Primary | ICD-10-CM

## 2019-06-07 DIAGNOSIS — N18.4 STAGE 4 CHRONIC KIDNEY DISEASE (HCC): ICD-10-CM

## 2019-06-07 DIAGNOSIS — F41.9 ANXIETY: ICD-10-CM

## 2019-06-07 DIAGNOSIS — Z02.83 ENCOUNTER FOR DRUG SCREENING: ICD-10-CM

## 2019-06-07 DIAGNOSIS — I48.0 PAROXYSMAL ATRIAL FIBRILLATION (HCC): ICD-10-CM

## 2019-06-07 DIAGNOSIS — Z00.00 ROUTINE GENERAL MEDICAL EXAMINATION AT A HEALTH CARE FACILITY: ICD-10-CM

## 2019-06-07 DIAGNOSIS — I50.22 CHRONIC SYSTOLIC CHF (CONGESTIVE HEART FAILURE) (HCC): ICD-10-CM

## 2019-06-07 DIAGNOSIS — I25.119 CORONARY ARTERY DISEASE INVOLVING NATIVE HEART WITH ANGINA PECTORIS, UNSPECIFIED VESSEL OR LESION TYPE (HCC): ICD-10-CM

## 2019-06-07 DIAGNOSIS — G60.9 IDIOPATHIC NEUROPATHY: ICD-10-CM

## 2019-06-07 DIAGNOSIS — M47.816 OSTEOARTHRITIS OF LUMBAR SPINE, UNSPECIFIED SPINAL OSTEOARTHRITIS COMPLICATION STATUS: ICD-10-CM

## 2019-06-07 LAB
ALBUMIN SERPL-MCNC: 4.2 G/DL (ref 3.5–5.2)
ALP BLD-CCNC: 90 U/L (ref 40–130)
ALT SERPL-CCNC: 6 U/L (ref 5–41)
AMPHETAMINE SCREEN, URINE: NORMAL
ANION GAP SERPL CALCULATED.3IONS-SCNC: 16 MMOL/L (ref 7–19)
AST SERPL-CCNC: 11 U/L (ref 5–40)
BACTERIA: NEGATIVE /HPF
BARBITURATE SCREEN, URINE: NORMAL
BENZODIAZEPINE SCREEN, URINE: NORMAL
BILIRUB SERPL-MCNC: 0.9 MG/DL (ref 0.2–1.2)
BILIRUBIN URINE: NEGATIVE
BLOOD, URINE: NEGATIVE
BUN BLDV-MCNC: 34 MG/DL (ref 8–23)
BUPRENORPHINE URINE: NORMAL
CALCIUM SERPL-MCNC: 9.4 MG/DL (ref 8.8–10.2)
CHLORIDE BLD-SCNC: 101 MMOL/L (ref 98–111)
CHOLESTEROL, TOTAL: 128 MG/DL (ref 160–199)
CLARITY: CLEAR
CO2: 27 MMOL/L (ref 22–29)
COCAINE METABOLITE SCREEN URINE: NORMAL
COLOR: YELLOW
CREAT SERPL-MCNC: 2.2 MG/DL (ref 0.5–1.2)
CREATININE URINE: 59.6 MG/DL (ref 4.2–622)
EPITHELIAL CELLS, UA: 3 /HPF (ref 0–5)
GABAPENTIN SCREEN, URINE: NORMAL
GFR NON-AFRICAN AMERICAN: 29
GLUCOSE BLD-MCNC: 102 MG/DL (ref 74–109)
GLUCOSE URINE: NEGATIVE MG/DL
HCT VFR BLD CALC: 41.5 % (ref 42–52)
HDLC SERPL-MCNC: 58 MG/DL (ref 55–121)
HEMOGLOBIN: 12.8 G/DL (ref 14–18)
HYALINE CASTS: 4 /HPF (ref 0–8)
KETONES, URINE: NEGATIVE MG/DL
LDL CHOLESTEROL CALCULATED: 52 MG/DL
LEUKOCYTE ESTERASE, URINE: ABNORMAL
MAGNESIUM: 2.3 MG/DL (ref 1.6–2.4)
MCH RBC QN AUTO: 30.2 PG (ref 27–31)
MCHC RBC AUTO-ENTMCNC: 30.8 G/DL (ref 33–37)
MCV RBC AUTO: 97.9 FL (ref 80–94)
MDMA URINE: NORMAL
METHADONE SCREEN, URINE: NORMAL
METHAMPHETAMINE, URINE: NORMAL
NITRITE, URINE: NEGATIVE
OPIATE SCREEN URINE: NORMAL
OXYCODONE SCREEN URINE: NORMAL
PDW BLD-RTO: 13.2 % (ref 11.5–14.5)
PH UA: 6 (ref 5–8)
PHENCYCLIDINE SCREEN URINE: NORMAL
PHOSPHORUS: 3.2 MG/DL (ref 2.5–4.5)
PLATELET # BLD: 121 K/UL (ref 130–400)
PMV BLD AUTO: 13.1 FL (ref 9.4–12.4)
POTASSIUM SERPL-SCNC: 4.4 MMOL/L (ref 3.5–5)
PROPOXYPHENE SCREEN, URINE: NORMAL
PROTEIN PROTEIN: 22 MG/DL (ref 15–45)
PROTEIN UA: ABNORMAL MG/DL
RBC # BLD: 4.24 M/UL (ref 4.7–6.1)
RBC UA: 1 /HPF (ref 0–4)
SODIUM BLD-SCNC: 144 MMOL/L (ref 136–145)
SPECIFIC GRAVITY UA: 1.01 (ref 1–1.03)
THC SCREEN, URINE: NORMAL
TOTAL PROTEIN: 7.4 G/DL (ref 6.6–8.7)
TRICYCLIC ANTIDEPRESSANTS, UR: NORMAL
TRIGL SERPL-MCNC: 88 MG/DL (ref 0–149)
TSH SERPL DL<=0.05 MIU/L-ACNC: 5.11 UIU/ML (ref 0.27–4.2)
URIC ACID, SERUM: 8.5 MG/DL (ref 3.4–7)
UROBILINOGEN, URINE: 0.2 E.U./DL
WBC # BLD: 5 K/UL (ref 4.8–10.8)
WBC UA: 11 /HPF (ref 0–5)

## 2019-06-07 PROCEDURE — 1036F TOBACCO NON-USER: CPT | Performed by: INTERNAL MEDICINE

## 2019-06-07 PROCEDURE — 80305 DRUG TEST PRSMV DIR OPT OBS: CPT | Performed by: INTERNAL MEDICINE

## 2019-06-07 PROCEDURE — G0438 PPPS, INITIAL VISIT: HCPCS | Performed by: INTERNAL MEDICINE

## 2019-06-07 PROCEDURE — G8420 CALC BMI NORM PARAMETERS: HCPCS | Performed by: INTERNAL MEDICINE

## 2019-06-07 PROCEDURE — G8427 DOCREV CUR MEDS BY ELIG CLIN: HCPCS | Performed by: INTERNAL MEDICINE

## 2019-06-07 PROCEDURE — 4040F PNEUMOC VAC/ADMIN/RCVD: CPT | Performed by: INTERNAL MEDICINE

## 2019-06-07 PROCEDURE — 1123F ACP DISCUSS/DSCN MKR DOCD: CPT | Performed by: INTERNAL MEDICINE

## 2019-06-07 PROCEDURE — G8598 ASA/ANTIPLAT THER USED: HCPCS | Performed by: INTERNAL MEDICINE

## 2019-06-07 PROCEDURE — 99213 OFFICE O/P EST LOW 20 MIN: CPT | Performed by: INTERNAL MEDICINE

## 2019-06-07 RX ORDER — LORAZEPAM 0.5 MG/1
TABLET ORAL
Qty: 120 TABLET | Refills: 0 | Status: SHIPPED | OUTPATIENT
Start: 2019-06-07 | End: 2019-08-02 | Stop reason: SDUPTHER

## 2019-06-07 RX ORDER — HYDROCODONE BITARTRATE AND ACETAMINOPHEN 5; 325 MG/1; MG/1
TABLET ORAL
Qty: 60 TABLET | Refills: 0 | Status: SHIPPED | OUTPATIENT
Start: 2019-06-07 | End: 2019-07-06 | Stop reason: SDUPTHER

## 2019-06-07 RX ORDER — LORAZEPAM 1 MG/1
1 TABLET ORAL
COMMUNITY
Start: 2017-09-12 | End: 2019-06-07

## 2019-06-07 RX ORDER — FUROSEMIDE 40 MG/1
40 TABLET ORAL DAILY PRN
Refills: 11 | COMMUNITY
Start: 2019-04-29 | End: 2019-06-07 | Stop reason: SDUPTHER

## 2019-06-07 RX ORDER — LORAZEPAM 0.5 MG/1
0.5 TABLET ORAL EVERY 6 HOURS PRN
Qty: 120 TABLET | Refills: 0 | Status: SHIPPED | OUTPATIENT
Start: 2019-06-07 | End: 2019-07-07

## 2019-06-07 RX ORDER — LORAZEPAM 0.5 MG/1
0.5 TABLET ORAL EVERY 6 HOURS PRN
COMMUNITY
End: 2019-06-07 | Stop reason: SDUPTHER

## 2019-06-07 RX ORDER — HYDROCODONE BITARTRATE AND ACETAMINOPHEN 7.5; 325 MG/1; MG/1
TABLET ORAL
Qty: 30 TABLET | Refills: 0 | Status: SHIPPED | OUTPATIENT
Start: 2019-06-07 | End: 2019-07-06 | Stop reason: SDUPTHER

## 2019-06-07 RX ORDER — HYDROCODONE BITARTRATE AND ACETAMINOPHEN 5; 325 MG/1; MG/1
1 TABLET ORAL EVERY 8 HOURS PRN
COMMUNITY
End: 2019-10-15 | Stop reason: CLARIF

## 2019-06-07 RX ORDER — HYDROCODONE BITARTRATE AND ACETAMINOPHEN 5; 325 MG/1; MG/1
TABLET ORAL
Qty: 60 TABLET | Refills: 0 | Status: CANCELLED | OUTPATIENT
Start: 2019-06-07 | End: 2019-07-07

## 2019-06-07 RX ORDER — FUROSEMIDE 40 MG/1
40 TABLET ORAL DAILY PRN
Qty: 60 TABLET | Refills: 11 | Status: SHIPPED | OUTPATIENT
Start: 2019-06-07 | End: 2021-02-05 | Stop reason: ALTCHOICE

## 2019-06-07 ASSESSMENT — PATIENT HEALTH QUESTIONNAIRE - PHQ9
SUM OF ALL RESPONSES TO PHQ QUESTIONS 1-9: 0
SUM OF ALL RESPONSES TO PHQ QUESTIONS 1-9: 0

## 2019-06-07 ASSESSMENT — ANXIETY QUESTIONNAIRES: GAD7 TOTAL SCORE: 0

## 2019-06-07 ASSESSMENT — LIFESTYLE VARIABLES: HOW OFTEN DO YOU HAVE A DRINK CONTAINING ALCOHOL: 0

## 2019-06-07 NOTE — PROGRESS NOTES
Chief Complaint   Patient presents with    Medicare AWV     medicare wellness visit       HPI: Patient is here today for Medicare annual wellness visit is here with his daughter. He is also here to follow-up chronic pain CHF CAD neuropathy anxiety hypertension and other medical problems. He appears to have gained a little weight and does appear stronger. He's had less issues with heart failure exacerbations. Mood is good he is very hard of hearing today. No chest pain no chest pressure recently. Stable dyspnea no abdominal pain. No fevers chills rashes cough or congestion he still has chronic pain restless legs neuropathy insomnia. Insomnia since Main complaint. Past Medical History:   Diagnosis Date    AICD (automatic cardioverter/defibrillator) present     followed by doctor in  [address] Arm pain 2/3/2012    Atrial fibrillation ([address]     Benign prostatic hyperplasia with lower urinary tract symptoms 11/16/2017    Benign prostatic hypertrophy     CAD (coronary artery disease)     Chronic kidney disease     Chronic pain 11/7/2017    Gallstones     Hyperlipidemia     Hypertension     Idiopathic neuropathy     Insomnia     Osteoarthritis of right knee     Restless legs     Right rotator cuff tear     Ventricular tachycardia ([address]        Past Surgical History:   Procedure Laterality Date    APPENDECTOMY      CARDIAC PACEMAKER PLACEMENT      Bi ventricular pacemaker. [facility] CARDIAC SURGERY      mitral valve, maze procedure, 1 vessel bypass -2008    CAROTID ENDARTERECTOMY      Right carotid endarterectomy.  COLONOSCOPY      CORONARY ARTERY BYPASS GRAFT      1 vessel 2008    MOUTH SURGERY      Oral implants.     PACEMAKER INSERTION      biventricular pacemaker ([employer])       Family History   Problem Relation Age of Onset   [facility] Stroke Mother     Heart Attack Mother 80    Diabetes Father     COPD Sister     Arthritis Sister     Stroke Brother     Heart Disease Brother Social History     Socioeconomic History    Marital status:      Spouse name: Not on file    Number of children: Not on file    Years of education: Not on file    Highest education level: Not on file   Occupational History    Not on file   Social Needs    Financial resource strain: Not on file    Food insecurity:     Worry: Not on file     Inability: Not on file    Transportation needs:     Medical: Not on file     Non-medical: Not on file   Tobacco Use    Smoking status: Former Smoker    Smokeless tobacco: Never Used   Substance and Sexual Activity    Alcohol use: No    Drug use: No    Sexual activity: Never     Comment: Marital status - . Lifestyle    Physical activity:     Days per week: Not on file     Minutes per session: Not on file    Stress: Not on file   Relationships    Social connections:     Talks on phone: Not on file     Gets together: Not on file     Attends Mu-ism service: Not on file     Active member of club or organization: Not on file     Attends meetings of clubs or organizations: Not on file     Relationship status: Not on file    Intimate partner violence:     Fear of current or ex partner: Not on file     Emotionally abused: Not on file     Physically abused: Not on file     Forced sexual activity: Not on file   Other Topics Concern    Not on file   Social History Narrative    Not on file       Allergies   Allergen Reactions    Keflex [Cephalexin] Rash     pruritius       Current Outpatient Medications   Medication Sig Dispense Refill    HYDROcodone-acetaminophen (NORCO) 5-325 MG per tablet Take 1 tablet by mouth every 8 hours as needed for Pain.  LORazepam (ATIVAN) 0.5 MG tablet Take 1 tablet by mouth every 6 hours as needed for Anxiety for up to 30 days.  120 tablet 0    furosemide (LASIX) 40 MG tablet Take 1 tablet by mouth daily as needed (for fluid) 60 tablet 11    atorvastatin (LIPITOR) 10 MG tablet TAKE ONE TABLET BY MOUTH EVERY DAY 90 tablet 2    dutasteride (AVODART) 0.5 MG capsule Take 1 capsule by mouth daily 90 capsule 1    sacubitril-valsartan (ENTRESTO) 49-51 MG per tablet Take 1 tablet by mouth 2 times daily 180 tablet 3    gabapentin (NEURONTIN) 300 MG capsule TAKE 1 CAPSULE BY MOUTH AT 5PM AND TAKE 2 CAPSULEs BY MOUTH EVERY BEDTIME. 90 capsule 5    tamsulosin (FLOMAX) 0.4 MG capsule TAKE ONE CAPSULE BY MOUTH EVERY DAY 90 capsule 3    metoprolol succinate (TOPROL XL) 25 MG extended release tablet Take 25 mg by mouth daily      bumetanide (BUMEX) 0.5 MG tablet Take 0.5 mg by mouth 2 times daily      vitamin D (CHOLECALCIFEROL) 1000 UNIT TABS tablet Take 1,000 Units by mouth daily      nitroGLYCERIN (NITROSTAT) 0.4 MG SL tablet Place 0.4 mg under the tongue      aspirin 81 MG tablet Take 81 mg by mouth daily.  HYDROcodone-acetaminophen (NORCO) 5-325 MG per tablet TAKE ONE TABLET BY MOUTH TWICE A DAY AS NEEDED FOR PAIN **MAY MAKE DROWSY** 60 tablet 0    LORazepam (ATIVAN) 0.5 MG tablet TAKE ONE TABLET BY MOUTH FOUR TIMES A DAY **MAY MAKE DROWSY**. 120 tablet 0    HYDROcodone-acetaminophen (NORCO) 7.5-325 MG per tablet TAKE ONE TABLET BY MOUTH ONCE NIGHTLY AS NEEDED FOR PAIN **MAY MAKE DROWSY** 30 tablet 0     No current facility-administered medications for this visit. Review of Systems   Constitutional: Positive for fatigue. Negative for chills and fever. HENT: Negative for congestion and sinus pressure. Eyes: Negative for discharge and redness. Respiratory: Positive for shortness of breath. Negative for cough. Cardiovascular: Positive for palpitations and leg swelling. Negative for chest pain. Gastrointestinal: Negative for abdominal distention and abdominal pain. Genitourinary: Negative for dysuria. Musculoskeletal: Positive for arthralgias and back pain. Skin: Negative for rash and wound. Neurological: Negative for dizziness, light-headedness and headaches.    Psychiatric/Behavioral: Positive for sleep disturbance. Negative for dysphoric mood. The patient is nervous/anxious. /68   Pulse 58   Ht 6' (1.829 m)   Wt 169 lb 6.4 oz (76.8 kg)   SpO2 97%   BMI 22.97 kg/m²   BP Readings from Last 7 Encounters:   06/07/19 126/68   02/07/19 120/76   10/24/18 94/66   08/26/18 112/77   08/20/18 105/70   04/27/18 (!) 88/56   02/26/18 90/60     Wt Readings from Last 7 Encounters:   06/07/19 169 lb 6.4 oz (76.8 kg)   02/07/19 167 lb (75.8 kg)   10/24/18 164 lb (74.4 kg)   08/26/18 160 lb 12.8 oz (72.9 kg)   08/20/18 164 lb (74.4 kg)   04/27/18 165 lb (74.8 kg)   02/26/18 161 lb (73 kg)     BMI Readings from Last 7 Encounters:   06/07/19 22.97 kg/m²   02/07/19 21.44 kg/m²   10/24/18 21.06 kg/m²   08/26/18 20.65 kg/m²   08/20/18 21.06 kg/m²   04/27/18 21.18 kg/m²   02/26/18 20.67 kg/m²     Resp Readings from Last 7 Encounters:   08/26/18 16   11/09/17 18   01/31/16 20       Physical Exam   Constitutional: He is oriented to person, place, and time. No distress. Very thin appearing. HENT:   Right Ear: External ear normal. Tympanic membrane is not injected. Left Ear: External ear normal. Tympanic membrane is not injected. Mouth/Throat: Oropharynx is clear and moist. No oropharyngeal exudate. Hard of hearing   Eyes: Conjunctivae are normal. No scleral icterus. Neck: Neck supple. Carotid bruit is not present. No thyroid mass and no thyromegaly present. Cardiovascular: Normal rate, regular rhythm, S1 normal and S2 normal. Exam reveals no S3 and no S4. No murmur heard. Pulmonary/Chest: Effort normal and breath sounds normal. No respiratory distress. He has no wheezes. He has no rales. Abdominal: Soft. Normal appearance and bowel sounds are normal. He exhibits no distension and no mass. There is no hepatosplenomegaly. There is no tenderness. Musculoskeletal: He exhibits no edema. Lymphadenopathy:     He has no cervical adenopathy.         Right: No supraclavicular adenopathy present. Left: No supraclavicular adenopathy present. Neurological: He is alert and oriented to person, place, and time. He has normal strength. No cranial nerve deficit. Skin: Skin is intact. No rash noted. Results for orders placed or performed in visit on 06/07/19   POCT Rapid Drug Screen   Result Value Ref Range    Amphetamine Screen, Urine neg     Barbiturate Screen, Urine neg     Benzodiazepine Screen, Urine neg     Buprenorphine Urine neg     Cocaine Metabolite Screen, Urine neg     Gabapentin Screen, Urine na     MDMA, Urine neg     Methamphetamine, Urine neg     Methadone Screen, Urine neg     Opiate Scrn, Ur pos     Oxycodone Screen, Ur neg     PCP Screen, Urine neg     Propoxyphene Screen, Urine neg     THC Screen, Urine neg     Tricyclic Antidepressants, Urine neg        ASSESSMENT/ PLAN:  1. Medicare annual wellness visit, subsequent  Chart medications labs vaccines reviewed keep up-to-date with routine medical care and follow-up call with any problems or complaints    2. Chronic systolic CHF (congestive heart failure) (HCC)  Little confusing he's on Bumex and Lasix we did not write his prescriptions but will record them in our record correctly he only takes Lasix when necessary. Takes Bumex routinely. - furosemide (LASIX) 40 MG tablet; Take 1 tablet by mouth daily as needed (for fluid)  Dispense: 60 tablet; Refill: 11    3. Idiopathic neuropathy  Stable follow    4. Chronic pain syndrome  Review oh and f/u --patient is on a lot of pain medicine but was actually on about twice is much and we have tapered him down he continues to try to taper on his own and we discussed again today we will keep the prescription the same continue to try to taper.  - POCT Rapid Drug Screen    5.  Coronary artery disease involving native heart with angina pectoris, unspecified vessel or lesion type ([facility]  Stable symptomatically trying to be conservative in care follow closely follows with cardiology  - CBC; Future  - Comprehensive Metabolic Panel; Future  - Lipid Panel; Future    6. Anxiety  Again we 1st saw him he was some much larger doses we have tapered him down and continue to try to be sparing- LORazepam (ATIVAN) 0.5 MG tablet; Take 1 tablet by mouth every 6 hours as needed for Anxiety for up to 30 days. Dispense: 120 tablet; Refill: 0    7. Osteoarthritis of lumbar spine, unspecified spinal osteoarthritis complication status  As above on chronic pain medicine dose has actually been tapered    8. Paroxysmal atrial fibrillation (HCC)  Follow  - TSH without Reflex; Future    9. Encounter for drug screening    - POCT Rapid Drug Screen    10. ckd 4 -stable and f/u                Medicare Annual Wellness Visit  Name: Faye Mendez Date: 6/15/2019   MRN: [id number] Sex: Male   Age: 80 y.o. Ethnicity: Non-/Non    : 1934 Race: Ivania Arrieta is here for Medicare AWV (medicare wellness visit)    Screenings for behavioral, psychosocial and functional/safety risks, and cognitive dysfunction are all negative except as indicated below. These results, as well as other patient data from the [facility] [address] form, are documented in Flowsheets linked to this Encounter. Allergies   Allergen Reactions    Keflex [Cephalexin] Rash     pruritius       Prior to Visit Medications    Medication Sig Taking? Authorizing Provider   HYDROcodone-acetaminophen (NORCO) 5-325 MG per tablet Take 1 tablet by mouth every 8 hours as needed for Pain. Yes Historical Provider, MD   LORazepam (ATIVAN) 0.5 MG tablet Take 1 tablet by mouth every 6 hours as needed for Anxiety for up to 30 days.  Yes Benji Baeza MD   furosemide (LASIX) 40 MG tablet Take 1 tablet by mouth daily as needed (for fluid) Yes Benji Baeza MD   atorvastatin (LIPITOR) 10 MG tablet TAKE ONE TABLET BY MOUTH EVERY DAY Yes Benji Baeza MD   dutasteride (AVODART) 0.5 MG capsule Take 1 capsule by mouth daily Yes Benji Baeza MD sacubitril-valsartan (ENTRESTO) 49-51 MG per tablet Take 1 tablet by mouth 2 times daily Yes Byron Villaseñor MD   gabapentin (NEURONTIN) 300 MG capsule TAKE 1 CAPSULE BY MOUTH AT 5PM AND TAKE 2 CAPSULEs BY MOUTH EVERY BEDTIME. Yes Byron Villaseñor MD   tamsulosin (FLOMAX) 0.4 MG capsule TAKE ONE CAPSULE BY MOUTH EVERY DAY Yes Byron Villaseñor MD   metoprolol succinate (TOPROL XL) 25 MG extended release tablet Take 25 mg by mouth daily Yes Historical Provider, MD   bumetanide (BUMEX) 0.5 MG tablet Take 0.5 mg by mouth 2 times daily Yes Historical Provider, MD   vitamin D (CHOLECALCIFEROL) 1000 UNIT TABS tablet Take 1,000 Units by mouth daily Yes Historical Provider, MD   nitroGLYCERIN (NITROSTAT) 0.4 MG SL tablet Place 0.4 mg under the tongue Yes Historical Provider, MD   aspirin 81 MG tablet Take 81 mg by mouth daily. Yes Historical Provider, MD   HYDROcodone-acetaminophen (NORCO) 5-325 MG per tablet TAKE ONE TABLET BY MOUTH TWICE A DAY AS NEEDED FOR PAIN **MAY MAKE DROWSY**  Byron Villaseñor MD   LORazepam (ATIVAN) 0.5 MG tablet TAKE ONE TABLET BY MOUTH FOUR TIMES A DAY **MAY MAKE DROWSY**.   Byron Villaseñor MD   HYDROcodone-acetaminophen ([facility]) 7.5-325 MG per tablet TAKE ONE TABLET BY MOUTH ONCE NIGHTLY AS NEEDED FOR PAIN **MAY MAKE DROWSY**  Byron Villaseñor MD       Past Medical History:   Diagnosis Date    AICD (automatic cardioverter/defibrillator) present     followed by doctor in [address] [place] [address] Str Arm pain 2/3/2012    Atrial fibrillation ([facility]     Benign prostatic hyperplasia with lower urinary tract symptoms 11/16/2017    Benign prostatic hypertrophy     CAD (coronary artery disease)     Chronic kidney disease     Chronic pain 11/7/2017    Gallstones     Hyperlipidemia     Hypertension     Idiopathic neuropathy     Insomnia     Osteoarthritis of right knee     Restless legs     Right rotator cuff tear     Ventricular tachycardia ([facility] [address]      Past Surgical History:   Procedure Laterality Date    APPENDECTOMY      CARDIAC PACEMAKER PLACEMENT      Bi ventricular pacemaker. [facility] CARDIAC SURGERY      mitral valve, maze procedure, 1 vessel bypass -2008    CAROTID ENDARTERECTOMY      Right carotid endarterectomy.  COLONOSCOPY      CORONARY ARTERY BYPASS GRAFT      1 vessel 2008    MOUTH SURGERY      Oral implants.  PACEMAKER INSERTION      biventricular pacemaker ([employer])       Family History   Problem Relation Age of Onset    Stroke Mother     Heart Attack Mother 80    Diabetes Father     COPD Sister     Arthritis Sister     Stroke Brother     Heart Disease Brother        CareTeam (Including outside providers/suppliers regularly involved in providing care):   Patient Care Team:  Hiro Borja MD as PCP - General (Internal Medicine)  Hiro Borja MD as PCP - [facility]  Eric Sin MD (General Surgery)  Ac Tamez MD (Orthopedic Surgery)  CYDNEY Hernandez MD (Cardiology)    Wt Readings from Last 3 Encounters:   06/07/19 169 lb 6.4 oz (76.8 kg)   02/07/19 167 lb (75.8 kg)   10/24/18 164 lb (74.4 kg)     Vitals:    06/07/19 0857   BP: 126/68   Pulse: 58   SpO2: 97%   Weight: 169 lb 6.4 oz (76.8 kg)   Height: 6' (1.829 m)     Body mass index is 22.97 kg/m². Based upon direct observation of the patient, evaluation of cognition reveals no issues  Patient's complete Health Risk Assessment and screening values have been reviewed and are found in Flowsheets. The following problems were reviewed today and where indicated follow up appointments were made and/or referrals ordered. Positive Risk Factor Screenings with Interventions:     General Health:  General  In general, how would you say your health is?: Fair  In the past 7 days, have you experienced any of the following?  New or Increased Pain, New or Increased Fatigue, Loneliness, Social Isolation, Stress or Anger?: None of These  Do you get the social and emotional support that you need?: (!) No  Do you have a Living Will?: Yes  General Health Risk Interventions:  · Stable     Health Habits/Nutrition:  Health Habits/Nutrition  Do you exercise for at least 20 minutes 2-3 times per week?: (!) No  Have you lost any weight without trying in the past 3 months?: No  Do you eat fewer than 2 meals per day?: No  Have you seen a dentist within the past year?: (!) No  Body mass index is 22.97 kg/m². Health Habits/Nutrition Interventions:  · Encourage healthy eating at diet    Hearing/Vision:  Hearing/Vision  Do you or your family notice any trouble with your hearing?: (!) Yes  Do you have difficulty driving, watching TV, or doing any of your daily activities because of your eyesight?: (!) Yes  Have you had an eye exam within the past year?: Yes  Hearing/Vision Interventions:  · Stable and f/u    ADL:  ADLs  In the past 7 days, did you need help from others to perform any of the following everyday activities? Eating, dressing, grooming, bathing, toileting, or walking/balance?: None  In the past 7 days, did you need help from others to take care of any of the following?  Laundry, housekeeping, banking/finances, shopping, telephone use, food preparation, transportation, or taking medications?: (!) Transportation  ADL Interventions:  · Family suppor    Personalized Preventive Plan   Current Health Maintenance Status  Immunization History   Administered Date(s) Administered    Influenza, High Dose (Fluzone 65 yrs and older) 10/27/2017, 10/24/2018    Pneumococcal 13-valent Conjugate ([id number]) 10/01/2015    Pneumococcal Polysaccharide ([id number]) 12/04/2009, 09/18/2012    Tetanus 12/04/2009        Health Maintenance   Topic Date Due    Hepatitis B Vaccine (1 of 3 - Risk 3-dose series) 07/21/1953    DTaP/Tdap/Td vaccine (1 - Tdap) 07/21/1953    Shingles Vaccine (1 of 2) 07/21/1984    Colon cancer screen colonoscopy  07/21/2009    Potassium monitoring  06/07/2020    Creatinine monitoring  06/07/2020    Flu vaccine  Completed  Pneumococcal 65+ years Vaccine  Completed    HPV vaccine  Aged Out     Recommendations for Preventive Services Due: see orders and patient instructions/AVS.  .   Recommended screening schedule for the next 5-10 years is provided to the patient in written form: see Patient Instructions/AVS.

## 2019-06-15 ASSESSMENT — ENCOUNTER SYMPTOMS
BACK PAIN: 1
COUGH: 0
ABDOMINAL PAIN: 0
ABDOMINAL DISTENTION: 0
EYE DISCHARGE: 0
EYE REDNESS: 0
SHORTNESS OF BREATH: 1
SINUS PRESSURE: 0

## 2019-06-16 NOTE — PATIENT INSTRUCTIONS
Personalized Preventive Plan for [place] - 6/7/2019  Medicare offers a range of preventive health benefits. Some of the tests and screenings are paid in full while other may be subject to a deductible, co-insurance, and/or copay. Some of these benefits include a comprehensive review of your medical history including lifestyle, illnesses that may run in your family, and various assessments and screenings as appropriate. After reviewing your medical record and screening and assessments performed today your provider may have ordered immunizations, labs, imaging, and/or referrals for you. A list of these orders (if applicable) as well as your Preventive Care list are included within your After Visit Summary for your review. Other Preventive Recommendations:    · A preventive eye exam performed by an eye specialist is recommended every 1-2 years to screen for glaucoma; cataracts, macular degeneration, and other eye disorders. · A preventive dental visit is recommended every 6 months. · Try to get at least 150 minutes of exercise per week or 10,000 steps per day on a pedometer . · Order or download the FREE \"Exercise & Physical Activity: Your Everyday Guide\" from [employer]. Call 1-539.279.5404 or search [employer] online. · You need 3866-2788 mg of calcium and 6573-7397 IU of vitamin D per day. It is possible to meet your calcium requirement with diet alone, but a vitamin D supplement is usually necessary to meet this goal.  · When exposed to the sun, use a sunscreen that protects against both UVA and UVB radiation with an SPF of 30 or greater. Reapply every 2 to 3 hours or after sweating, drying off with a towel, or swimming. · Always wear a seat belt when traveling in a car. Always wear a helmet when riding a bicycle or motorcycle.

## 2019-07-02 ENCOUNTER — TELEPHONE (OUTPATIENT)
Dept: INTERNAL MEDICINE | Age: 84
End: 2019-07-02

## 2019-07-06 DIAGNOSIS — G60.9 IDIOPATHIC NEUROPATHY: ICD-10-CM

## 2019-07-06 DIAGNOSIS — G89.4 CHRONIC PAIN SYNDROME: ICD-10-CM

## 2019-07-09 RX ORDER — HYDROCODONE BITARTRATE AND ACETAMINOPHEN 5; 325 MG/1; MG/1
TABLET ORAL
Qty: 60 TABLET | Refills: 0 | Status: SHIPPED | OUTPATIENT
Start: 2019-07-09 | End: 2019-08-02 | Stop reason: SDUPTHER

## 2019-07-09 RX ORDER — HYDROCODONE BITARTRATE AND ACETAMINOPHEN 7.5; 325 MG/1; MG/1
TABLET ORAL
Qty: 30 TABLET | Refills: 0 | Status: SHIPPED | OUTPATIENT
Start: 2019-07-09 | End: 2019-08-02 | Stop reason: SDUPTHER

## 2019-08-02 DIAGNOSIS — G60.9 IDIOPATHIC NEUROPATHY: ICD-10-CM

## 2019-08-02 DIAGNOSIS — G89.4 CHRONIC PAIN SYNDROME: ICD-10-CM

## 2019-08-02 DIAGNOSIS — F41.9 ANXIETY: ICD-10-CM

## 2019-08-02 RX ORDER — HYDROCODONE BITARTRATE AND ACETAMINOPHEN 5; 325 MG/1; MG/1
TABLET ORAL
Qty: 60 TABLET | Refills: 0 | Status: SHIPPED | OUTPATIENT
Start: 2019-08-02 | End: 2019-09-07 | Stop reason: SDUPTHER

## 2019-08-08 RX ORDER — LORAZEPAM 0.5 MG/1
TABLET ORAL
Qty: 120 TABLET | Refills: 0 | Status: SHIPPED | OUTPATIENT
Start: 2019-08-08 | End: 2019-09-07 | Stop reason: SDUPTHER

## 2019-08-08 RX ORDER — HYDROCODONE BITARTRATE AND ACETAMINOPHEN 7.5; 325 MG/1; MG/1
TABLET ORAL
Qty: 30 TABLET | Refills: 0 | Status: SHIPPED | OUTPATIENT
Start: 2019-08-08 | End: 2019-09-07 | Stop reason: SDUPTHER

## 2019-09-04 ENCOUNTER — CLINICAL SUPPORT (OUTPATIENT)
Dept: CARDIOLOGY | Facility: CLINIC | Age: 84
End: 2019-09-04

## 2019-09-04 DIAGNOSIS — Z95.810 BIVENTRICULAR ICD (IMPLANTABLE CARDIOVERTER-DEFIBRILLATOR) IN PLACE: ICD-10-CM

## 2019-09-04 PROCEDURE — 93295 DEV INTERROG REMOTE 1/2/MLT: CPT | Performed by: PHYSICIAN ASSISTANT

## 2019-09-04 PROCEDURE — 93296 REM INTERROG EVL PM/IDS: CPT | Performed by: PHYSICIAN ASSISTANT

## 2019-09-07 DIAGNOSIS — G60.9 IDIOPATHIC NEUROPATHY: ICD-10-CM

## 2019-09-07 DIAGNOSIS — F41.9 ANXIETY: ICD-10-CM

## 2019-09-07 DIAGNOSIS — G89.4 CHRONIC PAIN SYNDROME: ICD-10-CM

## 2019-09-12 RX ORDER — LORAZEPAM 0.5 MG/1
TABLET ORAL
Qty: 120 TABLET | Refills: 0 | Status: SHIPPED | OUTPATIENT
Start: 2019-09-12 | End: 2019-10-09 | Stop reason: SDUPTHER

## 2019-09-12 RX ORDER — HYDROCODONE BITARTRATE AND ACETAMINOPHEN 7.5; 325 MG/1; MG/1
TABLET ORAL
Qty: 30 TABLET | Refills: 0 | Status: SHIPPED | OUTPATIENT
Start: 2019-09-12 | End: 2019-10-09 | Stop reason: SDUPTHER

## 2019-09-12 RX ORDER — GABAPENTIN 300 MG/1
CAPSULE ORAL
Qty: 90 CAPSULE | Refills: 0 | Status: SHIPPED | OUTPATIENT
Start: 2019-09-12 | End: 2019-10-12 | Stop reason: SDUPTHER

## 2019-09-12 RX ORDER — HYDROCODONE BITARTRATE AND ACETAMINOPHEN 5; 325 MG/1; MG/1
TABLET ORAL
Qty: 60 TABLET | Refills: 0 | Status: SHIPPED | OUTPATIENT
Start: 2019-09-12 | End: 2019-10-09 | Stop reason: SDUPTHER

## 2019-09-12 NOTE — PROGRESS NOTES
Bi-V AICD Evaluation Report  [facility]    September 12, 2019    Primary Cardiologist: Zaria  : Guidant Model: [employer]  Implant date: 7/8/10    Reason for evaluation: routine  Indication for AICD: sick sinus syndrome and chronic systolic congestive heart failure    Measurements  Atrial sensing:  P wave: 0.3 mV  Atrial threshold: n/r  Atrial lead impedance: 486 ohms  Ventricular sensing:  R wave: n/r  RV Threshold:  n/r  RV Impedance:  373 ohms  Shock impedance:  RV 47 ohms     LV Threshold:  n/r  LV Impedance:  799 ohms      Diagnostic Data  Atrial paced: 69 %  Ventricular paced: 74-98 %  Other: few very brief episodes of NSVT  Battery status: satisfactory      Final Parameters  Mode: DDDR     Lower rate: 70 bpm   Upper rate: 125 bpm  AV Delay: 170-180 ms paced    115-120 ms sensed   Atrial - Amplitude: 2.2 V   Pulse width: 0.5 ms   Sensitivity: 0.15 mV   Ventricular:  RV Amplitude: 2.2 V @ 0.5 ms   Sensitivity: 0.6 mV            LV Amplitude:  1.5 V @ 0.5 ms  Changes made: n/a  Conclusions: normal AICD function    Follow up: 3 months

## 2019-09-30 RX ORDER — DUTASTERIDE 0.5 MG/1
0.5 CAPSULE, LIQUID FILLED ORAL DAILY
Qty: 90 CAPSULE | Refills: 1 | Status: SHIPPED | OUTPATIENT
Start: 2019-09-30 | End: 2020-04-13

## 2019-10-04 ENCOUNTER — CARE COORDINATION (OUTPATIENT)
Dept: CARE COORDINATION | Age: 84
End: 2019-10-04

## 2019-10-08 ENCOUNTER — CARE COORDINATION (OUTPATIENT)
Dept: CARE COORDINATION | Age: 84
End: 2019-10-08

## 2019-10-09 DIAGNOSIS — F41.9 ANXIETY: ICD-10-CM

## 2019-10-09 DIAGNOSIS — G60.9 IDIOPATHIC NEUROPATHY: ICD-10-CM

## 2019-10-09 DIAGNOSIS — G89.4 CHRONIC PAIN SYNDROME: ICD-10-CM

## 2019-10-09 RX ORDER — METOPROLOL SUCCINATE 25 MG/1
25 TABLET, EXTENDED RELEASE ORAL NIGHTLY
Qty: 30 TABLET | Refills: 11 | Status: SHIPPED | OUTPATIENT
Start: 2019-10-09 | End: 2020-09-28 | Stop reason: SDUPTHER

## 2019-10-10 ENCOUNTER — CARE COORDINATION (OUTPATIENT)
Dept: CARE COORDINATION | Age: 84
End: 2019-10-10

## 2019-10-11 RX ORDER — HYDROCODONE BITARTRATE AND ACETAMINOPHEN 5; 325 MG/1; MG/1
TABLET ORAL
Qty: 60 TABLET | Refills: 0 | Status: SHIPPED | OUTPATIENT
Start: 2019-10-11 | End: 2019-11-08 | Stop reason: SDUPTHER

## 2019-10-11 RX ORDER — LORAZEPAM 0.5 MG/1
TABLET ORAL
Qty: 120 TABLET | Refills: 0 | Status: SHIPPED | OUTPATIENT
Start: 2019-10-11 | End: 2019-11-08 | Stop reason: SDUPTHER

## 2019-10-11 RX ORDER — HYDROCODONE BITARTRATE AND ACETAMINOPHEN 7.5; 325 MG/1; MG/1
TABLET ORAL
Qty: 30 TABLET | Refills: 0 | Status: SHIPPED | OUTPATIENT
Start: 2019-10-11 | End: 2019-11-10

## 2019-10-12 DIAGNOSIS — G60.9 IDIOPATHIC NEUROPATHY: ICD-10-CM

## 2019-10-12 DIAGNOSIS — G89.4 CHRONIC PAIN SYNDROME: ICD-10-CM

## 2019-10-15 ENCOUNTER — CARE COORDINATION (OUTPATIENT)
Dept: CARE COORDINATION | Age: 84
End: 2019-10-15

## 2019-10-15 ENCOUNTER — OFFICE VISIT (OUTPATIENT)
Dept: INTERNAL MEDICINE | Age: 84
End: 2019-10-15
Payer: MEDICARE

## 2019-10-15 VITALS
SYSTOLIC BLOOD PRESSURE: 114 MMHG | WEIGHT: 169 LBS | OXYGEN SATURATION: 94 % | HEART RATE: 86 BPM | BODY MASS INDEX: 22.89 KG/M2 | DIASTOLIC BLOOD PRESSURE: 76 MMHG | HEIGHT: 72 IN

## 2019-10-15 DIAGNOSIS — I25.5 ISCHEMIC CARDIOMYOPATHY: ICD-10-CM

## 2019-10-15 DIAGNOSIS — G89.4 CHRONIC PAIN SYNDROME: ICD-10-CM

## 2019-10-15 DIAGNOSIS — Z23 NEEDS FLU SHOT: Primary | ICD-10-CM

## 2019-10-15 DIAGNOSIS — F41.9 ANXIETY: ICD-10-CM

## 2019-10-15 DIAGNOSIS — G60.9 IDIOPATHIC NEUROPATHY: ICD-10-CM

## 2019-10-15 DIAGNOSIS — K80.20 CALCULUS OF GALLBLADDER WITHOUT CHOLECYSTITIS WITHOUT OBSTRUCTION: ICD-10-CM

## 2019-10-15 DIAGNOSIS — N18.4 STAGE 4 CHRONIC KIDNEY DISEASE (HCC): ICD-10-CM

## 2019-10-15 DIAGNOSIS — I48.0 PAROXYSMAL ATRIAL FIBRILLATION (HCC): ICD-10-CM

## 2019-10-15 PROCEDURE — G8427 DOCREV CUR MEDS BY ELIG CLIN: HCPCS | Performed by: INTERNAL MEDICINE

## 2019-10-15 PROCEDURE — 1123F ACP DISCUSS/DSCN MKR DOCD: CPT | Performed by: INTERNAL MEDICINE

## 2019-10-15 PROCEDURE — 99214 OFFICE O/P EST MOD 30 MIN: CPT | Performed by: INTERNAL MEDICINE

## 2019-10-15 PROCEDURE — 1036F TOBACCO NON-USER: CPT | Performed by: INTERNAL MEDICINE

## 2019-10-15 PROCEDURE — 90653 IIV ADJUVANT VACCINE IM: CPT | Performed by: INTERNAL MEDICINE

## 2019-10-15 PROCEDURE — G8482 FLU IMMUNIZE ORDER/ADMIN: HCPCS | Performed by: INTERNAL MEDICINE

## 2019-10-15 PROCEDURE — G8420 CALC BMI NORM PARAMETERS: HCPCS | Performed by: INTERNAL MEDICINE

## 2019-10-15 PROCEDURE — 4040F PNEUMOC VAC/ADMIN/RCVD: CPT | Performed by: INTERNAL MEDICINE

## 2019-10-15 PROCEDURE — G0008 ADMIN INFLUENZA VIRUS VAC: HCPCS | Performed by: INTERNAL MEDICINE

## 2019-10-15 PROCEDURE — G8598 ASA/ANTIPLAT THER USED: HCPCS | Performed by: INTERNAL MEDICINE

## 2019-10-16 RX ORDER — GABAPENTIN 300 MG/1
CAPSULE ORAL
Qty: 90 CAPSULE | Refills: 1 | Status: SHIPPED | OUTPATIENT
Start: 2019-10-16 | End: 2019-12-23

## 2019-10-30 PROBLEM — K80.20 CHOLELITHIASIS: Status: ACTIVE | Noted: 2019-10-30

## 2019-10-30 ASSESSMENT — ENCOUNTER SYMPTOMS
BACK PAIN: 1
ABDOMINAL DISTENTION: 1
SINUS PRESSURE: 0
EYE REDNESS: 0
SHORTNESS OF BREATH: 1
COUGH: 0
ABDOMINAL PAIN: 1
EYE DISCHARGE: 0

## 2019-11-18 ENCOUNTER — DOCUMENTATION (OUTPATIENT)
Dept: CARDIOLOGY | Facility: CLINIC | Age: 84
End: 2019-11-18

## 2019-11-18 DIAGNOSIS — I49.5 SSS (SICK SINUS SYNDROME) (HCC): ICD-10-CM

## 2019-11-18 DIAGNOSIS — Z95.810 BIVENTRICULAR ICD (IMPLANTABLE CARDIOVERTER-DEFIBRILLATOR) IN PLACE: Primary | ICD-10-CM

## 2019-11-18 DIAGNOSIS — I50.22 CHRONIC SYSTOLIC CONGESTIVE HEART FAILURE (HCC): ICD-10-CM

## 2019-11-19 NOTE — PROGRESS NOTES
Bi-V AICD Evaluation Report  REMOTE/[facility]AT[facility]    November 19, 2019    Primary Cardiologist: Zaria  : Guidant Model: [employer]is 100-D N119  Implant date: 7/8/2010    Reason for evaluation: routine  Indication for AICD: sick sinus syndrome and chronic systolic congestive heart failure    Measurements  Atrial sensing:  P wave: 0.3 mV  Atrial threshold: N/P  Atrial lead impedance: 511 ohms  Ventricular sensing:  R wave: 11.7 mV  RV Threshold:  N/P  RV Impedance:  360 ohms  Shock impedance:  RV 45 ohms  LV Threshold:  N/P  LV Impedance:  837 ohms      Diagnostic Data  Atrial paced: 71 %  Ventricular paced: RV 74%, LV 98%    Episodes recorded since 9/4/2019:  Several NS-VT episodes noted on 11/17/2019 and one episode in October:  Longest recorded duration 17 seconds, rate 197 bpm treated successfully with one sequence of ATP (11/17/2019).  Other episodes ranged in duration from 1 second to 10 seconds.  Rates 140-168 bpm.    P-AFl:  Longest duration 1h:25m, rates controlled.  Patient is not anticoagulated s/t hx of  bleeding.    PMT episodes appear to be sinus tachycardia at max tracking rate of 125 bpm.    Battery status: intensify follow up   7 months remaining      Final Parameters  Mode: DDDR     Lower rate: 70 bpm   Upper rate: 125 bpm  AV Delay: 170-180 ms paced    115-120 ms sensed   Atrial - Amplitude: 2.2 V   Pulse width: 0.5 ms   Sensitivity: 0.15 mV   Ventricular:  RV Amplitude: 2.2 V @ 0.5 ms   Sensitivity: 0.6 mV            LV Amplitude:  1.5V @ 0.5ms  Changes made: No changes made.  Conclusions: normal AICD function and ATP treated VT and battery at < 1 year remaining    Follow up: Battery checks at least every 2 months via latitude, annually in office

## 2019-11-20 RX ORDER — SACUBITRIL AND VALSARTAN 49; 51 MG/1; MG/1
TABLET, FILM COATED ORAL
Qty: 60 TABLET | Refills: 11 | Status: ON HOLD | OUTPATIENT
Start: 2019-11-20 | End: 2020-01-26

## 2019-11-30 DIAGNOSIS — G60.9 IDIOPATHIC NEUROPATHY: ICD-10-CM

## 2019-11-30 DIAGNOSIS — G89.4 CHRONIC PAIN SYNDROME: ICD-10-CM

## 2019-12-02 RX ORDER — TAMSULOSIN HYDROCHLORIDE 0.4 MG/1
CAPSULE ORAL
Qty: 90 CAPSULE | Refills: 3 | Status: SHIPPED | OUTPATIENT
Start: 2019-12-02 | End: 2020-12-09

## 2019-12-02 RX ORDER — HYDROCODONE BITARTRATE AND ACETAMINOPHEN 5; 325 MG/1; MG/1
1 TABLET ORAL 3 TIMES DAILY PRN
Qty: 90 TABLET | Refills: 0 | Status: SHIPPED | OUTPATIENT
Start: 2019-12-02 | End: 2020-01-06

## 2019-12-09 ENCOUNTER — CLINICAL SUPPORT (OUTPATIENT)
Dept: CARDIOLOGY | Facility: CLINIC | Age: 84
End: 2019-12-09

## 2019-12-09 DIAGNOSIS — Z95.810 BIVENTRICULAR ICD (IMPLANTABLE CARDIOVERTER-DEFIBRILLATOR) IN PLACE: ICD-10-CM

## 2019-12-09 PROCEDURE — 93296 REM INTERROG EVL PM/IDS: CPT | Performed by: PHYSICIAN ASSISTANT

## 2019-12-09 PROCEDURE — 93295 DEV INTERROG REMOTE 1/2/MLT: CPT | Performed by: PHYSICIAN ASSISTANT

## 2019-12-11 ENCOUNTER — OFFICE VISIT (OUTPATIENT)
Dept: CARDIOLOGY | Facility: CLINIC | Age: 84
End: 2019-12-11

## 2019-12-11 ENCOUNTER — LAB (OUTPATIENT)
Dept: LAB | Facility: HOSPITAL | Age: 84
End: 2019-12-11

## 2019-12-11 VITALS
HEIGHT: 73 IN | WEIGHT: 171 LBS | SYSTOLIC BLOOD PRESSURE: 108 MMHG | HEART RATE: 81 BPM | BODY MASS INDEX: 22.66 KG/M2 | DIASTOLIC BLOOD PRESSURE: 68 MMHG

## 2019-12-11 DIAGNOSIS — Z95.810 BIVENTRICULAR ICD (IMPLANTABLE CARDIOVERTER-DEFIBRILLATOR) IN PLACE: ICD-10-CM

## 2019-12-11 DIAGNOSIS — I50.22 CHRONIC SYSTOLIC CONGESTIVE HEART FAILURE (HCC): ICD-10-CM

## 2019-12-11 DIAGNOSIS — I10 ESSENTIAL HYPERTENSION: ICD-10-CM

## 2019-12-11 DIAGNOSIS — I48.0 PAROXYSMAL ATRIAL FIBRILLATION (HCC): ICD-10-CM

## 2019-12-11 DIAGNOSIS — N18.4 STAGE 4 CHRONIC KIDNEY DISEASE (HCC): ICD-10-CM

## 2019-12-11 DIAGNOSIS — I25.810 CORONARY ARTERY DISEASE INVOLVING CORONARY BYPASS GRAFT OF NATIVE HEART WITHOUT ANGINA PECTORIS: ICD-10-CM

## 2019-12-11 DIAGNOSIS — E78.2 MIXED HYPERLIPIDEMIA: ICD-10-CM

## 2019-12-11 DIAGNOSIS — I50.22 CHRONIC SYSTOLIC CONGESTIVE HEART FAILURE (HCC): Primary | ICD-10-CM

## 2019-12-11 PROCEDURE — 80053 COMPREHEN METABOLIC PANEL: CPT | Performed by: INTERNAL MEDICINE

## 2019-12-11 PROCEDURE — 83880 ASSAY OF NATRIURETIC PEPTIDE: CPT | Performed by: INTERNAL MEDICINE

## 2019-12-11 PROCEDURE — 99214 OFFICE O/P EST MOD 30 MIN: CPT | Performed by: INTERNAL MEDICINE

## 2019-12-11 PROCEDURE — 36415 COLL VENOUS BLD VENIPUNCTURE: CPT

## 2019-12-11 PROCEDURE — 93000 ELECTROCARDIOGRAM COMPLETE: CPT | Performed by: INTERNAL MEDICINE

## 2019-12-11 NOTE — PROGRESS NOTES
Subjective:     Encounter Date: 12/13/19      Patient ID: Wild Bravo is a 85 y.o. male.    Chief Complaint: f/u CHF, AF  Mr. Bravo is a pleasant 85-year-old male with extensive cardiac history who was previously followed by Dr. Macdonald.  He transferred his care to me and I have seen him in the office on 1/3/18, 4/11/18, and 5/1/18, 8/18/18, 10/16/18, and most recently on 11/18/19.  At the last visit, he reported improvement in symptoms since starting low-dose Entresto (no orthopnea, PND, LE edema, or weight change).  I increased his Entresto to 49/51 PO BID, and he returns today for follow-up.  He reports no significant change in symptoms thereafter, but does still intermittently deal with severe abdominal bloating, particularly worse after eating at night.  He cites drinking water also makes it worse.  Has been compliant with Entresto, but also is taking Bumex 0.5 mg twice daily every day.  On occasion, he ends up taking an extra 40 mg of Lasix.  He reports he did this once recently but it was the first time in a week. His trigger for more taking the lasix is the bloating sensation; he does not weigh himself routinely.    When I last saw him in June '19, he noted that he did have a bump in his creatinine several months prior to that and that he was following with OUSMANE Hernandez, with the nephrology group. He reported he was told to drink more water, and reported his creatinine did improve somewhat thereafter.    Overall, now he's having more bad days than good.  There was a day last week when he was severely short of breath all day long.  Not aggravated by lying flat or exerting - just a constant.  Was not more swollen or did not have an increase in weight. He's not sure if he took his lasix or not.    Still taking lasix for bloating (maybe once every 2-3 days), and bumex 0.5mg po bid.    Atrial Fibrillation   Presents for follow-up visit. Symptoms include shortness of breath (occasionally). Symptoms  are negative for chest pain, dizziness, hemodynamic instability, hypotension, palpitations, syncope and tachycardia. The symptoms have been stable. Past medical history includes atrial fibrillation, CAD and CHF.   Congestive Heart Failure   Presents for follow-up visit. Associated symptoms include shortness of breath (occasionally). Pertinent negatives include no chest pain, chest pressure, claudication, edema, fatigue, near-syncope, orthopnea, palpitations, paroxysmal nocturnal dyspnea or unexpected weight change. The symptoms have been improving. His past medical history is significant for CAD.   Coronary Artery Disease   Presents for follow-up visit. Symptoms include shortness of breath (occasionally). Pertinent negatives include no chest pain, chest pressure, chest tightness, dizziness, leg swelling or palpitations. His past medical history is significant for CHF. The symptoms have been stable. Compliance with medications is good.       The following portions of the patient's history were reviewed and updated as appropriate: allergies, current medications, past family history, past medical history, past social history, past surgical history and problem list.    Review of Systems   Constitution: Negative for fatigue, malaise/fatigue and unexpected weight change.   Cardiovascular: Negative for chest pain, claudication, dyspnea on exertion, leg swelling, near-syncope, orthopnea, palpitations, paroxysmal nocturnal dyspnea and syncope.   Respiratory: Positive for shortness of breath (occasionally). Negative for chest tightness.    Hematologic/Lymphatic: Does not bruise/bleed easily.   Gastrointestinal: Positive for bloating.   Neurological: Negative for dizziness.       Current Outpatient Medications:   •  aspirin 81 MG EC tablet, Take 81 mg by mouth Daily., Disp: , Rfl:   •  atorvastatin (LIPITOR) 10 MG tablet, Take 1 tablet by mouth Every Night., Disp: , Rfl:   •  bumetanide (BUMEX) 1 MG tablet, Take 0.5 tablets by  mouth 2 (Two) Times a Day., Disp: 30 tablet, Rfl: 11  •  Cholecalciferol (VITAMIN D PO), Take  by mouth., Disp: , Rfl:   •  dutasteride (AVODART) 0.5 MG capsule, Take 0.5 mg by mouth Daily., Disp: , Rfl:   •  ENTRESTO 49-51 MG tablet, TAKE 1 TABLET BY MOUTH 2 (TWO) TIMES A DAY., Disp: 60 tablet, Rfl: 11  •  furosemide (LASIX) 40 MG tablet, Take 1 tablet by mouth As Needed (fluid retention)., Disp: 30 tablet, Rfl: 11  •  gabapentin (NEURONTIN) 400 MG capsule, Take 1 capsule by mouth Daily. (Patient taking differently: Take 300 mg by mouth Daily.), Disp: , Rfl:   •  HYDROcodone-acetaminophen (NORCO) 7.5-325 MG per tablet, Take 1 tablet by mouth Every 8 (Eight) Hours As Needed for Moderate Pain ., Disp: , Rfl:   •  LORazepam (ATIVAN) 1 MG tablet, Take 1 tablet by mouth 4 (Four) Times a Day. (Patient taking differently: Take 0.5 mg by mouth 4 (Four) Times a Day.), Disp: 30 tablet, Rfl:   •  metoprolol succinate XL (TOPROL-XL) 25 MG 24 hr tablet, TAKE 1 TABLET BY MOUTH EVERY NIGHT., Disp: 30 tablet, Rfl: 11  •  nitroglycerin (NITROSTAT) 0.4 MG SL tablet, PLACE 1 TABLET UNDER THE TONGUE AS NEEDED FOR ANGINA, MAY REPEAT EVERY 5 MINS FOR UP THREE DOSES, Disp: 25 tablet, Rfl: 3  •  isosorbide mononitrate (IMDUR) 30 MG 24 hr tablet, Take 30 mg by mouth Daily., Disp: , Rfl:   •  tamsulosin (FLOMAX) 0.4 MG capsule 24 hr capsule, Take 1 capsule by mouth Every Night., Disp: , Rfl:   •  tiotropium (SPIRIVA HANDIHALER) 18 MCG per inhalation capsule, Place 1 capsule into inhaler and inhale Daily., Disp: , Rfl:          Objective:      Vitals:    12/11/19 1456   BP: 108/68   Pulse: 81     Physical Exam   Constitutional: He is oriented to person, place, and time. He appears well-developed and well-nourished.   Neck: No JVD present.   Cardiovascular: Normal rate, regular rhythm, normal heart sounds and intact distal pulses.   No murmur heard.  Pulmonary/Chest: Effort normal and breath sounds normal.   Musculoskeletal: He exhibits no  edema.   Neurological: He is alert and oriented to person, place, and time.   Skin: Skin is warm and dry.       Lab Review:         ECG 12 Lead  Date/Time: 12/11/2019 11:13 PM  Performed by: Raleigh Enciso MD  Authorized by: Raleigh Enciso MD   Comparison: compared with previous ECG   Rhythm: paced  BPM: 82              Pacemaker interrogation reviewed - low burden of AF (longest episode ~1.5 hours)    LABS REVIEWED:        Assessment/Plan:     Problem List Items Addressed This Visit        Cardiovascular and Mediastinum    Coronary artery disease involving coronary bypass graft of native heart without angina pectoris: est, stable    Overview     MI x2  Single vessel CABG 2008         Chronic systolic congestive heart failure (CMS/HCC) - Primary: est, stable    Overview     LVEF 35% on last echo here in 2017, but was reported as 25% on echocardiogram performed during recent admission at [facility] in September 2018; has BiV-ICD         Essential hypertension: well controlled    Biventricular ICD (implantable cardioverter-defibrillator) in place    Paroxysmal atrial fibrillation (CMS/HCC): improved; now paroxysmal (had previously been deemed permanent). Not anticoagulated due to prior  bleeding    Overview     CHADS-VASc Risk Assessment            7       Total Score        1 CHF    1 Hypertension    2 Age >/= 75    2 PRIOR STROKE/TIA/THROMBO    1 Vascular Disease        Criteria that do not apply:    DM    Age 65-74    Sex: Female        MAZE done at time of MV repair, also with one subsequent attempt at catheter-based ablation.  Also reports prior intolerance to amiodarone - it is unclear what intolerance was.                Genitourinary    Stage 4 chronic kidney disease (CMS/HCC): cr typically ranges 1.5-2 but was 3 on last labs I can see today (from February)       Other    H/O mitral valve repair           Recommendations and plans:   1.  CHF - no signs of decompensation. Looks dry on exam. Repeat CMP and  "BNP today.  I feel his renal function might benefit from reduced daily diuretics; will await labs before advising, but based on history I do not think he \"bad days\" of worsened breathing recently are from volume overload/worsened systolic CHF such that more diuretics would help him.    2.  PAF - stable; still low burden. Cannot be on anticoagulation due to prior serious bleeding; not interested in Watchman.    3.  CAD: stable; no angina.    4.  Essential HTN: stable; well controlled    F/u 6 months    Raleigh Enciso MD  12/13/19  2:41 PM  "

## 2019-12-12 LAB
ALBUMIN SERPL-MCNC: 4.4 G/DL (ref 3.5–5.2)
ALBUMIN/GLOB SERPL: 1.4 G/DL
ALP SERPL-CCNC: 91 U/L (ref 39–117)
ALT SERPL W P-5'-P-CCNC: 5 U/L (ref 1–41)
ANION GAP SERPL CALCULATED.3IONS-SCNC: 11.2 MMOL/L (ref 5–15)
AST SERPL-CCNC: 10 U/L (ref 1–40)
BILIRUB SERPL-MCNC: 0.8 MG/DL (ref 0.2–1.2)
BUN BLD-MCNC: 29 MG/DL (ref 8–23)
BUN/CREAT SERPL: 14.3 (ref 7–25)
CALCIUM SPEC-SCNC: 9.8 MG/DL (ref 8.6–10.5)
CHLORIDE SERPL-SCNC: 100 MMOL/L (ref 98–107)
CO2 SERPL-SCNC: 32.8 MMOL/L (ref 22–29)
CREAT BLD-MCNC: 2.03 MG/DL (ref 0.76–1.27)
GFR SERPL CREATININE-BSD FRML MDRD: 31 ML/MIN/1.73
GLOBULIN UR ELPH-MCNC: 3.2 GM/DL
GLUCOSE BLD-MCNC: 115 MG/DL (ref 65–99)
NT-PROBNP SERPL-MCNC: 2878 PG/ML (ref 5–1800)
POTASSIUM BLD-SCNC: 4.7 MMOL/L (ref 3.5–5.2)
PROT SERPL-MCNC: 7.6 G/DL (ref 6–8.5)
SODIUM BLD-SCNC: 144 MMOL/L (ref 136–145)

## 2019-12-13 ENCOUNTER — TELEPHONE (OUTPATIENT)
Dept: CARDIOLOGY | Facility: CLINIC | Age: 84
End: 2019-12-13

## 2019-12-13 DIAGNOSIS — F41.9 ANXIETY: ICD-10-CM

## 2019-12-13 RX ORDER — LORAZEPAM 0.5 MG/1
TABLET ORAL
Qty: 120 TABLET | Refills: 0 | Status: SHIPPED | OUTPATIENT
Start: 2019-12-13 | End: 2020-01-07

## 2019-12-13 NOTE — TELEPHONE ENCOUNTER
Cherri just called and left her a message, as I have already provided instruction. See results notes attached to those labs.  You all can work out among yourselves which one calls her back.

## 2019-12-13 NOTE — TELEPHONE ENCOUNTER
Spoke with patient's daughter.  Discussed Dr. Enciso's response found in the note attached to the labs.  She expressed understanding.

## 2019-12-13 NOTE — TELEPHONE ENCOUNTER
Patient's daughter is calling back regarding the office visit from 12/11/2019.  He had his labs drawn afterwards and she is needing to know what you think about them and whether or not you wish to adjust his medications due to those results.  Current meds regarding this are Bumex 1mg-1/2 tablet BID and Lasix 40mg daily PRN.  BUN and creatinine are 29 and 2.03 (34 and 2.2 6 months ago) and BNP is 2878 (2403 10 months ago).  Please advise.

## 2019-12-13 NOTE — TELEPHONE ENCOUNTER
Chely and I have discussed this matter, and she is going to contact the patients daughter back and advise them of the results. Thanks.

## 2019-12-21 DIAGNOSIS — G89.4 CHRONIC PAIN SYNDROME: ICD-10-CM

## 2019-12-21 DIAGNOSIS — G60.9 IDIOPATHIC NEUROPATHY: ICD-10-CM

## 2019-12-27 RX ORDER — GABAPENTIN 300 MG/1
CAPSULE ORAL
Qty: 90 CAPSULE | Refills: 2 | Status: SHIPPED | OUTPATIENT
Start: 2019-12-27 | End: 2020-04-06

## 2019-12-27 NOTE — PROGRESS NOTES
Bi-V AICD Evaluation Report  Latitude    December 26, 2019    Primary Cardiologist: Zaria  : Guidant Model: Cognis  Implant date: 7/8/10    Reason for evaluation: routine  Indication for AICD: sick sinus syndrome    Measurements  Atrial sensing:  P wave: 0.4 mV  Atrial threshold: n/r  Atrial lead impedance: 505 ohms  Ventricular sensing:  R wave: n/r  RV Threshold:  n/r  RV Impedance:  360 ohms  Shock impedance:  RV 46 ohms     LV Threshold:  n/r  LV Impedance:  852 ohms      Diagnostic Data  Atrial paced: 72 %  Ventricular paced: 75-98 %  Other: Few episodes of NSVT noted.  Longest is 17 sec.  PMT alerts noted but are oversensing of farfield R waves.  Battery status: satisfactory         Final Parameters  Mode: DDDR     Lower rate: 70 bpm   Upper rate: 125 bpm  AV Delay: 170-180 ms paced    115-120 ms sensed   Atrial - Amplitude: 2.2 V   Pulse width: 0.5 ms   Sensitivity: 0.15 mV   Ventricular:  RV Amplitude: 2.2 V @ 0.5 ms   Sensitivity: 0.6 mV            LV Amplitude:  1.5 V @ 0.4 ms  Changes made: n/a  Conclusions: normal AICD function    Follow up: 3 months

## 2020-01-07 RX ORDER — HYDROCODONE BITARTRATE AND ACETAMINOPHEN 5; 325 MG/1; MG/1
TABLET ORAL
Qty: 90 TABLET | Refills: 0 | Status: SHIPPED | OUTPATIENT
Start: 2020-01-07 | End: 2020-02-14

## 2020-01-07 RX ORDER — LORAZEPAM 0.5 MG/1
TABLET ORAL
Qty: 120 TABLET | Refills: 0 | Status: SHIPPED | OUTPATIENT
Start: 2020-01-07 | End: 2020-02-14

## 2020-01-17 ENCOUNTER — TELEPHONE (OUTPATIENT)
Dept: CARDIOLOGY | Facility: CLINIC | Age: 85
End: 2020-01-17

## 2020-01-17 ENCOUNTER — DOCUMENTATION (OUTPATIENT)
Dept: CARDIOLOGY | Facility: CLINIC | Age: 85
End: 2020-01-17

## 2020-01-17 DIAGNOSIS — I50.22 CHRONIC SYSTOLIC CONGESTIVE HEART FAILURE (HCC): ICD-10-CM

## 2020-01-17 DIAGNOSIS — Z95.810 BIVENTRICULAR ICD (IMPLANTABLE CARDIOVERTER-DEFIBRILLATOR) IN PLACE: Primary | ICD-10-CM

## 2020-01-17 DIAGNOSIS — I49.5 SSS (SICK SINUS SYNDROME) (HCC): ICD-10-CM

## 2020-01-17 NOTE — PROGRESS NOTES
Bi-V AICD Evaluation Report  REMOTE/LATITUDE    January 17, 2020    Primary Cardiologist: Zaria  : Guidant Model: Cognis 100-D N119  Implant date: 7/8/2010    Reason for evaluation: remote transmission with ATP treated VT  Indication for AICD: sick sinus syndrome and chronic systolic congestive heart failure    Measurements  Atrial sensing:  P wave: 0.5 mV  Atrial threshold: N/P  Atrial lead impedance: 525 ohms  Ventricular sensing:  R wave: 10.3 mV  RV Threshold:  N/P  RV Impedance:  361 ohms  Shock impedance:  RV 45 ohms  LV Threshold:  N/P  LV Impedance:  778ohms      Diagnostic Data  Atrial paced: 69 %  Ventricular paced: RV 75%, LV 98%    Episodes recorded since 12/9/2019:  1/16/2020:  VT episode at 205 bpm treated successfully with ATP x 1 sequence.  Discussed with device rep who states episode is most likely VT and not AF with RVR as episode had sudden onset and was quickly terminated by ATP.      P-AF, current episode ongoing:  Longest duration 16 hours.  Not anticoagulated s/t hx of  bleeding    11 NS-VT episodes noted, longest duration 5 seconds, rates 150-195 bpm.    No shocks.    Battery status: intensify follow up   Estimated 3 months remaining      Final Parameters  Mode: DDDR     Lower rate: 70 bpm   Upper rate: 125 bpm  AV Delay: 170-180 ms paced    115-120 ms sensed   Atrial - Amplitude: 2.2 V   Pulse width: 0.5 ms   Sensitivity: 0.15 mV   Ventricular:  RV Amplitude: 2.2 V @ 0.5 ms   Sensitivity: 0.6 mV            LV Amplitude:  1.5V @ 0.5ms  Changes made: No changes made.  Conclusions: normal AICD function, stable sensing thresholds and battery nearing ERT and ATP treated VT    Follow up: 6 week remote battery check

## 2020-01-17 NOTE — TELEPHONE ENCOUNTER
RN attempted to call patient's daughter to inform her that patient has 3 months until needing a generator change on his AICD.  Message left for daughter to return call.

## 2020-01-20 ENCOUNTER — DOCUMENTATION (OUTPATIENT)
Dept: CARDIOLOGY | Facility: CLINIC | Age: 85
End: 2020-01-20

## 2020-01-20 DIAGNOSIS — I49.5 SSS (SICK SINUS SYNDROME) (HCC): ICD-10-CM

## 2020-01-20 DIAGNOSIS — I50.22 CHRONIC SYSTOLIC CONGESTIVE HEART FAILURE (HCC): ICD-10-CM

## 2020-01-20 DIAGNOSIS — Z95.810 BIVENTRICULAR ICD (IMPLANTABLE CARDIOVERTER-DEFIBRILLATOR) IN PLACE: Primary | ICD-10-CM

## 2020-01-20 NOTE — PROGRESS NOTES
Bi-V AICD Evaluation Report  REMOTE/LATITUDE    January 20, 2020     Interrogation Date:  1/18/2020    Primary Cardiologist: Zaria  : Guidant Model: Cognis 100-D N119  Implant date: 7/8/2010    Reason for evaluation: remote transmission with ATP treated VT  Indication for AICD: sick sinus syndrome and chronic systolic congestive heart failure    Measurements  Atrial sensing:  P wave: 0.4 mV  Atrial threshold: N/P  Atrial lead impedance: 525 ohms  Ventricular sensing:  R wave: 9.1 mV  RV Threshold:  N/P  RV Impedance:  361 ohms  Shock impedance:  RV 44 ohms  LV Threshold:  N/P  LV Impedance:  804 ohms      Diagnostic Data  Atrial paced: 68 %  Ventricular paced: RV 75%, LV 97%    Episodes recorded since 1/17/2020:  1/17/2020:  VT at 210 bpm terminated successfully by 1 sequence of ATP.  Discussed with device rep who agrees that rhythm is most likely VT and not AF with RVR due to regularity of rhythm.      P-AF, current episode ongoing:  Longest duration 16 hours.  Not anticoagulated   s/t hx of  bleeding.    Battery status: intensify follow up   Estimated 3 months remaining      Final Parameters  Mode: DDDR     Lower rate: 70 bpm   Upper rate: 125 bpm  AV Delay: 170-180 ms paced    115-120 ms sensed   Atrial - Amplitude: 2.2 V   Pulse width: 0.5 ms   Sensitivity: 0.15 mV   Ventricular:  RV Amplitude: 2.2 V @ 0.5 ms   Sensitivity: .6 mV            LV Amplitude:  1.5V @ 0.5ms  Changes made: No changes made.  Conclusions: normal AICD function and battery nearing ERT and ATP treated VT    Follow up: 6 week battery check via latitude.

## 2020-01-20 NOTE — TELEPHONE ENCOUNTER
Patients daughter returned the call. I informed her of the estimated battery life and let her know his Latitude will be transmitting in six weeks for another check. She voiced understanding.

## 2020-01-26 ENCOUNTER — APPOINTMENT (OUTPATIENT)
Dept: GENERAL RADIOLOGY | Facility: HOSPITAL | Age: 85
End: 2020-01-26

## 2020-01-26 ENCOUNTER — APPOINTMENT (OUTPATIENT)
Dept: CT IMAGING | Facility: HOSPITAL | Age: 85
End: 2020-01-26

## 2020-01-26 ENCOUNTER — HOSPITAL ENCOUNTER (INPATIENT)
Facility: HOSPITAL | Age: 85
LOS: 15 days | Discharge: HOME HEALTH | End: 2020-02-10
Attending: EMERGENCY MEDICINE | Admitting: SPECIALIST

## 2020-01-26 DIAGNOSIS — Z86.73 HISTORY OF CVA (CEREBROVASCULAR ACCIDENT): ICD-10-CM

## 2020-01-26 DIAGNOSIS — K56.609 SBO (SMALL BOWEL OBSTRUCTION) (HCC): Primary | ICD-10-CM

## 2020-01-26 DIAGNOSIS — N18.9 CHRONIC RENAL IMPAIRMENT, UNSPECIFIED CKD STAGE: ICD-10-CM

## 2020-01-26 DIAGNOSIS — I50.22 CHRONIC SYSTOLIC CONGESTIVE HEART FAILURE (HCC): ICD-10-CM

## 2020-01-26 DIAGNOSIS — N18.4 STAGE 4 CHRONIC KIDNEY DISEASE (HCC): ICD-10-CM

## 2020-01-26 PROBLEM — E87.20 LACTIC ACIDOSIS: Status: ACTIVE | Noted: 2020-01-26

## 2020-01-26 PROBLEM — I48.0 PAROXYSMAL ATRIAL FIBRILLATION (HCC): Status: ACTIVE | Noted: 2020-01-26

## 2020-01-26 LAB
ALBUMIN SERPL-MCNC: 4.7 G/DL (ref 3.5–5.2)
ALBUMIN/GLOB SERPL: 1.5 G/DL
ALP SERPL-CCNC: 97 U/L (ref 39–117)
ALT SERPL W P-5'-P-CCNC: <5 U/L (ref 1–41)
ANION GAP SERPL CALCULATED.3IONS-SCNC: 15 MMOL/L (ref 5–15)
AST SERPL-CCNC: 14 U/L (ref 1–40)
BACTERIA UR QL AUTO: ABNORMAL /HPF
BASOPHILS # BLD AUTO: 0.04 10*3/MM3 (ref 0–0.2)
BASOPHILS NFR BLD AUTO: 0.5 % (ref 0–1.5)
BILIRUB SERPL-MCNC: 1 MG/DL (ref 0.2–1.2)
BILIRUB UR QL STRIP: NEGATIVE
BUN BLD-MCNC: 31 MG/DL (ref 8–23)
BUN/CREAT SERPL: 15 (ref 7–25)
CALCIUM SPEC-SCNC: 9.7 MG/DL (ref 8.6–10.5)
CHLORIDE SERPL-SCNC: 104 MMOL/L (ref 98–107)
CLARITY UR: CLEAR
CO2 SERPL-SCNC: 27 MMOL/L (ref 22–29)
COLOR UR: YELLOW
CREAT BLD-MCNC: 2.07 MG/DL (ref 0.76–1.27)
D-LACTATE SERPL-SCNC: 1.8 MMOL/L (ref 0.5–2)
D-LACTATE SERPL-SCNC: 2.7 MMOL/L (ref 0.5–2)
DEPRECATED RDW RBC AUTO: 49.1 FL (ref 37–54)
EOSINOPHIL # BLD AUTO: 0.2 10*3/MM3 (ref 0–0.4)
EOSINOPHIL NFR BLD AUTO: 2.6 % (ref 0.3–6.2)
ERYTHROCYTE [DISTWIDTH] IN BLOOD BY AUTOMATED COUNT: 14.4 % (ref 12.3–15.4)
GFR SERPL CREATININE-BSD FRML MDRD: 31 ML/MIN/1.73
GLOBULIN UR ELPH-MCNC: 3.2 GM/DL
GLUCOSE BLD-MCNC: 165 MG/DL (ref 65–99)
GLUCOSE UR STRIP-MCNC: NEGATIVE MG/DL
GRAN CASTS URNS QL MICRO: ABNORMAL /LPF
HCT VFR BLD AUTO: 44.1 % (ref 37.5–51)
HGB BLD-MCNC: 13.7 G/DL (ref 13–17.7)
HGB UR QL STRIP.AUTO: NEGATIVE
HOLD SPECIMEN: NORMAL
HYALINE CASTS UR QL AUTO: ABNORMAL /LPF
IMM GRANULOCYTES # BLD AUTO: 0.03 10*3/MM3 (ref 0–0.05)
IMM GRANULOCYTES NFR BLD AUTO: 0.4 % (ref 0–0.5)
KETONES UR QL STRIP: NEGATIVE
LEUKOCYTE ESTERASE UR QL STRIP.AUTO: ABNORMAL
LIPASE SERPL-CCNC: 9 U/L (ref 13–60)
LYMPHOCYTES # BLD AUTO: 0.77 10*3/MM3 (ref 0.7–3.1)
LYMPHOCYTES NFR BLD AUTO: 10 % (ref 19.6–45.3)
MCH RBC QN AUTO: 28.8 PG (ref 26.6–33)
MCHC RBC AUTO-ENTMCNC: 31.1 G/DL (ref 31.5–35.7)
MCV RBC AUTO: 92.6 FL (ref 79–97)
MONOCYTES # BLD AUTO: 0.42 10*3/MM3 (ref 0.1–0.9)
MONOCYTES NFR BLD AUTO: 5.4 % (ref 5–12)
NEUTROPHILS # BLD AUTO: 6.25 10*3/MM3 (ref 1.7–7)
NEUTROPHILS NFR BLD AUTO: 81.1 % (ref 42.7–76)
NITRITE UR QL STRIP: NEGATIVE
NRBC BLD AUTO-RTO: 0 /100 WBC (ref 0–0.2)
PH UR STRIP.AUTO: <=5 [PH] (ref 5–8)
PLATELET # BLD AUTO: 126 10*3/MM3 (ref 140–450)
PMV BLD AUTO: 12.9 FL (ref 6–12)
POTASSIUM BLD-SCNC: 3.8 MMOL/L (ref 3.5–5.2)
PROT SERPL-MCNC: 7.9 G/DL (ref 6–8.5)
PROT UR QL STRIP: ABNORMAL
RBC # BLD AUTO: 4.76 10*6/MM3 (ref 4.14–5.8)
RBC # UR: ABNORMAL /HPF
REF LAB TEST METHOD: ABNORMAL
SODIUM BLD-SCNC: 146 MMOL/L (ref 136–145)
SP GR UR STRIP: 1.01 (ref 1–1.03)
SQUAMOUS #/AREA URNS HPF: ABNORMAL /HPF
UROBILINOGEN UR QL STRIP: ABNORMAL
WBC NRBC COR # BLD: 7.71 10*3/MM3 (ref 3.4–10.8)
WBC UR QL AUTO: ABNORMAL /HPF
WHOLE BLOOD HOLD SPECIMEN: NORMAL
WHOLE BLOOD HOLD SPECIMEN: NORMAL

## 2020-01-26 PROCEDURE — 81001 URINALYSIS AUTO W/SCOPE: CPT | Performed by: EMERGENCY MEDICINE

## 2020-01-26 PROCEDURE — 83690 ASSAY OF LIPASE: CPT | Performed by: EMERGENCY MEDICINE

## 2020-01-26 PROCEDURE — 83605 ASSAY OF LACTIC ACID: CPT | Performed by: EMERGENCY MEDICINE

## 2020-01-26 PROCEDURE — 0T9B70Z DRAINAGE OF BLADDER WITH DRAINAGE DEVICE, VIA NATURAL OR ARTIFICIAL OPENING: ICD-10-PCS | Performed by: SPECIALIST

## 2020-01-26 PROCEDURE — 25010000002 MORPHINE SULFATE (PF) 2 MG/ML SOLUTION: Performed by: SPECIALIST

## 2020-01-26 PROCEDURE — 25010000002 LORAZEPAM PER 2 MG: Performed by: INTERNAL MEDICINE

## 2020-01-26 PROCEDURE — 93010 ELECTROCARDIOGRAM REPORT: CPT | Performed by: INTERNAL MEDICINE

## 2020-01-26 PROCEDURE — 74018 RADEX ABDOMEN 1 VIEW: CPT

## 2020-01-26 PROCEDURE — 99284 EMERGENCY DEPT VISIT MOD MDM: CPT

## 2020-01-26 PROCEDURE — 3E0G76Z INTRODUCTION OF NUTRITIONAL SUBSTANCE INTO UPPER GI, VIA NATURAL OR ARTIFICIAL OPENING: ICD-10-PCS | Performed by: EMERGENCY MEDICINE

## 2020-01-26 PROCEDURE — 25010000002 ONDANSETRON PER 1 MG: Performed by: EMERGENCY MEDICINE

## 2020-01-26 PROCEDURE — 85025 COMPLETE CBC W/AUTO DIFF WBC: CPT | Performed by: EMERGENCY MEDICINE

## 2020-01-26 PROCEDURE — 80053 COMPREHEN METABOLIC PANEL: CPT | Performed by: EMERGENCY MEDICINE

## 2020-01-26 PROCEDURE — 87086 URINE CULTURE/COLONY COUNT: CPT | Performed by: EMERGENCY MEDICINE

## 2020-01-26 PROCEDURE — 74176 CT ABD & PELVIS W/O CONTRAST: CPT

## 2020-01-26 PROCEDURE — 93005 ELECTROCARDIOGRAM TRACING: CPT | Performed by: NURSE PRACTITIONER

## 2020-01-26 RX ORDER — GABAPENTIN 600 MG/1
600 TABLET ORAL NIGHTLY
COMMUNITY
End: 2020-02-17 | Stop reason: SDUPTHER

## 2020-01-26 RX ORDER — NITROGLYCERIN 0.4 MG/1
0.4 TABLET SUBLINGUAL
Status: DISCONTINUED | OUTPATIENT
Start: 2020-01-26 | End: 2020-02-10 | Stop reason: HOSPADM

## 2020-01-26 RX ORDER — DEXTROSE, SODIUM CHLORIDE, SODIUM LACTATE, POTASSIUM CHLORIDE, AND CALCIUM CHLORIDE 5; .6; .31; .03; .02 G/100ML; G/100ML; G/100ML; G/100ML; G/100ML
50 INJECTION, SOLUTION INTRAVENOUS CONTINUOUS
Status: DISCONTINUED | OUTPATIENT
Start: 2020-01-26 | End: 2020-01-31

## 2020-01-26 RX ORDER — ONDANSETRON 2 MG/ML
4 INJECTION INTRAMUSCULAR; INTRAVENOUS EVERY 4 HOURS PRN
Status: DISCONTINUED | OUTPATIENT
Start: 2020-01-26 | End: 2020-02-10 | Stop reason: HOSPADM

## 2020-01-26 RX ORDER — HYDROCODONE BITARTRATE AND ACETAMINOPHEN 5; 325 MG/1; MG/1
1 TABLET ORAL 2 TIMES DAILY
COMMUNITY
End: 2020-02-17 | Stop reason: SDUPTHER

## 2020-01-26 RX ORDER — TAMSULOSIN HYDROCHLORIDE 0.4 MG/1
1 CAPSULE ORAL NIGHTLY
COMMUNITY

## 2020-01-26 RX ORDER — MORPHINE SULFATE 2 MG/ML
2 INJECTION, SOLUTION INTRAMUSCULAR; INTRAVENOUS
Status: DISCONTINUED | OUTPATIENT
Start: 2020-01-26 | End: 2020-01-31 | Stop reason: ALTCHOICE

## 2020-01-26 RX ORDER — SODIUM CHLORIDE 0.9 % (FLUSH) 0.9 %
10 SYRINGE (ML) INJECTION AS NEEDED
Status: DISCONTINUED | OUTPATIENT
Start: 2020-01-26 | End: 2020-02-10 | Stop reason: HOSPADM

## 2020-01-26 RX ORDER — ONDANSETRON 2 MG/ML
4 INJECTION INTRAMUSCULAR; INTRAVENOUS ONCE
Status: COMPLETED | OUTPATIENT
Start: 2020-01-26 | End: 2020-01-26

## 2020-01-26 RX ORDER — METOPROLOL TARTRATE 5 MG/5ML
2.5 INJECTION INTRAVENOUS NIGHTLY
Status: DISCONTINUED | OUTPATIENT
Start: 2020-01-26 | End: 2020-01-28

## 2020-01-26 RX ORDER — LORAZEPAM 2 MG/ML
0.25 INJECTION INTRAMUSCULAR 2 TIMES DAILY
Status: DISCONTINUED | OUTPATIENT
Start: 2020-01-26 | End: 2020-01-29

## 2020-01-26 RX ADMIN — ONDANSETRON HYDROCHLORIDE 4 MG: 2 SOLUTION INTRAMUSCULAR; INTRAVENOUS at 12:26

## 2020-01-26 RX ADMIN — SODIUM CHLORIDE 1000 ML: 9 INJECTION, SOLUTION INTRAVENOUS at 13:02

## 2020-01-26 RX ADMIN — LORAZEPAM 0.25 MG: 2 INJECTION INTRAMUSCULAR; INTRAVENOUS at 22:10

## 2020-01-26 RX ADMIN — SACUBITRIL AND VALSARTAN 1 TABLET: 24; 26 TABLET, FILM COATED ORAL at 22:16

## 2020-01-26 RX ADMIN — METOPROLOL TARTRATE 2.5 MG: 5 INJECTION, SOLUTION INTRAVENOUS at 22:11

## 2020-01-26 RX ADMIN — MORPHINE SULFATE 2 MG: 2 INJECTION, SOLUTION INTRAMUSCULAR; INTRAVENOUS at 17:05

## 2020-01-26 RX ADMIN — HYDROMORPHONE HYDROCHLORIDE 0.5 MG: 1 INJECTION, SOLUTION INTRAMUSCULAR; INTRAVENOUS; SUBCUTANEOUS at 12:26

## 2020-01-26 RX ADMIN — MORPHINE SULFATE 2 MG: 2 INJECTION, SOLUTION INTRAMUSCULAR; INTRAVENOUS at 22:35

## 2020-01-26 RX ADMIN — SODIUM CHLORIDE, SODIUM LACTATE, POTASSIUM CHLORIDE, CALCIUM CHLORIDE AND DEXTROSE MONOHYDRATE 100 ML/HR: 5; 600; 310; 30; 20 INJECTION, SOLUTION INTRAVENOUS at 17:06

## 2020-01-27 ENCOUNTER — APPOINTMENT (OUTPATIENT)
Dept: GENERAL RADIOLOGY | Facility: HOSPITAL | Age: 85
End: 2020-01-27

## 2020-01-27 LAB
ALBUMIN SERPL-MCNC: 3.7 G/DL (ref 3.5–5.2)
ALBUMIN/GLOB SERPL: 1.4 G/DL
ALP SERPL-CCNC: 78 U/L (ref 39–117)
ALT SERPL W P-5'-P-CCNC: <5 U/L (ref 1–41)
AMYLASE SERPL-CCNC: 25 U/L (ref 28–100)
ANION GAP SERPL CALCULATED.3IONS-SCNC: 8 MMOL/L (ref 5–15)
AST SERPL-CCNC: 13 U/L (ref 1–40)
BASOPHILS # BLD AUTO: 0.03 10*3/MM3 (ref 0–0.2)
BASOPHILS NFR BLD AUTO: 0.4 % (ref 0–1.5)
BILIRUB SERPL-MCNC: 0.8 MG/DL (ref 0.2–1.2)
BUN BLD-MCNC: 32 MG/DL (ref 8–23)
BUN/CREAT SERPL: 15.5 (ref 7–25)
CALCIUM SPEC-SCNC: 8.9 MG/DL (ref 8.6–10.5)
CHLORIDE SERPL-SCNC: 107 MMOL/L (ref 98–107)
CO2 SERPL-SCNC: 31 MMOL/L (ref 22–29)
CREAT BLD-MCNC: 2.06 MG/DL (ref 0.76–1.27)
DEPRECATED RDW RBC AUTO: 50.4 FL (ref 37–54)
EOSINOPHIL # BLD AUTO: 0.02 10*3/MM3 (ref 0–0.4)
EOSINOPHIL NFR BLD AUTO: 0.3 % (ref 0.3–6.2)
ERYTHROCYTE [DISTWIDTH] IN BLOOD BY AUTOMATED COUNT: 14.6 % (ref 12.3–15.4)
GFR SERPL CREATININE-BSD FRML MDRD: 31 ML/MIN/1.73
GLOBULIN UR ELPH-MCNC: 2.6 GM/DL
GLUCOSE BLD-MCNC: 153 MG/DL (ref 65–99)
HCT VFR BLD AUTO: 40.5 % (ref 37.5–51)
HGB BLD-MCNC: 12.5 G/DL (ref 13–17.7)
IMM GRANULOCYTES # BLD AUTO: 0.03 10*3/MM3 (ref 0–0.05)
IMM GRANULOCYTES NFR BLD AUTO: 0.4 % (ref 0–0.5)
LIPASE SERPL-CCNC: 9 U/L (ref 13–60)
LYMPHOCYTES # BLD AUTO: 0.81 10*3/MM3 (ref 0.7–3.1)
LYMPHOCYTES NFR BLD AUTO: 11.5 % (ref 19.6–45.3)
MCH RBC QN AUTO: 28.9 PG (ref 26.6–33)
MCHC RBC AUTO-ENTMCNC: 30.9 G/DL (ref 31.5–35.7)
MCV RBC AUTO: 93.5 FL (ref 79–97)
MONOCYTES # BLD AUTO: 0.61 10*3/MM3 (ref 0.1–0.9)
MONOCYTES NFR BLD AUTO: 8.7 % (ref 5–12)
NEUTROPHILS # BLD AUTO: 5.52 10*3/MM3 (ref 1.7–7)
NEUTROPHILS NFR BLD AUTO: 78.7 % (ref 42.7–76)
NRBC BLD AUTO-RTO: 0 /100 WBC (ref 0–0.2)
PLATELET # BLD AUTO: 137 10*3/MM3 (ref 140–450)
PMV BLD AUTO: 12.8 FL (ref 6–12)
POTASSIUM BLD-SCNC: 4.4 MMOL/L (ref 3.5–5.2)
PROT SERPL-MCNC: 6.3 G/DL (ref 6–8.5)
RBC # BLD AUTO: 4.33 10*6/MM3 (ref 4.14–5.8)
SODIUM BLD-SCNC: 146 MMOL/L (ref 136–145)
WBC NRBC COR # BLD: 7.02 10*3/MM3 (ref 3.4–10.8)

## 2020-01-27 PROCEDURE — 74019 RADEX ABDOMEN 2 VIEWS: CPT

## 2020-01-27 PROCEDURE — 25010000002 MORPHINE SULFATE (PF) 2 MG/ML SOLUTION: Performed by: SPECIALIST

## 2020-01-27 PROCEDURE — 83690 ASSAY OF LIPASE: CPT | Performed by: SPECIALIST

## 2020-01-27 PROCEDURE — 82150 ASSAY OF AMYLASE: CPT | Performed by: SPECIALIST

## 2020-01-27 PROCEDURE — 25010000002 HYDROMORPHONE PER 4 MG: Performed by: SPECIALIST

## 2020-01-27 PROCEDURE — 85025 COMPLETE CBC W/AUTO DIFF WBC: CPT | Performed by: SPECIALIST

## 2020-01-27 PROCEDURE — 25010000002 HYDROMORPHONE 1 MG/ML SOLUTION: Performed by: SPECIALIST

## 2020-01-27 PROCEDURE — 25010000002 ONDANSETRON PER 1 MG: Performed by: SPECIALIST

## 2020-01-27 PROCEDURE — 25010000002 LORAZEPAM PER 2 MG: Performed by: INTERNAL MEDICINE

## 2020-01-27 PROCEDURE — 74250 X-RAY XM SM INT 1CNTRST STD: CPT

## 2020-01-27 PROCEDURE — 94799 UNLISTED PULMONARY SVC/PX: CPT

## 2020-01-27 PROCEDURE — 80053 COMPREHEN METABOLIC PANEL: CPT | Performed by: SPECIALIST

## 2020-01-27 RX ORDER — LORAZEPAM 0.5 MG/1
0.5 TABLET ORAL 3 TIMES DAILY PRN
COMMUNITY

## 2020-01-27 RX ORDER — HYDROMORPHONE HYDROCHLORIDE 1 MG/ML
0.5 INJECTION, SOLUTION INTRAMUSCULAR; INTRAVENOUS; SUBCUTANEOUS
Status: DISCONTINUED | OUTPATIENT
Start: 2020-01-27 | End: 2020-02-03

## 2020-01-27 RX ORDER — GABAPENTIN 300 MG/1
300 CAPSULE ORAL DAILY
COMMUNITY

## 2020-01-27 RX ADMIN — MORPHINE SULFATE 2 MG: 2 INJECTION, SOLUTION INTRAMUSCULAR; INTRAVENOUS at 10:27

## 2020-01-27 RX ADMIN — MORPHINE SULFATE 2 MG: 2 INJECTION, SOLUTION INTRAMUSCULAR; INTRAVENOUS at 04:44

## 2020-01-27 RX ADMIN — SACUBITRIL AND VALSARTAN 1 TABLET: 24; 26 TABLET, FILM COATED ORAL at 10:21

## 2020-01-27 RX ADMIN — HYDROMORPHONE HYDROCHLORIDE 0.5 MG: 1 INJECTION, SOLUTION INTRAMUSCULAR; INTRAVENOUS; SUBCUTANEOUS at 16:52

## 2020-01-27 RX ADMIN — PHENOL 2 SPRAY: 1.5 LIQUID ORAL at 04:37

## 2020-01-27 RX ADMIN — HYDROMORPHONE HYDROCHLORIDE 0.5 MG: 1 INJECTION, SOLUTION INTRAMUSCULAR; INTRAVENOUS; SUBCUTANEOUS at 14:39

## 2020-01-27 RX ADMIN — ONDANSETRON HYDROCHLORIDE 4 MG: 2 SOLUTION INTRAMUSCULAR; INTRAVENOUS at 20:08

## 2020-01-27 RX ADMIN — LORAZEPAM 0.25 MG: 2 INJECTION INTRAMUSCULAR; INTRAVENOUS at 10:19

## 2020-01-27 RX ADMIN — LORAZEPAM 0.25 MG: 2 INJECTION INTRAMUSCULAR; INTRAVENOUS at 22:47

## 2020-01-27 RX ADMIN — METOPROLOL TARTRATE 2.5 MG: 5 INJECTION, SOLUTION INTRAVENOUS at 22:47

## 2020-01-27 RX ADMIN — HYDROMORPHONE HYDROCHLORIDE 0.5 MG: 1 INJECTION, SOLUTION INTRAMUSCULAR; INTRAVENOUS; SUBCUTANEOUS at 22:57

## 2020-01-27 RX ADMIN — SODIUM CHLORIDE, SODIUM LACTATE, POTASSIUM CHLORIDE, CALCIUM CHLORIDE AND DEXTROSE MONOHYDRATE 100 ML/HR: 5; 600; 310; 30; 20 INJECTION, SOLUTION INTRAVENOUS at 03:09

## 2020-01-27 RX ADMIN — ONDANSETRON HYDROCHLORIDE 4 MG: 2 SOLUTION INTRAMUSCULAR; INTRAVENOUS at 12:56

## 2020-01-28 ENCOUNTER — ANESTHESIA EVENT (OUTPATIENT)
Dept: PERIOP | Facility: HOSPITAL | Age: 85
End: 2020-01-28

## 2020-01-28 ENCOUNTER — ANESTHESIA (OUTPATIENT)
Dept: PERIOP | Facility: HOSPITAL | Age: 85
End: 2020-01-28

## 2020-01-28 ENCOUNTER — APPOINTMENT (OUTPATIENT)
Dept: GENERAL RADIOLOGY | Facility: HOSPITAL | Age: 85
End: 2020-01-28

## 2020-01-28 LAB
ABO GROUP BLD: NORMAL
BACTERIA SPEC AEROBE CULT: NO GROWTH
BLD GP AB SCN SERPL QL: NEGATIVE
RH BLD: POSITIVE
T&S EXPIRATION DATE: NORMAL

## 2020-01-28 PROCEDURE — 25010000002 HYDROMORPHONE PER 4 MG: Performed by: SPECIALIST

## 2020-01-28 PROCEDURE — 25010000002 PHENYLEPHRINE 10 MG/ML SOLUTION 5 ML VIAL: Performed by: NURSE ANESTHETIST, CERTIFIED REGISTERED

## 2020-01-28 PROCEDURE — 25010000002 LORAZEPAM PER 2 MG: Performed by: INTERNAL MEDICINE

## 2020-01-28 PROCEDURE — 25010000002 FENTANYL CITRATE (PF) 100 MCG/2ML SOLUTION: Performed by: ANESTHESIOLOGY

## 2020-01-28 PROCEDURE — 25010000002 SUCCINYLCHOLINE PER 20 MG: Performed by: NURSE ANESTHETIST, CERTIFIED REGISTERED

## 2020-01-28 PROCEDURE — 74019 RADEX ABDOMEN 2 VIEWS: CPT

## 2020-01-28 PROCEDURE — 25010000002 MORPHINE SULFATE (PF) 2 MG/ML SOLUTION: Performed by: SPECIALIST

## 2020-01-28 PROCEDURE — 25010000002 LORAZEPAM PER 2 MG: Performed by: SPECIALIST

## 2020-01-28 PROCEDURE — 25010000002 FENTANYL CITRATE (PF) 250 MCG/5ML SOLUTION: Performed by: NURSE ANESTHETIST, CERTIFIED REGISTERED

## 2020-01-28 PROCEDURE — 86850 RBC ANTIBODY SCREEN: CPT | Performed by: SPECIALIST

## 2020-01-28 PROCEDURE — 25010000002 NEOSTIGMINE 10 MG/10ML SOLUTION 10 ML VIAL: Performed by: NURSE ANESTHETIST, CERTIFIED REGISTERED

## 2020-01-28 PROCEDURE — 25010000002 LEVOFLOXACIN PER 250 MG: Performed by: SPECIALIST

## 2020-01-28 PROCEDURE — 25010000002 ONDANSETRON PER 1 MG: Performed by: NURSE ANESTHETIST, CERTIFIED REGISTERED

## 2020-01-28 PROCEDURE — 0DH67UZ INSERTION OF FEEDING DEVICE INTO STOMACH, VIA NATURAL OR ARTIFICIAL OPENING: ICD-10-PCS | Performed by: EMERGENCY MEDICINE

## 2020-01-28 PROCEDURE — 0DNU4ZZ RELEASE OMENTUM, PERCUTANEOUS ENDOSCOPIC APPROACH: ICD-10-PCS | Performed by: SPECIALIST

## 2020-01-28 PROCEDURE — 99222 1ST HOSP IP/OBS MODERATE 55: CPT | Performed by: NURSE PRACTITIONER

## 2020-01-28 PROCEDURE — 94640 AIRWAY INHALATION TREATMENT: CPT

## 2020-01-28 PROCEDURE — 03HY32Z INSERTION OF MONITORING DEVICE INTO UPPER ARTERY, PERCUTANEOUS APPROACH: ICD-10-PCS | Performed by: ANESTHESIOLOGY

## 2020-01-28 PROCEDURE — 86900 BLOOD TYPING SEROLOGIC ABO: CPT | Performed by: SPECIALIST

## 2020-01-28 PROCEDURE — 86901 BLOOD TYPING SEROLOGIC RH(D): CPT | Performed by: SPECIALIST

## 2020-01-28 PROCEDURE — 25010000002 ONDANSETRON PER 1 MG: Performed by: SPECIALIST

## 2020-01-28 RX ORDER — METOPROLOL TARTRATE 5 MG/5ML
2.5 INJECTION INTRAVENOUS EVERY 6 HOURS
Status: DISCONTINUED | OUTPATIENT
Start: 2020-01-28 | End: 2020-01-29

## 2020-01-28 RX ORDER — MAGNESIUM HYDROXIDE 1200 MG/15ML
LIQUID ORAL AS NEEDED
Status: DISCONTINUED | OUTPATIENT
Start: 2020-01-28 | End: 2020-01-28 | Stop reason: HOSPADM

## 2020-01-28 RX ORDER — LEVOFLOXACIN 5 MG/ML
250 INJECTION, SOLUTION INTRAVENOUS EVERY 24 HOURS
Status: COMPLETED | OUTPATIENT
Start: 2020-01-29 | End: 2020-01-29

## 2020-01-28 RX ORDER — LIDOCAINE HYDROCHLORIDE 10 MG/ML
0.5 INJECTION, SOLUTION EPIDURAL; INFILTRATION; INTRACAUDAL; PERINEURAL ONCE AS NEEDED
Status: DISCONTINUED | OUTPATIENT
Start: 2020-01-28 | End: 2020-01-28 | Stop reason: HOSPADM

## 2020-01-28 RX ORDER — HYDRALAZINE HYDROCHLORIDE 20 MG/ML
5 INJECTION INTRAMUSCULAR; INTRAVENOUS
Status: DISCONTINUED | OUTPATIENT
Start: 2020-01-28 | End: 2020-01-28 | Stop reason: HOSPADM

## 2020-01-28 RX ORDER — ROCURONIUM BROMIDE 10 MG/ML
INJECTION, SOLUTION INTRAVENOUS AS NEEDED
Status: DISCONTINUED | OUTPATIENT
Start: 2020-01-28 | End: 2020-01-28 | Stop reason: SURG

## 2020-01-28 RX ORDER — FENTANYL CITRATE 50 UG/ML
25 INJECTION, SOLUTION INTRAMUSCULAR; INTRAVENOUS AS NEEDED
Status: DISCONTINUED | OUTPATIENT
Start: 2020-01-28 | End: 2020-01-28 | Stop reason: HOSPADM

## 2020-01-28 RX ORDER — NEOSTIGMINE METHYLSULFATE 1 MG/ML
INJECTION, SOLUTION INTRAVENOUS AS NEEDED
Status: DISCONTINUED | OUTPATIENT
Start: 2020-01-28 | End: 2020-01-28 | Stop reason: SURG

## 2020-01-28 RX ORDER — BUPIVACAINE HYDROCHLORIDE AND EPINEPHRINE 5; 5 MG/ML; UG/ML
INJECTION, SOLUTION PERINEURAL AS NEEDED
Status: DISCONTINUED | OUTPATIENT
Start: 2020-01-28 | End: 2020-01-28 | Stop reason: HOSPADM

## 2020-01-28 RX ORDER — METOCLOPRAMIDE HYDROCHLORIDE 5 MG/ML
5 INJECTION INTRAMUSCULAR; INTRAVENOUS
Status: DISCONTINUED | OUTPATIENT
Start: 2020-01-28 | End: 2020-01-28 | Stop reason: HOSPADM

## 2020-01-28 RX ORDER — NALOXONE HCL 0.4 MG/ML
0.04 VIAL (ML) INJECTION AS NEEDED
Status: DISCONTINUED | OUTPATIENT
Start: 2020-01-28 | End: 2020-01-28 | Stop reason: HOSPADM

## 2020-01-28 RX ORDER — IPRATROPIUM BROMIDE AND ALBUTEROL SULFATE 2.5; .5 MG/3ML; MG/3ML
3 SOLUTION RESPIRATORY (INHALATION) ONCE AS NEEDED
Status: COMPLETED | OUTPATIENT
Start: 2020-01-28 | End: 2020-01-28

## 2020-01-28 RX ORDER — ONDANSETRON 2 MG/ML
4 INJECTION INTRAMUSCULAR; INTRAVENOUS AS NEEDED
Status: DISCONTINUED | OUTPATIENT
Start: 2020-01-28 | End: 2020-01-28 | Stop reason: HOSPADM

## 2020-01-28 RX ORDER — SODIUM CHLORIDE, SODIUM LACTATE, POTASSIUM CHLORIDE, CALCIUM CHLORIDE 600; 310; 30; 20 MG/100ML; MG/100ML; MG/100ML; MG/100ML
100 INJECTION, SOLUTION INTRAVENOUS CONTINUOUS
Status: DISCONTINUED | OUTPATIENT
Start: 2020-01-28 | End: 2020-01-29

## 2020-01-28 RX ORDER — HYDROMORPHONE HYDROCHLORIDE 1 MG/ML
0.25 INJECTION, SOLUTION INTRAMUSCULAR; INTRAVENOUS; SUBCUTANEOUS
Status: DISCONTINUED | OUTPATIENT
Start: 2020-01-28 | End: 2020-01-28 | Stop reason: HOSPADM

## 2020-01-28 RX ORDER — SODIUM CHLORIDE 0.9 % (FLUSH) 0.9 %
3 SYRINGE (ML) INJECTION EVERY 12 HOURS SCHEDULED
Status: DISCONTINUED | OUTPATIENT
Start: 2020-01-28 | End: 2020-01-28 | Stop reason: HOSPADM

## 2020-01-28 RX ORDER — SODIUM CHLORIDE 9 MG/ML
INJECTION, SOLUTION INTRAVENOUS AS NEEDED
Status: DISCONTINUED | OUTPATIENT
Start: 2020-01-28 | End: 2020-01-28 | Stop reason: HOSPADM

## 2020-01-28 RX ORDER — LABETALOL HYDROCHLORIDE 5 MG/ML
5 INJECTION, SOLUTION INTRAVENOUS
Status: DISCONTINUED | OUTPATIENT
Start: 2020-01-28 | End: 2020-01-28 | Stop reason: HOSPADM

## 2020-01-28 RX ORDER — FENTANYL CITRATE 50 UG/ML
INJECTION, SOLUTION INTRAMUSCULAR; INTRAVENOUS AS NEEDED
Status: DISCONTINUED | OUTPATIENT
Start: 2020-01-28 | End: 2020-01-28 | Stop reason: SURG

## 2020-01-28 RX ORDER — LIDOCAINE HYDROCHLORIDE 20 MG/ML
INJECTION, SOLUTION INFILTRATION; PERINEURAL AS NEEDED
Status: DISCONTINUED | OUTPATIENT
Start: 2020-01-28 | End: 2020-01-28 | Stop reason: SURG

## 2020-01-28 RX ORDER — ONDANSETRON 2 MG/ML
INJECTION INTRAMUSCULAR; INTRAVENOUS AS NEEDED
Status: DISCONTINUED | OUTPATIENT
Start: 2020-01-28 | End: 2020-01-28 | Stop reason: SURG

## 2020-01-28 RX ORDER — ETOMIDATE 2 MG/ML
INJECTION INTRAVENOUS AS NEEDED
Status: DISCONTINUED | OUTPATIENT
Start: 2020-01-28 | End: 2020-01-28 | Stop reason: SURG

## 2020-01-28 RX ORDER — SUCCINYLCHOLINE CHLORIDE 20 MG/ML
INJECTION INTRAMUSCULAR; INTRAVENOUS AS NEEDED
Status: DISCONTINUED | OUTPATIENT
Start: 2020-01-28 | End: 2020-01-28 | Stop reason: SURG

## 2020-01-28 RX ORDER — GLYCOPYRROLATE 0.2 MG/ML
INJECTION INTRAMUSCULAR; INTRAVENOUS AS NEEDED
Status: DISCONTINUED | OUTPATIENT
Start: 2020-01-28 | End: 2020-01-28 | Stop reason: SURG

## 2020-01-28 RX ORDER — LEVOFLOXACIN 5 MG/ML
500 INJECTION, SOLUTION INTRAVENOUS ONCE
Status: COMPLETED | OUTPATIENT
Start: 2020-01-28 | End: 2020-01-28

## 2020-01-28 RX ORDER — SODIUM CHLORIDE 0.9 % (FLUSH) 0.9 %
3-10 SYRINGE (ML) INJECTION AS NEEDED
Status: DISCONTINUED | OUTPATIENT
Start: 2020-01-28 | End: 2020-01-28 | Stop reason: HOSPADM

## 2020-01-28 RX ORDER — FLUMAZENIL 0.1 MG/ML
0.2 INJECTION INTRAVENOUS AS NEEDED
Status: DISCONTINUED | OUTPATIENT
Start: 2020-01-28 | End: 2020-01-28 | Stop reason: HOSPADM

## 2020-01-28 RX ADMIN — IPRATROPIUM BROMIDE AND ALBUTEROL SULFATE 3 ML: 2.5; .5 SOLUTION RESPIRATORY (INHALATION) at 15:45

## 2020-01-28 RX ADMIN — FENTANYL CITRATE 50 MCG: 50 INJECTION INTRAMUSCULAR; INTRAVENOUS at 14:00

## 2020-01-28 RX ADMIN — PHENYLEPHRINE HYDROCHLORIDE 0.2 MCG/KG/MIN: 10 INJECTION INTRAVENOUS at 14:08

## 2020-01-28 RX ADMIN — ONDANSETRON HYDROCHLORIDE 4 MG: 2 SOLUTION INTRAMUSCULAR; INTRAVENOUS at 08:33

## 2020-01-28 RX ADMIN — ROCURONIUM BROMIDE 5 MG: 10 INJECTION INTRAVENOUS at 14:00

## 2020-01-28 RX ADMIN — NEOSTIGMINE METHYLSULFATE 4 MG: 1 INJECTION INTRAVENOUS at 14:53

## 2020-01-28 RX ADMIN — GLYCOPYRROLATE 0.6 MG: 0.2 INJECTION, SOLUTION INTRAMUSCULAR; INTRAVENOUS at 14:53

## 2020-01-28 RX ADMIN — FENTANYL CITRATE 25 MCG: 50 INJECTION, SOLUTION INTRAMUSCULAR; INTRAVENOUS at 16:03

## 2020-01-28 RX ADMIN — HYDROMORPHONE HYDROCHLORIDE 0.5 MG: 1 INJECTION, SOLUTION INTRAMUSCULAR; INTRAVENOUS; SUBCUTANEOUS at 08:33

## 2020-01-28 RX ADMIN — ONDANSETRON HYDROCHLORIDE 4 MG: 2 SOLUTION INTRAMUSCULAR; INTRAVENOUS at 18:13

## 2020-01-28 RX ADMIN — LIDOCAINE HYDROCHLORIDE 60 MG: 20 INJECTION, SOLUTION INFILTRATION; PERINEURAL at 14:00

## 2020-01-28 RX ADMIN — FENTANYL CITRATE 25 MCG: 50 INJECTION, SOLUTION INTRAMUSCULAR; INTRAVENOUS at 15:59

## 2020-01-28 RX ADMIN — ONDANSETRON HYDROCHLORIDE 4 MG: 2 SOLUTION INTRAMUSCULAR; INTRAVENOUS at 14:53

## 2020-01-28 RX ADMIN — SUCCINYLCHOLINE CHLORIDE 120 MG: 20 INJECTION, SOLUTION INTRAMUSCULAR; INTRAVENOUS at 14:00

## 2020-01-28 RX ADMIN — LORAZEPAM 0.25 MG: 2 INJECTION INTRAMUSCULAR; INTRAVENOUS at 20:20

## 2020-01-28 RX ADMIN — LORAZEPAM 0.25 MG: 2 INJECTION INTRAMUSCULAR; INTRAVENOUS at 11:02

## 2020-01-28 RX ADMIN — SODIUM CHLORIDE, POTASSIUM CHLORIDE, SODIUM LACTATE AND CALCIUM CHLORIDE 100 ML/HR: 600; 310; 30; 20 INJECTION, SOLUTION INTRAVENOUS at 13:14

## 2020-01-28 RX ADMIN — SODIUM CHLORIDE 500 ML: 9 INJECTION, SOLUTION INTRAVENOUS at 18:29

## 2020-01-28 RX ADMIN — HYDROMORPHONE HYDROCHLORIDE 0.5 MG: 1 INJECTION, SOLUTION INTRAMUSCULAR; INTRAVENOUS; SUBCUTANEOUS at 04:32

## 2020-01-28 RX ADMIN — ETOMIDATE 8 MG: 2 INJECTION, SOLUTION INTRAVENOUS at 14:00

## 2020-01-28 RX ADMIN — LEVOFLOXACIN 500 MG: 500 INJECTION, SOLUTION INTRAVENOUS at 13:27

## 2020-01-28 RX ADMIN — MORPHINE SULFATE 2 MG: 2 INJECTION, SOLUTION INTRAMUSCULAR; INTRAVENOUS at 20:20

## 2020-01-28 RX ADMIN — ROCURONIUM BROMIDE 45 MG: 10 INJECTION INTRAVENOUS at 14:07

## 2020-01-28 RX ADMIN — ONDANSETRON HYDROCHLORIDE 4 MG: 2 SOLUTION INTRAMUSCULAR; INTRAVENOUS at 04:38

## 2020-01-28 RX ADMIN — HYDROMORPHONE HYDROCHLORIDE 0.5 MG: 1 INJECTION, SOLUTION INTRAMUSCULAR; INTRAVENOUS; SUBCUTANEOUS at 11:27

## 2020-01-28 RX ADMIN — SODIUM CHLORIDE, SODIUM LACTATE, POTASSIUM CHLORIDE, CALCIUM CHLORIDE AND DEXTROSE MONOHYDRATE 100 ML/HR: 5; 600; 310; 30; 20 INJECTION, SOLUTION INTRAVENOUS at 20:37

## 2020-01-28 RX ADMIN — METOPROLOL TARTRATE 2.5 MG: 5 INJECTION, SOLUTION INTRAVENOUS at 11:27

## 2020-01-28 NOTE — ANESTHESIA POSTPROCEDURE EVALUATION
"Patient: Wild Bravo    Procedure Summary     Date:  01/28/20 Room / Location:   [facility] OR  /  [facility]    Anesthesia Start:  1345 Anesthesia Stop:  1514    Procedure:  LAPAROSCOPIC LYSIS OF ADHESIONS (N/A Abdomen) Diagnosis:      Surgeon:  Kim Almeida MD Provider:  Leandro Yanez CRNA    Anesthesia Type:  general ASA Status:  4 - Emergent          Anesthesia Type: general    Vitals  Vitals Value Taken Time   BP 83/43 1/28/2020  5:17 PM   Temp 97.9 °F (36.6 °C) 1/28/2020  5:00 PM   Pulse 103 1/28/2020  5:23 PM   Resp 21 1/28/2020  5:15 PM   SpO2 94 % 1/28/2020  5:23 PM   Vitals shown include unvalidated device data.        Post Anesthesia Care and Evaluation    Patient location during evaluation: PACU  Patient participation: complete - patient participated  Level of consciousness: awake and alert  Pain management: adequate  Airway patency: patent  Anesthetic complications: No anesthetic complications    Cardiovascular status: acceptable  Respiratory status: acceptable  Hydration status: acceptable    Comments: Blood pressure 96/51, pulse 108, temperature 97.9 °F (36.6 °C), temperature source Temporal, resp. rate 21, height 185.4 cm (73\"), weight 79.4 kg (175 lb), SpO2 95 %.    Pt discharged from PACU based on cristy score >8      "

## 2020-01-28 NOTE — ANESTHESIA PROCEDURE NOTES
Airway  Urgency: elective    Date/Time: 1/28/2020 2:03 PM  Airway not difficult    General Information and Staff    Patient location during procedure: OR  CRNA: Fuad Guadalupe CRNA    Indications and Patient Condition  Indications for airway management: airway protection    Preoxygenated: yes  Mask difficulty assessment: 0 - not attempted    Final Airway Details  Final airway type: endotracheal airway      Successful airway: ETT    Successful intubation technique: video laryngoscopy  Facilitating devices/methods: cricoid pressure  Endotracheal tube insertion site: oral  Blade: Patel  Blade size: 3  ETT size (mm): 7.0  Cormack-Lehane Classification: grade I - full view of glottis  Placement verified by: chest auscultation and capnometry   Measured from: lips  ETT/EBT  to lips (cm): 22  Number of attempts at approach: 1  Assessment: lips, teeth, and gum same as pre-op and atraumatic intubation    Additional Comments  By Olga SRNA

## 2020-01-28 NOTE — ANESTHESIA PROCEDURE NOTES
Arterial Line    Pre-sedation assessment completed: 1/28/2020 1:15 PM    Patient reassessed immediately prior to procedure    Patient location during procedure: holding area  Start time: 1/28/2020 1:17 PM  Stop Time:1/28/2020 1:19 PM       Line placed for ABGs/Labs/ISTAT and hemodynamic monitoring.  Performed By   Anesthesiologist: Jaylan Seo MD  Preanesthetic Checklist  Completed: patient identified, site marked, surgical consent, pre-op evaluation, timeout performed, IV checked, risks and benefits discussed and monitors and equipment checked  Arterial Line Prep   Sterile Tech: gloves  Prep: ChloraPrep  Patient monitoring: continuous pulse oximetry  Arterial Line Procedure   Laterality:left  Location:  radial artery  Catheter size: 20 G   Guidance: ultrasound guided  PROCEDURE NOTE/ULTRASOUND INTERPRETATION.  Using ultrasound guidance the potential vascular sites for insertion of the catheter were visualized to determine the patency of the vessel to be used for vascular access.  After selecting the appropriate site for insertion, the needle was visualized under ultrasound being inserted into the radial artery, followed by ultrasound confirmation of wire and catheter placement. There were no abnormalities seen on ultrasound; an image was taken; and the patient tolerated the procedure with no complications.   Number of attempts: 1  Successful placement: yes  Post Assessment   Dressing Type: occlusive dressing applied, secured with tape and wrist guard applied.   Complications no  Circ/Move/Sens Assessment: normal and unchanged.   Patient Tolerance: patient tolerated the procedure well with no apparent complications

## 2020-01-28 NOTE — ANESTHESIA PREPROCEDURE EVALUATION
Anesthesia Evaluation     Patient summary reviewed   no history of anesthetic complications:  NPO Solid Status: > 8 hours  NPO Liquid Status: > 2 hours           Airway   Mallampati: II  TM distance: >3 FB  Neck ROM: full  Dental    (+) edentulous    Comment: Implants on lower gums    Pulmonary - normal exam    breath sounds clear to auscultation  (+) a smoker (quit 8 yrs ago) Former,   (-) asthma, recent URI, sleep apnea  Cardiovascular - normal exam  Exercise tolerance: good (4-7 METS)    ECG reviewed  Patient on routine beta blocker and Beta blocker given within 24 hours of surgery  Rhythm: regular  Rate: normal    (+) pacemaker (Interrogated 1/20/20), hypertension, past MI , CAD, CABG >6 Months, dysrhythmias Atrial Fib, CHF , hyperlipidemia,   (-) angina, cardiac stents    ROS comment: Per note from Raleigh Enciso MD, 12/11/19:  LVEF 35% on last echo here in 2017, but was reported as 25% on echocardiogram performed during recent admission at [facility] in September 2018; has BiV-ICD    Neuro/Psych  (+) CVA (listed in EMR, but denied by patient), psychiatric history Anxiety,     (-) seizures, TIA  GI/Hepatic/Renal/Endo    (+)   renal disease CRI,   (-) GERD, liver disease, diabetes, no thyroid disorder    Musculoskeletal     Abdominal    Substance History      OB/GYN          Other                      Anesthesia Plan    ASA 4 - emergent     general   Rapid sequence(Preop arterial line  Discussed the increased risk of MACE)  intravenous induction     Anesthetic plan, all risks, benefits, and alternatives have been provided, discussed and informed consent has been obtained with: patient.  Use of blood products discussed with patient  Consented to blood products.

## 2020-01-29 LAB
ANION GAP SERPL CALCULATED.3IONS-SCNC: 6 MMOL/L (ref 5–15)
BASOPHILS # BLD AUTO: 0.02 10*3/MM3 (ref 0–0.2)
BASOPHILS NFR BLD AUTO: 0.4 % (ref 0–1.5)
BUN BLD-MCNC: 32 MG/DL (ref 8–23)
BUN/CREAT SERPL: 15.5 (ref 7–25)
CALCIUM SPEC-SCNC: 8.9 MG/DL (ref 8.6–10.5)
CHLORIDE SERPL-SCNC: 109 MMOL/L (ref 98–107)
CO2 SERPL-SCNC: 33 MMOL/L (ref 22–29)
CREAT BLD-MCNC: 2.06 MG/DL (ref 0.76–1.27)
DEPRECATED RDW RBC AUTO: 51.6 FL (ref 37–54)
EOSINOPHIL # BLD AUTO: 0.03 10*3/MM3 (ref 0–0.4)
EOSINOPHIL NFR BLD AUTO: 0.6 % (ref 0.3–6.2)
ERYTHROCYTE [DISTWIDTH] IN BLOOD BY AUTOMATED COUNT: 14.7 % (ref 12.3–15.4)
GFR SERPL CREATININE-BSD FRML MDRD: 31 ML/MIN/1.73
GLUCOSE BLD-MCNC: 114 MG/DL (ref 65–99)
HCT VFR BLD AUTO: 37.4 % (ref 37.5–51)
HGB BLD-MCNC: 11.4 G/DL (ref 13–17.7)
IMM GRANULOCYTES # BLD AUTO: 0.02 10*3/MM3 (ref 0–0.05)
IMM GRANULOCYTES NFR BLD AUTO: 0.4 % (ref 0–0.5)
LYMPHOCYTES # BLD AUTO: 0.55 10*3/MM3 (ref 0.7–3.1)
LYMPHOCYTES NFR BLD AUTO: 10.4 % (ref 19.6–45.3)
MCH RBC QN AUTO: 28.9 PG (ref 26.6–33)
MCHC RBC AUTO-ENTMCNC: 30.5 G/DL (ref 31.5–35.7)
MCV RBC AUTO: 94.7 FL (ref 79–97)
MONOCYTES # BLD AUTO: 0.52 10*3/MM3 (ref 0.1–0.9)
MONOCYTES NFR BLD AUTO: 9.9 % (ref 5–12)
NEUTROPHILS # BLD AUTO: 4.13 10*3/MM3 (ref 1.7–7)
NEUTROPHILS NFR BLD AUTO: 78.3 % (ref 42.7–76)
NRBC BLD AUTO-RTO: 0 /100 WBC (ref 0–0.2)
NT-PROBNP SERPL-MCNC: 5494 PG/ML (ref 5–1800)
PLATELET # BLD AUTO: 101 10*3/MM3 (ref 140–450)
PMV BLD AUTO: 12.8 FL (ref 6–12)
POTASSIUM BLD-SCNC: 4.1 MMOL/L (ref 3.5–5.2)
RBC # BLD AUTO: 3.95 10*6/MM3 (ref 4.14–5.8)
SODIUM BLD-SCNC: 148 MMOL/L (ref 136–145)
WBC NRBC COR # BLD: 5.27 10*3/MM3 (ref 3.4–10.8)

## 2020-01-29 PROCEDURE — 25010000002 ENOXAPARIN PER 10 MG: Performed by: SPECIALIST

## 2020-01-29 PROCEDURE — 25010000002 ONDANSETRON PER 1 MG: Performed by: SPECIALIST

## 2020-01-29 PROCEDURE — 85025 COMPLETE CBC W/AUTO DIFF WBC: CPT | Performed by: SPECIALIST

## 2020-01-29 PROCEDURE — 25010000002 LEVOFLOXACIN PER 250 MG: Performed by: SPECIALIST

## 2020-01-29 PROCEDURE — 83880 ASSAY OF NATRIURETIC PEPTIDE: CPT | Performed by: INTERNAL MEDICINE

## 2020-01-29 PROCEDURE — 80048 BASIC METABOLIC PNL TOTAL CA: CPT | Performed by: SPECIALIST

## 2020-01-29 PROCEDURE — 25010000002 MORPHINE SULFATE (PF) 2 MG/ML SOLUTION: Performed by: SPECIALIST

## 2020-01-29 PROCEDURE — 99232 SBSQ HOSP IP/OBS MODERATE 35: CPT | Performed by: INTERNAL MEDICINE

## 2020-01-29 RX ORDER — POLYMYXIN B SULFATE AND TRIMETHOPRIM 1; 10000 MG/ML; [USP'U]/ML
1 SOLUTION OPHTHALMIC EVERY 6 HOURS SCHEDULED
Status: DISCONTINUED | OUTPATIENT
Start: 2020-01-29 | End: 2020-02-10 | Stop reason: HOSPADM

## 2020-01-29 RX ORDER — METOPROLOL SUCCINATE 25 MG/1
25 TABLET, EXTENDED RELEASE ORAL NIGHTLY
Status: DISCONTINUED | OUTPATIENT
Start: 2020-01-29 | End: 2020-02-10 | Stop reason: HOSPADM

## 2020-01-29 RX ORDER — FINASTERIDE 5 MG/1
5 TABLET, FILM COATED ORAL DAILY
Status: DISCONTINUED | OUTPATIENT
Start: 2020-01-30 | End: 2020-02-01

## 2020-01-29 RX ORDER — METOPROLOL SUCCINATE 25 MG/1
25 TABLET, EXTENDED RELEASE ORAL NIGHTLY
Status: DISCONTINUED | OUTPATIENT
Start: 2020-01-29 | End: 2020-01-29

## 2020-01-29 RX ORDER — LORAZEPAM 0.5 MG/1
0.5 TABLET ORAL EVERY 12 HOURS SCHEDULED
Status: DISCONTINUED | OUTPATIENT
Start: 2020-01-29 | End: 2020-01-31

## 2020-01-29 RX ORDER — TAMSULOSIN HYDROCHLORIDE 0.4 MG/1
0.4 CAPSULE ORAL NIGHTLY
Status: DISCONTINUED | OUTPATIENT
Start: 2020-01-29 | End: 2020-02-10 | Stop reason: HOSPADM

## 2020-01-29 RX ADMIN — POLYMYXIN B SULFATE, TRIMETHOPRIM SULFATE 1 DROP: 10000; 1 SOLUTION/ DROPS OPHTHALMIC at 15:58

## 2020-01-29 RX ADMIN — ENOXAPARIN SODIUM 30 MG: 30 INJECTION SUBCUTANEOUS at 10:40

## 2020-01-29 RX ADMIN — MORPHINE SULFATE 2 MG: 2 INJECTION, SOLUTION INTRAMUSCULAR; INTRAVENOUS at 00:03

## 2020-01-29 RX ADMIN — SODIUM CHLORIDE, SODIUM LACTATE, POTASSIUM CHLORIDE, CALCIUM CHLORIDE AND DEXTROSE MONOHYDRATE 100 ML/HR: 5; 600; 310; 30; 20 INJECTION, SOLUTION INTRAVENOUS at 16:36

## 2020-01-29 RX ADMIN — MORPHINE SULFATE 2 MG: 2 INJECTION, SOLUTION INTRAMUSCULAR; INTRAVENOUS at 04:04

## 2020-01-29 RX ADMIN — MORPHINE SULFATE 2 MG: 2 INJECTION, SOLUTION INTRAMUSCULAR; INTRAVENOUS at 20:41

## 2020-01-29 RX ADMIN — TAMSULOSIN HYDROCHLORIDE 0.4 MG: 0.4 CAPSULE ORAL at 15:56

## 2020-01-29 RX ADMIN — METOPROLOL SUCCINATE 25 MG: 25 TABLET, EXTENDED RELEASE ORAL at 16:30

## 2020-01-29 RX ADMIN — MORPHINE SULFATE 2 MG: 2 INJECTION, SOLUTION INTRAMUSCULAR; INTRAVENOUS at 17:43

## 2020-01-29 RX ADMIN — ONDANSETRON HYDROCHLORIDE 4 MG: 2 SOLUTION INTRAMUSCULAR; INTRAVENOUS at 20:41

## 2020-01-29 RX ADMIN — METOPROLOL SUCCINATE 25 MG: 25 TABLET, EXTENDED RELEASE ORAL at 15:57

## 2020-01-29 RX ADMIN — LORAZEPAM 0.5 MG: 0.5 TABLET ORAL at 20:25

## 2020-01-29 RX ADMIN — ONDANSETRON HYDROCHLORIDE 4 MG: 2 SOLUTION INTRAMUSCULAR; INTRAVENOUS at 06:21

## 2020-01-29 RX ADMIN — METOPROLOL TARTRATE 2.5 MG: 5 INJECTION, SOLUTION INTRAVENOUS at 11:34

## 2020-01-29 RX ADMIN — LEVOFLOXACIN 250 MG: 250 INJECTION, SOLUTION INTRAVENOUS at 11:35

## 2020-01-29 RX ADMIN — ONDANSETRON HYDROCHLORIDE 4 MG: 2 SOLUTION INTRAMUSCULAR; INTRAVENOUS at 15:49

## 2020-01-29 RX ADMIN — ONDANSETRON HYDROCHLORIDE 4 MG: 2 SOLUTION INTRAMUSCULAR; INTRAVENOUS at 11:34

## 2020-01-29 RX ADMIN — SODIUM CHLORIDE, SODIUM LACTATE, POTASSIUM CHLORIDE, CALCIUM CHLORIDE AND DEXTROSE MONOHYDRATE 100 ML/HR: 5; 600; 310; 30; 20 INJECTION, SOLUTION INTRAVENOUS at 05:13

## 2020-01-29 RX ADMIN — SACUBITRIL AND VALSARTAN 1 TABLET: 24; 26 TABLET, FILM COATED ORAL at 16:30

## 2020-01-29 NOTE — ANESTHESIA POSTPROCEDURE EVALUATION
Patient: Wild Bravo    Procedure Summary     Date:  01/28/20 Room / Location:   [facility]  /  [facility]    Anesthesia Start:  1345 Anesthesia Stop:  1514    Procedure:  LAPAROSCOPIC LYSIS OF ADHESIONS (N/A Abdomen) Diagnosis:      Surgeon:  Kim Almeida MD Provider:  Leandro Yanez CRNA    Anesthesia Type:  general ASA Status:  4 - Emergent          Anesthesia Type: general    Vitals  Vitals Value Taken Time   BP 83/43 1/28/2020  5:17 PM   Temp 97.9 °F (36.6 °C) 1/28/2020  5:00 PM   Pulse 103 1/28/2020  5:23 PM   Resp 21 1/28/2020  5:15 PM   SpO2 94 % 1/28/2020  5:23 PM   Vitals shown include unvalidated device data.        Post Anesthesia Care and Evaluation    Patient location during evaluation: PACU  Level of consciousness: awake  Pain management: adequate  Airway patency: patent  Anesthetic complications: No anesthetic complications  PONV Status: none  Cardiovascular status: acceptable  Respiratory status: acceptable  Hydration status: acceptable

## 2020-01-30 ENCOUNTER — APPOINTMENT (OUTPATIENT)
Dept: GENERAL RADIOLOGY | Facility: HOSPITAL | Age: 85
End: 2020-01-30

## 2020-01-30 PROBLEM — N40.1 URINARY RETENTION DUE TO BENIGN PROSTATIC HYPERPLASIA: Status: ACTIVE | Noted: 2020-01-30

## 2020-01-30 PROBLEM — R33.8 URINARY RETENTION DUE TO BENIGN PROSTATIC HYPERPLASIA: Status: ACTIVE | Noted: 2020-01-30

## 2020-01-30 LAB
ANION GAP SERPL CALCULATED.3IONS-SCNC: 6 MMOL/L (ref 5–15)
BASOPHILS # BLD AUTO: 0.03 10*3/MM3 (ref 0–0.2)
BASOPHILS NFR BLD AUTO: 0.6 % (ref 0–1.5)
BUN BLD-MCNC: 27 MG/DL (ref 8–23)
BUN/CREAT SERPL: 15.1 (ref 7–25)
CALCIUM SPEC-SCNC: 8.8 MG/DL (ref 8.6–10.5)
CHLORIDE SERPL-SCNC: 109 MMOL/L (ref 98–107)
CO2 SERPL-SCNC: 33 MMOL/L (ref 22–29)
CREAT BLD-MCNC: 1.79 MG/DL (ref 0.76–1.27)
DEPRECATED RDW RBC AUTO: 51.4 FL (ref 37–54)
EOSINOPHIL # BLD AUTO: 0.08 10*3/MM3 (ref 0–0.4)
EOSINOPHIL NFR BLD AUTO: 1.6 % (ref 0.3–6.2)
ERYTHROCYTE [DISTWIDTH] IN BLOOD BY AUTOMATED COUNT: 14.6 % (ref 12.3–15.4)
GFR SERPL CREATININE-BSD FRML MDRD: 36 ML/MIN/1.73
GLUCOSE BLD-MCNC: 120 MG/DL (ref 65–99)
HCT VFR BLD AUTO: 37.9 % (ref 37.5–51)
HGB BLD-MCNC: 11.4 G/DL (ref 13–17.7)
LYMPHOCYTES # BLD AUTO: 0.69 10*3/MM3 (ref 0.7–3.1)
LYMPHOCYTES NFR BLD AUTO: 14.1 % (ref 19.6–45.3)
MCH RBC QN AUTO: 28.6 PG (ref 26.6–33)
MCHC RBC AUTO-ENTMCNC: 30.1 G/DL (ref 31.5–35.7)
MCV RBC AUTO: 95.2 FL (ref 79–97)
MONOCYTES # BLD AUTO: 0.4 10*3/MM3 (ref 0.1–0.9)
MONOCYTES NFR BLD AUTO: 8.2 % (ref 5–12)
NEUTROPHILS # BLD AUTO: 3.68 10*3/MM3 (ref 1.7–7)
NEUTROPHILS NFR BLD AUTO: 75.1 % (ref 42.7–76)
NT-PROBNP SERPL-MCNC: 8230 PG/ML (ref 5–1800)
PLATELET # BLD AUTO: 99 10*3/MM3 (ref 140–450)
PMV BLD AUTO: 13.5 FL (ref 6–12)
POTASSIUM BLD-SCNC: 4.2 MMOL/L (ref 3.5–5.2)
RBC # BLD AUTO: 3.98 10*6/MM3 (ref 4.14–5.8)
SODIUM BLD-SCNC: 148 MMOL/L (ref 136–145)
WBC NRBC COR # BLD: 4.9 10*3/MM3 (ref 3.4–10.8)

## 2020-01-30 PROCEDURE — 83880 ASSAY OF NATRIURETIC PEPTIDE: CPT | Performed by: INTERNAL MEDICINE

## 2020-01-30 PROCEDURE — 25010000002 ENOXAPARIN PER 10 MG: Performed by: SPECIALIST

## 2020-01-30 PROCEDURE — 25010000002 ONDANSETRON PER 1 MG: Performed by: SPECIALIST

## 2020-01-30 PROCEDURE — 80048 BASIC METABOLIC PNL TOTAL CA: CPT | Performed by: SPECIALIST

## 2020-01-30 PROCEDURE — 71045 X-RAY EXAM CHEST 1 VIEW: CPT

## 2020-01-30 PROCEDURE — 85025 COMPLETE CBC W/AUTO DIFF WBC: CPT | Performed by: SPECIALIST

## 2020-01-30 PROCEDURE — 94799 UNLISTED PULMONARY SVC/PX: CPT

## 2020-01-30 PROCEDURE — 99232 SBSQ HOSP IP/OBS MODERATE 35: CPT | Performed by: INTERNAL MEDICINE

## 2020-01-30 PROCEDURE — 25010000002 MORPHINE SULFATE (PF) 2 MG/ML SOLUTION: Performed by: SPECIALIST

## 2020-01-30 RX ORDER — HYDROCODONE BITARTRATE AND ACETAMINOPHEN 5; 325 MG/1; MG/1
1 TABLET ORAL 2 TIMES DAILY
Status: DISCONTINUED | OUTPATIENT
Start: 2020-01-30 | End: 2020-02-10 | Stop reason: HOSPADM

## 2020-01-30 RX ORDER — GABAPENTIN 300 MG/1
600 CAPSULE ORAL NIGHTLY
Status: DISCONTINUED | OUTPATIENT
Start: 2020-01-30 | End: 2020-02-10 | Stop reason: HOSPADM

## 2020-01-30 RX ORDER — GABAPENTIN 300 MG/1
300 CAPSULE ORAL DAILY
Status: DISCONTINUED | OUTPATIENT
Start: 2020-01-30 | End: 2020-02-10 | Stop reason: HOSPADM

## 2020-01-30 RX ADMIN — LORAZEPAM 0.5 MG: 0.5 TABLET ORAL at 20:14

## 2020-01-30 RX ADMIN — LORAZEPAM 0.5 MG: 0.5 TABLET ORAL at 08:42

## 2020-01-30 RX ADMIN — ENOXAPARIN SODIUM 30 MG: 30 INJECTION SUBCUTANEOUS at 10:24

## 2020-01-30 RX ADMIN — POLYMYXIN B SULFATE, TRIMETHOPRIM SULFATE 1 DROP: 10000; 1 SOLUTION/ DROPS OPHTHALMIC at 13:00

## 2020-01-30 RX ADMIN — MORPHINE SULFATE 2 MG: 2 INJECTION, SOLUTION INTRAMUSCULAR; INTRAVENOUS at 00:47

## 2020-01-30 RX ADMIN — METOPROLOL SUCCINATE 25 MG: 25 TABLET, EXTENDED RELEASE ORAL at 21:10

## 2020-01-30 RX ADMIN — SACUBITRIL AND VALSARTAN 1 TABLET: 24; 26 TABLET, FILM COATED ORAL at 20:15

## 2020-01-30 RX ADMIN — POLYMYXIN B SULFATE, TRIMETHOPRIM SULFATE 1 DROP: 10000; 1 SOLUTION/ DROPS OPHTHALMIC at 20:14

## 2020-01-30 RX ADMIN — HYDROCODONE BITARTRATE AND ACETAMINOPHEN 1 TABLET: 5; 325 TABLET ORAL at 20:14

## 2020-01-30 RX ADMIN — TAMSULOSIN HYDROCHLORIDE 0.4 MG: 0.4 CAPSULE ORAL at 21:10

## 2020-01-30 RX ADMIN — POLYMYXIN B SULFATE, TRIMETHOPRIM SULFATE 1 DROP: 10000; 1 SOLUTION/ DROPS OPHTHALMIC at 06:29

## 2020-01-30 RX ADMIN — GABAPENTIN 300 MG: 300 CAPSULE ORAL at 10:24

## 2020-01-30 RX ADMIN — SODIUM CHLORIDE, SODIUM LACTATE, POTASSIUM CHLORIDE, CALCIUM CHLORIDE AND DEXTROSE MONOHYDRATE 100 ML/HR: 5; 600; 310; 30; 20 INJECTION, SOLUTION INTRAVENOUS at 02:30

## 2020-01-30 RX ADMIN — POLYMYXIN B SULFATE, TRIMETHOPRIM SULFATE 1 DROP: 10000; 1 SOLUTION/ DROPS OPHTHALMIC at 00:00

## 2020-01-30 RX ADMIN — GABAPENTIN 600 MG: 300 CAPSULE ORAL at 21:11

## 2020-01-30 RX ADMIN — ONDANSETRON HYDROCHLORIDE 4 MG: 2 SOLUTION INTRAMUSCULAR; INTRAVENOUS at 08:34

## 2020-01-30 RX ADMIN — MORPHINE SULFATE 2 MG: 2 INJECTION, SOLUTION INTRAMUSCULAR; INTRAVENOUS at 08:34

## 2020-01-30 RX ADMIN — HYDROCODONE BITARTRATE AND ACETAMINOPHEN 1 TABLET: 5; 325 TABLET ORAL at 10:16

## 2020-01-30 RX ADMIN — FINASTERIDE 5 MG: 5 TABLET, FILM COATED ORAL at 16:32

## 2020-01-30 RX ADMIN — SACUBITRIL AND VALSARTAN 1 TABLET: 24; 26 TABLET, FILM COATED ORAL at 08:42

## 2020-01-30 RX ADMIN — MORPHINE SULFATE 2 MG: 2 INJECTION, SOLUTION INTRAMUSCULAR; INTRAVENOUS at 04:00

## 2020-01-31 LAB
ANION GAP SERPL CALCULATED.3IONS-SCNC: 6 MMOL/L (ref 5–15)
BASOPHILS # BLD AUTO: 0.02 10*3/MM3 (ref 0–0.2)
BASOPHILS NFR BLD AUTO: 0.6 % (ref 0–1.5)
BUN BLD-MCNC: 25 MG/DL (ref 8–23)
BUN/CREAT SERPL: 13.2 (ref 7–25)
CALCIUM SPEC-SCNC: 8.3 MG/DL (ref 8.6–10.5)
CHLORIDE SERPL-SCNC: 107 MMOL/L (ref 98–107)
CO2 SERPL-SCNC: 33 MMOL/L (ref 22–29)
CREAT BLD-MCNC: 1.89 MG/DL (ref 0.76–1.27)
DEPRECATED RDW RBC AUTO: 50 FL (ref 37–54)
EOSINOPHIL # BLD AUTO: 0.25 10*3/MM3 (ref 0–0.4)
EOSINOPHIL NFR BLD AUTO: 7.2 % (ref 0.3–6.2)
ERYTHROCYTE [DISTWIDTH] IN BLOOD BY AUTOMATED COUNT: 14.4 % (ref 12.3–15.4)
GFR SERPL CREATININE-BSD FRML MDRD: 34 ML/MIN/1.73
GLUCOSE BLD-MCNC: 92 MG/DL (ref 65–99)
HCT VFR BLD AUTO: 33.8 % (ref 37.5–51)
HGB BLD-MCNC: 10.3 G/DL (ref 13–17.7)
IMM GRANULOCYTES # BLD AUTO: 0 10*3/MM3 (ref 0–0.05)
IMM GRANULOCYTES NFR BLD AUTO: 0 % (ref 0–0.5)
LYMPHOCYTES # BLD AUTO: 0.65 10*3/MM3 (ref 0.7–3.1)
LYMPHOCYTES NFR BLD AUTO: 18.8 % (ref 19.6–45.3)
MCH RBC QN AUTO: 28.7 PG (ref 26.6–33)
MCHC RBC AUTO-ENTMCNC: 30.5 G/DL (ref 31.5–35.7)
MCV RBC AUTO: 94.2 FL (ref 79–97)
MONOCYTES # BLD AUTO: 0.33 10*3/MM3 (ref 0.1–0.9)
MONOCYTES NFR BLD AUTO: 9.6 % (ref 5–12)
NEUTROPHILS # BLD AUTO: 2.2 10*3/MM3 (ref 1.7–7)
NEUTROPHILS NFR BLD AUTO: 63.8 % (ref 42.7–76)
NRBC BLD AUTO-RTO: 0 /100 WBC (ref 0–0.2)
PLATELET # BLD AUTO: 88 10*3/MM3 (ref 140–450)
PMV BLD AUTO: 13 FL (ref 6–12)
POTASSIUM BLD-SCNC: 3.9 MMOL/L (ref 3.5–5.2)
RBC # BLD AUTO: 3.59 10*6/MM3 (ref 4.14–5.8)
SODIUM BLD-SCNC: 146 MMOL/L (ref 136–145)
WBC NRBC COR # BLD: 3.45 10*3/MM3 (ref 3.4–10.8)

## 2020-01-31 PROCEDURE — 25010000002 ENOXAPARIN PER 10 MG: Performed by: SPECIALIST

## 2020-01-31 PROCEDURE — 99232 SBSQ HOSP IP/OBS MODERATE 35: CPT | Performed by: INTERNAL MEDICINE

## 2020-01-31 PROCEDURE — 25010000002 HYDROMORPHONE PER 4 MG: Performed by: SPECIALIST

## 2020-01-31 PROCEDURE — 85025 COMPLETE CBC W/AUTO DIFF WBC: CPT | Performed by: SPECIALIST

## 2020-01-31 PROCEDURE — 80048 BASIC METABOLIC PNL TOTAL CA: CPT | Performed by: SPECIALIST

## 2020-01-31 RX ORDER — HYDROCODONE BITARTRATE AND ACETAMINOPHEN 5; 325 MG/1; MG/1
1 TABLET ORAL EVERY 4 HOURS PRN
Status: DISCONTINUED | OUTPATIENT
Start: 2020-01-31 | End: 2020-02-10 | Stop reason: HOSPADM

## 2020-01-31 RX ORDER — LORAZEPAM 0.5 MG/1
0.5 TABLET ORAL 3 TIMES DAILY
Status: DISCONTINUED | OUTPATIENT
Start: 2020-01-31 | End: 2020-02-10 | Stop reason: HOSPADM

## 2020-01-31 RX ADMIN — GABAPENTIN 600 MG: 300 CAPSULE ORAL at 21:44

## 2020-01-31 RX ADMIN — METOPROLOL SUCCINATE 25 MG: 25 TABLET, EXTENDED RELEASE ORAL at 21:44

## 2020-01-31 RX ADMIN — LORAZEPAM 0.5 MG: 0.5 TABLET ORAL at 08:37

## 2020-01-31 RX ADMIN — HYDROCODONE BITARTRATE AND ACETAMINOPHEN 1 TABLET: 5; 325 TABLET ORAL at 08:37

## 2020-01-31 RX ADMIN — FINASTERIDE 5 MG: 5 TABLET, FILM COATED ORAL at 11:20

## 2020-01-31 RX ADMIN — HYDROMORPHONE HYDROCHLORIDE 0.5 MG: 1 INJECTION, SOLUTION INTRAMUSCULAR; INTRAVENOUS; SUBCUTANEOUS at 16:14

## 2020-01-31 RX ADMIN — HYDROCODONE BITARTRATE AND ACETAMINOPHEN 1 TABLET: 5; 325 TABLET ORAL at 21:45

## 2020-01-31 RX ADMIN — POLYMYXIN B SULFATE, TRIMETHOPRIM SULFATE 1 DROP: 10000; 1 SOLUTION/ DROPS OPHTHALMIC at 17:04

## 2020-01-31 RX ADMIN — SACUBITRIL AND VALSARTAN 1 TABLET: 24; 26 TABLET, FILM COATED ORAL at 08:37

## 2020-01-31 RX ADMIN — SACUBITRIL AND VALSARTAN 1 TABLET: 24; 26 TABLET, FILM COATED ORAL at 21:39

## 2020-01-31 RX ADMIN — LORAZEPAM 0.5 MG: 0.5 TABLET ORAL at 21:45

## 2020-01-31 RX ADMIN — HYDROMORPHONE HYDROCHLORIDE 0.5 MG: 1 INJECTION, SOLUTION INTRAMUSCULAR; INTRAVENOUS; SUBCUTANEOUS at 03:14

## 2020-01-31 RX ADMIN — POLYMYXIN B SULFATE, TRIMETHOPRIM SULFATE 1 DROP: 10000; 1 SOLUTION/ DROPS OPHTHALMIC at 00:17

## 2020-01-31 RX ADMIN — ENOXAPARIN SODIUM 30 MG: 30 INJECTION SUBCUTANEOUS at 11:20

## 2020-01-31 RX ADMIN — TAMSULOSIN HYDROCHLORIDE 0.4 MG: 0.4 CAPSULE ORAL at 21:44

## 2020-01-31 RX ADMIN — POLYMYXIN B SULFATE, TRIMETHOPRIM SULFATE 1 DROP: 10000; 1 SOLUTION/ DROPS OPHTHALMIC at 11:20

## 2020-02-01 ENCOUNTER — APPOINTMENT (OUTPATIENT)
Dept: CARDIOLOGY | Facility: HOSPITAL | Age: 85
End: 2020-02-01

## 2020-02-01 LAB
ANION GAP SERPL CALCULATED.3IONS-SCNC: 6 MMOL/L (ref 5–15)
BASOPHILS # BLD AUTO: 0.03 10*3/MM3 (ref 0–0.2)
BASOPHILS NFR BLD AUTO: 0.7 % (ref 0–1.5)
BH CV ECHO MEAS - AO MAX PG (FULL): 7.1 MMHG
BH CV ECHO MEAS - AO MAX PG: 8.1 MMHG
BH CV ECHO MEAS - AO MEAN PG (FULL): 3 MMHG
BH CV ECHO MEAS - AO MEAN PG: 3 MMHG
BH CV ECHO MEAS - AO ROOT AREA (BSA CORRECTED): 2.8
BH CV ECHO MEAS - AO ROOT AREA: 15.6 CM^2
BH CV ECHO MEAS - AO ROOT DIAM: 4.5 CM
BH CV ECHO MEAS - AO V2 MAX: 142 CM/SEC
BH CV ECHO MEAS - AO V2 MEAN: 79.6 CM/SEC
BH CV ECHO MEAS - AO V2 VTI: 15 CM
BH CV ECHO MEAS - AVA(I,A): 1.8 CM^2
BH CV ECHO MEAS - AVA(I,D): 1.8 CM^2
BH CV ECHO MEAS - AVA(V,A): 1.3 CM^2
BH CV ECHO MEAS - AVA(V,D): 1.3 CM^2
BH CV ECHO MEAS - BSA(HAYCOCK): 1.5 M^2
BH CV ECHO MEAS - BSA: 1.6 M^2
BH CV ECHO MEAS - BZI_BMI: 13.4 KILOGRAMS/M^2
BH CV ECHO MEAS - BZI_METRIC_HEIGHT: 185.4 CM
BH CV ECHO MEAS - BZI_METRIC_WEIGHT: 45.9 KG
BH CV ECHO MEAS - EDV(CUBED): 196.1 ML
BH CV ECHO MEAS - EDV(MOD-SP4): 248 ML
BH CV ECHO MEAS - EDV(TEICH): 167.2 ML
BH CV ECHO MEAS - EF(CUBED): 23.2 %
BH CV ECHO MEAS - EF(MOD-SP4): 24.6 %
BH CV ECHO MEAS - EF(TEICH): 18.4 %
BH CV ECHO MEAS - ESV(CUBED): 150.6 ML
BH CV ECHO MEAS - ESV(MOD-SP4): 187 ML
BH CV ECHO MEAS - ESV(TEICH): 136.5 ML
BH CV ECHO MEAS - FS: 8.4 %
BH CV ECHO MEAS - IVS/LVPW: 1.1
BH CV ECHO MEAS - IVSD: 1.5 CM
BH CV ECHO MEAS - LA DIMENSION: 5.6 CM
BH CV ECHO MEAS - LA/AO: 1.3
BH CV ECHO MEAS - LAT PEAK E' VEL: 3.6 CM/SEC
BH CV ECHO MEAS - LV DIASTOLIC VOL/BSA (35-75): 154 ML/M^2
BH CV ECHO MEAS - LV MASS(C)D: 396.7 GRAMS
BH CV ECHO MEAS - LV MASS(C)DI: 246.3 GRAMS/M^2
BH CV ECHO MEAS - LV MAX PG: 0.92 MMHG
BH CV ECHO MEAS - LV MEAN PG: 0 MMHG
BH CV ECHO MEAS - LV SYSTOLIC VOL/BSA (12-30): 116.1 ML/M^2
BH CV ECHO MEAS - LV V1 MAX: 48 CM/SEC
BH CV ECHO MEAS - LV V1 MEAN: 26 CM/SEC
BH CV ECHO MEAS - LV V1 VTI: 7.1 CM
BH CV ECHO MEAS - LVIDD: 5.8 CM
BH CV ECHO MEAS - LVIDS: 5.3 CM
BH CV ECHO MEAS - LVLD AP4: 9.2 CM
BH CV ECHO MEAS - LVLS AP4: 8.7 CM
BH CV ECHO MEAS - LVOT AREA (M): 3.8 CM^2
BH CV ECHO MEAS - LVOT AREA: 3.8 CM^2
BH CV ECHO MEAS - LVOT DIAM: 2.2 CM
BH CV ECHO MEAS - LVPWD: 1.4 CM
BH CV ECHO MEAS - MED PEAK E' VEL: 2.9 CM/SEC
BH CV ECHO MEAS - MV DEC SLOPE: 1267 CM/SEC^2
BH CV ECHO MEAS - MV DEC TIME: 0.14 SEC
BH CV ECHO MEAS - MV E MAX VEL: 122 CM/SEC
BH CV ECHO MEAS - MV P1/2T MAX VEL: 155 CM/SEC
BH CV ECHO MEAS - MV P1/2T: 35.8 MSEC
BH CV ECHO MEAS - MVA P1/2T LCG: 1.4 CM^2
BH CV ECHO MEAS - MVA(P1/2T): 6.1 CM^2
BH CV ECHO MEAS - PA MAX PG: 3 MMHG
BH CV ECHO MEAS - PA V2 MAX: 86.9 CM/SEC
BH CV ECHO MEAS - PI END-D VEL: 193 CM/SEC
BH CV ECHO MEAS - RAP SYSTOLE: 5 MMHG
BH CV ECHO MEAS - RVSP: 41.7 MMHG
BH CV ECHO MEAS - SI(AO): 144.8 ML/M^2
BH CV ECHO MEAS - SI(CUBED): 28.3 ML/M^2
BH CV ECHO MEAS - SI(LVOT): 16.7 ML/M^2
BH CV ECHO MEAS - SI(MOD-SP4): 37.9 ML/M^2
BH CV ECHO MEAS - SI(TEICH): 19.1 ML/M^2
BH CV ECHO MEAS - SV(AO): 233.3 ML
BH CV ECHO MEAS - SV(CUBED): 45.6 ML
BH CV ECHO MEAS - SV(LVOT): 26.8 ML
BH CV ECHO MEAS - SV(MOD-SP4): 61 ML
BH CV ECHO MEAS - SV(TEICH): 30.7 ML
BH CV ECHO MEAS - TR MAX VEL: 303 CM/SEC
BH CV ECHO MEASUREMENTS AVERAGE E/E' RATIO: 37.54
BUN BLD-MCNC: 23 MG/DL (ref 8–23)
BUN/CREAT SERPL: 14.6 (ref 7–25)
CALCIUM SPEC-SCNC: 8.4 MG/DL (ref 8.6–10.5)
CHLORIDE SERPL-SCNC: 102 MMOL/L (ref 98–107)
CO2 SERPL-SCNC: 32 MMOL/L (ref 22–29)
CREAT BLD-MCNC: 1.58 MG/DL (ref 0.76–1.27)
DEPRECATED RDW RBC AUTO: 48.9 FL (ref 37–54)
EOSINOPHIL # BLD AUTO: 0.39 10*3/MM3 (ref 0–0.4)
EOSINOPHIL NFR BLD AUTO: 9.6 % (ref 0.3–6.2)
ERYTHROCYTE [DISTWIDTH] IN BLOOD BY AUTOMATED COUNT: 14.2 % (ref 12.3–15.4)
GFR SERPL CREATININE-BSD FRML MDRD: 42 ML/MIN/1.73
GLUCOSE BLD-MCNC: 107 MG/DL (ref 65–99)
HCT VFR BLD AUTO: 35.1 % (ref 37.5–51)
HGB BLD-MCNC: 11 G/DL (ref 13–17.7)
IMM GRANULOCYTES # BLD AUTO: 0.01 10*3/MM3 (ref 0–0.05)
IMM GRANULOCYTES NFR BLD AUTO: 0.2 % (ref 0–0.5)
LEFT ATRIUM VOLUME INDEX: 68 ML/M2
LYMPHOCYTES # BLD AUTO: 0.72 10*3/MM3 (ref 0.7–3.1)
LYMPHOCYTES NFR BLD AUTO: 17.7 % (ref 19.6–45.3)
MAXIMAL PREDICTED HEART RATE: 135 BPM
MCH RBC QN AUTO: 29.4 PG (ref 26.6–33)
MCHC RBC AUTO-ENTMCNC: 31.3 G/DL (ref 31.5–35.7)
MCV RBC AUTO: 93.9 FL (ref 79–97)
MONOCYTES # BLD AUTO: 0.39 10*3/MM3 (ref 0.1–0.9)
MONOCYTES NFR BLD AUTO: 9.6 % (ref 5–12)
NEUTROPHILS # BLD AUTO: 2.52 10*3/MM3 (ref 1.7–7)
NEUTROPHILS NFR BLD AUTO: 62.2 % (ref 42.7–76)
NRBC BLD AUTO-RTO: 0 /100 WBC (ref 0–0.2)
PLATELET # BLD AUTO: 88 10*3/MM3 (ref 140–450)
PMV BLD AUTO: 12.9 FL (ref 6–12)
POTASSIUM BLD-SCNC: 3.9 MMOL/L (ref 3.5–5.2)
RBC # BLD AUTO: 3.74 10*6/MM3 (ref 4.14–5.8)
SODIUM BLD-SCNC: 140 MMOL/L (ref 136–145)
STRESS TARGET HR: 115 BPM
WBC NRBC COR # BLD: 4.06 10*3/MM3 (ref 3.4–10.8)

## 2020-02-01 PROCEDURE — 80048 BASIC METABOLIC PNL TOTAL CA: CPT | Performed by: SPECIALIST

## 2020-02-01 PROCEDURE — 99232 SBSQ HOSP IP/OBS MODERATE 35: CPT | Performed by: INTERNAL MEDICINE

## 2020-02-01 PROCEDURE — 85025 COMPLETE CBC W/AUTO DIFF WBC: CPT | Performed by: SPECIALIST

## 2020-02-01 PROCEDURE — 93306 TTE W/DOPPLER COMPLETE: CPT

## 2020-02-01 PROCEDURE — 25010000002 ENOXAPARIN PER 10 MG: Performed by: SPECIALIST

## 2020-02-01 PROCEDURE — 93356 MYOCRD STRAIN IMG SPCKL TRCK: CPT

## 2020-02-01 PROCEDURE — 93356 MYOCRD STRAIN IMG SPCKL TRCK: CPT | Performed by: INTERNAL MEDICINE

## 2020-02-01 PROCEDURE — 25010000002 PERFLUTREN 6.52 MG/ML SUSPENSION: Performed by: SPECIALIST

## 2020-02-01 PROCEDURE — 93306 TTE W/DOPPLER COMPLETE: CPT | Performed by: INTERNAL MEDICINE

## 2020-02-01 RX ORDER — BUMETANIDE 0.5 MG/1
0.5 TABLET ORAL DAILY
Status: DISCONTINUED | OUTPATIENT
Start: 2020-02-01 | End: 2020-02-02

## 2020-02-01 RX ADMIN — LORAZEPAM 0.5 MG: 0.5 TABLET ORAL at 20:56

## 2020-02-01 RX ADMIN — POLYMYXIN B SULFATE, TRIMETHOPRIM SULFATE 1 DROP: 10000; 1 SOLUTION/ DROPS OPHTHALMIC at 18:28

## 2020-02-01 RX ADMIN — PERFLUTREN 8.48 MG: 6.52 INJECTION, SUSPENSION INTRAVENOUS at 14:38

## 2020-02-01 RX ADMIN — SACUBITRIL AND VALSARTAN 1 TABLET: 24; 26 TABLET, FILM COATED ORAL at 09:10

## 2020-02-01 RX ADMIN — SACUBITRIL AND VALSARTAN 1 TABLET: 24; 26 TABLET, FILM COATED ORAL at 20:56

## 2020-02-01 RX ADMIN — LORAZEPAM 0.5 MG: 0.5 TABLET ORAL at 09:10

## 2020-02-01 RX ADMIN — ENOXAPARIN SODIUM 30 MG: 30 INJECTION SUBCUTANEOUS at 11:37

## 2020-02-01 RX ADMIN — HYDROCODONE BITARTRATE AND ACETAMINOPHEN 1 TABLET: 5; 325 TABLET ORAL at 20:56

## 2020-02-01 RX ADMIN — HYDROCODONE BITARTRATE AND ACETAMINOPHEN 1 TABLET: 5; 325 TABLET ORAL at 08:11

## 2020-02-01 RX ADMIN — METOPROLOL SUCCINATE 25 MG: 25 TABLET, EXTENDED RELEASE ORAL at 20:56

## 2020-02-01 RX ADMIN — TAMSULOSIN HYDROCHLORIDE 0.4 MG: 0.4 CAPSULE ORAL at 20:56

## 2020-02-01 RX ADMIN — POLYMYXIN B SULFATE, TRIMETHOPRIM SULFATE 1 DROP: 10000; 1 SOLUTION/ DROPS OPHTHALMIC at 06:10

## 2020-02-01 RX ADMIN — BUMETANIDE 0.5 MG: 0.5 TABLET ORAL at 11:37

## 2020-02-01 RX ADMIN — HYDROCODONE BITARTRATE AND ACETAMINOPHEN 1 TABLET: 5; 325 TABLET ORAL at 16:17

## 2020-02-01 RX ADMIN — POLYMYXIN B SULFATE, TRIMETHOPRIM SULFATE 1 DROP: 10000; 1 SOLUTION/ DROPS OPHTHALMIC at 11:37

## 2020-02-01 NOTE — PROGRESS NOTES
Cheri Paredes MD FACS  Progress Note     LOS: 5 days   Patient Care Team:  Alison Hercules MD as PCP - General (Internal Medicine)  Alison Hercules MD as Referring Physician (Internal Medicine)      Subjective     Interval History:      wants to eat.  +flatus +bm     Objective     Vital Signs  Temp:  [97.3 °F (36.3 °C)-98.3 °F (36.8 °C)] 97.3 °F (36.3 °C)  Heart Rate:  [] 94  Resp:  [16-20] 18  BP: ()/(48-94) 90/62    Physical Exam:  General appearance - alert, well appearing, and in no distress  Abdomen - soft, appropriately tender, clean, nondistended      Results Review:    Lab Results (last 24 hours)     Procedure Component Value Units Date/Time    Basic Metabolic Panel [[id number]]  (Abnormal) Collected:  01/31/20 0531    Specimen:  Blood Updated:  01/31/20 0614     Glucose 92 mg/dL      BUN 25 mg/dL      Creatinine 1.89 mg/dL      Sodium 146 mmol/L      Potassium 3.9 mmol/L      Chloride 107 mmol/L      CO2 33.0 mmol/L      Calcium 8.3 mg/dL      eGFR Non African Amer 34 mL/min/1.73      BUN/Creatinine Ratio 13.2     Anion Gap 6.0 mmol/L     Narrative:       GFR Normal >60  Chronic Kidney Disease <60  Kidney Failure <15      CBC & Differential [[id number]] Collected:  01/31/20 0531    Specimen:  Blood Updated:  01/31/20 0607    Narrative:       The following orders were created for panel order CBC & Differential.  Procedure                               Abnormality         Status                     ---------                               -----------         ------                     CBC Auto Differential[[id number]]        Abnormal            Final result                 Please view results for these tests on the individual orders.    CBC Auto Differential [[id number]]  (Abnormal) Collected:  01/31/20 0531    Specimen:  Blood Updated:  01/31/20 0607     WBC 3.45 10*3/mm3      RBC 3.59 10*6/mm3      Hemoglobin 10.3 g/dL      Hematocrit 33.8 %      MCV 94.2 fL      MCH 28.7 pg      MCHC 30.5 g/dL       RDW 14.4 %      RDW-SD 50.0 fl      MPV 13.0 fL      Platelets 88 10*3/mm3      Neutrophil % 63.8 %      Lymphocyte % 18.8 %      Monocyte % 9.6 %      Eosinophil % 7.2 %      Basophil % 0.6 %      Immature Grans % 0.0 %      Neutrophils, Absolute 2.20 10*3/mm3      Lymphocytes, Absolute 0.65 10*3/mm3      Monocytes, Absolute 0.33 10*3/mm3      Eosinophils, Absolute 0.25 10*3/mm3      Basophils, Absolute 0.02 10*3/mm3      Immature Grans, Absolute 0.00 10*3/mm3      nRBC 0.0 /100 WBC         Imaging Results (Last 24 Hours)     ** No results found for the last 24 hours. **            Assessment/Plan       Fulls as tolerated.      Cheri Paredes MD  01/31/20  6:48 PM

## 2020-02-01 NOTE — PLAN OF CARE
Problem: Patient Care Overview  Goal: Plan of Care Review  Outcome: Ongoing (interventions implemented as appropriate)  Flowsheets  Taken 2/1/2020 0550 by Nelia Galo RNA  Progress: no change  Outcome Summary: pt sat in the chair for 2 hours before bed. medicated once for pain. vss. one bm. will continue to monitor.  Taken 1/31/2020 1505 by Lyric Houser RN  Plan of Care Reviewed With: patient

## 2020-02-01 NOTE — PROGRESS NOTES
[facility] Medicine Services  INPATIENT PROGRESS NOTE    Length of Stay: 6  Date of Admission: 1/26/2020  Primary Care Physician: Alison Hercules MD    Subjective   Chief Complaint: Follow-up hypertension  HPI   Lying in bed.  Daughter in room.  He is asking for pain medication.  He reports he takes pain tablets 3 times a day.  He denies shortness of breath.  He does not wear oxygen at home.  Denies chest tightness.  Loera catheter in place with clear urine return.  Discussed with daughter and patient removing Loera catheter and daughter prefers to talk to her brother who works with urology prior to making decision.  Daughter reports he had a bowel movement yesterday.  Creatinine improved to 1.58.  Ambulated in fu[place] yesterday.      Review of Systems     All pertinent negatives and positives are as above. All other systems have been reviewed and are negative unless otherwise stated.     Objective    Temp:  [97.3 °F (36.3 °C)-98.1 °F (36.7 °C)] 98.1 °F (36.7 °C)  Heart Rate:  [] 113  Resp:  [16-20] 16  BP: ()/(50-94) 91/55  Physical Exam   Constitutional: He is oriented to person, place, and time.   No acute distress.  Lying in bed.  Daughter in room.   HENT:   Head: Normocephalic and atraumatic.   Eyes: Pupils are equal, round, and reactive to light. EOM are normal.   Neck: Normal range of motion. Neck supple.   Cardiovascular: Normal rate and regular rhythm. Exam reveals no gallop and no friction rub.   Murmur heard.  Paced rhythm 100 on telemetry   Pulmonary/Chest: He has no wheezes. He has no rales.   No oxygen in use.   Abdominal: Soft. Bowel sounds are normal. He exhibits no distension. There is tenderness (Abdominal incisions).   Genitourinary:   Genitourinary Comments: Loera catheter in place with clear urine return   Musculoskeletal: Normal range of motion. He exhibits edema (Trace pedal edema). He exhibits no tenderness.   Neurological: He is alert and oriented  to person, place, and time.   Skin: Skin is warm and dry. No erythema.   Psychiatric: He has a normal mood and affect. His behavior is normal. Judgment and thought content normal.     Results Review:  I have reviewed the labs, radiology results, and diagnostic studies.    Laboratory Data:   Results from last 7 days   Lab Units 02/01/20  0539 01/31/20  0531 01/30/20  0612   WBC 10*3/mm3 4.06 3.45 4.90   HEMOGLOBIN g/dL 11.0* 10.3* 11.4*   HEMATOCRIT % 35.1* 33.8* 37.9   PLATELETS 10*3/mm3 88* 88* 99*        Results from last 7 days   Lab Units 02/01/20  0539 01/31/20  0531 01/30/20  0612  01/27/20  0500 01/26/20  1224   SODIUM mmol/L 140 146* 148*   < > 146* 146*   POTASSIUM mmol/L 3.9 3.9 4.2   < > 4.4 3.8   CHLORIDE mmol/L 102 107 109*   < > 107 104   CO2 mmol/L 32.0* 33.0* 33.0*   < > 31.0* 27.0   BUN mg/dL 23 25* 27*   < > 32* 31*   CREATININE mg/dL 1.58* 1.89* 1.79*   < > 2.06* 2.07*   CALCIUM mg/dL 8.4* 8.3* 8.8   < > 8.9 9.7   BILIRUBIN mg/dL  --   --   --   --  0.8 1.0   ALK PHOS U/L  --   --   --   --  78 97   ALT (SGPT) U/L  --   --   --   --  <5 <5   AST (SGOT) U/L  --   --   --   --  13 14   GLUCOSE mg/dL 107* 92 120*   < > 153* 165*    < > = values in this interval not displayed.     Urine Culture   Date Value Ref Range Status   01/26/2020 No growth  Final     Imaging Results (All)     Procedure Component Value Units Date/Time    XR Chest 1 View [[id number]] Collected:  01/30/20 1030     Updated:  01/30/20 1033    Narrative:       XR CHEST 1 VW- 1/30/2020 9:20 AM CST     HISTORY: Shortness of air; K56.609-Unspecified intestinal obstruction,  unspecified as to partial versus complete obstruction; N18.9-Chronic  kidney disease, unspecified       COMPARISON: 04/24/2018.     FINDINGS:   Stable granulomatous calcifications are noted. There is consolidation in  the RIGHT lower lobe. The cardiomediastinal silhouette and pulmonary  vascularity are unchanged. Pacemaker leads and sternotomy sutures are  again  noted.     The osseous structures and surrounding soft tissues demonstrate no acute  abnormality.       Impression:       1. RIGHT lower lobe consolidation could be due to pneumonia or  aspiration.        This report was finalized on 01/30/2020 10:30 by Dr. Fuad Osorio MD.    XR Abdomen Flat & Upright [[id number]] Collected:  01/28/20 1030     Updated:  01/28/20 1035    Narrative:       XR ABDOMEN FLAT AND UPRIGHT- 1/28/2020 10:15 AM CST     HISTORY: Small bowel obstruction; K56.609-Unspecified intestinal  obstruction, unspecified as to partial versus complete obstruction;  N18.9-Chronic kidney disease, unspecified     COMPARISON: 01/27/2020     FINDINGS:  The enteric tube is in the stomach. Pacemaker leads are unchanged. There  is no evidence of free intraperitoneal air. Distended loops of small  bowel measure up to 5 cm. Cholelithiasis and atherosclerosis are noted.     The osseous structures demonstrate no acute process.       Impression:       Findings suspicious for persistent small bowel obstruction with dilated  loops measuring up to 5 cm. There is no definite contrast in the colon.     Cholelithiasis.  This report was finalized on 01/28/2020 10:32 by Dr. Fuad Osorio MD.    FL Small Bowel Follow Through Single-Contrast [[id number]] Collected:  01/27/20 2120     Updated:  01/27/20 2127    Narrative:       EXAMINATION: Small bowel follow-through 01/27/2020     HISTORY: Small bowel obstruction     FINDINGS: A small bowel follow-through was performed with administration  of Gastrografin through the patient's indwelling NG tube. On the initial  films there is only mild distention of the proximal jejunum. However, on  subsequent films obtained out to 4.5 hours there is progressive  distention of the jejunum including the more proximal previously  nondistended small bowel loops of the proximal jejunum. At 4.5 hours  contrast does not make its way into the colon. There is progressive  distention of the small  bowel again suggesting a mechanical small bowel  obstruction. I would suggest a KUB radiograph in the morning to assess  as to whether any contrast makes its way through the small bowel into  the colon.       Impression:       1.. Progressive small bowel distention on subsequent overhead films  following administration of Gastrografin through the patient's  indwelling NG tube. FINDINGS would suggest a high-grade mechanical  obstruction with no contrast having made its way into the colon by 4 1/2  hours. A follow-up KUB radiograph would be recommended in the morning to  assess as to whether any contrast does eventually make its way into the  colon..  This report was finalized on 01/27/2020 21:24 by Dr. Pierre Guan MD.    XR Abdomen Flat & Upright [[id number]] Collected:  01/27/20 1028     Updated:  01/27/20 1039    Narrative:       EXAM: XR ABDOMEN FLAT AND UPRIGHT- - 1/27/2020 9:43 AM CST     HISTORY: SBO; K56.609-Unspecified intestinal obstruction, unspecified as  to partial versus complete obstruction; N18.9-Chronic kidney disease,  unspecified       COMPARISON: 01/26/2020.      TECHNIQUE:  2 images.  Upright and supine views of the abdomen     FINDINGS:    No evidence of free air on upright image.     Increased caliber of dilated gas-filled loops of bowel now measuring up  to 3.9 cm.     Gastric tube tip projects at the gastric body. Partially visualized  cardiac pulse generator wires, sternotomy, valve replacement.  Degenerative changes in the spine.     Streaky linear opacity at the right lung base, not optimally evaluated  on this exam, but could be seen with atelectasis.       Impression:       1. Increased caliber of dilated gas-filled loops of bowel, now measure  up to 3.9 cm. This is concerning for worsening small bowel obstruction.  2. Gastric tube tip projects at the gastric body.  This report was finalized on 01/27/2020 10:35 by Dr Manisha Jefferson MD.    XR Abdomen KUB [[id number]] Collected:   01/26/20 1506     Updated:  01/26/20 1512    Narrative:       EXAM: XR ABDOMEN KUB- - 1/26/2020 2:58 PM CST     HISTORY: verify NG tube placement; K56.609-Unspecified intestinal  obstruction, unspecified as to partial versus complete obstruction;  N18.9-Chronic kidney disease, unspecified       COMPARISON: 01/26/2020.      TECHNIQUE:  1 images.  Limited field of view of the lower chest and  upper abdomen.     FINDINGS:    Gastric tube tip projects at the distal gastric body.  Dilated  gas-filled loops of small bowel. Round calcifications of the right upper  quadrant, correlates with gallstones on recent CT.      Limited evaluation of the lung bases. Partially visualized cardiac pulse  generator. Changes of valve replacement.          Impression:       1. Gastric tube tip projects at the distal stomach.  2. Dilated gas-filled loops of bowel. Concerning for bowel obstruction.  See separately dictated CT of the same day.  3. Gallstones.  This report was finalized on 01/26/2020 15:09 by Dr Manisha Jefferson MD.    CT Abdomen Pelvis Without Contrast [[id number]] Collected:  01/26/20 1342     Updated:  01/26/20 1357    Narrative:       EXAM: CT ABDOMEN PELVIS WO CONTRAST- - 1/26/2020 1:16 PM CST     HISTORY: Abdominal pain, fever, abscess suspected. Technologist obtained  history includes back pain, decreased renal function.     COMPARISON: None.      DOSE LENGTH PRODUCT: 289 mGy cm. Automatic exposure control was utilized  to make radiation dose as low as reasonably achievable.     TECHNIQUE: Unenhanced axial images of the abdomen and pelvis obtained  with coronal and sagittal reformats.     FINDINGS: Evaluation limited secondary to lack of intravenous contrast  agent.      VISUALIZED CHEST: Trace right pleural effusion. Right lung base with  linear and groundglass opacity, may represent atelectasis. Left lung  bases clear. Cardiomegaly. No pericardial effusion. Cardiac pulse  generator wires. Ranges of valve replacement.  Coronary artery densities,  may representing calcified atherosclerosis with or without stent.     LIVER: The liver appears shrunken. Small perihepatic fluid.     BILIARY: Gallstones in the gallbladder. No bile duct dilation.     PANCREAS: Atrophic.     SPLEEN: Normal size.     ADRENAL: Normal appearance of the bilateral adrenal glands.     GENITOURINARY:   Bilateral renal lesions, which appear predominantly low density,  incompletely evaluated without intravenous contrast. No hydronephrosis.  No urolithiasis.   Urinary bladder is within normal limits.  Markedly enlarged prostate with mass effect on the base of the urinary  bladder.     PERITONEUM: No free air or ascites.     GI TRACT: Normal configuration of the stomach and duodenum.  Numerous  colonic diverticula. No evidence of acute diverticulitis. The appendix  is not discretely visualized. No secondary signs of appendicitis.     Fluid-filled loops of small bowel in the left midabdomen. There is small  bowel between the pancreas and colon as well as anterior to the colon.  There is a tethered appearance to the edematous mesentery, suggesting  internal hernia, such as on coronal image 55. Suspected transition point  on coronal image 43. There may be a closed loop component to the  obstruction.     VESSELS: Infrarenal aorta measures up to 4.2 cm. Diffuse calcified  atherosclerosis. Limited evaluation of vascular patency without  intravenous contrast.     RETROPERITONEUM: No retroperitoneal adenopathy.     SOFT TISSUES: Small fat-containing umbilical hernia.     BONES: No acute or aggressive bony lesion.           Impression:       1. Findings of small bowel obstruction. Suspect internal hernia at the  left abdomen with a closed-loop component to the obstruction.  2. Infrarenal abdominal aorta measuring 4.2 cm.  3. Gallstones and gallbladder.  4. Markedly enlarged prostate.  5. Bilateral renal lesions, incompletely evaluated on this exam. Some of  them correlate  with renal cysts on prior ultrasound 09/09/2017. Some too  small to further characterize.  6. Cardiomegaly with coronary artery densities may representing  combination of calcified atherosclerosis and stent.     Results communicated by telephone to Dr. Manisha Enciso at 1:54 PM on  01/26/2020.  This report was finalized on 01/26/2020 13:54 by Dr Manisha Jefferson MD.          Intake/Output    Intake/Output Summary (Last 24 hours) at 2/1/2020 0759  Last data filed at 2/1/2020 0600  Gross per 24 hour   Intake --   Output 575 ml   Net -575 ml       Scheduled Meds    enoxaparin 30 mg Subcutaneous Q24H   finasteride 5 mg Oral Daily   gabapentin 300 mg Oral Daily   gabapentin 600 mg Oral Nightly   HYDROcodone-acetaminophen 1 tablet Oral BID   LORazepam 0.5 mg Oral TID   metoprolol succinate XL 25 mg Oral Nightly   sacubitril-valsartan 1 tablet Oral Q12H   tamsulosin 0.4 mg Oral Nightly   trimethoprim-polymyxin b 1 drop Right Eye Q6H       I have reviewed the patient current medications.     Assessment/Plan     Active Hospital Problems    Diagnosis   • **SBO (small bowel obstruction) (CMS/HCC)   • Urinary retention due to benign prostatic hyperplasia   • Lactic acidosis   • Paroxysmal atrial fibrillation (CMS/HCC)   • Stage 4 chronic kidney disease (CMS/HCC)   • Biventricular ICD (implantable cardioverter-defibrillator) in place   • Essential hypertension   • Chronic systolic congestive heart failure (CMS/HCC)     LVEF 35% on last echo here in 2017, but was reported as 25% on echocardiogram performed during recent admission at [facility] in September 2018; has BiV-ICD     • Coronary artery disease involving coronary bypass graft of native heart without angina pectoris     MI x2  Single vessel CABG 2008       Plan:  1.  Laparoscopic lysis of adhesions 1/28/2020.  Clear liquid diet per Dr. Almeida.  2.  Loera catheter reinserted 1/30 due to urinary retention.  Discussed option of removing Loera catheter for voiding trial prior to  discharge.  Daughter wants to discuss further with her brother before making decision.  Finasteride and tamsulosin resumed, home medications.  3.  Ventricular paced rhythm.  Cardiology following.  Entresto and metoprolol continued.  IV fluids off.  Trace ankle edema.  Cardiology holding diuresis for now.  Takes Lasix as needed at home.  4.  Creatinine continues to improve.  Creatinine 2.06 on admission and trended down to 1.58.  Baseline chronic kidney disease stage IV.  BMP tomorrow.  5.  Incentive spirometry.  Chest x-ray 1/30 right lower lobe consolidation.  Suspect edema as opposed to pneumonia.  Afebrile, white blood cell count normal.  No cough or congestion.  Continue off antibiotics. Out of bed and ambulate.   6.  Home medications reviewed    The above documentation resulted from a face-to-face encounter by me Marj ROMEO, [facility].      Discharge Planning: I expect patient to be discharged to home per Dr. Almeida.    OUSMANE Lemos   02/01/20   7:59 AM    I personally evaluated and examined the patient in conjunction with OUSMANE Arana and agree with the assessment, treatment plan, and disposition of the patient as recorded by her. My history, exam, and further recommendations are:     Patient seen and examined.  Still some abdominal soreness but no overt complaints.  Denies chest pain or shortness of breath.  No nausea or vomiting.    GEN: Awake, alert, interactive, in NAD  HEENT: Atraumatic, PERRLA, EOMI, Anicteric, Trachea midline  Lungs: CTAB, no wheezing/rales/rhonchi  Heart: tachy, +S1/s2,   ABD: soft, distended, +BS, lap incisions clean  Neuro: AAOx3, no focal deficits    Patient was given a dose of diuretic earlier and his blood pressures have been somewhat low since then but he is relatively asymptomatic.  Continue to monitor closely.  Also noticed a slow drift in his platelet count over the last several days.  No obvious signs of bleeding and his H&H is stable.  Okay to  continue Lovenox for now but may need to hold and use SCDs instead soon if platelets get much lower.  Would recommend leaving Loera catheter in place through discharge and let patient follow-up with Dr. Shukla in the outpatient setting for voiding trials removal.    Geronimo Pulido DO  02/01/20  6:27 PM

## 2020-02-01 NOTE — PROGRESS NOTES
Cheri Paredes MD FACS  Progress Note     LOS: 6 days   Patient Care Team:  Alison Hercules MD as PCP - General (Internal Medicine)  Alison Hercules MD as Referring Physician (Internal Medicine)      Subjective     Interval History:      brian fulls.  Loose stools.  No pain  No nausea     Objective     Vital Signs  Temp:  [98.1 °F (36.7 °C)-98.3 °F (36.8 °C)] 98.3 °F (36.8 °C)  Heart Rate:  [] 112  Resp:  [16-18] 16  BP: (90-97)/(55-67) 91/62    Physical Exam:  General appearance - alert, well appearing, and in no distress  Abdomen - soft, nontender, nondistended      Results Review:    Lab Results (last 24 hours)     Procedure Component Value Units Date/Time    Basic Metabolic Panel [[id number]]  (Abnormal) Collected:  02/01/20 0539    Specimen:  Blood Updated:  02/01/20 0618     Glucose 107 mg/dL      BUN 23 mg/dL      Creatinine 1.58 mg/dL      Sodium 140 mmol/L      Potassium 3.9 mmol/L      Chloride 102 mmol/L      CO2 32.0 mmol/L      Calcium 8.4 mg/dL      eGFR Non African Amer 42 mL/min/1.73      BUN/Creatinine Ratio 14.6     Anion Gap 6.0 mmol/L     Narrative:       GFR Normal >60  Chronic Kidney Disease <60  Kidney Failure <15      CBC & Differential [[id number]] Collected:  02/01/20 0539    Specimen:  Blood Updated:  02/01/20 0610    Narrative:       The following orders were created for panel order CBC & Differential.  Procedure                               Abnormality         Status                     ---------                               -----------         ------                     CBC Auto Differential[[id number]]        Abnormal            Final result                 Please view results for these tests on the individual orders.    CBC Auto Differential [[id number]]  (Abnormal) Collected:  02/01/20 0539    Specimen:  Blood Updated:  02/01/20 0610     WBC 4.06 10*3/mm3      RBC 3.74 10*6/mm3      Hemoglobin 11.0 g/dL      Hematocrit 35.1 %      MCV 93.9 fL      MCH 29.4 pg      MCHC 31.3 g/dL        RDW 14.2 %      RDW-SD 48.9 fl      MPV 12.9 fL      Platelets 88 10*3/mm3      Neutrophil % 62.2 %      Lymphocyte % 17.7 %      Monocyte % 9.6 %      Eosinophil % 9.6 %      Basophil % 0.7 %      Immature Grans % 0.2 %      Neutrophils, Absolute 2.52 10*3/mm3      Lymphocytes, Absolute 0.72 10*3/mm3      Monocytes, Absolute 0.39 10*3/mm3      Eosinophils, Absolute 0.39 10*3/mm3      Basophils, Absolute 0.03 10*3/mm3      Immature Grans, Absolute 0.01 10*3/mm3      nRBC 0.0 /100 WBC         Imaging Results (Last 24 Hours)     ** No results found for the last 24 hours. **            Assessment/Plan       ADAT      Cheri Paredes MD  02/01/20  11:03 AM

## 2020-02-01 NOTE — PROGRESS NOTES
LOS: 6 days   Patient Care Team:  Alison Hercules MD as PCP - General (Internal Medicine)  Alison Hercules MD as Referring Physician (Internal Medicine)    Chief Complaint:   SBO (small bowel obstruction) (CMS/HCC)    Coronary artery disease involving coronary bypass graft of native heart without angina pectoris    Chronic systolic congestive heart failure (CMS/HCC)    Essential hypertension    Biventricular ICD (implantable cardioverter-defibrillator) in place    Stage 4 chronic kidney disease (CMS/HCC)    Lactic acidosis    Paroxysmal atrial fibrillation (CMS/HCC)    Urinary retention due to benign prostatic hyperplasia         Subjective    Wild Bravo is a 85 y.o. malewho is being seen in follow-up.  Feels weak and tired  Shortness of breath  Mild abdominal discomfort  Indwelling Loera's catheter  No chest pain  No new events or occurrences  Ventricular paced rhythm  Family by bedside who are very involved with his care  proBNP has progressively increased and above 5000 now baseline between 2-3000  Oral intake is poor  According to daughter patient has gained weight  No recent weights noted  Creatinine has decreased from 2.07 down to 1.58    Telemetry: no malignant arrhythmia. No significant pauses.    Review of Systems     Constitutional: No chills   Has fatigue   No fever.     HENT: Negative.    Eyes: Negative.      Respiratory: Negative for cough,   No chest wall soreness,   Shortness of breath,   no wheezing, no stridor.      Cardiovascular: As above    Gastrointestinal: Negative for abdominal distention,  No abdominal pain,   No blood in stool,   No constipation,   No diarrhea,   No nausea   No vomiting.     Endocrine: Negative.    Genitourinary: Negative for difficulty urinating, dysuria, flank pain and hematuria.     Musculoskeletal: Negative.    Skin: Negative for rash and wound.   Allergic/Immunologic: Negative.      Neurological: Negative for dizziness, syncope, weakness,   No  light-headedness  No  headaches.     Hematological: Does not bruise/bleed easily.     Psychiatric/Behavioral: Negative for agitation or behavioral problems,   No confusion,   the patient is  nervous/anxious.       History:   Past Medical History:   Diagnosis Date   • Abdominal aortic aneurysm (CMS/HCC)    • Anxiety    • Atrial fibrillation (CMS/HCC)    • Biventricular ICD (implantable cardioverter-defibrillator) in place    • BPH (benign prostatic hypertrophy)    • Carotid artery stenosis    • CHF (congestive heart failure) (CMS/HCC)    • Chronic kidney disease     Chronic kidney disease, stage 4 (severe)   • Chronic systolic (congestive) heart failure (CMS/HCC)     limited echo 7/2016 EF was 40-45%. Patient has BiV AICD   • Coronary arteriosclerosis     MI x2   • Hearing loss    • Iron deficiency anemia    • Ischemic cardiomyopathy    • Mixed hyperlipidemia    • Myocardial infarction (CMS/HCC)    • Stroke (CMS/HCC)      Past Surgical History:   Procedure Laterality Date   • ABLATION OF DYSRHYTHMIC FOCUS  2008    MAZE at time of CABG/MVR   • CARDIAC ABLATION     • CARDIAC CATHETERIZATION     • CARDIAC DEFIBRILLATOR PLACEMENT     • CARDIAC PACEMAKER PLACEMENT     • CARDIOVERSION     • CAROTID ENDARTERECTOMY     • CAROTID ENDARTERECTOMY     • CORONARY ARTERY BYPASS GRAFT  2008    X 1   • CORONARY STENT PLACEMENT  2008    X 2   • LAPAROSCOPIC LYSIS OF ADHESIONS N/A 1/28/2020    Procedure: LAPAROSCOPIC LYSIS OF ADHESIONS;  Surgeon: Kim Almeida MD;  Location: [facility];  Service: General   • MITRAL VALVE REPAIR/REPLACEMENT  2008   • TOTAL KNEE ARTHROPLASTY       Social History     Socioeconomic History   • Marital status:      Spouse name: Not on file   • Number of children: Not on file   • Years of education: Not on file   • Highest education level: Not on file   Tobacco Use   • Smoking status: Former Smoker     Years: 45.00     Types: Cigarettes   • Smokeless tobacco: Never Used   Substance and Sexual  Activity   • Alcohol use: No   • Drug use: No   • Sexual activity: Defer     Family History   Problem Relation Age of Onset   • Stroke Mother    • Heart disease Mother    • Diabetes Father        Labs:  WBC WBC   Date Value Ref Range Status   02/01/2020 4.06 3.40 - 10.80 10*3/mm3 Final   01/31/2020 3.45 3.40 - 10.80 10*3/mm3 Final   01/30/2020 4.90 3.40 - 10.80 10*3/mm3 Final      HGB Hemoglobin   Date Value Ref Range Status   02/01/2020 11.0 (L) 13.0 - 17.7 g/dL Final   01/31/2020 10.3 (L) 13.0 - 17.7 g/dL Final   01/30/2020 11.4 (L) 13.0 - 17.7 g/dL Final      HCT Hematocrit   Date Value Ref Range Status   02/01/2020 35.1 (L) 37.5 - 51.0 % Final   01/31/2020 33.8 (L) 37.5 - 51.0 % Final   01/30/2020 37.9 37.5 - 51.0 % Final      Platelets Platelets   Date Value Ref Range Status   02/01/2020 88 (L) 140 - 450 10*3/mm3 Final   01/31/2020 88 (L) 140 - 450 10*3/mm3 Final   01/30/2020 99 (L) 140 - 450 10*3/mm3 Final      MCV MCV   Date Value Ref Range Status   02/01/2020 93.9 79.0 - 97.0 fL Final   01/31/2020 94.2 79.0 - 97.0 fL Final   01/30/2020 95.2 79.0 - 97.0 fL Final        Results from last 7 days   Lab Units 02/01/20  0539 01/31/20  0531 01/30/20  0612  01/27/20  0500 01/26/20  1224   SODIUM mmol/L 140 146* 148*   < > 146* 146*   POTASSIUM mmol/L 3.9 3.9 4.2   < > 4.4 3.8   CHLORIDE mmol/L 102 107 109*   < > 107 104   CO2 mmol/L 32.0* 33.0* 33.0*   < > 31.0* 27.0   BUN mg/dL 23 25* 27*   < > 32* 31*   CREATININE mg/dL 1.58* 1.89* 1.79*   < > 2.06* 2.07*   CALCIUM mg/dL 8.4* 8.3* 8.8   < > 8.9 9.7   BILIRUBIN mg/dL  --   --   --   --  0.8 1.0   ALK PHOS U/L  --   --   --   --  78 97   ALT (SGPT) U/L  --   --   --   --  <5 <5   AST (SGOT) U/L  --   --   --   --  13 14   GLUCOSE mg/dL 107* 92 120*   < > 153* 165*    < > = values in this interval not displayed.     Lab Results   Component Value Date    TROPONINI 0.03 08/24/2018     PT/INR:  No results found for: PROTIME/No results found for: INR    Imaging Results  (Last 72 Hours)     Procedure Component Value Units Date/Time    XR Chest 1 View [[id number]] Collected:  01/30/20 1030     Updated:  01/30/20 1033    Narrative:       XR CHEST 1 VW- 1/30/2020 9:20 AM CST     HISTORY: Shortness of air; K56.609-Unspecified intestinal obstruction,  unspecified as to partial versus complete obstruction; N18.9-Chronic  kidney disease, unspecified       COMPARISON: 04/24/2018.     FINDINGS:   Stable granulomatous calcifications are noted. There is consolidation in  the RIGHT lower lobe. The cardiomediastinal silhouette and pulmonary  vascularity are unchanged. Pacemaker leads and sternotomy sutures are  again noted.     The osseous structures and surrounding soft tissues demonstrate no acute  abnormality.       Impression:       1. RIGHT lower lobe consolidation could be due to pneumonia or  aspiration.        This report was finalized on 01/30/2020 10:30 by Dr. Fuad Osorio MD.          Objective     Allergies   Allergen Reactions   • Keflex [Cephalexin]      CEPHALOSPORINS       Medication Review: Performed  Current Facility-Administered Medications   Medication Dose Route Frequency Provider Last Rate Last Dose   • bumetanide (BUMEX) tablet 0.5 mg  0.5 mg Oral Daily Karthik Macdonald MD       • enoxaparin (LOVENOX) syringe 30 mg  30 mg Subcutaneous Q24H Kim Almeida MD   30 mg at 01/31/20 1120   • gabapentin (NEURONTIN) capsule 300 mg  300 mg Oral Daily WoeltRuben cardonaia K, APRN   300 mg at 01/30/20 1024   • gabapentin (NEURONTIN) capsule 600 mg  600 mg Oral Nightly Jennie Turner, APRN   600 mg at 01/31/20 2144   • HYDROcodone-acetaminophen (NORCO) 5-325 MG per tablet 1 tablet  1 tablet Oral BID AllyeltJennie cardona K, APRN   1 tablet at 01/31/20 2145   • HYDROcodone-acetaminophen (NORCO) 5-325 MG per tablet 1 tablet  1 tablet Oral Q4H PRN Cheri Paredes MD   1 tablet at 02/01/20 0811   • HYDROmorphone (DILAUDID) injection 0.5 mg  0.5 mg Intravenous Q3H PRN Kim Almeida MD    "0.5 mg at 01/31/20 1614   • LORazepam (ATIVAN) tablet 0.5 mg  0.5 mg Oral TID Ruben Turneria GEM, APRN   0.5 mg at 02/01/20 0910   • metoprolol succinate XL (TOPROL-XL) 24 hr tablet 25 mg  25 mg Oral Nightly Woeltz, Jennie K, APRN   25 mg at 01/31/20 2144   • nitroglycerin (NITROSTAT) SL tablet 0.4 mg  0.4 mg Sublingual Q5 Min PRN Kim Almeida MD       • ondansetron (ZOFRAN) injection 4 mg  4 mg Intravenous Q4H PRN Kim Almeida MD   4 mg at 01/30/20 0834   • phenol (CHLORASEPTIC) 1.4 % liquid 2 spray  2 spray Mouth/Throat Q2H PRN Kim Almeida MD   2 spray at 01/27/20 0437   • sacubitril-valsartan (ENTRESTO) 24-26 MG tablet 1 tablet  1 tablet Oral Q12H Shanti Turnerricia K, APRN   1 tablet at 02/01/20 0910   • sodium chloride 0.9 % flush 10 mL  10 mL Intravenous PRN iKm Almeida MD       • tamsulosin (FLOMAX) 24 hr capsule 0.4 mg  0.4 mg Oral Nightly Allyeltdulce, Jennie K, APRN   0.4 mg at 01/31/20 2144   • trimethoprim-polymyxin b (POLYTRIM) 61530-3.1 UNIT/ML-% ophthalmic solution 1 drop  1 drop Right Eye Q6H Ruben Turneria K, APRN   1 drop at 02/01/20 0610       Vital Sign Min/Max for last 24 hours  Temp  Min: 98.1 °F (36.7 °C)  Max: 98.3 °F (36.8 °C)   BP  Min: 90/62  Max: 97/67   Pulse  Min: 94  Max: 113   Resp  Min: 16  Max: 18   SpO2  Min: 94 %  Max: 99 %   No data recorded   Weight  Min: 87.3 kg (192 lb 8 oz)  Max: 87.3 kg (192 lb 8 oz)     Flowsheet Rows      First Filed Value   Admission Height  185.4 cm (73\") Documented at 01/26/2020 1155   Admission Weight  79.4 kg (175 lb) Documented at 01/26/2020 1155          Results for orders placed during the hospital encounter of 09/08/17   Adult Transthoracic Echo Complete    Narrative · Left ventricular systolic function is moderately decreased. Estimated EF   = 35%.  · Left ventricular diastolic dysfunction (grade I) consistent with   impaired relaxation.  · Mild to moderate aortic valve regurgitation is present.  · Mild pulmonary hypertension is " present.          Physical Exam:    General Appearance: Awake, alert, in no acute distress  Eyes: Pupils equal and reactive    Ears: Appear intact with no abnormalities noted  Nose: Nares normal, no drainage  Neck: supple, trachea midline, no carotid bruit and no JVD  Back: no kyphosis present,    Lungs: respirations regular, respirations even and respirations unlabored    Heart: normal S1, S2,  2/6 pansystolic murmur in the left sternal border,    no rub and no click    Abdomen: normal bowel sounds, no tenderness   Skin: no bleeding, bruising or rash  Extremities: no cyanosis  Psychiatric/Behavioral: Negative for agitation, behavioral problems, confusion, the patient does  appear to be nervous/anxious.     1+ pitting edema     Results Review:   I reviewed the patient's new clinical results.  I reviewed the patient's new imaging results and agree with the interpretation.  I reviewed the patient's other test results and agree with the interpretation  I personally viewed and interpreted the patient's EKG/Telemetry data  Discussed with patient    Reviewed available prior notes, consults, prior visits, laboratory findings, radiology and cardiology relevant reports.   Updated chart as applicable.   I have reviewed the patient's medical history in detail and updated the computerized patient record as relevant.      Updated patient regarding any new or relevant abnormalities on review of records or any new findings on physical exam.   Mentioned to patient about purpose of visit and desirable health short and long term goals and objectives.     Assessment/Plan       SBO (small bowel obstruction) (CMS/HCC)    Coronary artery disease involving coronary bypass graft of native heart without angina pectoris    Chronic systolic congestive heart failure (CMS/HCC)    Essential hypertension    Biventricular ICD (implantable cardioverter-defibrillator) in place    Stage 4 chronic kidney disease (CMS/HCC)    Lactic acidosis     Paroxysmal atrial fibrillation (CMS/HCC)    Urinary retention due to benign prostatic hyperplasia        Plan    Discussed in detail with his daughter.  We will get echocardiogram to assess biventricular function  Family has questioned if there are any recent echocardiograms.    Add Bumex 0.5 mg p.o. daily  Monitor kidney functions  proBNP in future      BNP progressively increasing  Daily weights  Discontinue Proscar    Telemetry  Deep vein thrombosis prophylaxis/precautions  Appropriate diet, fluid, sodium, caffeine, stimulants intake   Questions were encouraged, asked and answered to the patient's  understanding and satisfaction.  Compliance to diet and medications       Karthik Macdonald MD  02/01/20  11:06 AM    EMR Dragon/Transcription was used to dictate part of this note

## 2020-02-02 LAB
ANION GAP SERPL CALCULATED.3IONS-SCNC: 8 MMOL/L (ref 5–15)
BASOPHILS # BLD AUTO: 0.03 10*3/MM3 (ref 0–0.2)
BASOPHILS NFR BLD AUTO: 0.6 % (ref 0–1.5)
BUN BLD-MCNC: 25 MG/DL (ref 8–23)
BUN/CREAT SERPL: 14.6 (ref 7–25)
CALCIUM SPEC-SCNC: 8.5 MG/DL (ref 8.6–10.5)
CHLORIDE SERPL-SCNC: 101 MMOL/L (ref 98–107)
CO2 SERPL-SCNC: 31 MMOL/L (ref 22–29)
CREAT BLD-MCNC: 1.71 MG/DL (ref 0.76–1.27)
DEPRECATED RDW RBC AUTO: 47.8 FL (ref 37–54)
EOSINOPHIL # BLD AUTO: 0.31 10*3/MM3 (ref 0–0.4)
EOSINOPHIL NFR BLD AUTO: 5.7 % (ref 0.3–6.2)
ERYTHROCYTE [DISTWIDTH] IN BLOOD BY AUTOMATED COUNT: 13.9 % (ref 12.3–15.4)
GFR SERPL CREATININE-BSD FRML MDRD: 38 ML/MIN/1.73
GLUCOSE BLD-MCNC: 100 MG/DL (ref 65–99)
HCT VFR BLD AUTO: 36.2 % (ref 37.5–51)
HGB BLD-MCNC: 11.4 G/DL (ref 13–17.7)
IMM GRANULOCYTES # BLD AUTO: 0.01 10*3/MM3 (ref 0–0.05)
IMM GRANULOCYTES NFR BLD AUTO: 0.2 % (ref 0–0.5)
LYMPHOCYTES # BLD AUTO: 0.78 10*3/MM3 (ref 0.7–3.1)
LYMPHOCYTES NFR BLD AUTO: 14.4 % (ref 19.6–45.3)
MCH RBC QN AUTO: 29 PG (ref 26.6–33)
MCHC RBC AUTO-ENTMCNC: 31.5 G/DL (ref 31.5–35.7)
MCV RBC AUTO: 92.1 FL (ref 79–97)
MONOCYTES # BLD AUTO: 0.49 10*3/MM3 (ref 0.1–0.9)
MONOCYTES NFR BLD AUTO: 9 % (ref 5–12)
NEUTROPHILS # BLD AUTO: 3.8 10*3/MM3 (ref 1.7–7)
NEUTROPHILS NFR BLD AUTO: 70.1 % (ref 42.7–76)
NRBC BLD AUTO-RTO: 0 /100 WBC (ref 0–0.2)
PLATELET # BLD AUTO: 103 10*3/MM3 (ref 140–450)
PMV BLD AUTO: 12.4 FL (ref 6–12)
POTASSIUM BLD-SCNC: 4 MMOL/L (ref 3.5–5.2)
RBC # BLD AUTO: 3.93 10*6/MM3 (ref 4.14–5.8)
SODIUM BLD-SCNC: 140 MMOL/L (ref 136–145)
WBC NRBC COR # BLD: 5.42 10*3/MM3 (ref 3.4–10.8)

## 2020-02-02 PROCEDURE — 80048 BASIC METABOLIC PNL TOTAL CA: CPT | Performed by: SPECIALIST

## 2020-02-02 PROCEDURE — 99232 SBSQ HOSP IP/OBS MODERATE 35: CPT | Performed by: INTERNAL MEDICINE

## 2020-02-02 PROCEDURE — 85025 COMPLETE CBC W/AUTO DIFF WBC: CPT | Performed by: SPECIALIST

## 2020-02-02 PROCEDURE — 25010000002 ENOXAPARIN PER 10 MG: Performed by: SPECIALIST

## 2020-02-02 RX ORDER — BUMETANIDE 0.25 MG/ML
0.5 INJECTION INTRAMUSCULAR; INTRAVENOUS DAILY
Status: DISCONTINUED | OUTPATIENT
Start: 2020-02-02 | End: 2020-02-03

## 2020-02-02 RX ADMIN — POLYMYXIN B SULFATE, TRIMETHOPRIM SULFATE 1 DROP: 10000; 1 SOLUTION/ DROPS OPHTHALMIC at 23:51

## 2020-02-02 RX ADMIN — SACUBITRIL AND VALSARTAN 1 TABLET: 24; 26 TABLET, FILM COATED ORAL at 10:49

## 2020-02-02 RX ADMIN — LORAZEPAM 0.5 MG: 0.5 TABLET ORAL at 20:46

## 2020-02-02 RX ADMIN — TAMSULOSIN HYDROCHLORIDE 0.4 MG: 0.4 CAPSULE ORAL at 20:46

## 2020-02-02 RX ADMIN — METOPROLOL SUCCINATE 25 MG: 25 TABLET, EXTENDED RELEASE ORAL at 20:46

## 2020-02-02 RX ADMIN — HYDROCODONE BITARTRATE AND ACETAMINOPHEN 1 TABLET: 5; 325 TABLET ORAL at 19:21

## 2020-02-02 RX ADMIN — HYDROCODONE BITARTRATE AND ACETAMINOPHEN 1 TABLET: 5; 325 TABLET ORAL at 10:49

## 2020-02-02 RX ADMIN — POLYMYXIN B SULFATE, TRIMETHOPRIM SULFATE 1 DROP: 10000; 1 SOLUTION/ DROPS OPHTHALMIC at 17:13

## 2020-02-02 RX ADMIN — POLYMYXIN B SULFATE, TRIMETHOPRIM SULFATE 1 DROP: 10000; 1 SOLUTION/ DROPS OPHTHALMIC at 05:56

## 2020-02-02 RX ADMIN — BUMETANIDE 0.5 MG: 0.25 INJECTION INTRAMUSCULAR; INTRAVENOUS at 10:49

## 2020-02-02 RX ADMIN — HYDROCODONE BITARTRATE AND ACETAMINOPHEN 1 TABLET: 5; 325 TABLET ORAL at 06:12

## 2020-02-02 RX ADMIN — GABAPENTIN 300 MG: 300 CAPSULE ORAL at 10:11

## 2020-02-02 RX ADMIN — LORAZEPAM 0.5 MG: 0.5 TABLET ORAL at 17:12

## 2020-02-02 RX ADMIN — ENOXAPARIN SODIUM 30 MG: 30 INJECTION SUBCUTANEOUS at 10:50

## 2020-02-02 RX ADMIN — LORAZEPAM 0.5 MG: 0.5 TABLET ORAL at 10:49

## 2020-02-02 RX ADMIN — SACUBITRIL AND VALSARTAN 1 TABLET: 24; 26 TABLET, FILM COATED ORAL at 20:46

## 2020-02-02 RX ADMIN — POLYMYXIN B SULFATE, TRIMETHOPRIM SULFATE 1 DROP: 10000; 1 SOLUTION/ DROPS OPHTHALMIC at 12:37

## 2020-02-02 NOTE — PLAN OF CARE
Problem: Patient Care Overview  Goal: Plan of Care Review  Outcome: Ongoing (interventions implemented as appropriate)  Flowsheets (Taken 2/2/2020 8602)  Progress: no change  Plan of Care Reviewed With: patient  Note:   Medicated for pain;  edema pitting to lower legs;  pt reports increased weight to arms and legs and face;Catheter in place for retention;  ambulated to bathroom x2;;  tele v-paced;  continue to moniitor

## 2020-02-02 NOTE — NURSING NOTE
Family noticed a large bruise on pt back 1-31-20 but did not mention to nursing staff til last nite.

## 2020-02-02 NOTE — PROGRESS NOTES
[facility] Medicine Services  INPATIENT PROGRESS NOTE    Length of Stay: 7  Date of Admission: 1/26/2020  Primary Care Physician: Alison Hercules MD    Subjective   Chief Complaint: Follow-up   HPI   Lying in bed.  Daughter in room.  He was sleeping and awoke for assessment.  Complains of peripheral edema.  Dr. Macdonald resumed Bumex yesterday.  No oxygen in use.  Denies chest pain.  Loera catheter remains in place with clear urine return.  Creatinine up to 1.7 today.  Ambulating.  Echo yesterday with ejection fraction 26 to 30%.  Discussed with daughter and patient.  Denies nausea, vomiting or abdominal pain.  Had bowel movement.  Tolerating regular diet.    Review of Systems     All pertinent negatives and positives are as above. All other systems have been reviewed and are negative unless otherwise stated.     Objective    Temp:  [97.4 °F (36.3 °C)-98.3 °F (36.8 °C)] 98 °F (36.7 °C)  Heart Rate:  [100-112] 100  Resp:  [16] 16  BP: ()/(51-68) 112/68  Physical Exam  Constitutional: He is oriented to person, place, and time.   No acute distress.  Lying in bed.  Daughter in room.   HENT:   Head: Normocephalic and atraumatic.   Eyes: Pupils are equal, round, and reactive to light. EOM are normal.   Neck: Normal range of motion. Neck supple.   Cardiovascular: Normal rate and regular rhythm. Exam reveals no gallop and no friction rub.   Murmur heard.  Paced rhythm  with PVCs on telemetry   Pulmonary/Chest: He has no wheezes. He has no rales.   No oxygen in use.   Abdominal: Soft. Bowel sounds are normal. He exhibits no distension. There is tenderness (Abdominal incisions).   Genitourinary:   Genitourinary Comments: Loera catheter in place with clear urine return   Musculoskeletal: Normal range of motion. He exhibits edema (Lower extremity edema bilaterally). He exhibits no tenderness.   Neurological: He is alert and oriented to person, place, and time.   Skin: Skin is warm and  dry. No erythema.   Psychiatric: He has a normal mood and affect. His behavior is normal. Judgment and thought content normal.     Results Review:  I have reviewed the labs, radiology results, and diagnostic studies.    Laboratory Data:   Results from last 7 days   Lab Units 02/02/20  0550 02/01/20  0539 01/31/20  0531   WBC 10*3/mm3 5.42 4.06 3.45   HEMOGLOBIN g/dL 11.4* 11.0* 10.3*   HEMATOCRIT % 36.2* 35.1* 33.8*   PLATELETS 10*3/mm3 103* 88* 88*        Results from last 7 days   Lab Units 02/02/20  0550 02/01/20  0539 01/31/20  0531  01/27/20  0500 01/26/20  1224   SODIUM mmol/L 140 140 146*   < > 146* 146*   POTASSIUM mmol/L 4.0 3.9 3.9   < > 4.4 3.8   CHLORIDE mmol/L 101 102 107   < > 107 104   CO2 mmol/L 31.0* 32.0* 33.0*   < > 31.0* 27.0   BUN mg/dL 25* 23 25*   < > 32* 31*   CREATININE mg/dL 1.71* 1.58* 1.89*   < > 2.06* 2.07*   CALCIUM mg/dL 8.5* 8.4* 8.3*   < > 8.9 9.7   BILIRUBIN mg/dL  --   --   --   --  0.8 1.0   ALK PHOS U/L  --   --   --   --  78 97   ALT (SGPT) U/L  --   --   --   --  <5 <5   AST (SGOT) U/L  --   --   --   --  13 14   GLUCOSE mg/dL 100* 107* 92   < > 153* 165*    < > = values in this interval not displayed.     Urine Culture   Date Value Ref Range Status   01/26/2020 No growth  Final     Transthoracic echocardiogram 2/1/2020  · Estimated EF appears to be in the range of 26 - 30%  · Left ventricular wall thickness is consistent with hypertrophy.  · Left ventricular diastolic dysfunction.  · Left atrial cavity size is severely dilated.  · Mild dilation of the aortic root is present.  · Mild pulmonary hypertension is present.    Intake/Output    Intake/Output Summary (Last 24 hours) at 2/2/2020 0785  Last data filed at 2/2/2020 0327  Gross per 24 hour   Intake 720 ml   Output 1200 ml   Net -480 ml       Scheduled Meds    bumetanide 0.5 mg Oral Daily   enoxaparin 30 mg Subcutaneous Q24H   gabapentin 300 mg Oral Daily   gabapentin 600 mg Oral Nightly   HYDROcodone-acetaminophen 1 tablet  Oral BID   LORazepam 0.5 mg Oral TID   metoprolol succinate XL 25 mg Oral Nightly   sacubitril-valsartan 1 tablet Oral Q12H   tamsulosin 0.4 mg Oral Nightly   trimethoprim-polymyxin b 1 drop Right Eye Q6H       I have reviewed the patient current medications.     Assessment/Plan     Active Hospital Problems    Diagnosis   • **SBO (small bowel obstruction) (CMS/[facility])   • Urinary retention due to benign prostatic hyperplasia   • Lactic acidosis   • Paroxysmal atrial fibrillation (CMS/[facility])   • Stage 4 chronic kidney disease (CMS/[facility])   • Biventricular ICD (implantable cardioverter-defibrillator) in place   • Essential hypertension   • Chronic systolic congestive heart failure (CMS/[facility])     LVEF 35% on last echo here in 2017, but was reported as 25% on echocardiogram performed during recent admission at [facility] in September 2018; has BiV-ICD     • Coronary artery disease involving coronary bypass graft of native heart without angina pectoris     MI x2  Single vessel CABG 2008       Plan:  1.  Laparoscopic lysis of adhesions 1/28/2020. regular diet per Dr. Almeida.  2.  Loera catheter reinserted 1/30 due to urinary retention.  Discussed option of removing Loera catheter for voiding trial prior to discharge.  Daughter wants to discuss further with her brother before making decision.  Finasteride and tamsulosin resumed, home medications.  3.  Ventricular paced rhythm.  Cardiology following.  Entresto and metoprolol continued.  IV fluids off.  Edema bilateral lower extremities.  Dr. Macdonald started Bumex 0.5 mg daily yesterday.  Patient takes Lasix at home.  Echo 2/1/2020 with ejection fraction 26 to 30%.  Discussed with daughter and patient.  Follows with Dr. Enciso.  4.  Creatinine improved.  Creatinine 2.06 on admission and trended down to 1.71.  Baseline chronic kidney disease stage IV.  BMP tomorrow.  5.  Incentive spirometry.  Chest x-ray 1/30 right lower lobe consolidation.  Suspect edema as opposed to pneumonia.   Afebrile, white blood cell count normal.  No cough or congestion.  Continue off antibiotics. Out of bed and ambulate.   6.  Home medications reviewed.  7.  Blood pressure running low yesterday.  Improved to 112/68, 95/59.  Suspect chronically low due to decreased ejection fraction. Cardiology following.     The above documentation resulted from a face-to-face encounter by me Marj ROMEO, [facility].     Discharge Planning: I expect patient to be discharged to home per Dr. Almeida.    OUSMANE Lemos   02/02/20   7:28 AM

## 2020-02-02 NOTE — PROGRESS NOTES
Cheri Paredes MD FACS  Progress Note     LOS: 7 days   Patient Care Team:  Alison Hercules MD as PCP - General (Internal Medicine)  Alison Hercules MD as Referring Physician (Internal Medicine)      Subjective     Interval History:      resting comfortably.  No events over pm     Objective     Vital Signs  Temp:  [97.4 °F (36.3 °C)-98 °F (36.7 °C)] 97.6 °F (36.4 °C)  Heart Rate:  [] 93  Resp:  [16] 16  BP: ()/(51-68) 95/51    Physical Exam:  General appearance - sleeping      Results Review:    Lab Results (last 24 hours)     Procedure Component Value Units Date/Time    Basic Metabolic Panel [[id number]]  (Abnormal) Collected:  02/02/20 0550    Specimen:  Blood Updated:  02/02/20 0616     Glucose 100 mg/dL      BUN 25 mg/dL      Creatinine 1.71 mg/dL      Sodium 140 mmol/L      Potassium 4.0 mmol/L      Chloride 101 mmol/L      CO2 31.0 mmol/L      Calcium 8.5 mg/dL      eGFR Non African Amer 38 mL/min/1.73      BUN/Creatinine Ratio 14.6     Anion Gap 8.0 mmol/L     Narrative:       GFR Normal >60  Chronic Kidney Disease <60  Kidney Failure <15      CBC & Differential [[id number]] Collected:  02/02/20 0550    Specimen:  Blood Updated:  02/02/20 0612    Narrative:       The following orders were created for panel order CBC & Differential.  Procedure                               Abnormality         Status                     ---------                               -----------         ------                     CBC Auto Differential[[id number]]        Abnormal            Final result                 Please view results for these tests on the individual orders.    CBC Auto Differential [[id number]]  (Abnormal) Collected:  02/02/20 0550    Specimen:  Blood Updated:  02/02/20 0612     WBC 5.42 10*3/mm3      RBC 3.93 10*6/mm3      Hemoglobin 11.4 g/dL      Hematocrit 36.2 %      MCV 92.1 fL      MCH 29.0 pg      MCHC 31.5 g/dL      RDW 13.9 %      RDW-SD 47.8 fl      MPV 12.4 fL      Platelets 103 10*3/mm3       Neutrophil % 70.1 %      Lymphocyte % 14.4 %      Monocyte % 9.0 %      Eosinophil % 5.7 %      Basophil % 0.6 %      Immature Grans % 0.2 %      Neutrophils, Absolute 3.80 10*3/mm3      Lymphocytes, Absolute 0.78 10*3/mm3      Monocytes, Absolute 0.49 10*3/mm3      Eosinophils, Absolute 0.31 10*3/mm3      Basophils, Absolute 0.03 10*3/mm3      Immature Grans, Absolute 0.01 10*3/mm3      nRBC 0.0 /100 WBC         Imaging Results (Last 24 Hours)     ** No results found for the last 24 hours. **            Assessment/Plan       Diet as tolerated.  Continue PT      Cheri Paredes MD  02/02/20  11:27 AM

## 2020-02-02 NOTE — PROGRESS NOTES
LOS: 7 days   Patient Care Team:  Alison Hercules MD as PCP - General (Internal Medicine)  Alison Hercules MD as Referring Physician (Internal Medicine)    Chief Complaint:   SBO (small bowel obstruction) (CMS/HCC)    Coronary artery disease involving coronary bypass graft of native heart without angina pectoris    Chronic systolic congestive heart failure (CMS/HCC)    Essential hypertension    Biventricular ICD (implantable cardioverter-defibrillator) in place    Stage 4 chronic kidney disease (CMS/HCC)    Lactic acidosis    Paroxysmal atrial fibrillation (CMS/HCC)    Urinary retention due to benign prostatic hyperplasia         Subjective    Wild Bravo is a 85 y.o. malewho is being seen in follow-up.  Feels weak and tired  Overnight had shortness of breath  Has orthopnea  Persistent bipedal edema  Progressive increase in proBNP  Creatinine has increased from 1.58-1.7  On pupil Bumex  Results of echocardiogram as below  Daughter by bedside  Overall not feeling well    Telemetry: no malignant arrhythmia. No significant pauses.    Review of Systems     Constitutional: No chills   Has fatigue   No fever.     HENT: Negative.    Eyes: Negative.      Respiratory: Negative for cough,   No chest wall soreness,   Shortness of breath,   no wheezing, no stridor.      Cardiovascular: As above    Gastrointestinal: Negative for abdominal distention,  No abdominal pain,   No blood in stool,   No constipation,   No diarrhea,   No nausea   No vomiting.     Endocrine: Negative.    Genitourinary: Negative for difficulty urinating, dysuria, flank pain and hematuria.     Musculoskeletal: Negative.    Skin: Negative for rash and wound.   Allergic/Immunologic: Negative.      Neurological: Negative for dizziness, syncope, weakness,   No light-headedness  No  headaches.     Hematological: Does not bruise/bleed easily.     Psychiatric/Behavioral: Negative for agitation or behavioral problems,   No confusion,   the patient is   nervous/anxious.       History:   Past Medical History:   Diagnosis Date   • Abdominal aortic aneurysm (CMS/HCC)    • Anxiety    • Atrial fibrillation (CMS/HCC)    • Biventricular ICD (implantable cardioverter-defibrillator) in place    • BPH (benign prostatic hypertrophy)    • Carotid artery stenosis    • CHF (congestive heart failure) (CMS/HCC)    • Chronic kidney disease     Chronic kidney disease, stage 4 (severe)   • Chronic systolic (congestive) heart failure (CMS/HCC)     limited echo 7/2016 EF was 40-45%. Patient has BiV AICD   • Coronary arteriosclerosis     MI x2   • Hearing loss    • Iron deficiency anemia    • Ischemic cardiomyopathy    • Mixed hyperlipidemia    • Myocardial infarction (CMS/HCC)    • Stroke (CMS/HCC)      Past Surgical History:   Procedure Laterality Date   • ABLATION OF DYSRHYTHMIC FOCUS  2008    MAZE at time of CABG/MVR   • CARDIAC ABLATION     • CARDIAC CATHETERIZATION     • CARDIAC DEFIBRILLATOR PLACEMENT     • CARDIAC PACEMAKER PLACEMENT     • CARDIOVERSION     • CAROTID ENDARTERECTOMY     • CAROTID ENDARTERECTOMY     • CORONARY ARTERY BYPASS GRAFT  2008    X 1   • CORONARY STENT PLACEMENT  2008    X 2   • LAPAROSCOPIC LYSIS OF ADHESIONS N/A 1/28/2020    Procedure: LAPAROSCOPIC LYSIS OF ADHESIONS;  Surgeon: Kim Almeida MD;  Location: [facility];  Service: General   • MITRAL VALVE REPAIR/REPLACEMENT  2008   • TOTAL KNEE ARTHROPLASTY       Social History     Socioeconomic History   • Marital status:      Spouse name: Not on file   • Number of children: Not on file   • Years of education: Not on file   • Highest education level: Not on file   Tobacco Use   • Smoking status: Former Smoker     Years: 45.00     Types: Cigarettes   • Smokeless tobacco: Never Used   Substance and Sexual Activity   • Alcohol use: No   • Drug use: No   • Sexual activity: Defer     Family History   Problem Relation Age of Onset   • Stroke Mother    • Heart disease Mother    • Diabetes Father         Labs:  WBC WBC   Date Value Ref Range Status   02/02/2020 5.42 3.40 - 10.80 10*3/mm3 Final   02/01/2020 4.06 3.40 - 10.80 10*3/mm3 Final   01/31/2020 3.45 3.40 - 10.80 10*3/mm3 Final      HGB Hemoglobin   Date Value Ref Range Status   02/02/2020 11.4 (L) 13.0 - 17.7 g/dL Final   02/01/2020 11.0 (L) 13.0 - 17.7 g/dL Final   01/31/2020 10.3 (L) 13.0 - 17.7 g/dL Final      HCT Hematocrit   Date Value Ref Range Status   02/02/2020 36.2 (L) 37.5 - 51.0 % Final   02/01/2020 35.1 (L) 37.5 - 51.0 % Final   01/31/2020 33.8 (L) 37.5 - 51.0 % Final      Platelets Platelets   Date Value Ref Range Status   02/02/2020 103 (L) 140 - 450 10*3/mm3 Final   02/01/2020 88 (L) 140 - 450 10*3/mm3 Final   01/31/2020 88 (L) 140 - 450 10*3/mm3 Final      MCV MCV   Date Value Ref Range Status   02/02/2020 92.1 79.0 - 97.0 fL Final   02/01/2020 93.9 79.0 - 97.0 fL Final   01/31/2020 94.2 79.0 - 97.0 fL Final        Results from last 7 days   Lab Units 02/02/20  0550 02/01/20  0539 01/31/20  0531  01/27/20  0500 01/26/20  1224   SODIUM mmol/L 140 140 146*   < > 146* 146*   POTASSIUM mmol/L 4.0 3.9 3.9   < > 4.4 3.8   CHLORIDE mmol/L 101 102 107   < > 107 104   CO2 mmol/L 31.0* 32.0* 33.0*   < > 31.0* 27.0   BUN mg/dL 25* 23 25*   < > 32* 31*   CREATININE mg/dL 1.71* 1.58* 1.89*   < > 2.06* 2.07*   CALCIUM mg/dL 8.5* 8.4* 8.3*   < > 8.9 9.7   BILIRUBIN mg/dL  --   --   --   --  0.8 1.0   ALK PHOS U/L  --   --   --   --  78 97   ALT (SGPT) U/L  --   --   --   --  <5 <5   AST (SGOT) U/L  --   --   --   --  13 14   GLUCOSE mg/dL 100* 107* 92   < > 153* 165*    < > = values in this interval not displayed.     Lab Results   Component Value Date    TROPONINI 0.03 08/24/2018     PT/INR:  No results found for: PROTIME/No results found for: INR    Imaging Results (Last 72 Hours)     Procedure Component Value Units Date/Time    XR Chest 1 View [[id number]] Collected:  01/30/20 1030     Updated:  01/30/20 1033    Narrative:       XR CHEST 1 VW-  1/30/2020 9:20 AM CST     HISTORY: Shortness of air; K56.609-Unspecified intestinal obstruction,  unspecified as to partial versus complete obstruction; N18.9-Chronic  kidney disease, unspecified       COMPARISON: 04/24/2018.     FINDINGS:   Stable granulomatous calcifications are noted. There is consolidation in  the RIGHT lower lobe. The cardiomediastinal silhouette and pulmonary  vascularity are unchanged. Pacemaker leads and sternotomy sutures are  again noted.     The osseous structures and surrounding soft tissues demonstrate no acute  abnormality.       Impression:       1. RIGHT lower lobe consolidation could be due to pneumonia or  aspiration.        This report was finalized on 01/30/2020 10:30 by Dr. Fuad Osorio MD.          Objective     Allergies   Allergen Reactions   • Keflex [Cephalexin]      CEPHALOSPORINS       Medication Review: Performed  Current Facility-Administered Medications   Medication Dose Route Frequency Provider Last Rate Last Dose   • bumetanide (BUMEX) injection 0.5 mg  0.5 mg Intravenous Daily Karthik Macdonald MD       • enoxaparin (LOVENOX) syringe 30 mg  30 mg Subcutaneous Q24H Kim Almeida MD   30 mg at 02/01/20 1137   • gabapentin (NEURONTIN) capsule 300 mg  300 mg Oral Daily Jennie Turner K, APRN   300 mg at 01/30/20 1024   • gabapentin (NEURONTIN) capsule 600 mg  600 mg Oral Nightly WoJennie carlos, APRN   600 mg at 01/31/20 2144   • HYDROcodone-acetaminophen (NORCO) 5-325 MG per tablet 1 tablet  1 tablet Oral BID Jennie Turner K APRN   1 tablet at 02/01/20 2056   • HYDROcodone-acetaminophen (NORCO) 5-325 MG per tablet 1 tablet  1 tablet Oral Q4H PRN Cheri Paredes MD   1 tablet at 02/02/20 0612   • HYDROmorphone (DILAUDID) injection 0.5 mg  0.5 mg Intravenous Q3H PRN Kim Almeida MD   0.5 mg at 01/31/20 1614   • LORazepam (ATIVAN) tablet 0.5 mg  0.5 mg Oral TID Jennie Turner, APRN   0.5 mg at 02/01/20 2056   • metoprolol succinate XL (TOPROL-XL) 24 hr  "tablet 25 mg  25 mg Oral Nightly Jennie Turner, APRN   25 mg at 02/01/20 2056   • nitroglycerin (NITROSTAT) SL tablet 0.4 mg  0.4 mg Sublingual Q5 Min PRN Kim Almeida MD       • ondansetron (ZOFRAN) injection 4 mg  4 mg Intravenous Q4H PRN Kim Almeida MD   4 mg at 01/30/20 0834   • phenol (CHLORASEPTIC) 1.4 % liquid 2 spray  2 spray Mouth/Throat Q2H PRN Kim Almeida MD   2 spray at 01/27/20 0437   • sacubitril-valsartan (ENTRESTO) 24-26 MG tablet 1 tablet  1 tablet Oral Q12H Jennie Turner APRN   1 tablet at 02/01/20 2056   • sodium chloride 0.9 % flush 10 mL  10 mL Intravenous PRN Kim Almeida MD       • tamsulosin (FLOMAX) 24 hr capsule 0.4 mg  0.4 mg Oral Nightly Jennie Turner, APRN   0.4 mg at 02/01/20 2056   • trimethoprim-polymyxin b (POLYTRIM) 87458-8.1 UNIT/ML-% ophthalmic solution 1 drop  1 drop Right Eye Q6H Jennie Turner APRN   1 drop at 02/02/20 0556       Vital Sign Min/Max for last 24 hours  Temp  Min: 97.4 °F (36.3 °C)  Max: 98 °F (36.7 °C)   BP  Min: 83/51  Max: 112/68   Pulse  Min: 100  Max: 100   Resp  Min: 16  Max: 16   SpO2  Min: 96 %  Max: 98 %   No data recorded   Weight  Min: 87.7 kg (193 lb 6.4 oz)  Max: 87.7 kg (193 lb 6.4 oz)     Flowsheet Rows      First Filed Value   Admission Height  185.4 cm (73\") Documented at 01/26/2020 1155   Admission Weight  79.4 kg (175 lb) Documented at 01/26/2020 1155          Results for orders placed during the hospital encounter of 01/26/20   STAT Adult Transthoracic Echo Complete W/ Cont if Necessary Per Protocol    Narrative · Estimated EF appears to be in the range of 26 - 30%  · Left ventricular wall thickness is consistent with hypertrophy.  · Left ventricular diastolic dysfunction.  · Left atrial cavity size is severely dilated.  · Mild dilation of the aortic root is present.  · Mild pulmonary hypertension is present.   "   ___________________________________________________________________________  _________________________________________  Prior echo report as below for comparison      Wild Bravo   Echocardiogram   Order# [id number]   Reading physician: Karthik Macdonald MD Ordering physician: Waleska Sifuentes APRN   Study date: 17   Patient Information     Patient Name  Wild Bravo MRN  [id number] Sex  Male  (Age)  1934 (85 y.o.)   Sedation Narrator Report     Sedation Narrator Report   Interpretation Summary     · Left ventricular systolic function is moderately decreased. Estimated EF   = 35%.  · Left ventricular diastolic dysfunction (grade I) consistent with   impaired relaxation.  · Mild to moderate aortic valve regurgitation is present.  · Mild pulmonary hypertension is present.              Physical Exam:    General Appearance: Awake, alert, in no acute distress  Eyes: Pupils equal and reactive    Ears: Appear intact with no abnormalities noted  Nose: Nares normal, no drainage  Neck: supple, trachea midline, no carotid bruit and no JVD  Back: no kyphosis present,    Lungs: respirations regular, respirations even and respirations unlabored    Heart: normal S1, S2,  2/6 pansystolic murmur in the left sternal border,    no rub and no click    Abdomen: normal bowel sounds, no tenderness   Skin: no bleeding, bruising or rash  Extremities: no cyanosis  Psychiatric/Behavioral: Negative for agitation, behavioral problems, confusion, the patient does  appear to be nervous/anxious.     2+ pitting edema     Results Review:   I reviewed the patient's new clinical results.  I reviewed the patient's new imaging results and agree with the interpretation.  I reviewed the patient's other test results and agree with the interpretation  I personally viewed and interpreted the patient's EKG/Telemetry data  Discussed with patient    Reviewed available prior notes, consults, prior visits, laboratory findings, radiology and  cardiology relevant reports.   Updated chart as applicable.   I have reviewed the patient's medical history in detail and updated the computerized patient record as relevant.      Updated patient regarding any new or relevant abnormalities on review of records or any new findings on physical exam.   Mentioned to patient about purpose of visit and desirable health short and long term goals and objectives.     Assessment/Plan       SBO (small bowel obstruction) (CMS/[facility])    Coronary artery disease involving coronary bypass graft of native heart without angina pectoris    Chronic systolic congestive heart failure (CMS/[facility])    Essential hypertension    Biventricular ICD (implantable cardioverter-defibrillator) in place    Stage 4 chronic kidney disease (CMS/[facility])    Lactic acidosis    Paroxysmal atrial fibrillation (CMS/[facility])    Urinary retention due to benign prostatic hyperplasia        Plan    Discussed in detail with his daughter.  We will get echocardiogram to assess biventricular function  Family has questioned if there are any recent echocardiograms.    Discontinue oral Bumex  Start IV Bumex 0.5 mg daily  Monitor proBNP  Baseline creatinine is around 2  Monitor kidney functions  proBNP in future  Dr. Raleigh Enciso primary cardiologist will assume care tomorrow    BNP progressively increasing  Daily weights  Discontinue Proscar    Telemetry  Deep vein thrombosis prophylaxis/precautions  Appropriate diet, fluid, sodium, caffeine, stimulants intake   Questions were encouraged, asked and answered to the patient's  understanding and satisfaction.  Compliance to diet and medications       Karthik Macdonald MD  02/02/20  9:18 AM    EMR Dragon/Transcription was used to dictate part of this note

## 2020-02-02 NOTE — PLAN OF CARE
Problem: Patient Care Overview  Goal: Plan of Care Review  Outcome: Ongoing (interventions implemented as appropriate)  Flowsheets (Taken 2/2/2020 8952)  Progress: no change  Plan of Care Reviewed With: patient  Outcome Summary: Pt has been up in the chair majority of the day today. Medicated for pain x1 thus far, see MAR. Tolerating regular diet, F/C in place, tele: v-paced. VSS, cont to monitor.

## 2020-02-02 NOTE — PLAN OF CARE
Problem: Patient Care Overview  Goal: Plan of Care Review  Outcome: Ongoing (interventions implemented as appropriate)  Flowsheets (Taken 2/1/2020 1805)  Progress: no change  Plan of Care Reviewed With: patient  Outcome Summary: Up in the chair most of the day today. Ambulated in room. Medicated for pain, see MAR. Tolerating regular diet, F/C, [id number]. VSS, cont to monitor.

## 2020-02-03 ENCOUNTER — APPOINTMENT (OUTPATIENT)
Dept: GENERAL RADIOLOGY | Facility: HOSPITAL | Age: 85
End: 2020-02-03

## 2020-02-03 LAB
ANION GAP SERPL CALCULATED.3IONS-SCNC: 9 MMOL/L (ref 5–15)
BASOPHILS # BLD AUTO: 0.01 10*3/MM3 (ref 0–0.2)
BASOPHILS NFR BLD AUTO: 0.2 % (ref 0–1.5)
BUN BLD-MCNC: 22 MG/DL (ref 8–23)
BUN BLD-MCNC: 23 MG/DL (ref 8–23)
BUN BLD-MCNC: 23 MG/DL (ref 8–23)
BUN/CREAT SERPL: 14 (ref 7–25)
BUN/CREAT SERPL: 14.1 (ref 7–25)
BUN/CREAT SERPL: 14.3 (ref 7–25)
CALCIUM SPEC-SCNC: 8.4 MG/DL (ref 8.6–10.5)
CALCIUM SPEC-SCNC: 8.5 MG/DL (ref 8.6–10.5)
CALCIUM SPEC-SCNC: 8.6 MG/DL (ref 8.6–10.5)
CHLORIDE SERPL-SCNC: 100 MMOL/L (ref 98–107)
CO2 SERPL-SCNC: 31 MMOL/L (ref 22–29)
CO2 SERPL-SCNC: 33 MMOL/L (ref 22–29)
CO2 SERPL-SCNC: 34 MMOL/L (ref 22–29)
CREAT BLD-MCNC: 1.56 MG/DL (ref 0.76–1.27)
CREAT BLD-MCNC: 1.61 MG/DL (ref 0.76–1.27)
CREAT BLD-MCNC: 1.64 MG/DL (ref 0.76–1.27)
DEPRECATED RDW RBC AUTO: 47.2 FL (ref 37–54)
EOSINOPHIL # BLD AUTO: 0.2 10*3/MM3 (ref 0–0.4)
EOSINOPHIL NFR BLD AUTO: 3.3 % (ref 0.3–6.2)
ERYTHROCYTE [DISTWIDTH] IN BLOOD BY AUTOMATED COUNT: 14.3 % (ref 12.3–15.4)
GFR SERPL CREATININE-BSD FRML MDRD: 40 ML/MIN/1.73
GFR SERPL CREATININE-BSD FRML MDRD: 41 ML/MIN/1.73
GFR SERPL CREATININE-BSD FRML MDRD: 43 ML/MIN/1.73
GLUCOSE BLD-MCNC: 105 MG/DL (ref 65–99)
GLUCOSE BLD-MCNC: 122 MG/DL (ref 65–99)
GLUCOSE BLD-MCNC: 98 MG/DL (ref 65–99)
HCT VFR BLD AUTO: 34.4 % (ref 37.5–51)
HGB BLD-MCNC: 11 G/DL (ref 13–17.7)
IMM GRANULOCYTES # BLD AUTO: 0.01 10*3/MM3 (ref 0–0.05)
IMM GRANULOCYTES NFR BLD AUTO: 0.2 % (ref 0–0.5)
LYMPHOCYTES # BLD AUTO: 0.66 10*3/MM3 (ref 0.7–3.1)
LYMPHOCYTES NFR BLD AUTO: 10.8 % (ref 19.6–45.3)
MAGNESIUM SERPL-MCNC: 1.9 MG/DL (ref 1.6–2.4)
MCH RBC QN AUTO: 29 PG (ref 26.6–33)
MCHC RBC AUTO-ENTMCNC: 32 G/DL (ref 31.5–35.7)
MCV RBC AUTO: 90.8 FL (ref 79–97)
MONOCYTES # BLD AUTO: 0.57 10*3/MM3 (ref 0.1–0.9)
MONOCYTES NFR BLD AUTO: 9.3 % (ref 5–12)
NEUTROPHILS # BLD AUTO: 4.66 10*3/MM3 (ref 1.7–7)
NEUTROPHILS NFR BLD AUTO: 76.2 % (ref 42.7–76)
NRBC BLD AUTO-RTO: 0 /100 WBC (ref 0–0.2)
NT-PROBNP SERPL-MCNC: 8493 PG/ML (ref 5–1800)
PLATELET # BLD AUTO: 93 10*3/MM3 (ref 140–450)
PMV BLD AUTO: 13 FL (ref 6–12)
POTASSIUM BLD-SCNC: 3.6 MMOL/L (ref 3.5–5.2)
POTASSIUM BLD-SCNC: 3.7 MMOL/L (ref 3.5–5.2)
POTASSIUM BLD-SCNC: 4 MMOL/L (ref 3.5–5.2)
RBC # BLD AUTO: 3.79 10*6/MM3 (ref 4.14–5.8)
SODIUM BLD-SCNC: 140 MMOL/L (ref 136–145)
SODIUM BLD-SCNC: 142 MMOL/L (ref 136–145)
SODIUM BLD-SCNC: 143 MMOL/L (ref 136–145)
WBC NRBC COR # BLD: 6.11 10*3/MM3 (ref 3.4–10.8)

## 2020-02-03 PROCEDURE — 80048 BASIC METABOLIC PNL TOTAL CA: CPT | Performed by: SPECIALIST

## 2020-02-03 PROCEDURE — 80048 BASIC METABOLIC PNL TOTAL CA: CPT | Performed by: INTERNAL MEDICINE

## 2020-02-03 PROCEDURE — 99233 SBSQ HOSP IP/OBS HIGH 50: CPT | Performed by: INTERNAL MEDICINE

## 2020-02-03 PROCEDURE — 85025 COMPLETE CBC W/AUTO DIFF WBC: CPT | Performed by: SPECIALIST

## 2020-02-03 PROCEDURE — 83880 ASSAY OF NATRIURETIC PEPTIDE: CPT | Performed by: INTERNAL MEDICINE

## 2020-02-03 PROCEDURE — 25010000002 ENOXAPARIN PER 10 MG: Performed by: SPECIALIST

## 2020-02-03 PROCEDURE — 83735 ASSAY OF MAGNESIUM: CPT | Performed by: INTERNAL MEDICINE

## 2020-02-03 RX ORDER — MAGNESIUM SULFATE HEPTAHYDRATE 40 MG/ML
2 INJECTION, SOLUTION INTRAVENOUS AS NEEDED
Status: DISCONTINUED | OUTPATIENT
Start: 2020-02-03 | End: 2020-02-10 | Stop reason: HOSPADM

## 2020-02-03 RX ORDER — MAGNESIUM SULFATE HEPTAHYDRATE 40 MG/ML
4 INJECTION, SOLUTION INTRAVENOUS AS NEEDED
Status: DISCONTINUED | OUTPATIENT
Start: 2020-02-03 | End: 2020-02-10 | Stop reason: HOSPADM

## 2020-02-03 RX ORDER — ASPIRIN 81 MG/1
81 TABLET ORAL DAILY
Status: DISCONTINUED | OUTPATIENT
Start: 2020-02-04 | End: 2020-02-10 | Stop reason: HOSPADM

## 2020-02-03 RX ORDER — BUMETANIDE 0.25 MG/ML
1 INJECTION INTRAMUSCULAR; INTRAVENOUS ONCE
Status: COMPLETED | OUTPATIENT
Start: 2020-02-03 | End: 2020-02-03

## 2020-02-03 RX ORDER — POTASSIUM CHLORIDE 7.45 MG/ML
10 INJECTION INTRAVENOUS
Status: DISCONTINUED | OUTPATIENT
Start: 2020-02-03 | End: 2020-02-10 | Stop reason: HOSPADM

## 2020-02-03 RX ORDER — BUMETANIDE 0.25 MG/ML
1 INJECTION INTRAMUSCULAR; INTRAVENOUS EVERY 6 HOURS
Status: COMPLETED | OUTPATIENT
Start: 2020-02-03 | End: 2020-02-03

## 2020-02-03 RX ORDER — POTASSIUM CHLORIDE 1.5 G/1.77G
40 POWDER, FOR SOLUTION ORAL AS NEEDED
Status: DISCONTINUED | OUTPATIENT
Start: 2020-02-03 | End: 2020-02-10 | Stop reason: HOSPADM

## 2020-02-03 RX ORDER — POTASSIUM CHLORIDE 750 MG/1
40 CAPSULE, EXTENDED RELEASE ORAL AS NEEDED
Status: DISCONTINUED | OUTPATIENT
Start: 2020-02-03 | End: 2020-02-10 | Stop reason: HOSPADM

## 2020-02-03 RX ORDER — ATORVASTATIN CALCIUM 10 MG/1
10 TABLET, FILM COATED ORAL NIGHTLY
Status: DISCONTINUED | OUTPATIENT
Start: 2020-02-04 | End: 2020-02-10 | Stop reason: HOSPADM

## 2020-02-03 RX ORDER — POTASSIUM CHLORIDE 750 MG/1
40 CAPSULE, EXTENDED RELEASE ORAL ONCE
Status: COMPLETED | OUTPATIENT
Start: 2020-02-03 | End: 2020-02-03

## 2020-02-03 RX ADMIN — BUMETANIDE 1 MG: 0.25 INJECTION INTRAMUSCULAR; INTRAVENOUS at 09:33

## 2020-02-03 RX ADMIN — ENOXAPARIN SODIUM 30 MG: 30 INJECTION SUBCUTANEOUS at 09:32

## 2020-02-03 RX ADMIN — HYDROCODONE BITARTRATE AND ACETAMINOPHEN 1 TABLET: 5; 325 TABLET ORAL at 05:17

## 2020-02-03 RX ADMIN — HYDROCODONE BITARTRATE AND ACETAMINOPHEN 1 TABLET: 5; 325 TABLET ORAL at 16:34

## 2020-02-03 RX ADMIN — LORAZEPAM 0.5 MG: 0.5 TABLET ORAL at 21:15

## 2020-02-03 RX ADMIN — LORAZEPAM 0.5 MG: 0.5 TABLET ORAL at 16:34

## 2020-02-03 RX ADMIN — GABAPENTIN 300 MG: 300 CAPSULE ORAL at 09:30

## 2020-02-03 RX ADMIN — SACUBITRIL AND VALSARTAN 1 TABLET: 24; 26 TABLET, FILM COATED ORAL at 21:14

## 2020-02-03 RX ADMIN — BUMETANIDE 1 MG: 0.25 INJECTION INTRAMUSCULAR; INTRAVENOUS at 21:14

## 2020-02-03 RX ADMIN — POLYMYXIN B SULFATE, TRIMETHOPRIM SULFATE 1 DROP: 10000; 1 SOLUTION/ DROPS OPHTHALMIC at 05:18

## 2020-02-03 RX ADMIN — POLYMYXIN B SULFATE, TRIMETHOPRIM SULFATE 1 DROP: 10000; 1 SOLUTION/ DROPS OPHTHALMIC at 12:22

## 2020-02-03 RX ADMIN — HYDROCODONE BITARTRATE AND ACETAMINOPHEN 1 TABLET: 5; 325 TABLET ORAL at 21:15

## 2020-02-03 RX ADMIN — POLYMYXIN B SULFATE, TRIMETHOPRIM SULFATE 1 DROP: 10000; 1 SOLUTION/ DROPS OPHTHALMIC at 18:26

## 2020-02-03 RX ADMIN — LORAZEPAM 0.5 MG: 0.5 TABLET ORAL at 09:30

## 2020-02-03 RX ADMIN — GABAPENTIN 600 MG: 300 CAPSULE ORAL at 21:15

## 2020-02-03 RX ADMIN — SACUBITRIL AND VALSARTAN 1 TABLET: 24; 26 TABLET, FILM COATED ORAL at 09:30

## 2020-02-03 RX ADMIN — POTASSIUM CHLORIDE 40 MEQ: 750 CAPSULE, EXTENDED RELEASE ORAL at 09:32

## 2020-02-03 RX ADMIN — METOPROLOL SUCCINATE 25 MG: 25 TABLET, EXTENDED RELEASE ORAL at 21:14

## 2020-02-03 RX ADMIN — TAMSULOSIN HYDROCHLORIDE 0.4 MG: 0.4 CAPSULE ORAL at 21:14

## 2020-02-03 RX ADMIN — BUMETANIDE 1 MG: 0.25 INJECTION INTRAMUSCULAR; INTRAVENOUS at 15:11

## 2020-02-03 RX ADMIN — HYDROCODONE BITARTRATE AND ACETAMINOPHEN 1 TABLET: 5; 325 TABLET ORAL at 09:31

## 2020-02-03 NOTE — PROGRESS NOTES
[facility] Medicine Services  INPATIENT PROGRESS NOTE    Length of Stay: 8  Date of Admission: 1/26/2020  Primary Care Physician: Alison Hercules MD    Subjective   Chief Complaint: Follow-up  HPI   Patient sitting up in the chair eating breakfast.  No family at bedside at present.  He states that he feels awful.  He states he has had shortness of breath since midnight, and has not been able to sleep.  He had to get up to sit in the chair, and states that sitting upright did make him feel a little better.  He denies any chest pain.  He denies dizziness or lightheadedness.  He is tolerating a regular diet without nausea or vomiting.  He states that he moved his bowels either yesterday or the day before, he cannot remember.  He has been passing some gas this morning.    Review of Systems   All pertinent negatives and positives are as above. All other systems have been reviewed and are negative unless otherwise stated.     Objective    Temp:  [97.6 °F (36.4 °C)-98.4 °F (36.9 °C)] 98.4 °F (36.9 °C)  Heart Rate:  [] 97  Resp:  [16-18] 16  BP: ()/(51-70) 102/61  Physical Exam  Constitutional: He is oriented to person, place, and time. He appears well-developed and well-nourished. No distress.   HENT:   Head: Normocephalic and atraumatic.   Neck: Normal range of motion. Neck supple. No JVD present. No tracheal deviation present.   Cardiovascular: Intact distal pulses. Exam reveals no gallop and no friction rub.   Murmur heard.  Irregular rhythm.  Appears to be having frequent PVC's on telemetry.    on telemetry, apparently had a 5 second run of VT overnight, 11 beats of VT this morning.  Pulmonary/Chest: Effort normal. He has no wheezes. He has rales- bibasilar  Abdominal: Soft. Bowel sounds are active. He exhibits distension. There is tenderness. Lap x4 intact, LLQ incision stained.   Musculoskeletal: He exhibits no tenderness. Mild 1+ Bilateral ankle/pedal edema,  L>R  Neurological: He is alert and oriented to person, place, and time. No cranial nerve deficit.   Skin: Skin is warm and dry. No rash noted. No erythema.   Psychiatric: He has a normal mood and affect. His behavior is normal. Judgment and thought content normal.   Vitals reviewed.    Results Review:  I have reviewed the labs, radiology results, and diagnostic studies.    Laboratory Data:   Results from last 7 days   Lab Units 02/03/20  0600 02/02/20  0550 02/01/20  0539   WBC 10*3/mm3 6.11 5.42 4.06   HEMOGLOBIN g/dL 11.0* 11.4* 11.0*   HEMATOCRIT % 34.4* 36.2* 35.1*   PLATELETS 10*3/mm3 93* 103* 88*     Results from last 7 days   Lab Units 02/03/20  0600 02/02/20  0550 02/01/20  0539   SODIUM mmol/L 140 140 140   POTASSIUM mmol/L 3.6 4.0 3.9   CHLORIDE mmol/L 100 101 102   CO2 mmol/L 31.0* 31.0* 32.0*   BUN mg/dL 23 25* 23   CREATININE mg/dL 1.61* 1.71* 1.58*   CALCIUM mg/dL 8.4* 8.5* 8.4*   GLUCOSE mg/dL 122* 100* 107*     I have reviewed the patient current medications.     Assessment/Plan     Active Hospital Problems    Diagnosis   • **SBO (small bowel obstruction) (CMS/[facility])   • Urinary retention due to benign prostatic hyperplasia   • Lactic acidosis   • Paroxysmal atrial fibrillation (CMS/[facility])   • Stage 4 chronic kidney disease (CMS/[facility])   • Biventricular ICD (implantable cardioverter-defibrillator) in place   • Essential hypertension   • Chronic systolic congestive heart failure (CMS/[facility])     LVEF 35% on last echo here in 2017, but was reported as 25% on echocardiogram performed during recent admission at [facility] in September 2018; has BiV-ICD     • Coronary artery disease involving coronary bypass graft of native heart without angina pectoris     MI x2  Single vessel CABG 2008       Plan:  1.  Status post laparoscopic lysis of adhesions 1/28/2020.  He is tolerating a regular diet at this time.    2.  Loera catheter reinserted 1/30 due to urinary retention.  Tamsulosin continues, Proscar discontinued per  Dr. Macdonald yesterday.  Feel that the best option at this time would be to leave the Loera catheter in place at time of discharge and to follow-up as an outpatient with urology for voiding trial.    3.  PA lateral chest x-ray today.  Chest x-ray 1/30 showed a right lower lobe consolidation.  Would suspect edema as opposed to pneumonia.  Patient does complain of a cough, but states this is rare and he is producing clear phlegm.  He has been afebrile, normal white blood cell count.  He does have bibasilar crackles, peripheral edema, and orthopnea.  Feel that he needs to be more aggressively diuresed, will increase Bumex to 1 mg IV once now, and monitor his response.  Will give potassium 40 meq once as well.  Strict intake and output, cardiac diet, daily weights.    4.  Cardiology following.  Continue Toprol-XL and Entresto (Entresto is at a decreased dosage for now).  He has had runs of V. tach, which would not be unexpected given the severity of his heart failure.  Echo 2/1/2020 does show decreased ejection fraction of 26 to 30%.  Patient is on optimal medical therapy at this point in time, will await any further recommendations from cardiology.    5.  Labs in AM    Discharge Planning: As per attending physician.    Jennie Turner, OUSMANE   02/03/20   8:40 AM

## 2020-02-03 NOTE — PROGRESS NOTES
[facility] -  Progress Note     LOS: 8 days   Patient Care Team:  Alison Hercules MD as PCP - General (Internal Medicine)  Alison Hercules MD as Referring Physician (Internal Medicine)    Chief Complaint: CHF    Subjective     Interval History:   Patient states he does not feel well. He refused his daily weights this morning. He notes shortness of breath and chest pressure when walking. His daughter refuses for any treatment until the patient is seen by Dr. Enciso. Tele notes an 11 beat run of VT at which time the patient was sleeping and asymptomatic.         Review of Systems:   Review of Systems   Constitutional: Negative for chills, diaphoresis, fatigue and unexpected weight change.   HENT: Negative for nosebleeds.    Respiratory: Positive for chest tightness and shortness of breath. Negative for cough and wheezing.    Cardiovascular: Positive for leg swelling. Negative for chest pain and palpitations.   Gastrointestinal: Negative for anal bleeding, blood in stool, diarrhea and nausea.   Neurological: Negative for dizziness, syncope and light-headedness.   All other systems reviewed and are negative.      Objective     Vital Sign Min/Max for last 24 hours  Temp  Min: 97.6 °F (36.4 °C)  Max: 98.4 °F (36.9 °C)   BP  Min: 95/51  Max: 120/65   Pulse  Min: 93  Max: 106   Resp  Min: 16  Max: 18   SpO2  Min: 95 %  Max: 100 %   No data recorded   Weight  Min: 83.3 kg (183 lb 9.6 oz)  Max: 83.3 kg (183 lb 9.6 oz)         02/03/20  0912   Weight: 83.3 kg (183 lb 9.6 oz)         Intake/Output Summary (Last 24 hours) at 2/3/2020 0914  Last data filed at 2/3/2020 0730  Gross per 24 hour   Intake 480 ml   Output 2600 ml   Net -2120 ml         Physical Exam:  Physical Exam   Constitutional: He is oriented to person, place, and time. He appears well-developed and well-nourished. No distress.   HENT:   Head: Normocephalic.   Mouth/Throat: No oropharyngeal exudate.   Eyes: Pupils are equal, round, and  reactive to light. Conjunctivae and EOM are normal. No scleral icterus.   Neck: Normal range of motion. Neck supple.   Cardiovascular: Normal rate, regular rhythm, normal heart sounds and intact distal pulses.   No murmur heard.  Pulmonary/Chest: Effort normal. No respiratory distress. He has no wheezes. He has rales in the right lower field and the left lower field. He exhibits no tenderness.   Abdominal: Soft. Bowel sounds are normal. He exhibits no distension. There is no tenderness.   Musculoskeletal: Normal range of motion. He exhibits edema (2+ pitting edema).   Neurological: He is alert and oriented to person, place, and time. He has normal reflexes.   Skin: Skin is warm and dry. No rash noted. He is not diaphoretic. No erythema. No pallor.   Psychiatric: He has a normal mood and affect. His behavior is normal.   Vitals reviewed.     Results Review:   Lab Results (last 72 hours)     Procedure Component Value Units Date/Time    Basic Metabolic Panel [[id number]]  (Abnormal) Collected:  02/03/20 0600    Specimen:  Blood Updated:  02/03/20 0640     Glucose 122 mg/dL      BUN 23 mg/dL      Creatinine 1.61 mg/dL      Sodium 140 mmol/L      Potassium 3.6 mmol/L      Chloride 100 mmol/L      CO2 31.0 mmol/L      Calcium 8.4 mg/dL      eGFR Non African Amer 41 mL/min/1.73      BUN/Creatinine Ratio 14.3     Anion Gap 9.0 mmol/L     Narrative:       GFR Normal >60  Chronic Kidney Disease <60  Kidney Failure <15      BNP [[id number]]  (Abnormal) Collected:  02/03/20 0600    Specimen:  Blood Updated:  02/03/20 0640     proBNP 8,493.0 pg/mL     Narrative:       Among patients with dyspnea, NT-proBNP is highly sensitive for the detection of acute congestive heart failure. In addition NT-proBNP of <300 pg/ml effectively rules out acute congestive heart failure with 99% negative predictive value.    Results may be falsely decreased if patient taking Biotin.      CBC & Differential [[id number]] Collected:  02/03/20 0600     Specimen:  Blood Updated:  02/03/20 0629    Narrative:       The following orders were created for panel order CBC & Differential.  Procedure                               Abnormality         Status                     ---------                               -----------         ------                     CBC Auto Differential[[id number]]        Abnormal            Final result                 Please view results for these tests on the individual orders.    CBC Auto Differential [[id number]]  (Abnormal) Collected:  02/03/20 0600    Specimen:  Blood Updated:  02/03/20 0629     WBC 6.11 10*3/mm3      RBC 3.79 10*6/mm3      Hemoglobin 11.0 g/dL      Hematocrit 34.4 %      MCV 90.8 fL      MCH 29.0 pg      MCHC 32.0 g/dL      RDW 14.3 %      RDW-SD 47.2 fl      MPV 13.0 fL      Platelets 93 10*3/mm3      Neutrophil % 76.2 %      Lymphocyte % 10.8 %      Monocyte % 9.3 %      Eosinophil % 3.3 %      Basophil % 0.2 %      Immature Grans % 0.2 %      Neutrophils, Absolute 4.66 10*3/mm3      Lymphocytes, Absolute 0.66 10*3/mm3      Monocytes, Absolute 0.57 10*3/mm3      Eosinophils, Absolute 0.20 10*3/mm3      Basophils, Absolute 0.01 10*3/mm3      Immature Grans, Absolute 0.01 10*3/mm3      nRBC 0.0 /100 WBC     Basic Metabolic Panel [[id number]]  (Abnormal) Collected:  02/02/20 0550    Specimen:  Blood Updated:  02/02/20 0616     Glucose 100 mg/dL      BUN 25 mg/dL      Creatinine 1.71 mg/dL      Sodium 140 mmol/L      Potassium 4.0 mmol/L      Chloride 101 mmol/L      CO2 31.0 mmol/L      Calcium 8.5 mg/dL      eGFR Non African Amer 38 mL/min/1.73      BUN/Creatinine Ratio 14.6     Anion Gap 8.0 mmol/L     Narrative:       GFR Normal >60  Chronic Kidney Disease <60  Kidney Failure <15      CBC & Differential [[id number]] Collected:  02/02/20 0550    Specimen:  Blood Updated:  02/02/20 0612    Narrative:       The following orders were created for panel order CBC & Differential.  Procedure                                Abnormality         Status                     ---------                               -----------         ------                     CBC Auto Differential[[id number]]        Abnormal            Final result                 Please view results for these tests on the individual orders.    CBC Auto Differential [[id number]]  (Abnormal) Collected:  02/02/20 0550    Specimen:  Blood Updated:  02/02/20 0612     WBC 5.42 10*3/mm3      RBC 3.93 10*6/mm3      Hemoglobin 11.4 g/dL      Hematocrit 36.2 %      MCV 92.1 fL      MCH 29.0 pg      MCHC 31.5 g/dL      RDW 13.9 %      RDW-SD 47.8 fl      MPV 12.4 fL      Platelets 103 10*3/mm3      Neutrophil % 70.1 %      Lymphocyte % 14.4 %      Monocyte % 9.0 %      Eosinophil % 5.7 %      Basophil % 0.6 %      Immature Grans % 0.2 %      Neutrophils, Absolute 3.80 10*3/mm3      Lymphocytes, Absolute 0.78 10*3/mm3      Monocytes, Absolute 0.49 10*3/mm3      Eosinophils, Absolute 0.31 10*3/mm3      Basophils, Absolute 0.03 10*3/mm3      Immature Grans, Absolute 0.01 10*3/mm3      nRBC 0.0 /100 WBC     Basic Metabolic Panel [[id number]]  (Abnormal) Collected:  02/01/20 0539    Specimen:  Blood Updated:  02/01/20 0618     Glucose 107 mg/dL      BUN 23 mg/dL      Creatinine 1.58 mg/dL      Sodium 140 mmol/L      Potassium 3.9 mmol/L      Chloride 102 mmol/L      CO2 32.0 mmol/L      Calcium 8.4 mg/dL      eGFR Non African Amer 42 mL/min/1.73      BUN/Creatinine Ratio 14.6     Anion Gap 6.0 mmol/L     Narrative:       GFR Normal >60  Chronic Kidney Disease <60  Kidney Failure <15      CBC & Differential [[id number]] Collected:  02/01/20 0539    Specimen:  Blood Updated:  02/01/20 0610    Narrative:       The following orders were created for panel order CBC & Differential.  Procedure                               Abnormality         Status                     ---------                               -----------         ------                     CBC Auto Differential[[id number]]         Abnormal            Final result                 Please view results for these tests on the individual orders.    CBC Auto Differential [[id number]]  (Abnormal) Collected:  02/01/20 0539    Specimen:  Blood Updated:  02/01/20 0610     WBC 4.06 10*3/mm3      RBC 3.74 10*6/mm3      Hemoglobin 11.0 g/dL      Hematocrit 35.1 %      MCV 93.9 fL      MCH 29.4 pg      MCHC 31.3 g/dL      RDW 14.2 %      RDW-SD 48.9 fl      MPV 12.9 fL      Platelets 88 10*3/mm3      Neutrophil % 62.2 %      Lymphocyte % 17.7 %      Monocyte % 9.6 %      Eosinophil % 9.6 %      Basophil % 0.7 %      Immature Grans % 0.2 %      Neutrophils, Absolute 2.52 10*3/mm3      Lymphocytes, Absolute 0.72 10*3/mm3      Monocytes, Absolute 0.39 10*3/mm3      Eosinophils, Absolute 0.39 10*3/mm3      Basophils, Absolute 0.03 10*3/mm3      Immature Grans, Absolute 0.01 10*3/mm3      nRBC 0.0 /100 WBC               Echo EF Estimated  Lab Results   Component Value Date    ECHOEFEST 35 09/08/2017         Cath Ejection Fraction Quantitative  No results found for: CATHEF        Medication Review: yes  Current Facility-Administered Medications   Medication Dose Route Frequency Provider Last Rate Last Dose   • bumetanide (BUMEX) injection 1 mg  1 mg Intravenous Once Jennie Turner APRN       • enoxaparin (LOVENOX) syringe 30 mg  30 mg Subcutaneous Q24H Kim Almeida MD   30 mg at 02/02/20 1050   • gabapentin (NEURONTIN) capsule 300 mg  300 mg Oral Daily Jennie Turner APRN   300 mg at 02/02/20 1011   • gabapentin (NEURONTIN) capsule 600 mg  600 mg Oral Nightly Jennie Turner APRN   600 mg at 01/31/20 2144   • HYDROcodone-acetaminophen (NORCO) 5-325 MG per tablet 1 tablet  1 tablet Oral BID Jennie Turner APRN   1 tablet at 02/01/20 2056   • HYDROcodone-acetaminophen (NORCO) 5-325 MG per tablet 1 tablet  1 tablet Oral Q4H PRN Cheri Paredes MD   1 tablet at 02/03/20 0517   • LORazepam (ATIVAN) tablet 0.5 mg  0.5 mg Oral TID Yue,  OUSMANE Chawla   0.5 mg at 02/02/20 2046   • metoprolol succinate XL (TOPROL-XL) 24 hr tablet 25 mg  25 mg Oral Nightly Jennie Turner APRN   25 mg at 02/02/20 2046   • nitroglycerin (NITROSTAT) SL tablet 0.4 mg  0.4 mg Sublingual Q5 Min PRN Kim Almeida MD       • ondansetron (ZOFRAN) injection 4 mg  4 mg Intravenous Q4H PRN Kim Almeida MD   4 mg at 01/30/20 0834   • phenol (CHLORASEPTIC) 1.4 % liquid 2 spray  2 spray Mouth/Throat Q2H PRN Kim Almeida MD   2 spray at 01/27/20 0437   • potassium chloride (MICRO-K) CR capsule 40 mEq  40 mEq Oral Once Jennie Turner APRN       • sacubitril-valsartan (ENTRESTO) 24-26 MG tablet 1 tablet  1 tablet Oral Q12H Jennie Turner APRN   1 tablet at 02/02/20 2046   • sodium chloride 0.9 % flush 10 mL  10 mL Intravenous PRN Kim Almeida MD       • tamsulosin (FLOMAX) 24 hr capsule 0.4 mg  0.4 mg Oral Nightly Jennie Turner APRN   0.4 mg at 02/02/20 2046   • trimethoprim-polymyxin b (POLYTRIM) 72372-8.1 UNIT/ML-% ophthalmic solution 1 drop  1 drop Right Eye Q6H Jennie Turner APRN   1 drop at 02/03/20 0518         Assessment/Plan       SBO (small bowel obstruction) (CMS/HCC)    Coronary artery disease involving coronary bypass graft of native heart without angina pectoris    Chronic systolic congestive heart failure (CMS/HCC)    Essential hypertension    Biventricular ICD (implantable cardioverter-defibrillator) in place    Stage 4 chronic kidney disease (CMS/HCC)    Lactic acidosis    Paroxysmal atrial fibrillation (CMS/HCC)    Urinary retention due to benign prostatic hyperplasia    Plan:   -recommend increasing Entresto to home dose of 49/51mg BID. Daughter states that won't make a difference and would like for Dr. Enciso to make that decision.  -recommend increasing diuretic from 0.5 to mg IV BID. Daughter would like Dr. Enciso to make that decision because that should have been done days ago.  -agree with CXR due to previous XR showing  PNA. Daughter states he doesn't need another xray because he just had one a few days ago. She would like to discuss with Dr. Enciso before an xray is obtained.   -patient and daughter prefer to see Dr. Enciso only     Further recommendations per Dr. Zaria Ochoa, APRN  02/03/20  9:14 AM

## 2020-02-03 NOTE — PROGRESS NOTES
Continued Stay Note  MARJ Esparza     Patient Name: Wild Bravo  MRN: [id number]  Today's Date: 2/3/2020    Admit Date: 1/26/2020    Discharge Plan     Row Name 02/03/20 1332       Plan    Plan  Home with daughter    Patient/Family in Agreement with Plan  yes    Plan Comments  Plan is for pt to go to his daughter's home at d/c. Have requested home health from MD.        Discharge Codes    No documentation.             JUAN PABLO Soto

## 2020-02-03 NOTE — PROGRESS NOTES
Cheri Paredes MD FACS  Progress Note     LOS: 8 days   Patient Care Team:  Alison Hercules MD as PCP - General (Internal Medicine)  Alison Hercules MD as Referring Physician (Internal Medicine)      Subjective     Interval History:      complains of shortness of breath since midnight.  Denies associated chest pain.       Objective     Vital Signs  Temp:  [97.6 °F (36.4 °C)-98.4 °F (36.9 °C)] 98.4 °F (36.9 °C)  Heart Rate:  [] 97  Resp:  [16-18] 16  BP: ()/(51-70) 102/61    Physical Exam:  General appearance - alert, well appearing, and in no distress  Abdomen - soft, appropriately tender      Results Review:    Lab Results (last 24 hours)     Procedure Component Value Units Date/Time    Basic Metabolic Panel [[id number]]  (Abnormal) Collected:  02/03/20 0600    Specimen:  Blood Updated:  02/03/20 0640     Glucose 122 mg/dL      BUN 23 mg/dL      Creatinine 1.61 mg/dL      Sodium 140 mmol/L      Potassium 3.6 mmol/L      Chloride 100 mmol/L      CO2 31.0 mmol/L      Calcium 8.4 mg/dL      eGFR Non African Amer 41 mL/min/1.73      BUN/Creatinine Ratio 14.3     Anion Gap 9.0 mmol/L     Narrative:       GFR Normal >60  Chronic Kidney Disease <60  Kidney Failure <15      BNP [[id number]]  (Abnormal) Collected:  02/03/20 0600    Specimen:  Blood Updated:  02/03/20 0640     proBNP 8,493.0 pg/mL     Narrative:       Among patients with dyspnea, NT-proBNP is highly sensitive for the detection of acute congestive heart failure. In addition NT-proBNP of <300 pg/ml effectively rules out acute congestive heart failure with 99% negative predictive value.    Results may be falsely decreased if patient taking Biotin.      CBC & Differential [[id number]] Collected:  02/03/20 0600    Specimen:  Blood Updated:  02/03/20 0629    Narrative:       The following orders were created for panel order CBC & Differential.  Procedure                               Abnormality         Status                     ---------                                -----------         ------                     CBC Auto Differential[[id number]]        Abnormal            Final result                 Please view results for these tests on the individual orders.    CBC Auto Differential [[id number]]  (Abnormal) Collected:  02/03/20 0600    Specimen:  Blood Updated:  02/03/20 0629     WBC 6.11 10*3/mm3      RBC 3.79 10*6/mm3      Hemoglobin 11.0 g/dL      Hematocrit 34.4 %      MCV 90.8 fL      MCH 29.0 pg      MCHC 32.0 g/dL      RDW 14.3 %      RDW-SD 47.2 fl      MPV 13.0 fL      Platelets 93 10*3/mm3      Neutrophil % 76.2 %      Lymphocyte % 10.8 %      Monocyte % 9.3 %      Eosinophil % 3.3 %      Basophil % 0.2 %      Immature Grans % 0.2 %      Neutrophils, Absolute 4.66 10*3/mm3      Lymphocytes, Absolute 0.66 10*3/mm3      Monocytes, Absolute 0.57 10*3/mm3      Eosinophils, Absolute 0.20 10*3/mm3      Basophils, Absolute 0.01 10*3/mm3      Immature Grans, Absolute 0.01 10*3/mm3      nRBC 0.0 /100 WBC         Imaging Results (Last 24 Hours)     ** No results found for the last 24 hours. **            Assessment/Plan       Await IM/cardiology evaluation.   Diet as tolerated.      Cheri Paredes MD  02/03/20  8:19 AM

## 2020-02-03 NOTE — PLAN OF CARE
Problem: Patient Care Overview  Goal: Plan of Care Review  Outcome: Ongoing (interventions implemented as appropriate)  Flowsheets (Taken 2/3/2020 2388)  Progress: no change  Plan of Care Reviewed With: patient  Note:   Pt up in chair at 8 pm and back to bed by 10 pm with daughter in room;  daughter stayed til 10:30 pm;  BP relatively stable;  pain med given but refused neurontin per daughter;  F/C in place;  tele V-paced;  continue to monitor;  5 beats of V-Tach noted per monitor room

## 2020-02-04 LAB
ANION GAP SERPL CALCULATED.3IONS-SCNC: 10 MMOL/L (ref 5–15)
ANION GAP SERPL CALCULATED.3IONS-SCNC: 8 MMOL/L (ref 5–15)
BUN BLD-MCNC: 24 MG/DL (ref 8–23)
BUN BLD-MCNC: 24 MG/DL (ref 8–23)
BUN/CREAT SERPL: 13.8 (ref 7–25)
BUN/CREAT SERPL: 15.3 (ref 7–25)
CALCIUM SPEC-SCNC: 8.7 MG/DL (ref 8.6–10.5)
CALCIUM SPEC-SCNC: 8.7 MG/DL (ref 8.6–10.5)
CHLORIDE SERPL-SCNC: 100 MMOL/L (ref 98–107)
CHLORIDE SERPL-SCNC: 97 MMOL/L (ref 98–107)
CO2 SERPL-SCNC: 35 MMOL/L (ref 22–29)
CO2 SERPL-SCNC: 35 MMOL/L (ref 22–29)
CREAT BLD-MCNC: 1.57 MG/DL (ref 0.76–1.27)
CREAT BLD-MCNC: 1.74 MG/DL (ref 0.76–1.27)
DEPRECATED RDW RBC AUTO: 47.7 FL (ref 37–54)
ERYTHROCYTE [DISTWIDTH] IN BLOOD BY AUTOMATED COUNT: 14.3 % (ref 12.3–15.4)
GFR SERPL CREATININE-BSD FRML MDRD: 37 ML/MIN/1.73
GFR SERPL CREATININE-BSD FRML MDRD: 42 ML/MIN/1.73
GLUCOSE BLD-MCNC: 124 MG/DL (ref 65–99)
GLUCOSE BLD-MCNC: 99 MG/DL (ref 65–99)
HCT VFR BLD AUTO: 33.7 % (ref 37.5–51)
HGB BLD-MCNC: 10.8 G/DL (ref 13–17.7)
MAGNESIUM SERPL-MCNC: 1.9 MG/DL (ref 1.6–2.4)
MCH RBC QN AUTO: 29.1 PG (ref 26.6–33)
MCHC RBC AUTO-ENTMCNC: 32 G/DL (ref 31.5–35.7)
MCV RBC AUTO: 90.8 FL (ref 79–97)
PLATELET # BLD AUTO: 105 10*3/MM3 (ref 140–450)
PMV BLD AUTO: 13 FL (ref 6–12)
POTASSIUM BLD-SCNC: 3.8 MMOL/L (ref 3.5–5.2)
POTASSIUM BLD-SCNC: 4.2 MMOL/L (ref 3.5–5.2)
RBC # BLD AUTO: 3.71 10*6/MM3 (ref 4.14–5.8)
SODIUM BLD-SCNC: 142 MMOL/L (ref 136–145)
SODIUM BLD-SCNC: 143 MMOL/L (ref 136–145)
WBC NRBC COR # BLD: 5.92 10*3/MM3 (ref 3.4–10.8)

## 2020-02-04 PROCEDURE — 83735 ASSAY OF MAGNESIUM: CPT | Performed by: INTERNAL MEDICINE

## 2020-02-04 PROCEDURE — 25010000002 ENOXAPARIN PER 10 MG: Performed by: SPECIALIST

## 2020-02-04 PROCEDURE — 85027 COMPLETE CBC AUTOMATED: CPT | Performed by: NURSE PRACTITIONER

## 2020-02-04 PROCEDURE — 80048 BASIC METABOLIC PNL TOTAL CA: CPT | Performed by: NURSE PRACTITIONER

## 2020-02-04 PROCEDURE — 80048 BASIC METABOLIC PNL TOTAL CA: CPT | Performed by: INTERNAL MEDICINE

## 2020-02-04 PROCEDURE — 99232 SBSQ HOSP IP/OBS MODERATE 35: CPT | Performed by: INTERNAL MEDICINE

## 2020-02-04 RX ORDER — BUMETANIDE 0.25 MG/ML
1 INJECTION INTRAMUSCULAR; INTRAVENOUS EVERY 8 HOURS
Status: DISCONTINUED | OUTPATIENT
Start: 2020-02-04 | End: 2020-02-05

## 2020-02-04 RX ORDER — DOCUSATE SODIUM 100 MG/1
100 CAPSULE, LIQUID FILLED ORAL 2 TIMES DAILY
Status: DISCONTINUED | OUTPATIENT
Start: 2020-02-04 | End: 2020-02-10 | Stop reason: HOSPADM

## 2020-02-04 RX ORDER — POTASSIUM CHLORIDE 750 MG/1
40 CAPSULE, EXTENDED RELEASE ORAL ONCE
Status: COMPLETED | OUTPATIENT
Start: 2020-02-04 | End: 2020-02-04

## 2020-02-04 RX ADMIN — SACUBITRIL AND VALSARTAN 1 TABLET: 24; 26 TABLET, FILM COATED ORAL at 09:28

## 2020-02-04 RX ADMIN — BUMETANIDE 1 MG: 0.25 INJECTION INTRAMUSCULAR; INTRAVENOUS at 17:17

## 2020-02-04 RX ADMIN — POTASSIUM CHLORIDE 40 MEQ: 750 CAPSULE, EXTENDED RELEASE ORAL at 12:13

## 2020-02-04 RX ADMIN — HYDROCODONE BITARTRATE AND ACETAMINOPHEN 1 TABLET: 5; 325 TABLET ORAL at 15:23

## 2020-02-04 RX ADMIN — LORAZEPAM 0.5 MG: 0.5 TABLET ORAL at 16:08

## 2020-02-04 RX ADMIN — DOCUSATE SODIUM 100 MG: 100 CAPSULE, LIQUID FILLED ORAL at 12:13

## 2020-02-04 RX ADMIN — LORAZEPAM 0.5 MG: 0.5 TABLET ORAL at 20:53

## 2020-02-04 RX ADMIN — HYDROCODONE BITARTRATE AND ACETAMINOPHEN 1 TABLET: 5; 325 TABLET ORAL at 01:09

## 2020-02-04 RX ADMIN — SACUBITRIL AND VALSARTAN 1 TABLET: 24; 26 TABLET, FILM COATED ORAL at 20:53

## 2020-02-04 RX ADMIN — ATORVASTATIN CALCIUM 10 MG: 10 TABLET, FILM COATED ORAL at 20:53

## 2020-02-04 RX ADMIN — TAMSULOSIN HYDROCHLORIDE 0.4 MG: 0.4 CAPSULE ORAL at 20:53

## 2020-02-04 RX ADMIN — HYDROCODONE BITARTRATE AND ACETAMINOPHEN 1 TABLET: 5; 325 TABLET ORAL at 19:43

## 2020-02-04 RX ADMIN — POLYMYXIN B SULFATE, TRIMETHOPRIM SULFATE 1 DROP: 10000; 1 SOLUTION/ DROPS OPHTHALMIC at 00:19

## 2020-02-04 RX ADMIN — DOCUSATE SODIUM 100 MG: 100 CAPSULE, LIQUID FILLED ORAL at 20:53

## 2020-02-04 RX ADMIN — ASPIRIN 81 MG: 81 TABLET ORAL at 08:07

## 2020-02-04 RX ADMIN — POLYMYXIN B SULFATE, TRIMETHOPRIM SULFATE 1 DROP: 10000; 1 SOLUTION/ DROPS OPHTHALMIC at 05:30

## 2020-02-04 RX ADMIN — GABAPENTIN 300 MG: 300 CAPSULE ORAL at 08:07

## 2020-02-04 RX ADMIN — BUMETANIDE 1 MG: 0.25 INJECTION INTRAMUSCULAR; INTRAVENOUS at 10:21

## 2020-02-04 RX ADMIN — POLYMYXIN B SULFATE, TRIMETHOPRIM SULFATE 1 DROP: 10000; 1 SOLUTION/ DROPS OPHTHALMIC at 17:17

## 2020-02-04 RX ADMIN — HYDROCODONE BITARTRATE AND ACETAMINOPHEN 1 TABLET: 5; 325 TABLET ORAL at 09:28

## 2020-02-04 RX ADMIN — ENOXAPARIN SODIUM 30 MG: 30 INJECTION SUBCUTANEOUS at 09:30

## 2020-02-04 RX ADMIN — LORAZEPAM 0.5 MG: 0.5 TABLET ORAL at 08:07

## 2020-02-04 RX ADMIN — MILK OF MAGNESIA 10 ML: 2400 CONCENTRATE ORAL at 11:40

## 2020-02-04 RX ADMIN — POLYMYXIN B SULFATE, TRIMETHOPRIM SULFATE 1 DROP: 10000; 1 SOLUTION/ DROPS OPHTHALMIC at 11:40

## 2020-02-04 RX ADMIN — METOPROLOL SUCCINATE 25 MG: 25 TABLET, EXTENDED RELEASE ORAL at 20:53

## 2020-02-04 RX ADMIN — HYDROCODONE BITARTRATE AND ACETAMINOPHEN 1 TABLET: 5; 325 TABLET ORAL at 05:36

## 2020-02-04 NOTE — PROGRESS NOTES
[facility] -  Progress Note     LOS: 9 days   Patient Care Team:  Alison Hercules MD as PCP - General (Internal Medicine)  Alison Hercules MD as Referring Physician (Internal Medicine)    Chief Complaint:f/u chf    Subjective     Interval History: Patient received 1 mg IV Bumex 3 times yesterday; reports good urine output and substantial symptomatic improvement today.  He did report some orthopnea and tachypnea overnight, the report his breathing is much improved today.  Reports abdominal bloating and discomfort is significant improved as well.  Leg swelling improving, though still present.  No associated chest pain or palpitations.    Review of Systems:  Review of Systems   Constitution: Negative for malaise/fatigue.   Cardiovascular: Positive for leg swelling. Negative for chest pain, claudication, dyspnea on exertion, near-syncope, orthopnea, palpitations, paroxysmal nocturnal dyspnea and syncope.   Respiratory: Negative for shortness of breath.    Hematologic/Lymphatic: Does not bruise/bleed easily.       Vital Sign Min/Max for last 24 hours  Temp  Min: 97.4 °F (36.3 °C)  Max: 98.6 °F (37 °C)   BP  Min: 80/46  Max: 110/60   Pulse  Min: 97  Max: 118   Resp  Min: 16  Max: 18   SpO2  Min: 93 %  Max: 98 %   No data recorded   Weight  Min: 79.1 kg (174 lb 6.4 oz)  Max: 79.1 kg (174 lb 6.4 oz)       Intake/Output Summary (Last 24 hours) at 2/4/2020 1626  Last data filed at 2/4/2020 1300  Gross per 24 hour   Intake 720 ml   Output 3975 ml   Net -3255 ml           02/04/20  0500   Weight: 79.1 kg (174 lb 6.4 oz)       Physical Exam:   Physical Exam   Constitutional: No distress.   Neck: JVD present.   Cardiovascular: Normal rate, regular rhythm, S1 normal, S2 normal, normal heart sounds, intact distal pulses and normal pulses. Exam reveals no gallop.   Pulmonary/Chest: Effort normal. He has bilateral rales to the midchest and diffuse wheezes.   Abdominal: Soft. There is no tenderness.      Musculoskeletal: He exhibits edema (1+ BLE).   Neurological: He is alert and oriented to person, place, and time.   Skin: Skin is warm and dry.       Medication Review: yes  Current Facility-Administered Medications   Medication Dose Route Frequency Provider Last Rate Last Dose   • aspirin EC tablet 81 mg  81 mg Oral Daily Raleigh Enciso MD   81 mg at 02/04/20 0807   • atorvastatin (LIPITOR) tablet 10 mg  10 mg Oral Nightly Raleigh Enciso MD       • bumetanide (BUMEX) injection 1 mg  1 mg Intravenous Q8H Raleigh Enciso MD   1 mg at 02/04/20 1021   • docusate sodium (COLACE) capsule 100 mg  100 mg Oral BID Cheri Paredes MD   100 mg at 02/04/20 1213   • enoxaparin (LOVENOX) syringe 30 mg  30 mg Subcutaneous Q24H Kim Almeida MD   30 mg at 02/04/20 0930   • gabapentin (NEURONTIN) capsule 300 mg  300 mg Oral Daily Jennie Turner, APRN   300 mg at 02/04/20 0807   • gabapentin (NEURONTIN) capsule 600 mg  600 mg Oral Nightly Jennie Turner K, APRN   600 mg at 02/03/20 2115   • HYDROcodone-acetaminophen (NORCO) 5-325 MG per tablet 1 tablet  1 tablet Oral BID Jennie Turner, APRN   1 tablet at 02/04/20 0928   • HYDROcodone-acetaminophen (NORCO) 5-325 MG per tablet 1 tablet  1 tablet Oral Q4H PRN Cheri Paredes MD   1 tablet at 02/04/20 1523   • LORazepam (ATIVAN) tablet 0.5 mg  0.5 mg Oral TID Jennie Turner K, APRN   0.5 mg at 02/04/20 1608   • Magnesium Sulfate 2 gram Bolus, followed by 8 gram infusion (total Mg dose 10 grams)- Mg less than or equal to 1mg/dL  2 g Intravenous PRN Raleigh Enciso MD        Or   • Magnesium Sulfate 2 gram / 50mL Infusion (GIVE X 3 BAGS TO EQUAL 6GM TOTAL DOSE) - Mg 1.1 - 1.5 mg/dl  2 g Intravenous PRN Raleigh Enciso MD        Or   • Magnesium Sulfate 4 gram infusion- Mg 1.6-1.9 mg/dL  4 g Intravenous PRN Raleigh Enciso MD       • metoprolol succinate XL (TOPROL-XL) 24 hr tablet 25 mg  25 mg Oral Nightly Jennie Turner APRN   25 mg at 02/03/20 2114   •  nitroglycerin (NITROSTAT) SL tablet 0.4 mg  0.4 mg Sublingual Q5 Min PRN Kim Almeida MD       • ondansetron (ZOFRAN) injection 4 mg  4 mg Intravenous Q4H PRN Kim Almeida MD   4 mg at 01/30/20 0834   • phenol (CHLORASEPTIC) 1.4 % liquid 2 spray  2 spray Mouth/Throat Q2H PRN Kim Almeida MD   2 spray at 01/27/20 0437   • potassium chloride (MICRO-K) CR capsule 40 mEq  40 mEq Oral PRN Raleigh Enciso MD        Or   • potassium chloride (KLOR-CON) packet 40 mEq  40 mEq Oral PRN Raleigh Enciso MD        Or   • potassium chloride 10 mEq in 100 mL IVPB  10 mEq Intravenous Q1H PRN Raleigh Enciso MD       • sacubitril-valsartan (ENTRESTO) 24-26 MG tablet 1 tablet  1 tablet Oral Q12H Jennie Turner APRN   1 tablet at 02/04/20 0928   • sodium chloride 0.9 % flush 10 mL  10 mL Intravenous PRN Kim Almeida MD       • tamsulosin (FLOMAX) 24 hr capsule 0.4 mg  0.4 mg Oral Nightly Jennie Turner APRN   0.4 mg at 02/03/20 2114   • trimethoprim-polymyxin b (POLYTRIM) 58627-2.1 UNIT/ML-% ophthalmic solution 1 drop  1 drop Right Eye Q6H Jennie Turner APRN   1 drop at 02/04/20 1140         Results Review:   I have reviewed all laboratory data, with pertinent findings: Frequent BMPs are starting to demonstrate contraction alkalosis, with chloride trending down and CO2 trending up.  This morning's creatinine is 1.57, repeat this afternoon is 1.74.  Potassium was 3.8 this morning, 4.2 this afternoon.    I have reviewed telemetry, which shows V-paced, short NSVT (5 beat max)    Assessment/Plan       SBO (small bowel obstruction) (CMS/[facility])    Coronary artery disease involving coronary bypass graft of native heart without angina pectoris    Chronic systolic congestive heart failure (CMS/[facility])    Essential hypertension    Biventricular ICD (implantable cardioverter-defibrillator) in place    Stage 4 chronic kidney disease (CMS/[facility])    Lactic acidosis    Paroxysmal atrial fibrillation (CMS/HCC)    Urinary  retention due to benign prostatic hyperplasia    1. Acute on chronic combined systolic/diastolic CHF: EF essentially unchanged based upon my review.  Due to necessary holding of diuretic during first few days of hospitalization for abdominal surgery, along with necessary IVF resuscitation at those times to help maintain perfusing BP, patient is now volume overloaded.  He did respond yesterday with >2L UOP after one dose of IV bumex; today he needs more of the same but more frequently.  -continue bumex to 1mg IV q8h today with frequent electrolyte monitoring/replacement; anticipate reducing back to more of PO maintenance dose tomorrow  -daily weights  -strict intake/output recording  -continue current dose Entresto (24/26 po bid) while aggressively diuresing; will increase back to home dose for maintenance therapy once acute issues resolves (so as to not confuse matters if kidney function worsens or BP drops)  -continue current doser BB     2.  NSVT - stable  -continuous telemetry  -keep Mg>2 and K>4, check these 3 times today since giving three doses of IV bumex     3.  Hypokalemia - improved  -40mEq this AM per daughter request (K 3.8); continue BMP at least twice daily now with electrolyte replacement protocol     4.  CAD - stable.   -resume ASA, statin     5.  Atrial flutter - stable    Raleigh Enciso MD  02/04/20  4:26 PM

## 2020-02-04 NOTE — PLAN OF CARE
Problem: Patient Care Overview  Goal: Plan of Care Review  Outcome: Ongoing (interventions implemented as appropriate)  Flowsheets (Taken 2/4/2020 8306)  Progress: no change  Plan of Care Reviewed With: patient  Note:   Patient c/o pain x 1 that was decreased with scheduled pain medication. Up in chair. Alvarado with Q4H alvarado care. Daughter asked me to contact Dr Enciso about potassium due to Bumex admin and level was 3.8 which she stated was low for a cardiac patient. Also, notified  about 5 beats of vtach. One time dose of potassium ordered and admin. Patient up in chair most of day. Up with standby assist. No signs of distress at this time. Will cont to monitor.  Goal: Individualization and Mutuality  Outcome: Ongoing (interventions implemented as appropriate)  Goal: Discharge Needs Assessment  Outcome: Ongoing (interventions implemented as appropriate)  Goal: Interprofessional Rounds/Family Conf  Outcome: Ongoing (interventions implemented as appropriate)     Problem: Pain, Chronic (Adult)  Goal: Identify Related Risk Factors and Signs and Symptoms  Outcome: Ongoing (interventions implemented as appropriate)  Goal: Acceptable Pain/Comfort Level and Functional Ability  Outcome: Ongoing (interventions implemented as appropriate)     Problem: Fall Risk (Adult)  Goal: Identify Related Risk Factors and Signs and Symptoms  Outcome: Ongoing (interventions implemented as appropriate)  Goal: Absence of Fall  Outcome: Ongoing (interventions implemented as appropriate)

## 2020-02-04 NOTE — PLAN OF CARE
Problem: Patient Care Overview  Goal: Plan of Care Review  Outcome: Ongoing (interventions implemented as appropriate)  Flowsheets (Taken 2/4/2020 1620)  Progress: improving  Plan of Care Reviewed With: patient; daughter  Outcome Summary: Pt is on a cardiac diet. He has consumed 50% of the last 4 meals. He reports his appetite  has been better today. He declines supplements at this time. Did discuss with pt that he may benefit from supplement use at home to prevent weight loss until his appetite is back to normal. Provided pt with [employer] coupons for home use. Provided pt with a menu to keep in his room and explained alternate selections available. Obtained pt's lunch meal choice and relayed to PDA. Will cont to follow and encourage intake.

## 2020-02-04 NOTE — PROGRESS NOTES
[facility] Medicine Services  INPATIENT PROGRESS NOTE    Length of Stay: 9  Date of Admission: 1/26/2020  Primary Care Physician: Alison Hercules MD    Subjective   Chief Complaint: Follow-up shortness of breath  HPI   Patient sitting up in the chair with daughter at bedside.  He states that he was feeling much better today at first, but now does not feel well.  He states that he was given milk of magnesia, which has made his abdomen cramp and he has had some diarrhea as well.  He feels that he has been breathing better since diuresis yesterday.  He denies any chest pain.    Review of Systems   All pertinent negatives and positives are as above. All other systems have been reviewed and are negative unless otherwise stated.     Objective    Temp:  [97.4 °F (36.3 °C)-98.6 °F (37 °C)] 97.4 °F (36.3 °C)  Heart Rate:  [] 82  Resp:  [16-18] 16  BP: ()/(46-79) 92/54  Physical Exam  Constitutional: He is oriented to person, place, and time. He appears well-developed and well-nourished. No distress.   HENT:   Head: Normocephalic and atraumatic.   Neck: Normal range of motion. Neck supple. No JVD present. No tracheal deviation present.   Cardiovascular: Intact distal pulses. Exam reveals no gallop and no friction rub.   Murmur heard.  Irregular rhythm.  V pacing/a pacing 95-1 12.  5.43 run of V. tach overnight with heart rate up to 146  Pulmonary/Chest: Effort normal. He has no wheezes. He has bibasilar rales- much improved in comparison to yesterday.  Abdominal: Soft. Bowel sounds are active.  He  exhibits distension. There is tenderness. Lap x4 intact, LLQ incision stained.   Musculoskeletal: He exhibits no tenderness.   Neurological: He is alert and oriented to person, place, and time. No cranial nerve deficit.   Skin: Skin is warm and dry. No rash noted. No erythema.   Psychiatric: He has a normal mood and affect. His behavior is normal. Judgment and thought content normal.     Vitals reviewed.     Results Review:  I have reviewed the labs, radiology results, and diagnostic studies.    Laboratory Data:   Results from last 7 days   Lab Units 02/04/20  0529 02/03/20  0600 02/02/20  0550   WBC 10*3/mm3 5.92 6.11 5.42   HEMOGLOBIN g/dL 10.8* 11.0* 11.4*   HEMATOCRIT % 33.7* 34.4* 36.2*   PLATELETS 10*3/mm3 105* 93* 103*        Results from last 7 days   Lab Units 02/04/20  1337 02/04/20  0529 02/03/20  1734   SODIUM mmol/L 142 143 143   POTASSIUM mmol/L 4.2 3.8 4.0   CHLORIDE mmol/L 97* 100 100   CO2 mmol/L 35.0* 35.0* 34.0*   BUN mg/dL 24* 24* 23   CREATININE mg/dL 1.74* 1.57* 1.64*   CALCIUM mg/dL 8.7 8.7 8.6   GLUCOSE mg/dL 124* 99 98     I have reviewed the patient current medications.     Assessment/Plan     Active Hospital Problems    Diagnosis   • **SBO (small bowel obstruction) (CMS/[facility])   • Urinary retention due to benign prostatic hyperplasia   • Lactic acidosis   • Paroxysmal atrial fibrillation (CMS/[facility])   • Stage 4 chronic kidney disease (CMS/[facility])   • Biventricular ICD (implantable cardioverter-defibrillator) in place   • Essential hypertension   • Chronic systolic congestive heart failure (CMS/[facility])     LVEF 35% on last echo here in 2017, but was reported as 25% on echocardiogram performed during recent admission at [facility] in September 2018; has BiV-ICD     • Coronary artery disease involving coronary bypass graft of native heart without angina pectoris     MI x2  Single vessel CABG 2008       Plan:  1.  Status post laparoscopic lysis of adhesions 1/28/2020.  He is tolerating a regular diet at this time.     2.  Loera catheter reinserted 1/30 due to urinary retention.  Tamsulosin continues, Proscar discontinued per Dr. Macdonald yesterday.  Feel that the best option at this time would be to leave the Loera catheter in place at time of discharge and to follow-up as an outpatient with urology for voiding trial.      3.  Patient symptoms seem to have improved in comparison to yesterday  with aggressive IV diuresis as initiated by cardiology.  Patient continues on current dose of beta-blocker and Entresto.    4.  General surgery and cardiology are managing the majority of the patient's care at this point.  We will sign off, please feel free to reconsult if needed.    Discharge Planning: As per attending physician.    OUSMANE Worley   02/04/20   5:38 PM   I personally evaluated and examined the patient in conjunction with OUSMANE Brian and agree with the assessment, treatment plan, and disposition of the patient as recorded by her. My history, exam, and further recommendations are:     I have seen the patient with Snehal.  He is known to me from initial consult.  He is hemodynamically stable.  He is not in any distress.  Continue present management.  Will sign off for now.  Reconsult as needed.    Gilberto Marshall MD  02/04/20  6:04 PM

## 2020-02-04 NOTE — PLAN OF CARE
Problem: Patient Care Overview  Goal: Plan of Care Review  Outcome: Ongoing (interventions implemented as appropriate)  Flowsheets (Taken 2/4/2020 0316)  Progress: no change  Plan of Care Reviewed With: patient  Outcome Summary: Pt. c/o pain x2; F/C remains to BSD; Bumex IV x1; Tele remains v-paced; IID x1; will continue to monitor.

## 2020-02-04 NOTE — NURSING NOTE
Educated pt on [facility] protocol to change IV site every 7 days. Pt is on day 6, pt refuses to have IV changed. Educated pt on risks of infection.

## 2020-02-04 NOTE — PROGRESS NOTES
Cheri Paredes MD FACS  Progress Note     LOS: 9 days   Patient Care Team:  Alison Hercules MD as PCP - General (Internal Medicine)  Alison Hercules MD as Referring Physician (Internal Medicine)      Subjective     Interval History:      feeling better today.  No sob.  brian po.  No bm     Objective     Vital Signs  Temp:  [97.8 °F (36.6 °C)-98.6 °F (37 °C)] 98 °F (36.7 °C)  Heart Rate:  [] 97  Resp:  [16-18] 16  BP: ()/(46-79) 110/60    Physical Exam:  General appearance - alert, well appearing, and in no distress  Abdomen - soft, appropriately tender, clean      Results Review:    Lab Results (last 24 hours)     Procedure Component Value Units Date/Time    Basic Metabolic Panel [[id number]]  (Abnormal) Collected:  02/04/20 0529    Specimen:  Blood Updated:  02/04/20 0601     Glucose 99 mg/dL      BUN 24 mg/dL      Creatinine 1.57 mg/dL      Sodium 143 mmol/L      Potassium 3.8 mmol/L      Chloride 100 mmol/L      CO2 35.0 mmol/L      Calcium 8.7 mg/dL      eGFR Non African Amer 42 mL/min/1.73      BUN/Creatinine Ratio 15.3     Anion Gap 8.0 mmol/L     Narrative:       GFR Normal >60  Chronic Kidney Disease <60  Kidney Failure <15      CBC (No Diff) [[id number]]  (Abnormal) Collected:  02/04/20 0529    Specimen:  Blood Updated:  02/04/20 0544     WBC 5.92 10*3/mm3      RBC 3.71 10*6/mm3      Hemoglobin 10.8 g/dL      Hematocrit 33.7 %      MCV 90.8 fL      MCH 29.1 pg      MCHC 32.0 g/dL      RDW 14.3 %      RDW-SD 47.7 fl      MPV 13.0 fL      Platelets 105 10*3/mm3     Basic Metabolic Panel [[id number]]  (Abnormal) Collected:  02/03/20 1734    Specimen:  Blood Updated:  02/03/20 1819     Glucose 98 mg/dL      BUN 23 mg/dL      Creatinine 1.64 mg/dL      Sodium 143 mmol/L      Potassium 4.0 mmol/L      Chloride 100 mmol/L      CO2 34.0 mmol/L      Calcium 8.6 mg/dL      eGFR Non African Amer 40 mL/min/1.73      BUN/Creatinine Ratio 14.0     Anion Gap 9.0 mmol/L     Narrative:       GFR Normal >60  Chronic  Kidney Disease <60  Kidney Failure <15      Basic Metabolic Panel [[id number]]  (Abnormal) Collected:  02/03/20 1138    Specimen:  Blood Updated:  02/03/20 1206     Glucose 105 mg/dL      BUN 22 mg/dL      Creatinine 1.56 mg/dL      Sodium 142 mmol/L      Potassium 3.7 mmol/L      Chloride 100 mmol/L      CO2 33.0 mmol/L      Calcium 8.5 mg/dL      eGFR Non African Amer 43 mL/min/1.73      BUN/Creatinine Ratio 14.1     Anion Gap 9.0 mmol/L     Narrative:       GFR Normal >60  Chronic Kidney Disease <60  Kidney Failure <15      Magnesium [[id number]]  (Normal) Collected:  02/03/20 0600    Specimen:  Blood Updated:  02/03/20 1133     Magnesium 1.9 mg/dL         Imaging Results (Last 24 Hours)     ** No results found for the last 24 hours. **            Assessment/Plan       Try d/c alvarado.  Diet as tolerated.  Stool softeners      Cheri Paredes MD  02/04/20  10:00 AM

## 2020-02-05 LAB
ANION GAP SERPL CALCULATED.3IONS-SCNC: 10 MMOL/L (ref 5–15)
ANION GAP SERPL CALCULATED.3IONS-SCNC: 9 MMOL/L (ref 5–15)
BUN BLD-MCNC: 26 MG/DL (ref 8–23)
BUN BLD-MCNC: 28 MG/DL (ref 8–23)
BUN/CREAT SERPL: 14.7 (ref 7–25)
BUN/CREAT SERPL: 15 (ref 7–25)
CALCIUM SPEC-SCNC: 9 MG/DL (ref 8.6–10.5)
CALCIUM SPEC-SCNC: 9.1 MG/DL (ref 8.6–10.5)
CHLORIDE SERPL-SCNC: 95 MMOL/L (ref 98–107)
CHLORIDE SERPL-SCNC: 96 MMOL/L (ref 98–107)
CO2 SERPL-SCNC: 33 MMOL/L (ref 22–29)
CO2 SERPL-SCNC: 36 MMOL/L (ref 22–29)
CREAT BLD-MCNC: 1.73 MG/DL (ref 0.76–1.27)
CREAT BLD-MCNC: 1.91 MG/DL (ref 0.76–1.27)
GFR SERPL CREATININE-BSD FRML MDRD: 34 ML/MIN/1.73
GFR SERPL CREATININE-BSD FRML MDRD: 38 ML/MIN/1.73
GLUCOSE BLD-MCNC: 112 MG/DL (ref 65–99)
GLUCOSE BLD-MCNC: 129 MG/DL (ref 65–99)
MAGNESIUM SERPL-MCNC: 2 MG/DL (ref 1.6–2.4)
NT-PROBNP SERPL-MCNC: 7180 PG/ML (ref 5–1800)
POTASSIUM BLD-SCNC: 4.2 MMOL/L (ref 3.5–5.2)
POTASSIUM BLD-SCNC: 4.4 MMOL/L (ref 3.5–5.2)
SODIUM BLD-SCNC: 139 MMOL/L (ref 136–145)
SODIUM BLD-SCNC: 140 MMOL/L (ref 136–145)

## 2020-02-05 PROCEDURE — 83735 ASSAY OF MAGNESIUM: CPT | Performed by: INTERNAL MEDICINE

## 2020-02-05 PROCEDURE — 80048 BASIC METABOLIC PNL TOTAL CA: CPT | Performed by: INTERNAL MEDICINE

## 2020-02-05 PROCEDURE — 99232 SBSQ HOSP IP/OBS MODERATE 35: CPT | Performed by: INTERNAL MEDICINE

## 2020-02-05 PROCEDURE — 83880 ASSAY OF NATRIURETIC PEPTIDE: CPT | Performed by: INTERNAL MEDICINE

## 2020-02-05 RX ORDER — BUMETANIDE 0.5 MG/1
0.5 TABLET ORAL
Status: DISCONTINUED | OUTPATIENT
Start: 2020-02-06 | End: 2020-02-06

## 2020-02-05 RX ADMIN — LORAZEPAM 0.5 MG: 0.5 TABLET ORAL at 16:49

## 2020-02-05 RX ADMIN — HYDROCODONE BITARTRATE AND ACETAMINOPHEN 1 TABLET: 5; 325 TABLET ORAL at 03:03

## 2020-02-05 RX ADMIN — METOPROLOL SUCCINATE 25 MG: 25 TABLET, EXTENDED RELEASE ORAL at 21:17

## 2020-02-05 RX ADMIN — HYDROCODONE BITARTRATE AND ACETAMINOPHEN 1 TABLET: 5; 325 TABLET ORAL at 23:37

## 2020-02-05 RX ADMIN — GABAPENTIN 300 MG: 300 CAPSULE ORAL at 08:55

## 2020-02-05 RX ADMIN — BUMETANIDE 1 MG: 0.25 INJECTION INTRAMUSCULAR; INTRAVENOUS at 05:05

## 2020-02-05 RX ADMIN — DOCUSATE SODIUM 100 MG: 100 CAPSULE, LIQUID FILLED ORAL at 08:55

## 2020-02-05 RX ADMIN — HYDROCODONE BITARTRATE AND ACETAMINOPHEN 1 TABLET: 5; 325 TABLET ORAL at 18:56

## 2020-02-05 RX ADMIN — TAMSULOSIN HYDROCHLORIDE 0.4 MG: 0.4 CAPSULE ORAL at 21:18

## 2020-02-05 RX ADMIN — LORAZEPAM 0.5 MG: 0.5 TABLET ORAL at 21:17

## 2020-02-05 RX ADMIN — HYDROCODONE BITARTRATE AND ACETAMINOPHEN 1 TABLET: 5; 325 TABLET ORAL at 08:55

## 2020-02-05 RX ADMIN — GABAPENTIN 600 MG: 300 CAPSULE ORAL at 21:17

## 2020-02-05 RX ADMIN — DOCUSATE SODIUM 100 MG: 100 CAPSULE, LIQUID FILLED ORAL at 21:18

## 2020-02-05 RX ADMIN — BUMETANIDE 1 MG: 0.25 INJECTION INTRAMUSCULAR; INTRAVENOUS at 13:47

## 2020-02-05 RX ADMIN — SACUBITRIL AND VALSARTAN 1 TABLET: 24; 26 TABLET, FILM COATED ORAL at 21:17

## 2020-02-05 RX ADMIN — ATORVASTATIN CALCIUM 10 MG: 10 TABLET, FILM COATED ORAL at 23:46

## 2020-02-05 RX ADMIN — HYDROCODONE BITARTRATE AND ACETAMINOPHEN 1 TABLET: 5; 325 TABLET ORAL at 13:53

## 2020-02-05 RX ADMIN — LORAZEPAM 0.5 MG: 0.5 TABLET ORAL at 08:55

## 2020-02-05 RX ADMIN — SACUBITRIL AND VALSARTAN 1 TABLET: 24; 26 TABLET, FILM COATED ORAL at 08:55

## 2020-02-05 NOTE — PROGRESS NOTES
Kim Almeida MD Progress Note     LOS: 10 days   Patient Care Team:  Alison Hercules MD as PCP - General (Internal Medicine)  Alison Hercules MD as Referring Physician (Internal Medicine)        Subjective     Had a good bowel movement this morning.  Tolerating regular food    Objective     Vital Signs  Temp:  [97.4 °F (36.3 °C)-98.9 °F (37.2 °C)] 98.1 °F (36.7 °C)  Heart Rate:  [] 110  Resp:  [16-18] 16  BP: ()/(54-79) 99/64    Intake & Output (last 3 days)       02/02 0701 - 02/03 0700 02/03 0701 - 02/04 0700 02/04 0701 - 02/05 0700 02/05 0701 - 02/06 0700    P.O. 480  2160 180    Total Intake(mL/kg) 480 (5.5)  2160 (27.3) 180 (2.3)    Urine (mL/kg/hr) 2425 (1.2) 5025 (2.6) 4350 (2.3)     Total Output 2425 5025 4350     Net -1945 -5025 -2190 +180                  Physical Exam:     General Appearance:    Alert, cooperative, in no acute distress   Lungs:     respirations regular, even and unlabored    Heart:    Regular rhythm and normal rate, normal S1 and S2, no            murmur, no gallop, no rub   Chest Wall:    No abnormalities observed   Abdomen:      Soft no tympany no tenderness dressings intact   Extremities: No edema,    Results Review:     I reviewed the patient's new clinical results.    Lab Results (last 72 hours)     Procedure Component Value Units Date/Time    Basic Metabolic Panel [[id number]]  (Abnormal) Collected:  02/05/20 0650    Specimen:  Blood Updated:  02/05/20 0825     Glucose 112 mg/dL      BUN 26 mg/dL      Creatinine 1.73 mg/dL      Sodium 139 mmol/L      Potassium 4.4 mmol/L      Chloride 96 mmol/L      CO2 33.0 mmol/L      Calcium 9.0 mg/dL      eGFR Non African Amer 38 mL/min/1.73      BUN/Creatinine Ratio 15.0     Anion Gap 10.0 mmol/L     Narrative:       GFR Normal >60  Chronic Kidney Disease <60  Kidney Failure <15      BNP [[id number]]  (Abnormal) Collected:  02/05/20 0650    Specimen:  Blood Updated:  02/05/20 0731     proBNP 7,180.0 pg/mL     Narrative:       Among  patients with dyspnea, NT-proBNP is highly sensitive for the detection of acute congestive heart failure. In addition NT-proBNP of <300 pg/ml effectively rules out acute congestive heart failure with 99% negative predictive value.    Results may be falsely decreased if patient taking Biotin.      Basic Metabolic Panel [[id number]]  (Abnormal) Collected:  02/04/20 1337    Specimen:  Blood Updated:  02/04/20 1406     Glucose 124 mg/dL      BUN 24 mg/dL      Creatinine 1.74 mg/dL      Sodium 142 mmol/L      Potassium 4.2 mmol/L      Chloride 97 mmol/L      CO2 35.0 mmol/L      Calcium 8.7 mg/dL      eGFR Non African Amer 37 mL/min/1.73      BUN/Creatinine Ratio 13.8     Anion Gap 10.0 mmol/L     Narrative:       GFR Normal >60  Chronic Kidney Disease <60  Kidney Failure <15      Magnesium [[id number]]  (Normal) Collected:  02/04/20 1337    Specimen:  Blood Updated:  02/04/20 1406     Magnesium 1.9 mg/dL     Basic Metabolic Panel [[id number]]  (Abnormal) Collected:  02/04/20 0529    Specimen:  Blood Updated:  02/04/20 0601     Glucose 99 mg/dL      BUN 24 mg/dL      Creatinine 1.57 mg/dL      Sodium 143 mmol/L      Potassium 3.8 mmol/L      Chloride 100 mmol/L      CO2 35.0 mmol/L      Calcium 8.7 mg/dL      eGFR Non African Amer 42 mL/min/1.73      BUN/Creatinine Ratio 15.3     Anion Gap 8.0 mmol/L     Narrative:       GFR Normal >60  Chronic Kidney Disease <60  Kidney Failure <15      CBC (No Diff) [[id number]]  (Abnormal) Collected:  02/04/20 0529    Specimen:  Blood Updated:  02/04/20 0544     WBC 5.92 10*3/mm3      RBC 3.71 10*6/mm3      Hemoglobin 10.8 g/dL      Hematocrit 33.7 %      MCV 90.8 fL      MCH 29.1 pg      MCHC 32.0 g/dL      RDW 14.3 %      RDW-SD 47.7 fl      MPV 13.0 fL      Platelets 105 10*3/mm3     Basic Metabolic Panel [[id number]]  (Abnormal) Collected:  02/03/20 1734    Specimen:  Blood Updated:  02/03/20 1819     Glucose 98 mg/dL      BUN 23 mg/dL      Creatinine 1.64 mg/dL      Sodium 143 mmol/L       Potassium 4.0 mmol/L      Chloride 100 mmol/L      CO2 34.0 mmol/L      Calcium 8.6 mg/dL      eGFR Non African Amer 40 mL/min/1.73      BUN/Creatinine Ratio 14.0     Anion Gap 9.0 mmol/L     Narrative:       GFR Normal >60  Chronic Kidney Disease <60  Kidney Failure <15      Basic Metabolic Panel [[id number]]  (Abnormal) Collected:  02/03/20 1138    Specimen:  Blood Updated:  02/03/20 1206     Glucose 105 mg/dL      BUN 22 mg/dL      Creatinine 1.56 mg/dL      Sodium 142 mmol/L      Potassium 3.7 mmol/L      Chloride 100 mmol/L      CO2 33.0 mmol/L      Calcium 8.5 mg/dL      eGFR Non African Amer 43 mL/min/1.73      BUN/Creatinine Ratio 14.1     Anion Gap 9.0 mmol/L     Narrative:       GFR Normal >60  Chronic Kidney Disease <60  Kidney Failure <15      Magnesium [[id number]]  (Normal) Collected:  02/03/20 0600    Specimen:  Blood Updated:  02/03/20 1133     Magnesium 1.9 mg/dL     Basic Metabolic Panel [[id number]]  (Abnormal) Collected:  02/03/20 0600    Specimen:  Blood Updated:  02/03/20 0640     Glucose 122 mg/dL      BUN 23 mg/dL      Creatinine 1.61 mg/dL      Sodium 140 mmol/L      Potassium 3.6 mmol/L      Chloride 100 mmol/L      CO2 31.0 mmol/L      Calcium 8.4 mg/dL      eGFR Non African Amer 41 mL/min/1.73      BUN/Creatinine Ratio 14.3     Anion Gap 9.0 mmol/L     Narrative:       GFR Normal >60  Chronic Kidney Disease <60  Kidney Failure <15      BNP [[id number]]  (Abnormal) Collected:  02/03/20 0600    Specimen:  Blood Updated:  02/03/20 0640     proBNP 8,493.0 pg/mL     Narrative:       Among patients with dyspnea, NT-proBNP is highly sensitive for the detection of acute congestive heart failure. In addition NT-proBNP of <300 pg/ml effectively rules out acute congestive heart failure with 99% negative predictive value.    Results may be falsely decreased if patient taking Biotin.      CBC & Differential [[id number]] Collected:  02/03/20 0600    Specimen:  Blood Updated:  02/03/20 0629    Narrative:        The following orders were created for panel order CBC & Differential.  Procedure                               Abnormality         Status                     ---------                               -----------         ------                     CBC Auto Differential[[id number]]        Abnormal            Final result                 Please view results for these tests on the individual orders.    CBC Auto Differential [[id number]]  (Abnormal) Collected:  02/03/20 0600    Specimen:  Blood Updated:  02/03/20 0629     WBC 6.11 10*3/mm3      RBC 3.79 10*6/mm3      Hemoglobin 11.0 g/dL      Hematocrit 34.4 %      MCV 90.8 fL      MCH 29.0 pg      MCHC 32.0 g/dL      RDW 14.3 %      RDW-SD 47.2 fl      MPV 13.0 fL      Platelets 93 10*3/mm3      Neutrophil % 76.2 %      Lymphocyte % 10.8 %      Monocyte % 9.3 %      Eosinophil % 3.3 %      Basophil % 0.2 %      Immature Grans % 0.2 %      Neutrophils, Absolute 4.66 10*3/mm3      Lymphocytes, Absolute 0.66 10*3/mm3      Monocytes, Absolute 0.57 10*3/mm3      Eosinophils, Absolute 0.20 10*3/mm3      Basophils, Absolute 0.01 10*3/mm3      Immature Grans, Absolute 0.01 10*3/mm3      nRBC 0.0 /100 WBC         Imaging Results (Last 72 Hours)     ** No results found for the last 72 hours. **              Assessment/Plan       SBO (small bowel obstruction) (CMS/HCC)    Coronary artery disease involving coronary bypass graft of native heart without angina pectoris    Chronic systolic congestive heart failure (CMS/HCC)    Essential hypertension    Biventricular ICD (implantable cardioverter-defibrillator) in place    Stage 4 chronic kidney disease (CMS/HCC)    Lactic acidosis    Paroxysmal atrial fibrillation (CMS/HCC)    Urinary retention due to benign prostatic hyperplasia      Undergoing further diuresis today per cardiology and medicine.  Will leave Loera in for that.  Patient ready for discharge when okay from a cardiac standpoint      Kim Almeida MD  02/05/20  11:01  AM

## 2020-02-05 NOTE — PROGRESS NOTES
"  [facility] -  Progress Note     LOS: 10 days   Patient Care Team:  Alison Hercules MD as PCP - General (Internal Medicine)  Alison Hercules MD as Referring Physician (Internal Medicine)    Chief Complaint:f/u chf    Subjective     Interval History: Continues to improve - received IV bumex x2 yesterday; had good UOP. He does say he feels \"lousy\" today, specifically citing worsened abdominal bloating.  States he was able to eat a little bit this morning, but overall is concerned about not having a bowel movement.  He does not report any problems breathing, and notices that his leg swelling is getting better.    Review of Systems:  Review of Systems   Constitution: Negative for malaise/fatigue.   Cardiovascular: Positive for leg swelling. Negative for chest pain, claudication, dyspnea on exertion, near-syncope, orthopnea, palpitations, paroxysmal nocturnal dyspnea and syncope.   Respiratory: Negative for shortness of breath.    Hematologic/Lymphatic: Does not bruise/bleed easily.   Gastrointestinal: Positive for bloating and constipation. Negative for abdominal pain.       Vital Sign Min/Max for last 24 hours  Temp  Min: 97.5 °F (36.4 °C)  Max: 98.9 °F (37.2 °C)   BP  Min: 92/54  Max: 113/70   Pulse  Min: 76  Max: 112   Resp  Min: 16  Max: 18   SpO2  Min: 95 %  Max: 98 %   No data recorded   No data recorded         02/04/20  0500   Weight: 79.1 kg (174 lb 6.4 oz)       Physical Exam:   Physical Exam   Constitutional: No distress.   Neck: No JVD present.   Cardiovascular: Normal rate, regular rhythm, S1 normal, S2 normal, normal heart sounds, intact distal pulses and normal pulses. Exam reveals no gallop.   Pulmonary/Chest: Effort normal. He has no wheezes. He has bibasilar rales.   Abdominal: Soft. There is no tenderness.   Musculoskeletal: He exhibits edema (1+ BLE).   Neurological: He is alert and oriented to person, place, and time.   Skin: Skin is warm and dry.       Medication Review: " yes  Current Facility-Administered Medications   Medication Dose Route Frequency Provider Last Rate Last Dose   • aspirin EC tablet 81 mg  81 mg Oral Daily Raleigh Enciso MD   81 mg at 02/04/20 0807   • atorvastatin (LIPITOR) tablet 10 mg  10 mg Oral Nightly Raleigh Enciso MD   10 mg at 02/04/20 2053   • bumetanide (BUMEX) injection 1 mg  1 mg Intravenous Q8H Raleigh Enciso MD   1 mg at 02/05/20 0505   • docusate sodium (COLACE) capsule 100 mg  100 mg Oral BID Cheri Paredes MD   100 mg at 02/05/20 0855   • enoxaparin (LOVENOX) syringe 30 mg  30 mg Subcutaneous Q24H Kim Almeida MD   30 mg at 02/04/20 0930   • gabapentin (NEURONTIN) capsule 300 mg  300 mg Oral Daily Jennie Turner APRN   300 mg at 02/05/20 0855   • gabapentin (NEURONTIN) capsule 600 mg  600 mg Oral Nightly Jennie Turner APRN   600 mg at 02/03/20 2115   • HYDROcodone-acetaminophen (NORCO) 5-325 MG per tablet 1 tablet  1 tablet Oral BID Jennie Turner APRN   1 tablet at 02/05/20 0855   • HYDROcodone-acetaminophen (NORCO) 5-325 MG per tablet 1 tablet  1 tablet Oral Q4H PRN Cheri Paredes MD   1 tablet at 02/05/20 0303   • LORazepam (ATIVAN) tablet 0.5 mg  0.5 mg Oral TID Jennie Turner APRN   0.5 mg at 02/05/20 0855   • Magnesium Sulfate 2 gram Bolus, followed by 8 gram infusion (total Mg dose 10 grams)- Mg less than or equal to 1mg/dL  2 g Intravenous PRN Raleigh Enciso MD        Or   • Magnesium Sulfate 2 gram / 50mL Infusion (GIVE X 3 BAGS TO EQUAL 6GM TOTAL DOSE) - Mg 1.1 - 1.5 mg/dl  2 g Intravenous PRN Raleigh Enciso MD        Or   • Magnesium Sulfate 4 gram infusion- Mg 1.6-1.9 mg/dL  4 g Intravenous PRN Raleigh Enciso MD       • metoprolol succinate XL (TOPROL-XL) 24 hr tablet 25 mg  25 mg Oral Nightly Jennie Turner APRN   25 mg at 02/04/20 2053   • nitroglycerin (NITROSTAT) SL tablet 0.4 mg  0.4 mg Sublingual Q5 Min PRN Kim Almeida MD       • ondansetron (ZOFRAN) injection 4 mg  4 mg  Intravenous Q4H PRN Kim Almeida MD   4 mg at 01/30/20 0834   • phenol (CHLORASEPTIC) 1.4 % liquid 2 spray  2 spray Mouth/Throat Q2H PRN Kim Almeida MD   2 spray at 01/27/20 0437   • potassium chloride (MICRO-K) CR capsule 40 mEq  40 mEq Oral PRN Raleigh Enciso MD        Or   • potassium chloride (KLOR-CON) packet 40 mEq  40 mEq Oral PRN Raleigh Enciso MD        Or   • potassium chloride 10 mEq in 100 mL IVPB  10 mEq Intravenous Q1H PRN Raleigh Enciso MD       • sacubitril-valsartan (ENTRESTO) 24-26 MG tablet 1 tablet  1 tablet Oral Q12H Jennie Turner APRN   1 tablet at 02/05/20 0855   • sodium chloride 0.9 % flush 10 mL  10 mL Intravenous PRN Kim Almeida MD       • tamsulosin (FLOMAX) 24 hr capsule 0.4 mg  0.4 mg Oral Nightly Jennie Turner APRN   0.4 mg at 02/04/20 2053   • trimethoprim-polymyxin b (POLYTRIM) 64799-3.1 UNIT/ML-% ophthalmic solution 1 drop  1 drop Right Eye Q6H Jnenie Turner APRN   1 drop at 02/04/20 1717         Results Review:   I have reviewed all laboratory data, with pertinent findings: proBNP down to 7180.  Sodium 139.  Potassium 4.4.  Creatinine stable at 1.73, BUN stable at 26.    I have reviewed telemetry, which shows V pacing with short non-sustained VT runs (longest today 6 beats)      Assessment/Plan       SBO (small bowel obstruction) (CMS/HCC)    Coronary artery disease involving coronary bypass graft of native heart without angina pectoris    Chronic systolic congestive heart failure (CMS/HCC)    Essential hypertension    Biventricular ICD (implantable cardioverter-defibrillator) in place    Stage 4 chronic kidney disease (CMS/HCC)    Lactic acidosis    Paroxysmal atrial fibrillation (CMS/HCC)    Urinary retention due to benign prostatic hyperplasia    1. Acute on chronic combined systolic/diastolic CHF: EF essentially unchanged based upon my review.  Due to necessary holding of diuretic during first few days of hospitalization for abdominal  surgery, along with necessary IVF resuscitation at those times to help maintain perfusing BP, patient developed significant volume overloaded.  He is responding well now to IV diuresis.  -Today, he looks much improved by physical examination (JVD has resolved, and lungs exhibit much better airflow.  Legs are still mildly swollen but improved)  -continue bumex 1mg IV q8h today with frequent electrolyte monitoring/replacement; anticipate reducing back to more of PO maintenance dose tomorrow  -daily weights  -strict intake/output recording  -continue current dose Entresto (24/26 po bid) while aggressively diuresing; will increase back to home dose for maintenance therapy once acute issues resolves (so as to not confuse matters if kidney function worsens or BP drops)  -continue current doser BB     2.  NSVT - stable  -continuous telemetry  -keep Mg>2 and K>4, check these 3 times today since giving three doses of IV bumex     3.  Hypokalemia - improved     4.  CAD - stable.   -resume ASA, statin     5.  Atrial flutter - stable    Raleigh Enciso MD  02/05/20  1:01 PM      ADDENDUM:  Patient's weight down another 7# today, and repeat labs this afternoon showed bump up in creatinine with other changes of contraction alkalosis.      Thus, will d/c IV bumex now and resume home PO dose tomorrow (0.5mg po bid). Continue strict I's/o's and daily weights. Continue low dose entresto for now - will go back to intermediate dose once renal fxn stabilized.    Raeligh Enciso MD   4:42 PM  02/05/20

## 2020-02-05 NOTE — PLAN OF CARE
Problem: Patient Care Overview  Goal: Plan of Care Review  Outcome: Ongoing (interventions implemented as appropriate)  Flowsheets (Taken 2/5/2020 5018)  Progress: improving  Plan of Care Reviewed With: patient; daughter  Outcome Summary: Pt medicated with prn PO pain meds as needed with good relief. Pt c/o abd pain, abd soft, nondistended. Ambulates with walker and assist x1. Catheter maintained with adequate output. Pt and family requested to not be bothered during the night, so Bumex times rescheduled. Bedcheck set per family request. BP coming up. Daughter is concerned about bruising on L lower back, area marked. Pt refused q 4 cath care. VSS, will cont to monitor.

## 2020-02-05 NOTE — PLAN OF CARE
Problem: Patient Care Overview  Goal: Plan of Care Review  Outcome: Ongoing (interventions implemented as appropriate)  Flowsheets (Taken 2/5/2020 160)  Plan of Care Reviewed With: patient  Outcome Summary: pt ambulated in fuentes  IV bumex continues alvarado cath continues strict I&O daily weight

## 2020-02-05 NOTE — NURSING NOTE
Pt stated that this is the last time he would do catheter care tonight. He stated he did not want to be woken up to do cath care. Education provided on importance of cath care, but pt still refused.

## 2020-02-06 LAB
ALBUMIN SERPL-MCNC: 3.6 G/DL (ref 3.5–5.2)
ALBUMIN/GLOB SERPL: 1 G/DL
ALP SERPL-CCNC: 100 U/L (ref 39–117)
ALT SERPL W P-5'-P-CCNC: 6 U/L (ref 1–41)
ANION GAP SERPL CALCULATED.3IONS-SCNC: 10 MMOL/L (ref 5–15)
ANION GAP SERPL CALCULATED.3IONS-SCNC: 11 MMOL/L (ref 5–15)
AST SERPL-CCNC: 6 U/L (ref 1–40)
BILIRUB SERPL-MCNC: 0.9 MG/DL (ref 0.2–1.2)
BUN BLD-MCNC: 28 MG/DL (ref 8–23)
BUN BLD-MCNC: 31 MG/DL (ref 8–23)
BUN/CREAT SERPL: 15.5 (ref 7–25)
BUN/CREAT SERPL: 16.8 (ref 7–25)
CALCIUM SPEC-SCNC: 8.9 MG/DL (ref 8.6–10.5)
CALCIUM SPEC-SCNC: 9.2 MG/DL (ref 8.6–10.5)
CHLORIDE SERPL-SCNC: 95 MMOL/L (ref 98–107)
CHLORIDE SERPL-SCNC: 96 MMOL/L (ref 98–107)
CO2 SERPL-SCNC: 34 MMOL/L (ref 22–29)
CO2 SERPL-SCNC: 36 MMOL/L (ref 22–29)
CREAT BLD-MCNC: 1.81 MG/DL (ref 0.76–1.27)
CREAT BLD-MCNC: 1.84 MG/DL (ref 0.76–1.27)
GFR SERPL CREATININE-BSD FRML MDRD: 35 ML/MIN/1.73
GFR SERPL CREATININE-BSD FRML MDRD: 36 ML/MIN/1.73
GLOBULIN UR ELPH-MCNC: 3.5 GM/DL
GLUCOSE BLD-MCNC: 105 MG/DL (ref 65–99)
GLUCOSE BLD-MCNC: 137 MG/DL (ref 65–99)
NT-PROBNP SERPL-MCNC: 6333 PG/ML (ref 5–1800)
POTASSIUM BLD-SCNC: 4.1 MMOL/L (ref 3.5–5.2)
POTASSIUM BLD-SCNC: 4.3 MMOL/L (ref 3.5–5.2)
PROT SERPL-MCNC: 7.1 G/DL (ref 6–8.5)
SODIUM BLD-SCNC: 141 MMOL/L (ref 136–145)
SODIUM BLD-SCNC: 141 MMOL/L (ref 136–145)

## 2020-02-06 PROCEDURE — 83880 ASSAY OF NATRIURETIC PEPTIDE: CPT | Performed by: INTERNAL MEDICINE

## 2020-02-06 PROCEDURE — 80053 COMPREHEN METABOLIC PANEL: CPT | Performed by: INTERNAL MEDICINE

## 2020-02-06 PROCEDURE — 99232 SBSQ HOSP IP/OBS MODERATE 35: CPT | Performed by: INTERNAL MEDICINE

## 2020-02-06 PROCEDURE — 25010000002 ENOXAPARIN PER 10 MG: Performed by: SPECIALIST

## 2020-02-06 RX ORDER — BUMETANIDE 0.25 MG/ML
1 INJECTION INTRAMUSCULAR; INTRAVENOUS EVERY 8 HOURS SCHEDULED
Status: COMPLETED | OUTPATIENT
Start: 2020-02-06 | End: 2020-02-07

## 2020-02-06 RX ADMIN — LORAZEPAM 0.5 MG: 0.5 TABLET ORAL at 20:41

## 2020-02-06 RX ADMIN — BUMETANIDE 0.5 MG: 0.5 TABLET ORAL at 09:35

## 2020-02-06 RX ADMIN — METOPROLOL SUCCINATE 25 MG: 25 TABLET, EXTENDED RELEASE ORAL at 20:40

## 2020-02-06 RX ADMIN — HYDROCODONE BITARTRATE AND ACETAMINOPHEN 1 TABLET: 5; 325 TABLET ORAL at 18:37

## 2020-02-06 RX ADMIN — DOCUSATE SODIUM 100 MG: 100 CAPSULE, LIQUID FILLED ORAL at 20:40

## 2020-02-06 RX ADMIN — GABAPENTIN 600 MG: 300 CAPSULE ORAL at 20:41

## 2020-02-06 RX ADMIN — TAMSULOSIN HYDROCHLORIDE 0.4 MG: 0.4 CAPSULE ORAL at 20:40

## 2020-02-06 RX ADMIN — POLYMYXIN B SULFATE, TRIMETHOPRIM SULFATE 1 DROP: 10000; 1 SOLUTION/ DROPS OPHTHALMIC at 11:42

## 2020-02-06 RX ADMIN — POLYMYXIN B SULFATE, TRIMETHOPRIM SULFATE 1 DROP: 10000; 1 SOLUTION/ DROPS OPHTHALMIC at 18:24

## 2020-02-06 RX ADMIN — LORAZEPAM 0.5 MG: 0.5 TABLET ORAL at 09:35

## 2020-02-06 RX ADMIN — HYDROCODONE BITARTRATE AND ACETAMINOPHEN 1 TABLET: 5; 325 TABLET ORAL at 04:25

## 2020-02-06 RX ADMIN — ENOXAPARIN SODIUM 30 MG: 30 INJECTION SUBCUTANEOUS at 09:35

## 2020-02-06 RX ADMIN — DOCUSATE SODIUM 100 MG: 100 CAPSULE, LIQUID FILLED ORAL at 09:35

## 2020-02-06 RX ADMIN — BUMETANIDE 1 MG: 0.25 INJECTION INTRAMUSCULAR; INTRAVENOUS at 20:41

## 2020-02-06 RX ADMIN — ATORVASTATIN CALCIUM 10 MG: 10 TABLET, FILM COATED ORAL at 20:40

## 2020-02-06 RX ADMIN — SACUBITRIL AND VALSARTAN 1 TABLET: 24; 26 TABLET, FILM COATED ORAL at 20:40

## 2020-02-06 RX ADMIN — LORAZEPAM 0.5 MG: 0.5 TABLET ORAL at 16:27

## 2020-02-06 RX ADMIN — HYDROCODONE BITARTRATE AND ACETAMINOPHEN 1 TABLET: 5; 325 TABLET ORAL at 22:10

## 2020-02-06 RX ADMIN — SODIUM CHLORIDE, PRESERVATIVE FREE 10 ML: 5 INJECTION INTRAVENOUS at 20:40

## 2020-02-06 RX ADMIN — HYDROCODONE BITARTRATE AND ACETAMINOPHEN 1 TABLET: 5; 325 TABLET ORAL at 09:35

## 2020-02-06 RX ADMIN — HYDROCODONE BITARTRATE AND ACETAMINOPHEN 1 TABLET: 5; 325 TABLET ORAL at 13:32

## 2020-02-06 RX ADMIN — ASPIRIN 81 MG: 81 TABLET ORAL at 09:35

## 2020-02-06 RX ADMIN — GABAPENTIN 300 MG: 300 CAPSULE ORAL at 09:34

## 2020-02-06 RX ADMIN — POLYMYXIN B SULFATE, TRIMETHOPRIM SULFATE 1 DROP: 10000; 1 SOLUTION/ DROPS OPHTHALMIC at 06:25

## 2020-02-06 RX ADMIN — SACUBITRIL AND VALSARTAN 1 TABLET: 24; 26 TABLET, FILM COATED ORAL at 09:34

## 2020-02-06 NOTE — PLAN OF CARE
Problem: Patient Care Overview  Goal: Plan of Care Review  Outcome: Ongoing (interventions implemented as appropriate)  Flowsheets (Taken 2/6/2020 1151)  Progress: no change  Plan of Care Reviewed With: patient  Note:   Follow up visit. Pt with improved PO intake. Several snacks on bedside table at time of visit. PO intake shows 75% avg of the last 4 documented meals. Will cont to follow for nutrition needs.

## 2020-02-06 NOTE — PLAN OF CARE
Problem: Patient Care Overview  Goal: Plan of Care Review  Outcome: Ongoing (interventions implemented as appropriate)  Flowsheets  Taken 2/6/2020 1430 by Radha Curtis RN  Progress: improving  Taken 2/6/2020 1151 by Lydia Landers  Plan of Care Reviewed With: patient (Pended)  Note:   Pt is on po bumex with only mild edema of bilateral ankles, alvarado cath remains in place, ambulated in fuentes with daughter, BM x 1. Po pain med X 2.  Goal: Individualization and Mutuality  Outcome: Ongoing (interventions implemented as appropriate)  Flowsheets  Taken 2/6/2020 1430 by Radha Curtis RN  Patient Specific Goals (Include Timeframe): Voiding, tolerating regular diet, having regular BM prior to discharge  How to Address Anxieties/Fears: Answer any questions  Patient Specific Interventions: Po bumex, po pain med, regular diet  What Information Would Help Us Give You More Personalized Care?: Daughters are involved in his care  How Would You and/or Your Support Person Like to Participate in Your Care?: Keep updated on treatment plan.  What Anxieties, Fears, Concerns, or Questions Do You Have About Your Care?: Concerned about urinating  Taken 1/27/2020 0156 by Mery Gaytan RNA  Patient Specific Preferences: Pt likes head of bed elevated. Pt does not like females in the room when he needs to void.  Goal: Discharge Needs Assessment  Outcome: Ongoing (interventions implemented as appropriate)  Goal: Interprofessional Rounds/Family Conf  Outcome: Ongoing (interventions implemented as appropriate)     Problem: Pain, Chronic (Adult)  Goal: Identify Related Risk Factors and Signs and Symptoms  Outcome: Ongoing (interventions implemented as appropriate)  Flowsheets  Taken 2/5/2020 1603 by Belem Velez RN  Related Risk Factors (Chronic Pain): surgery  Taken 2/4/2020 0316 by Teodora Childs RN  Signs and Symptoms (Chronic Pain): verbalization of pain/discomfort for a prolonged time period  Goal: Acceptable Pain/Comfort  Level and Functional Ability  Outcome: Ongoing (interventions implemented as appropriate)  Flowsheets (Taken 2/4/2020 0316 by Teodora Childs RN)  Acceptable Pain/Comfort Level and Functional Ability: making progress toward outcome     Problem: Fall Risk (Adult)  Goal: Identify Related Risk Factors and Signs and Symptoms  Outcome: Ongoing (interventions implemented as appropriate)  Goal: Absence of Fall  Outcome: Ongoing (interventions implemented as appropriate)     Problem: Nutrition, Imbalanced: Inadequate Oral Intake (Adult)  Goal: Improved Oral Intake  Outcome: Ongoing (interventions implemented as appropriate)  Goal: Prevent Further Weight Loss  Outcome: Ongoing (interventions implemented as appropriate)

## 2020-02-06 NOTE — PROGRESS NOTES
[facility] -  Progress Note     LOS: 11 days   Patient Care Team:  Alison Hercules MD as PCP - General (Internal Medicine)  Alison Hercules MD as Referring Physician (Internal Medicine)    Chief Complaint: f/u chf    Subjective     Interval History: Patient transition back to p.o. Bumex today.  Has put out 775 of urine so far today, but this afternoon is complained of worsening shortness of breath.  According to his daughter, he had labored breathing for about 2-3 hours while seated in a chair, and was not able to find any improvement regardless of position change.  However, he then did get up and walk in the hallways, and his reported breathing is improved ever since.  Currently, he has no complaints.  He notes his leg swelling has continued to improve.    Review of Systems:  Review of Systems   Constitution: Negative for malaise/fatigue.   Cardiovascular: Positive for leg swelling. Negative for chest pain, claudication, dyspnea on exertion, near-syncope, orthopnea, palpitations, paroxysmal nocturnal dyspnea and syncope.   Respiratory: Positive for shortness of breath.    Hematologic/Lymphatic: Does not bruise/bleed easily.       Vital Sign Min/Max for last 24 hours  Temp  Min: 97.8 °F (36.6 °C)  Max: 98.3 °F (36.8 °C)   BP  Min: 101/62  Max: 107/64   Pulse  Min: 104  Max: 117   Resp  Min: 16  Max: 18   SpO2  Min: 92 %  Max: 96 %   No data recorded   Weight  Min: 75.4 kg (166 lb 3.2 oz)  Max: 75.4 kg (166 lb 3.2 oz)         02/06/20  0459   Weight: 75.4 kg (166 lb 3.2 oz)       Intake/Output Summary (Last 24 hours) at 2/6/2020 1745  Last data filed at 2/6/2020 1600  Gross per 24 hour   Intake 620 ml   Output 2525 ml   Net -1905 ml       Physical Exam:   Physical Exam   Constitutional: No distress.   Neck: No JVD present.   Cardiovascular: Normal rate, regular rhythm, S1 normal, S2 normal, normal heart sounds, intact distal pulses and normal pulses.   Pulmonary/Chest: He has bibasilar rales.     Abdominal: Soft. There is no tenderness.   Musculoskeletal: He exhibits edema (trace BLE).   Neurological: He is alert and oriented to person, place, and time.   Skin: Skin is warm and dry.       Medication Review: yes  Current Facility-Administered Medications   Medication Dose Route Frequency Provider Last Rate Last Dose   • aspirin EC tablet 81 mg  81 mg Oral Daily Raleigh Enciso MD   81 mg at 02/06/20 0935   • atorvastatin (LIPITOR) tablet 10 mg  10 mg Oral Nightly Raleigh Enciso MD   10 mg at 02/05/20 2346   • bumetanide (BUMEX) tablet 0.5 mg  0.5 mg Oral BID Raleigh Enciso MD   0.5 mg at 02/06/20 0935   • docusate sodium (COLACE) capsule 100 mg  100 mg Oral BID Cheri Paredes MD   100 mg at 02/06/20 0935   • enoxaparin (LOVENOX) syringe 30 mg  30 mg Subcutaneous Q24H Kim Almeida MD   30 mg at 02/06/20 0935   • gabapentin (NEURONTIN) capsule 300 mg  300 mg Oral Daily Jennie Turner K, APRN   300 mg at 02/06/20 0934   • gabapentin (NEURONTIN) capsule 600 mg  600 mg Oral Nightly AllyeltShanti cardonaJennie K, APRN   600 mg at 02/05/20 2117   • HYDROcodone-acetaminophen (NORCO) 5-325 MG per tablet 1 tablet  1 tablet Oral BID AllyeltRuben cardonaia K, APRN   1 tablet at 02/06/20 0935   • HYDROcodone-acetaminophen (NORCO) 5-325 MG per tablet 1 tablet  1 tablet Oral Q4H PRN Cheri Paredes MD   1 tablet at 02/06/20 1332   • LORazepam (ATIVAN) tablet 0.5 mg  0.5 mg Oral TID AllyeltRuben cardonaia K, APRN   0.5 mg at 02/06/20 1627   • Magnesium Sulfate 2 gram Bolus, followed by 8 gram infusion (total Mg dose 10 grams)- Mg less than or equal to 1mg/dL  2 g Intravenous PRN Raleigh Enciso MD        Or   • Magnesium Sulfate 2 gram / 50mL Infusion (GIVE X 3 BAGS TO EQUAL 6GM TOTAL DOSE) - Mg 1.1 - 1.5 mg/dl  2 g Intravenous PRN Raleigh Enciso MD        Or   • Magnesium Sulfate 4 gram infusion- Mg 1.6-1.9 mg/dL  4 g Intravenous PRN Raleigh Enciso MD       • metoprolol succinate XL (TOPROL-XL) 24 hr tablet 25 mg  25 mg  Oral Nightly WoeltzShantiJennie K, APRN   25 mg at 02/05/20 2117   • nitroglycerin (NITROSTAT) SL tablet 0.4 mg  0.4 mg Sublingual Q5 Min PRN Kim Almeida MD       • ondansetron (ZOFRAN) injection 4 mg  4 mg Intravenous Q4H PRN Kim Almeida MD   4 mg at 01/30/20 0834   • phenol (CHLORASEPTIC) 1.4 % liquid 2 spray  2 spray Mouth/Throat Q2H PRN Kim Almeida MD   2 spray at 01/27/20 0437   • potassium chloride (MICRO-K) CR capsule 40 mEq  40 mEq Oral PRN Raleigh Enciso MD        Or   • potassium chloride (KLOR-CON) packet 40 mEq  40 mEq Oral PRN Raleigh Enciso MD        Or   • potassium chloride 10 mEq in 100 mL IVPB  10 mEq Intravenous Q1H PRN Raleigh Enciso MD       • sacubitril-valsartan (ENTRESTO) 24-26 MG tablet 1 tablet  1 tablet Oral Q12H Jennie Turner K, APRN   1 tablet at 02/06/20 0934   • sodium chloride 0.9 % flush 10 mL  10 mL Intravenous PRN Kim Almeida MD       • tamsulosin (FLOMAX) 24 hr capsule 0.4 mg  0.4 mg Oral Nightly AllyeltRuben cardonaia K, APRN   0.4 mg at 02/05/20 2118   • trimethoprim-polymyxin b (POLYTRIM) 09458-4.1 UNIT/ML-% ophthalmic solution 1 drop  1 drop Right Eye Q6H Jennie Turner, APRN   1 drop at 02/06/20 1142         Results Review:   I have reviewed all laboratory data, with pertinent findings: This morning's labs show creatinine 1.81 (down from 1.91 yesterday).  BUN steady at 28.  Sodium 141.  LFTs all within normal limits.    I have reviewed telemetry, which shows V-pacing with short runs of NSVT    Assessment/Plan       SBO (small bowel obstruction) (CMS/HCC)    Coronary artery disease involving coronary bypass graft of native heart without angina pectoris    Chronic systolic congestive heart failure (CMS/HCC)    Essential hypertension    Biventricular ICD (implantable cardioverter-defibrillator) in place    Stage 4 chronic kidney disease (CMS/HCC)    Lactic acidosis    Paroxysmal atrial fibrillation (CMS/HCC)    Urinary retention due to benign prostatic  hyperplasia      1. Acute on chronic combined systolic/diastolic CHF: EF essentially unchanged based upon my review.  Due to necessary holding of diuretic during first few days of hospitalization for abdominal surgery, along with necessary IVF resuscitation at those times to help maintain perfusing BP, patient developed significant volume overloaded.  He is responding well now to IV diuresis.  -Today, he has reporting worsened breathing this afternoon, though does not appear more volume overloaded on exam.  In fact, his weight is down 1 pound from yesterday, his legs are less swollen than yesterday, and he has no evidence of jugular venous distention.  Pulmonary exam is very similar compared to yesterday's.     -We will repeat BMP and BNP now; so long as renal function has not significantly worsened, will re-dose IV Bumex another one time tonight and then reevaluate tomorrow    -keep Loera in for now; if no further IV diuresis is needed then may be able to pull it    -continue daily weights  -strict intake/output recording  -continue current dose Entresto (24/26 po bid) while aggressively diuresing; will increase back to home dose for maintenance therapy once acute issues resolves (so as to not confuse matters if kidney function worsens or BP drops)  -continue current dose BB     2.  NSVT - stable  -continuous telemetry  -keep Mg>2 and K>4, check these 3 times today since giving three doses of IV bumex     3.  Hypokalemia - improved/resolved; will continue to monitor given ongoing diuresis     4.  CAD - stable.   -resume ASA, statin     5.  Atrial flutter - stable    Raleigh Enciso MD  02/06/20  5:41 PM

## 2020-02-06 NOTE — PROGRESS NOTES
Continued Stay Note  MARJ Esparza     Patient Name: Wild Bravo  MRN: [id number]  Today's Date: 2/6/2020    Admit Date: 1/26/2020    Discharge Plan     Row Name 02/06/20 1524       Plan    Plan  Home with daughter    Patient/Family in Agreement with Plan  yes    Plan Comments  Met with pt and daughter to update d/c plan. She still plans on pt going to her home at d/c. They would like [facility] . Will need orders, which were requested from MD's office.         Discharge Codes    No documentation.             JUAN PABLO Soto

## 2020-02-06 NOTE — PROGRESS NOTES
Kim Almeida MD Progress Note     LOS: 11 days   Patient Care Team:  Alison Hercules MD as PCP - General (Internal Medicine)  Alison Hercules MD as Referring Physician (Internal Medicine)        Subjective     Complaining of being more short of breath today than yesterday.  Cardiology switched him to Bumex 0.5 mg twice daily.  Has had some crampy abdominal pain but none since he had a good bowel movement this afternoon    Objective     Vital Signs  Temp:  [97.8 °F (36.6 °C)-98.3 °F (36.8 °C)] 97.9 °F (36.6 °C)  Heart Rate:  [104-117] 110  Resp:  [16-18] 16  BP: (101-107)/(62-70) 103/70    Intake & Output (last 3 days)       02/03 0701 - 02/04 0700 02/04 0701 - 02/05 0700 02/05 0701 - 02/06 0700 02/06 0701 - 02/07 0700    P.O.  2160 560 440    Total Intake(mL/kg)  2160 (27.3) 560 (7.4) 440 (5.8)    Urine (mL/kg/hr) 5025 (2.6) 4350 (2.3) 3050 (1.7) 775 (1)    Stool    0    Total Output 5025 4350 3050 775    Net -5025 -2190 -2490 -335            Stool Unmeasured Occurrence    1 x          Physical Exam:     General Appearance:    Alert, cooperative, in no acute distress   Lungs:     respirations regular, even and unlabored    Heart:    Regular rhythm and normal rate, normal S1 and S2, no            murmur, no gallop, no rub   Chest Wall:    No abnormalities observed   Abdomen:      Soft nontender nondistended   Extremities: No edema,    Results Review:     I reviewed the patient's new clinical results.    Lab Results (last 72 hours)     Procedure Component Value Units Date/Time    Comprehensive Metabolic Panel [[id number]]  (Abnormal) Collected:  02/06/20 0511    Specimen:  Blood Updated:  02/06/20 0626     Glucose 137 mg/dL      BUN 28 mg/dL      Creatinine 1.81 mg/dL      Sodium 141 mmol/L      Potassium 4.1 mmol/L      Chloride 95 mmol/L      CO2 36.0 mmol/L      Calcium 9.2 mg/dL      Total Protein 7.1 g/dL      Albumin 3.60 g/dL      ALT (SGPT) 6 U/L      AST (SGOT) 6 U/L      Alkaline Phosphatase 100 U/L      Total  Bilirubin 0.9 mg/dL      eGFR Non African Amer 36 mL/min/1.73      Globulin 3.5 gm/dL      A/G Ratio 1.0 g/dL      BUN/Creatinine Ratio 15.5     Anion Gap 10.0 mmol/L     Narrative:       GFR Normal >60  Chronic Kidney Disease <60  Kidney Failure <15      Basic Metabolic Panel [[id number]]  (Abnormal) Collected:  02/05/20 1538    Specimen:  Blood Updated:  02/05/20 1607     Glucose 129 mg/dL      BUN 28 mg/dL      Creatinine 1.91 mg/dL      Sodium 140 mmol/L      Potassium 4.2 mmol/L      Chloride 95 mmol/L      CO2 36.0 mmol/L      Calcium 9.1 mg/dL      eGFR Non African Amer 34 mL/min/1.73      BUN/Creatinine Ratio 14.7     Anion Gap 9.0 mmol/L     Narrative:       GFR Normal >60  Chronic Kidney Disease <60  Kidney Failure <15      Magnesium [[id number]]  (Normal) Collected:  02/05/20 1538    Specimen:  Blood Updated:  02/05/20 1607     Magnesium 2.0 mg/dL     Basic Metabolic Panel [[id number]]  (Abnormal) Collected:  02/05/20 0650    Specimen:  Blood Updated:  02/05/20 0825     Glucose 112 mg/dL      BUN 26 mg/dL      Creatinine 1.73 mg/dL      Sodium 139 mmol/L      Potassium 4.4 mmol/L      Chloride 96 mmol/L      CO2 33.0 mmol/L      Calcium 9.0 mg/dL      eGFR Non African Amer 38 mL/min/1.73      BUN/Creatinine Ratio 15.0     Anion Gap 10.0 mmol/L     Narrative:       GFR Normal >60  Chronic Kidney Disease <60  Kidney Failure <15      BNP [[id number]]  (Abnormal) Collected:  02/05/20 0650    Specimen:  Blood Updated:  02/05/20 0731     proBNP 7,180.0 pg/mL     Narrative:       Among patients with dyspnea, NT-proBNP is highly sensitive for the detection of acute congestive heart failure. In addition NT-proBNP of <300 pg/ml effectively rules out acute congestive heart failure with 99% negative predictive value.    Results may be falsely decreased if patient taking Biotin.      Basic Metabolic Panel [[id number]]  (Abnormal) Collected:  02/04/20 1337    Specimen:  Blood Updated:  02/04/20 1406     Glucose 124 mg/dL         BUN 24 mg/dL      Creatinine 1.74 mg/dL      Sodium 142 mmol/L      Potassium 4.2 mmol/L      Chloride 97 mmol/L      CO2 35.0 mmol/L      Calcium 8.7 mg/dL      eGFR Non African Amer 37 mL/min/1.73      BUN/Creatinine Ratio 13.8     Anion Gap 10.0 mmol/L     Narrative:       GFR Normal >60  Chronic Kidney Disease <60  Kidney Failure <15      Magnesium [[id number]]  (Normal) Collected:  02/04/20 1337    Specimen:  Blood Updated:  02/04/20 1406     Magnesium 1.9 mg/dL     Basic Metabolic Panel [[id number]]  (Abnormal) Collected:  02/04/20 0529    Specimen:  Blood Updated:  02/04/20 0601     Glucose 99 mg/dL      BUN 24 mg/dL      Creatinine 1.57 mg/dL      Sodium 143 mmol/L      Potassium 3.8 mmol/L      Chloride 100 mmol/L      CO2 35.0 mmol/L      Calcium 8.7 mg/dL      eGFR Non African Amer 42 mL/min/1.73      BUN/Creatinine Ratio 15.3     Anion Gap 8.0 mmol/L     Narrative:       GFR Normal >60  Chronic Kidney Disease <60  Kidney Failure <15      CBC (No Diff) [[id number]]  (Abnormal) Collected:  02/04/20 0529    Specimen:  Blood Updated:  02/04/20 0544     WBC 5.92 10*3/mm3      RBC 3.71 10*6/mm3      Hemoglobin 10.8 g/dL      Hematocrit 33.7 %      MCV 90.8 fL      MCH 29.1 pg      MCHC 32.0 g/dL      RDW 14.3 %      RDW-SD 47.7 fl      MPV 13.0 fL      Platelets 105 10*3/mm3     Basic Metabolic Panel [[id number]]  (Abnormal) Collected:  02/03/20 1734    Specimen:  Blood Updated:  02/03/20 1819     Glucose 98 mg/dL      BUN 23 mg/dL      Creatinine 1.64 mg/dL      Sodium 143 mmol/L      Potassium 4.0 mmol/L      Chloride 100 mmol/L      CO2 34.0 mmol/L      Calcium 8.6 mg/dL      eGFR Non African Amer 40 mL/min/1.73      BUN/Creatinine Ratio 14.0     Anion Gap 9.0 mmol/L     Narrative:       GFR Normal >60  Chronic Kidney Disease <60  Kidney Failure <15          Imaging Results (Last 72 Hours)     ** No results found for the last 72 hours. **              Assessment/Plan       SBO (small bowel obstruction)  (CMS/HCC)    Coronary artery disease involving coronary bypass graft of native heart without angina pectoris    Chronic systolic congestive heart failure (CMS/HCC)    Essential hypertension    Biventricular ICD (implantable cardioverter-defibrillator) in place    Stage 4 chronic kidney disease (CMS/HCC)    Lactic acidosis    Paroxysmal atrial fibrillation (CMS/HCC)    Urinary retention due to benign prostatic hyperplasia      I am deferring to cardiology regarding his diuresis and treatment of his congestive heart failure.  Will order labs for tomorrow morning.  Leave Loera until the morning.      Kim Almeida MD  02/06/20  5:20 PM

## 2020-02-07 LAB
ANION GAP SERPL CALCULATED.3IONS-SCNC: 10 MMOL/L (ref 5–15)
BUN BLD-MCNC: 31 MG/DL (ref 8–23)
BUN/CREAT SERPL: 16.7 (ref 7–25)
CALCIUM SPEC-SCNC: 8.6 MG/DL (ref 8.6–10.5)
CHLORIDE SERPL-SCNC: 97 MMOL/L (ref 98–107)
CO2 SERPL-SCNC: 35 MMOL/L (ref 22–29)
CREAT BLD-MCNC: 1.86 MG/DL (ref 0.76–1.27)
DEPRECATED RDW RBC AUTO: 46.3 FL (ref 37–54)
ERYTHROCYTE [DISTWIDTH] IN BLOOD BY AUTOMATED COUNT: 14.1 % (ref 12.3–15.4)
GFR SERPL CREATININE-BSD FRML MDRD: 35 ML/MIN/1.73
GLUCOSE BLD-MCNC: 142 MG/DL (ref 65–99)
HCT VFR BLD AUTO: 32.6 % (ref 37.5–51)
HGB BLD-MCNC: 10.4 G/DL (ref 13–17.7)
MCH RBC QN AUTO: 28.7 PG (ref 26.6–33)
MCHC RBC AUTO-ENTMCNC: 31.9 G/DL (ref 31.5–35.7)
MCV RBC AUTO: 89.8 FL (ref 79–97)
NT-PROBNP SERPL-MCNC: 5499 PG/ML (ref 5–1800)
PLATELET # BLD AUTO: 132 10*3/MM3 (ref 140–450)
PMV BLD AUTO: 12.6 FL (ref 6–12)
POTASSIUM BLD-SCNC: 4.1 MMOL/L (ref 3.5–5.2)
RBC # BLD AUTO: 3.63 10*6/MM3 (ref 4.14–5.8)
SODIUM BLD-SCNC: 142 MMOL/L (ref 136–145)
WBC NRBC COR # BLD: 4.38 10*3/MM3 (ref 3.4–10.8)

## 2020-02-07 PROCEDURE — 25010000002 ENOXAPARIN PER 10 MG: Performed by: SPECIALIST

## 2020-02-07 PROCEDURE — 83880 ASSAY OF NATRIURETIC PEPTIDE: CPT | Performed by: SPECIALIST

## 2020-02-07 PROCEDURE — 80048 BASIC METABOLIC PNL TOTAL CA: CPT | Performed by: SPECIALIST

## 2020-02-07 PROCEDURE — 99232 SBSQ HOSP IP/OBS MODERATE 35: CPT | Performed by: INTERNAL MEDICINE

## 2020-02-07 PROCEDURE — 85027 COMPLETE CBC AUTOMATED: CPT | Performed by: SPECIALIST

## 2020-02-07 RX ORDER — BUMETANIDE 1 MG/1
1 TABLET ORAL 2 TIMES DAILY
Status: DISCONTINUED | OUTPATIENT
Start: 2020-02-08 | End: 2020-02-09

## 2020-02-07 RX ORDER — BUMETANIDE 0.25 MG/ML
1 INJECTION INTRAMUSCULAR; INTRAVENOUS ONCE
Status: COMPLETED | OUTPATIENT
Start: 2020-02-07 | End: 2020-02-07

## 2020-02-07 RX ORDER — BUMETANIDE 0.25 MG/ML
1 INJECTION INTRAMUSCULAR; INTRAVENOUS EVERY 8 HOURS
Status: DISCONTINUED | OUTPATIENT
Start: 2020-02-07 | End: 2020-02-07

## 2020-02-07 RX ADMIN — ENOXAPARIN SODIUM 30 MG: 30 INJECTION SUBCUTANEOUS at 09:37

## 2020-02-07 RX ADMIN — BUMETANIDE 1 MG: 0.25 INJECTION INTRAMUSCULAR; INTRAVENOUS at 16:39

## 2020-02-07 RX ADMIN — HYDROCODONE BITARTRATE AND ACETAMINOPHEN 1 TABLET: 5; 325 TABLET ORAL at 04:23

## 2020-02-07 RX ADMIN — GABAPENTIN 600 MG: 300 CAPSULE ORAL at 21:31

## 2020-02-07 RX ADMIN — POLYMYXIN B SULFATE, TRIMETHOPRIM SULFATE 1 DROP: 10000; 1 SOLUTION/ DROPS OPHTHALMIC at 11:46

## 2020-02-07 RX ADMIN — DOCUSATE SODIUM 100 MG: 100 CAPSULE, LIQUID FILLED ORAL at 09:42

## 2020-02-07 RX ADMIN — LORAZEPAM 0.5 MG: 0.5 TABLET ORAL at 16:37

## 2020-02-07 RX ADMIN — TAMSULOSIN HYDROCHLORIDE 0.4 MG: 0.4 CAPSULE ORAL at 21:31

## 2020-02-07 RX ADMIN — METOPROLOL SUCCINATE 25 MG: 25 TABLET, EXTENDED RELEASE ORAL at 22:18

## 2020-02-07 RX ADMIN — DOCUSATE SODIUM 100 MG: 100 CAPSULE, LIQUID FILLED ORAL at 21:31

## 2020-02-07 RX ADMIN — HYDROCODONE BITARTRATE AND ACETAMINOPHEN 1 TABLET: 5; 325 TABLET ORAL at 22:02

## 2020-02-07 RX ADMIN — ATORVASTATIN CALCIUM 10 MG: 10 TABLET, FILM COATED ORAL at 21:31

## 2020-02-07 RX ADMIN — POLYMYXIN B SULFATE, TRIMETHOPRIM SULFATE 1 DROP: 10000; 1 SOLUTION/ DROPS OPHTHALMIC at 05:34

## 2020-02-07 RX ADMIN — GABAPENTIN 300 MG: 300 CAPSULE ORAL at 09:42

## 2020-02-07 RX ADMIN — ASPIRIN 81 MG: 81 TABLET ORAL at 09:43

## 2020-02-07 RX ADMIN — LORAZEPAM 0.5 MG: 0.5 TABLET ORAL at 09:44

## 2020-02-07 RX ADMIN — HYDROCODONE BITARTRATE AND ACETAMINOPHEN 1 TABLET: 5; 325 TABLET ORAL at 18:29

## 2020-02-07 RX ADMIN — SODIUM CHLORIDE, PRESERVATIVE FREE 10 ML: 5 INJECTION INTRAVENOUS at 09:42

## 2020-02-07 RX ADMIN — POLYMYXIN B SULFATE, TRIMETHOPRIM SULFATE 1 DROP: 10000; 1 SOLUTION/ DROPS OPHTHALMIC at 18:29

## 2020-02-07 RX ADMIN — BUMETANIDE 1 MG: 0.25 INJECTION INTRAMUSCULAR; INTRAVENOUS at 05:34

## 2020-02-07 RX ADMIN — SACUBITRIL AND VALSARTAN 1 TABLET: 24; 26 TABLET, FILM COATED ORAL at 09:43

## 2020-02-07 RX ADMIN — HYDROCODONE BITARTRATE AND ACETAMINOPHEN 1 TABLET: 5; 325 TABLET ORAL at 13:49

## 2020-02-07 RX ADMIN — HYDROCODONE BITARTRATE AND ACETAMINOPHEN 1 TABLET: 5; 325 TABLET ORAL at 09:42

## 2020-02-07 NOTE — PLAN OF CARE
Problem: Patient Care Overview  Goal: Plan of Care Review  Flowsheets (Taken 2/7/2020 5094)  Progress: improving  Plan of Care Reviewed With: patient  Outcome Summary: Pt c/o minimal pain this shift. PRN and scheduled pain medications given with favorable results. Loera in place. Urine is clear and yellow. IV bumex reordered and administered. VSS. Safety maintained. Will continue to monitor.

## 2020-02-07 NOTE — PROGRESS NOTES
Continued Stay Note  MARJ Esparza     Patient Name: Wild Bravo  MRN: [id number]  Today's Date: 2/7/2020    Admit Date: 1/26/2020    Discharge Plan     Row Name 02/07/20 1137       Plan    Plan  Home with daughter and home health    Patient/Family in Agreement with Plan  yes    Plan Comments  Spoke with Rhianna from Dr. Almeida's office to request home health orders. Will follow.         Discharge Codes    No documentation.             JUAN PABLO Soto

## 2020-02-07 NOTE — PROGRESS NOTES
[facility] -  Progress Note     LOS: 12 days   Patient Care Team:  Alison Hercules MD as PCP - General (Internal Medicine)  Alison Hercules MD as Referring Physician (Internal Medicine)    Chief Complaint: f/u CHF    Subjective     Interval History: Patient received IV bumex last night and again this morning.  Reports his breathing is good today.  No abdominal discomfort.    Review of Systems:  Review of Systems   Constitution: Negative for malaise/fatigue.   Cardiovascular: Negative for chest pain, claudication, dyspnea on exertion, leg swelling, near-syncope, orthopnea, palpitations, paroxysmal nocturnal dyspnea and syncope.   Respiratory: Negative for shortness of breath.    Hematologic/Lymphatic: Does not bruise/bleed easily.       Vital Sign Min/Max for last 24 hours  Temp  Min: 96.3 °F (35.7 °C)  Max: 98.1 °F (36.7 °C)   BP  Min: 88/52  Max: 127/91   Pulse  Min: 67  Max: 110   Resp  Min: 18  Max: 18   SpO2  Min: 91 %  Max: 100 %   No data recorded   Weight  Min: 74.4 kg (164 lb)  Max: 74.4 kg (164 lb)         02/07/20  0427   Weight: 74.4 kg (164 lb)       Physical Exam:   Physical Exam   Constitutional: No distress.   Neck: No JVD present.   Cardiovascular: Normal rate, regular rhythm, S1 normal, S2 normal, normal heart sounds, intact distal pulses and normal pulses.   Pulmonary/Chest: He has bibasilar rales.   Abdominal: Soft. There is no tenderness.   Musculoskeletal: He exhibits no edema.   Neurological: He is alert and oriented to person, place, and time.   Skin: Skin is warm and dry.       Medication Review: yes  Current Facility-Administered Medications   Medication Dose Route Frequency Provider Last Rate Last Dose   • aspirin EC tablet 81 mg  81 mg Oral Daily Raleigh Enciso MD   81 mg at 02/07/20 0943   • atorvastatin (LIPITOR) tablet 10 mg  10 mg Oral Nightly Raleigh Enciso MD   10 mg at 02/06/20 2040   • docusate sodium (COLACE) capsule 100 mg  100 mg Oral BID Reggie  Cheri PETIT MD   100 mg at 02/07/20 0942   • enoxaparin (LOVENOX) syringe 30 mg  30 mg Subcutaneous Q24H Kim Almeida MD   30 mg at 02/07/20 0937   • gabapentin (NEURONTIN) capsule 300 mg  300 mg Oral Daily Jennie Turner APRN   300 mg at 02/07/20 0942   • gabapentin (NEURONTIN) capsule 600 mg  600 mg Oral Nightly Jennie Turner APRN   600 mg at 02/06/20 2041   • HYDROcodone-acetaminophen (NORCO) 5-325 MG per tablet 1 tablet  1 tablet Oral BID Jennie Turner APRN   1 tablet at 02/07/20 0942   • HYDROcodone-acetaminophen (NORCO) 5-325 MG per tablet 1 tablet  1 tablet Oral Q4H PRN Cheri Paredes MD   1 tablet at 02/07/20 1349   • LORazepam (ATIVAN) tablet 0.5 mg  0.5 mg Oral TID Jennie Turner APRN   0.5 mg at 02/07/20 0944   • Magnesium Sulfate 2 gram Bolus, followed by 8 gram infusion (total Mg dose 10 grams)- Mg less than or equal to 1mg/dL  2 g Intravenous PRN Raleigh Enciso MD        Or   • Magnesium Sulfate 2 gram / 50mL Infusion (GIVE X 3 BAGS TO EQUAL 6GM TOTAL DOSE) - Mg 1.1 - 1.5 mg/dl  2 g Intravenous PRN Raleigh Enciso MD        Or   • Magnesium Sulfate 4 gram infusion- Mg 1.6-1.9 mg/dL  4 g Intravenous PRN Raleigh Enciso MD       • metoprolol succinate XL (TOPROL-XL) 24 hr tablet 25 mg  25 mg Oral Nightly Jennie Turner APRN   25 mg at 02/06/20 2040   • nitroglycerin (NITROSTAT) SL tablet 0.4 mg  0.4 mg Sublingual Q5 Min PRN Kim Almeida MD       • ondansetron (ZOFRAN) injection 4 mg  4 mg Intravenous Q4H PRN Kim Almeida MD   4 mg at 01/30/20 0834   • phenol (CHLORASEPTIC) 1.4 % liquid 2 spray  2 spray Mouth/Throat Q2H PRN Kim Almeida MD   2 spray at 01/27/20 0437   • potassium chloride (MICRO-K) CR capsule 40 mEq  40 mEq Oral PRN Raleigh Enciso MD        Or   • potassium chloride (KLOR-CON) packet 40 mEq  40 mEq Oral PRN Raleigh Enciso MD        Or   • potassium chloride 10 mEq in 100 mL IVPB  10 mEq Intravenous Q1H PRN Raleigh Enciso MD       •  sacubitril-valsartan (ENTRESTO) 24-26 MG tablet 1 tablet  1 tablet Oral Q12H Jennie Turner APRN   1 tablet at 02/07/20 0943   • sodium chloride 0.9 % flush 10 mL  10 mL Intravenous PRN Kim Almeida MD   10 mL at 02/07/20 0942   • tamsulosin (FLOMAX) 24 hr capsule 0.4 mg  0.4 mg Oral Nightly Jennie Turner APRN   0.4 mg at 02/06/20 2040   • trimethoprim-polymyxin b (POLYTRIM) 99747-3.1 UNIT/ML-% ophthalmic solution 1 drop  1 drop Right Eye Q6H Jennie Turner APRN   1 drop at 02/07/20 1146         Results Review:   I have reviewed all laboratory data, with pertinent findings: proBNP down to 5499 (was 5494 when first checked this hospitalization, on 1/29/2020).  Sodium steady at 142, potassium 4.1.  Creatinine stable at 1.86, BUN stable at 31.    I have reviewed telemetry, which shows V-pacing with short runs of nonsustained VT    Assessment/Plan       SBO (small bowel obstruction) (CMS/HCC)    Coronary artery disease involving coronary bypass graft of native heart without angina pectoris    Chronic systolic congestive heart failure (CMS/HCC)    Essential hypertension    Biventricular ICD (implantable cardioverter-defibrillator) in place    Stage 4 chronic kidney disease (CMS/HCC)    Lactic acidosis    Paroxysmal atrial fibrillation (CMS/HCC)    Urinary retention due to benign prostatic hyperplasia    1. Acute on chronic combined systolic/diastolic CHF: EF essentially unchanged based upon my review.  Due to necessary holding of diuretic during first few days of hospitalization for abdominal surgery, along with necessary IVF resuscitation at those times to help maintain perfusing BP, patient developed significant volume overloaded.  He is responding well now to IV diuresis.  -Today,  he is improved.  His weight is down to 164 pounds (reported dry weight from family is 163 pounds).  His leg swelling is now resolved, and his lungs do sound better.  -We will re-dose IV Bumex 1 more additional time  today, then plan to transition to oral therapy tomorrow (I ordered bumex 1mg po bid to start tomorrow AM)              -keep Loera in for now; if no further IV diuresis is needed then may be able to pull it  -Once patient is maintaining euvolemia and symptoms today well-controlled on oral diuretics, will be stable for discharge home from a cardiac standpoint  -increase Entresto back to home dose (49/51mg po bid) tomorrow AM     -continue daily weights  -strict intake/output recording  -continue current dose BB    -will need BMP 1 week after discharge  -if d/c home this weekend, will need [facility] clinic f/u in 1 week after discharge, and with me in 4 weeks     2.  NSVT - stable  -continuous telemetry  -keep Mg>2 and K>4     3.  Hypokalemia - improved/resolved; will continue to monitor given ongoing diuresis     4.  CAD - stable.   -resume ASA, statin     5.  Atrial flutter - stable    Raleigh Enciso MD  02/07/20  3:26 PM

## 2020-02-07 NOTE — PROGRESS NOTES
Reviewed Dr. James notes.  He seems to be doing better.  Abdomen is soft.  Will defer to cardiology

## 2020-02-07 NOTE — PROGRESS NOTES
Continued Stay Note   Isaias     Patient Name: Wild Bravo  MRN: [id number]  Today's Date: 2/7/2020    Admit Date: 1/26/2020    Discharge Plan     Row Name 02/07/20 1451       Plan    Plan  Home with daughter and [facility]    Post Acute Provider List  Home Health    Post Acute Provider Quality & Resource List  Yes    Delivered To  Patient    Method of Delivery  In person    Patient/Family in Agreement with Plan  yes    Plan Comments  Rec'd home health orders. Pt and daughter had already chosen [facility]. Referral called to [facility] [phone].        Discharge Codes    No documentation.             JUAN PABLO Soto

## 2020-02-08 ENCOUNTER — APPOINTMENT (OUTPATIENT)
Dept: GENERAL RADIOLOGY | Facility: HOSPITAL | Age: 85
End: 2020-02-08

## 2020-02-08 PROCEDURE — 25010000002 ENOXAPARIN PER 10 MG: Performed by: SPECIALIST

## 2020-02-08 PROCEDURE — 71045 X-RAY EXAM CHEST 1 VIEW: CPT

## 2020-02-08 PROCEDURE — 25010000002 ONDANSETRON PER 1 MG: Performed by: SPECIALIST

## 2020-02-08 RX ADMIN — SACUBITRIL AND VALSARTAN 2 TABLET: 24; 26 TABLET, FILM COATED ORAL at 09:21

## 2020-02-08 RX ADMIN — BUMETANIDE 1 MG: 1 TABLET ORAL at 09:21

## 2020-02-08 RX ADMIN — SACUBITRIL AND VALSARTAN 2 TABLET: 24; 26 TABLET, FILM COATED ORAL at 21:50

## 2020-02-08 RX ADMIN — ENOXAPARIN SODIUM 30 MG: 30 INJECTION SUBCUTANEOUS at 11:04

## 2020-02-08 RX ADMIN — POLYMYXIN B SULFATE, TRIMETHOPRIM SULFATE 1 DROP: 10000; 1 SOLUTION/ DROPS OPHTHALMIC at 05:43

## 2020-02-08 RX ADMIN — LORAZEPAM 0.5 MG: 0.5 TABLET ORAL at 06:31

## 2020-02-08 RX ADMIN — LORAZEPAM 0.5 MG: 0.5 TABLET ORAL at 16:42

## 2020-02-08 RX ADMIN — LORAZEPAM 0.5 MG: 0.5 TABLET ORAL at 21:51

## 2020-02-08 RX ADMIN — GABAPENTIN 300 MG: 300 CAPSULE ORAL at 09:35

## 2020-02-08 RX ADMIN — DOCUSATE SODIUM 100 MG: 100 CAPSULE, LIQUID FILLED ORAL at 21:50

## 2020-02-08 RX ADMIN — ASPIRIN 81 MG: 81 TABLET ORAL at 09:21

## 2020-02-08 RX ADMIN — BUMETANIDE 1 MG: 1 TABLET ORAL at 21:50

## 2020-02-08 RX ADMIN — POLYMYXIN B SULFATE, TRIMETHOPRIM SULFATE 1 DROP: 10000; 1 SOLUTION/ DROPS OPHTHALMIC at 11:04

## 2020-02-08 RX ADMIN — HYDROCODONE BITARTRATE AND ACETAMINOPHEN 1 TABLET: 5; 325 TABLET ORAL at 01:12

## 2020-02-08 RX ADMIN — HYDROCODONE BITARTRATE AND ACETAMINOPHEN 1 TABLET: 5; 325 TABLET ORAL at 19:45

## 2020-02-08 RX ADMIN — HYDROCODONE BITARTRATE AND ACETAMINOPHEN 1 TABLET: 5; 325 TABLET ORAL at 09:34

## 2020-02-08 RX ADMIN — ATORVASTATIN CALCIUM 10 MG: 10 TABLET, FILM COATED ORAL at 21:50

## 2020-02-08 RX ADMIN — METOPROLOL SUCCINATE 25 MG: 25 TABLET, EXTENDED RELEASE ORAL at 21:50

## 2020-02-08 RX ADMIN — ONDANSETRON HYDROCHLORIDE 4 MG: 2 SOLUTION INTRAMUSCULAR; INTRAVENOUS at 23:37

## 2020-02-08 RX ADMIN — TAMSULOSIN HYDROCHLORIDE 0.4 MG: 0.4 CAPSULE ORAL at 21:50

## 2020-02-08 RX ADMIN — GABAPENTIN 600 MG: 300 CAPSULE ORAL at 21:51

## 2020-02-08 RX ADMIN — HYDROCODONE BITARTRATE AND ACETAMINOPHEN 1 TABLET: 5; 325 TABLET ORAL at 13:26

## 2020-02-08 RX ADMIN — DOCUSATE SODIUM 100 MG: 100 CAPSULE, LIQUID FILLED ORAL at 09:21

## 2020-02-08 NOTE — NURSING NOTE
Pt daughter called, states pt c/o shortness of breath, requests pt have chest x ray. Spoke with Dr. Paz, notified that pt c/o shortness of breath, O2 sat RR WNL, daughter requests chest xray, stated to notify primary provider.

## 2020-02-08 NOTE — PLAN OF CARE
Problem: Patient Care Overview  Goal: Plan of Care Review  2/7/2020 1807 by Radha Curtis RN  Outcome: Ongoing (interventions implemented as appropriate)  2/7/2020 1806 by Radha Curtis RN  Outcome: Ongoing (interventions implemented as appropriate)  Flowsheets  Taken 2/7/2020 1752  Progress: improving  Taken 2/7/2020 0813  Plan of Care Reviewed With: patient  Note:   IV bumex reordered X 1 dose. Loera to BSD. To be dc'd in am. Possible discharge this weekend. C/o feeling light headed most of this shift. B/P 88/52, 96/56. Dr. Enciso is monitoring.  Goal: Individualization and Mutuality  2/7/2020 1807 by Radha Curtis RN  Outcome: Ongoing (interventions implemented as appropriate)  2/7/2020 1806 by Radha Curtis RN  Outcome: Ongoing (interventions implemented as appropriate)  Flowsheets  Taken 2/6/2020 1430 by Radha Curtis RN  Patient Specific Goals (Include Timeframe): Voiding, tolerating regular diet, having regular BM prior to discharge  How to Address Anxieties/Fears: Answer any questions  Patient Specific Interventions: Po bumex, po pain med, regular diet  What Information Would Help Us Give You More Personalized Care?: Daughters are involved in his care  How Would You and/or Your Support Person Like to Participate in Your Care?: Keep updated on treatment plan.  What Anxieties, Fears, Concerns, or Questions Do You Have About Your Care?: Concerned about urinating  Taken 1/27/2020 0156 by Mery Gaytan RNA  Patient Specific Preferences: Pt likes head of bed elevated. Pt does not like females in the room when he needs to void.  Goal: Discharge Needs Assessment  2/7/2020 1807 by Radha Curtis RN  Outcome: Ongoing (interventions implemented as appropriate)  2/7/2020 1806 by Radha Curtis RN  Outcome: Ongoing (interventions implemented as appropriate)  Goal: Interprofessional Rounds/Family Conf  2/7/2020 1807 by Radha Curtis RN  Outcome: Ongoing (interventions implemented as  appropriate)  2/7/2020 1806 by Radha Curtis RN  Outcome: Ongoing (interventions implemented as appropriate)     Problem: Pain, Chronic (Adult)  Goal: Identify Related Risk Factors and Signs and Symptoms  2/7/2020 1807 by Radha Curtis RN  Outcome: Ongoing (interventions implemented as appropriate)  2/7/2020 1806 by Radha Curtis RN  Outcome: Ongoing (interventions implemented as appropriate)  Goal: Acceptable Pain/Comfort Level and Functional Ability  2/7/2020 1807 by Radha Curtis RN  Outcome: Ongoing (interventions implemented as appropriate)  2/7/2020 1806 by Radha Curtis RN  Outcome: Ongoing (interventions implemented as appropriate)     Problem: Fall Risk (Adult)  Goal: Identify Related Risk Factors and Signs and Symptoms  2/7/2020 1807 by Radha Curtis RN  Outcome: Ongoing (interventions implemented as appropriate)  2/7/2020 1806 by Radha Curtis RN  Outcome: Ongoing (interventions implemented as appropriate)  Goal: Absence of Fall  2/7/2020 1807 by Radha Curtis RN  Outcome: Ongoing (interventions implemented as appropriate)  2/7/2020 1806 by Radha Curtis RN  Outcome: Ongoing (interventions implemented as appropriate)     Problem: Nutrition, Imbalanced: Inadequate Oral Intake (Adult)  Goal: Improved Oral Intake  2/7/2020 1807 by Radha Curtis RN  Outcome: Ongoing (interventions implemented as appropriate)  2/7/2020 1806 by Radha Curtis RN  Outcome: Ongoing (interventions implemented as appropriate)  Goal: Prevent Further Weight Loss  2/7/2020 1807 by Radha Curtis RN  Outcome: Ongoing (interventions implemented as appropriate)  2/7/2020 1806 by Radha Curtis RN  Outcome: Ongoing (interventions implemented as appropriate)

## 2020-02-08 NOTE — PROGRESS NOTES
Still waiting for cardiology to clear him for discharge.  Loera is still in place and a we will determine today whether the oral diuresis is enough and we can DC the Loera with aim for discharge tomorrow if stable from their standpoint.  He is stable from surgical standpoint.

## 2020-02-08 NOTE — PLAN OF CARE
Problem: Patient Care Overview  Goal: Plan of Care Review  Outcome: Ongoing (interventions implemented as appropriate)  Note:   Pt has slept well this evening. He stated during last evening that he had experienced some dizziness while he was in his chair prior to coming to bed. Per parameters of MAR, pt Enetro was held. Pt displayed some irritation that this medication was held and expressed desires to know more. Education was provided as to ordered parameters, lower BP (chronic for pt), and additional medications to treat pain that are weighed in together. Pt displayed annoyance but did tolerate MAR action. Pt awoke early this morning and attempted to have a BM. No results thus far but pt reports that he is passing gas.   Goal: Individualization and Mutuality  Outcome: Ongoing (interventions implemented as appropriate)  Goal: Discharge Needs Assessment  Outcome: Ongoing (interventions implemented as appropriate)  Goal: Interprofessional Rounds/Family Conf  Outcome: Ongoing (interventions implemented as appropriate)     Problem: Pain, Chronic (Adult)  Goal: Identify Related Risk Factors and Signs and Symptoms  Description  Related risk factors and signs and symptoms are identified upon initiation of Human Response Clinical Practice Guideline (CPG).  Outcome: Ongoing (interventions implemented as appropriate)  Goal: Acceptable Pain/Comfort Level and Functional Ability  Description  Patient will demonstrate the desired outcomes by discharge/transition of care.  Outcome: Ongoing (interventions implemented as appropriate)     Problem: Fall Risk (Adult)  Goal: Identify Related Risk Factors and Signs and Symptoms  Description  Related risk factors and signs and symptoms are identified upon initiation of Human Response Clinical Practice Guideline (CPG).  Outcome: Ongoing (interventions implemented as appropriate)  Goal: Absence of Fall  Description  Patient will demonstrate the desired outcomes by discharge/transition of  care.  Outcome: Ongoing (interventions implemented as appropriate)     Problem: Nutrition, Imbalanced: Inadequate Oral Intake (Adult)  Goal: Improved Oral Intake  Description  Patient will demonstrate the desired outcomes by discharge/transition of care.  Outcome: Ongoing (interventions implemented as appropriate)  Goal: Prevent Further Weight Loss  Description  Patient will demonstrate the desired outcomes by discharge/transition of care.  Outcome: Ongoing (interventions implemented as appropriate)

## 2020-02-08 NOTE — PROGRESS NOTES
[facility] -  Progress Note     LOS: 13 days   Patient Care Team:  Alison Hercules MD as PCP - General (Internal Medicine)  Alison Hercules MD as Referring Physician (Internal Medicine)    Chief Complaint: heart failure follow up    Subjective     Interval History: feels stable this morning upon awakening. Remains with alvarado catheter. Weights improved by 1 pound, now 163 which his dry weight. He has no lower extremity edema. He is no supplemental oxygen.  He denies shortness of breath. No abdominal pain at this time. He is to start oral diuretic dose today. His Entresto has been up titrated to his original home dose for this am.       Review of Systems:     Review of Systems   Constitutional: Negative for fatigue and fever.   HENT: Negative for congestion.    Respiratory: Negative for chest tightness and shortness of breath.    Cardiovascular: Negative for chest pain, palpitations and leg swelling.   Genitourinary: Negative for difficulty urinating.   Neurological: Negative for dizziness and syncope.     Objective     Vital Sign Min/Max for last 24 hours  Temp  Min: 96.3 °F (35.7 °C)  Max: 97.6 °F (36.4 °C)   BP  Min: 88/52  Max: 106/67   Pulse  Min: 67  Max: 111   Resp  Min: 18  Max: 18   SpO2  Min: 91 %  Max: 100 %   No data recorded   Weight  Min: 74.1 kg (163 lb 6.4 oz)  Max: 74.1 kg (163 lb 6.4 oz)         02/08/20  0539   Weight: 74.1 kg (163 lb 6.4 oz)       Physical Exam:    Physical Exam   Constitutional: He is oriented to person, place, and time. He appears well-developed and well-nourished. No distress.   HENT:   Head: Normocephalic and atraumatic.   Eyes: Conjunctivae are normal. No scleral icterus.   Neck: Normal range of motion. Neck supple.   Cardiovascular: Normal rate and regular rhythm.   Pulmonary/Chest: Effort normal. No respiratory distress. He has no wheezes. He has rales (faint).   Abdominal: Soft.   Musculoskeletal: He exhibits no edema.   Neurological: He is alert and  oriented to person, place, and time.   Skin: Skin is warm and dry. He is not diaphoretic.   Psychiatric: He has a normal mood and affect. His behavior is normal.   Vitals reviewed.    Results Review:   Lab Results (last 72 hours)     Procedure Component Value Units Date/Time    BNP [[id number]]  (Abnormal) Collected:  02/07/20 0608    Specimen:  Blood Updated:  02/07/20 0651     proBNP 5,499.0 pg/mL     Narrative:       Among patients with dyspnea, NT-proBNP is highly sensitive for the detection of acute congestive heart failure. In addition NT-proBNP of <300 pg/ml effectively rules out acute congestive heart failure with 99% negative predictive value.    Results may be falsely decreased if patient taking Biotin.      Basic Metabolic Panel [[id number]]  (Abnormal) Collected:  02/07/20 0608    Specimen:  Blood Updated:  02/07/20 0651     Glucose 142 mg/dL      BUN 31 mg/dL      Creatinine 1.86 mg/dL      Sodium 142 mmol/L      Potassium 4.1 mmol/L      Chloride 97 mmol/L      CO2 35.0 mmol/L      Calcium 8.6 mg/dL      eGFR Non African Amer 35 mL/min/1.73      BUN/Creatinine Ratio 16.7     Anion Gap 10.0 mmol/L     Narrative:       GFR Normal >60  Chronic Kidney Disease <60  Kidney Failure <15      CBC (No Diff) [[id number]]  (Abnormal) Collected:  02/07/20 0607    Specimen:  Blood Updated:  02/07/20 0631     WBC 4.38 10*3/mm3      RBC 3.63 10*6/mm3      Hemoglobin 10.4 g/dL      Hematocrit 32.6 %      MCV 89.8 fL      MCH 28.7 pg      MCHC 31.9 g/dL      RDW 14.1 %      RDW-SD 46.3 fl      MPV 12.6 fL      Platelets 132 10*3/mm3     Basic Metabolic Panel [[id number]]  (Abnormal) Collected:  02/06/20 1824    Specimen:  Blood Updated:  02/06/20 1846     Glucose 105 mg/dL      BUN 31 mg/dL      Creatinine 1.84 mg/dL      Sodium 141 mmol/L      Potassium 4.3 mmol/L      Chloride 96 mmol/L      CO2 34.0 mmol/L      Calcium 8.9 mg/dL      eGFR Non African Amer 35 mL/min/1.73      BUN/Creatinine Ratio 16.8     Anion Gap 11.0  mmol/L     Narrative:       GFR Normal >60  Chronic Kidney Disease <60  Kidney Failure <15      BNP [[id number]]  (Abnormal) Collected:  02/06/20 1824    Specimen:  Blood Updated:  02/06/20 1846     proBNP 6,333.0 pg/mL     Narrative:       Among patients with dyspnea, NT-proBNP is highly sensitive for the detection of acute congestive heart failure. In addition NT-proBNP of <300 pg/ml effectively rules out acute congestive heart failure with 99% negative predictive value.    Results may be falsely decreased if patient taking Biotin.      Comprehensive Metabolic Panel [[id number]]  (Abnormal) Collected:  02/06/20 0511    Specimen:  Blood Updated:  02/06/20 0626     Glucose 137 mg/dL      BUN 28 mg/dL      Creatinine 1.81 mg/dL      Sodium 141 mmol/L      Potassium 4.1 mmol/L      Chloride 95 mmol/L      CO2 36.0 mmol/L      Calcium 9.2 mg/dL      Total Protein 7.1 g/dL      Albumin 3.60 g/dL      ALT (SGPT) 6 U/L      AST (SGOT) 6 U/L      Alkaline Phosphatase 100 U/L      Total Bilirubin 0.9 mg/dL      eGFR Non African Amer 36 mL/min/1.73      Globulin 3.5 gm/dL      A/G Ratio 1.0 g/dL      BUN/Creatinine Ratio 15.5     Anion Gap 10.0 mmol/L     Narrative:       GFR Normal >60  Chronic Kidney Disease <60  Kidney Failure <15      Basic Metabolic Panel [[id number]]  (Abnormal) Collected:  02/05/20 1538    Specimen:  Blood Updated:  02/05/20 1607     Glucose 129 mg/dL      BUN 28 mg/dL      Creatinine 1.91 mg/dL      Sodium 140 mmol/L      Potassium 4.2 mmol/L      Chloride 95 mmol/L      CO2 36.0 mmol/L      Calcium 9.1 mg/dL      eGFR Non African Amer 34 mL/min/1.73      BUN/Creatinine Ratio 14.7     Anion Gap 9.0 mmol/L     Narrative:       GFR Normal >60  Chronic Kidney Disease <60  Kidney Failure <15      Magnesium [[id number]]  (Normal) Collected:  02/05/20 1538    Specimen:  Blood Updated:  02/05/20 1607     Magnesium 2.0 mg/dL               Echo EF Estimated  Lab Results   Component Value Date    ECHOEFEST 35  09/08/2017     Medication Review: yes  Current Facility-Administered Medications   Medication Dose Route Frequency Provider Last Rate Last Dose   • aspirin EC tablet 81 mg  81 mg Oral Daily Raleigh Enciso MD   81 mg at 02/08/20 0921   • atorvastatin (LIPITOR) tablet 10 mg  10 mg Oral Nightly Raleigh Enciso MD   10 mg at 02/07/20 2131   • bumetanide (BUMEX) tablet 1 mg  1 mg Oral BID Raliegh Enciso MD   1 mg at 02/08/20 0921   • docusate sodium (COLACE) capsule 100 mg  100 mg Oral BID Chrei Paredes MD   100 mg at 02/08/20 0921   • enoxaparin (LOVENOX) syringe 30 mg  30 mg Subcutaneous Q24H Kim Almeida MD   30 mg at 02/07/20 0937   • gabapentin (NEURONTIN) capsule 300 mg  300 mg Oral Daily Jennie Turner APRN   300 mg at 02/07/20 0942   • gabapentin (NEURONTIN) capsule 600 mg  600 mg Oral Nightly Jennie Turner APRN   600 mg at 02/07/20 2131   • HYDROcodone-acetaminophen (NORCO) 5-325 MG per tablet 1 tablet  1 tablet Oral BID Jennie Turner APRN   1 tablet at 02/07/20 2202   • HYDROcodone-acetaminophen (NORCO) 5-325 MG per tablet 1 tablet  1 tablet Oral Q4H PRN Cheri Paredes MD   1 tablet at 02/08/20 0112   • LORazepam (ATIVAN) tablet 0.5 mg  0.5 mg Oral TID Jennie Turner APRN   0.5 mg at 02/08/20 0631   • Magnesium Sulfate 2 gram Bolus, followed by 8 gram infusion (total Mg dose 10 grams)- Mg less than or equal to 1mg/dL  2 g Intravenous PRN Raleigh Enciso MD        Or   • Magnesium Sulfate 2 gram / 50mL Infusion (GIVE X 3 BAGS TO EQUAL 6GM TOTAL DOSE) - Mg 1.1 - 1.5 mg/dl  2 g Intravenous PRN Raleigh Enciso MD        Or   • Magnesium Sulfate 4 gram infusion- Mg 1.6-1.9 mg/dL  4 g Intravenous PRN Raleigh Enciso MD       • metoprolol succinate XL (TOPROL-XL) 24 hr tablet 25 mg  25 mg Oral Nightly Jennie Turner, APRN   25 mg at 02/07/20 2216   • nitroglycerin (NITROSTAT) SL tablet 0.4 mg  0.4 mg Sublingual Q5 Min PRN Kim Almeida MD       • ondansetron (ZOFRAN)  injection 4 mg  4 mg Intravenous Q4H PRN Kim Almeida MD   4 mg at 01/30/20 0834   • phenol (CHLORASEPTIC) 1.4 % liquid 2 spray  2 spray Mouth/Throat Q2H PRN Kim Almeida MD   2 spray at 01/27/20 0437   • potassium chloride (MICRO-K) CR capsule 40 mEq  40 mEq Oral PRN Raleigh Enciso MD        Or   • potassium chloride (KLOR-CON) packet 40 mEq  40 mEq Oral PRN Raleigh Enciso MD        Or   • potassium chloride 10 mEq in 100 mL IVPB  10 mEq Intravenous Q1H PRN Raleigh Enciso MD       • sacubitril-valsartan (ENTRESTO) 24-26 MG tablet 2 tablet  2 tablet Oral Q12H Raleigh Enciso MD   2 tablet at 02/08/20 0921   • sodium chloride 0.9 % flush 10 mL  10 mL Intravenous PRN Kim Almeida MD   10 mL at 02/07/20 0942   • tamsulosin (FLOMAX) 24 hr capsule 0.4 mg  0.4 mg Oral Nightly Jennie Turner APRN   0.4 mg at 02/07/20 2131   • trimethoprim-polymyxin b (POLYTRIM) 39573-6.1 UNIT/ML-% ophthalmic solution 1 drop  1 drop Right Eye Q6H Jennie Turner APRN   1 drop at 02/08/20 0543         Assessment/Plan     Assessment    1. Acute on chronic combined systolic and diastolic heart failure  2. CAD  3. NSVT  4. AFluttter  5. CKD  6. SBO    Plan    -start oral Bumex today. Resume home dose of Entresto today. Monitor I/O, weights.  Monitor symptoms, weights, renal function with transition back to oral diuretics.   - continue beta blocker  - continue tele  - BMP, MG in am  - likely home tomorrow  - if he does well with am meds and symptoms remain stable would be ok with pulling Loera this afternoon    -will need BMP 1 week after discharge  -if d/c home this weekend, will need [facility] clinic f/u in 1 week after discharge, and with me in 4 weeks    OUSMANE Tipton  02/08/20  9:28 AM

## 2020-02-08 NOTE — PLAN OF CARE
Problem: Patient Care Overview  Goal: Plan of Care Review  Outcome: Ongoing (interventions implemented as appropriate)  Flowsheets (Taken 2/8/2020 5501)  Outcome Summary: Pt c/o abdominal pain meds given per orders, cont alvarado cath with clear yellow output, bumex PO, entresto dosage increased, ambulating in fuentes with daughter, cont tele v paced with rare PVCs, will cont to monitor

## 2020-02-09 LAB
ANION GAP SERPL CALCULATED.3IONS-SCNC: 10 MMOL/L (ref 5–15)
BUN BLD-MCNC: 40 MG/DL (ref 8–23)
BUN/CREAT SERPL: 19.7 (ref 7–25)
CALCIUM SPEC-SCNC: 8.3 MG/DL (ref 8.6–10.5)
CHLORIDE SERPL-SCNC: 97 MMOL/L (ref 98–107)
CO2 SERPL-SCNC: 32 MMOL/L (ref 22–29)
CREAT BLD-MCNC: 2.03 MG/DL (ref 0.76–1.27)
GFR SERPL CREATININE-BSD FRML MDRD: 31 ML/MIN/1.73
GLUCOSE BLD-MCNC: 86 MG/DL (ref 65–99)
MAGNESIUM SERPL-MCNC: 2.2 MG/DL (ref 1.6–2.4)
POTASSIUM BLD-SCNC: 4.4 MMOL/L (ref 3.5–5.2)
SODIUM BLD-SCNC: 139 MMOL/L (ref 136–145)

## 2020-02-09 PROCEDURE — 25010000002 ENOXAPARIN PER 10 MG: Performed by: SPECIALIST

## 2020-02-09 PROCEDURE — 83735 ASSAY OF MAGNESIUM: CPT | Performed by: NURSE PRACTITIONER

## 2020-02-09 PROCEDURE — 80048 BASIC METABOLIC PNL TOTAL CA: CPT | Performed by: NURSE PRACTITIONER

## 2020-02-09 RX ORDER — BUMETANIDE 0.5 MG/1
0.5 TABLET ORAL 2 TIMES DAILY
Qty: 60 TABLET | Refills: 2 | Status: SHIPPED | OUTPATIENT
Start: 2020-02-09 | End: 2020-02-17 | Stop reason: SDUPTHER

## 2020-02-09 RX ORDER — HYDROCODONE BITARTRATE AND ACETAMINOPHEN 7.5; 325 MG/1; MG/1
1 TABLET ORAL EVERY 4 HOURS PRN
Qty: 30 TABLET | Refills: 0 | Status: SHIPPED | OUTPATIENT
Start: 2020-02-09 | End: 2020-02-17 | Stop reason: SDUPTHER

## 2020-02-09 RX ORDER — BUMETANIDE 0.5 MG/1
0.5 TABLET ORAL 2 TIMES DAILY
Status: DISCONTINUED | OUTPATIENT
Start: 2020-02-09 | End: 2020-02-10 | Stop reason: HOSPADM

## 2020-02-09 RX ADMIN — LORAZEPAM 0.5 MG: 0.5 TABLET ORAL at 20:30

## 2020-02-09 RX ADMIN — GABAPENTIN 600 MG: 300 CAPSULE ORAL at 21:51

## 2020-02-09 RX ADMIN — DOCUSATE SODIUM 100 MG: 100 CAPSULE, LIQUID FILLED ORAL at 21:51

## 2020-02-09 RX ADMIN — HYDROCODONE BITARTRATE AND ACETAMINOPHEN 1 TABLET: 5; 325 TABLET ORAL at 08:41

## 2020-02-09 RX ADMIN — ENOXAPARIN SODIUM 30 MG: 30 INJECTION SUBCUTANEOUS at 11:22

## 2020-02-09 RX ADMIN — DOCUSATE SODIUM 100 MG: 100 CAPSULE, LIQUID FILLED ORAL at 08:41

## 2020-02-09 RX ADMIN — LORAZEPAM 0.5 MG: 0.5 TABLET ORAL at 17:05

## 2020-02-09 RX ADMIN — POLYMYXIN B SULFATE, TRIMETHOPRIM SULFATE 1 DROP: 10000; 1 SOLUTION/ DROPS OPHTHALMIC at 11:22

## 2020-02-09 RX ADMIN — HYDROCODONE BITARTRATE AND ACETAMINOPHEN 1 TABLET: 5; 325 TABLET ORAL at 17:43

## 2020-02-09 RX ADMIN — BUMETANIDE 0.5 MG: 1 TABLET ORAL at 20:36

## 2020-02-09 RX ADMIN — HYDROCODONE BITARTRATE AND ACETAMINOPHEN 1 TABLET: 5; 325 TABLET ORAL at 21:50

## 2020-02-09 RX ADMIN — BUMETANIDE 0.5 MG: 1 TABLET ORAL at 10:15

## 2020-02-09 RX ADMIN — GABAPENTIN 300 MG: 300 CAPSULE ORAL at 08:41

## 2020-02-09 RX ADMIN — TAMSULOSIN HYDROCHLORIDE 0.4 MG: 0.4 CAPSULE ORAL at 20:36

## 2020-02-09 RX ADMIN — ASPIRIN 81 MG: 81 TABLET ORAL at 08:41

## 2020-02-09 RX ADMIN — SACUBITRIL AND VALSARTAN 2 TABLET: 24; 26 TABLET, FILM COATED ORAL at 20:36

## 2020-02-09 RX ADMIN — SACUBITRIL AND VALSARTAN 2 TABLET: 24; 26 TABLET, FILM COATED ORAL at 08:40

## 2020-02-09 RX ADMIN — LORAZEPAM 0.5 MG: 0.5 TABLET ORAL at 08:41

## 2020-02-09 RX ADMIN — ATORVASTATIN CALCIUM 10 MG: 10 TABLET, FILM COATED ORAL at 21:51

## 2020-02-09 RX ADMIN — POLYMYXIN B SULFATE, TRIMETHOPRIM SULFATE 1 DROP: 10000; 1 SOLUTION/ DROPS OPHTHALMIC at 05:20

## 2020-02-09 NOTE — DISCHARGE PLACEMENT REQUEST
Discharge Summary      Kim Almeida MD at 02/09/20 1030          Kim Almeida MD Discharge Summary    Date of Admission: 1/26/2020  Date of Discharge:  2/9/2020    Discharge Diagnosis: Small bowel obstruction, acute on chronic congestive heart failure, chronic renal disease stage IV    Presenting Problem/History of Present Illness    History and Physical as outlined in the chart    Hospital Course  Patient was admitted with small bowel obstruction and ultimately underwent laparoscopic lysis of adhesions.  He had exacerbation of his congestive heart failure.  This was complicated by his chronic renal disease.  Internal medicine initially was consulted and signed off but the cardiologist followed him adjusting his diuretics.  Ultimately he is stabilized and is ready for discharge.  He is tolerating a regular diet.  They have made some adjustments to his meds.  Patient will be discharged to home.  See medication reconciliation for medications at discharge.    Procedures Performed  Procedure(s):  LAPAROSCOPIC LYSIS OF ADHESIONS       Consults:   Consults     Date and Time Order Name Status Description    1/28/2020 0840 Inpatient Cardiology Consult Completed     1/26/2020 1632 Inpatient Hospitalist Consult Completed           Condition on Discharge:  stable      Discharge Disposition  Home or Self Care    Discharge Medications     Discharge Medications      New Medications      Instructions Start Date   bumetanide 0.5 MG tablet  Commonly known as:  BUMEX   0.5 mg, Oral, 2 Times Daily         Changes to Medications      Instructions Start Date   HYDROcodone-acetaminophen 5-325 MG per tablet  Commonly known as:  NORCO  What changed:  Another medication with the same name was added. Make sure you understand how and when to take each.   1 tablet, Oral, Nightly      HYDROcodone-acetaminophen 5-325 MG per tablet  Commonly known as:  NORCO  What changed:  Another medication with the same name was added. Make sure  you understand how and when to take each.   1 tablet, Oral, 2 Times Daily, Takes one tab in morning and one tab at noon      HYDROcodone-acetaminophen 7.5-325 MG per tablet  Commonly known as:  NORCO  What changed:  You were already taking a medication with the same name, and this prescription was added. Make sure you understand how and when to take each.   1 tablet, Oral, Every 4 Hours PRN         Continue These Medications      Instructions Start Date   aspirin 81 MG EC tablet   81 mg, Oral, Daily      atorvastatin 10 MG tablet  Commonly known as:  LIPITOR   10 mg, Oral, Nightly      dutasteride 0.5 MG capsule  Commonly known as:  AVODART   0.5 mg, Oral, Daily, Patient takes at noon      ENTRESTO 49-51 MG tablet  Generic drug:  sacubitril-valsartan   1 tablet, Oral, 2 Times Daily, Takes one tab in morning and one tab at bedtime      gabapentin 600 MG tablet  Commonly known as:  NEURONTIN   600 mg, Oral, Nightly      gabapentin 300 MG capsule  Commonly known as:  NEURONTIN   300 mg, Oral, Daily      LORazepam 0.5 MG tablet  Commonly known as:  ATIVAN   0.5 mg, Oral, 4 Times Daily      metoprolol succinate XL 25 MG 24 hr tablet  Commonly known as:  TOPROL-XL   25 mg, Oral, Nightly      nitroglycerin 0.4 MG SL tablet  Commonly known as:  NITROSTAT   PLACE 1 TABLET UNDER THE TONGUE AS NEEDED FOR ANGINA, MAY REPEAT EVERY 5 MINS FOR UP THREE DOSES      tamsulosin 0.4 MG capsule 24 hr capsule  Commonly known as:  FLOMAX   1 capsule, Oral, Nightly         Stop These Medications    furosemide 40 MG tablet  Commonly known as:  LASIX        ASK your doctor about these medications      Instructions Start Date   VITAMIN D PO   1,000 mg, Oral, Nightly             Discharge Diet:     Activity at Discharge:     Follow-up Appointments  Future Appointments   Date Time Provider Department Center   6/9/2020 12:30 PM [facility]     Additional Instructions for the Follow-ups that You Need to  Schedule     Discharge Follow-up with Specified Provider: Me; 3 Weeks   As directed      To:  Me    Follow Up:  3 Weeks         Basic Metabolic Panel    Feb 16, 2020 (Approximate)      Pt has office visit - run in house after     Order Comments:  Pt has office visit - run in house after                 Test Results Pending at Discharge       Kim Almeida MD  02/09/20  10:30 AM    Time: Time spent at discharge 30 minutes             Electronically signed by Kim Almeida MD at 02/09/20 1033

## 2020-02-09 NOTE — DISCHARGE SUMMARY
Kim Almeida MD Discharge Summary    Date of Admission: 1/26/2020  Date of Discharge:  2/9/2020    Discharge Diagnosis: Small bowel obstruction, acute on chronic congestive heart failure, chronic renal disease stage IV    Presenting Problem/History of Present Illness    History and Physical as outlined in the chart    Hospital Course  Patient was admitted with small bowel obstruction and ultimately underwent laparoscopic lysis of adhesions.  He had exacerbation of his congestive heart failure.  This was complicated by his chronic renal disease.  Internal medicine initially was consulted and signed off but the cardiologist followed him adjusting his diuretics.  Ultimately he is stabilized and is ready for discharge.  He is tolerating a regular diet.  They have made some adjustments to his meds.  Patient will be discharged to home.  See medication reconciliation for medications at discharge.    Addendum: Patient had urinary retention last night so could not go home.  He has been urinating small amounts with a poor stream.  Postvoid residual just now shows 500 cc.  Will place Loera catheter and have him discharged home with follow-up with his primary urologist    Procedures Performed  Procedure(s):  LAPAROSCOPIC LYSIS OF ADHESIONS       Consults:   Consults     Date and Time Order Name Status Description    1/28/2020 0840 Inpatient Cardiology Consult Completed     1/26/2020 1632 Inpatient Hospitalist Consult Completed           Condition on Discharge:  stable      Discharge Disposition  Home or Self Care    Discharge Medications     Discharge Medications      New Medications      Instructions Start Date   bumetanide 0.5 MG tablet  Commonly known as:  BUMEX   0.5 mg, Oral, 2 Times Daily         Changes to Medications      Instructions Start Date   HYDROcodone-acetaminophen 5-325 MG per tablet  Commonly known as:  NORCO  What changed:  Another medication with the same name was added. Make sure you understand how and  when to take each.   1 tablet, Oral, Nightly      HYDROcodone-acetaminophen 5-325 MG per tablet  Commonly known as:  NORCO  What changed:  Another medication with the same name was added. Make sure you understand how and when to take each.   1 tablet, Oral, 2 Times Daily, Takes one tab in morning and one tab at noon      HYDROcodone-acetaminophen 7.5-325 MG per tablet  Commonly known as:  NORCO  What changed:  You were already taking a medication with the same name, and this prescription was added. Make sure you understand how and when to take each.   1 tablet, Oral, Every 4 Hours PRN         Continue These Medications      Instructions Start Date   aspirin 81 MG EC tablet   81 mg, Oral, Daily      atorvastatin 10 MG tablet  Commonly known as:  LIPITOR   10 mg, Oral, Nightly      dutasteride 0.5 MG capsule  Commonly known as:  AVODART   0.5 mg, Oral, Daily, Patient takes at noon      ENTRESTO 49-51 MG tablet  Generic drug:  sacubitril-valsartan   1 tablet, Oral, 2 Times Daily, Takes one tab in morning and one tab at bedtime      gabapentin 600 MG tablet  Commonly known as:  NEURONTIN   600 mg, Oral, Nightly      gabapentin 300 MG capsule  Commonly known as:  NEURONTIN   300 mg, Oral, Daily      LORazepam 0.5 MG tablet  Commonly known as:  ATIVAN   0.5 mg, Oral, 4 Times Daily      metoprolol succinate XL 25 MG 24 hr tablet  Commonly known as:  TOPROL-XL   25 mg, Oral, Nightly      nitroglycerin 0.4 MG SL tablet  Commonly known as:  NITROSTAT   PLACE 1 TABLET UNDER THE TONGUE AS NEEDED FOR ANGINA, MAY REPEAT EVERY 5 MINS FOR UP THREE DOSES      tamsulosin 0.4 MG capsule 24 hr capsule  Commonly known as:  FLOMAX   1 capsule, Oral, Nightly         Stop These Medications    furosemide 40 MG tablet  Commonly known as:  LASIX        ASK your doctor about these medications      Instructions Start Date   VITAMIN D PO   1,000 mg, Oral, Nightly             Discharge Diet:     Activity at Discharge:     Follow-up  Appointments  Future Appointments   Date Time Provider Department Center   6/9/2020 12:30 PM [facility]     Additional Instructions for the Follow-ups that You Need to Schedule     Discharge Follow-up with Specified Provider: Me; 3 Weeks   As directed      To:  Me    Follow Up:  3 Weeks         Basic Metabolic Panel    Feb 16, 2020 (Approximate)      Pt has office visit - run in house after     Order Comments:  Pt has office visit - run in house after                 Test Results Pending at Discharge       Kim Almeida MD  02/09/20  10:30 AM    Time: Time spent at discharge 30 minutes

## 2020-02-09 NOTE — PLAN OF CARE
Problem: Patient Care Overview  Goal: Plan of Care Review  Outcome: Ongoing (interventions implemented as appropriate)  Note:   Pt had interrupted sleep last night. SBP running in the mid 90's and pt has been asymptomatic. Pt complained of minor gastric discomfort and reported relief when prn medication was administered.   Goal: Individualization and Mutuality  Outcome: Ongoing (interventions implemented as appropriate)  Goal: Discharge Needs Assessment  Outcome: Ongoing (interventions implemented as appropriate)  Goal: Interprofessional Rounds/Family Conf  Outcome: Ongoing (interventions implemented as appropriate)     Problem: Pain, Chronic (Adult)  Goal: Identify Related Risk Factors and Signs and Symptoms  Description  Related risk factors and signs and symptoms are identified upon initiation of Human Response Clinical Practice Guideline (CPG).  Outcome: Ongoing (interventions implemented as appropriate)  Goal: Acceptable Pain/Comfort Level and Functional Ability  Description  Patient will demonstrate the desired outcomes by discharge/transition of care.  Outcome: Ongoing (interventions implemented as appropriate)     Problem: Fall Risk (Adult)  Goal: Identify Related Risk Factors and Signs and Symptoms  Description  Related risk factors and signs and symptoms are identified upon initiation of Human Response Clinical Practice Guideline (CPG).  Outcome: Ongoing (interventions implemented as appropriate)  Goal: Absence of Fall  Description  Patient will demonstrate the desired outcomes by discharge/transition of care.  Outcome: Ongoing (interventions implemented as appropriate)     Problem: Nutrition, Imbalanced: Inadequate Oral Intake (Adult)  Goal: Improved Oral Intake  Description  Patient will demonstrate the desired outcomes by discharge/transition of care.  Outcome: Ongoing (interventions implemented as appropriate)  Goal: Prevent Further Weight Loss  Description  Patient will demonstrate the desired  outcomes by discharge/transition of care.  Outcome: Ongoing (interventions implemented as appropriate)

## 2020-02-09 NOTE — PROGRESS NOTES
"[facility] -  Progress Note     LOS: 14 days   Patient Care Team:  Alison Hercules MD as PCP - General (Internal Medicine)  Alison Hercules MD as Referring Physician (Internal Medicine)    Chief Complaint: chf follow up    Subjective     Interval History: sitting up eating breakfast.  How do you feel? \"I feel great!\" He has agreed to have alvarado removed after breakfast. Slept well. Denies shortness of breath. Understands he will be discharged today and that we have made adjustments to his medications. Creatinine 2.03 after Bumex 1 BID and Increase of Entresto to home dose 49/51 yesterday. Weights stable 164 today.      Review of Systems:     Review of Systems   Constitutional: Negative for unexpected weight change.   Respiratory: Negative for cough, chest tightness, shortness of breath and wheezing.    Cardiovascular: Negative for chest pain, palpitations and leg swelling.   Gastrointestinal: Negative for abdominal distention and abdominal pain.   Genitourinary: Negative for difficulty urinating.   Neurological: Negative for dizziness.   Psychiatric/Behavioral: Negative for agitation.     Objective     Vital Sign Min/Max for last 24 hours  Temp  Min: 96.6 °F (35.9 °C)  Max: 97.9 °F (36.6 °C)   BP  Min: 86/59  Max: 102/62   Pulse  Min: 84  Max: 105   Resp  Min: 18  Max: 18   SpO2  Min: 96 %  Max: 98 %   No data recorded   No data recorded         02/08/20  0539   Weight: 74.1 kg (163 lb 6.4 oz)       Physical Exam:    Physical Exam   Constitutional: He is oriented to person, place, and time. He appears well-developed and well-nourished. No distress.   HENT:   Head: Normocephalic and atraumatic.   Mouth/Throat: Oropharynx is clear and moist. No oropharyngeal exudate.   Eyes: Conjunctivae are normal. No scleral icterus.   Neck: Normal range of motion. Neck supple. No JVD present.   Cardiovascular: Normal rate and regular rhythm. Exam reveals no gallop and no friction rub.   No murmur " heard.  Pulmonary/Chest: Effort normal. No respiratory distress. He has no wheezes. He has rales (faint rales).   Abdominal: Soft. Normal appearance and bowel sounds are normal. He exhibits no distension. There is no tenderness.   Musculoskeletal: He exhibits no edema.   Neurological: He is alert and oriented to person, place, and time.   Skin: Skin is warm, dry and intact. He is not diaphoretic.   Psychiatric: He has a normal mood and affect. His behavior is normal.   Vitals reviewed.    Results Review:   Lab Results (last 72 hours)     Procedure Component Value Units Date/Time    Magnesium [[id number]]  (Normal) Collected:  02/09/20 0611    Specimen:  Blood Updated:  02/09/20 0705     Magnesium 2.2 mg/dL     Basic Metabolic Panel [[id number]]  (Abnormal) Collected:  02/09/20 0611    Specimen:  Blood Updated:  02/09/20 0705     Glucose 86 mg/dL      BUN 40 mg/dL      Creatinine 2.03 mg/dL      Sodium 139 mmol/L      Potassium 4.4 mmol/L      Chloride 97 mmol/L      CO2 32.0 mmol/L      Calcium 8.3 mg/dL      eGFR Non African Amer 31 mL/min/1.73      BUN/Creatinine Ratio 19.7     Anion Gap 10.0 mmol/L     Narrative:       GFR Normal >60  Chronic Kidney Disease <60  Kidney Failure <15      BNP [[id number]]  (Abnormal) Collected:  02/07/20 0608    Specimen:  Blood Updated:  02/07/20 0651     proBNP 5,499.0 pg/mL     Narrative:       Among patients with dyspnea, NT-proBNP is highly sensitive for the detection of acute congestive heart failure. In addition NT-proBNP of <300 pg/ml effectively rules out acute congestive heart failure with 99% negative predictive value.    Results may be falsely decreased if patient taking Biotin.      Basic Metabolic Panel [[id number]]  (Abnormal) Collected:  02/07/20 0608    Specimen:  Blood Updated:  02/07/20 0651     Glucose 142 mg/dL      BUN 31 mg/dL      Creatinine 1.86 mg/dL      Sodium 142 mmol/L      Potassium 4.1 mmol/L      Chloride 97 mmol/L      CO2 35.0 mmol/L      Calcium 8.6  mg/dL      eGFR Non African Amer 35 mL/min/1.73      BUN/Creatinine Ratio 16.7     Anion Gap 10.0 mmol/L     Narrative:       GFR Normal >60  Chronic Kidney Disease <60  Kidney Failure <15      CBC (No Diff) [[id number]]  (Abnormal) Collected:  02/07/20 0607    Specimen:  Blood Updated:  02/07/20 0631     WBC 4.38 10*3/mm3      RBC 3.63 10*6/mm3      Hemoglobin 10.4 g/dL      Hematocrit 32.6 %      MCV 89.8 fL      MCH 28.7 pg      MCHC 31.9 g/dL      RDW 14.1 %      RDW-SD 46.3 fl      MPV 12.6 fL      Platelets 132 10*3/mm3     Basic Metabolic Panel [[id number]]  (Abnormal) Collected:  02/06/20 1824    Specimen:  Blood Updated:  02/06/20 1846     Glucose 105 mg/dL      BUN 31 mg/dL      Creatinine 1.84 mg/dL      Sodium 141 mmol/L      Potassium 4.3 mmol/L      Chloride 96 mmol/L      CO2 34.0 mmol/L      Calcium 8.9 mg/dL      eGFR Non African Amer 35 mL/min/1.73      BUN/Creatinine Ratio 16.8     Anion Gap 11.0 mmol/L     Narrative:       GFR Normal >60  Chronic Kidney Disease <60  Kidney Failure <15      BNP [[id number]]  (Abnormal) Collected:  02/06/20 1824    Specimen:  Blood Updated:  02/06/20 1846     proBNP 6,333.0 pg/mL     Narrative:       Among patients with dyspnea, NT-proBNP is highly sensitive for the detection of acute congestive heart failure. In addition NT-proBNP of <300 pg/ml effectively rules out acute congestive heart failure with 99% negative predictive value.    Results may be falsely decreased if patient taking Biotin.             Echo EF Estimated  Lab Results   Component Value Date    ECHOEFEST 35 09/08/2017     Medication Review: yes  Current Facility-Administered Medications   Medication Dose Route Frequency Provider Last Rate Last Dose   • aspirin EC tablet 81 mg  81 mg Oral Daily Raleigh Enciso MD   81 mg at 02/08/20 0921   • atorvastatin (LIPITOR) tablet 10 mg  10 mg Oral Nightly Raleigh Enciso MD   10 mg at 02/08/20 2150   • bumetanide (BUMEX) tablet 0.5 mg  0.5 mg Oral BID Katy  OUSMANE Mayorga       • docusate sodium (COLACE) capsule 100 mg  100 mg Oral BID Cheri Paredes MD   100 mg at 02/08/20 2150   • enoxaparin (LOVENOX) syringe 30 mg  30 mg Subcutaneous Q24H Kim Almeida MD   30 mg at 02/08/20 1104   • gabapentin (NEURONTIN) capsule 300 mg  300 mg Oral Daily Jennie Turner APRN   300 mg at 02/08/20 0935   • gabapentin (NEURONTIN) capsule 600 mg  600 mg Oral Nightly Jennie Turner APRN   600 mg at 02/08/20 2151   • HYDROcodone-acetaminophen (NORCO) 5-325 MG per tablet 1 tablet  1 tablet Oral BID Jennie Turner APRN   1 tablet at 02/08/20 0934   • HYDROcodone-acetaminophen (NORCO) 5-325 MG per tablet 1 tablet  1 tablet Oral Q4H PRN Cheri Paredes MD   1 tablet at 02/08/20 1945   • LORazepam (ATIVAN) tablet 0.5 mg  0.5 mg Oral TID Jennie Turner APRN   0.5 mg at 02/08/20 2151   • Magnesium Sulfate 2 gram Bolus, followed by 8 gram infusion (total Mg dose 10 grams)- Mg less than or equal to 1mg/dL  2 g Intravenous PRN Raleigh Enciso MD        Or   • Magnesium Sulfate 2 gram / 50mL Infusion (GIVE X 3 BAGS TO EQUAL 6GM TOTAL DOSE) - Mg 1.1 - 1.5 mg/dl  2 g Intravenous PRN Raleigh Enciso MD        Or   • Magnesium Sulfate 4 gram infusion- Mg 1.6-1.9 mg/dL  4 g Intravenous PRN Raleigh Enciso MD       • metoprolol succinate XL (TOPROL-XL) 24 hr tablet 25 mg  25 mg Oral Nightly Jennie Turner APRN   25 mg at 02/08/20 2150   • nitroglycerin (NITROSTAT) SL tablet 0.4 mg  0.4 mg Sublingual Q5 Min PRN Kim Almeida MD       • ondansetron (ZOFRAN) injection 4 mg  4 mg Intravenous Q4H PRN Kim Almeida MD   4 mg at 02/08/20 2337   • phenol (CHLORASEPTIC) 1.4 % liquid 2 spray  2 spray Mouth/Throat Q2H PRN Kim Almeida MD   2 spray at 01/27/20 8187   • potassium chloride (MICRO-K) CR capsule 40 mEq  40 mEq Oral PRN Raleigh Enciso MD        Or   • potassium chloride (KLOR-CON) packet 40 mEq  40 mEq Oral PRN Raleigh Enciso MD        Or   • potassium  chloride 10 mEq in 100 mL IVPB  10 mEq Intravenous Q1H PRN Raleigh Enciso MD       • sacubitril-valsartan (ENTRESTO) 24-26 MG tablet 2 tablet  2 tablet Oral Q12H Raleigh Enciso MD   2 tablet at 02/08/20 2150   • sodium chloride 0.9 % flush 10 mL  10 mL Intravenous PRN Kim Almeida MD   10 mL at 02/07/20 0942   • tamsulosin (FLOMAX) 24 hr capsule 0.4 mg  0.4 mg Oral Nightly Jennie Turner APRN   0.4 mg at 02/08/20 2150   • trimethoprim-polymyxin b (POLYTRIM) 54715-3.1 UNIT/ML-% ophthalmic solution 1 drop  1 drop Right Eye Q6H Jennie Turner APRN   1 drop at 02/09/20 0520       Assessment/Plan     Assessment     1. Acute on chronic combined systolic and diastolic heart failure  2. CAD  3. NSVT  4. AFluttter  5. CKD  6. SBO    Plan    - remove alvarado now  - obtain standing weight: done nursing reports 164  - plan for discharge home today with the following:   - Bumex 0.5 mg by my mouth twice daily   - Entresto 49/51 mg by mouth twice daily (home dose)   - BMP in one week   - Cardiology APC follow up in 1 week   - Dr. Enciso follow up in 4 weeks    Creatinine is 2.03 today at discharge after increasing Entresto to home dose yesterday. BP stable.  Will decrease Bumex to 0.5 mg BID and follow up outpatient.        OUSMANE Tipton  02/09/20  7:57 AM               Initial (On Arrival)

## 2020-02-09 NOTE — DISCHARGE INSTRUCTIONS
Patient educated at length regarding low sodium diet, daily weights, and daily blood pressure monitoring. Patient instructed to bring daily weights and blood pressures to follow up office visit. Patient instructed to call with a 2lb weight gain overnight, 5lb weight gain in 1 week, increasing dyspnea or edema. Patient verbalized understanding and agreed to the plan of care.     Home dose of Entresto 49/51 mg twice daily  Bumex 0.5 mg twice a day  Daily weights at home  Low Sodium Diet    BMP in one week prior to appointment date  Office will call with appointments on Monday 2/10/2020  APC appt in 1 week  Dr. Enciso in 4 weeks

## 2020-02-09 NOTE — PROGRESS NOTES
Continued Stay Note   Isaias     Patient Name: Wild Bravo  MRN: [id number]  Today's Date: 2/9/2020    Admit Date: 1/26/2020    Discharge Plan     Row Name 02/09/20 1557       Plan    Plan  Home with daughter and Religious     Patient/Family in Agreement with Plan  yes    Final Discharge Disposition Code  06 - home with home health care    Final Note  Pt is being d/c'ed home today. Informed Renetta, on call nurse for [facility] [phone], and faxed the d/c summary to them at [phone].        Discharge Codes    No documentation.       Expected Discharge Date and Time     Expected Discharge Date Expected Discharge Time    Feb 9, 2020             JUAN PABLO Soto

## 2020-02-10 VITALS
RESPIRATION RATE: 16 BRPM | OXYGEN SATURATION: 92 % | HEART RATE: 77 BPM | BODY MASS INDEX: 21.74 KG/M2 | TEMPERATURE: 98 F | SYSTOLIC BLOOD PRESSURE: 113 MMHG | HEIGHT: 73 IN | DIASTOLIC BLOOD PRESSURE: 58 MMHG | WEIGHT: 164 LBS

## 2020-02-10 PROCEDURE — 25010000002 ENOXAPARIN PER 10 MG: Performed by: SPECIALIST

## 2020-02-10 RX ADMIN — ASPIRIN 81 MG: 81 TABLET ORAL at 09:26

## 2020-02-10 RX ADMIN — DOCUSATE SODIUM 100 MG: 100 CAPSULE, LIQUID FILLED ORAL at 09:26

## 2020-02-10 RX ADMIN — HYDROCODONE BITARTRATE AND ACETAMINOPHEN 1 TABLET: 5; 325 TABLET ORAL at 15:44

## 2020-02-10 RX ADMIN — HYDROCODONE BITARTRATE AND ACETAMINOPHEN 1 TABLET: 5; 325 TABLET ORAL at 05:11

## 2020-02-10 RX ADMIN — LORAZEPAM 0.5 MG: 0.5 TABLET ORAL at 06:36

## 2020-02-10 RX ADMIN — SACUBITRIL AND VALSARTAN 1 TABLET: 24; 26 TABLET, FILM COATED ORAL at 13:31

## 2020-02-10 RX ADMIN — ENOXAPARIN SODIUM 30 MG: 30 INJECTION SUBCUTANEOUS at 13:20

## 2020-02-10 NOTE — PLAN OF CARE
Problem: Patient Care Overview  Goal: Plan of Care Review  Outcome: Ongoing (interventions implemented as appropriate)  Note:   Pt had an uneventful night. His daughter remained at bedside throughout the shift. After efforts to assist patient with elimination and his resistance to increase PO fluids, pt did void this shift and is ready to dc.    Goal: Individualization and Mutuality  Outcome: Ongoing (interventions implemented as appropriate)  Goal: Discharge Needs Assessment  Outcome: Ongoing (interventions implemented as appropriate)  Goal: Interprofessional Rounds/Family Conf  Outcome: Ongoing (interventions implemented as appropriate)     Problem: Pain, Chronic (Adult)  Goal: Identify Related Risk Factors and Signs and Symptoms  Description  Related risk factors and signs and symptoms are identified upon initiation of Human Response Clinical Practice Guideline (CPG).  Outcome: Ongoing (interventions implemented as appropriate)  Goal: Acceptable Pain/Comfort Level and Functional Ability  Description  Patient will demonstrate the desired outcomes by discharge/transition of care.  Outcome: Ongoing (interventions implemented as appropriate)     Problem: Fall Risk (Adult)  Goal: Identify Related Risk Factors and Signs and Symptoms  Description  Related risk factors and signs and symptoms are identified upon initiation of Human Response Clinical Practice Guideline (CPG).  Outcome: Ongoing (interventions implemented as appropriate)  Goal: Absence of Fall  Description  Patient will demonstrate the desired outcomes by discharge/transition of care.  Outcome: Ongoing (interventions implemented as appropriate)     Problem: Nutrition, Imbalanced: Inadequate Oral Intake (Adult)  Goal: Improved Oral Intake  Description  Patient will demonstrate the desired outcomes by discharge/transition of care.  Outcome: Ongoing (interventions implemented as appropriate)  Goal: Prevent Further Weight Loss  Description  Patient will  demonstrate the desired outcomes by discharge/transition of care.  Outcome: Ongoing (interventions implemented as appropriate)

## 2020-02-10 NOTE — NURSING NOTE
Call placed to Munira Cai to update as to our efforts to elicit urination from the patient. He stated that he didn't want any more water and wasn't interested in drinking anything further. Last bladder scan showed 145mL. Pt was asked what drink would he be interested in. He stated ginger ale. Pt was given iced ginger ale to drink; pt tolerated. Goal for pt tonight is to increase fluid intake so that he may begin attempting to urinate. Family tolerated update. Rajat Kaplan RN

## 2020-02-10 NOTE — PROGRESS NOTES
Continued Stay Note   Isaias     Patient Name: Wild Bravo  MRN: [id number]  Today's Date: 2/10/2020    Admit Date: 1/26/2020    Discharge Plan     Row Name 02/10/20 1107       Plan    Plan  Home with daughter and [employer]     Patient/Family in Agreement with Plan  yes    Plan Comments  Pt was not able to be d/c'ed yesterday and will not be d/c'ed today due to low BP. Tiara [phone] with [facility] is aware.         Discharge Codes    No documentation.       Expected Discharge Date and Time     Expected Discharge Date Expected Discharge Time    Feb 9, 2020             JUAN PABLO Soto

## 2020-02-10 NOTE — PROGRESS NOTES
Continued Stay Note   Isaias     Patient Name: Wild Bravo  MRN: [id number]  Today's Date: 2/10/2020    Admit Date: 1/26/2020    Discharge Plan     Row Name 02/10/20 1518       Plan    Plan  Home with daughter and [employer]     Patient/Family in Agreement with Plan  yes    Final Note  Pt is actually being d/c'ed home today. Notified Tiara [phone] with [facility].        Discharge Codes    No documentation.       Expected Discharge Date and Time     Expected Discharge Date Expected Discharge Time    Feb 10, 2020             JUAN PABLO Soto

## 2020-02-10 NOTE — NURSING NOTE
Patient b/p 72/48 this morning - was sleeping & asymptomatic.  MD paged. While waiting for return call b/p 88/60 & 99/60. Spoke with Dr. Almeida & he wanted Cardiology to be aware and to take over admitting. Spoke with   VERONIQUE ROMEO with cardiology - do not want to take over care at this time. Stated baseline systolic b/p is in 90s. Will decrease entresto to 1 tablet BID. Do not give neurontin, norco, or bumex at this time. B/p improved throughout day. 105/67, 100/62, 113/58. Patient doing well. Ambulated fuentes and up to chair. Spoke with cardiology again - ok to d/c from their standpoint. Pt having difficulty voiding. Bladder scan 498cc. Per Dr. Almeida ok to place f/c and d/c with it in place. Family member is PA for his urologist and will f/u & monitor his alvarado.

## 2020-02-11 ENCOUNTER — READMISSION MANAGEMENT (OUTPATIENT)
Dept: CALL CENTER | Facility: HOSPITAL | Age: 85
End: 2020-02-11

## 2020-02-11 ENCOUNTER — TELEPHONE (OUTPATIENT)
Dept: INTERNAL MEDICINE | Age: 85
End: 2020-02-11

## 2020-02-11 NOTE — TELEPHONE ENCOUNTER
I would just try him again in a couple of days- he probably went to his daughters house -if that # available could try it

## 2020-02-11 NOTE — TELEPHONE ENCOUNTER
Jazmine 45 Transitions Initial Follow Up Call    Outreach made within 2 business days of discharge: Yes-  Second attempt to contact patient. Left voice message request for progress and to advise patient to contact office to schedule HFU. Patient: Bib Dueñas Patient : 1934   MRN: [id number]  Reason for Admission: Patient was admitted on 2020 due to small bowel obstruction. , acute on chronic congestive heart failure, chronic renal disease Stage IV.     Discharge Diagnosis: Small bowel obstruction, acute on chronic congestive heart failure, chronic renal disease stage IV      Discharge Date: 2020      Spoke with: no one. Discharge department/facility: [facility]    Follow Up  No future appointments.     Carmine Colindres LPN

## 2020-02-11 NOTE — TELEPHONE ENCOUNTER
Jazmine 45 Transitions Initial Follow Up Call    Outreach made within 2 business days of discharge: Yes-  Left voice message with request for progress report and to schedule HFU appointment. Patient: Michael Beverly Patient : 1934   MRN: [id number]  Reason for Admission: Patient was admitted on 2020 duet to small bowel obstruction. , acute on chronic congestive heart failure, chronic renal disease Stage IV. Discharge Diagnosis: Small bowel obstruction, acute on chronic congestive heart failure, chronic renal disease stage IV     Discharge Date: 2020      Spoke with: no one. Discharge department/facility: [address]    Follow Up  No future appointments.     Breanne Mondragon LPN

## 2020-02-11 NOTE — OUTREACH NOTE
Prep Survey      Responses   Facility patient discharged from?  [facility]   Is patient eligible?  Yes   Discharge diagnosis  lysis of adhesions,  CHF,  chronic renal disease   Does the patient have one of the following disease processes/diagnoses(primary or secondary)?  CHF   Does the patient have Home health ordered?  Yes   What is the Home health agency?   [facility]   Is there a DME ordered?  Yes   What DME was ordered?  shower chair   Comments regarding appointments  see AVS   Medication alerts for this patient  bumex, entresto   Prep survey completed?  Yes          Nuzhat Carranza RN

## 2020-02-12 ENCOUNTER — READMISSION MANAGEMENT (OUTPATIENT)
Dept: CALL CENTER | Facility: HOSPITAL | Age: 85
End: 2020-02-12

## 2020-02-12 ENCOUNTER — TELEPHONE (OUTPATIENT)
Dept: INTERNAL MEDICINE | Age: 85
End: 2020-02-12

## 2020-02-12 NOTE — TELEPHONE ENCOUNTER
Mehreen Mckeon daughter Trent Mcfarland called to schedule a hfu with [email or web address]. Please be advised that the best time to call him to accommodate their needs is Anytime. Thank you.

## 2020-02-12 NOTE — OUTREACH NOTE
CHF Week 1 Survey      Responses   Facility patient discharged from?  [facility]   Does the patient have one of the following disease processes/diagnoses(primary or secondary)?  CHF   Is there a successful TCM telephone encounter documented?  No   CHF Week 1 attempt successful?  Yes   Call start time  1241   Call end time  1248   Discharge diagnosis  lysis of adhesions,  CHF,  chronic renal disease   Is patient permission given to speak with other caregiver?  Yes   List who call center can speak with  dtr   Person spoke with today (if not patient) and relationship  dtr   Meds reviewed with patient/caregiver?  Yes   Is the patient having any side effects they believe may be caused by any medication additions or changes?  No   Does the patient have all medications ordered at discharge?  Yes   Is the patient taking all medications as directed (includes completed medication regime)?  Yes   Does the patient have a primary care provider?   Yes   Does the patient have an appointment with their PCP within 7 days of discharge?  Yes   Has the patient kept scheduled appointments due by today?  Yes   Has home health visited the patient within 72 hours of discharge?  Call prior to 72 hours   Has all DME been delivered?  No   DME comments  They did not get a shower chair. I educated on where to get one and to investigate a possible shower chair with transfer bench extension as pt has tub/shower combo at his home.    Psychosocial issues?  No   Comments  Pt still has alvarado cath in place, will make f/u appt to have DC. Son in law is PA at Urology office. Pt currently staying with dtr. doing slightly better but still not feeling great per dtr.    Did the patient receive a copy of their discharge instructions?  Yes   Nursing interventions  Reviewed instructions with patient   What is the patient's perception of their health status since discharge?  Improving   Is the patient weighing daily?  Yes   Does the patient have scales?  Yes    Daily weight interventions  Education provided on importance of daily weight   Is the patient able to teach back Heart Failure diet management?  Yes   Is the patient able to teach back Heart Failure Zones?  Yes    CHF Week 1 call completed?  Yes          Latrice Eastman RN

## 2020-02-13 ENCOUNTER — TELEPHONE (OUTPATIENT)
Dept: INTERNAL MEDICINE | Age: 85
End: 2020-02-13

## 2020-02-13 NOTE — TELEPHONE ENCOUNTER
Attempted to reach patient and or daughter. Still went to Voice mail. Left message we were contacting patient for progress report. Advised to contact office with any concerns and [facility]

## 2020-02-14 ENCOUNTER — LAB (OUTPATIENT)
Dept: LAB | Facility: HOSPITAL | Age: 85
End: 2020-02-14

## 2020-02-14 DIAGNOSIS — I50.22 CHRONIC SYSTOLIC CONGESTIVE HEART FAILURE (HCC): ICD-10-CM

## 2020-02-14 DIAGNOSIS — N18.4 STAGE 4 CHRONIC KIDNEY DISEASE (HCC): ICD-10-CM

## 2020-02-14 LAB
ANION GAP SERPL CALCULATED.3IONS-SCNC: 12 MMOL/L (ref 5–15)
BUN BLD-MCNC: 37 MG/DL (ref 8–23)
BUN/CREAT SERPL: 19.5 (ref 7–25)
CALCIUM SPEC-SCNC: 9.2 MG/DL (ref 8.6–10.5)
CHLORIDE SERPL-SCNC: 101 MMOL/L (ref 98–107)
CO2 SERPL-SCNC: 29 MMOL/L (ref 22–29)
CREAT BLD-MCNC: 1.9 MG/DL (ref 0.76–1.27)
GFR SERPL CREATININE-BSD FRML MDRD: 34 ML/MIN/1.73
GLUCOSE BLD-MCNC: 94 MG/DL (ref 65–99)
POTASSIUM BLD-SCNC: 5 MMOL/L (ref 3.5–5.2)
SODIUM BLD-SCNC: 142 MMOL/L (ref 136–145)

## 2020-02-14 PROCEDURE — 80048 BASIC METABOLIC PNL TOTAL CA: CPT | Performed by: NURSE PRACTITIONER

## 2020-02-14 PROCEDURE — 36415 COLL VENOUS BLD VENIPUNCTURE: CPT

## 2020-02-14 RX ORDER — HYDROCODONE BITARTRATE AND ACETAMINOPHEN 5; 325 MG/1; MG/1
TABLET ORAL
Qty: 90 TABLET | Refills: 0 | Status: SHIPPED | OUTPATIENT
Start: 2020-02-14 | End: 2020-03-16

## 2020-02-14 RX ORDER — LORAZEPAM 0.5 MG/1
TABLET ORAL
Qty: 120 TABLET | Refills: 0 | Status: SHIPPED | OUTPATIENT
Start: 2020-02-14 | End: 2020-03-16

## 2020-02-17 ENCOUNTER — OFFICE VISIT (OUTPATIENT)
Dept: CARDIOLOGY | Facility: CLINIC | Age: 85
End: 2020-02-17

## 2020-02-17 VITALS
HEIGHT: 73 IN | SYSTOLIC BLOOD PRESSURE: 104 MMHG | WEIGHT: 163 LBS | OXYGEN SATURATION: 96 % | HEART RATE: 91 BPM | BODY MASS INDEX: 21.6 KG/M2 | DIASTOLIC BLOOD PRESSURE: 54 MMHG

## 2020-02-17 DIAGNOSIS — I50.22 CHRONIC SYSTOLIC CONGESTIVE HEART FAILURE (HCC): Primary | ICD-10-CM

## 2020-02-17 PROCEDURE — 99214 OFFICE O/P EST MOD 30 MIN: CPT | Performed by: NURSE PRACTITIONER

## 2020-02-17 RX ORDER — BUMETANIDE 0.5 MG/1
0.5 TABLET ORAL 2 TIMES DAILY
Qty: 270 TABLET | Refills: 3 | Status: SHIPPED | OUTPATIENT
Start: 2020-02-17 | End: 2021-04-21

## 2020-02-17 RX ORDER — BUMETANIDE 0.5 MG/1
0.5 TABLET ORAL 2 TIMES DAILY
Qty: 180 TABLET | Refills: 3 | Status: SHIPPED | OUTPATIENT
Start: 2020-02-17 | End: 2020-02-17 | Stop reason: SDUPTHER

## 2020-02-17 NOTE — PROGRESS NOTES
Subjective:     Encounter Date:02/17/2020      Patient ID: Wild Bravo is a 85 y.o. male. He presents today for routine follow up of hospital discharge on 2/9/2020 for small bowel obstruction with subsequent lysis of adhesions.  He was treated for acute exacerbation of congestive heart failure during that admission.  He has a history of chronic systolic congestive heart failure, persistent atrial fibrillation not on anticoagulation due to history of GI bleed, sick sinus syndrome s/p BIV-AICD, coronary artery disease, carotid artery disease, stage IV chronic kidney disease, hyperlipidemia, hypertension and history of stroke.  He complains of weakness.  He reports chronic intermittent shortness of breath and mild bilateral lower extremity edema but states these are at baseline. He denies any chest pain, palpitations, dizziness, syncope, orthopnea or PND. He states that overall he has been doing well since hospital discharge.    Chief Complaint: Routine follow-up  Congestive Heart Failure   Presents for follow-up visit. Associated symptoms include edema. Pertinent negatives include no abdominal pain, chest pain, chest pressure, claudication, fatigue, muscle weakness, near-syncope, nocturia, orthopnea, palpitations, paroxysmal nocturnal dyspnea, shortness of breath or unexpected weight change. The symptoms have been stable. Compliance with total regimen is 51-75%. Compliance with diet is 51-75%. Compliance with exercise is 51-75%. Compliance with medications is %.   Atrial Fibrillation   Presents for follow-up visit. Symptoms are negative for an AICD problem, bradycardia, chest pain, dizziness, hemodynamic instability, hypertension, hypotension, pacemaker problem, palpitations, shortness of breath, syncope, tachycardia and weakness. The symptoms have been stable. Past medical history includes atrial fibrillation, CHF and hyperlipidemia.   Hypertension   This is a chronic problem. The current episode  started more than 1 year ago. The problem is controlled. Associated symptoms include malaise/fatigue and peripheral edema. Pertinent negatives include no anxiety, blurred vision, chest pain, headaches, neck pain, orthopnea, palpitations, PND, shortness of breath or sweats. Risk factors for coronary artery disease include dyslipidemia and male gender. Hypertensive end-organ damage includes kidney disease, CAD/MI, heart failure and PVD.   Hyperlipidemia   This is a chronic problem. The current episode started more than 1 year ago. The problem is controlled. Recent lipid tests were reviewed and are normal. Pertinent negatives include no chest pain, focal sensory loss, focal weakness, leg pain, myalgias or shortness of breath. Current antihyperlipidemic treatment includes statins. The current treatment provides significant improvement of lipids. There are no compliance problems.  Risk factors for coronary artery disease include dyslipidemia, hypertension and male sex.       The following portions of the patient's history were reviewed and updated as appropriate: allergies, current medications, past family history, past medical history, past social history, past surgical history and problem list.     Allergies   Allergen Reactions   • Keflex [Cephalexin]      CEPHALOSPORINS       Current Outpatient Medications:   •  aspirin 81 MG EC tablet, Take 81 mg by mouth Daily., Disp: , Rfl:   •  atorvastatin (LIPITOR) 10 MG tablet, Take 1 tablet by mouth Every Night., Disp: , Rfl:   •  bumetanide (BUMEX) 0.5 MG tablet, Take 1 tablet by mouth 2 (Two) Times a Day. Take an extra tablet daily as needed for increased shortness of breath, edema and/or weight gain, Disp: 270 tablet, Rfl: 3  •  dutasteride (AVODART) 0.5 MG capsule, Take 0.5 mg by mouth Daily. Patient takes at noon, Disp: , Rfl:   •  gabapentin (NEURONTIN) 300 MG capsule, Take 300 mg by mouth Daily., Disp: , Rfl:   •  HYDROcodone-acetaminophen (NORCO) 5-325 MG per tablet,  Take 1 tablet by mouth Every 6 (Six) Hours As Needed for Moderate Pain ., Disp: , Rfl:   •  LORazepam (ATIVAN) 0.5 MG tablet, Take 0.5 mg by mouth 3 (Three) Times a Day As Needed., Disp: , Rfl:   •  metoprolol succinate XL (TOPROL-XL) 25 MG 24 hr tablet, TAKE 1 TABLET BY MOUTH EVERY NIGHT., Disp: 30 tablet, Rfl: 11  •  nitroglycerin (NITROSTAT) 0.4 MG SL tablet, PLACE 1 TABLET UNDER THE TONGUE AS NEEDED FOR ANGINA, MAY REPEAT EVERY 5 MINS FOR UP THREE DOSES, Disp: 25 tablet, Rfl: 3  •  sacubitril-valsartan (ENTRESTO) 24-26 MG tablet, Take 1 tablet by mouth 2 (Two) Times a Day., Disp: 60 tablet, Rfl: 1  •  tamsulosin (FLOMAX) 0.4 MG capsule 24 hr capsule, Take 1 capsule by mouth Every Night., Disp: , Rfl:   Past Medical History:   Diagnosis Date   • Abdominal aortic aneurysm (CMS/HCC)    • Anxiety    • Atrial fibrillation (CMS/HCC)    • Biventricular ICD (implantable cardioverter-defibrillator) in place    • BPH (benign prostatic hypertrophy)    • Carotid artery stenosis    • CHF (congestive heart failure) (CMS/HCC)    • Chronic kidney disease     Chronic kidney disease, stage 4 (severe)   • Chronic systolic (congestive) heart failure (CMS/HCC)     limited echo 7/2016 EF was 40-45%. Patient has BiV AICD   • Colon obstruction (CMS/HCC)    • Coronary arteriosclerosis     MI x2   • Hearing loss    • Iron deficiency anemia    • Ischemic cardiomyopathy    • Mixed hyperlipidemia    • Myocardial infarction (CMS/HCC)    • Stroke (CMS/HCC)      Past Surgical History:   Procedure Laterality Date   • ABLATION OF DYSRHYTHMIC FOCUS  2008    MAZE at time of CABG/MVR   • CARDIAC ABLATION     • CARDIAC CATHETERIZATION     • CARDIAC DEFIBRILLATOR PLACEMENT     • CARDIAC PACEMAKER PLACEMENT     • CARDIOVERSION     • CAROTID ENDARTERECTOMY     • CAROTID ENDARTERECTOMY     • CORONARY ARTERY BYPASS GRAFT  2008    X 1   • CORONARY STENT PLACEMENT  2008    X 2   • LAPAROSCOPIC LYSIS OF ADHESIONS N/A 1/28/2020    Procedure: LAPAROSCOPIC  LYSIS OF ADHESIONS;  Surgeon: Kim Almeida MD;  Location: [facility] OR;  Service: General   • MITRAL VALVE REPAIR/REPLACEMENT  2008   • TOTAL KNEE ARTHROPLASTY       Family History   Problem Relation Age of Onset   • Stroke Mother    • Heart disease Mother    • Diabetes Father      Social History     Socioeconomic History   • Marital status:      Spouse name: Not on file   • Number of children: Not on file   • Years of education: Not on file   • Highest education level: Not on file   Tobacco Use   • Smoking status: Former Smoker     Years: 45.00     Types: Cigarettes   • Smokeless tobacco: Never Used   Substance and Sexual Activity   • Alcohol use: No   • Drug use: No   • Sexual activity: Defer           Review of Systems   Constitution: Positive for malaise/fatigue. Negative for chills, decreased appetite, fatigue, fever, night sweats, unexpected weight change, weight gain and weight loss.   HENT: Negative for nosebleeds.    Eyes: Negative for blurred vision and visual disturbance.   Cardiovascular: Positive for dyspnea on exertion and leg swelling. Negative for chest pain, claudication, near-syncope, orthopnea, palpitations, paroxysmal nocturnal dyspnea and syncope.   Respiratory: Negative for cough, hemoptysis, shortness of breath, snoring and wheezing.    Endocrine: Negative for cold intolerance and heat intolerance.   Hematologic/Lymphatic: Does not bruise/bleed easily.   Skin: Negative for rash.   Musculoskeletal: Negative for back pain, falls, muscle weakness, myalgias and neck pain.   Gastrointestinal: Negative for abdominal pain, change in bowel habit, constipation, diarrhea, dysphagia, heartburn, nausea and vomiting.   Genitourinary: Negative for hematuria and nocturia.   Neurological: Negative for dizziness, focal weakness, headaches, light-headedness and weakness.   Psychiatric/Behavioral: Negative for altered mental status.   Allergic/Immunologic: Negative for persistent infections.  "      Procedures      /54   Pulse 91   Ht 185.4 cm (73\")   Wt 73.9 kg (163 lb)   SpO2 96%   BMI 21.51 kg/m²     Objective:     Physical Exam   Constitutional: He is oriented to person, place, and time. Vital signs are normal. He appears well-developed and well-nourished. No distress.   HENT:   Head: Normocephalic and atraumatic.   Right Ear: External ear normal.   Left Ear: External ear normal.   Eyes: Pupils are equal, round, and reactive to light. Conjunctivae are normal. Right eye exhibits no discharge. Left eye exhibits no discharge.   Neck: Normal range of motion. Neck supple. No JVD present. Carotid bruit is not present. No thyromegaly present.   Cardiovascular: Normal rate, normal heart sounds and intact distal pulses. An irregular rhythm present. PMI is not displaced. Exam reveals no gallop, no friction rub and no decreased pulses.   No murmur heard.  Pulses:       Radial pulses are 2+ on the right side, and 2+ on the left side.        Dorsalis pedis pulses are 2+ on the right side, and 2+ on the left side.        Posterior tibial pulses are 2+ on the right side, and 2+ on the left side.   Pulmonary/Chest: Effort normal. No respiratory distress. He has decreased breath sounds in the right lower field and the left lower field. He has no wheezes. He has no rhonchi. He has no rales. He exhibits no tenderness.   Abdominal: Soft. Bowel sounds are normal. He exhibits no distension. There is no tenderness.   Musculoskeletal: Normal range of motion. He exhibits no edema.   Neurological: He is alert and oriented to person, place, and time.   Skin: Skin is warm and dry. No rash noted. He is not diaphoretic. No erythema. No pallor.   Psychiatric: He has a normal mood and affect. His behavior is normal. Judgment and thought content normal.   Vitals reviewed.      Lab Review:   Lab Results   Component Value Date    WBC 4.38 02/07/2020    HGB 10.4 (L) 02/07/2020    HCT 32.6 (L) 02/07/2020    MCV 89.8 02/07/2020 "     (L) 02/07/2020     Lab Results   Component Value Date    GLUCOSE 94 02/14/2020    BUN 37 (H) 02/14/2020    CREATININE 1.90 (H) 02/14/2020    EGFRIFNONA 34 (L) 02/14/2020    BCR 19.5 02/14/2020    K 5.0 02/14/2020    CO2 29.0 02/14/2020    CALCIUM 9.2 02/14/2020    ALBUMIN 3.60 02/06/2020    AST 6 02/06/2020    ALT 6 02/06/2020     Lab Results   Component Value Date    CHOL 126 (L) 09/09/2017    CHLPL 128 (L) 06/07/2019    CHLPL 133 (L) 10/24/2018     Lab Results   Component Value Date    TRIG 88 06/07/2019    TRIG 82 10/24/2018    TRIG 114 09/09/2017     Lab Results   Component Value Date    HDL 58 06/07/2019    HDL 57 10/24/2018    HDL 62 09/09/2017     Lab Results   Component Value Date    LDL 52 06/07/2019    LDL 60 10/24/2018    LDL 49 09/09/2017           Assessment:          Diagnosis Plan   1. Chronic systolic congestive heart failure  NYHA class II. Compensated.    2. Coronary arteriosclerosis  No clinical signs of ischemia.    3. Persistent atrial fibrillation  Rate controlled. No anticoagulation due to GI bleed.    4. History of GI bleed  Not on anticoagulation.    5. PAD (peripheral artery disease)     6. SSS (sick sinus syndrome)  S/p Bi-V AICD/pacemaker.    7. Biventricular ICD (implantable cardioverter-defibrillator) in place  Interrogated 1/18/20, 68% atrial paced, 75% right ventricular paced, 97% left ventricular paced, one episode of sustained ventricular tachycardia on 1/17/2020 that was treated successfully with 1 sequence of ATP.  Paroxysmal atrial fibrillation noted with longest duration of 16 hours.  Normal function.  Stable thresholds.  Adequate battery.   8. Essential hypertension  Well controlled.    9. H/O mitral valve repair     10. Bilateral carotid artery disease     11. Stage 4 chronic kidney disease  Follows with nephrology.    12. Mixed hyperlipidemia  Well controlled. On statin.    13. History of CVA (cerebrovascular accident)            Plan:       1. Continue medications  as previously prescribed.  2. Report any worsening symptoms.  3. Report any signs of bleeding.  4. Continue heart healthy diet and regular exercise as tolerated.   5. Follow up with PCP for blood pressure and cholesterol management and routine lab work.  6. Follow up with Dr. Enciso in one month, or sooner if needed.  BMP prior to next visit.  7. Reviewed signs and symptoms of CHF and what to report with the patient. Patient instructed to restrict sodium and weigh daily. Report weight gain of greater than 2 lbs overnight or 5 lbs in 1 week. Pt verbalized understanding of instructions and plan of care.

## 2020-02-18 ENCOUNTER — OFFICE VISIT (OUTPATIENT)
Dept: INTERNAL MEDICINE | Age: 85
End: 2020-02-18
Payer: MEDICARE

## 2020-02-18 VITALS
HEART RATE: 106 BPM | OXYGEN SATURATION: 95 % | HEIGHT: 72 IN | DIASTOLIC BLOOD PRESSURE: 60 MMHG | BODY MASS INDEX: 22.35 KG/M2 | WEIGHT: 165 LBS | SYSTOLIC BLOOD PRESSURE: 90 MMHG

## 2020-02-18 PROBLEM — I50.9 CHF (CONGESTIVE HEART FAILURE) (HCC): Status: ACTIVE | Noted: 2020-02-18

## 2020-02-18 PROCEDURE — 1111F DSCHRG MED/CURRENT MED MERGE: CPT | Performed by: INTERNAL MEDICINE

## 2020-02-18 PROCEDURE — 99495 TRANSJ CARE MGMT MOD F2F 14D: CPT | Performed by: INTERNAL MEDICINE

## 2020-02-18 NOTE — PROGRESS NOTES
Post-Discharge Transitional Care Management Services or Hospital Follow Up      Shauna Samson   YOB: 1934    Date of Office Visit:  2/18/2020  Date of Hospital Admission: 1/26/2020  Date of Hospital Discharge: 2/10/2020    Care management risk score Rising risk (score 2-5) and Complex Care (Scores >=6): 6     Non face to face  following discharge, date last encounter closed (first attempt may have been earlier): *No documented post hospital discharge outreach found in the last 14 days     Call initiated 2 business days of discharge: *No response recorded in the last 14 days    Patient Active Problem List   Diagnosis    Arm pain    Paroxysmal atrial fibrillation ([address]    Ventricular tachycardia ([address]    Coronary artery disease involving native heart with angina pectoris ([address]    Chest pain    Acute chest pain    Volume overload    Stage 4 chronic kidney disease ([address]    Anemia    Chronic pain    Benign prostatic hyperplasia with lower urinary tract symptoms    Restless legs    Idiopathic neuropathy    Insomnia    Anxiety    Chronic atrial fibrillation    Chronic systolic CHF (congestive heart failure) ([address]    Urinary retention    Complicated UTI (urinary tract infection)    Gross hematuria    Acute cystitis with hematuria    Ischemic cardiomyopathy    Atrial fibrillation with RVR ([address]    Bilateral hearing loss    Cholelithiasis    CHF (congestive heart failure) (HCC)       Allergies   Allergen Reactions    Keflex [Cephalexin] Rash     pruritius       Medications listed as ordered at the time of discharge from hospital  meds reconciled and reviewed     Medications marked \"taking\" at this time  Outpatient Medications Marked as Taking for the 2/18/20 encounter (Office Visit) with Zara Sawant MD   Medication Sig Dispense Refill    sacubitril-valsartan (ENTRESTO) 24-26 MG per tablet Take 1 tablet by mouth 2 times daily 60 tablet 5        Medications patient taking as of now reconciled against medications ordered at time of hospital discharge: yes    Chief Complaint   Patient presents with    Follow-Up from Hospital     due to small bowell obstruction       HPI patient is here today for high level TCM follow-up for recent admission to the hospital at [facility] from January 26, 2020 to February 10, 2020 patient was admitted with small bowel obstruction required emergency laparoscopic lysis of adhesions he then had an exacerbation of his heart failure complicated by his chronic renal disease patient was medically stabilized and discharged to home but still has some fluid overload compared to his baseline. Inpatient course: Discharge summary reviewed- see chart. Interval history/Current status: Patient is still more short of breath than usual but he is continuing to improve. No fevers or chills. His abdominal pain is better he is tired weak fatigued unsteady on his feet he does not want to use a walker or cane --his family and others keep encouraging him. He has not fallen. Vitals:    02/18/20 1607   BP: 90/60   Site: Left Upper Arm   Pulse: 106   SpO2: 95%   Weight: 165 lb (74.8 kg)   Height: 6' (1.829 m)     Body mass index is 22.38 kg/m². Wt Readings from Last 3 Encounters:   02/20/20 166 lb (75.3 kg)   02/18/20 165 lb (74.8 kg)   10/15/19 169 lb (76.7 kg)     BP Readings from Last 3 Encounters:   02/18/20 90/60   10/15/19 114/76   06/07/19 126/68       Review of Systems   Constitutional: Positive for fatigue. Negative for chills and fever. HENT: Negative for congestion and sinus pressure. Eyes: Negative for discharge and redness. Respiratory: Positive for shortness of breath. Negative for cough. Cardiovascular: Positive for leg swelling. Negative for chest pain and palpitations. Gastrointestinal: Negative for abdominal distention and abdominal pain. Genitourinary: Negative for dysuria.    Musculoskeletal: Positive for arthralgias, back pain and gait problem. Skin: Negative for rash and wound. Neurological: Negative for dizziness, light-headedness and headaches. Psychiatric/Behavioral: Positive for sleep disturbance. Negative for dysphoric mood. The patient is nervous/anxious. Physical Exam very pale weak appearing unsteady with his gait weaker than in the past sclera anicteric heart S1-S2 lungs with a little bit of coarse breath sound at the bases extremities with some pitting edema abdomen soft incisions well-healed. Assessment/Plan:  1. Small bowel obstruction (HCC)  Status post laparoscopic lysis of adhesions he is now doing well hopefully some of the upper abdominal pain he has been having will stay resolved. He seems to be slowly regaining his strength still a little fluid overloaded continue with diuresis follow closely continue with home health he is unsteady on his feet fragile weak we highly encouraged him to use a walker at all times. 2. Acute on chronic systolic congestive heart failure ([address]  Continue with gradual diuresis close follow-up and monitoring    3. Stage 4 chronic kidney disease ([address]  We will work with home health to monitor his labs and status    4.  Coronary artery disease involving native heart with angina pectoris, unspecified vessel or lesion type ([address]  Stable at current        Medical Decision Making: high

## 2020-02-19 ENCOUNTER — READMISSION MANAGEMENT (OUTPATIENT)
Dept: CALL CENTER | Facility: HOSPITAL | Age: 85
End: 2020-02-19

## 2020-02-19 NOTE — OUTREACH NOTE
CHF Week 2 Survey      Responses   Facility patient discharged from?  [place]   Does the patient have one of the following disease processes/diagnoses(primary or secondary)?  CHF   Week 2 attempt successful?  No   Unsuccessful attempts  Attempt 1          Latrice Eastman RN

## 2020-02-20 ENCOUNTER — OFFICE VISIT (OUTPATIENT)
Dept: UROLOGY | Age: 85
End: 2020-02-20
Payer: MEDICARE

## 2020-02-20 ENCOUNTER — READMISSION MANAGEMENT (OUTPATIENT)
Dept: CALL CENTER | Facility: HOSPITAL | Age: 85
End: 2020-02-20

## 2020-02-20 VITALS — BODY MASS INDEX: 22 KG/M2 | TEMPERATURE: 96.9 F | WEIGHT: 166 LBS | HEIGHT: 73 IN

## 2020-02-20 PROCEDURE — 1036F TOBACCO NON-USER: CPT | Performed by: PHYSICIAN ASSISTANT

## 2020-02-20 PROCEDURE — 1123F ACP DISCUSS/DSCN MKR DOCD: CPT | Performed by: PHYSICIAN ASSISTANT

## 2020-02-20 PROCEDURE — G8482 FLU IMMUNIZE ORDER/ADMIN: HCPCS | Performed by: PHYSICIAN ASSISTANT

## 2020-02-20 PROCEDURE — G8420 CALC BMI NORM PARAMETERS: HCPCS | Performed by: PHYSICIAN ASSISTANT

## 2020-02-20 PROCEDURE — G8427 DOCREV CUR MEDS BY ELIG CLIN: HCPCS | Performed by: PHYSICIAN ASSISTANT

## 2020-02-20 PROCEDURE — 4040F PNEUMOC VAC/ADMIN/RCVD: CPT | Performed by: PHYSICIAN ASSISTANT

## 2020-02-20 PROCEDURE — 99213 OFFICE O/P EST LOW 20 MIN: CPT | Performed by: PHYSICIAN ASSISTANT

## 2020-02-20 ASSESSMENT — ENCOUNTER SYMPTOMS
COUGH: 0
BACK PAIN: 0
DIARRHEA: 0
EYE DISCHARGE: 0
ABDOMINAL PAIN: 0
WHEEZING: 0
EYE REDNESS: 0
SHORTNESS OF BREATH: 0
CONSTIPATION: 0

## 2020-02-20 NOTE — PROGRESS NOTES
Bib Dueñas is a 80 y.o. male who presents today   Chief Complaint   Patient presents with    Follow-up     I am here for my voiding trial        Urinary Retention  Patient complains of urinary retention. Onset of retention was a few weeks ago and was sudden in onset. Patient currently does have a urinary catheter in place. unknown ml of urine were drained when catheter was placed. Prior to this event voiding symptoms consisted of slow stream, hesitancy, intermittency. Prior treatments include tamulosin and Avodart. Patient was recently in [facility] due to small bowel obstruction which required surgery and he was unable to void after surgery catheter placed in the past for retention mostly during hospitalizations. He is here for catheter removal voiding trial.    Past Medical History:   Diagnosis Date    AICD (automatic cardioverter/defibrillator) present     followed by doctor in [address] [place], [place] [id number] Arm pain 2/3/2012    Atrial fibrillation ([address]     Benign prostatic hyperplasia with lower urinary tract symptoms 11/16/2017    Benign prostatic hypertrophy     CAD (coronary artery disease)     Chronic kidney disease     Chronic pain 11/7/2017    Gallstones     Hyperlipidemia     Hypertension     Idiopathic neuropathy     Insomnia     Osteoarthritis of right knee     Restless legs     Right rotator cuff tear     Ventricular tachycardia ([address]        Past Surgical History:   Procedure Laterality Date    APPENDECTOMY      CARDIAC PACEMAKER PLACEMENT      Bi ventricular pacemaker. Hermelinda Zavala CARDIAC SURGERY      mitral valve, maze procedure, 1 vessel bypass -2008    CAROTID ENDARTERECTOMY      Right carotid endarterectomy.  COLONOSCOPY      CORONARY ARTERY BYPASS GRAFT      1 vessel 2008    MOUTH SURGERY      Oral implants.     PACEMAKER INSERTION      biventricular pacemaker ([employer])       Current Outpatient Medications   Medication Sig Dispense Refill    sacubitril-valsartan General: He is not in acute distress. Appearance: Normal appearance. HENT:      Head: Normocephalic. Nose: Nose normal.   Eyes:      Conjunctiva/sclera: Conjunctivae normal.   Pulmonary:      Effort: Pulmonary effort is normal.   Neurological:      Mental Status: He is alert and oriented to person, place, and time. Psychiatric:         Mood and Affect: Mood normal.         Behavior: Behavior normal.             DATA:  U/A:    Lab Results   Component Value Date    COLORU YELLOW 06/07/2019    PROTEINU TRACE 06/07/2019    PHUR 6.0 06/07/2019    WBCUA 11 06/07/2019    RBCUA 1 06/07/2019    RBCUA 2 11/27/2015    BACTERIA NEGATIVE 06/07/2019    CLARITYU Clear 06/07/2019    SPECGRAV 1.012 06/07/2019    LEUKOCYTESUR SMALL 06/07/2019    UROBILINOGEN 0.2 06/07/2019    BILIRUBINUR Negative 06/07/2019    BLOODU Negative 06/07/2019    GLUCOSEU Negative 06/07/2019          Imaging:  Bladder scan 39 mL    1. Retention of urine  After cath removal 300 cc of saline was inserted into his bladder he voided approximately 250 cc bladder scan revealed 39 mL residual.  Catheter out he was sent home with catheter supplies and instructions he has self cathed in the past if he does not void recommend he do in and out catheterization. 2. BPH with obstruction/lower urinary tract symptoms  He is on maximal medical therapy in the would not be a surgical candidate for TURP. Continue tamsulosin and Avodart as directed. No orders of the defined types were placed in this encounter. No orders of the defined types were placed in this encounter. Plan:  Follow-up PRN.

## 2020-02-20 NOTE — OUTREACH NOTE
CHF Week 2 Survey      Responses   Facility patient discharged from?  [place]   Does the patient have one of the following disease processes/diagnoses(primary or secondary)?  CHF   Week 2 attempt successful?  No   Unsuccessful attempts  Attempt 2          Kalee Perez RN

## 2020-02-20 NOTE — PROGRESS NOTES
Per the orders of Yady Manning PA-C, a voiding trial was completed. Sterile water was inserted via the catheter tubing. At 300ml, the patient voiced that he had the urge to void and the catheter was removed by deflating the catheter balloon with a 10cc syringe. The patient was able to void 250ml. Yady Manning stated that he was ok with the result and that we would not place another catheter. The patient will follow up as scheduled.

## 2020-02-24 ENCOUNTER — READMISSION MANAGEMENT (OUTPATIENT)
Dept: CALL CENTER | Facility: HOSPITAL | Age: 85
End: 2020-02-24

## 2020-02-24 NOTE — OUTREACH NOTE
CHF Week 3 Survey      Responses   Facility patient discharged from?  [place]   Does the patient have one of the following disease processes/diagnoses(primary or secondary)?  CHF   Week 3 attempt successful?  Yes   Call start time  1709   Rescheduled  Rescheduled-pt requested   Call end time  1710   General alerts for this patient  Pt ERNA, call dtr quang   Discharge diagnosis  lysis of adhesions,  CHF,  chronic renal disease          Latrice Eastman RN

## 2020-02-25 ENCOUNTER — READMISSION MANAGEMENT (OUTPATIENT)
Dept: CALL CENTER | Facility: HOSPITAL | Age: 85
End: 2020-02-25

## 2020-02-25 NOTE — OUTREACH NOTE
CHF Week 3 Survey      Responses   Facility patient discharged from?  [place]   Does the patient have one of the following disease processes/diagnoses(primary or secondary)?  CHF   Week 3 attempt successful?  No   Rescheduled  Revoked          Kalee Perez RN

## 2020-02-27 ASSESSMENT — ENCOUNTER SYMPTOMS
ABDOMINAL PAIN: 0
ABDOMINAL DISTENTION: 0
SINUS PRESSURE: 0
BACK PAIN: 1
COUGH: 0
EYE DISCHARGE: 0
EYE REDNESS: 0
SHORTNESS OF BREATH: 1

## 2020-02-28 ENCOUNTER — TELEPHONE (OUTPATIENT)
Dept: CARDIOLOGY | Facility: CLINIC | Age: 85
End: 2020-02-28

## 2020-02-28 NOTE — TELEPHONE ENCOUNTER
Patient daughter called in wanting some samples of Entresto 24/26 MG - they are waiting to hear back from Patient assistance foundation they applied for, and I advised I have 1 bottle that will last him 2 weeks, and daughter states she will come  on Tuesday.

## 2020-03-02 ENCOUNTER — TELEPHONE (OUTPATIENT)
Dept: CARDIOLOGY | Facility: CLINIC | Age: 85
End: 2020-03-02

## 2020-03-02 RX ORDER — ATORVASTATIN CALCIUM 10 MG/1
TABLET, FILM COATED ORAL
Qty: 90 TABLET | Refills: 2 | Status: SHIPPED | OUTPATIENT
Start: 2020-03-02 | End: 2020-12-09

## 2020-03-02 NOTE — TELEPHONE ENCOUNTER
"Patient's latitude monitor is reading that \"implanted device is not found\" and needs to be reset.  RN left a message for patient's daughter to return call.    "

## 2020-03-05 RX ORDER — SULFAMETHOXAZOLE AND TRIMETHOPRIM 400; 80 MG/1; MG/1
1 TABLET ORAL 2 TIMES DAILY
Qty: 20 TABLET | Refills: 0
Start: 2020-03-05 | End: 2020-03-15

## 2020-03-09 LAB
ORGANISM: ABNORMAL
ORGANISM: ABNORMAL
URINE CULTURE, ROUTINE: ABNORMAL
URINE CULTURE, ROUTINE: ABNORMAL

## 2020-03-09 RX ORDER — DOXYCYCLINE HYCLATE 100 MG
100 TABLET ORAL 2 TIMES DAILY
Qty: 14 TABLET | Refills: 0 | Status: SHIPPED | OUTPATIENT
Start: 2020-03-09 | End: 2020-03-09 | Stop reason: SDUPTHER

## 2020-03-09 RX ORDER — DOXYCYCLINE HYCLATE 100 MG
100 TABLET ORAL 2 TIMES DAILY
Qty: 14 TABLET | Refills: 0 | Status: SHIPPED | OUTPATIENT
Start: 2020-03-09 | End: 2020-03-16

## 2020-03-12 ENCOUNTER — OFFICE VISIT (OUTPATIENT)
Dept: UROLOGY | Age: 85
End: 2020-03-12
Payer: MEDICARE

## 2020-03-12 ENCOUNTER — TRANSCRIBE ORDERS (OUTPATIENT)
Dept: ADMINISTRATIVE | Facility: HOSPITAL | Age: 85
End: 2020-03-12

## 2020-03-12 ENCOUNTER — LAB (OUTPATIENT)
Dept: LAB | Facility: HOSPITAL | Age: 85
End: 2020-03-12

## 2020-03-12 VITALS — HEIGHT: 72 IN | BODY MASS INDEX: 21.81 KG/M2 | WEIGHT: 161 LBS | TEMPERATURE: 98.2 F

## 2020-03-12 DIAGNOSIS — N18.4 CHRONIC KIDNEY DISEASE, STAGE IV (SEVERE) (HCC): Primary | ICD-10-CM

## 2020-03-12 DIAGNOSIS — N18.4 CHRONIC KIDNEY DISEASE, STAGE IV (SEVERE) (HCC): ICD-10-CM

## 2020-03-12 PROBLEM — N13.8 BPH WITH URINARY OBSTRUCTION: Status: ACTIVE | Noted: 2017-11-16

## 2020-03-12 LAB
25(OH)D3 SERPL-MCNC: 45.3 NG/ML (ref 30–100)
ALBUMIN SERPL-MCNC: 4.3 G/DL (ref 3.5–5)
ALBUMIN/GLOB SERPL: 1.2 G/DL (ref 1.1–2.5)
ALP SERPL-CCNC: 98 U/L (ref 24–120)
ALT SERPL W P-5'-P-CCNC: 10 U/L (ref 0–50)
ANION GAP SERPL CALCULATED.3IONS-SCNC: 16 MMOL/L (ref 4–13)
AST SERPL-CCNC: 20 U/L (ref 7–45)
AUTO MIXED CELLS #: 0.4 10*3/MM3 (ref 0.1–2.6)
AUTO MIXED CELLS %: 6.7 % (ref 0.1–24)
BACTERIA URINE, POC: ABNORMAL
BILIRUB SERPL-MCNC: 0.8 MG/DL (ref 0.1–1)
BILIRUBIN URINE: 1 MG/DL
BLOOD, URINE: POSITIVE
BUN BLD-MCNC: 45 MG/DL (ref 5–21)
BUN/CREAT SERPL: 15.4
CALCIUM SPEC-SCNC: 9.1 MG/DL (ref 8.4–10.4)
CASTS URINE, POC: ABNORMAL
CHLORIDE SERPL-SCNC: 99 MMOL/L (ref 98–110)
CLARITY: ABNORMAL
CO2 SERPL-SCNC: 28 MMOL/L (ref 24–31)
COLOR: ABNORMAL
CREAT BLD-MCNC: 2.93 MG/DL (ref 0.5–1.4)
CRYSTALS URINE, POC: ABNORMAL
EPI CELLS URINE, POC: ABNORMAL
ERYTHROCYTE [DISTWIDTH] IN BLOOD BY AUTOMATED COUNT: 14.7 % (ref 12.3–15.4)
GFR SERPL CREATININE-BSD FRML MDRD: 21 ML/MIN/1.73
GLOBULIN UR ELPH-MCNC: 3.6 GM/DL
GLUCOSE BLD-MCNC: 113 MG/DL (ref 70–100)
GLUCOSE URINE: ABNORMAL
HCT VFR BLD AUTO: 39.2 % (ref 37.5–51)
HGB BLD-MCNC: 12.2 G/DL (ref 13–17.7)
KETONES, URINE: POSITIVE
LEUKOCYTE EST, POC: ABNORMAL
LYMPHOCYTES # BLD AUTO: 0.8 10*3/MM3 (ref 0.7–3.1)
LYMPHOCYTES NFR BLD AUTO: 11.6 % (ref 19.6–45.3)
MAGNESIUM SERPL-MCNC: 2.1 MG/DL (ref 1.6–2.4)
MCH RBC QN AUTO: 28.2 PG (ref 26.6–33)
MCHC RBC AUTO-ENTMCNC: 31.1 G/DL (ref 31.5–35.7)
MCV RBC AUTO: 90.5 FL (ref 79–97)
NEUTROPHILS # BLD AUTO: 5.4 10*3/MM3 (ref 1.7–7)
NEUTROPHILS NFR BLD AUTO: 81.7 % (ref 42.7–76)
NITRITE, URINE: NEGATIVE
PH UA: 5.5 (ref 4.5–8)
PHOSPHATE SERPL-MCNC: 4.6 MG/DL (ref 2.5–4.5)
PLATELET # BLD AUTO: 157 10*3/MM3 (ref 140–450)
PMV BLD AUTO: 11.5 FL (ref 6–12)
POTASSIUM BLD-SCNC: 4.2 MMOL/L (ref 3.5–5.3)
PROT SERPL-MCNC: 7.9 G/DL (ref 6.3–8.7)
PROTEIN UA: POSITIVE
PTH-INTACT SERPL-MCNC: 201 PG/ML (ref 15–65)
RBC # BLD AUTO: 4.33 10*6/MM3 (ref 4.14–5.8)
RBC URINE, POC: ABNORMAL
SODIUM BLD-SCNC: 143 MMOL/L (ref 135–145)
SPECIFIC GRAVITY UA: 1.02 (ref 1–1.03)
URATE SERPL-MCNC: 8.3 MG/DL (ref 3.5–8.5)
UROBILINOGEN, URINE: ABNORMAL
WBC NRBC COR # BLD: 6.6 10*3/MM3 (ref 3.4–10.8)
WBC URINE, POC: ABNORMAL
YEAST URINE, POC: ABNORMAL

## 2020-03-12 PROCEDURE — 84100 ASSAY OF PHOSPHORUS: CPT | Performed by: NURSE PRACTITIONER

## 2020-03-12 PROCEDURE — 4040F PNEUMOC VAC/ADMIN/RCVD: CPT | Performed by: PHYSICIAN ASSISTANT

## 2020-03-12 PROCEDURE — 83970 ASSAY OF PARATHORMONE: CPT | Performed by: NURSE PRACTITIONER

## 2020-03-12 PROCEDURE — 99214 OFFICE O/P EST MOD 30 MIN: CPT | Performed by: PHYSICIAN ASSISTANT

## 2020-03-12 PROCEDURE — 83735 ASSAY OF MAGNESIUM: CPT | Performed by: NURSE PRACTITIONER

## 2020-03-12 PROCEDURE — 51702 INSERT TEMP BLADDER CATH: CPT | Performed by: PHYSICIAN ASSISTANT

## 2020-03-12 PROCEDURE — 80053 COMPREHEN METABOLIC PANEL: CPT

## 2020-03-12 PROCEDURE — 84550 ASSAY OF BLOOD/URIC ACID: CPT

## 2020-03-12 PROCEDURE — G8427 DOCREV CUR MEDS BY ELIG CLIN: HCPCS | Performed by: PHYSICIAN ASSISTANT

## 2020-03-12 PROCEDURE — 85025 COMPLETE CBC W/AUTO DIFF WBC: CPT

## 2020-03-12 PROCEDURE — 1123F ACP DISCUSS/DSCN MKR DOCD: CPT | Performed by: PHYSICIAN ASSISTANT

## 2020-03-12 PROCEDURE — 81001 URINALYSIS AUTO W/SCOPE: CPT | Performed by: PHYSICIAN ASSISTANT

## 2020-03-12 PROCEDURE — 36415 COLL VENOUS BLD VENIPUNCTURE: CPT

## 2020-03-12 PROCEDURE — 1036F TOBACCO NON-USER: CPT | Performed by: PHYSICIAN ASSISTANT

## 2020-03-12 PROCEDURE — G8420 CALC BMI NORM PARAMETERS: HCPCS | Performed by: PHYSICIAN ASSISTANT

## 2020-03-12 PROCEDURE — 51798 US URINE CAPACITY MEASURE: CPT | Performed by: PHYSICIAN ASSISTANT

## 2020-03-12 PROCEDURE — G8482 FLU IMMUNIZE ORDER/ADMIN: HCPCS | Performed by: PHYSICIAN ASSISTANT

## 2020-03-12 PROCEDURE — 82306 VITAMIN D 25 HYDROXY: CPT | Performed by: NURSE PRACTITIONER

## 2020-03-12 ASSESSMENT — ENCOUNTER SYMPTOMS
COUGH: 0
EYE DISCHARGE: 0
WHEEZING: 0
SHORTNESS OF BREATH: 0
ABDOMINAL PAIN: 0
BACK PAIN: 0
DIARRHEA: 0
CONSTIPATION: 0
EYE REDNESS: 0

## 2020-03-12 NOTE — PROGRESS NOTES
Ana Maria Alamo is a 80 y.o. male who presents today   Chief Complaint   Patient presents with    Follow-up     I am here for urinary retention      HPI:  Patient is an 80 old gentleman with complaints of gross hematuria and urinary retention. Patient was in [facility] AT [place] related to a small bowel obstruction which required surgery by Dr. Lissy Edouard he was unable to void after surgery and went home with a catheter. His catheter. After catheter was removed he had some difficulties but he started to void on his own without difficulties and urine was clear. More recently he developed some burning and frequency I sent a urine sample which grew staph I placed him on doxycycline this was most likely contaminant from the original catheter. He continue to go small frequent amount and was starting to have some discomfort so he had to start doing intermittent catheterization he is using a 14 [employer] prelubricated catheter. Initially was having no difficulties he was draining clear urine however the last few days he has had some traces of blood and then some clots today he had a clot and some more blood and he attempted 3 times to place the catheter without any success.   Patient came in today for evaluation    Past Medical History:   Diagnosis Date    AICD (automatic cardioverter/defibrillator) present     followed by doctor in [address] [place], [place] [id number] Arm pain 2/3/2012    Atrial fibrillation ([address]     Benign prostatic hyperplasia with lower urinary tract symptoms 11/16/2017    Benign prostatic hypertrophy     CAD (coronary artery disease)     Chronic kidney disease     Chronic pain 11/7/2017    Gallstones     Hyperlipidemia     Hypertension     Idiopathic neuropathy     Insomnia     Osteoarthritis of right knee     Restless legs     Right rotator cuff tear     Ventricular tachycardia ([address]        Past Surgical History:   Procedure Laterality Date    APPENDECTOMY      CARDIAC PACEMAKER PLACEMENT      Bi Allergies   Allergen Reactions    Keflex [Cephalexin] Rash     pruritius       Social History     Socioeconomic History    Marital status:      Spouse name: None    Number of children: None    Years of education: None    Highest education level: None   Occupational History    None   Social Needs    Financial resource strain: None    Food insecurity     Worry: None     Inability: None    Transportation needs     Medical: None     Non-medical: None   Tobacco Use    Smoking status: Former Smoker    Smokeless tobacco: Never Used   Substance and Sexual Activity    Alcohol use: No    Drug use: No    Sexual activity: Never     Comment: Marital status - . Lifestyle    Physical activity     Days per week: None     Minutes per session: None    Stress: None   Relationships    Social connections     Talks on phone: None     Gets together: None     Attends Mandaen service: None     Active member of club or organization: None     Attends meetings of clubs or organizations: None     Relationship status: None    Intimate partner violence     Fear of current or ex partner: None     Emotionally abused: None     Physically abused: None     Forced sexual activity: None   Other Topics Concern    None   Social History Narrative    None       Family History   Problem Relation Age of Onset    Stroke Mother     Heart Attack Mother 80    Diabetes Father     COPD Sister     Arthritis Sister     Stroke Brother     Heart Disease Brother        REVIEW OF SYSTEMS:  Review of Systems   Constitutional: Negative for chills and fever. HENT: Negative for congestion and hearing loss. Eyes: Negative for discharge and redness. Respiratory: Negative for cough, shortness of breath and wheezing. Cardiovascular: Negative for leg swelling. Gastrointestinal: Negative for abdominal pain, constipation and diarrhea. Endocrine: Negative for cold intolerance and heat intolerance.    Genitourinary:

## 2020-03-13 ENCOUNTER — NURSE ONLY (OUTPATIENT)
Dept: UROLOGY | Age: 85
End: 2020-03-13

## 2020-03-13 VITALS — TEMPERATURE: 97.3 F | BODY MASS INDEX: 21.84 KG/M2 | WEIGHT: 161 LBS

## 2020-03-13 ASSESSMENT — ENCOUNTER SYMPTOMS
SINUS PAIN: 0
ABDOMINAL PAIN: 0
EYE PAIN: 0
BACK PAIN: 0
SHORTNESS OF BREATH: 0
VOMITING: 0
WHEEZING: 0

## 2020-03-13 NOTE — PROGRESS NOTES
Sunita Webster is a 80 y.o. male who presents today   Chief Complaint   Patient presents with    Follow-up     I am here today unable to get cathto drain      HPI:  Patient is an 80 old gentleman with complaints of gross hematuria and urinary retention. Patient was in [facility] AT [place] related to a small bowel obstruction which required surgery by Dr. Abdi Kurtz he was unable to void after surgery and went home with a catheter. His catheter. After catheter was removed he had some difficulties but he started to void on his own without difficulties and urine was clear. More recently he developed some burning and frequency I sent a urine sample which grew staph I placed him on doxycycline this was most likely contaminant from the original catheter. He continue to go small frequent amount and was starting to have some discomfort so he had to start doing intermittent catheterization he is using a 14 [employer]ov[employer] prelubricated catheter. Initially was having no difficulties he was draining clear urine however the last few days he has had some traces of blood and then some clots today he had a clot and some more blood and he attempted 3 times to place the catheter without any success. Patient was hand irrigated yesterday and catheter appear to be draining when he was sent home also the urine lightened to a light pink-tinged however during the night and this morning there was noted to be no catheter drainage and the catheter bag was empty patient has had some increasing discomfort he is here to check for clot or blockage of the catheter.       Past Medical History:   Diagnosis Date    AICD (automatic cardioverter/defibrillator) present     followed by doctor in [address] [place] [address] Str Arm pain 2/3/2012    Atrial fibrillation [address]     Benign prostatic hyperplasia with lower urinary tract symptoms 11/16/2017    Benign prostatic hypertrophy     CAD (coronary artery disease)     Chronic kidney disease     Chronic pain 11/7/2017    Gallstones     Hyperlipidemia     Hypertension     Idiopathic neuropathy     Insomnia     Osteoarthritis of right knee     Restless legs     Right rotator cuff tear     Ventricular tachycardia ([address]        Past Surgical History:   Procedure Laterality Date    APPENDECTOMY      CARDIAC PACEMAKER PLACEMENT      Bi ventricular pacemaker. Romero Rule CARDIAC SURGERY      mitral valve, maze procedure, 1 vessel bypass -2008    CAROTID ENDARTERECTOMY      Right carotid endarterectomy.  COLONOSCOPY      CORONARY ARTERY BYPASS GRAFT      1 vessel 2008    MOUTH SURGERY      Oral implants.  PACEMAKER INSERTION      biventricular pacemaker ([employer])       Current Outpatient Medications   Medication Sig Dispense Refill    doxycycline hyclate (VIBRA-TABS) 100 MG tablet Take 1 tablet by mouth 2 times daily for 7 days 14 tablet 0    sulfamethoxazole-trimethoprim (BACTRIM) 400-80 MG per tablet Take 1 tablet by mouth 2 times daily for 10 days 20 tablet 0    atorvastatin (LIPITOR) 10 MG tablet TAKE ONE TABLET BY MOUTH EVERY DAY 90 tablet 2    sacubitril-valsartan (ENTRESTO) 24-26 MG per tablet Take 1 tablet by mouth 2 times daily 60 tablet 5    HYDROcodone-acetaminophen (NORCO) 5-325 MG per tablet TAKE ONE TABLET BY MOUTH THREE TIMES A DAY **MAY MAKE DROWSY** 90 tablet 0    LORazepam (ATIVAN) 0.5 MG tablet TAKE ONE TABLET BY MOUTH FOUR TIMES A DAY **MAY MAKE DROWSY**. 120 tablet 0    gabapentin (NEURONTIN) 300 MG capsule TAKE 1 CAPSULE BY MOUTH AT 5PM AND TAKE 2 CAPSULES BY MOUTH EVERY BEDTIME.  90 capsule 2    tamsulosin (FLOMAX) 0.4 MG capsule TAKE ONE CAPSULE BY MOUTH EVERY DAY 90 capsule 3    dutasteride (AVODART) 0.5 MG capsule TAKE 1 CAPSULE BY MOUTH DAILY 90 capsule 1    furosemide (LASIX) 40 MG tablet Take 1 tablet by mouth daily as needed (for fluid) 60 tablet 11    metoprolol succinate (TOPROL XL) 25 MG extended release tablet Take 25 mg by mouth daily      bumetanide (BUMEX) 0.5 developed some hematuria and yesterday he was unable to self cath he came to the office yesterday and three-way Kovacs catheter placed and it was hand irrigated to light red color and he was sent home. Overnight this morning he noticed that there was no urine in the catheter bag and he was having some discomfort in the suprapubic area. Came today and was found to have over 429 mL in his bladder numerous hand irrigations were done the urine had cleared very nicely by the end of the irrigation. Dr. Antwan Mccord then hand irrigated himself there were some small clots that were irrigated out there was good movement of irrigation from and into the bladder. Catheter appears to be draining. Could have been from clot. Dr. Antwan Mccord discussed that at the soonest opportunity should try to remove this three-way Kovacs catheter and have him go back to resuming in and out catheterization if he is not able to void. Also Dr. Antwan Mccord discussed the Rezum procedure with him also as an option for him due to the fact he is not a candidate for elective general anesthesia and/or surgery. 2. Gross hematuria  Had cleared to do slight pink-tinged after hand irrigating. We will need to monitor over the weekend. Will hand irrigate as needed if catheter stops draining. Urine culture preliminary reveals no growth at this time. No orders of the defined types were placed in this encounter. No orders of the defined types were placed in this encounter. Plan:  Attempt to remove catheter in a few days if urine remains clear urine draining.

## 2020-03-14 LAB
ORGANISM: ABNORMAL
URINE CULTURE, ROUTINE: ABNORMAL
URINE CULTURE, ROUTINE: ABNORMAL

## 2020-03-16 RX ORDER — LORAZEPAM 0.5 MG/1
TABLET ORAL
Qty: 120 TABLET | Refills: 0 | Status: SHIPPED | OUTPATIENT
Start: 2020-03-16 | End: 2020-04-20

## 2020-03-16 RX ORDER — HYDROCODONE BITARTRATE AND ACETAMINOPHEN 5; 325 MG/1; MG/1
TABLET ORAL
Qty: 90 TABLET | Refills: 0 | Status: SHIPPED | OUTPATIENT
Start: 2020-03-16 | End: 2020-04-13

## 2020-03-21 LAB
ALBUMIN SERPL-MCNC: 3.7 G/DL (ref 3.5–5.2)
ALP BLD-CCNC: 82 U/L (ref 40–130)
ALT SERPL-CCNC: 5 U/L (ref 5–41)
ANION GAP SERPL CALCULATED.3IONS-SCNC: 13 MMOL/L (ref 7–19)
AST SERPL-CCNC: 9 U/L (ref 5–40)
BACTERIA: NEGATIVE /HPF
BASOPHILS ABSOLUTE: 0 K/UL (ref 0–0.2)
BASOPHILS RELATIVE PERCENT: 0.8 % (ref 0–1)
BILIRUB SERPL-MCNC: 0.4 MG/DL (ref 0.2–1.2)
BILIRUBIN URINE: NEGATIVE
BLOOD, URINE: NEGATIVE
BUN BLDV-MCNC: 47 MG/DL (ref 8–23)
CALCIUM SERPL-MCNC: 8.6 MG/DL (ref 8.8–10.2)
CHLORIDE BLD-SCNC: 100 MMOL/L (ref 98–111)
CLARITY: ABNORMAL
CO2: 28 MMOL/L (ref 22–29)
COLOR: YELLOW
CREAT SERPL-MCNC: 2.5 MG/DL (ref 0.5–1.2)
EOSINOPHILS ABSOLUTE: 0.4 K/UL (ref 0–0.6)
EOSINOPHILS RELATIVE PERCENT: 7.2 % (ref 0–5)
EPITHELIAL CELLS, UA: 3 /HPF (ref 0–5)
GFR NON-AFRICAN AMERICAN: 25
GLUCOSE BLD-MCNC: 90 MG/DL (ref 74–109)
GLUCOSE URINE: NEGATIVE MG/DL
HCT VFR BLD CALC: 37 % (ref 42–52)
HEMOGLOBIN: 10.9 G/DL (ref 14–18)
HYALINE CASTS: 6 /HPF (ref 0–8)
IMMATURE GRANULOCYTES #: 0 K/UL
KETONES, URINE: NEGATIVE MG/DL
LEUKOCYTE ESTERASE, URINE: ABNORMAL
LYMPHOCYTES ABSOLUTE: 0.8 K/UL (ref 1.1–4.5)
LYMPHOCYTES RELATIVE PERCENT: 15.2 % (ref 20–40)
MCH RBC QN AUTO: 28.4 PG (ref 27–31)
MCHC RBC AUTO-ENTMCNC: 29.5 G/DL (ref 33–37)
MCV RBC AUTO: 96.4 FL (ref 80–94)
MONOCYTES ABSOLUTE: 0.4 K/UL (ref 0–0.9)
MONOCYTES RELATIVE PERCENT: 8.4 % (ref 0–10)
NEUTROPHILS ABSOLUTE: 3.4 K/UL (ref 1.5–7.5)
NEUTROPHILS RELATIVE PERCENT: 68 % (ref 50–65)
NITRITE, URINE: NEGATIVE
PDW BLD-RTO: 14.7 % (ref 11.5–14.5)
PH UA: 5.5 (ref 5–8)
PLATELET # BLD: 142 K/UL (ref 130–400)
PMV BLD AUTO: 12.5 FL (ref 9.4–12.4)
POTASSIUM SERPL-SCNC: 4.2 MMOL/L (ref 3.5–5)
PROTEIN UA: 30 MG/DL
RBC # BLD: 3.84 M/UL (ref 4.7–6.1)
RBC UA: 3 /HPF (ref 0–4)
SODIUM BLD-SCNC: 141 MMOL/L (ref 136–145)
SPECIFIC GRAVITY UA: 1.02 (ref 1–1.03)
TOTAL PROTEIN: 6.5 G/DL (ref 6.6–8.7)
UROBILINOGEN, URINE: 0.2 E.U./DL
WBC # BLD: 5 K/UL (ref 4.8–10.8)
WBC UA: 35 /HPF (ref 0–5)

## 2020-03-23 LAB — URINE CULTURE, ROUTINE: NORMAL

## 2020-03-25 ENCOUNTER — TELEPHONE (OUTPATIENT)
Dept: INTERNAL MEDICINE CLINIC | Age: 85
End: 2020-03-25

## 2020-03-25 NOTE — TELEPHONE ENCOUNTER
Patient's daughter, Munira, called and stated that patient was in the hospital for approximately two weeks and has been living with her since.  His monitor has been at his home during this time.  Patient's daughter plans to retrieve monitor tonight and set it up at her house where patient is sleeping.  RN instructed daughter on how to send a remote transmission after the monitor is plugged in.  RN instructed daughter to do that ASAP as we are closely monitoring the battery of patient's device.  Understanding verbalized.  RN will call patient's daughter with an update on battery status when transmission has been received.

## 2020-04-07 RX ORDER — GABAPENTIN 300 MG/1
CAPSULE ORAL
Qty: 90 CAPSULE | Refills: 2 | Status: SHIPPED | OUTPATIENT
Start: 2020-04-07 | End: 2020-07-06

## 2020-04-13 RX ORDER — HYDROCODONE BITARTRATE AND ACETAMINOPHEN 5; 325 MG/1; MG/1
TABLET ORAL
Qty: 90 TABLET | Refills: 0 | Status: SHIPPED | OUTPATIENT
Start: 2020-04-13 | End: 2020-05-12

## 2020-04-13 RX ORDER — DUTASTERIDE 0.5 MG/1
0.5 CAPSULE, LIQUID FILLED ORAL DAILY
Qty: 90 CAPSULE | Refills: 1 | Status: SHIPPED | OUTPATIENT
Start: 2020-04-13 | End: 2020-10-07

## 2020-04-14 ENCOUNTER — TELEPHONE (OUTPATIENT)
Dept: CARDIOLOGY | Facility: CLINIC | Age: 85
End: 2020-04-14

## 2020-04-14 ENCOUNTER — CLINICAL SUPPORT (OUTPATIENT)
Dept: CARDIOLOGY | Facility: CLINIC | Age: 85
End: 2020-04-14

## 2020-04-14 DIAGNOSIS — Z95.810 BIVENTRICULAR ICD (IMPLANTABLE CARDIOVERTER-DEFIBRILLATOR) IN PLACE: Primary | ICD-10-CM

## 2020-04-14 DIAGNOSIS — I50.22 CHRONIC SYSTOLIC CONGESTIVE HEART FAILURE (HCC): ICD-10-CM

## 2020-04-14 DIAGNOSIS — I49.5 SSS (SICK SINUS SYNDROME) (HCC): ICD-10-CM

## 2020-04-14 PROCEDURE — 93296 REM INTERROG EVL PM/IDS: CPT | Performed by: INTERNAL MEDICINE

## 2020-04-14 PROCEDURE — 93295 DEV INTERROG REMOTE 1/2/MLT: CPT | Performed by: INTERNAL MEDICINE

## 2020-04-15 LAB
ALBUMIN SERPL-MCNC: 3.9 G/DL (ref 3.5–5.2)
ALP BLD-CCNC: 96 U/L (ref 40–130)
ALT SERPL-CCNC: 6 U/L (ref 5–41)
ANION GAP SERPL CALCULATED.3IONS-SCNC: 11 MMOL/L (ref 7–19)
AST SERPL-CCNC: 10 U/L (ref 5–40)
BASOPHILS ABSOLUTE: 0 K/UL (ref 0–0.2)
BASOPHILS RELATIVE PERCENT: 0.8 % (ref 0–1)
BILIRUB SERPL-MCNC: 0.8 MG/DL (ref 0.2–1.2)
BUN BLDV-MCNC: 43 MG/DL (ref 8–23)
CALCIUM SERPL-MCNC: 9.2 MG/DL (ref 8.8–10.2)
CHLORIDE BLD-SCNC: 101 MMOL/L (ref 98–111)
CO2: 31 MMOL/L (ref 22–29)
CREAT SERPL-MCNC: 2.3 MG/DL (ref 0.5–1.2)
EOSINOPHILS ABSOLUTE: 0.5 K/UL (ref 0–0.6)
EOSINOPHILS RELATIVE PERCENT: 10.3 % (ref 0–5)
GFR NON-AFRICAN AMERICAN: 27
GLUCOSE BLD-MCNC: 153 MG/DL (ref 74–109)
HCT VFR BLD CALC: 37.9 % (ref 42–52)
HEMOGLOBIN: 11.7 G/DL (ref 14–18)
IMMATURE GRANULOCYTES #: 0 K/UL
LYMPHOCYTES ABSOLUTE: 1.1 K/UL (ref 1.1–4.5)
LYMPHOCYTES RELATIVE PERCENT: 21.5 % (ref 20–40)
MCH RBC QN AUTO: 28.5 PG (ref 27–31)
MCHC RBC AUTO-ENTMCNC: 30.9 G/DL (ref 33–37)
MCV RBC AUTO: 92.4 FL (ref 80–94)
MONOCYTES ABSOLUTE: 0.4 K/UL (ref 0–0.9)
MONOCYTES RELATIVE PERCENT: 7 % (ref 0–10)
NEUTROPHILS ABSOLUTE: 3 K/UL (ref 1.5–7.5)
NEUTROPHILS RELATIVE PERCENT: 60.2 % (ref 50–65)
PDW BLD-RTO: 14.7 % (ref 11.5–14.5)
PLATELET # BLD: 117 K/UL (ref 130–400)
PMV BLD AUTO: 12.7 FL (ref 9.4–12.4)
POTASSIUM SERPL-SCNC: 4.2 MMOL/L (ref 3.5–5)
RBC # BLD: 4.1 M/UL (ref 4.7–6.1)
SODIUM BLD-SCNC: 143 MMOL/L (ref 136–145)
TOTAL PROTEIN: 6.7 G/DL (ref 6.6–8.7)
WBC # BLD: 5 K/UL (ref 4.8–10.8)

## 2020-04-16 NOTE — PROGRESS NOTES
Bi-V AICD Evaluation Report  REMOTE/LATITUDE    April 14, 2020    Primary Cardiologist: Zaria  : Guidant Model: Cognis 100-D N119  Implant date: 7/8/2010    Reason for evaluation: battery check  Indication for AICD: sick sinus syndrome and chronic systolic congestive heart failure    Measurements  Atrial sensing:  P wave: 0.4 mV  Atrial threshold: N/P  Atrial lead impedance: 567 ohms  Ventricular sensing:  R wave: 9.4 mV  RV Threshold:  N/P  RV Impedance:  358 ohms  Shock impedance:  RV 44 ohms  LV Threshold:  N/P  LV Impedance:  784 ohms      Diagnostic Data  Atrial paced: 0 %  Ventricular paced: RV 85%, LV 95%    Episodes recorded since 1/20/2020:  AF burden 100% since mid-January 2020, appears to be atrial flutter.   5 ventricular high rate episodes treated with one sequence of ATP each:  Longest duration 20 seconds, rates 186-240 bpm.  Appear regular in nature: Probable dual arrhythmia (AFl with VT) instead of AF with RVR.   No shocks.     Battery status: elective replacement time   Explant indicator reached on Apr 10, 2020.  Schedule replacement of this device.      Final Parameters  Mode: DDDR     Lower rate: 70 bpm   Upper rate: 120 bpm  AV Delay: 170-180 ms paced    115-120 ms sensed   Atrial - Amplitude: 2.2 V   Pulse width: 0.5 ms   Sensitivity: 0.15 mV   Ventricular:  RV Amplitude: 2.2 V @ 0.5 ms   Sensitivity: 0.6 mV            LV Amplitude:  1.5V @ 0.5ms  Changes made: No changes made.  Conclusions: normal AICD function, stable sensing thresholds, battery at elective replacement voltage and increased a-fib since last evaluation    Follow up: 6 weeks post generator change

## 2020-04-17 ENCOUNTER — DOCUMENTATION (OUTPATIENT)
Dept: CARDIOLOGY | Facility: CLINIC | Age: 85
End: 2020-04-17

## 2020-04-17 NOTE — PROGRESS NOTES
Attempted to call pt to schedule a video/phone visit, as pt's appointment on 3/26/20 was cancelled due to Covid-10 pandemic. No answer. If pt returns call, okay to schedule for video/phone visit.

## 2020-04-20 RX ORDER — LORAZEPAM 0.5 MG/1
TABLET ORAL
Qty: 120 TABLET | Refills: 0 | Status: SHIPPED | OUTPATIENT
Start: 2020-04-20 | End: 2020-05-19

## 2020-05-01 LAB
ALBUMIN SERPL-MCNC: 4 G/DL (ref 3.5–5.2)
ALP BLD-CCNC: 101 U/L (ref 40–130)
ALT SERPL-CCNC: 6 U/L (ref 5–41)
ANION GAP SERPL CALCULATED.3IONS-SCNC: 10 MMOL/L (ref 7–19)
AST SERPL-CCNC: 9 U/L (ref 5–40)
BASOPHILS ABSOLUTE: 0.1 K/UL (ref 0–0.2)
BASOPHILS RELATIVE PERCENT: 0.9 % (ref 0–1)
BILIRUB SERPL-MCNC: 0.9 MG/DL (ref 0.2–1.2)
BUN BLDV-MCNC: 36 MG/DL (ref 8–23)
CALCIUM SERPL-MCNC: 9.4 MG/DL (ref 8.8–10.2)
CHLORIDE BLD-SCNC: 100 MMOL/L (ref 98–111)
CO2: 30 MMOL/L (ref 22–29)
CREAT SERPL-MCNC: 2.3 MG/DL (ref 0.5–1.2)
EOSINOPHILS ABSOLUTE: 0.4 K/UL (ref 0–0.6)
EOSINOPHILS RELATIVE PERCENT: 6.6 % (ref 0–5)
GFR NON-AFRICAN AMERICAN: 27
GLUCOSE BLD-MCNC: 106 MG/DL (ref 74–109)
HBA1C MFR BLD: 5.9 % (ref 4–6)
HCT VFR BLD CALC: 39.5 % (ref 42–52)
HEMOGLOBIN: 12 G/DL (ref 14–18)
IMMATURE GRANULOCYTES #: 0 K/UL
LYMPHOCYTES ABSOLUTE: 1 K/UL (ref 1.1–4.5)
LYMPHOCYTES RELATIVE PERCENT: 17.9 % (ref 20–40)
MCH RBC QN AUTO: 28.5 PG (ref 27–31)
MCHC RBC AUTO-ENTMCNC: 30.4 G/DL (ref 33–37)
MCV RBC AUTO: 93.8 FL (ref 80–94)
MONOCYTES ABSOLUTE: 0.4 K/UL (ref 0–0.9)
MONOCYTES RELATIVE PERCENT: 7.5 % (ref 0–10)
NEUTROPHILS ABSOLUTE: 3.7 K/UL (ref 1.5–7.5)
NEUTROPHILS RELATIVE PERCENT: 66.9 % (ref 50–65)
PDW BLD-RTO: 14.9 % (ref 11.5–14.5)
PLATELET # BLD: 145 K/UL (ref 130–400)
PMV BLD AUTO: 12.4 FL (ref 9.4–12.4)
POTASSIUM SERPL-SCNC: 4.8 MMOL/L (ref 3.5–5)
RBC # BLD: 4.21 M/UL (ref 4.7–6.1)
SODIUM BLD-SCNC: 140 MMOL/L (ref 136–145)
TOTAL PROTEIN: 6.5 G/DL (ref 6.6–8.7)
WBC # BLD: 5.6 K/UL (ref 4.8–10.8)

## 2020-05-04 ENCOUNTER — DOCUMENTATION (OUTPATIENT)
Dept: CARDIOLOGY | Facility: CLINIC | Age: 85
End: 2020-05-04

## 2020-05-04 DIAGNOSIS — I50.22 CHRONIC SYSTOLIC CONGESTIVE HEART FAILURE (HCC): ICD-10-CM

## 2020-05-04 DIAGNOSIS — Z95.810 BIVENTRICULAR ICD (IMPLANTABLE CARDIOVERTER-DEFIBRILLATOR) IN PLACE: Primary | ICD-10-CM

## 2020-05-04 RX ORDER — BUMETANIDE 0.5 MG/1
TABLET ORAL
Qty: 60 TABLET | Refills: 0 | OUTPATIENT
Start: 2020-05-04

## 2020-05-12 RX ORDER — HYDROCODONE BITARTRATE AND ACETAMINOPHEN 5; 325 MG/1; MG/1
TABLET ORAL
Qty: 90 TABLET | Refills: 0 | Status: SHIPPED | OUTPATIENT
Start: 2020-05-12 | End: 2020-06-11 | Stop reason: SDUPTHER

## 2020-05-12 NOTE — PROGRESS NOTES
Bi-V AICD Evaluation Report  REMOTE/LATITUDE    May 4, 2020    Primary Cardiologist: Zaria  : Guidant Model: Cognis 100-D N119  Implant date: 7/8/2010    Reason for evaluation: ATP treatment  Indication for AICD: sick sinus syndrome and chronic systolic congestive heart failure    Measurements  Atrial sensing:  P wave: 0.7 mV  Atrial threshold: N/P - in AF  Atrial lead impedance: 570 ohms  Ventricular sensing:  R wave: 11.4 mV  RV Threshold:  N/P  RV Impedance:  360 ohms  Shock impedance:  RV 47 ohms  LV Threshold:  N/P  LV Impedance:  812 ohms    Diagnostic Data  Atrial paced: 0 %  Ventricular paced: RV 85%, LV 95%    Episodes recorded since 4/14/2020:  100% AF burden.  4 ventricular high rates treated with ATP (1 sequence of ATP each), rates 216-222 bpm.  Longest duration 25 seconds.  AF with RVR vs VT.  Several nonsustained ventricular high rates.  157-198 bpm.  No shocks.    Battery status: elective replacement time       Final Parameters  Mode: DDDR     Lower rate: 70 bpm   Upper rate: 125 bpm  AV Delay: 170-180 ms paced    115-120 ms sensed   Atrial - Amplitude: 2.2 V   Pulse width: 0.5 ms   Sensitivity: 0.15 mV   Ventricular:  RV Amplitude: 2.2 V @ 0.5 ms   Sensitivity: 0.6 mV            LV Amplitude:  1.5V @ 0.5ms  Changes made: No changes made.  Conclusions: normal AICD function, stable sensing thresholds, battery at elective replacement voltage and ATP treated VT vs AF with RVR    Follow up: remotely via latitude, 6 weeks in office post gen change.

## 2020-05-13 ENCOUNTER — PREP FOR SURGERY (OUTPATIENT)
Dept: OTHER | Facility: HOSPITAL | Age: 85
End: 2020-05-13

## 2020-05-13 DIAGNOSIS — Z45.02 BIVENTRICULAR IMPLANTABLE CARDIOVERTER-DEFIBRILLATOR (ICD) AT END OF DEVICE LIFE: Primary | ICD-10-CM

## 2020-05-13 RX ORDER — SODIUM CHLORIDE 0.9 % (FLUSH) 0.9 %
10 SYRINGE (ML) INJECTION AS NEEDED
Status: CANCELLED | OUTPATIENT
Start: 2020-05-13

## 2020-05-13 RX ORDER — VANCOMYCIN HYDROCHLORIDE 1 G/200ML
1000 INJECTION, SOLUTION INTRAVENOUS ONCE
Status: CANCELLED | OUTPATIENT
Start: 2020-05-13 | End: 2020-05-13

## 2020-05-13 RX ORDER — SODIUM CHLORIDE 0.9 % (FLUSH) 0.9 %
3 SYRINGE (ML) INJECTION EVERY 12 HOURS SCHEDULED
Status: CANCELLED | OUTPATIENT
Start: 2020-05-13

## 2020-05-19 RX ORDER — LORAZEPAM 0.5 MG/1
TABLET ORAL
Qty: 120 TABLET | Refills: 0 | Status: SHIPPED | OUTPATIENT
Start: 2020-05-19 | End: 2020-06-29

## 2020-05-21 ENCOUNTER — TRANSCRIBE ORDERS (OUTPATIENT)
Dept: ADMINISTRATIVE | Facility: HOSPITAL | Age: 85
End: 2020-05-21

## 2020-05-21 DIAGNOSIS — Z01.818 PRE-OP TESTING: Primary | ICD-10-CM

## 2020-05-22 PROBLEM — Z45.02 BIVENTRICULAR IMPLANTABLE CARDIOVERTER-DEFIBRILLATOR (ICD) AT END OF DEVICE LIFE: Status: ACTIVE | Noted: 2020-05-13

## 2020-06-03 ENCOUNTER — DOCUMENTATION (OUTPATIENT)
Dept: CARDIOLOGY | Facility: CLINIC | Age: 85
End: 2020-06-03

## 2020-06-03 DIAGNOSIS — I49.5 SSS (SICK SINUS SYNDROME) (HCC): ICD-10-CM

## 2020-06-03 DIAGNOSIS — I50.22 CHRONIC SYSTOLIC CONGESTIVE HEART FAILURE (HCC): ICD-10-CM

## 2020-06-03 DIAGNOSIS — Z45.02 BIVENTRICULAR IMPLANTABLE CARDIOVERTER-DEFIBRILLATOR (ICD) AT END OF DEVICE LIFE: Primary | ICD-10-CM

## 2020-06-03 NOTE — PROGRESS NOTES
Bi-V AICD Evaluation Report  REMOTE/LATITUDE    Kamini 3, 2020    Primary Cardiologist: Zaria  : Guidant Model: Cognis 100-D N119  Implant date: 7/8/2010    Reason for evaluation: remote report with ATP treated VT  Indication for AICD: sick sinus syndrome and chronic systolic congestive heart failure    Measurements  Atrial sensing:  P wave: 0.7 mV  Atrial threshold: N/P  Atrial lead impedance: 534 ohms  Ventricular sensing:  R wave: N/R mV  RV Threshold:  N/P  RV Impedance:  366 ohms  Shock impedance:  RV 46 ohms  LV Threshold:  N/P  LV Impedance:  799 ohms      Diagnostic Data  Atrial paced: 2 %  Ventricular paced: RV 85%, LV 96%    Episodes recorded since 5/4/2020:  5/20/2020:  VT at 178 bpm, duration 10 seconds, treated successfully with 1 sequence of ATP.  Several short NS-VT episodes, no therapy  No shocks.  Of note, patient has been in NSR, out of AF, since the middle of May.    Battery status: elective replacement time   Gen change scheduled for 6/9/2020      Final Parameters  Mode: DDDR     Lower rate: 70 bpm   Upper rate: 125 bpm  AV Delay: 170-180 ms paced    115-120 ms sensed   Atrial - Amplitude: 2.2 V   Pulse width: 0.5 ms   Sensitivity: 0.15 mV   Ventricular:  RV Amplitude: 2.2 V @ 0.5 ms   Sensitivity: 0.6 mV            LV Amplitude:  1.5V @ 0.5ms  Changes made: No changes made.  Conclusions: normal AICD function, battery at elective replacement voltage and ATP treated VT x 1    Follow up: 6 weeks post gen change, remotely via latitude

## 2020-06-06 ENCOUNTER — LAB (OUTPATIENT)
Dept: LAB | Facility: HOSPITAL | Age: 85
End: 2020-06-06

## 2020-06-06 PROCEDURE — U0003 INFECTIOUS AGENT DETECTION BY NUCLEIC ACID (DNA OR RNA); SEVERE ACUTE RESPIRATORY SYNDROME CORONAVIRUS 2 (SARS-COV-2) (CORONAVIRUS DISEASE [COVID-19]), AMPLIFIED PROBE TECHNIQUE, MAKING USE OF HIGH THROUGHPUT TECHNOLOGIES AS DESCRIBED BY CMS-2020-01-R: HCPCS | Performed by: INTERNAL MEDICINE

## 2020-06-07 LAB
COVID LABCORP PRIORITY: NORMAL
SARS-COV-2 RNA RESP QL NAA+PROBE: NOT DETECTED

## 2020-06-09 ENCOUNTER — HOSPITAL ENCOUNTER (OUTPATIENT)
Facility: HOSPITAL | Age: 85
Discharge: HOME | End: 2020-06-09
Attending: INTERNAL MEDICINE | Admitting: INTERNAL MEDICINE

## 2020-06-09 VITALS
DIASTOLIC BLOOD PRESSURE: 63 MMHG | RESPIRATION RATE: 19 BRPM | WEIGHT: 163 LBS | HEIGHT: 73 IN | TEMPERATURE: 97.4 F | BODY MASS INDEX: 21.6 KG/M2 | SYSTOLIC BLOOD PRESSURE: 90 MMHG | OXYGEN SATURATION: 94 % | HEART RATE: 71 BPM

## 2020-06-09 DIAGNOSIS — Z45.02 BIVENTRICULAR IMPLANTABLE CARDIOVERTER-DEFIBRILLATOR (ICD) AT END OF DEVICE LIFE: ICD-10-CM

## 2020-06-09 LAB
DEPRECATED RDW RBC AUTO: 45.9 FL (ref 37–54)
ERYTHROCYTE [DISTWIDTH] IN BLOOD BY AUTOMATED COUNT: 14.3 % (ref 12.3–15.4)
HCT VFR BLD AUTO: 41.8 % (ref 37.5–51)
HGB BLD-MCNC: 13.1 G/DL (ref 13–17.7)
MCH RBC QN AUTO: 28 PG (ref 26.6–33)
MCHC RBC AUTO-ENTMCNC: 31.3 G/DL (ref 31.5–35.7)
MCV RBC AUTO: 89.3 FL (ref 79–97)
PLATELET # BLD AUTO: 168 10*3/MM3 (ref 140–450)
PMV BLD AUTO: 12 FL (ref 6–12)
RBC # BLD AUTO: 4.68 10*6/MM3 (ref 4.14–5.8)
WBC NRBC COR # BLD: 6.22 10*3/MM3 (ref 3.4–10.8)

## 2020-06-09 PROCEDURE — 99153 MOD SED SAME PHYS/QHP EA: CPT | Performed by: INTERNAL MEDICINE

## 2020-06-09 PROCEDURE — C1882 AICD, OTHER THAN SING/DUAL: HCPCS | Performed by: INTERNAL MEDICINE

## 2020-06-09 PROCEDURE — S0260 H&P FOR SURGERY: HCPCS | Performed by: INTERNAL MEDICINE

## 2020-06-09 PROCEDURE — 85027 COMPLETE CBC AUTOMATED: CPT | Performed by: INTERNAL MEDICINE

## 2020-06-09 PROCEDURE — 99152 MOD SED SAME PHYS/QHP 5/>YRS: CPT | Performed by: INTERNAL MEDICINE

## 2020-06-09 PROCEDURE — 33264 RMVL & RPLCMT DFB GEN MLT LD: CPT | Performed by: INTERNAL MEDICINE

## 2020-06-09 PROCEDURE — 25010000002 VANCOMYCIN PER 500 MG: Performed by: INTERNAL MEDICINE

## 2020-06-09 PROCEDURE — 25010000002 MIDAZOLAM PER 1 MG: Performed by: INTERNAL MEDICINE

## 2020-06-09 PROCEDURE — 25010000002 FENTANYL CITRATE (PF) 100 MCG/2ML SOLUTION: Performed by: INTERNAL MEDICINE

## 2020-06-09 DEVICE — CARDIAC RESYNCHRONIZATION THERAPY DEFIBRILLATOR
Type: IMPLANTABLE DEVICE | Status: FUNCTIONAL
Brand: MOMENTUM™ CRT-D

## 2020-06-09 RX ORDER — MAGNESIUM HYDROXIDE 1200 MG/15ML
LIQUID ORAL AS NEEDED
Status: DISCONTINUED | OUTPATIENT
Start: 2020-06-09 | End: 2020-06-09 | Stop reason: HOSPADM

## 2020-06-09 RX ORDER — VANCOMYCIN HYDROCHLORIDE 1 G/200ML
1000 INJECTION, SOLUTION INTRAVENOUS ONCE
Status: COMPLETED | OUTPATIENT
Start: 2020-06-09 | End: 2020-06-09

## 2020-06-09 RX ORDER — NEOMYCIN AND POLYMYXIN B SULFATES 40; 200000 MG/ML; [USP'U]/ML
SOLUTION IRRIGATION AS NEEDED
Status: DISCONTINUED | OUTPATIENT
Start: 2020-06-09 | End: 2020-06-09 | Stop reason: HOSPADM

## 2020-06-09 RX ORDER — SODIUM CHLORIDE 0.9 % (FLUSH) 0.9 %
10 SYRINGE (ML) INJECTION AS NEEDED
Status: DISCONTINUED | OUTPATIENT
Start: 2020-06-09 | End: 2020-06-09 | Stop reason: HOSPADM

## 2020-06-09 RX ORDER — ACETAMINOPHEN 325 MG/1
650 TABLET ORAL EVERY 4 HOURS PRN
Status: DISCONTINUED | OUTPATIENT
Start: 2020-06-09 | End: 2020-06-09 | Stop reason: HOSPADM

## 2020-06-09 RX ORDER — FENTANYL CITRATE 50 UG/ML
INJECTION, SOLUTION INTRAMUSCULAR; INTRAVENOUS AS NEEDED
Status: DISCONTINUED | OUTPATIENT
Start: 2020-06-09 | End: 2020-06-09 | Stop reason: HOSPADM

## 2020-06-09 RX ORDER — SODIUM CHLORIDE 0.9 % (FLUSH) 0.9 %
3 SYRINGE (ML) INJECTION EVERY 12 HOURS SCHEDULED
Status: DISCONTINUED | OUTPATIENT
Start: 2020-06-09 | End: 2020-06-09 | Stop reason: HOSPADM

## 2020-06-09 RX ORDER — MIDAZOLAM HYDROCHLORIDE 1 MG/ML
INJECTION INTRAMUSCULAR; INTRAVENOUS AS NEEDED
Status: DISCONTINUED | OUTPATIENT
Start: 2020-06-09 | End: 2020-06-09 | Stop reason: HOSPADM

## 2020-06-09 RX ORDER — LIDOCAINE HYDROCHLORIDE 20 MG/ML
INJECTION, SOLUTION EPIDURAL; INFILTRATION; INTRACAUDAL; PERINEURAL AS NEEDED
Status: DISCONTINUED | OUTPATIENT
Start: 2020-06-09 | End: 2020-06-09 | Stop reason: HOSPADM

## 2020-06-09 RX ORDER — ACETAMINOPHEN 650 MG/1
650 SUPPOSITORY RECTAL EVERY 4 HOURS PRN
Status: DISCONTINUED | OUTPATIENT
Start: 2020-06-09 | End: 2020-06-09 | Stop reason: HOSPADM

## 2020-06-09 RX ADMIN — VANCOMYCIN HYDROCHLORIDE 1000 MG: 1 INJECTION, SOLUTION INTRAVENOUS at 11:10

## 2020-06-09 NOTE — H&P
[facility] - CARDIOLOGY  HISTORY AND PHYSICAL    Date of Admission: 6/9/2020  Primary Care Physician: Alison Hercules MD    Subjective     Chief Complaint: Needs battery changed    History of Present Illness  85-year-old with a host of cardiac conditions, including history of systolic congestive heart failure status post biventricular ICD.  He routinely has sustained ventricular tachycardia that is treated with ATP from this device.  Routine device monitoring has revealed that it has reached ERT, so he presents today for generator change.  No other new signs or symptoms of late.      Review of Systems   Otherwise complete ROS reviewed and negative except as mentioned in the HPI.    Past Medical History:   Past Medical History:   Diagnosis Date   • Abdominal aortic aneurysm (CMS/HCC)    • Anxiety    • Atrial fibrillation (CMS/HCC)    • Biventricular ICD (implantable cardioverter-defibrillator) in place    • BPH (benign prostatic hypertrophy)    • Carotid artery stenosis    • CHF (congestive heart failure) (CMS/HCC)    • Chronic kidney disease     Chronic kidney disease, stage 4 (severe)   • Chronic systolic (congestive) heart failure (CMS/HCC)     limited echo 7/2016 EF was 40-45%. Patient has BiV AICD   • Colon obstruction (CMS/HCC)    • Coronary arteriosclerosis     MI x2   • Hearing loss    • Iron deficiency anemia    • Ischemic cardiomyopathy    • Mixed hyperlipidemia    • Myocardial infarction (CMS/HCC)    • Stroke (CMS/HCC)        Past Surgical History:  Past Surgical History:   Procedure Laterality Date   • ABLATION OF DYSRHYTHMIC FOCUS  2008    MAZE at time of CABG/MVR   • CARDIAC ABLATION     • CARDIAC CATHETERIZATION     • CARDIAC DEFIBRILLATOR PLACEMENT     • CARDIAC PACEMAKER PLACEMENT     • CARDIOVERSION     • CAROTID ENDARTERECTOMY     • CAROTID ENDARTERECTOMY     • CORONARY ARTERY BYPASS GRAFT  2008    X 1   • CORONARY STENT PLACEMENT  2008    X 2   • LAPAROSCOPIC LYSIS OF ADHESIONS N/A 1/28/2020     Procedure: LAPAROSCOPIC LYSIS OF ADHESIONS;  Surgeon: Kim Almeida MD;  Location: [facility];  Service: General   • MITRAL VALVE REPAIR/REPLACEMENT  2008   • TOTAL KNEE ARTHROPLASTY         Family History: family history includes Diabetes in his father; Heart disease in his mother; Stroke in his mother.    Social History:  reports that he has quit smoking. His smoking use included cigarettes. He quit after 45.00 years of use. He has never used smokeless tobacco. He reports that he does not drink alcohol or use drugs.    Medications:  Prior to Admission medications    Medication Sig Start Date End Date Taking? Authorizing Provider   aspirin 81 MG EC tablet Take 81 mg by mouth Daily.   Yes Ramakrishna Stubbs MD   atorvastatin (LIPITOR) 10 MG tablet Take 1 tablet by mouth Every Night. 9/12/17  Yes Waleska Sifuentes APRN   dutasteride (AVODART) 0.5 MG capsule Take 0.5 mg by mouth Daily. Patient takes at noon   Yes Ramakrishna Stubbs MD   gabapentin (NEURONTIN) 300 MG capsule Take 300 mg by mouth Daily.   Yes Ramakrishna Stubbs MD   HYDROcodone-acetaminophen (NORCO) 5-325 MG per tablet Take 1 tablet by mouth Every 6 (Six) Hours As Needed for Moderate Pain .   Yes Ramakrishna Stubbs MD   metoprolol succinate XL (TOPROL-XL) 25 MG 24 hr tablet TAKE 1 TABLET BY MOUTH EVERY NIGHT. 10/9/19  Yes Jose Cary MD   sacubitril-valsartan (ENTRESTO) 24-26 MG tablet Take 1 tablet by mouth 2 (Two) Times a Day. 2/10/20  Yes Hernan Falk APRN   tamsulosin (FLOMAX) 0.4 MG capsule 24 hr capsule Take 1 capsule by mouth Every Night.   Yes Ramakrishna Stubbs MD   bumetanide (BUMEX) 0.5 MG tablet Take 1 tablet by mouth 2 (Two) Times a Day. Take an extra tablet daily as needed for increased shortness of breath, edema and/or weight gain 2/17/20   Isabel Vazquez APRN   LORazepam (ATIVAN) 0.5 MG tablet Take 0.5 mg by mouth 3 (Three) Times a Day As Needed.    Ramakrishna Stubbs MD   nitroglycerin (NITROSTAT) 0.4  "MG SL tablet PLACE 1 TABLET UNDER THE TONGUE AS NEEDED FOR ANGINA, MAY REPEAT EVERY 5 MINS FOR UP THREE DOSES 11/6/17   Karthik Macdonald MD     Allergies:  Allergies   Allergen Reactions   • Keflex [Cephalexin]      CEPHALOSPORINS       Objective     Vital Signs: /89 (BP Location: Right arm, Patient Position: Sitting)   Pulse 78   Temp 97.4 °F (36.3 °C) (Temporal)   Resp 12   Ht 185.4 cm (73\")   Wt 73.9 kg (163 lb)   SpO2 97%   BMI 21.51 kg/m²     Physical Exam   Constitutional: No distress.   Neck: No JVD present.   Cardiovascular: Normal rate, regular rhythm, S1 normal, S2 normal, normal heart sounds, intact distal pulses and normal pulses.   Pulmonary/Chest: Effort normal and breath sounds normal.   Abdominal: Soft. There is no tenderness.   Neurological: He is alert and oriented to person, place, and time.   Skin: Skin is warm and dry.       Results Reviewed:  I have reviewed all laboratory data, with pertinent findings: Hemoglobin 13.1      Assessment / Plan        Active Hospital Problems    Diagnosis   • **Biventricular implantable cardioverter-defibrillator (ICD) at end of device life     Added automatically from request for surgery [id number]       Proceed with elective generator replacement.  All risk, benefits, and alternatives were discussed with both the patient and his daughter, Munira.      Code Status: full     I discussed the patients findings and my recommendations with: patient and his daughter    Estimated length of stay: <1 night    Raleigh Enciso MD   06/09/20   11:19    "

## 2020-06-11 ENCOUNTER — TELEPHONE (OUTPATIENT)
Dept: INTERNAL MEDICINE | Age: 85
End: 2020-06-11

## 2020-06-11 RX ORDER — HYDROCODONE BITARTRATE AND ACETAMINOPHEN 5; 325 MG/1; MG/1
1 TABLET ORAL 3 TIMES DAILY PRN
Qty: 90 TABLET | Refills: 0 | Status: SHIPPED | OUTPATIENT
Start: 2020-06-11 | End: 2020-07-10 | Stop reason: SDUPTHER

## 2020-06-11 NOTE — TELEPHONE ENCOUNTER
[facility] called and they do not have the right shape of lortab that he is used to taking and will not take the round ones that they have. [facility]ab [facility] and his daughter will pick them up and bring them to [facility] to be put in pill box.

## 2020-06-22 ENCOUNTER — DOCUMENTATION (OUTPATIENT)
Dept: CARDIOLOGY | Facility: CLINIC | Age: 85
End: 2020-06-22

## 2020-06-22 DIAGNOSIS — Z95.810 BIVENTRICULAR ICD (IMPLANTABLE CARDIOVERTER-DEFIBRILLATOR) IN PLACE: Primary | ICD-10-CM

## 2020-06-22 DIAGNOSIS — I49.5 SSS (SICK SINUS SYNDROME) (HCC): ICD-10-CM

## 2020-06-22 DIAGNOSIS — I50.22 CHRONIC SYSTOLIC CONGESTIVE HEART FAILURE (HCC): ICD-10-CM

## 2020-06-22 NOTE — PROGRESS NOTES
Bi-V AICD Evaluation Report  REMOTE/LATITUDE    June 22, 2020    Primary Cardiologist: Zaria  : Guidant Model: [employer] CRT_D G125  Implant date: 6/9/2020    Reason for evaluation: ATP treated VT  Indication for AICD: sick sinus syndrome and chronic systolic congestive heart failure    Measurements  Atrial sensing:  P wave: 0.4 mV  Atrial threshold: N/R  Atrial lead impedance: 494 ohms  Ventricular sensing:  R wave: N/R mV  RV Threshold:  1V @ 0.4 ms  RV Impedance:  308 ohms  Shock impedance:  RV 39 ohms  LV Threshold:  1V @ 0.4ms  LV Impedance:  772 ohms    Diagnostic Data  Atrial paced: 49 %  Ventricular paced: RV 90%, LV 99%    Episodes recorded since 6/9/2020:    P-AF, burden <1%, longest duration 9 seconds.  Not anticoagulated.  6/21/2020:  VT at 195 bpm, duration 10 seconds, treated successfully with one sequence of ATP.      Battery status: satisfactory   Estimated 10.5 years remaining    Final Parameters  Mode: DDDR     Lower rate: 70 bpm   Upper rate: 125 bpm  AV Delay: 170-180 ms paced    115-120 ms sensed   Atrial - Amplitude: 2.2 V   Pulse width: 0.4 ms   Sensitivity: 0.15 mV   Ventricular:  RV Amplitude: 4.2 V @ 0.4 ms   Sensitivity: 0.6 mV            LV Amplitude:  3.1V @ 0.4ms  Changes made: No changes made.  Conclusions: normal AICD function, adequate battery reserve and ATP treated VT    Follow up: Every 3 months via latitude, annually in office (7/21/2020)

## 2020-06-29 RX ORDER — LORAZEPAM 0.5 MG/1
TABLET ORAL
Qty: 120 TABLET | Refills: 0 | Status: SHIPPED | OUTPATIENT
Start: 2020-06-29 | End: 2020-08-03

## 2020-07-06 RX ORDER — GABAPENTIN 300 MG/1
CAPSULE ORAL
Qty: 90 CAPSULE | Refills: 2 | Status: SHIPPED | OUTPATIENT
Start: 2020-07-06 | End: 2020-10-14

## 2020-07-10 ENCOUNTER — TELEPHONE (OUTPATIENT)
Dept: INTERNAL MEDICINE | Age: 85
End: 2020-07-10

## 2020-07-10 RX ORDER — HYDROCODONE BITARTRATE AND ACETAMINOPHEN 5; 325 MG/1; MG/1
1 TABLET ORAL 3 TIMES DAILY PRN
Qty: 90 TABLET | Refills: 0 | Status: SHIPPED | OUTPATIENT
Start: 2020-07-10 | End: 2020-08-05 | Stop reason: SDUPTHER

## 2020-07-10 NOTE — TELEPHONE ENCOUNTER
Vanessa Parsons is calling for a medication refill that is not listed in his chart. Pt daughter called asking for pt pain medication to be refilled. Meredith Daley from the pharmacy said that she called the office to get the refill but [facility] is not [facility].   Last office visit: 2/18/2020  Next office visit: 8/25/2020   [facility]    Please call patient to clarify. Thank You.

## 2020-08-03 RX ORDER — LORAZEPAM 0.5 MG/1
TABLET ORAL
Qty: 120 TABLET | Refills: 0 | Status: SHIPPED | OUTPATIENT
Start: 2020-08-03 | End: 2020-09-04

## 2020-08-04 ENCOUNTER — TELEPHONE (OUTPATIENT)
Dept: CARDIOLOGY | Facility: CLINIC | Age: 85
End: 2020-08-04

## 2020-08-04 NOTE — TELEPHONE ENCOUNTER
Patient's [employer]ic Heart Failure Index is elevated at 17.  RN attempted to call patient for a symptom check.  Message left for patient to return call.

## 2020-08-05 RX ORDER — HYDROCODONE BITARTRATE AND ACETAMINOPHEN 5; 325 MG/1; MG/1
1 TABLET ORAL 3 TIMES DAILY PRN
Qty: 90 TABLET | Refills: 0 | Status: SHIPPED | OUTPATIENT
Start: 2020-08-05 | End: 2020-09-09 | Stop reason: SDUPTHER

## 2020-08-20 NOTE — TELEPHONE ENCOUNTER
Patient never returned call; however, HeartLogic Heart Failure Index has now decreased to 8, below alert threshold.

## 2020-08-25 ENCOUNTER — OFFICE VISIT (OUTPATIENT)
Dept: INTERNAL MEDICINE | Age: 85
End: 2020-08-25
Payer: MEDICARE

## 2020-08-25 VITALS
SYSTOLIC BLOOD PRESSURE: 120 MMHG | DIASTOLIC BLOOD PRESSURE: 76 MMHG | WEIGHT: 163 LBS | BODY MASS INDEX: 22.08 KG/M2 | HEIGHT: 72 IN | HEART RATE: 62 BPM | OXYGEN SATURATION: 92 %

## 2020-08-25 DIAGNOSIS — N18.4 STAGE 4 CHRONIC KIDNEY DISEASE (HCC): ICD-10-CM

## 2020-08-25 DIAGNOSIS — R53.83 OTHER FATIGUE: ICD-10-CM

## 2020-08-25 LAB
ALBUMIN SERPL-MCNC: 4.5 G/DL (ref 3.5–5.2)
ALP BLD-CCNC: 109 U/L (ref 40–130)
ALT SERPL-CCNC: 9 U/L (ref 5–41)
ANION GAP SERPL CALCULATED.3IONS-SCNC: 13 MMOL/L (ref 7–19)
AST SERPL-CCNC: 11 U/L (ref 5–40)
BILIRUB SERPL-MCNC: 0.9 MG/DL (ref 0.2–1.2)
BUN BLDV-MCNC: 41 MG/DL (ref 8–23)
CALCIUM SERPL-MCNC: 9.7 MG/DL (ref 8.8–10.2)
CHLORIDE BLD-SCNC: 103 MMOL/L (ref 98–111)
CO2: 28 MMOL/L (ref 22–29)
CREAT SERPL-MCNC: 2.2 MG/DL (ref 0.5–1.2)
CREATININE URINE: 77 MG/DL (ref 4.2–622)
FERRITIN: 59.4 NG/ML (ref 30–400)
GFR AFRICAN AMERICAN: 35
GFR NON-AFRICAN AMERICAN: 29
GLUCOSE BLD-MCNC: 107 MG/DL (ref 74–109)
HCT VFR BLD CALC: 43.8 % (ref 42–52)
HEMOGLOBIN: 13 G/DL (ref 14–18)
IRON: 72 UG/DL (ref 59–158)
MAGNESIUM: 2.3 MG/DL (ref 1.6–2.4)
MCH RBC QN AUTO: 28.5 PG (ref 27–31)
MCHC RBC AUTO-ENTMCNC: 29.7 G/DL (ref 33–37)
MCV RBC AUTO: 96.1 FL (ref 80–94)
PDW BLD-RTO: 15.1 % (ref 11.5–14.5)
PLATELET # BLD: 143 K/UL (ref 130–400)
PMV BLD AUTO: 12.1 FL (ref 9.4–12.4)
POTASSIUM SERPL-SCNC: 4.7 MMOL/L (ref 3.5–5)
RBC # BLD: 4.56 M/UL (ref 4.7–6.1)
SODIUM BLD-SCNC: 144 MMOL/L (ref 136–145)
TOTAL PROTEIN: 7.7 G/DL (ref 6.6–8.7)
TSH SERPL DL<=0.05 MIU/L-ACNC: 5.6 UIU/ML (ref 0.27–4.2)
URIC ACID, SERUM: 9.3 MG/DL (ref 3.4–7)
WBC # BLD: 8 K/UL (ref 4.8–10.8)

## 2020-08-25 PROCEDURE — 4040F PNEUMOC VAC/ADMIN/RCVD: CPT | Performed by: INTERNAL MEDICINE

## 2020-08-25 PROCEDURE — 1123F ACP DISCUSS/DSCN MKR DOCD: CPT | Performed by: INTERNAL MEDICINE

## 2020-08-25 PROCEDURE — 99214 OFFICE O/P EST MOD 30 MIN: CPT | Performed by: INTERNAL MEDICINE

## 2020-08-25 PROCEDURE — G8420 CALC BMI NORM PARAMETERS: HCPCS | Performed by: INTERNAL MEDICINE

## 2020-08-25 PROCEDURE — 1036F TOBACCO NON-USER: CPT | Performed by: INTERNAL MEDICINE

## 2020-08-25 PROCEDURE — G8427 DOCREV CUR MEDS BY ELIG CLIN: HCPCS | Performed by: INTERNAL MEDICINE

## 2020-08-25 ASSESSMENT — PATIENT HEALTH QUESTIONNAIRE - PHQ9
SUM OF ALL RESPONSES TO PHQ9 QUESTIONS 1 & 2: 0
SUM OF ALL RESPONSES TO PHQ QUESTIONS 1-9: 0
2. FEELING DOWN, DEPRESSED OR HOPELESS: 0
1. LITTLE INTEREST OR PLEASURE IN DOING THINGS: 0
SUM OF ALL RESPONSES TO PHQ QUESTIONS 1-9: 0

## 2020-08-25 NOTE — PROGRESS NOTES
Social Needs    Financial resource strain: Not on file    Food insecurity     Worry: Not on file     Inability: Not on file    Transportation needs     Medical: Not on file     Non-medical: Not on file   Tobacco Use    Smoking status: Former Smoker    Smokeless tobacco: Never Used   Substance and Sexual Activity    Alcohol use: No    Drug use: No    Sexual activity: Never     Comment: Marital status - .    Lifestyle    Physical activity     Days per week: Not on file     Minutes per session: Not on file    Stress: Not on file   Relationships    Social connections     Talks on phone: Not on file     Gets together: Not on file     Attends Christianity service: Not on file     Active member of club or organization: Not on file     Attends meetings of clubs or organizations: Not on file     Relationship status: Not on file    Intimate partner violence     Fear of current or ex partner: Not on file     Emotionally abused: Not on file     Physically abused: Not on file     Forced sexual activity: Not on file   Other Topics Concern    Not on file   Social History Narrative    Not on file       Allergies   Allergen Reactions    Keflex [Cephalexin] Rash     pruritius       Current Outpatient Medications   Medication Sig Dispense Refill    dutasteride (AVODART) 0.5 MG capsule TAKE 1 CAPSULE BY MOUTH DAILY 90 capsule 1    atorvastatin (LIPITOR) 10 MG tablet TAKE ONE TABLET BY MOUTH EVERY DAY 90 tablet 2    sacubitril-valsartan (ENTRESTO) 24-26 MG per tablet Take 1 tablet by mouth 2 times daily 60 tablet 5    tamsulosin (FLOMAX) 0.4 MG capsule TAKE ONE CAPSULE BY MOUTH EVERY DAY 90 capsule 3    metoprolol succinate (TOPROL XL) 25 MG extended release tablet Take 25 mg by mouth daily      vitamin D (CHOLECALCIFEROL) 1000 UNIT TABS tablet Take 1,000 Units by mouth daily      LORazepam (ATIVAN) 0.5 MG tablet TAKE ONE TABLET BY MOUTH FOUR TIMES A DAY **MAY MAKE DROWSY**. 120 tablet 0    gabapentin (NEURONTIN) 300 MG capsule TAKE 1 CAPSULE BY MOUTH AT 5PM AND TAKE 2 CAPSULES BY MOUTH EVERY BEDTIME. 90 capsule 2    furosemide (LASIX) 40 MG tablet Take 1 tablet by mouth daily as needed (for fluid) (Patient not taking: Reported on 8/25/2020) 60 tablet 11    bumetanide (BUMEX) 0.5 MG tablet Take 0.5 mg by mouth 2 times daily      nitroGLYCERIN (NITROSTAT) 0.4 MG SL tablet Place 0.4 mg under the tongue      aspirin 81 MG tablet Take 81 mg by mouth daily. No current facility-administered medications for this visit. Review of Systems   Constitutional: Positive for fatigue. Negative for chills and fever. HENT: Negative for congestion and sinus pressure. Eyes: Negative for discharge and redness. Respiratory: Positive for shortness of breath. Negative for cough. Cardiovascular: Positive for leg swelling. Negative for chest pain and palpitations. Gastrointestinal: Negative for abdominal distention and abdominal pain. Genitourinary: Negative for dysuria. Musculoskeletal: Positive for arthralgias, back pain and gait problem. Skin: Negative for rash and wound. Neurological: Negative for dizziness, light-headedness and headaches. Psychiatric/Behavioral: Positive for sleep disturbance. Negative for dysphoric mood. The patient is nervous/anxious.         /76 (Site: Left Upper Arm)   Pulse 62   Ht 6' (1.829 m)   Wt 163 lb (73.9 kg)   SpO2 92%   BMI 22.11 kg/m²   BP Readings from Last 7 Encounters:   08/25/20 120/76   02/18/20 90/60   10/15/19 114/76   06/07/19 126/68   02/07/19 120/76   10/24/18 94/66   08/26/18 112/77     Wt Readings from Last 7 Encounters:   08/25/20 163 lb (73.9 kg)   03/13/20 161 lb (73 kg)   03/12/20 161 lb (73 kg)   02/20/20 166 lb (75.3 kg)   02/18/20 165 lb (74.8 kg)   10/15/19 169 lb (76.7 kg)   06/07/19 169 lb 6.4 oz (76.8 kg)     BMI Readings from Last 7 Encounters:   08/25/20 22.11 kg/m²   03/13/20 21.84 kg/m²   03/12/20 21.84 kg/m²   02/20/20 21.90 kg/m²   02/18/20 22.38 kg/m²   10/15/19 22.92 kg/m²   06/07/19 22.97 kg/m²     Resp Readings from Last 7 Encounters:   08/26/18 16   11/09/17 18   01/31/16 20       Physical Exam  Constitutional:       General: He is not in acute distress. Appearance: He is cachectic. Comments: Tired chronically ill-appearing pale   HENT:      Right Ear: External ear normal. Tympanic membrane is not injected. Left Ear: External ear normal. Tympanic membrane is not injected. Mouth/Throat:      Pharynx: No oropharyngeal exudate. Eyes:      General: No scleral icterus. Conjunctiva/sclera: Conjunctivae normal.   Neck:      Musculoskeletal: Neck supple. Thyroid: No thyroid mass or thyromegaly. Vascular: No carotid bruit. Cardiovascular:      Rate and Rhythm: Normal rate and regular rhythm. Heart sounds: S1 normal and S2 normal. No S3 or S4 sounds. Pulmonary:      Effort: Pulmonary effort is normal. No respiratory distress. Breath sounds: Decreased air movement present. Decreased breath sounds present. No wheezing or rales. Abdominal:      General: Bowel sounds are normal. There is no distension. Palpations: Abdomen is soft. There is no mass. Tenderness: There is no abdominal tenderness. Musculoskeletal:      Comments: Chronic arthritis type changes no edema   Lymphadenopathy:      Cervical: No cervical adenopathy. Upper Body:      Right upper body: No supraclavicular adenopathy. Left upper body: No supraclavicular adenopathy. Skin:     Coloration: Skin is pale. Findings: No rash. Neurological:      Mental Status: He is alert and oriented to person, place, and time. Cranial Nerves: No cranial nerve deficit. Results for orders placed or performed in visit on 05/01/20   Hemoglobin A1C   Result Value Ref Range    Hemoglobin A1C 5.9 4.0 - 6.0 %       ASSESSMENT/ PLAN:  1. Stage 4 chronic kidney disease (HCC)    - CBC;  Future  - Comprehensive

## 2020-09-04 RX ORDER — LORAZEPAM 0.5 MG/1
TABLET ORAL
Qty: 120 TABLET | Refills: 0 | Status: SHIPPED | OUTPATIENT
Start: 2020-09-04 | End: 2020-09-09

## 2020-09-06 PROBLEM — R09.02 HYPOXIA: Status: ACTIVE | Noted: 2020-09-06

## 2020-09-06 ASSESSMENT — ENCOUNTER SYMPTOMS
COUGH: 0
BACK PAIN: 1
ABDOMINAL DISTENTION: 0
EYE DISCHARGE: 0
SHORTNESS OF BREATH: 1
EYE REDNESS: 0
ABDOMINAL PAIN: 0
SINUS PRESSURE: 0

## 2020-09-09 RX ORDER — HYDROCODONE BITARTRATE AND ACETAMINOPHEN 5; 325 MG/1; MG/1
1 TABLET ORAL 3 TIMES DAILY PRN
Qty: 90 TABLET | Refills: 0 | Status: SHIPPED | OUTPATIENT
Start: 2020-09-09 | End: 2020-10-06 | Stop reason: SDUPTHER

## 2020-09-09 RX ORDER — LORAZEPAM 0.5 MG/1
TABLET ORAL
Qty: 120 TABLET | Refills: 0 | Status: SHIPPED | OUTPATIENT
Start: 2020-09-09 | End: 2020-10-06

## 2020-09-09 NOTE — TELEPHONE ENCOUNTER
Krzysztof Farnsworth called to request a refill on his medication. Last office visit : 8/25/2020   Next office visit : 12/17/2020     Last UDS:   Amphetamine Screen, Urine   Date Value Ref Range Status   06/07/2019 neg  Final     Barbiturate Screen, Urine   Date Value Ref Range Status   06/07/2019 neg  Final     Benzodiazepine Screen, Urine   Date Value Ref Range Status   06/07/2019 neg  Final     Buprenorphine Urine   Date Value Ref Range Status   06/07/2019 neg  Final     Cocaine Metabolite Screen, Urine   Date Value Ref Range Status   06/07/2019 neg  Final     Gabapentin Screen, Urine   Date Value Ref Range Status   06/07/2019 na  Final     MDMA, Urine   Date Value Ref Range Status   06/07/2019 neg  Final     Methamphetamine, Urine   Date Value Ref Range Status   06/07/2019 neg  Final     Opiate Scrn, Ur   Date Value Ref Range Status   06/07/2019 pos  Final     Oxycodone Screen, Ur   Date Value Ref Range Status   06/07/2019 neg  Final     PCP Screen, Urine   Date Value Ref Range Status   06/07/2019 neg  Final     Propoxyphene Screen, Urine   Date Value Ref Range Status   06/07/2019 neg  Final     THC Screen, Urine   Date Value Ref Range Status   06/07/2019 neg  Final     Tricyclic Antidepressants, Urine   Date Value Ref Range Status   06/07/2019 neg  Final       Last Kev Yang: 06/29/2020  Medication Contract: 02/07/2019    Requested Prescriptions     Pending Prescriptions Disp Refills    LORazepam (ATIVAN) 0.5 MG tablet [Pharmacy Med Name: LORAZEPAM 0.5 MG TABS 0.5 TAB] 120 tablet 0     Sig: TAKE ONE TABLET BY MOUTH FOUR TIMES A DAY **MAY MAKE DROWSY**.    HYDROcodone-acetaminophen (NORCO) 5-325 MG per tablet 90 tablet 0     Sig: Take 1 tablet by mouth 3 times daily as needed for Pain for up to 30 days. Please approve or refuse this medication.    Talia Galindo

## 2020-09-28 RX ORDER — METOPROLOL SUCCINATE 25 MG/1
25 TABLET, EXTENDED RELEASE ORAL NIGHTLY
Qty: 90 TABLET | Refills: 4 | Status: SHIPPED | OUTPATIENT
Start: 2020-09-28

## 2020-10-01 ENCOUNTER — NURSE ONLY (OUTPATIENT)
Dept: INTERNAL MEDICINE | Age: 85
End: 2020-10-01
Payer: MEDICARE

## 2020-10-01 PROCEDURE — 90694 VACC AIIV4 NO PRSRV 0.5ML IM: CPT | Performed by: INTERNAL MEDICINE

## 2020-10-01 PROCEDURE — G0008 ADMIN INFLUENZA VIRUS VAC: HCPCS | Performed by: INTERNAL MEDICINE

## 2020-10-06 RX ORDER — HYDROCODONE BITARTRATE AND ACETAMINOPHEN 5; 325 MG/1; MG/1
1 TABLET ORAL 3 TIMES DAILY PRN
Qty: 90 TABLET | Refills: 0 | Status: SHIPPED | OUTPATIENT
Start: 2020-10-06 | End: 2020-11-07 | Stop reason: SDUPTHER

## 2020-10-06 RX ORDER — LORAZEPAM 0.5 MG/1
TABLET ORAL
Qty: 120 TABLET | Refills: 0 | Status: SHIPPED | OUTPATIENT
Start: 2020-10-06 | End: 2020-11-02

## 2020-10-07 RX ORDER — DUTASTERIDE 0.5 MG/1
0.5 CAPSULE, LIQUID FILLED ORAL DAILY
Qty: 30 CAPSULE | Refills: 1 | Status: SHIPPED | OUTPATIENT
Start: 2020-10-07 | End: 2020-12-10

## 2020-10-16 RX ORDER — GABAPENTIN 300 MG/1
CAPSULE ORAL
Qty: 90 CAPSULE | Refills: 2 | Status: SHIPPED | OUTPATIENT
Start: 2020-10-16 | End: 2021-02-08 | Stop reason: SDUPTHER

## 2020-11-02 RX ORDER — LORAZEPAM 0.5 MG/1
TABLET ORAL
Qty: 120 TABLET | Refills: 0 | Status: SHIPPED | OUTPATIENT
Start: 2020-11-02 | End: 2020-12-09

## 2020-11-07 RX ORDER — HYDROCODONE BITARTRATE AND ACETAMINOPHEN 5; 325 MG/1; MG/1
1 TABLET ORAL 3 TIMES DAILY PRN
Qty: 90 TABLET | Refills: 0 | Status: SHIPPED | OUTPATIENT
Start: 2020-11-07 | End: 2020-12-04 | Stop reason: SDUPTHER

## 2020-11-09 RX ORDER — HYDROCODONE BITARTRATE AND ACETAMINOPHEN 5; 325 MG/1; MG/1
1 TABLET ORAL 3 TIMES DAILY PRN
Qty: 90 TABLET | Refills: 0 | OUTPATIENT
Start: 2020-11-09 | End: 2020-12-09

## 2020-11-28 RX ORDER — SULFAMETHOXAZOLE AND TRIMETHOPRIM 400; 80 MG/1; MG/1
1 TABLET ORAL 2 TIMES DAILY
Qty: 10 TABLET | Refills: 0 | Status: SHIPPED | OUTPATIENT
Start: 2020-11-28 | End: 2020-12-03

## 2020-12-04 RX ORDER — HYDROCODONE BITARTRATE AND ACETAMINOPHEN 5; 325 MG/1; MG/1
1 TABLET ORAL 3 TIMES DAILY PRN
Qty: 90 TABLET | Refills: 0 | Status: SHIPPED | OUTPATIENT
Start: 2020-12-04 | End: 2021-01-08 | Stop reason: SDUPTHER

## 2020-12-08 ENCOUNTER — TELEPHONE (OUTPATIENT)
Dept: CARDIOLOGY | Facility: CLINIC | Age: 85
End: 2020-12-08

## 2020-12-08 ENCOUNTER — TELEPHONE (OUTPATIENT)
Dept: INTERNAL MEDICINE | Age: 85
End: 2020-12-08

## 2020-12-08 NOTE — TELEPHONE ENCOUNTER
RN spoke with patient's daughter, Munira.  She reports that patient is stable from a cardiac standpoint; however, he has fallen three times recently and has developed tremors.  She plans to contact PCP for a visit, in addition to keeping planned appointment with Dr. Enciso for next week.  RN encouraged her to contact clinic if patient develops worsening edema, shortness of breath, chest pain, or any other concerning symptoms.  Understanding verbalized.

## 2020-12-08 NOTE — TELEPHONE ENCOUNTER
He has an appt in January, but he is having increased tremmors and several falls lately and wants to know if he can be seen sooner? She can bring him in 12/10/20 in afternoon or on 12/15/20. Wasn't sure where to put him.

## 2020-12-08 NOTE — TELEPHONE ENCOUNTER
Patient's [employer] heart failure index is elevated at 16.  RN called to assess for symptoms of heart failure.  Message left for patient to return call.

## 2020-12-09 RX ORDER — LORAZEPAM 0.5 MG/1
TABLET ORAL
Qty: 120 TABLET | Refills: 0 | Status: SHIPPED | OUTPATIENT
Start: 2020-12-09 | End: 2021-01-08

## 2020-12-09 RX ORDER — ATORVASTATIN CALCIUM 10 MG/1
TABLET, FILM COATED ORAL
Qty: 90 TABLET | Refills: 2 | Status: SHIPPED | OUTPATIENT
Start: 2020-12-09 | End: 2021-03-22

## 2020-12-09 RX ORDER — TAMSULOSIN HYDROCHLORIDE 0.4 MG/1
CAPSULE ORAL
Qty: 90 CAPSULE | Refills: 3 | Status: SHIPPED | OUTPATIENT
Start: 2020-12-09 | End: 2021-12-07

## 2020-12-10 RX ORDER — DUTASTERIDE 0.5 MG/1
0.5 CAPSULE, LIQUID FILLED ORAL DAILY
Qty: 30 CAPSULE | Refills: 1 | Status: SHIPPED | OUTPATIENT
Start: 2020-12-10 | End: 2021-02-17

## 2020-12-15 ENCOUNTER — OFFICE VISIT (OUTPATIENT)
Dept: INTERNAL MEDICINE | Age: 85
End: 2020-12-15
Payer: MEDICARE

## 2020-12-15 VITALS
HEIGHT: 72 IN | DIASTOLIC BLOOD PRESSURE: 60 MMHG | BODY MASS INDEX: 22.35 KG/M2 | WEIGHT: 165 LBS | HEART RATE: 54 BPM | SYSTOLIC BLOOD PRESSURE: 98 MMHG

## 2020-12-15 DIAGNOSIS — R25.1 TREMOR: ICD-10-CM

## 2020-12-15 DIAGNOSIS — N18.4 STAGE 4 CHRONIC KIDNEY DISEASE (HCC): ICD-10-CM

## 2020-12-15 DIAGNOSIS — E55.9 VITAMIN D DEFICIENCY: ICD-10-CM

## 2020-12-15 DIAGNOSIS — D64.9 ANEMIA, UNSPECIFIED TYPE: ICD-10-CM

## 2020-12-15 DIAGNOSIS — I50.22 CHRONIC SYSTOLIC CONGESTIVE HEART FAILURE (HCC): ICD-10-CM

## 2020-12-15 LAB
ALBUMIN SERPL-MCNC: 4.1 G/DL (ref 3.5–5.2)
ALP BLD-CCNC: 113 U/L (ref 40–130)
ALT SERPL-CCNC: 8 U/L (ref 5–41)
ANION GAP SERPL CALCULATED.3IONS-SCNC: 12 MMOL/L (ref 7–19)
AST SERPL-CCNC: 10 U/L (ref 5–40)
BILIRUB SERPL-MCNC: 0.5 MG/DL (ref 0.2–1.2)
BUN BLDV-MCNC: 47 MG/DL (ref 8–23)
C-REACTIVE PROTEIN: 4.05 MG/DL (ref 0–0.5)
CALCIUM SERPL-MCNC: 9.2 MG/DL (ref 8.8–10.2)
CHLORIDE BLD-SCNC: 103 MMOL/L (ref 98–111)
CHOLESTEROL, TOTAL: 106 MG/DL (ref 160–199)
CO2: 28 MMOL/L (ref 22–29)
CREAT SERPL-MCNC: 2.8 MG/DL (ref 0.5–1.2)
FOLATE: 9.6 NG/ML (ref 4.5–32.2)
GFR AFRICAN AMERICAN: 26
GFR NON-AFRICAN AMERICAN: 22
GLUCOSE BLD-MCNC: 91 MG/DL (ref 74–109)
HCT VFR BLD CALC: 38.7 % (ref 42–52)
HDLC SERPL-MCNC: 63 MG/DL (ref 55–121)
HEMOGLOBIN: 11.2 G/DL (ref 14–18)
LDL CHOLESTEROL CALCULATED: 31 MG/DL
MAGNESIUM: 2.7 MG/DL (ref 1.6–2.4)
MCH RBC QN AUTO: 28.1 PG (ref 27–31)
MCHC RBC AUTO-ENTMCNC: 28.9 G/DL (ref 33–37)
MCV RBC AUTO: 97 FL (ref 80–94)
PDW BLD-RTO: 14.7 % (ref 11.5–14.5)
PLATELET # BLD: 123 K/UL (ref 130–400)
PMV BLD AUTO: 12.6 FL (ref 9.4–12.4)
POTASSIUM SERPL-SCNC: 5.1 MMOL/L (ref 3.5–5)
PRO-BNP: 4434 PG/ML (ref 0–1800)
RBC # BLD: 3.99 M/UL (ref 4.7–6.1)
SEDIMENTATION RATE, ERYTHROCYTE: 26 MM/HR (ref 0–15)
SODIUM BLD-SCNC: 143 MMOL/L (ref 136–145)
T4 FREE: 0.78 NG/DL (ref 0.93–1.7)
TOTAL PROTEIN: 7.5 G/DL (ref 6.6–8.7)
TRIGL SERPL-MCNC: 62 MG/DL (ref 0–149)
TSH SERPL DL<=0.05 MIU/L-ACNC: 3.99 UIU/ML (ref 0.27–4.2)
VITAMIN B-12: 568 PG/ML (ref 211–946)
VITAMIN D 25-HYDROXY: 59.7 NG/ML
WBC # BLD: 5.6 K/UL (ref 4.8–10.8)

## 2020-12-15 PROCEDURE — 4040F PNEUMOC VAC/ADMIN/RCVD: CPT | Performed by: INTERNAL MEDICINE

## 2020-12-15 PROCEDURE — 99214 OFFICE O/P EST MOD 30 MIN: CPT | Performed by: INTERNAL MEDICINE

## 2020-12-15 PROCEDURE — 1123F ACP DISCUSS/DSCN MKR DOCD: CPT | Performed by: INTERNAL MEDICINE

## 2020-12-15 PROCEDURE — 1036F TOBACCO NON-USER: CPT | Performed by: INTERNAL MEDICINE

## 2020-12-15 PROCEDURE — G8420 CALC BMI NORM PARAMETERS: HCPCS | Performed by: INTERNAL MEDICINE

## 2020-12-15 PROCEDURE — G8427 DOCREV CUR MEDS BY ELIG CLIN: HCPCS | Performed by: INTERNAL MEDICINE

## 2020-12-15 PROCEDURE — G8484 FLU IMMUNIZE NO ADMIN: HCPCS | Performed by: INTERNAL MEDICINE

## 2020-12-15 SDOH — ECONOMIC STABILITY: INCOME INSECURITY: HOW HARD IS IT FOR YOU TO PAY FOR THE VERY BASICS LIKE FOOD, HOUSING, MEDICAL CARE, AND HEATING?: NOT HARD AT ALL

## 2020-12-15 SDOH — ECONOMIC STABILITY: FOOD INSECURITY: WITHIN THE PAST 12 MONTHS, YOU WORRIED THAT YOUR FOOD WOULD RUN OUT BEFORE YOU GOT MONEY TO BUY MORE.: NEVER TRUE

## 2020-12-15 SDOH — ECONOMIC STABILITY: TRANSPORTATION INSECURITY
IN THE PAST 12 MONTHS, HAS THE LACK OF TRANSPORTATION KEPT YOU FROM MEDICAL APPOINTMENTS OR FROM GETTING MEDICATIONS?: NOT ASKED

## 2020-12-15 SDOH — ECONOMIC STABILITY: TRANSPORTATION INSECURITY
IN THE PAST 12 MONTHS, HAS LACK OF TRANSPORTATION KEPT YOU FROM MEETINGS, WORK, OR FROM GETTING THINGS NEEDED FOR DAILY LIVING?: NOT ASKED

## 2020-12-15 SDOH — ECONOMIC STABILITY: FOOD INSECURITY: WITHIN THE PAST 12 MONTHS, THE FOOD YOU BOUGHT JUST DIDN'T LAST AND YOU DIDN'T HAVE MONEY TO GET MORE.: NEVER TRUE

## 2020-12-15 NOTE — PROGRESS NOTES
Chief Complaint   Patient presents with    Follow-up     tremors worsening       HPI: Patient is here today for problem visit with tremors worsening. Generalized weakness just declining in general..  Cristo abdullahi has had several falls. Hard of hearing short of breath and weak. Living with family full-time and very debilitated    Past Medical History:   Diagnosis Date    AICD (automatic cardioverter/defibrillator) present     followed by doctor in [address] Arm pain 2/3/2012    Atrial fibrillation ([address]     Benign prostatic hyperplasia with lower urinary tract symptoms 11/16/2017    Benign prostatic hypertrophy     CAD (coronary artery disease)     Chronic kidney disease     Chronic pain 11/7/2017    Gallstones     Hyperlipidemia     Hypertension     Hypoxia 9/6/2020    Idiopathic neuropathy     Insomnia     Osteoarthritis of right knee     Restless legs     Right rotator cuff tear     Ventricular tachycardia ([address]        Past Surgical History:   Procedure Laterality Date    APPENDECTOMY      CARDIAC PACEMAKER PLACEMENT      Bi ventricular pacemaker. [facility] CARDIAC SURGERY      mitral valve, maze procedure, 1 vessel bypass -2008    CAROTID ENDARTERECTOMY      Right carotid endarterectomy.  COLONOSCOPY      CORONARY ARTERY BYPASS GRAFT      1 vessel 2008    MOUTH SURGERY      Oral implants.     PACEMAKER INSERTION      biventricular pacemaker ([facility])       Family History   Problem Relation Age of Onset    Stroke Mother     Heart Attack Mother 80    Diabetes Father     COPD Sister     Arthritis Sister     Stroke Brother     Heart Disease Brother        Social History     Socioeconomic History    Marital status:      Spouse name: Not on file    Number of children: Not on file    Years of education: Not on file    Highest education level: Not on file   Occupational History    Not on file   Social Needs    Financial resource strain: Not hard at all   [facility] Food insecurity     Worry: Never true     Inability: Never true    Transportation needs     Medical: Not on file     Non-medical: Not on file   Tobacco Use    Smoking status: Former Smoker    Smokeless tobacco: Never Used   Substance and Sexual Activity    Alcohol use: No    Drug use: No    Sexual activity: Never     Comment: Marital status - . Lifestyle    Physical activity     Days per week: Not on file     Minutes per session: Not on file    Stress: Not on file   Relationships    Social connections     Talks on phone: Not on file     Gets together: Not on file     Attends Anglican service: Not on file     Active member of club or organization: Not on file     Attends meetings of clubs or organizations: Not on file     Relationship status: Not on file    Intimate partner violence     Fear of current or ex partner: Not on file     Emotionally abused: Not on file     Physically abused: Not on file     Forced sexual activity: Not on file   Other Topics Concern    Not on file   Social History Narrative    Not on file       Allergies   Allergen Reactions    Keflex [Cephalexin] Rash     pruritius       Current Outpatient Medications   Medication Sig Dispense Refill    dutasteride (AVODART) 0.5 MG capsule TAKE 1 CAPSULE BY MOUTH DAILY 30 capsule 1    LORazepam (ATIVAN) 0.5 MG tablet TAKE ONE TABLET BY MOUTH FOUR TIMES A DAY **MAY MAKE DROWSY**. 120 tablet 0    tamsulosin (FLOMAX) 0.4 MG capsule TAKE ONE CAPSULE BY MOUTH EVERY DAY 90 capsule 3    atorvastatin (LIPITOR) 10 MG tablet TAKE ONE TABLET BY MOUTH EVERY DAY 90 tablet 2    HYDROcodone-acetaminophen (NORCO) 5-325 MG per tablet Take 1 tablet by mouth 3 times daily as needed for Pain for up to 30 days. 90 tablet 0    gabapentin (NEURONTIN) 300 MG capsule TAKE 1 CAPSULE BY MOUTH AT 5PM AND TAKE 2 CAPSULES BY MOUTH EVERY BEDTIME.  90 capsule 2    sacubitril-valsartan (ENTRESTO) 24-26 MG per tablet Take 1 tablet by mouth 2 times daily 60 tablet 5    furosemide (LASIX) 40 MG tablet Take 1 tablet by mouth daily as needed (for fluid) (Patient not taking: Reported on 8/25/2020) 60 tablet 11    metoprolol succinate (TOPROL XL) 25 MG extended release tablet Take 25 mg by mouth daily      bumetanide (BUMEX) 0.5 MG tablet Take 0.5 mg by mouth 2 times daily      vitamin D (CHOLECALCIFEROL) 1000 UNIT TABS tablet Take 1,000 Units by mouth daily      nitroGLYCERIN (NITROSTAT) 0.4 MG SL tablet Place 0.4 mg under the tongue      aspirin 81 MG tablet Take 81 mg by mouth daily. No current facility-administered medications for this visit. Review of Systems   Constitutional: Positive for fatigue. Negative for chills and fever. HENT: Negative for congestion and sinus pressure. Eyes: Negative for discharge and redness. Respiratory: Positive for shortness of breath. Negative for cough. Cardiovascular: Positive for leg swelling. Negative for chest pain and palpitations. Gastrointestinal: Negative for abdominal distention and abdominal pain. Genitourinary: Negative for dysuria. Musculoskeletal: Positive for arthralgias, back pain and gait problem. Skin: Negative for rash and wound. Neurological: Positive for tremors and weakness. Negative for dizziness, light-headedness and headaches. Psychiatric/Behavioral: Positive for sleep disturbance. Negative for dysphoric mood. The patient is nervous/anxious.         BP 98/60 (Site: Left Upper Arm)   Pulse 54   Ht 6' (1.829 m)   Wt 165 lb (74.8 kg)   BMI 22.38 kg/m²   BP Readings from Last 7 Encounters:   12/15/20 98/60   08/25/20 120/76   02/18/20 90/60   10/15/19 114/76   06/07/19 126/68   02/07/19 120/76   10/24/18 94/66     Wt Readings from Last 7 Encounters:   12/15/20 165 lb (74.8 kg)   08/25/20 163 lb (73.9 kg)   03/13/20 161 lb (73 kg)   03/12/20 161 lb (73 kg)   02/20/20 166 lb (75.3 kg)   02/18/20 165 lb (74.8 kg)   10/15/19 169 lb (76.7 kg)     BMI Readings from Last 7 Encounters:   12/15/20 22.38 kg/m²   08/25/20 22.11 kg/m²   03/13/20 21.84 kg/m²   03/12/20 21.84 kg/m²   02/20/20 21.90 kg/m²   02/18/20 22.38 kg/m²   10/15/19 22.92 kg/m²     Resp Readings from Last 7 Encounters:   08/26/18 16   11/09/17 18   01/31/16 20       Physical Exam  Vitals signs reviewed. Constitutional:       General: He is not in acute distress. Comments: Very hard of hearing   HENT:      Nose: Nose normal.   Eyes:      General: No scleral icterus. Neck:      Musculoskeletal: Neck supple. Cardiovascular:      Heart sounds: Normal heart sounds. Pulmonary:      Breath sounds: Normal breath sounds. Musculoskeletal:         General: Swelling present. Comments: Trace minimal swelling   Lymphadenopathy:      Cervical: No cervical adenopathy. Skin:     Findings: No rash.       Comments: Very pale   Neurological:      Comments: Hard of hearing but also seems a little confused   Psychiatric:      Comments: Tired         Results for orders placed or performed in visit on 08/25/20   Magnesium   Result Value Ref Range    Magnesium 2.3 1.6 - 2.4 mg/dL   Uric Acid   Result Value Ref Range    Uric Acid, Serum 9.3 (H) 3.4 - 7.0 mg/dL   Ferritin   Result Value Ref Range    Ferritin 59.4 30.0 - 400.0 ng/mL   Iron   Result Value Ref Range    Iron 72 59 - 158 ug/dL   TSH without Reflex   Result Value Ref Range    TSH 5.600 (H) 0.270 - 4.200 uIU/mL   Comprehensive Metabolic Panel   Result Value Ref Range    Sodium 144 136 - 145 mmol/L    Potassium 4.7 3.5 - 5.0 mmol/L    Chloride 103 98 - 111 mmol/L    CO2 28 22 - 29 mmol/L    Anion Gap 13 7 - 19 mmol/L    Glucose 107 74 - 109 mg/dL    BUN 41 (H) 8 - 23 mg/dL    CREATININE 2.2 (H) 0.5 - 1.2 mg/dL    GFR Non-African American 29 (A) >60    GFR  35 (L) >59    Calcium 9.7 8.8 - 10.2 mg/dL    Total Protein 7.7 6.6 - 8.7 g/dL    Alb 4.5 3.5 - 5.2 g/dL    Total Bilirubin 0.9 0.2 - 1.2 mg/dL    Alkaline Phosphatase 109 40 - 130 U/L    ALT 9 5 - 41 U/L    AST 11 5 - 40 U/L   CBC   Result Value Ref Range    WBC 8.0 4.8 - 10.8 K/uL    RBC 4.56 (L) 4.70 - 6.10 M/uL    Hemoglobin 13.0 (L) 14.0 - 18.0 g/dL    Hematocrit 43.8 42.0 - 52.0 %    MCV 96.1 (H) 80.0 - 94.0 fL    MCH 28.5 27.0 - 31.0 pg    MCHC 29.7 (L) 33.0 - 37.0 g/dL    RDW 15.1 (H) 11.5 - 14.5 %    Platelets 480 637 - 047 K/uL    MPV 12.1 9.4 - 12.4 fL   Creatinine, Random Urine   Result Value Ref Range    Creatinine, Ur 77.0 4.2 - 622.0 mg/dL       ASSESSMENT/ PLAN:  1. Tremor  Fine tremor we will check his labs may be medication related also has chronic kidney disease some of his meds are probably not metabolizing his high-dose benzos or narcotics has been for many years we actually taper them back. - Magnesium; Future    2. Frequent falls  Chronically debilitated but also may be medication related    3. Stage 4 chronic kidney disease (HCC)    - CBC; Future  - Comprehensive Metabolic Panel; Future  - Lipid Panel; Future    4. Chronic systolic congestive heart failure (HCC)  Difficult situation with severe CHF and stage IV CKD  - TSH without Reflex; Future  - T4, Free; Future  - Brain Natriuretic Peptide; Future  - Sedimentation Rate; Future  - C-Reactive Protein; Future    5. Anemia, unspecified type  Appears much more pale today recheck  - Vitamin B12; Future  - Folate; Future    6. Vitamin D deficiency    - Vitamin D 25 Hydroxy; Future    Chart medications labs reviewed.

## 2020-12-17 ENCOUNTER — CLINICAL SUPPORT NO REQUIREMENTS (OUTPATIENT)
Dept: CARDIOLOGY | Facility: CLINIC | Age: 85
End: 2020-12-17

## 2020-12-17 ENCOUNTER — OFFICE VISIT (OUTPATIENT)
Dept: CARDIOLOGY | Facility: CLINIC | Age: 85
End: 2020-12-17

## 2020-12-17 VITALS
HEART RATE: 110 BPM | HEIGHT: 73 IN | SYSTOLIC BLOOD PRESSURE: 120 MMHG | OXYGEN SATURATION: 97 % | DIASTOLIC BLOOD PRESSURE: 80 MMHG | BODY MASS INDEX: 22.13 KG/M2 | WEIGHT: 167 LBS

## 2020-12-17 DIAGNOSIS — I47.20 VT (VENTRICULAR TACHYCARDIA) (HCC): ICD-10-CM

## 2020-12-17 DIAGNOSIS — I50.22 CHRONIC SYSTOLIC CONGESTIVE HEART FAILURE (HCC): Primary | ICD-10-CM

## 2020-12-17 DIAGNOSIS — Z95.810 BIVENTRICULAR ICD (IMPLANTABLE CARDIOVERTER-DEFIBRILLATOR) IN PLACE: Primary | ICD-10-CM

## 2020-12-17 DIAGNOSIS — I25.810 CORONARY ARTERY DISEASE INVOLVING CORONARY BYPASS GRAFT OF NATIVE HEART WITHOUT ANGINA PECTORIS: ICD-10-CM

## 2020-12-17 DIAGNOSIS — I48.21 PERMANENT ATRIAL FIBRILLATION (HCC): ICD-10-CM

## 2020-12-17 DIAGNOSIS — Z98.890 H/O MITRAL VALVE REPAIR: ICD-10-CM

## 2020-12-17 DIAGNOSIS — Z95.810 BIVENTRICULAR ICD (IMPLANTABLE CARDIOVERTER-DEFIBRILLATOR) IN PLACE: ICD-10-CM

## 2020-12-17 DIAGNOSIS — N18.4 STAGE 4 CHRONIC KIDNEY DISEASE (HCC): ICD-10-CM

## 2020-12-17 DIAGNOSIS — Z74.09 IMPAIRED FUNCTIONAL MOBILITY, BALANCE, GAIT, AND ENDURANCE: ICD-10-CM

## 2020-12-17 PROBLEM — I48.0 PAROXYSMAL ATRIAL FIBRILLATION (HCC): Status: RESOLVED | Noted: 2020-01-26 | Resolved: 2020-12-17

## 2020-12-17 PROBLEM — Z45.02 BIVENTRICULAR IMPLANTABLE CARDIOVERTER-DEFIBRILLATOR (ICD) AT END OF DEVICE LIFE: Status: RESOLVED | Noted: 2020-05-13 | Resolved: 2020-12-17

## 2020-12-17 PROCEDURE — 93000 ELECTROCARDIOGRAM COMPLETE: CPT | Performed by: INTERNAL MEDICINE

## 2020-12-17 PROCEDURE — 93289 INTERROG DEVICE EVAL HEART: CPT | Performed by: INTERNAL MEDICINE

## 2020-12-17 PROCEDURE — 99215 OFFICE O/P EST HI 40 MIN: CPT | Performed by: INTERNAL MEDICINE

## 2020-12-17 RX ORDER — FUROSEMIDE 20 MG/1
20 TABLET ORAL AS NEEDED
COMMUNITY

## 2020-12-17 NOTE — PROGRESS NOTES
"     Subjective:     Encounter Date:12/17/2020      Patient ID: Wild Bravo is a 86 y.o. male.    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  He is here for routine follow-up with his son-in-law, Ochoa Cai.  Encounter significantly limited by Catherine Lawrence's difficulty hearing.  Ochoa expresses that Catherine OrtizLawrence has been \"going downhill\", getting much more weak and falling.  Patient states he is falling due to right leg weakness.  Overall, patient is angry that his narcotics have been decreased from 4 times a day to 3 times a day, though I explained to him I did not make this decision was not sure why it was done.  Regarding his extensive and chronic cardiac conditions, updates as per below:    Chronic CHF: Last EF 25-30% on echocardiogram here in January 2020.  Continues to take Bumex 0.5 mg daily.  In the past, has always insisted that he take extra Lasix on top of the Bumex if he was feeling more abdominal bloating or swelling.  However, he reports he has not needed to do this \"in a long time.\"  He denies any change in his breathing, denies any peripheral swelling, and states that his weight has been stable.    CAD: Stable.  No angina.    Atrial fibrillation: No palpitations or other symptomatic complaints.  Device interrogation does reveal that, despite prior conversion (without therapy) from permanent back to paroxysmal atrial fibrillation, it does appear now as though he is permanently been in atrial fibrillation since at least September 2020.    The following portions of the patient's history were reviewed and updated as appropriate: allergies, current medications, past family history, past medical history, past social history, past surgical history and problem list.    Review of Systems   Constitution: Negative for malaise/fatigue.   HENT: Positive for hearing loss.    Cardiovascular: Negative for chest pain, " claudication, dyspnea on exertion, leg swelling, near-syncope, orthopnea, palpitations, paroxysmal nocturnal dyspnea and syncope.   Respiratory: Negative for shortness of breath.    Hematologic/Lymphatic: Does not bruise/bleed easily.   Musculoskeletal: Positive for falls.   Neurological: Positive for focal weakness (rt leg) and weakness.         Current Outpatient Medications:   •  aspirin 81 MG EC tablet, Take 81 mg by mouth Daily., Disp: , Rfl:   •  atorvastatin (LIPITOR) 10 MG tablet, Take 1 tablet by mouth Every Night., Disp: , Rfl:   •  bumetanide (BUMEX) 0.5 MG tablet, Take 1 tablet by mouth 2 (Two) Times a Day. Take an extra tablet daily as needed for increased shortness of breath, edema and/or weight gain, Disp: 270 tablet, Rfl: 3  •  dutasteride (AVODART) 0.5 MG capsule, Take 0.5 mg by mouth Daily. Patient takes at noon, Disp: , Rfl:   •  furosemide (LASIX) 20 MG tablet, Take 20 mg by mouth As Needed., Disp: , Rfl:   •  gabapentin (NEURONTIN) 300 MG capsule, Take 300 mg by mouth Daily., Disp: , Rfl:   •  HYDROcodone-acetaminophen (NORCO) 5-325 MG per tablet, Take 1 tablet by mouth Every 6 (Six) Hours As Needed for Moderate Pain ., Disp: , Rfl:   •  LORazepam (ATIVAN) 0.5 MG tablet, Take 0.5 mg by mouth 3 (Three) Times a Day As Needed., Disp: , Rfl:   •  metoprolol succinate XL (TOPROL-XL) 25 MG 24 hr tablet, Take 1 tablet by mouth Every Night., Disp: 90 tablet, Rfl: 4  •  nitroglycerin (NITROSTAT) 0.4 MG SL tablet, PLACE 1 TABLET UNDER THE TONGUE AS NEEDED FOR ANGINA, MAY REPEAT EVERY 5 MINS FOR UP THREE DOSES, Disp: 25 tablet, Rfl: 3  •  sacubitril-valsartan (ENTRESTO) 24-26 MG tablet, Take 1 tablet by mouth 2 (Two) Times a Day., Disp: 60 tablet, Rfl: 1  •  tamsulosin (FLOMAX) 0.4 MG capsule 24 hr capsule, Take 1 capsule by mouth Every Night., Disp: , Rfl:        Objective:      Vitals:    12/17/20 1526   BP: 120/80   Pulse: 110   SpO2: 97%     Vitals signs and nursing note reviewed.   Constitutional:        General: Not in acute distress.     Appearance: Not in distress. Frail. Chronically ill-appearing.   Neck:      Vascular: No JVD or JVR. JVD normal.   Pulmonary:      Effort: Pulmonary effort is normal.      Breath sounds: Normal breath sounds.   Cardiovascular:      Normal rate. Regular rhythm.      Murmurs: There is no murmur.      No gallop.   Pulses:     Intact distal pulses.   Edema:     Peripheral edema absent.   Abdominal:      Palpations: Abdomen is soft.      Tenderness: There is no abdominal tenderness.   Skin:     General: Skin is warm and dry.      Comments: +increased turgor.  Very dry   Neurological:      Mental Status: Alert, oriented to person, place, and time and oriented to person, place and time.         Lab Review:         ECG 12 Lead    Date/Time: 12/17/2020 4:34 PM  Performed by: Raleigh Enciso MD  Authorized by: Raleigh Enciso MD   Comparison: compared with previous ECG from 1/26/2020  Similar to previous ECG  Rhythm: paced  Ectopy: unifocal PVCs  BPM: 110  Pacing: ventricular paced rhythm  Clinical impression: abnormal EKG            LABS REVIEWED:              LAST ECHO:  Results for orders placed during the hospital encounter of 01/26/20   STAT Adult Transthoracic Echo Complete W/ Cont if Necessary Per Protocol    Narrative · Estimated EF appears to be in the range of 26 - 30%  · Left ventricular wall thickness is consistent with hypertrophy.  · Left ventricular diastolic dysfunction.  · Left atrial cavity size is severely dilated.  · Mild dilation of the aortic root is present.  · Mild pulmonary hypertension is present.     ___________________________________________________________________________  _________________________________________  Prior echo report as below for comparison      Wild Bravo   Echocardiogram   Order# [id number]   Reading physician: Karthik Macdonald MD Ordering physician: Waleska Sifuentes APRN   Study date: 9/8/17   Patient Information     Patient Name  Wild  SILVINA Bravo MRN  [id number] Sex  Male  (Age)  1934 (85 y.o.)   Sedation Narrator Report     Sedation Narrator Report   Interpretation Summary     · Left ventricular systolic function is moderately decreased. Estimated EF   = 35%.  · Left ventricular diastolic dysfunction (grade I) consistent with   impaired relaxation.  · Mild to moderate aortic valve regurgitation is present.  · Mild pulmonary hypertension is present.              Today's device interrogation reviewed: 2% atrial pacing, 85% RV pacing, 95% LV pacing.  100% burden of atrial fibrillation since at least 2020.  Heart logic heart failure index up to 19.  Assessment/Plan:     Problem List Items Addressed This Visit (all established)       Cardiovascular and Mediastinum    Chronic systolic congestive heart failure (CMS/HCC) - Primary    Overview     LVEF 35% on echo here in , but was reported as 25% on echocardiogram performed during admission at [facility] in 2018; has BiV-ICD.  Last echo at [facility] 2020 showed EF 25-30%.         Biventricular ICD (implantable cardioverter-defibrillator) in place    Permanent atrial fibrillation (CMS/HCC): Has been back in atrial fibrillation permanently (according to device interpretation) since at least 2020.  No options for antiarrhythmics other than amiodarone, tolerance to this in the past.    Overview     CHADS-VASc Risk Assessment            7       Total Score        1 CHF    1 Hypertension    2 Age >/= 75    2 PRIOR STROKE/TIA/THROMBO    1 Vascular Disease        Criteria that do not apply:    DM    Age 65-74    Sex: Female        MAZE done at time of MV repair, also with one subsequent attempt at catheter-based ablation.  Also reports prior intolerance to amiodarone - it is unclear what intolerance was.    Has refused watchman implant in the past; not be anticoagulated due to prior significant GI bleeding.                Genitourinary    Acute kidney injury on stage 4  chronic kidney disease (CMS/[facility]): Creatinine worse on recent labs (up to 2.8, with last known value of 2.2)       Other    Coronary artery disease involving coronary bypass graft of native heart without angina pectoris: Stable; no angina    Overview     MI x2  Single vessel CABG 2008         H/O mitral valve repair    Impaired functional mobility, balance, gait, and endurance: Worsening            Recommendations/plans:  Patient's recent labs showed creatinine up to 2.8.  proBNP elevated as well.  Heart logic score from defibrillator score has been increasing.  However, by way of history, he is not complaining of any worsening dyspnea nor abdominal girth, which he typically reports when he is retaining fluid.  His weight has remained stable.  Otherwise, on exam, he looks incredibly dry.      I advised hydration I encouraged him to drink water ad myles., but if he starts to notice weight gain or increased abdominal distention, to back off.  Otherwise, continue all current medical therapies for chronic systolic heart failure with low-dose Entresto, Toprol-XL, and daily bumetanide.  His renal function at this point would not allow further adjustment or titration of these medications.  I did advise that they call his nephrologist office back to request evaluation.    Overall, he seems to be increasingly frail.  With the severity of his heart failure, and worsening creatinine, I did discuss with the patient and his son-in-law what his goals of care are.  His main frustration seems to be the fact that his narcotic was decreased from 4 times daily down to 3 times daily.  He is also frustrated that he has been told not to drink liquids when he is thirsty.  I suggested that a palliative approach to his care might be beneficial at this point, as neither his cardiac nor renal degrees of dysfunction appear completely salvageable.  The discussion was significantly limited by mask-wearing and the patient's difficulty hearing, but I  did discuss separately as well with his son-in-law and encouraged family discussions regarding overall goals of care.      Raleigh Enciso MD  12/17/2020  16:32 CST

## 2020-12-18 ENCOUNTER — TELEPHONE (OUTPATIENT)
Dept: INTERNAL MEDICINE | Age: 85
End: 2020-12-18

## 2020-12-18 NOTE — TELEPHONE ENCOUNTER
His daughter is calling about lab results. Cardiologist said CHF is worse and discussed palliative care. She would like your opinion.   [phone]

## 2020-12-18 NOTE — PROGRESS NOTES
Bi-V AICD Evaluation Report  IN OFFICE INTERROGATION    December 17, 2020    Primary Cardiologist: Zaria  : Guidant Model: Momentum CRT-D G125  Implant date: 6/9/2020    Reason for evaluation: routine  Indication for AICD: Ventricular tachycardia    Measurements  Atrial sensing:  P wave: 0.5 mV  Atrial threshold: N/P - in AF  Atrial lead impedance: 557 ohms  Ventricular sensing:  R wave: 6.5 mV  RV Threshold:  2V @ 0.4 ms  RV Impedance:  340 ohms  Shock impedance:  RV 38 ohms  LV Threshold:  N/P  LV Impedance:  803 ohms      Diagnostic Data  Atrial paced: 2 %  Ventricular paced: RV 85%, LV 95%  Other: AF burden 100% since September 2020.  NS-VT x 2, longest duration 5 seconds, rates up to 207 bpm.  No ATP or shocks.  Battery status: satisfactory   Estimated 8 years remaining      Final Parameters  Mode: DDDR     Lower rate: 70 bpm   Upper rate: 125 bpm  AV Delay: 170-180 ms paced    115-120 ms sensed   Atrial - Amplitude: 2.2 V   Pulse width: 0.4 ms   Sensitivity: 0.15 mV   Ventricular:  RV Amplitude: 2.4 V @ 0.4 ms   Sensitivity: 0.6 mV            LV Amplitude:  1.5V @ 0.4ms  Changes made: No changes made.  Conclusions: normal AICD function, no therapy delivered and adequate battery reserve    Follow up: Every 3 months via latitude, annually in office.

## 2020-12-21 NOTE — TELEPHONE ENCOUNTER
She called today and would like to discuss maybe ordering Hospice. She is aware of call will be after 4:30 today.

## 2020-12-23 ENCOUNTER — TELEPHONE (OUTPATIENT)
Dept: INTERNAL MEDICINE | Age: 85
End: 2020-12-23

## 2020-12-27 ASSESSMENT — ENCOUNTER SYMPTOMS
COUGH: 0
ABDOMINAL PAIN: 0
ABDOMINAL DISTENTION: 0
SINUS PRESSURE: 0
BACK PAIN: 1
EYE REDNESS: 0
SHORTNESS OF BREATH: 1
EYE DISCHARGE: 0

## 2021-01-05 ENCOUNTER — OFFICE VISIT (OUTPATIENT)
Dept: UROLOGY | Facility: CLINIC | Age: 86
End: 2021-01-05

## 2021-01-05 VITALS — BODY MASS INDEX: 22.15 KG/M2 | TEMPERATURE: 97.2 F | HEIGHT: 73 IN | WEIGHT: 167.11 LBS

## 2021-01-05 DIAGNOSIS — N39.0 URINARY TRACT INFECTION WITHOUT HEMATURIA, SITE UNSPECIFIED: Primary | ICD-10-CM

## 2021-01-05 DIAGNOSIS — R30.0 DYSURIA: Primary | ICD-10-CM

## 2021-01-05 DIAGNOSIS — R35.0 FREQUENCY OF URINATION: ICD-10-CM

## 2021-01-05 LAB
BILIRUB BLD-MCNC: NEGATIVE MG/DL
CLARITY, POC: ABNORMAL
COLOR UR: YELLOW
GLUCOSE UR STRIP-MCNC: NEGATIVE MG/DL
KETONES UR QL: NEGATIVE
LEUKOCYTE EST, POC: ABNORMAL
NITRITE UR-MCNC: NEGATIVE MG/ML
PH UR: 6 [PH] (ref 5–8)
PROT UR STRIP-MCNC: ABNORMAL MG/DL
RBC # UR STRIP: ABNORMAL /UL
SP GR UR: 1.01 (ref 1–1.03)
UROBILINOGEN UR QL: NORMAL

## 2021-01-05 PROCEDURE — 87086 URINE CULTURE/COLONY COUNT: CPT | Performed by: PHYSICIAN ASSISTANT

## 2021-01-05 PROCEDURE — 81003 URINALYSIS AUTO W/O SCOPE: CPT | Performed by: PHYSICIAN ASSISTANT

## 2021-01-05 PROCEDURE — 99024 POSTOP FOLLOW-UP VISIT: CPT | Performed by: PHYSICIAN ASSISTANT

## 2021-01-05 RX ORDER — SULFAMETHOXAZOLE AND TRIMETHOPRIM 400; 80 MG/1; MG/1
1 TABLET ORAL 2 TIMES DAILY
Qty: 20 TABLET | Refills: 0 | Status: SHIPPED | OUTPATIENT
Start: 2021-01-05 | End: 2021-01-15

## 2021-01-05 NOTE — PROGRESS NOTES
Subjective    Mr. Bravo is 86 y.o. male    Chief Complaint: UTI symptoms:    History of Present Illness  Patient is a 86-year-old gentleman who is my father-in-law and I have seen in the past at [facility] urology with worsening frequency and burning with urination the last 2 to 3 days.  Patient does have history of enlarged prostate he is on Avodart and tamsulosin.  Patient has A. fib end-stage CHF and renal disease patient not able to travel outside the house due to fear of Covid and overall health condition as well as being extremely hard of hearing.  Due to this fact patient had urine brought in from home urine was checked here to assess for urinary tract infection.  Patient was not seen here in the office today.      The following portions of the patient's history were reviewed and updated as appropriate: allergies, current medications, past family history, past medical history, past social history, past surgical history and problem list.    Review of Systems   Constitutional: Negative for appetite change and fever.   HENT: Negative for hearing loss and sore throat.    Eyes: Negative for pain and redness.   Respiratory: Negative for cough and shortness of breath.    Cardiovascular: Negative for chest pain and leg swelling.   Gastrointestinal: Negative for anal bleeding, nausea and vomiting.   Endocrine: Negative for cold intolerance and heat intolerance.   Genitourinary: Positive for dysuria and frequency. Negative for flank pain and hematuria.   Musculoskeletal: Negative for joint swelling and myalgias.   Skin: Negative for color change and rash.   Allergic/Immunologic: Negative for immunocompromised state.   Neurological: Negative for dizziness and speech difficulty.   Hematological: Negative for adenopathy. Does not bruise/bleed easily.   Psychiatric/Behavioral: Negative for dysphoric mood and suicidal ideas.   Review of systems based on my knowledge of the patient and his current health status      Current  Outpatient Medications:   •  aspirin 81 MG EC tablet, Take 81 mg by mouth Daily., Disp: , Rfl:   •  atorvastatin (LIPITOR) 10 MG tablet, Take 1 tablet by mouth Every Night., Disp: , Rfl:   •  bumetanide (BUMEX) 0.5 MG tablet, Take 1 tablet by mouth 2 (Two) Times a Day. Take an extra tablet daily as needed for increased shortness of breath, edema and/or weight gain, Disp: 270 tablet, Rfl: 3  •  dutasteride (AVODART) 0.5 MG capsule, Take 0.5 mg by mouth Daily. Patient takes at noon, Disp: , Rfl:   •  furosemide (LASIX) 20 MG tablet, Take 20 mg by mouth As Needed., Disp: , Rfl:   •  gabapentin (NEURONTIN) 300 MG capsule, Take 300 mg by mouth Daily., Disp: , Rfl:   •  HYDROcodone-acetaminophen (NORCO) 5-325 MG per tablet, Take 1 tablet by mouth Every 6 (Six) Hours As Needed for Moderate Pain ., Disp: , Rfl:   •  LORazepam (ATIVAN) 0.5 MG tablet, Take 0.5 mg by mouth 3 (Three) Times a Day As Needed., Disp: , Rfl:   •  metoprolol succinate XL (TOPROL-XL) 25 MG 24 hr tablet, Take 1 tablet by mouth Every Night., Disp: 90 tablet, Rfl: 4  •  nitroglycerin (NITROSTAT) 0.4 MG SL tablet, PLACE 1 TABLET UNDER THE TONGUE AS NEEDED FOR ANGINA, MAY REPEAT EVERY 5 MINS FOR UP THREE DOSES, Disp: 25 tablet, Rfl: 3  •  sacubitril-valsartan (ENTRESTO) 24-26 MG tablet, Take 1 tablet by mouth 2 (Two) Times a Day., Disp: 60 tablet, Rfl: 1  •  sulfamethoxazole-trimethoprim (Bactrim) 400-80 MG tablet, Take 1 tablet by mouth 2 (Two) Times a Day for 10 days., Disp: 20 tablet, Rfl: 0  •  tamsulosin (FLOMAX) 0.4 MG capsule 24 hr capsule, Take 1 capsule by mouth Every Night., Disp: , Rfl:     Past Medical History:   Diagnosis Date   • Abdominal aortic aneurysm (CMS/HCC)    • Anxiety    • Atrial fibrillation (CMS/HCC)    • Biventricular ICD (implantable cardioverter-defibrillator) in place    • BPH (benign prostatic hypertrophy)    • Carotid artery stenosis    • CHF (congestive heart failure) (CMS/HCC)    • Chronic kidney disease     Chronic kidney  disease, stage 4 (severe)   • Chronic systolic (congestive) heart failure (CMS/HCC)     limited echo 7/2016 EF was 40-45%. Patient has BiV AICD   • Colon obstruction (CMS/HCC)    • Coronary arteriosclerosis     MI x2   • Hearing loss    • Iron deficiency anemia    • Ischemic cardiomyopathy    • Mixed hyperlipidemia    • Myocardial infarction (CMS/HCC)    • Stroke (CMS/HCC)        Past Surgical History:   Procedure Laterality Date   • ABLATION OF DYSRHYTHMIC FOCUS  2008    MAZE at time of CABG/MVR   • CARDIAC ABLATION     • CARDIAC CATHETERIZATION     • CARDIAC DEFIBRILLATOR PLACEMENT     • CARDIAC ELECTROPHYSIOLOGY PROCEDURE N/A 6/9/2020    Procedure: ICD battery change;  Surgeon: Raleigh Enciso MD;  Location:  [facility] CATH INVASIVE LOCATION;  Service: Cardiovascular;  Laterality: N/A;   • CARDIAC PACEMAKER PLACEMENT     • CARDIOVERSION     • CAROTID ENDARTERECTOMY     • CAROTID ENDARTERECTOMY     • CORONARY ARTERY BYPASS GRAFT  2008    X 1   • CORONARY STENT PLACEMENT  2008    X 2   • LAPAROSCOPIC LYSIS OF ADHESIONS N/A 1/28/2020    Procedure: LAPAROSCOPIC LYSIS OF ADHESIONS;  Surgeon: Kim Almeida MD;  Location:  [facility] OR;  Service: General   • MITRAL VALVE REPAIR/REPLACEMENT  2008   • TOTAL KNEE ARTHROPLASTY         Social History     Socioeconomic History   • Marital status:      Spouse name: Not on file   • Number of children: Not on file   • Years of education: Not on file   • Highest education level: Not on file   Tobacco Use   • Smoking status: Former Smoker     Years: 45.00     Types: Cigarettes   • Smokeless tobacco: Never Used   Substance and Sexual Activity   • Alcohol use: No   • Drug use: No   • Sexual activity: Defer       Family History   Problem Relation Age of Onset   • Stroke Mother    • Heart disease Mother    • Diabetes Father        Objective      Physical Exam  Patient not physically seen in the office      Results for orders placed or performed in visit on 01/05/21   Urine Culture  - Urine, Urine, Random Void    Specimen: Urine, Random Void   Result Value Ref Range    Urine Culture No growth    POC Urinalysis Dipstick, Multipro    Specimen: Urine   Result Value Ref Range    Color Yellow Yellow, Straw, Dark Yellow, Aminta    Clarity, UA Cloudy (A) Clear    Glucose, UA Negative Negative, 1000 mg/dL (3+) mg/dL    Bilirubin Negative Negative    Ketones, UA Negative Negative    Specific Gravity  1.015 1.005 - 1.030    Blood, UA 1+ (A) Negative    pH, Urine 6.0 5.0 - 8.0    Protein, POC 2+ (A) Negative mg/dL    Urobilinogen, UA Normal Normal    Nitrite, UA Negative Negative    Leukocytes Large (3+) (A) Negative   Urine sent for culture and sensitivity  Assessment and Plan    Diagnoses and all orders for this visit:    1. Urinary tract infection without hematuria, site unspecified (Primary)  -     POC Urinalysis Dipstick, Multipro  -     Urine Culture - Urine, Urine, Random Void; Future  -     Urine Culture - Urine, Urine, Random Void    Patient was not seen here in the office urinalysis was brought in from home directly to the clinic it was checked and sent for urine culture and sensitivity has been having worsening frequency and burning with urination has history of enlarged prostate.  Due to his poor health condition and inability to leave the home did not want to come to the office to be physically seen although I live with the patient and have good knowledge of his health condition I have also seen him as a patient at [facility] urology.

## 2021-01-06 DIAGNOSIS — G60.9 IDIOPATHIC NEUROPATHY: ICD-10-CM

## 2021-01-06 DIAGNOSIS — F41.9 ANXIETY: ICD-10-CM

## 2021-01-06 DIAGNOSIS — G89.4 CHRONIC PAIN SYNDROME: ICD-10-CM

## 2021-01-06 LAB — BACTERIA SPEC AEROBE CULT: NO GROWTH

## 2021-01-08 ENCOUNTER — TELEPHONE (OUTPATIENT)
Dept: INTERNAL MEDICINE | Age: 86
End: 2021-01-08

## 2021-01-08 RX ORDER — LORAZEPAM 0.5 MG/1
TABLET ORAL
Qty: 120 TABLET | Refills: 0 | Status: SHIPPED | OUTPATIENT
Start: 2021-01-08 | End: 2021-02-04

## 2021-01-08 RX ORDER — HYDROCODONE BITARTRATE AND ACETAMINOPHEN 5; 325 MG/1; MG/1
1 TABLET ORAL 3 TIMES DAILY PRN
Qty: 90 TABLET | Refills: 0 | Status: SHIPPED | OUTPATIENT
Start: 2021-01-08 | End: 2021-02-04 | Stop reason: SDUPTHER

## 2021-01-08 NOTE — TELEPHONE ENCOUNTER
[facility] is calling to see if ativan can be sent in to them today and also [place] [facility] so [facility] can fill box.   Meds are pended

## 2021-01-14 ENCOUNTER — TELEPHONE (OUTPATIENT)
Dept: INTERNAL MEDICINE | Age: 86
End: 2021-01-14

## 2021-01-14 ENCOUNTER — OFFICE VISIT (OUTPATIENT)
Dept: UROLOGY | Facility: CLINIC | Age: 86
End: 2021-01-14

## 2021-01-14 VITALS — WEIGHT: 167 LBS | HEIGHT: 73 IN | TEMPERATURE: 97.5 F | BODY MASS INDEX: 22.13 KG/M2

## 2021-01-14 DIAGNOSIS — R33.8 ACUTE URINARY RETENTION: Primary | ICD-10-CM

## 2021-01-14 LAB
BILIRUB BLD-MCNC: NEGATIVE MG/DL
CLARITY, POC: ABNORMAL
COLOR UR: ABNORMAL
GLUCOSE UR STRIP-MCNC: NEGATIVE MG/DL
KETONES UR QL: NEGATIVE
LEUKOCYTE EST, POC: ABNORMAL
NITRITE UR-MCNC: NEGATIVE MG/ML
PH UR: 6.5 [PH] (ref 5–8)
PROT UR STRIP-MCNC: ABNORMAL MG/DL
RBC # UR STRIP: ABNORMAL /UL
SP GR UR: 1.01 (ref 1–1.03)
UROBILINOGEN UR QL: NORMAL

## 2021-01-14 PROCEDURE — 51798 US URINE CAPACITY MEASURE: CPT | Performed by: UROLOGY

## 2021-01-14 PROCEDURE — 81003 URINALYSIS AUTO W/O SCOPE: CPT | Performed by: UROLOGY

## 2021-01-14 PROCEDURE — 99212 OFFICE O/P EST SF 10 MIN: CPT | Performed by: UROLOGY

## 2021-01-14 NOTE — PROGRESS NOTES
Mr. Bravo is 86 y.o. 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  He is essentially incontinent.  He has a long history of BPH with obstruction and has gone into retention in the past.  He has never had surgical intervention for his BPH.  He is however on maximal medical therapy with tamsulosin 0.4 mg and dutasteride 0.5 mg titrating the dose upward of the former is probably not a reasonable option given his propensity to dizziness and falls.  He has significant issues with tremors     The following portions of the patient's history were reviewed and updated as appropriate: allergies, current medications, past family history, past medical history, past social history, past surgical history and problem list.      Review of Systems   Constitutional: Negative for appetite change and fever.   HENT: Negative for hearing loss and sore throat.    Eyes: Negative for pain and redness.   Respiratory: Negative for cough and shortness of breath.    Cardiovascular: Negative for chest pain and leg swelling.   Gastrointestinal: Negative for anal bleeding, nausea and vomiting.   Endocrine: Negative for cold intolerance and heat intolerance.   Genitourinary: Negative for dysuria, flank pain and hematuria.   Musculoskeletal: Negative for joint swelling and myalgias.   Skin: Negative for color change and rash.   Allergic/Immunologic: Negative for immunocompromised state.   Neurological: Negative for dizziness and speech difficulty.   Hematological: Negative for adenopathy. Does not bruise/bleed easily.   Psychiatric/Behavioral: Negative for dysphoric mood and suicidal ideas.         Current Outpatient Medications:   •  aspirin 81 MG EC tablet, Take 81 mg by mouth Daily., Disp: , Rfl:   •  atorvastatin (LIPITOR) 10 MG tablet, Take 1 tablet by mouth Every Night., Disp: , Rfl:   •  bumetanide (BUMEX) 0.5 MG  tablet, Take 1 tablet by mouth 2 (Two) Times a Day. Take an extra tablet daily as needed for increased shortness of breath, edema and/or weight gain, Disp: 270 tablet, Rfl: 3  •  dutasteride (AVODART) 0.5 MG capsule, Take 0.5 mg by mouth Daily. Patient takes at noon, Disp: , Rfl:   •  furosemide (LASIX) 20 MG tablet, Take 20 mg by mouth As Needed., Disp: , Rfl:   •  gabapentin (NEURONTIN) 300 MG capsule, Take 300 mg by mouth Daily., Disp: , Rfl:   •  HYDROcodone-acetaminophen (NORCO) 5-325 MG per tablet, Take 1 tablet by mouth Every 6 (Six) Hours As Needed for Moderate Pain ., Disp: , Rfl:   •  LORazepam (ATIVAN) 0.5 MG tablet, Take 0.5 mg by mouth 3 (Three) Times a Day As Needed., Disp: , Rfl:   •  metoprolol succinate XL (TOPROL-XL) 25 MG 24 hr tablet, Take 1 tablet by mouth Every Night., Disp: 90 tablet, Rfl: 4  •  nitroglycerin (NITROSTAT) 0.4 MG SL tablet, PLACE 1 TABLET UNDER THE TONGUE AS NEEDED FOR ANGINA, MAY REPEAT EVERY 5 MINS FOR UP THREE DOSES, Disp: 25 tablet, Rfl: 3  •  sacubitril-valsartan (ENTRESTO) 24-26 MG tablet, Take 1 tablet by mouth 2 (Two) Times a Day., Disp: 60 tablet, Rfl: 1  •  tamsulosin (FLOMAX) 0.4 MG capsule 24 hr capsule, Take 1 capsule by mouth Every Night., Disp: , Rfl:     Past Medical History:   Diagnosis Date   • Abdominal aortic aneurysm (CMS/HCC)    • Anxiety    • Atrial fibrillation (CMS/HCC)    • Biventricular ICD (implantable cardioverter-defibrillator) in place    • BPH (benign prostatic hypertrophy)    • Carotid artery stenosis    • CHF (congestive heart failure) (CMS/HCC)    • Chronic kidney disease     Chronic kidney disease, stage 4 (severe)   • Chronic systolic (congestive) heart failure (CMS/HCC)     limited echo 7/2016 EF was 40-45%. Patient has BiV AICD   • Colon obstruction (CMS/HCC)    • Coronary arteriosclerosis     MI x2   • Hearing loss    • Iron deficiency anemia    • Ischemic cardiomyopathy    • Mixed hyperlipidemia    • Myocardial infarction (CMS/HCC)    •  "Stroke (CMS/[facility])        Past Surgical History:   Procedure Laterality Date   • ABLATION OF DYSRHYTHMIC FOCUS  2008    MAZE at time of CABG/MVR   • CARDIAC ABLATION     • CARDIAC CATHETERIZATION     • CARDIAC DEFIBRILLATOR PLACEMENT     • CARDIAC ELECTROPHYSIOLOGY PROCEDURE N/A 6/9/2020    Procedure: ICD battery change;  Surgeon: Raleigh Enciso MD;  Location:  [facility] CATH INVASIVE LOC[facility];  Service: Cardiovascular;  Laterality: N/A;   • CARDIAC PACEMAKER PLACEMENT     • CARDIOVERSION     • CAROTID ENDARTERECTOMY     • CAROTID ENDARTERECTOMY     • CORONARY ARTERY BYPASS GRAFT  2008    X 1   • CORONARY STENT PLACEMENT  2008    X 2   • LAPAROSCOPIC LYSIS OF ADHESIONS N/A 1/28/2020    Procedure: LAPAROSCOPIC LYSIS OF ADHESIONS;  Surgeon: Kim Almeida MD;  Location:  [facility] OR;  Service: General   • MITRAL VALVE REPAIR/REPLACEMENT  2008   • TOTAL KNEE ARTHROPLASTY         Social History     Socioeconomic History   • Marital status:      Spouse name: Not on file   • Number of children: Not on file   • Years of education: Not on file   • Highest education level: Not on file   Tobacco Use   • Smoking status: Former Smoker     Years: 45.00     Types: Cigarettes   • Smokeless tobacco: Never Used   Substance and Sexual Activity   • Alcohol use: No   • Drug use: No   • Sexual activity: Defer       Family History   Problem Relation Age of Onset   • Stroke Mother    • Heart disease Mother    • Diabetes Father          Temp 97.5 °F (36.4 °C)   Ht 185.4 cm (72.99\")   Wt 75.8 kg (167 lb)   BMI 22.04 kg/m²       Physical Exam  Constitutional: Patient is without distress or deformity.  Vital signs are reviewed as above.    Neuro: No confusion; No disorientation; Alert and oriented  Pulmonary: No respiratory distress.   Skin: No pallor or diaphoresis  : His bladder is palpably distended.  The penis is without significant edema.  There is no significant scrotal edema.      Data    Bladder Scan interpretation  Estimation " of residual urine via abdominal ultrasound  Residual Urine: 533ml  Indication: Urinary Retention  Position: Supine  Examination: Incremental scanning of the suprapubic area using 3 MHz transducer using copious amounts of acoustic gel.   Findings: An anechoic area was demonstrated which represented the bladder, with measurement of residual urine as noted. I inspected this myself. In that the residual urine was stable or insignificant, no treatment will be necessary at this time.       Assessment and Plan  Diagnoses and all orders for this visit:    1. Acute urinary retention (Primary)  -     POC Urinalysis Dipstick, Multipro    The catheter was placed and over a liter of urine was obtained.  He has significant retention at this time.  I think we should leave the catheter in for about a week.  I would like him to continue his dutasteride and tamsulosin.  Patient is already seen by neurology but there is been no definitive diagnosis for these tremors.  Certainly Parkinson's disease can lead to retention with coexistent BPH but these tremors do not appear to be of that pattern.  He would not be a reasonable surgical candidate as he basically has end-stage cardiac disease as well.    (Please note that portions of this note were completed with a voice recognition program.)  Octavio Bain MD  01/16/21  12:31 CST

## 2021-01-15 DIAGNOSIS — I50.22 CHRONIC SYSTOLIC CHF (CONGESTIVE HEART FAILURE) (HCC): ICD-10-CM

## 2021-01-15 DIAGNOSIS — N18.4 STAGE 4 CHRONIC KIDNEY DISEASE (HCC): Primary | ICD-10-CM

## 2021-01-15 NOTE — TELEPHONE ENCOUNTER
I keep missing her - see if you can get her on phone later and let me know good time to call her back

## 2021-01-18 NOTE — PROGRESS NOTES
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  As described previously he is on maximal medical management with tamsulosin and dutasteride.  He has not been able to tolerate a higher dose of tamsulosin because he already has a some level of dizziness and there is concerned he could fall especially given his tremor.      The following portions of the patient's history were reviewed and updated as appropriate: allergies, current medications, past family history, past medical history, past social history, past surgical history and problem list.      Review of Systems   Constitutional: Negative for chills and fever.   Gastrointestinal: Negative for abdominal pain, anal bleeding and blood in stool.   Genitourinary: Negative for dysuria and hematuria.         Current Outpatient Medications:   •  aspirin 81 MG EC tablet, Take 81 mg by mouth Daily., Disp: , Rfl:   •  atorvastatin (LIPITOR) 10 MG tablet, Take 1 tablet by mouth Every Night., Disp: , Rfl:   •  bumetanide (BUMEX) 0.5 MG tablet, Take 1 tablet by mouth 2 (Two) Times a Day. Take an extra tablet daily as needed for increased shortness of breath, edema and/or weight gain, Disp: 270 tablet, Rfl: 3  •  dutasteride (AVODART) 0.5 MG capsule, Take 0.5 mg by mouth Daily. Patient takes at noon, Disp: , Rfl:   •  furosemide (LASIX) 20 MG tablet, Take 20 mg by mouth As Needed., Disp: , Rfl:   •  gabapentin (NEURONTIN) 300 MG capsule, Take 300 mg by mouth Daily., Disp: , Rfl:   •  HYDROcodone-acetaminophen (NORCO) 5-325 MG per tablet, Take 1 tablet by mouth Every 6 (Six) Hours As Needed for Moderate Pain ., Disp: , Rfl:   •  LORazepam (ATIVAN) 0.5 MG tablet, Take 0.5 mg by mouth 3 (Three) Times a Day As Needed., Disp: , Rfl:   •  metoprolol succinate XL (TOPROL-XL) 25 MG 24 hr tablet, Take 1 tablet by mouth Every Night., Disp: 90 tablet, Rfl: 4  •  nitroglycerin  (NITROSTAT) 0.4 MG SL tablet, PLACE 1 TABLET UNDER THE TONGUE AS NEEDED FOR ANGINA, MAY REPEAT EVERY 5 MINS FOR UP THREE DOSES, Disp: 25 tablet, Rfl: 3  •  sacubitril-valsartan (ENTRESTO) 24-26 MG tablet, Take 1 tablet by mouth 2 (Two) Times a Day., Disp: 60 tablet, Rfl: 1  •  tamsulosin (FLOMAX) 0.4 MG capsule 24 hr capsule, Take 1 capsule by mouth Every Night., Disp: , Rfl:     Past Medical History:   Diagnosis Date   • Abdominal aortic aneurysm (CMS/HCC)    • Anxiety    • Atrial fibrillation (CMS/HCC)    • Biventricular ICD (implantable cardioverter-defibrillator) in place    • BPH (benign prostatic hypertrophy)    • Carotid artery stenosis    • CHF (congestive heart failure) (CMS/HCC)    • Chronic kidney disease     Chronic kidney disease, stage 4 (severe)   • Chronic systolic (congestive) heart failure (CMS/HCC)     limited echo 7/2016 EF was 40-45%. Patient has BiV AICD   • Colon obstruction (CMS/HCC)    • Coronary arteriosclerosis     MI x2   • Hearing loss    • Iron deficiency anemia    • Ischemic cardiomyopathy    • Mixed hyperlipidemia    • Myocardial infarction (CMS/HCC)    • Stroke (CMS/HCC)        Past Surgical History:   Procedure Laterality Date   • ABLATION OF DYSRHYTHMIC FOCUS  2008    MAZE at time of CABG/MVR   • CARDIAC ABLATION     • CARDIAC CATHETERIZATION     • CARDIAC DEFIBRILLATOR PLACEMENT     • CARDIAC ELECTROPHYSIOLOGY PROCEDURE N/A 6/9/2020    Procedure: ICD battery change;  Surgeon: Raleigh Enciso MD;  Location:  [facility] CATH [facility]VASIVE LOC[facility];  Service: Cardiovascular;  Laterality: N/A;   • CARDIAC PACEMAKER PLACEMENT     • CARDIOVERSION     • CAROTID ENDARTERECTOMY     • CAROTID ENDARTERECTOMY     • CORONARY ARTERY BYPASS GRAFT  2008    X 1   • CORONARY STENT PLACEMENT  2008    X 2   • LAPAROSCOPIC LYSIS OF ADHESIONS N/A 1/28/2020    Procedure: LAPAROSCOPIC LYSIS OF ADHESIONS;  Surgeon: Kim Almeida MD;  Location:  [facility] OR;  Service: General   • MITRAL VALVE REPAIR/REPLACEMENT   "2008   • TOTAL KNEE ARTHROPLASTY         Social History     Socioeconomic History   • Marital status:      Spouse name: Not on file   • Number of children: Not on file   • Years of education: Not on file   • Highest education level: Not on file   Tobacco Use   • Smoking status: Former Smoker     Years: 45.00     Types: Cigarettes   • Smokeless tobacco: Never Used   Substance and Sexual Activity   • Alcohol use: No   • Drug use: No   • Sexual activity: Defer       Family History   Problem Relation Age of Onset   • Stroke Mother    • Heart disease Mother    • Diabetes Father          Temp 97.2 °F (36.2 °C)   Ht 185.4 cm (72.99\")   Wt 75.8 kg (167 lb)   BMI 22.04 kg/m²       Physical Exam   Constitutional: Patient is without distress or deformity.  Vital signs are reviewed as above.    Neuro: No confusion; No disorientation; Alert and oriented  Pulmonary: No respiratory distress.   Skin: No pallor or diaphoresis        Data  Results for orders placed or performed in visit on 01/14/21   POC Urinalysis Dipstick, Multipro    Specimen: Urine   Result Value Ref Range    Color Straw Yellow, Straw, Dark Yellow, Aminta    Clarity, UA Cloudy (A) Clear    Glucose, UA Negative Negative, 1000 mg/dL (3+) mg/dL    Bilirubin Negative Negative    Ketones, UA Negative Negative    Specific Gravity  1.010 1.005 - 1.030    Blood, UA Large (A) Negative    pH, Urine 6.5 5.0 - 8.0    Protein,  mg/dL (A) Negative mg/dL    Urobilinogen, UA Normal Normal    Nitrite, UA Negative Negative    Leukocytes Large (3+) (A) Negative           Assessment and Plan  Diagnoses and all orders for this visit:    1. Acute urinary retention (Primary)    2. BPH with obstruction/lower urinary tract symptoms      Loera removed.  Continue CIC as needed.    (Please note that portions of this note were completed with a voice recognition program.)  Octavio Bain MD  01/22/21  06:10 CST      "

## 2021-01-21 ENCOUNTER — OFFICE VISIT (OUTPATIENT)
Dept: UROLOGY | Facility: CLINIC | Age: 86
End: 2021-01-21

## 2021-01-21 VITALS — WEIGHT: 167 LBS | BODY MASS INDEX: 22.13 KG/M2 | TEMPERATURE: 97.2 F | HEIGHT: 73 IN

## 2021-01-21 DIAGNOSIS — N13.8 BPH WITH OBSTRUCTION/LOWER URINARY TRACT SYMPTOMS: ICD-10-CM

## 2021-01-21 DIAGNOSIS — R33.8 ACUTE URINARY RETENTION: Primary | ICD-10-CM

## 2021-01-21 DIAGNOSIS — N40.1 BPH WITH OBSTRUCTION/LOWER URINARY TRACT SYMPTOMS: ICD-10-CM

## 2021-01-21 PROCEDURE — 99212 OFFICE O/P EST SF 10 MIN: CPT | Performed by: UROLOGY

## 2021-01-22 ENCOUNTER — PROCEDURE VISIT (OUTPATIENT)
Dept: UROLOGY | Facility: CLINIC | Age: 86
End: 2021-01-22

## 2021-01-22 DIAGNOSIS — R33.9 URINARY RETENTION: Primary | ICD-10-CM

## 2021-01-22 NOTE — PROGRESS NOTES
PT came in with urinary retention and Gross Hematuria.      Bladder Scan interpretation  Estimation of residual urine via abdominal ultrasound  Residual Urine: 256 ml  Indication: Urinary retetoion  Position: Supine  Examination: Incremental scanning of the suprapubic area using 3 MHz transducer using copious amounts of acoustic gel.   Findings: An anechoic area was demonstrated which represented the bladder, with measurement of residual urine as noted. I inspected this myself. In that the residual urine was stable or insignificant, no treatment will be necessary at this time.         Patient of Dr. Bain. Using sterile technique a new 20fr Coude catheter was placed, balloon inflated using 10cc sterile water,  300cc of urine drained from patients bladder via catheter. Also irrigated the patients bladder until water was back clear then catheter was connected to leg bag.  Patient tolerated the procedure well.  The patient was advised to return in 1 month for next catheter change. Patient verbalized understanding. Dr. Garcia was in the office at the time of procedure.     Following irrigation:  Bladder Scan interpretation  Estimation of residual urine via abdominal ultrasound  Residual Urine: 91 ml  Indication: urinary retention  Position: Supine  Examination: Incremental scanning of the suprapubic area using 3 MHz transducer using copious amounts of acoustic gel.   Findings: An anechoic area was demonstrated which represented the bladder, with measurement of residual urine as noted. I inspected this myself. In that the residual urine was stable or insignificant, no treatment will be necessary at this time.   Lucia TOBIAS MA    I reviewed and agree with the medical assistance documentation above.

## 2021-01-26 ENCOUNTER — PROCEDURE VISIT (OUTPATIENT)
Dept: UROLOGY | Facility: CLINIC | Age: 86
End: 2021-01-26

## 2021-01-26 DIAGNOSIS — R33.8 URINARY RETENTION DUE TO BENIGN PROSTATIC HYPERPLASIA: ICD-10-CM

## 2021-01-26 DIAGNOSIS — N40.1 URINARY RETENTION DUE TO BENIGN PROSTATIC HYPERPLASIA: ICD-10-CM

## 2021-01-26 PROCEDURE — 99211 OFF/OP EST MAY X REQ PHY/QHP: CPT | Performed by: UROLOGY

## 2021-01-26 PROCEDURE — 51798 US URINE CAPACITY MEASURE: CPT | Performed by: UROLOGY

## 2021-01-26 NOTE — PROGRESS NOTES
Bladder Scan interpretation  Estimation of residual urine via abdominal ultrasound  Residual Urine: 428 ml (before)  Indication: Urinary retention  Position: Supine  Examination: Incremental scanning of the suprapubic area using 3 MHz transducer using copious amounts of acoustic gel.   Findings: An anechoic area was demonstrated which represented the bladder, with measurement of residual urine as noted. I inspected this myself. In that the residual urine was stable or insignificant, no treatment will be necessary at this time.       Bladder Scan interpretation  Estimation of residual urine via abdominal ultrasound  Residual Urine: 113 ml  Indication: urinary rentention (after)  Position: Supine  Examination: Incremental scanning of the suprapubic area using 3 MHz transducer using copious amounts of acoustic gel.   Findings: An anechoic area was demonstrated which represented the bladder, with measurement of residual urine as noted. I inspected this myself. In that the residual urine was stable or insignificant, no treatment will be necessary at this time.     I did a bladder scan due to pt bladder being distended, which was 428 ml. Pt cath was irrigated with 120 cc of sterile water. Urine had no visible clots. Cath irrigated with ease. We drained the pt cath and was able to remove 320 ml of urine. Pt then had a bladder scan of 113 ml. Pt stated that he was relieved and feels much better. We then attached a leg bag, cath was draining well.       Liudmila Cerna MA and Arleen Haynes RN  Reviewed and agreed with medical assistant documentation as above.      Medical assistant documentation is reviewed.  Agree with management as above  Jackson Perez MD  1/26/2021  16:45 CST

## 2021-02-01 DIAGNOSIS — F41.9 ANXIETY: ICD-10-CM

## 2021-02-02 ENCOUNTER — PROCEDURE VISIT (OUTPATIENT)
Dept: UROLOGY | Facility: CLINIC | Age: 86
End: 2021-02-02

## 2021-02-02 DIAGNOSIS — N40.1 URINARY RETENTION DUE TO BENIGN PROSTATIC HYPERPLASIA: Primary | ICD-10-CM

## 2021-02-02 DIAGNOSIS — R33.8 URINARY RETENTION DUE TO BENIGN PROSTATIC HYPERPLASIA: Primary | ICD-10-CM

## 2021-02-02 PROCEDURE — 99211 OFF/OP EST MAY X REQ PHY/QHP: CPT | Performed by: UROLOGY

## 2021-02-02 NOTE — PROGRESS NOTES
Wild Bravo is a 86 y.o. male who is here today for catheter removal. Patient of Dr. Bain. 10cc syringe used to deflate the balloon and the catheter was removed without difficulty.  The patient tolerated this well and will return in 4 weeks to see Dr. Bain in the office for follow up. Dr. Bain was in the office at the time of procedure.     Patient was advised to drink clear fluids for the next couple hours and urinate. The patient was also advised he may experience some blood in the urine and burning with urination for the next couple days. If the patient is unable to urinate or develops fever, chills, N&V or suprapubic pain he will call to return for an appt at clinic or seek medical treatment at [facility] ER, PCP or Urgent Care after hours. Patient verbalized understanding and all questions were answered. Tana PARKER    I have reviewed and agree with the medical assistance documentation above

## 2021-02-02 NOTE — PROGRESS NOTES
The patient states He is here today for catheter insertion.  Patient of Dr. Bain. Using sterile technique a new 18fr catheter was placed, balloon inflated using 10cc sterile water,  180cc of urine drained from patients bladder via catheter then catheter was connected to leg bag.  Patient tolerated the procedure well.  The patient was advised to return in 1 month for next catheter change. Patient verbalized understanding. Dr. Bain was in the office at the time of procedure. Tana PARKER    Medical assistant documentation is reviewed.  Agree with management as above  Octavio Bain MD  2/3/2021  16:38 CST

## 2021-02-03 ENCOUNTER — TELEPHONE (OUTPATIENT)
Dept: INTERNAL MEDICINE | Age: 86
End: 2021-02-03

## 2021-02-03 RX ORDER — PREDNISONE 10 MG/1
10 TABLET ORAL DAILY
Qty: 7 TABLET | Refills: 0 | Status: SHIPPED | OUTPATIENT
Start: 2021-02-03 | End: 2021-02-10

## 2021-02-03 NOTE — TELEPHONE ENCOUNTER
Cristiane HUGHES at [facility] would like to speak with you about a rash on Mr. Anderson Form back.       Ray Mccoy - [phone]

## 2021-02-04 DIAGNOSIS — G89.4 CHRONIC PAIN SYNDROME: ICD-10-CM

## 2021-02-04 DIAGNOSIS — G60.9 IDIOPATHIC NEUROPATHY: ICD-10-CM

## 2021-02-04 RX ORDER — HYDROCODONE BITARTRATE AND ACETAMINOPHEN 5; 325 MG/1; MG/1
1 TABLET ORAL 3 TIMES DAILY PRN
Qty: 90 TABLET | Refills: 0 | Status: CANCELLED | OUTPATIENT
Start: 2021-02-04 | End: 2021-03-06

## 2021-02-04 RX ORDER — LORAZEPAM 0.5 MG/1
TABLET ORAL
Qty: 120 TABLET | Refills: 0 | Status: SHIPPED | OUTPATIENT
Start: 2021-02-04 | End: 2021-03-03

## 2021-02-04 RX ORDER — HYDROCODONE BITARTRATE AND ACETAMINOPHEN 5; 325 MG/1; MG/1
1 TABLET ORAL 3 TIMES DAILY PRN
Qty: 90 TABLET | Refills: 0 | Status: SHIPPED | OUTPATIENT
Start: 2021-02-04 | End: 2021-02-08 | Stop reason: SDUPTHER

## 2021-02-04 NOTE — TELEPHONE ENCOUNTER
[facility] called requesting pharmacy. They fill Mr Star Altman Rx box, But Mr Dom Marmolejo does not feel that their Norco 5mg work, so he gets them filled at [facility] and brings the to [facility] and they fill his prescription box. He will not be getting his box until Saturday. Joanna Sainz called to request a refill on his medication. Last office visit : 12/15/2020   Next office visit : 3/4/2021     Last UDS: 6/7/2019   Amphetamine Screen, Urine   Date Value Ref Range Status   06/07/2019 neg  Final     Barbiturate Screen, Urine   Date Value Ref Range Status   06/07/2019 neg  Final     Benzodiazepine Screen, Urine   Date Value Ref Range Status   06/07/2019 neg  Final     Buprenorphine Urine   Date Value Ref Range Status   06/07/2019 neg  Final     Cocaine Metabolite Screen, Urine   Date Value Ref Range Status   06/07/2019 neg  Final     Gabapentin Screen, Urine   Date Value Ref Range Status   06/07/2019 na  Final     MDMA, Urine   Date Value Ref Range Status   06/07/2019 neg  Final     Methamphetamine, Urine   Date Value Ref Range Status   06/07/2019 neg  Final     Opiate Scrn, Ur   Date Value Ref Range Status   06/07/2019 pos  Final     Oxycodone Screen, Ur   Date Value Ref Range Status   06/07/2019 neg  Final     PCP Screen, Urine   Date Value Ref Range Status   06/07/2019 neg  Final     Propoxyphene Screen, Urine   Date Value Ref Range Status   06/07/2019 neg  Final     THC Screen, Urine   Date Value Ref Range Status   06/07/2019 neg  Final     Tricyclic Antidepressants, Urine   Date Value Ref Range Status   06/07/2019 neg  Final       Last Je Robles: 02/04/2021   Medication Contract: 02/07/2019     Requested Prescriptions      No prescriptions requested or ordered in this encounter         Please approve or refuse this medication.    Romana Passer

## 2021-02-05 DIAGNOSIS — G60.9 IDIOPATHIC NEUROPATHY: ICD-10-CM

## 2021-02-05 DIAGNOSIS — G89.4 CHRONIC PAIN SYNDROME: ICD-10-CM

## 2021-02-08 DIAGNOSIS — G60.9 IDIOPATHIC NEUROPATHY: ICD-10-CM

## 2021-02-08 DIAGNOSIS — G89.4 CHRONIC PAIN SYNDROME: ICD-10-CM

## 2021-02-08 RX ORDER — HYDROCODONE BITARTRATE AND ACETAMINOPHEN 5; 325 MG/1; MG/1
1 TABLET ORAL 3 TIMES DAILY PRN
Qty: 90 TABLET | Refills: 0 | Status: SHIPPED | OUTPATIENT
Start: 2021-02-08 | End: 2021-03-04 | Stop reason: SDUPTHER

## 2021-02-08 RX ORDER — GABAPENTIN 300 MG/1
CAPSULE ORAL
Qty: 90 CAPSULE | Refills: 2 | Status: SHIPPED | OUTPATIENT
Start: 2021-02-08 | End: 2021-11-11

## 2021-02-17 RX ORDER — DUTASTERIDE 0.5 MG/1
0.5 CAPSULE, LIQUID FILLED ORAL DAILY
Qty: 30 CAPSULE | Refills: 1 | Status: SHIPPED | OUTPATIENT
Start: 2021-02-17 | End: 2021-04-20

## 2021-02-19 ENCOUNTER — TELEPHONE (OUTPATIENT)
Dept: CARDIOLOGY | Facility: CLINIC | Age: 86
End: 2021-02-19

## 2021-03-01 DIAGNOSIS — F41.9 ANXIETY: ICD-10-CM

## 2021-03-03 RX ORDER — LORAZEPAM 0.5 MG/1
TABLET ORAL
Qty: 120 TABLET | Refills: 0 | Status: SHIPPED | OUTPATIENT
Start: 2021-03-03 | End: 2021-04-01

## 2021-03-04 DIAGNOSIS — G60.9 IDIOPATHIC NEUROPATHY: ICD-10-CM

## 2021-03-04 DIAGNOSIS — L60.0 INGROWN LEFT BIG TOENAIL: Primary | ICD-10-CM

## 2021-03-04 DIAGNOSIS — G89.4 CHRONIC PAIN SYNDROME: ICD-10-CM

## 2021-03-04 PROBLEM — Z86.73 HISTORY OF CVA (CEREBROVASCULAR ACCIDENT): Status: ACTIVE | Noted: 2017-10-27

## 2021-03-04 PROBLEM — I95.2 HYPOTENSION DUE TO DRUGS: Status: ACTIVE | Noted: 2018-08-10

## 2021-03-04 PROBLEM — Z95.0 PACEMAKER: Status: ACTIVE | Noted: 2017-02-14

## 2021-03-04 PROBLEM — I49.5 SSS (SICK SINUS SYNDROME) (HCC): Status: ACTIVE | Noted: 2017-02-14

## 2021-03-04 PROBLEM — H02.139 INVOLUTIONAL ECTROPION: Status: ACTIVE | Noted: 2020-02-14

## 2021-03-04 PROBLEM — I77.9 BILATERAL CAROTID ARTERY DISEASE (HCC): Status: ACTIVE | Noted: 2017-10-27

## 2021-03-04 PROBLEM — Z95.810 BIVENTRICULAR ICD (IMPLANTABLE CARDIOVERTER-DEFIBRILLATOR) IN PLACE: Status: ACTIVE | Noted: 2017-09-08

## 2021-03-04 PROBLEM — E87.20 LACTIC ACIDOSIS: Status: ACTIVE | Noted: 2020-01-26

## 2021-03-04 PROBLEM — H01.019 ULCERATIVE BLEPHARITIS: Status: ACTIVE | Noted: 2020-02-14

## 2021-03-04 PROBLEM — I73.9 PAD (PERIPHERAL ARTERY DISEASE) (HCC): Status: ACTIVE | Noted: 2017-09-08

## 2021-03-04 PROBLEM — I10 ESSENTIAL HYPERTENSION: Status: ACTIVE | Noted: 2017-02-14

## 2021-03-04 PROBLEM — K56.609 SBO (SMALL BOWEL OBSTRUCTION) (HCC): Status: ACTIVE | Noted: 2020-01-26

## 2021-03-04 PROBLEM — E78.2 MIXED HYPERLIPIDEMIA: Status: ACTIVE | Noted: 2017-10-27

## 2021-03-04 PROBLEM — Z98.890 H/O MITRAL VALVE REPAIR: Status: ACTIVE | Noted: 2017-09-08

## 2021-03-04 PROBLEM — Z74.09 IMPAIRED FUNCTIONAL MOBILITY, BALANCE, GAIT, AND ENDURANCE: Status: ACTIVE | Noted: 2017-09-10

## 2021-03-04 NOTE — TELEPHONE ENCOUNTER
Please refill through [facility] for a free 60-day supply while patient assistance application is being processed. Thank you. Alma Mendez MA      Patient's daughter,Munira Cai, also called asking for her father's appointment to be moved to 3/18, 3/19 or 3/22 so his daughter who will be here from [place] can come to the appointment. His next appointment is 4/8. Please advise. Alma Mendez MA

## 2021-03-05 DIAGNOSIS — G60.9 IDIOPATHIC NEUROPATHY: ICD-10-CM

## 2021-03-05 DIAGNOSIS — G89.4 CHRONIC PAIN SYNDROME: ICD-10-CM

## 2021-03-05 RX ORDER — HYDROCODONE BITARTRATE AND ACETAMINOPHEN 5; 325 MG/1; MG/1
1 TABLET ORAL 3 TIMES DAILY PRN
Qty: 90 TABLET | Refills: 0 | Status: SHIPPED | OUTPATIENT
Start: 2021-03-05 | End: 2021-04-02 | Stop reason: SDUPTHER

## 2021-03-05 RX ORDER — HYDROCODONE BITARTRATE AND ACETAMINOPHEN 5; 325 MG/1; MG/1
1 TABLET ORAL 3 TIMES DAILY PRN
Qty: 90 TABLET | Refills: 0 | Status: SHIPPED | OUTPATIENT
Start: 2021-03-05 | End: 2021-03-05 | Stop reason: SDUPTHER

## 2021-03-14 PROCEDURE — G2066 INTER DEVC REMOTE 30D: HCPCS | Performed by: INTERNAL MEDICINE

## 2021-03-14 PROCEDURE — 93297 REM INTERROG DEV EVAL ICPMS: CPT | Performed by: INTERNAL MEDICINE

## 2021-03-17 NOTE — PROGRESS NOTES
Psychiatric - PODIATRY    Today's Date: 03/22/21    Patient Name: Wild Bravo  MRN: [id number]  CSN: [id number]  PCP: Alison Hercules MD  Referring Provider: Alison Hercules MD    SUBJECTIVE     Chief Complaint   Patient presents with   • Establish Care     pt is here for ingrown toenail of great toe on left foot- pt presents with long, thickened, discolored toenails on both great toes - pt pain level is great when working on toenails (pt is hard of hearing could not get pain scale)     HPI: Wild Bravo, a 86 y.o.male, comes to clinic as a(n) new patient complaining of painful toenails and complaining of thickened, tender hallux nails. Patient has h/o AAA, Anxiety, AF, BPH, Carotid Stenosis, CHF, CKD, MI, Hearing Loss, CVA. Patient presents with complaints of bilateral hallux nail tenderness, thickness, and discoloration.  Patient notes that he experiences pain intermittently in the nails, particularly when wearing shoes or walking.  Notes that he is not experiencing any pain today and last nails are pressed.  States that family has previously attempted to care for nails, however, this resulted in pain and they were unable to effectively debride the nail.  Denies redness, drainage, or other signs of infection. Denies pain at the present time, however, relates tenderness when nails are pressed and when wearing shoes. Relates previous treatment(s) including Self treatment of nails at home. Denies any constitutional symptoms. No other pedal complaints at this time.    Past Medical History:   Diagnosis Date   • Abdominal aortic aneurysm (CMS/HCC)    • Anxiety    • Atrial fibrillation (CMS/HCC)    • Biventricular ICD (implantable cardioverter-defibrillator) in place    • BPH (benign prostatic hypertrophy)    • Carotid artery stenosis    • CHF (congestive heart failure) (CMS/HCC)    • Chronic kidney disease     Chronic kidney disease, stage 4 (severe)   • Chronic systolic (congestive) heart failure  (CMS/HCC)     limited echo 7/2016 EF was 40-45%. Patient has BiV AICD   • Colon obstruction (CMS/HCC)    • Coronary arteriosclerosis     MI x2   • Hearing loss    • Iron deficiency anemia    • Ischemic cardiomyopathy    • Mixed hyperlipidemia    • Myocardial infarction (CMS/HCC)    • Stroke (CMS/HCC)      Past Surgical History:   Procedure Laterality Date   • ABLATION OF DYSRHYTHMIC FOCUS  2008    MAZE at time of CABG/MVR   • CARDIAC ABLATION     • CARDIAC CATHETERIZATION     • CARDIAC DEFIBRILLATOR PLACEMENT     • CARDIAC ELECTROPHYSIOLOGY PROCEDURE N/A 6/9/2020    Procedure: ICD battery change;  Surgeon: Raleigh Enciso MD;  Location:  [facility];  Service: Cardiovascular;  Laterality: N/A;   • CARDIAC PACEMAKER PLACEMENT     • CARDIOVERSION     • CAROTID ENDARTERECTOMY     • CAROTID ENDARTERECTOMY     • CORONARY ARTERY BYPASS GRAFT  2008    X 1   • CORONARY STENT PLACEMENT  2008    X 2   • LAPAROSCOPIC LYSIS OF ADHESIONS N/A 1/28/2020    Procedure: LAPAROSCOPIC LYSIS OF ADHESIONS;  Surgeon: Kim Almeida MD;  Location:  [facility] OR;  Service: General   • MITRAL VALVE REPAIR/REPLACEMENT  2008   • TOTAL KNEE ARTHROPLASTY       Family History   Problem Relation Age of Onset   • Stroke Mother    • Heart disease Mother    • Diabetes Father      Social History     Socioeconomic History   • Marital status:      Spouse name: Not on file   • Number of children: Not on file   • Years of education: Not on file   • Highest education level: Not on file   Tobacco Use   • Smoking status: Former Smoker     Years: 45.00     Types: Cigarettes   • Smokeless tobacco: Never Used   Vaping Use   • Vaping Use: Never used   Substance and Sexual Activity   • Alcohol use: No   • Drug use: No   • Sexual activity: Defer     Allergies   Allergen Reactions   • Keflex [Cephalexin]      CEPHALOSPORINS     Current Outpatient Medications   Medication Sig Dispense Refill   • aspirin 81 MG EC tablet Take 81 mg by mouth  Daily.     • atorvastatin (LIPITOR) 10 MG tablet Take 1 tablet by mouth Every Night.     • bumetanide (BUMEX) 0.5 MG tablet Take 1 tablet by mouth 2 (Two) Times a Day. Take an extra tablet daily as needed for increased shortness of breath, edema and/or weight gain 270 tablet 3   • dutasteride (AVODART) 0.5 MG capsule Take 0.5 mg by mouth Daily. Patient takes at noon     • furosemide (LASIX) 20 MG tablet Take 20 mg by mouth As Needed.     • gabapentin (NEURONTIN) 300 MG capsule Take 300 mg by mouth Daily.     • HYDROcodone-acetaminophen (NORCO) 5-325 MG per tablet Take 1 tablet by mouth Every 6 (Six) Hours As Needed for Moderate Pain .     • LORazepam (ATIVAN) 0.5 MG tablet Take 0.5 mg by mouth 3 (Three) Times a Day As Needed.     • metoprolol succinate XL (TOPROL-XL) 25 MG 24 hr tablet Take 1 tablet by mouth Every Night. 90 tablet 4   • nitroglycerin (NITROSTAT) 0.4 MG SL tablet PLACE 1 TABLET UNDER THE TONGUE AS NEEDED FOR ANGINA, MAY REPEAT EVERY 5 MINS FOR UP THREE DOSES 25 tablet 3   • sacubitril-valsartan (ENTRESTO) 24-26 MG tablet Take 1 tablet by mouth 2 (Two) Times a Day. 60 tablet 1   • tamsulosin (FLOMAX) 0.4 MG capsule 24 hr capsule Take 1 capsule by mouth Every Night.       No current facility-administered medications for this visit.     Review of Systems   Constitutional: Negative for chills and fever.   HENT: Positive for hearing loss. Negative for congestion.    Respiratory: Negative for shortness of breath.    Cardiovascular: Negative for chest pain and leg swelling.   Gastrointestinal: Negative for constipation, diarrhea, nausea and vomiting.   Musculoskeletal: Positive for arthralgias. Negative for myalgias.   Skin: Negative for wound.   Neurological: Negative for numbness.       OBJECTIVE     Vitals:    03/19/21 1319   BP: 102/72   Pulse: 118   SpO2: (!) 87%       PHYSICAL EXAM  GEN:   Accompanied by daughter.     Foot/Ankle Exam:       General:   Appearance: appears stated age and healthy and  elderly    Orientation: AAOx3    Orientation comment:  Very [place]  Affect: appropriate    Gait: unimpaired    Assistance: independent    Shoe Gear:  Casual shoes    VASCULAR      Right Foot Vascularity   Dorsalis pedis:  2+  Posterior tibial:  2+  Skin Temperature: warm    Edema Grading:  None  CFT:  3  Pedal Hair Growth:  Present  Varicosities: mild varicosities       Left Foot Vascularity   Dorsalis pedis:  2+  Posterior tibial:  2+  Skin Temperature: warm    Edema Grading:  None  CFT:  3  Pedal Hair Growth:  Present  Varicosities: mild varicosities        NEUROLOGIC     Right Foot Neurologic   Normal sensation    Light touch sensation:  Normal  Vibratory sensation:  Normal  Hot/Cold sensation: normal    Protective Sensation using [place] Monofilament:  10     Left Foot Neurologic   Normal sensation    Light touch sensation:  Normal  Vibratory sensation:  Normal  Hot/cold sensation: normal    Protective Sensation using [place] Monofilament:  10     MUSCULOSKELETAL      Right Foot Musculoskeletal   Ecchymosis:  None  Tenderness: toenails and toe 1    Arch:  Normal  Hallux valgus: No    Hallux limitus: No       Left Foot Musculoskeletal   Ecchymosis:  None  Tenderness: toenails and toe 1    Arch:  Normal  Hallux valgus: No    Hallux limitus: No       MUSCLE STRENGTH     Right Foot Muscle Strength   Foot dorsiflexion:  5  Foot plantar flexion:  5  Foot inversion:  5  Foot eversion:  5     Left Foot Muscle Strength   Foot dorsiflexion:  5  Foot plantar flexion:  5  Foot inversion:  5  Foot eversion:  5     RANGE OF MOTION      Right Foot Range of Motion   Foot and ankle ROM within normal limits       Left Foot Range of Motion   Foot and ankle ROM within normal limits       DERMATOLOGIC     Right Foot Dermatologic   Skin: skin intact and atrophic    Nails: onychomycosis, abnormally thick, dystrophic nails and ingrown toenail (hallux)       Left Foot Dermatologic   Skin: skin intact and atrophic     Nails: onychomycosis, abnormally thick, dystrophic nails and ingrown toenail (hallux)        RADIOLOGY/NUCLEAR:  No results found.    LABORATORY/CULTURE RESULTS:      PATHOLOGY RESULTS:       ASSESSMENT/PLAN     Diagnoses and all orders for this visit:    1. Ingrown toenail (Primary)    2. Onychomycosis    3. Thickened nail    4. Foot pain, left    5. Advanced age      Comprehensive lower extremity examination and evaluation was performed.  Discussed findings and treatment plan including risks, benefits, and treatment options with patient in detail. Patient agreed with treatment plan.  After verbal consent obtained, nail(s) x2 debrided of length and thickness with nail nipper without incidence  After verbal consent obtained, nail(s) x2 debrided of offending borders with nail nipper without incidence  Patient may maintain nails and calluses at home utilizing emery board or pumice stone between visits as needed   An After Visit Summary was printed and given to the patient at discharge, including (if requested) any available informative/educational handouts regarding diagnosis, treatment, or medications. All questions were answered to patient/family satisfaction. Should symptoms fail to improve or worsen they agree to call or return to clinic or to go to the Emergency Department. Discussed the importance of following up with any needed screening tests/labs/specialist appointments and any requested follow-up recommended by me today. Importance of maintaining follow-up discussed and patient accepts that missed appointments can delay diagnosis and potentially lead to worsening of conditions.  Return if symptoms worsen or fail to improve., or sooner if acute issues arise.    Lab Frequency Next Occurrence   Instruct Patient To Stop Heparin or Lovenox 24 Hours Before Scheduled Implant Procedure Once 05/13/2020   Instruct Patient To Stop Apixaban, Rivaroxaban, Edoxaban, Dibigatran, Vorapaxar, Atopaxar 48 Hours Before  Scheduled Implant Procedure Once 05/13/2020       This document has been electronically signed by OUSMANE Park on March 22, 2021 07:44 CDT

## 2021-03-18 ENCOUNTER — TELEPHONE (OUTPATIENT)
Dept: PODIATRY | Facility: CLINIC | Age: 86
End: 2021-03-18

## 2021-03-18 NOTE — TELEPHONE ENCOUNTER
LM for pt regarding appt on 03/19. Left number for pt to return call if any questions or concerns arise.

## 2021-03-19 ENCOUNTER — OFFICE VISIT (OUTPATIENT)
Dept: PODIATRY | Facility: CLINIC | Age: 86
End: 2021-03-19

## 2021-03-19 VITALS
WEIGHT: 161.6 LBS | OXYGEN SATURATION: 87 % | BODY MASS INDEX: 21.42 KG/M2 | HEART RATE: 118 BPM | SYSTOLIC BLOOD PRESSURE: 102 MMHG | HEIGHT: 73 IN | DIASTOLIC BLOOD PRESSURE: 72 MMHG

## 2021-03-19 DIAGNOSIS — B35.1 ONYCHOMYCOSIS: ICD-10-CM

## 2021-03-19 DIAGNOSIS — L60.0 INGROWN TOENAIL: Primary | ICD-10-CM

## 2021-03-19 DIAGNOSIS — R54 ADVANCED AGE: ICD-10-CM

## 2021-03-19 DIAGNOSIS — L60.2 THICKENED NAIL: ICD-10-CM

## 2021-03-19 DIAGNOSIS — M79.672 FOOT PAIN, LEFT: ICD-10-CM

## 2021-03-19 PROCEDURE — 99213 OFFICE O/P EST LOW 20 MIN: CPT | Performed by: NURSE PRACTITIONER

## 2021-03-19 PROCEDURE — 11720 DEBRIDE NAIL 1-5: CPT | Performed by: NURSE PRACTITIONER

## 2021-03-22 ENCOUNTER — OFFICE VISIT (OUTPATIENT)
Dept: INTERNAL MEDICINE | Age: 86
End: 2021-03-22
Payer: MEDICARE

## 2021-03-22 VITALS
DIASTOLIC BLOOD PRESSURE: 58 MMHG | HEIGHT: 72 IN | SYSTOLIC BLOOD PRESSURE: 84 MMHG | OXYGEN SATURATION: 97 % | BODY MASS INDEX: 21.94 KG/M2 | WEIGHT: 162 LBS | HEART RATE: 111 BPM

## 2021-03-22 DIAGNOSIS — N18.4 STAGE 4 CHRONIC KIDNEY DISEASE (HCC): ICD-10-CM

## 2021-03-22 DIAGNOSIS — I65.23 BILATERAL CAROTID ARTERY STENOSIS: ICD-10-CM

## 2021-03-22 DIAGNOSIS — I48.21 PERMANENT ATRIAL FIBRILLATION (HCC): ICD-10-CM

## 2021-03-22 DIAGNOSIS — G89.4 CHRONIC PAIN SYNDROME: ICD-10-CM

## 2021-03-22 DIAGNOSIS — I48.91 ATRIAL FIBRILLATION WITH RVR (HCC): ICD-10-CM

## 2021-03-22 DIAGNOSIS — R06.02 SHORTNESS OF BREATH: ICD-10-CM

## 2021-03-22 DIAGNOSIS — I50.22 CHRONIC SYSTOLIC CONGESTIVE HEART FAILURE (HCC): Primary | ICD-10-CM

## 2021-03-22 DIAGNOSIS — I25.119 CORONARY ARTERY DISEASE INVOLVING NATIVE HEART WITH ANGINA PECTORIS, UNSPECIFIED VESSEL OR LESION TYPE (HCC): ICD-10-CM

## 2021-03-22 LAB
ALBUMIN SERPL-MCNC: 4 G/DL (ref 3.5–5.2)
ALP BLD-CCNC: 111 U/L (ref 40–130)
ALT SERPL-CCNC: 9 U/L (ref 5–41)
ANION GAP SERPL CALCULATED.3IONS-SCNC: 16 MMOL/L (ref 7–19)
AST SERPL-CCNC: 13 U/L (ref 5–40)
BILIRUB SERPL-MCNC: 0.6 MG/DL (ref 0.2–1.2)
BUN BLDV-MCNC: 42 MG/DL (ref 8–23)
CALCIUM SERPL-MCNC: 9.1 MG/DL (ref 8.8–10.2)
CHLORIDE BLD-SCNC: 103 MMOL/L (ref 98–111)
CO2: 27 MMOL/L (ref 22–29)
CREAT SERPL-MCNC: 2.3 MG/DL (ref 0.5–1.2)
GFR AFRICAN AMERICAN: 33
GFR NON-AFRICAN AMERICAN: 27
GLUCOSE BLD-MCNC: 76 MG/DL (ref 74–109)
HCT VFR BLD CALC: 39.3 % (ref 42–52)
HEMOGLOBIN: 11.8 G/DL (ref 14–18)
MCH RBC QN AUTO: 28 PG (ref 27–31)
MCHC RBC AUTO-ENTMCNC: 30 G/DL (ref 33–37)
MCV RBC AUTO: 93.3 FL (ref 80–94)
PDW BLD-RTO: 14.5 % (ref 11.5–14.5)
PLATELET # BLD: 125 K/UL (ref 130–400)
PMV BLD AUTO: 12.2 FL (ref 9.4–12.4)
POTASSIUM SERPL-SCNC: 4 MMOL/L (ref 3.5–5)
PRO-BNP: 5309 PG/ML (ref 0–1800)
RBC # BLD: 4.21 M/UL (ref 4.7–6.1)
SODIUM BLD-SCNC: 146 MMOL/L (ref 136–145)
TOTAL PROTEIN: 7 G/DL (ref 6.6–8.7)
TSH SERPL DL<=0.05 MIU/L-ACNC: 3.36 UIU/ML (ref 0.27–4.2)
VITAMIN D 25-HYDROXY: 52.8 NG/ML
WBC # BLD: 5.4 K/UL (ref 4.8–10.8)

## 2021-03-22 PROCEDURE — 99214 OFFICE O/P EST MOD 30 MIN: CPT | Performed by: INTERNAL MEDICINE

## 2021-03-22 PROCEDURE — 1123F ACP DISCUSS/DSCN MKR DOCD: CPT | Performed by: INTERNAL MEDICINE

## 2021-03-22 PROCEDURE — G8420 CALC BMI NORM PARAMETERS: HCPCS | Performed by: INTERNAL MEDICINE

## 2021-03-22 PROCEDURE — 1036F TOBACCO NON-USER: CPT | Performed by: INTERNAL MEDICINE

## 2021-03-22 PROCEDURE — G8484 FLU IMMUNIZE NO ADMIN: HCPCS | Performed by: INTERNAL MEDICINE

## 2021-03-22 PROCEDURE — 4040F PNEUMOC VAC/ADMIN/RCVD: CPT | Performed by: INTERNAL MEDICINE

## 2021-03-22 PROCEDURE — G8427 DOCREV CUR MEDS BY ELIG CLIN: HCPCS | Performed by: INTERNAL MEDICINE

## 2021-03-22 NOTE — PROGRESS NOTES
Chief Complaint   Patient presents with    Follow-up     4mo f/u//     Discuss Medications       HPI: Patient is here today to follow-up CHF atrial fib CAD CKD 4 and other medical issues he is here with his daughter. He is very hard of hearing really cannot hear me with the mask on. He denies new complaints just ongoing fatigue he does not want me to change any meds short of breath fatigue tired weak gets anxious and in discomfort but overall he seems similar in a lot of regards with a gradual decline. Past Medical History:   Diagnosis Date    AICD (automatic cardioverter/defibrillator) present     followed by doctor in [address] pain 2/3/2012    Atrial fibrillation ([address]     Benign prostatic hyperplasia with lower urinary tract symptoms 11/16/2017    Benign prostatic hypertrophy     CAD (coronary artery disease)     Chronic kidney disease     Chronic pain 11/7/2017    Gallstones     Hyperlipidemia     Hypertension     Hypoxia 9/6/2020    Idiopathic neuropathy     Insomnia     Osteoarthritis of right knee     Restless legs     Right rotator cuff tear     Ventricular tachycardia (HCC)        Past Surgical History:   Procedure Laterality Date    APPENDECTOMY      CARDIAC PACEMAKER PLACEMENT      Bi ventricular pacemaker. [facility] CARDIAC SURGERY      mitral valve, maze procedure, 1 vessel bypass -2008    CAROTID ENDARTERECTOMY      Right carotid endarterectomy.  COLONOSCOPY      CORONARY ARTERY BYPASS GRAFT      1 vessel 2008    MOUTH SURGERY      Oral implants.     PACEMAKER INSERTION      biventricular pacemaker ([employer])       Family History   Problem Relation Age of Onset    Stroke Mother     Heart Attack Mother 80    Diabetes Father     COPD Sister     Arthritis Sister     Stroke Brother     Heart Disease Brother        Social History     Socioeconomic History    Marital status:      Spouse name: Not on file    Number of children: Not on file   [facility] Years of education: Not on file    Highest education level: Not on file   Occupational History    Not on file   Social Needs    Financial resource strain: Not hard at all    Food insecurity     Worry: Never true     Inability: Never true   [employer] needs     Medical: Not on file     Non-medical: Not on file   Tobacco Use    Smoking status: Former Smoker    Smokeless tobacco: Never Used   Substance and Sexual Activity    Alcohol use: No    Drug use: No    Sexual activity: Never     Comment: Marital status - . Lifestyle    Physical activity     Days per week: Not on file     Minutes per session: Not on file    Stress: Not on file   Relationships    Social connections     Talks on phone: Not on file     Gets together: Not on file     Attends [employer] service: Not on file     Active member of club or organization: Not on file     Attends meetings of clubs or organizations: Not on file     Relationship status: Not on file    Intimate partner violence     Fear of current or ex partner: Not on file     Emotionally abused: Not on file     Physically abused: Not on file     Forced sexual activity: Not on file   Other Topics Concern    Not on file   Social History Narrative    Not on file       Allergies   Allergen Reactions    Keflex [Cephalexin] Rash     pruritius       Current Outpatient Medications   Medication Sig Dispense Refill    HYDROcodone-acetaminophen (NORCO) 5-325 MG per tablet Take 1 tablet by mouth 3 times daily as needed for Pain for up to 30 days.  90 tablet 0    LORazepam (ATIVAN) 0.5 MG tablet TAKE ONE TABLET BY MOUTH FOUR TIMES A DAY **MAY MAKE DROWSY**. 120 tablet 0    dutasteride (AVODART) 0.5 MG capsule TAKE 1 CAPSULE BY MOUTH DAILY 30 capsule 1    gabapentin (NEURONTIN) 300 MG capsule TAKE 1 CAPSULE BY MOUTH AT 5PM AND TAKE 2 CAPSULEs BY MOUTH EVERY BEDTIME 90 capsule 2    tamsulosin (FLOMAX) 0.4 MG capsule TAKE ONE CAPSULE BY MOUTH EVERY DAY 90 capsule 3    sacubitril-valsartan (ENTRESTO) 24-26 MG per tablet Take 1 tablet by mouth 2 times daily 60 tablet 5    metoprolol succinate (TOPROL XL) 25 MG extended release tablet Take 25 mg by mouth daily      bumetanide (BUMEX) 0.5 MG tablet Take 0.5 mg by mouth 2 times daily      vitamin D (CHOLECALCIFEROL) 1000 UNIT TABS tablet Take 1,000 Units by mouth daily      nitroGLYCERIN (NITROSTAT) 0.4 MG SL tablet Place 0.4 mg under the tongue      aspirin 81 MG tablet Take 81 mg by mouth daily. No current facility-administered medications for this visit. Review of Systems    BP (!) 84/58   Pulse 111   Ht 6' (1.829 m)   Wt 162 lb (73.5 kg)   SpO2 97%   BMI 21.97 kg/m²   BP Readings from Last 7 Encounters:   03/22/21 (!) 84/58   12/15/20 98/60   08/25/20 120/76   02/18/20 90/60   10/15/19 114/76   06/07/19 126/68   02/07/19 120/76     Wt Readings from Last 7 Encounters:   03/22/21 162 lb (73.5 kg)   12/15/20 165 lb (74.8 kg)   08/25/20 163 lb (73.9 kg)   03/13/20 161 lb (73 kg)   03/12/20 161 lb (73 kg)   02/20/20 166 lb (75.3 kg)   02/18/20 165 lb (74.8 kg)     BMI Readings from Last 7 Encounters:   03/22/21 21.97 kg/m²   12/15/20 22.38 kg/m²   08/25/20 22.11 kg/m²   03/13/20 21.84 kg/m²   03/12/20 21.84 kg/m²   02/20/20 21.90 kg/m²   02/18/20 22.38 kg/m²     Resp Readings from Last 7 Encounters:   08/26/18 16   11/09/17 18   01/31/16 20       Physical Exam  Constitutional:       General: He is not in acute distress. HENT:      Head:      Comments: Patient is very hard of hearing. Eyes:      General: No scleral icterus. Neck:      Musculoskeletal: Neck supple. Cardiovascular:      Heart sounds: Normal heart sounds. Pulmonary:      Breath sounds: Normal breath sounds. Lymphadenopathy:      Cervical: No cervical adenopathy. Skin:     Findings: No rash.          Results for orders placed or performed in visit on 12/15/20   Vitamin D 25 Hydroxy   Result Value Ref Range    Vit D, 25-Hydroxy 59.7 >=30 will going to barely increase his beta-blocker to    2. Stage 4 chronic kidney disease ([facility] [address]  Patient CKD 4 is stable but labs have not been checked in some time we will check have to balance his meds closely  - CBC; Future  - Comprehensive Metabolic Panel; Future  - TSH without Reflex; Future    3. Permanent atrial fibrillation (HCC)  We will going up his beta-blocker from 25 to 37-1/2 mg limited by his blood pressure but we will try this cautiously rate is above 100 most of the time  - TSH without Reflex; Future    4. Shortness of breath  Check a BNP in addition to other labs  - Brain Natriuretic Peptide; Future    5. Bilateral carotid artery disease 50 to 69% on one side less than 50 on the other we reviewed that Doppler from 2017 when completing his note    6.   Coronary artery disease medically managed stable reviewed last cardiology note    Recommend the COVID-19 vaccine

## 2021-03-26 ENCOUNTER — TELEPHONE (OUTPATIENT)
Dept: CARDIOLOGY | Facility: CLINIC | Age: 86
End: 2021-03-26

## 2021-03-26 NOTE — TELEPHONE ENCOUNTER
Notified Munira Cai that her father is approved for patient assistance for Entresto until 12/31/21 and it is being processed for shipment. She voiced understanding.Alma Mendez MA

## 2021-03-29 DIAGNOSIS — F41.9 ANXIETY: ICD-10-CM

## 2021-03-29 RX ORDER — METOPROLOL SUCCINATE 25 MG/1
37.5 TABLET, EXTENDED RELEASE ORAL DAILY
Qty: 135 TABLET | Refills: 3 | Status: SHIPPED | OUTPATIENT
Start: 2021-03-29 | End: 2022-04-12

## 2021-03-29 NOTE — TELEPHONE ENCOUNTER
Lewis Webb called to request a refill on his medication. Last office visit : 3/22/2021   Next office visit : 6/28/2021     Last UDS:   Amphetamine Screen, Urine   Date Value Ref Range Status   06/07/2019 neg  Final     Barbiturate Screen, Urine   Date Value Ref Range Status   06/07/2019 neg  Final     Benzodiazepine Screen, Urine   Date Value Ref Range Status   06/07/2019 neg  Final     Buprenorphine Urine   Date Value Ref Range Status   06/07/2019 neg  Final     Cocaine Metabolite Screen, Urine   Date Value Ref Range Status   06/07/2019 neg  Final     Gabapentin Screen, Urine   Date Value Ref Range Status   06/07/2019 na  Final     MDMA, Urine   Date Value Ref Range Status   06/07/2019 neg  Final     Methamphetamine, Urine   Date Value Ref Range Status   06/07/2019 neg  Final     Opiate Scrn, Ur   Date Value Ref Range Status   06/07/2019 pos  Final     Oxycodone Screen, Ur   Date Value Ref Range Status   06/07/2019 neg  Final     PCP Screen, Urine   Date Value Ref Range Status   06/07/2019 neg  Final     Propoxyphene Screen, Urine   Date Value Ref Range Status   06/07/2019 neg  Final     THC Screen, Urine   Date Value Ref Range Status   06/07/2019 neg  Final     Tricyclic Antidepressants, Urine   Date Value Ref Range Status   06/07/2019 neg  Final       Last Therese Natalbany: 02/04/2021  Medication Contract: 02/07/2019    Requested Prescriptions     Pending Prescriptions Disp Refills    LORazepam (ATIVAN) 0.5 MG tablet [Pharmacy Med Name: LORAZEPAM 0.5 MG TABS 0.5 Tablet] 120 tablet 0     Sig: TAKE ONE TABLET BY MOUTH FOUR TIMES A DAY **MAY MAKE DROWSY**. Please approve or refuse this medication.    Perry Belcher

## 2021-03-31 ENCOUNTER — TELEPHONE (OUTPATIENT)
Dept: CARDIOLOGY | Facility: CLINIC | Age: 86
End: 2021-03-31

## 2021-04-01 RX ORDER — LORAZEPAM 0.5 MG/1
TABLET ORAL
Qty: 120 TABLET | Refills: 0 | Status: SHIPPED | OUTPATIENT
Start: 2021-04-01 | End: 2021-04-27

## 2021-04-02 DIAGNOSIS — G60.9 IDIOPATHIC NEUROPATHY: ICD-10-CM

## 2021-04-02 DIAGNOSIS — G89.4 CHRONIC PAIN SYNDROME: ICD-10-CM

## 2021-04-02 RX ORDER — HYDROCODONE BITARTRATE AND ACETAMINOPHEN 5; 325 MG/1; MG/1
1 TABLET ORAL 3 TIMES DAILY PRN
Qty: 90 TABLET | Refills: 0 | Status: SHIPPED | OUTPATIENT
Start: 2021-04-02 | End: 2021-05-06 | Stop reason: SDUPTHER

## 2021-04-20 RX ORDER — DUTASTERIDE 0.5 MG/1
0.5 CAPSULE, LIQUID FILLED ORAL DAILY
Qty: 30 CAPSULE | Refills: 1 | Status: SHIPPED | OUTPATIENT
Start: 2021-04-20 | End: 2021-06-21

## 2021-04-21 RX ORDER — BUMETANIDE 0.5 MG/1
0.5 TABLET ORAL 2 TIMES DAILY
Qty: 90 TABLET | Refills: 3 | Status: SHIPPED | OUTPATIENT
Start: 2021-04-21 | End: 2021-10-12

## 2021-04-26 DIAGNOSIS — F41.9 ANXIETY: ICD-10-CM

## 2021-04-27 RX ORDER — LORAZEPAM 0.5 MG/1
TABLET ORAL
Qty: 120 TABLET | Refills: 0 | Status: SHIPPED | OUTPATIENT
Start: 2021-04-27 | End: 2021-05-13

## 2021-04-30 PROCEDURE — 93295 DEV INTERROG REMOTE 1/2/MLT: CPT | Performed by: INTERNAL MEDICINE

## 2021-04-30 PROCEDURE — 93296 REM INTERROG EVL PM/IDS: CPT | Performed by: INTERNAL MEDICINE

## 2021-05-04 DIAGNOSIS — G60.9 IDIOPATHIC NEUROPATHY: ICD-10-CM

## 2021-05-04 DIAGNOSIS — G89.4 CHRONIC PAIN SYNDROME: ICD-10-CM

## 2021-05-06 ENCOUNTER — TELEPHONE (OUTPATIENT)
Dept: INTERNAL MEDICINE | Age: 86
End: 2021-05-06

## 2021-05-06 DIAGNOSIS — F41.9 ANXIETY: ICD-10-CM

## 2021-05-06 RX ORDER — HYDROCODONE BITARTRATE AND ACETAMINOPHEN 5; 325 MG/1; MG/1
1 TABLET ORAL 3 TIMES DAILY PRN
Qty: 90 TABLET | Refills: 0 | Status: SHIPPED | OUTPATIENT
Start: 2021-05-06 | End: 2021-06-03 | Stop reason: SDUPTHER

## 2021-05-06 NOTE — TELEPHONE ENCOUNTER
[facility] is calling because they need his Norco for the pill box. Please send to [facility] and his daughter brings it to [address] [facility].   It is pended

## 2021-05-13 RX ORDER — LORAZEPAM 0.5 MG/1
TABLET ORAL
Qty: 120 TABLET | Refills: 0 | Status: SHIPPED | OUTPATIENT
Start: 2021-05-13 | End: 2021-07-14

## 2021-05-28 PROBLEM — F41.9 ANXIETY: Status: ACTIVE | Noted: 2021-05-28

## 2021-05-28 PROBLEM — E53.8 VITAMIN B12 DEFICIENCY (NON ANEMIC): Status: ACTIVE | Noted: 2021-05-28

## 2021-05-28 PROBLEM — N40.1 BENIGN PROSTATIC HYPERPLASIA WITH URINARY OBSTRUCTION: Status: ACTIVE | Noted: 2021-05-28

## 2021-05-28 PROBLEM — M19.90 OSTEOARTHRITIS: Status: ACTIVE | Noted: 2021-05-28

## 2021-05-28 PROBLEM — H91.93 BILATERAL HEARING LOSS: Status: ACTIVE | Noted: 2019-02-26

## 2021-05-28 PROBLEM — G89.29 CHRONIC PAIN: Status: ACTIVE | Noted: 2017-11-07

## 2021-05-28 PROBLEM — I71.40 ABDOMINAL AORTIC ANEURYSM (HCC): Status: ACTIVE | Noted: 2021-05-28

## 2021-05-28 PROBLEM — M75.100 ROTATOR CUFF SYNDROME: Status: ACTIVE | Noted: 2021-05-28

## 2021-05-28 PROBLEM — N25.81 SECONDARY HYPERPARATHYROIDISM: Status: ACTIVE | Noted: 2021-05-28

## 2021-05-28 PROBLEM — I47.20 VENTRICULAR TACHYCARDIA (HCC): Status: ACTIVE | Noted: 2021-05-28

## 2021-05-28 PROBLEM — I48.0 PAROXYSMAL ATRIAL FIBRILLATION (HCC): Status: ACTIVE | Noted: 2021-05-28

## 2021-05-28 PROBLEM — I50.22 CHRONIC SYSTOLIC CHF (CONGESTIVE HEART FAILURE): Status: ACTIVE | Noted: 2018-02-12

## 2021-05-28 PROBLEM — K63.5 POLYP OF COLON: Status: ACTIVE | Noted: 2021-05-28

## 2021-05-28 PROBLEM — H01.019 ULCERATIVE BLEPHARITIS: Status: ACTIVE | Noted: 2020-02-14

## 2021-05-28 PROBLEM — N13.8 BENIGN PROSTATIC HYPERPLASIA WITH URINARY OBSTRUCTION: Status: ACTIVE | Noted: 2021-05-28

## 2021-05-28 PROBLEM — R07.9 ACUTE CHEST PAIN: Status: ACTIVE | Noted: 2021-05-28

## 2021-05-28 PROBLEM — D64.9 ANEMIA: Status: ACTIVE | Noted: 2017-11-03

## 2021-05-28 PROBLEM — I25.10 CORONARY ARTERIOSCLEROSIS: Status: ACTIVE | Noted: 2021-05-28

## 2021-05-28 PROBLEM — M19.019 DEGENERATIVE JOINT DISEASE OF SHOULDER REGION: Status: ACTIVE | Noted: 2021-05-28

## 2021-05-28 PROBLEM — I35.1 AORTIC VALVE REGURGITATION: Status: ACTIVE | Noted: 2021-05-28

## 2021-05-28 PROBLEM — R31.0 GROSS HEMATURIA: Status: ACTIVE | Noted: 2021-05-28

## 2021-05-28 PROBLEM — E78.2 MIXED HYPERLIPIDEMIA: Status: ACTIVE | Noted: 2017-10-27

## 2021-05-28 PROBLEM — G60.9 IDIOPATHIC NEUROPATHY: Status: ACTIVE | Noted: 2017-04-05

## 2021-05-28 PROBLEM — H02.139 INVOLUTIONAL ECTROPION: Status: ACTIVE | Noted: 2020-02-14

## 2021-05-28 PROBLEM — I10 ESSENTIAL HYPERTENSION: Status: ACTIVE | Noted: 2017-02-14

## 2021-05-28 PROBLEM — N39.0 COMPLICATED UTI (URINARY TRACT INFECTION): Status: ACTIVE | Noted: 2018-08-22

## 2021-05-28 PROBLEM — I65.29 CAROTID ARTERY STENOSIS: Status: ACTIVE | Noted: 2021-05-28

## 2021-05-28 PROBLEM — I25.119 CORONARY ARTERY DISEASE INVOLVING NATIVE HEART WITH ANGINA PECTORIS (HCC): Status: ACTIVE | Noted: 2021-05-28

## 2021-05-28 PROBLEM — G47.00 INSOMNIA: Status: ACTIVE | Noted: 2021-05-28

## 2021-05-28 PROBLEM — R09.02 HYPOXIA: Status: ACTIVE | Noted: 2020-09-06

## 2021-05-28 PROBLEM — I48.91 ATRIAL FIBRILLATION WITH RVR (HCC): Status: ACTIVE | Noted: 2021-05-28

## 2021-05-28 PROBLEM — I48.20 CHRONIC ATRIAL FIBRILLATION (HCC): Status: ACTIVE | Noted: 2018-02-12

## 2021-05-28 PROBLEM — Q61.02 MULTIPLE RENAL CYSTS: Status: ACTIVE | Noted: 2021-05-28

## 2021-05-28 PROBLEM — K80.20 CHOLELITHIASIS: Status: ACTIVE | Noted: 2019-10-30

## 2021-05-28 PROBLEM — I95.2 HYPOTENSION DUE TO DRUGS: Status: ACTIVE | Noted: 2018-08-10

## 2021-05-28 PROBLEM — N40.1 BPH WITH URINARY OBSTRUCTION: Status: ACTIVE | Noted: 2017-11-16

## 2021-05-28 PROBLEM — I25.5 ISCHEMIC CARDIOMYOPATHY: Status: ACTIVE | Noted: 2021-05-28

## 2021-05-28 PROBLEM — I50.9 CHF (CONGESTIVE HEART FAILURE) (HCC): Status: ACTIVE | Noted: 2020-02-18

## 2021-05-28 PROBLEM — K80.20 GALLSTONE: Status: ACTIVE | Noted: 2021-05-28

## 2021-05-28 PROBLEM — N30.01 ACUTE CYSTITIS WITH HEMATURIA: Status: ACTIVE | Noted: 2021-05-28

## 2021-05-28 PROBLEM — R33.9 RETENTION OF URINE: Status: ACTIVE | Noted: 2018-08-22

## 2021-05-28 PROBLEM — D50.9 IRON DEFICIENCY ANEMIA: Status: ACTIVE | Noted: 2021-05-28

## 2021-05-28 PROBLEM — N13.8 BPH WITH URINARY OBSTRUCTION: Status: ACTIVE | Noted: 2017-11-16

## 2021-06-03 ENCOUNTER — TELEPHONE (OUTPATIENT)
Dept: PODIATRY | Facility: CLINIC | Age: 86
End: 2021-06-03

## 2021-06-03 DIAGNOSIS — G60.9 IDIOPATHIC NEUROPATHY: ICD-10-CM

## 2021-06-03 DIAGNOSIS — G89.4 CHRONIC PAIN SYNDROME: ICD-10-CM

## 2021-06-03 RX ORDER — HYDROCODONE BITARTRATE AND ACETAMINOPHEN 5; 325 MG/1; MG/1
1 TABLET ORAL 3 TIMES DAILY PRN
Qty: 90 TABLET | Refills: 0 | Status: SHIPPED | OUTPATIENT
Start: 2021-06-03 | End: 2021-07-01 | Stop reason: SDUPTHER

## 2021-06-03 NOTE — TELEPHONE ENCOUNTER
Left message for a return call . Pts appointment with Ted Hickey has been changed to 06/10 with Dr. Hensley.

## 2021-06-04 NOTE — PROGRESS NOTES
[facility] - PODIATRY    Today's Date: 06/10/21    Patient Name: Wild Bravo  MRN: [id number]  CSN: [id number]  PCP: Alison Hercules MD  Referring Provider: No ref. provider found    SUBJECTIVE     Chief Complaint   Patient presents with   • Follow-up     pcp 12/15/2020 NON-DAIBETIC NAIL CARE- pt states feet are fine- pt denies pain- pt presents with discoloration to feet and nails. Nails are thickened and long    • pulse ox     was unable to get pulse ox reading      HPI: Wild Bravo, a 86 y.o.male, comes to clinic as a(n) new patient complaining of painful toenails and complaining of thickened, tender hallux nails. Patient has h/o AAA, Anxiety, AF, BPH, Carotid Stenosis, CHF, CKD, MI, Hearing Loss, CVA. Patient presents with complaints of bilateral hallux nail tenderness, thickness, and discoloration.  Patient notes that he experiences pain intermittently in the nails, particularly when wearing shoes or walking.  Notes that he is not experiencing any pain today and last nails are pressed.  States that family has previously attempted to care for nails, however, this resulted in pain and they were unable to effectively debride the nail.  Denies redness, drainage, or other signs of infection. Denies pain at the present time, however, relates tenderness when nails are pressed and when wearing shoes. Relates previous treatment(s) including Self treatment of nails at home. Denies any constitutional symptoms. No other pedal complaints at this time.    Past Medical History:   Diagnosis Date   • Abdominal aortic aneurysm (CMS/HCC)    • Anxiety    • Atrial fibrillation (CMS/HCC)    • Biventricular ICD (implantable cardioverter-defibrillator) in place    • BPH (benign prostatic hypertrophy)    • Carotid artery stenosis    • CHF (congestive heart failure) (CMS/HCC)    • Chronic kidney disease     Chronic kidney disease, stage 4 (severe)   • Chronic systolic (congestive) heart failure (CMS/HCC)      limited echo 7/2016 EF was 40-45%. Patient has BiV AICD   • Colon obstruction (CMS/HCC)    • Coronary arteriosclerosis     MI x2   • Hearing loss    • Iron deficiency anemia    • Ischemic cardiomyopathy    • Mixed hyperlipidemia    • Myocardial infarction (CMS/HCC)    • Stroke (CMS/HCC)      Past Surgical History:   Procedure Laterality Date   • ABLATION OF DYSRHYTHMIC FOCUS  2008    MAZE at time of CABG/MVR   • CARDIAC ABLATION     • CARDIAC CATHETERIZATION     • CARDIAC DEFIBRILLATOR PLACEMENT     • CARDIAC ELECTROPHYSIOLOGY PROCEDURE N/A 6/9/2020    Procedure: ICD battery change;  Surgeon: Raleigh Enciso MD;  Location:  [facility];  Service: Cardiovascular;  Laterality: N/A;   • CARDIAC PACEMAKER PLACEMENT     • CARDIOVERSION     • CAROTID ENDARTERECTOMY     • CAROTID ENDARTERECTOMY     • CORONARY ARTERY BYPASS GRAFT  2008    X 1   • CORONARY STENT PLACEMENT  2008    X 2   • LAPAROSCOPIC LYSIS OF ADHESIONS N/A 1/28/2020    Procedure: LAPAROSCOPIC LYSIS OF ADHESIONS;  Surgeon: Kim Almeida MD;  Location: [facility] OR;  Service: General   • MITRAL VALVE REPAIR/REPLACEMENT  2008   • TOTAL KNEE ARTHROPLASTY       Family History   Problem Relation Age of Onset   • Stroke Mother    • Heart disease Mother    • Diabetes Father      Social History     Socioeconomic History   • Marital status:      Spouse name: Not on file   • Number of children: Not on file   • Years of education: Not on file   • Highest education level: Not on file   Tobacco Use   • Smoking status: Former Smoker     Years: 45.00     Types: Cigarettes   • Smokeless tobacco: Never Used   Vaping Use   • Vaping Use: Never used   Substance and Sexual Activity   • Alcohol use: No   • Drug use: No   • Sexual activity: Defer     Allergies   Allergen Reactions   • Keflex [Cephalexin]      CEPHALOSPORINS     Current Outpatient Medications   Medication Sig Dispense Refill   • aspirin 81 MG EC tablet Take 81 mg by mouth Daily.     •  atorvastatin (LIPITOR) 10 MG tablet Take 1 tablet by mouth Every Night.     • bumetanide (BUMEX) 0.5 MG tablet TAKE 1 TABLET BY MOUTH 2 (TWO) TIMES A DAY. TAKE AN EXTRA TABLET DAILY AS NEEDED FOR INCREASED SHORTNESS OF BREATH, EDEMA AND/OR WEIGHT GAIN 90 tablet 3   • dutasteride (AVODART) 0.5 MG capsule Take 0.5 mg by mouth Daily. Patient takes at noon     • furosemide (LASIX) 20 MG tablet Take 20 mg by mouth As Needed.     • gabapentin (NEURONTIN) 300 MG capsule Take 300 mg by mouth Daily.     • HYDROcodone-acetaminophen (NORCO) 5-325 MG per tablet Take 1 tablet by mouth Every 6 (Six) Hours As Needed for Moderate Pain .     • LORazepam (ATIVAN) 0.5 MG tablet Take 0.5 mg by mouth 3 (Three) Times a Day As Needed.     • metoprolol succinate XL (TOPROL-XL) 25 MG 24 hr tablet Take 1 tablet by mouth Every Night. 90 tablet 4   • nitroglycerin (NITROSTAT) 0.4 MG SL tablet PLACE 1 TABLET UNDER THE TONGUE AS NEEDED FOR ANGINA, MAY REPEAT EVERY 5 MINS FOR UP THREE DOSES 25 tablet 3   • sacubitril-valsartan (ENTRESTO) 24-26 MG tablet Take 1 tablet by mouth 2 (Two) Times a Day. 60 tablet 1   • tamsulosin (FLOMAX) 0.4 MG capsule 24 hr capsule Take 1 capsule by mouth Every Night.       No current facility-administered medications for this visit.     Review of Systems   Constitutional: Negative for chills and fever.   HENT: Positive for hearing loss. Negative for congestion.    Respiratory: Negative for shortness of breath.    Cardiovascular: Negative for chest pain and leg swelling.   Gastrointestinal: Negative for constipation, diarrhea, nausea and vomiting.   Musculoskeletal: Positive for arthralgias. Negative for myalgias.   Skin: Negative for wound.   Neurological: Negative for numbness.       OBJECTIVE     Vitals:    06/10/21 1424   BP: 104/56       PHYSICAL EXAM  GEN:   Accompanied by son-in-law.     Foot/Ankle Exam:       General:   Appearance: appears stated age and healthy and elderly    Orientation: AAOx3    Orientation  comment:  Very [place]  Affect: appropriate    Gait: unimpaired    Assistance: cane    Shoe Gear:  Casual shoes    VASCULAR      Right Foot Vascularity   Dorsalis pedis:  2+  Posterior tibial:  2+  Skin Temperature: warm    Edema Grading:  Trace  CFT:  4  Pedal Hair Growth:  Present  Varicosities: mild varicosities       Left Foot Vascularity   Dorsalis pedis:  2+  Posterior tibial:  2+  Skin Temperature: warm    Edema Grading:  Trace  CFT:  4  Pedal Hair Growth:  Present  Varicosities: mild varicosities        NEUROLOGIC     Right Foot Neurologic   Normal sensation    Light touch sensation:  Normal  Vibratory sensation:  Normal  Hot/Cold sensation: normal    Protective Sensation using [place]ofil[place]:  10     Left Foot Neurologic   Normal sensation    Light touch sensation:  Normal  Vibratory sensation:  Normal  Hot/cold sensation: normal    Protective Sensation using [place] Monofilament:  10     MUSCULOSKELETAL      Right Foot Musculoskeletal   Ecchymosis:  None  Tenderness: toenails and toe 1    Arch:  Normal  Hallux valgus: No    Hallux limitus: No       Left Foot Musculoskeletal   Ecchymosis:  None  Tenderness: toenails and toe 1    Arch:  Normal  Hallux valgus: No    Hallux limitus: No       MUSCLE STRENGTH     Right Foot Muscle Strength   Foot dorsiflexion:  4+  Foot plantar flexion:  4+  Foot inversion:  4+  Foot eversion:  4+     Left Foot Muscle Strength   Foot dorsiflexion:  4+  Foot plantar flexion:  4+  Foot inversion:  4+  Foot eversion:  4+     RANGE OF MOTION      Right Foot Range of Motion   Foot and ankle ROM within normal limits       Left Foot Range of Motion   Foot and ankle ROM within normal limits       DERMATOLOGIC     Right Foot Dermatologic   Skin: skin intact and atrophic    Nails: onychomycosis, abnormally thick, subungual debris, dystrophic nails and ingrown toenail (hallux)       Left Foot Dermatologic   Skin: skin intact and atrophic    Nails: onychomycosis,  abnormally thick, subungual debris, dystrophic nails and ingrown toenail (hallux)        RADIOLOGY/NUCLEAR:  No results found.    LABORATORY/CULTURE RESULTS:      PATHOLOGY RESULTS:       ASSESSMENT/PLAN     Diagnoses and all orders for this visit:    1. Ingrown toenail (Primary)    2. Onychomycosis    3. Advanced age    4. Atrophic skin      Comprehensive lower extremity examination and evaluation was performed.  Discussed findings and treatment plan including risks, benefits, and treatment options with patient in detail. Patient agreed with treatment plan.  After verbal consent obtained, nail(s) x10 debrided of length and thickness with nail nipper without incidence  After verbal consent obtained, nail(s) x2 debrided of offending borders with nail nipper without incidence  Patient may maintain nails and calluses at home utilizing emery board or pumice stone between visits as needed   Moisturize skin on a regular basis.  An After Visit Summary was printed and given to the patient at discharge, including (if requested) any available informative/educational handouts regarding diagnosis, treatment, or medications. All questions were answered to patient/family satisfaction. Should symptoms fail to improve or worsen they agree to call or return to clinic or to go to the Emergency Department. Discussed the importance of following up with any needed screening tests/labs/specialist appointments and any requested follow-up recommended by me today. Importance of maintaining follow-up discussed and patient accepts that missed appointments can delay diagnosis and potentially lead to worsening of conditions.  Return in about 3 months (around 9/10/2021)., or sooner if acute issues arise.    Lab Frequency Next Occurrence   Instruct Patient To Stop Heparin or Lovenox 24 Hours Before Scheduled Implant Procedure Once 05/13/2020   Instruct Patient To Stop Apixaban, Rivaroxaban, Edoxaban, Dibigatran, Vorapaxar, Atopaxar 48 Hours  Before Scheduled Implant Procedure Once 05/13/2020       This document has been electronically signed by Mickey Hensley DPM on Kamini 10, 2021 14:54 CDT

## 2021-06-09 ENCOUNTER — TELEPHONE (OUTPATIENT)
Dept: PODIATRY | Facility: CLINIC | Age: 86
End: 2021-06-09

## 2021-06-09 NOTE — TELEPHONE ENCOUNTER
Spoke with patients daughter regarding appt on 6/10/2021. Patient confirmed date and time off appt.

## 2021-06-10 ENCOUNTER — OFFICE VISIT (OUTPATIENT)
Dept: PODIATRY | Facility: CLINIC | Age: 86
End: 2021-06-10

## 2021-06-10 VITALS
SYSTOLIC BLOOD PRESSURE: 104 MMHG | WEIGHT: 160 LBS | HEIGHT: 73 IN | BODY MASS INDEX: 21.2 KG/M2 | DIASTOLIC BLOOD PRESSURE: 56 MMHG

## 2021-06-10 DIAGNOSIS — L90.9 ATROPHIC SKIN: ICD-10-CM

## 2021-06-10 DIAGNOSIS — L60.0 INGROWN TOENAIL: Primary | ICD-10-CM

## 2021-06-10 DIAGNOSIS — R54 ADVANCED AGE: ICD-10-CM

## 2021-06-10 DIAGNOSIS — B35.1 ONYCHOMYCOSIS: ICD-10-CM

## 2021-06-10 PROCEDURE — 11721 DEBRIDE NAIL 6 OR MORE: CPT | Performed by: PODIATRIST

## 2021-06-10 RX ORDER — LORAZEPAM 1 MG/1
TABLET ORAL
COMMUNITY
Start: 2021-05-13 | End: 2021-06-10

## 2021-06-30 DIAGNOSIS — G89.4 CHRONIC PAIN SYNDROME: ICD-10-CM

## 2021-06-30 DIAGNOSIS — G60.9 IDIOPATHIC NEUROPATHY: ICD-10-CM

## 2021-07-01 RX ORDER — HYDROCODONE BITARTRATE AND ACETAMINOPHEN 5; 325 MG/1; MG/1
1 TABLET ORAL 3 TIMES DAILY PRN
Qty: 90 TABLET | Refills: 0 | Status: SHIPPED | OUTPATIENT
Start: 2021-07-01 | End: 2021-07-30 | Stop reason: SDUPTHER

## 2021-07-07 ENCOUNTER — TELEPHONE (OUTPATIENT)
Dept: CARDIOLOGY | Facility: CLINIC | Age: 86
End: 2021-07-07

## 2021-07-07 NOTE — TELEPHONE ENCOUNTER
Patient's [employer] Heart Failure Index is elevated at 19.  RN attempted to call patient for a heart failure symptom check.  Message left for patient to return call.

## 2021-07-08 DIAGNOSIS — F41.9 ANXIETY: ICD-10-CM

## 2021-07-14 RX ORDER — LORAZEPAM 0.5 MG/1
TABLET ORAL
Qty: 120 TABLET | Refills: 0 | Status: SHIPPED | OUTPATIENT
Start: 2021-07-14 | End: 2021-08-17

## 2021-07-20 ENCOUNTER — OFFICE VISIT (OUTPATIENT)
Dept: INTERNAL MEDICINE | Age: 86
End: 2021-07-20
Payer: MEDICARE

## 2021-07-20 VITALS
WEIGHT: 152 LBS | BODY MASS INDEX: 20.59 KG/M2 | OXYGEN SATURATION: 95 % | HEART RATE: 78 BPM | SYSTOLIC BLOOD PRESSURE: 84 MMHG | DIASTOLIC BLOOD PRESSURE: 50 MMHG | HEIGHT: 72 IN

## 2021-07-20 DIAGNOSIS — N18.4 STAGE 4 CHRONIC KIDNEY DISEASE (HCC): ICD-10-CM

## 2021-07-20 DIAGNOSIS — S90.821A BLISTER OF RIGHT HEEL, INITIAL ENCOUNTER: Primary | ICD-10-CM

## 2021-07-20 PROBLEM — I73.9 PAD (PERIPHERAL ARTERY DISEASE) (HCC): Status: RESOLVED | Noted: 2017-09-08 | Resolved: 2021-07-20

## 2021-07-20 PROBLEM — I77.9 BILATERAL CAROTID ARTERY DISEASE (HCC): Status: RESOLVED | Noted: 2017-10-27 | Resolved: 2021-07-20

## 2021-07-20 PROCEDURE — G8427 DOCREV CUR MEDS BY ELIG CLIN: HCPCS | Performed by: INTERNAL MEDICINE

## 2021-07-20 PROCEDURE — 1036F TOBACCO NON-USER: CPT | Performed by: INTERNAL MEDICINE

## 2021-07-20 PROCEDURE — 4040F PNEUMOC VAC/ADMIN/RCVD: CPT | Performed by: INTERNAL MEDICINE

## 2021-07-20 PROCEDURE — 1123F ACP DISCUSS/DSCN MKR DOCD: CPT | Performed by: INTERNAL MEDICINE

## 2021-07-20 PROCEDURE — G8420 CALC BMI NORM PARAMETERS: HCPCS | Performed by: INTERNAL MEDICINE

## 2021-07-20 PROCEDURE — 99213 OFFICE O/P EST LOW 20 MIN: CPT | Performed by: INTERNAL MEDICINE

## 2021-07-20 ASSESSMENT — PATIENT HEALTH QUESTIONNAIRE - PHQ9
SUM OF ALL RESPONSES TO PHQ QUESTIONS 1-9: 0
SUM OF ALL RESPONSES TO PHQ9 QUESTIONS 1 & 2: 0
SUM OF ALL RESPONSES TO PHQ QUESTIONS 1-9: 0
2. FEELING DOWN, DEPRESSED OR HOPELESS: 0
SUM OF ALL RESPONSES TO PHQ QUESTIONS 1-9: 0
1. LITTLE INTEREST OR PLEASURE IN DOING THINGS: 0

## 2021-07-22 PROBLEM — I71.40 ABDOMINAL AORTIC ANEURYSM: Status: ACTIVE | Noted: 2021-05-28

## 2021-07-22 PROBLEM — I35.1 AORTIC VALVE REGURGITATION: Status: ACTIVE | Noted: 2021-05-28

## 2021-07-22 PROBLEM — E53.8 VITAMIN B12 DEFICIENCY (NON ANEMIC): Status: ACTIVE | Noted: 2021-05-28

## 2021-07-22 PROBLEM — M19.90 OSTEOARTHROSIS: Status: ACTIVE | Noted: 2021-05-28

## 2021-07-22 PROBLEM — Q61.02 MULTIPLE RENAL CYSTS: Status: ACTIVE | Noted: 2021-05-28

## 2021-07-22 PROBLEM — N25.81 SECONDARY HYPERPARATHYROIDISM (HCC): Status: ACTIVE | Noted: 2021-05-28

## 2021-07-22 PROBLEM — D50.9 IRON DEFICIENCY ANEMIA: Status: ACTIVE | Noted: 2021-05-28

## 2021-07-22 PROBLEM — M75.100 ROTATOR CUFF SYNDROME: Status: ACTIVE | Noted: 2021-05-28

## 2021-07-22 PROBLEM — K63.5 POLYP OF COLON: Status: ACTIVE | Noted: 2021-05-28

## 2021-07-29 DIAGNOSIS — G89.4 CHRONIC PAIN SYNDROME: ICD-10-CM

## 2021-07-29 DIAGNOSIS — G60.9 IDIOPATHIC NEUROPATHY: ICD-10-CM

## 2021-07-30 PROCEDURE — 93296 REM INTERROG EVL PM/IDS: CPT | Performed by: INTERNAL MEDICINE

## 2021-07-30 PROCEDURE — 93295 DEV INTERROG REMOTE 1/2/MLT: CPT | Performed by: INTERNAL MEDICINE

## 2021-07-30 RX ORDER — HYDROCODONE BITARTRATE AND ACETAMINOPHEN 5; 325 MG/1; MG/1
1 TABLET ORAL 3 TIMES DAILY PRN
Qty: 90 TABLET | Refills: 0 | Status: SHIPPED | OUTPATIENT
Start: 2021-07-30 | End: 2021-08-26 | Stop reason: SDUPTHER

## 2021-08-07 PROBLEM — N25.81 SECONDARY HYPERPARATHYROIDISM (HCC): Status: RESOLVED | Noted: 2021-05-28 | Resolved: 2021-08-07

## 2021-08-07 PROBLEM — I71.40 ABDOMINAL AORTIC ANEURYSM: Status: RESOLVED | Noted: 2021-05-28 | Resolved: 2021-08-07

## 2021-08-07 PROBLEM — S90.821A BLISTER OF RIGHT HEEL: Status: ACTIVE | Noted: 2021-08-07

## 2021-08-08 NOTE — PROGRESS NOTES
Chief Complaint   Patient presents with    Blister     to right heel       Pt is here for a problem visit with a large blister right heel      Past Medical History:   Diagnosis Date    AICD (automatic cardioverter/defibrillator) present     followed by doctor in [address] pain 2/3/2012    Atrial fibrillation ([address]     Benign prostatic hyperplasia with lower urinary tract symptoms 11/16/2017    Benign prostatic hypertrophy     CAD (coronary artery disease)     Chronic kidney disease     Chronic pain 11/7/2017    Gallstones     Hyperlipidemia     Hypertension     Hypoxia 9/6/2020    Idiopathic neuropathy     Insomnia     Osteoarthritis of right knee     Restless legs     Right rotator cuff tear     Ventricular tachycardia ([address]        Past Surgical History:   Procedure Laterality Date    APPENDECTOMY      CARDIAC PACEMAKER PLACEMENT      Bi ventricular pacemaker. Lenny Salm CARDIAC SURGERY      mitral valve, maze procedure, 1 vessel bypass -2008    CAROTID ENDARTERECTOMY      Right carotid endarterectomy.  COLONOSCOPY      CORONARY ARTERY BYPASS GRAFT      1 vessel 2008    MOUTH SURGERY      Oral implants.  PACEMAKER INSERTION      biventricular pacemaker ([employer])       Family History   Problem Relation Age of Onset    Stroke Mother     Heart Attack Mother 80    Diabetes Father     COPD Sister     Arthritis Sister     Stroke Brother     Heart Disease Brother        Social History     Socioeconomic History    Marital status:      Spouse name: Not on file    Number of children: Not on file    Years of education: Not on file    Highest education level: Not on file   Occupational History    Not on file   Tobacco Use    Smoking status: Former Smoker    Smokeless tobacco: Never Used   Substance and Sexual Activity    Alcohol use: No    Drug use: No    Sexual activity: Never     Comment: Marital status - .    Other Topics Concern    Not on file Social History Narrative    Not on file     Social Determinants of Health     Financial Resource Strain: Low Risk     Difficulty of Paying Living Expenses: Not hard at all   Food Insecurity: No Food Insecurity    Worried About Running Out of Food in the Last Year: Never true    [address] in the Last Year: Never true   Transportation Needs:     Lack of Transportation (Medical):  Lack of Transportation (Non-Medical):    Physical Activity:     Days of Exercise per Week:     Minutes of Exercise per Session:    Stress:     Feeling of Stress :    Social Connections:     Frequency of Communication with Friends and Family:     Frequency of Social Gatherings with Friends and Family:     Attends [employer] Services:     Active Member of Clubs or Organizations:     Attends Club or Organization Meetings:     Marital Status:    Intimate Partner Violence:     Fear of Current or Ex-Partner:     Emotionally Abused:     Physically Abused:     Sexually Abused: Allergies   Allergen Reactions    Keflex [Cephalexin] Rash     pruritius       Current Outpatient Medications   Medication Sig Dispense Refill    HYDROcodone-acetaminophen (NORCO) 5-325 MG per tablet Take 1 tablet by mouth 3 times daily as needed for Pain for up to 30 days.  90 tablet 0    LORazepam (ATIVAN) 0.5 MG tablet TAKE ONE TABLET BY MOUTH FOUR TIMES A DAY **MAY MAKE DROWSY**. 120 tablet 0    dutasteride (AVODART) 0.5 MG capsule TAKE 1 CAPSULE BY MOUTH DAILY 90 capsule 3    metoprolol succinate (TOPROL XL) 25 MG extended release tablet Take 1.5 tablets by mouth daily 135 tablet 3    gabapentin (NEURONTIN) 300 MG capsule TAKE 1 CAPSULE BY MOUTH AT 5PM AND TAKE 2 CAPSULEs BY MOUTH EVERY BEDTIME 90 capsule 2    tamsulosin (FLOMAX) 0.4 MG capsule TAKE ONE CAPSULE BY MOUTH EVERY DAY 90 capsule 3    sacubitril-valsartan (ENTRESTO) 24-26 MG per tablet Take 1 tablet by mouth 2 times daily 60 tablet 5    bumetanide (BUMEX) 0.5 MG tablet Take 0.5 mg by mouth 2 times daily      vitamin D (CHOLECALCIFEROL) 1000 UNIT TABS tablet Take 1,000 Units by mouth daily      nitroGLYCERIN (NITROSTAT) 0.4 MG SL tablet Place 0.4 mg under the tongue      aspirin 81 MG tablet Take 81 mg by mouth daily. No current facility-administered medications for this visit. Review of Systems    BP (!) 84/50   Pulse 78   Ht 6' (1.829 m)   Wt 152 lb (68.9 kg)   SpO2 95%   BMI 20.61 kg/m²   BP Readings from Last 7 Encounters:   07/20/21 (!) 84/50   03/22/21 (!) 84/58   12/15/20 98/60   08/25/20 120/76   02/18/20 90/60   10/15/19 114/76   06/07/19 126/68     Wt Readings from Last 7 Encounters:   07/20/21 152 lb (68.9 kg)   03/22/21 162 lb (73.5 kg)   12/15/20 165 lb (74.8 kg)   08/25/20 163 lb (73.9 kg)   03/13/20 161 lb (73 kg)   03/12/20 161 lb (73 kg)   02/20/20 166 lb (75.3 kg)     BMI Readings from Last 7 Encounters:   07/20/21 20.61 kg/m²   03/22/21 21.97 kg/m²   12/15/20 22.38 kg/m²   08/25/20 22.11 kg/m²   03/13/20 21.84 kg/m²   03/12/20 21.84 kg/m²   02/20/20 21.90 kg/m²     Resp Readings from Last 7 Encounters:   08/26/18 16   11/09/17 18   01/31/16 20       Physical Exam  Constitutional:       General: He is not in acute distress. Eyes:      General: No scleral icterus. Cardiovascular:      Heart sounds: Normal heart sounds. Pulmonary:      Breath sounds: Normal breath sounds. Musculoskeletal:      Cervical back: Neck supple. Lymphadenopathy:      Cervical: No cervical adenopathy. Skin:     Findings: No rash.       Comments: Right heel blister         Results for orders placed or performed in visit on 03/22/21   Brain Natriuretic Peptide   Result Value Ref Range    Pro-BNP 5,309 (H) 0 - 1,800 pg/mL   TSH without Reflex   Result Value Ref Range    TSH 3.360 0.270 - 4.200 uIU/mL   Comprehensive Metabolic Panel   Result Value Ref Range    Sodium 146 (H) 136 - 145 mmol/L    Potassium 4.0 3.5 - 5.0 mmol/L    Chloride 103 98 - 111 mmol/L    CO2 27 22 - 29 mmol/L    Anion Gap 16 7 - 19 mmol/L    Glucose 76 74 - 109 mg/dL    BUN 42 (H) 8 - 23 mg/dL    CREATININE 2.3 (H) 0.5 - 1.2 mg/dL    GFR Non-African American 27 (A) >60    GFR  33 (L) >59    Calcium 9.1 8.8 - 10.2 mg/dL    Total Protein 7.0 6.6 - 8.7 g/dL    Albumin 4.0 3.5 - 5.2 g/dL    Total Bilirubin 0.6 0.2 - 1.2 mg/dL    Alkaline Phosphatase 111 40 - 130 U/L    ALT 9 5 - 41 U/L    AST 13 5 - 40 U/L   CBC   Result Value Ref Range    WBC 5.4 4.8 - 10.8 K/uL    RBC 4.21 (L) 4.70 - 6.10 M/uL    Hemoglobin 11.8 (L) 14.0 - 18.0 g/dL    Hematocrit 39.3 (L) 42.0 - 52.0 %    MCV 93.3 80.0 - 94.0 fL    MCH 28.0 27.0 - 31.0 pg    MCHC 30.0 (L) 33.0 - 37.0 g/dL    RDW 14.5 11.5 - 14.5 %    Platelets 690 (L) 568 - 400 K/uL    MPV 12.2 9.4 - 12.4 fL   Vitamin D 25 Hydroxy   Result Value Ref Range    Vit D, 25-Hydroxy 52.8 >=30 ng/mL       ASSESSMENT/ PLAN:  1. Blister of right heel, initial encounter  Xeroform on heel and keep pressure off of this area and f/u - placed dressing and samples     2. Stage 4 chronic kidney disease ([facility] [address]  Assess labs and f/u  - CBC; Future  - Comprehensive Metabolic Panel;  Future

## 2021-08-16 DIAGNOSIS — F41.9 ANXIETY: ICD-10-CM

## 2021-08-17 RX ORDER — LORAZEPAM 0.5 MG/1
TABLET ORAL
Qty: 120 TABLET | Refills: 0 | Status: SHIPPED | OUTPATIENT
Start: 2021-08-17 | End: 2021-09-15

## 2021-08-26 DIAGNOSIS — G60.9 IDIOPATHIC NEUROPATHY: ICD-10-CM

## 2021-08-26 DIAGNOSIS — G89.4 CHRONIC PAIN SYNDROME: ICD-10-CM

## 2021-08-26 RX ORDER — HYDROCODONE BITARTRATE AND ACETAMINOPHEN 5; 325 MG/1; MG/1
1 TABLET ORAL 3 TIMES DAILY PRN
Qty: 90 TABLET | Refills: 0 | Status: SHIPPED | OUTPATIENT
Start: 2021-08-26 | End: 2021-10-04 | Stop reason: SDUPTHER

## 2021-08-26 NOTE — TELEPHONE ENCOUNTER
Escobar Mormon called to request a refill on his medication. Last office visit : 7/20/2021   Next office visit : 10/12/2021     Last UDS:   Amphetamine Screen, Urine   Date Value Ref Range Status   06/07/2019 neg  Final     Barbiturate Screen, Urine   Date Value Ref Range Status   06/07/2019 neg  Final     Benzodiazepine Screen, Urine   Date Value Ref Range Status   06/07/2019 neg  Final     Buprenorphine Urine   Date Value Ref Range Status   06/07/2019 neg  Final     Cocaine Metabolite Screen, Urine   Date Value Ref Range Status   06/07/2019 neg  Final     Gabapentin Screen, Urine   Date Value Ref Range Status   06/07/2019 na  Final     MDMA, Urine   Date Value Ref Range Status   06/07/2019 neg  Final     Methamphetamine, Urine   Date Value Ref Range Status   06/07/2019 neg  Final     Opiate Scrn, Ur   Date Value Ref Range Status   06/07/2019 pos  Final     Oxycodone Screen, Ur   Date Value Ref Range Status   06/07/2019 neg  Final     PCP Screen, Urine   Date Value Ref Range Status   06/07/2019 neg  Final     Propoxyphene Screen, Urine   Date Value Ref Range Status   06/07/2019 neg  Final     THC Screen, Urine   Date Value Ref Range Status   06/07/2019 neg  Final     Tricyclic Antidepressants, Urine   Date Value Ref Range Status   06/07/2019 neg  Final       Last Rebecca Gregorybas: 6/3/2021  Medication Contract: 2/7/2019   Last Refill Date: 7/30/2021    Requested Prescriptions     Pending Prescriptions Disp Refills    HYDROcodone-acetaminophen (NORCO) 5-325 MG per tablet 90 tablet 0     Sig: Take 1 tablet by mouth 3 times daily as needed for Pain for up to 30 days. Please approve or refuse this medication.    Madeline Helton

## 2021-09-08 ENCOUNTER — TELEPHONE (OUTPATIENT)
Dept: PODIATRY | Facility: CLINIC | Age: 86
End: 2021-09-08

## 2021-09-08 NOTE — TELEPHONE ENCOUNTER
Called patient regarding appt on 09/09/2021. Left message for patient to return call if any questions or concerns arise.

## 2021-09-11 DIAGNOSIS — F41.9 ANXIETY: ICD-10-CM

## 2021-09-13 RX ORDER — ATORVASTATIN CALCIUM 10 MG/1
TABLET, FILM COATED ORAL
Qty: 90 TABLET | Refills: 2 | OUTPATIENT
Start: 2021-09-13

## 2021-09-13 NOTE — TELEPHONE ENCOUNTER
Katy Goddard called to request a refill on his medication. Last office visit : 7/20/2021   Next office visit : 10/12/2021     Last UDS:   Amphetamine Screen, Urine   Date Value Ref Range Status   06/07/2019 neg  Final     Barbiturate Screen, Urine   Date Value Ref Range Status   06/07/2019 neg  Final     Benzodiazepine Screen, Urine   Date Value Ref Range Status   06/07/2019 neg  Final     Buprenorphine Urine   Date Value Ref Range Status   06/07/2019 neg  Final     Cocaine Metabolite Screen, Urine   Date Value Ref Range Status   06/07/2019 neg  Final     Gabapentin Screen, Urine   Date Value Ref Range Status   06/07/2019 na  Final     MDMA, Urine   Date Value Ref Range Status   06/07/2019 neg  Final     Methamphetamine, Urine   Date Value Ref Range Status   06/07/2019 neg  Final     Opiate Scrn, Ur   Date Value Ref Range Status   06/07/2019 pos  Final     Oxycodone Screen, Ur   Date Value Ref Range Status   06/07/2019 neg  Final     PCP Screen, Urine   Date Value Ref Range Status   06/07/2019 neg  Final     Propoxyphene Screen, Urine   Date Value Ref Range Status   06/07/2019 neg  Final     THC Screen, Urine   Date Value Ref Range Status   06/07/2019 neg  Final     Tricyclic Antidepressants, Urine   Date Value Ref Range Status   06/07/2019 neg  Final       Last Cricket Delgado: 06/03/2021  Medication Contract: 02/07/2019    Requested Prescriptions     Pending Prescriptions Disp Refills    LORazepam (ATIVAN) 0.5 MG tablet [Pharmacy Med Name: LORAZEPAM 0.5 MG TABS 0.5 Tablet] 120 tablet 0     Sig: TAKE ONE TABLET BY MOUTH FOUR TIMES A DAY **MAY MAKE DROWSY**. Refused Prescriptions Disp Refills    atorvastatin (LIPITOR) 10 MG tablet [Pharmacy Med Name: ATORVASTATIN CALCIUM 10 MG 10 Tablet] 90 tablet 2     Sig: TAKE ONE TABLET BY MOUTH EVERY DAY         Please approve or refuse this medication.    Marce Bound

## 2021-09-15 RX ORDER — LORAZEPAM 0.5 MG/1
TABLET ORAL
Qty: 120 TABLET | Refills: 0 | Status: SHIPPED | OUTPATIENT
Start: 2021-09-15 | End: 2021-10-21

## 2021-09-15 RX ORDER — ATORVASTATIN CALCIUM 10 MG/1
TABLET, FILM COATED ORAL
Qty: 90 TABLET | Refills: 2 | OUTPATIENT
Start: 2021-09-15

## 2021-09-23 RX ORDER — NITROGLYCERIN 0.4 MG/1
0.4 TABLET SUBLINGUAL EVERY 5 MIN PRN
Qty: 25 TABLET | Refills: 1 | Status: SHIPPED | OUTPATIENT
Start: 2021-09-23 | End: 2022-10-10 | Stop reason: SDUPTHER

## 2021-09-30 DIAGNOSIS — G60.9 IDIOPATHIC NEUROPATHY: ICD-10-CM

## 2021-09-30 DIAGNOSIS — G89.4 CHRONIC PAIN SYNDROME: ICD-10-CM

## 2021-10-04 ENCOUNTER — TELEPHONE (OUTPATIENT)
Dept: INTERNAL MEDICINE | Age: 86
End: 2021-10-04

## 2021-10-04 DIAGNOSIS — G60.9 IDIOPATHIC NEUROPATHY: ICD-10-CM

## 2021-10-04 DIAGNOSIS — G89.4 CHRONIC PAIN SYNDROME: ICD-10-CM

## 2021-10-04 RX ORDER — HYDROCODONE BITARTRATE AND ACETAMINOPHEN 5; 325 MG/1; MG/1
1 TABLET ORAL 3 TIMES DAILY PRN
Qty: 90 TABLET | Refills: 0 | Status: SHIPPED | OUTPATIENT
Start: 2021-10-04 | End: 2021-11-01 | Stop reason: SDUPTHER

## 2021-10-04 RX ORDER — HYDROCODONE BITARTRATE AND ACETAMINOPHEN 5; 325 MG/1; MG/1
1 TABLET ORAL 3 TIMES DAILY PRN
Qty: 90 TABLET | Refills: 0 | Status: CANCELLED | OUTPATIENT
Start: 2021-10-04 | End: 2021-11-03

## 2021-10-04 NOTE — TELEPHONE ENCOUNTER
S/w [facility], pt needs his Hydrocodone sent to [facility] in [place]. He does not like the Hydrocodone that [facility] has.

## 2021-10-05 NOTE — PROGRESS NOTES
[facility] - PODIATRY    Today's Date: 10/07/21    Patient Name: Wild Bravo  MRN: [id number]  CSN: [id number]  PCP: Alison Hercules MD  Referring Provider: No ref. provider found    SUBJECTIVE     Chief Complaint   Patient presents with   • Follow-up     pcp07/20/2021 FOLLOW UP- nail care- pt states non diabetic, needs nails trimmed. Pt states he fell about a month ago and has an injury on right leg and foot- pt denies pain- pt presents with thickened and discolored toenails and wound to right foot     HPI: Wild Bravo, a 87 y.o.male, comes to clinic as a(n) established patient complaining of painful toenails and complaining of thickened, tender hallux nails. Patient has h/o AAA, Anxiety, AF, BPH, Carotid Stenosis, CHF, CKD, MI, Hearing Loss, CVA. Patient presents with complaints of bilateral hallux nail tenderness, thickness, and discoloration.  Patient notes that he experiences pain intermittently in the nails, particularly when wearing shoes or walking.  Notes that he is not experiencing any pain today and last nails are pressed.  States that family has previously attempted to care for nails, however, this resulted in pain and they were unable to effectively debride the nail.  Denies redness, drainage, or other signs of infection. Denies pain at the present time, however, relates tenderness when nails are pressed and when wearing shoes. Relates previous treatment(s) including foot care by podiatry. Denies any constitutional symptoms. No other pedal complaints at this time.    Past Medical History:   Diagnosis Date   • Abdominal aortic aneurysm (HCC)    • Anxiety    • Atrial fibrillation (HCC)    • Biventricular ICD (implantable cardioverter-defibrillator) in place    • BPH (benign prostatic hypertrophy)    • Carotid artery stenosis    • CHF (congestive heart failure) (HCC)    • Chronic kidney disease     Chronic kidney disease, stage 4 (severe)   • Chronic systolic (congestive) heart  failure (HCC)     limited echo 7/2016 EF was 40-45%. Patient has BiV AICD   • Colon obstruction (HCC)    • Coronary arteriosclerosis     MI x2   • Hearing loss    • Iron deficiency anemia    • Ischemic cardiomyopathy    • Mixed hyperlipidemia    • Myocardial infarction (HCC)    • Stroke (HCC)      Past Surgical History:   Procedure Laterality Date   • ABLATION OF DYSRHYTHMIC FOCUS  2008    MAZE at time of CABG/MVR   • CARDIAC ABLATION     • CARDIAC CATHETERIZATION     • CARDIAC DEFIBRILLATOR PLACEMENT     • CARDIAC ELECTROPHYSIOLOGY PROCEDURE N/A 6/9/2020    Procedure: ICD battery change;  Surgeon: Raleigh Enciso MD;  Location:  [facility] CATH INVASIVE LOCATION;  Service: Cardiovascular;  Laterality: N/A;   • CARDIAC PACEMAKER PLACEMENT     • CARDIOVERSION     • CAROTID ENDARTERECTOMY     • CAROTID ENDARTERECTOMY     • CORONARY ARTERY BYPASS GRAFT  2008    X 1   • CORONARY STENT PLACEMENT  2008    X 2   • LAPAROSCOPIC LYSIS OF ADHESIONS N/A 1/28/2020    Procedure: LAPAROSCOPIC LYSIS OF ADHESIONS;  Surgeon: Kim Almeida MD;  Location:  [facility] OR;  Service: General   • MITRAL VALVE REPAIR/REPLACEMENT  2008   • TOTAL KNEE ARTHROPLASTY       Family History   Problem Relation Age of Onset   • Stroke Mother    • Heart disease Mother    • Diabetes Father      Social History     Socioeconomic History   • Marital status:      Spouse name: Not on file   • Number of children: Not on file   • Years of education: Not on file   • Highest education level: Not on file   Tobacco Use   • Smoking status: Former Smoker     Years: 45.00     Types: Cigarettes   • Smokeless tobacco: Never Used   Vaping Use   • Vaping Use: Never used   Substance and Sexual Activity   • Alcohol use: No   • Drug use: No   • Sexual activity: Defer     Allergies   Allergen Reactions   • Keflex [Cephalexin]      CEPHALOSPORINS     Current Outpatient Medications   Medication Sig Dispense Refill   • aspirin 81 MG EC tablet Take 81 mg by mouth Daily.      • atorvastatin (LIPITOR) 10 MG tablet Take 1 tablet by mouth Every Night.     • bumetanide (BUMEX) 0.5 MG tablet TAKE 1 TABLET BY MOUTH 2 (TWO) TIMES A DAY. TAKE AN EXTRA TABLET DAILY AS NEEDED FOR INCREASED SHORTNESS OF BREATH, EDEMA AND/OR WEIGHT GAIN 90 tablet 3   • dutasteride (AVODART) 0.5 MG capsule Take 0.5 mg by mouth Daily. Patient takes at noon     • furosemide (LASIX) 20 MG tablet Take 20 mg by mouth As Needed.     • gabapentin (NEURONTIN) 300 MG capsule Take 300 mg by mouth Daily.     • HYDROcodone-acetaminophen (NORCO) 5-325 MG per tablet Take 1 tablet by mouth Every 6 (Six) Hours As Needed for Moderate Pain .     • LORazepam (ATIVAN) 0.5 MG tablet Take 0.5 mg by mouth 3 (Three) Times a Day As Needed.     • metoprolol succinate XL (TOPROL-XL) 25 MG 24 hr tablet Take 1 tablet by mouth Every Night. 90 tablet 4   • nitroglycerin (NITROSTAT) 0.4 MG SL tablet PLACE 1 TABLET UNDER THE TONGUE AS NEEDED FOR ANGINA, MAY REPEAT EVERY 5 MINS FOR UP THREE DOSES 25 tablet 3   • sacubitril-valsartan (ENTRESTO) 24-26 MG tablet Take 1 tablet by mouth 2 (Two) Times a Day. 180 tablet 1   • tamsulosin (FLOMAX) 0.4 MG capsule 24 hr capsule Take 1 capsule by mouth Every Night.       No current facility-administered medications for this visit.     Review of Systems   Constitutional: Negative for chills and fever.   HENT: Positive for hearing loss. Negative for congestion.    Respiratory: Negative for shortness of breath.    Cardiovascular: Negative for chest pain and leg swelling.   Gastrointestinal: Negative for constipation, diarrhea, nausea and vomiting.   Musculoskeletal: Positive for arthralgias. Negative for myalgias.   Skin: Negative for wound.   Neurological: Negative for numbness.       OBJECTIVE     Vitals:    10/07/21 1458   BP: 104/62   Pulse: 60   SpO2: (!) 88%       PHYSICAL EXAM  GEN:   Accompanied by grandson.     Foot/Ankle Exam:       General:   Appearance: appears stated age and healthy and elderly     Orientation: AAOx3    Orientation comment:  Very [place]  Affect: appropriate    Gait: unimpaired    Assistance: cane    Shoe Gear:  Casual shoes    VASCULAR      Right Foot Vascularity   Dorsalis pedis:  2+  Posterior tibial:  2+  Skin Temperature: warm    Edema Grading:  Trace  CFT:  4  Pedal Hair Growth:  Present  Varicosities: mild varicosities       Left Foot Vascularity   Dorsalis pedis:  2+  Posterior tibial:  2+  Skin Temperature: warm    Edema Grading:  Trace  CFT:  4  Pedal Hair Growth:  Present  Varicosities: mild varicosities        NEUROLOGIC     Right Foot Neurologic   Normal sensation    Light touch sensation:  Normal  Vibratory sensation:  Normal  Hot/Cold sensation: normal    Protective Sensation using Merrill-De Monofilament:  10     Left Foot Neurologic   Normal sensation    Light touch sensation:  Normal  Vibratory sensation:  Normal  Hot/cold sensation: normal    Protective Sensation using Merrill-De Monofilament:  10     MUSCULOSKELETAL      Right Foot Musculoskeletal   Ecchymosis:  None  Tenderness: toenails and toe 1    Arch:  Normal  Hallux valgus: No    Hallux limitus: No       Left Foot Musculoskeletal   Ecchymosis:  None  Tenderness: toenails and toe 1    Arch:  Normal  Hallux valgus: No    Hallux limitus: No       MUSCLE STRENGTH     Right Foot Muscle Strength   Foot dorsiflexion:  4+  Foot plantar flexion:  4+  Foot inversion:  4+  Foot eversion:  4+     Left Foot Muscle Strength   Foot dorsiflexion:  4+  Foot plantar flexion:  4+  Foot inversion:  4+  Foot eversion:  4+     RANGE OF MOTION      Right Foot Range of Motion   Foot and ankle ROM within normal limits       Left Foot Range of Motion   Foot and ankle ROM within normal limits       DERMATOLOGIC     Right Foot Dermatologic   Skin: skin intact and atrophic    Nails: onychomycosis, abnormally thick, subungual debris, dystrophic nails and ingrown toenail (hallux)       Left Foot Dermatologic   Skin: skin intact and  atrophic    Nails: onychomycosis, abnormally thick, subungual debris, dystrophic nails and ingrown toenail (hallux)        RADIOLOGY/NUCLEAR:  No results found.    LABORATORY/CULTURE RESULTS:      PATHOLOGY RESULTS:       ASSESSMENT/PLAN     Diagnoses and all orders for this visit:    1. Ingrown toenail (Primary)    2. Onychomycosis    3. Advanced age    4. Atrophic skin    5. Foot pain, bilateral      Comprehensive lower extremity examination and evaluation was performed.  Discussed findings and treatment plan including risks, benefits, and treatment options with patient in detail. Patient agreed with treatment plan.  After verbal consent obtained, nail(s) x10 debrided of length and thickness with nail nipper without incidence  After verbal consent obtained, nail(s) x2 debrided of offending borders with nail nipper without incidence  Patient may maintain nails and calluses at home utilizing emery board or pumice stone between visits as needed   Moisturize skin on a regular basis.  An After Visit Summary was printed and given to the patient at discharge, including (if requested) any available informative/educational handouts regarding diagnosis, treatment, or medications. All questions were answered to patient/family satisfaction. Should symptoms fail to improve or worsen they agree to call or return to clinic or to go to the Emergency Department. Discussed the importance of following up with any needed screening tests/labs/specialist appointments and any requested follow-up recommended by me today. Importance of maintaining follow-up discussed and patient accepts that missed appointments can delay diagnosis and potentially lead to worsening of conditions.  Return in about 4 months (around 2/7/2022) for Follow-up with Ted SAUCEDA, or sooner if acute issues arise.    Lab Frequency Next Occurrence   Instruct Patient To Stop Heparin or Lovenox 24 Hours Before Scheduled Implant Procedure Once 05/13/2020    Instruct Patient To Stop Apixaban, Rivaroxaban, Edoxaban, Dibigatran, Vorapaxar, Atopaxar 48 Hours Before Scheduled Implant Procedure Once 05/13/2020       This document has been electronically signed by Mickey Hensley DPM on October 7, 2021 15:17 CDT

## 2021-10-06 ENCOUNTER — TELEPHONE (OUTPATIENT)
Dept: PODIATRY | Facility: CLINIC | Age: 86
End: 2021-10-06

## 2021-10-07 ENCOUNTER — OFFICE VISIT (OUTPATIENT)
Dept: PODIATRY | Facility: CLINIC | Age: 86
End: 2021-10-07

## 2021-10-07 VITALS
HEART RATE: 60 BPM | WEIGHT: 153.4 LBS | BODY MASS INDEX: 20.33 KG/M2 | HEIGHT: 73 IN | OXYGEN SATURATION: 88 % | SYSTOLIC BLOOD PRESSURE: 104 MMHG | DIASTOLIC BLOOD PRESSURE: 62 MMHG

## 2021-10-07 DIAGNOSIS — M79.672 FOOT PAIN, BILATERAL: ICD-10-CM

## 2021-10-07 DIAGNOSIS — L60.0 INGROWN TOENAIL: Primary | ICD-10-CM

## 2021-10-07 DIAGNOSIS — B35.1 ONYCHOMYCOSIS: ICD-10-CM

## 2021-10-07 DIAGNOSIS — R54 ADVANCED AGE: ICD-10-CM

## 2021-10-07 DIAGNOSIS — M79.671 FOOT PAIN, BILATERAL: ICD-10-CM

## 2021-10-07 DIAGNOSIS — L90.9 ATROPHIC SKIN: ICD-10-CM

## 2021-10-07 PROCEDURE — 11721 DEBRIDE NAIL 6 OR MORE: CPT | Performed by: PODIATRIST

## 2021-10-12 DIAGNOSIS — F41.9 ANXIETY: ICD-10-CM

## 2021-10-12 RX ORDER — BUMETANIDE 0.5 MG/1
TABLET ORAL
Qty: 270 TABLET | Refills: 4 | Status: SHIPPED | OUTPATIENT
Start: 2021-10-12 | End: 2022-01-01

## 2021-10-17 PROCEDURE — 93297 REM INTERROG DEV EVAL ICPMS: CPT | Performed by: INTERNAL MEDICINE

## 2021-10-17 PROCEDURE — G2066 INTER DEVC REMOTE 30D: HCPCS | Performed by: INTERNAL MEDICINE

## 2021-10-21 RX ORDER — LORAZEPAM 0.5 MG/1
TABLET ORAL
Qty: 120 TABLET | Refills: 0 | Status: SHIPPED | OUTPATIENT
Start: 2021-10-21 | End: 2021-11-22

## 2021-10-29 PROCEDURE — 93296 REM INTERROG EVL PM/IDS: CPT | Performed by: INTERNAL MEDICINE

## 2021-10-29 PROCEDURE — 93295 DEV INTERROG REMOTE 1/2/MLT: CPT | Performed by: INTERNAL MEDICINE

## 2021-11-01 DIAGNOSIS — G60.9 IDIOPATHIC NEUROPATHY: ICD-10-CM

## 2021-11-01 DIAGNOSIS — G89.4 CHRONIC PAIN SYNDROME: ICD-10-CM

## 2021-11-01 RX ORDER — HYDROCODONE BITARTRATE AND ACETAMINOPHEN 5; 325 MG/1; MG/1
1 TABLET ORAL 3 TIMES DAILY PRN
Qty: 90 TABLET | Refills: 0 | Status: SHIPPED | OUTPATIENT
Start: 2021-11-01 | End: 2021-12-07 | Stop reason: SDUPTHER

## 2021-11-06 DIAGNOSIS — G60.9 IDIOPATHIC NEUROPATHY: ICD-10-CM

## 2021-11-06 DIAGNOSIS — G89.4 CHRONIC PAIN SYNDROME: ICD-10-CM

## 2021-11-11 RX ORDER — GABAPENTIN 300 MG/1
CAPSULE ORAL
Qty: 90 CAPSULE | Refills: 1 | Status: SHIPPED | OUTPATIENT
Start: 2021-11-11 | End: 2022-05-02

## 2021-11-18 ENCOUNTER — TELEPHONE (OUTPATIENT)
Dept: CARDIOLOGY | Facility: CLINIC | Age: 86
End: 2021-11-18

## 2021-11-18 DIAGNOSIS — F41.9 ANXIETY: ICD-10-CM

## 2021-11-18 NOTE — TELEPHONE ENCOUNTER
Patient's HeartLogic Heart Failure Index is elevated at 25.  RN called to assess for signs/symptoms of worsening heart failure.  Message left for patient to return call.

## 2021-11-19 NOTE — TELEPHONE ENCOUNTER
Spoke with patient's daughter who will check with patient over the weekend to see how he is doing.  She will call back on Monday.

## 2021-11-22 RX ORDER — LORAZEPAM 0.5 MG/1
TABLET ORAL
Qty: 120 TABLET | Refills: 0 | Status: SHIPPED | OUTPATIENT
Start: 2021-11-22 | End: 2021-12-21

## 2021-11-23 ENCOUNTER — DOCUMENTATION (OUTPATIENT)
Dept: CARDIOLOGY | Facility: CLINIC | Age: 86
End: 2021-11-23

## 2021-11-23 NOTE — PROGRESS NOTES
Mailed patient assistance forms for Entresto (from [employer]) since current program expires the end of the year. Instructions are included to return completed forms to our office, then will fax along with our completed section. Alma Mendez MA

## 2021-11-23 NOTE — TELEPHONE ENCOUNTER
Patient's daughter called and reported that patient is currently stable.  RN will continue to monitor.

## 2021-11-30 ENCOUNTER — PROCEDURE VISIT (OUTPATIENT)
Dept: UROLOGY | Facility: CLINIC | Age: 86
End: 2021-11-30

## 2021-11-30 DIAGNOSIS — R33.9 URINARY RETENTION: Primary | ICD-10-CM

## 2021-11-30 PROCEDURE — 51798 US URINE CAPACITY MEASURE: CPT | Performed by: UROLOGY

## 2021-12-02 ENCOUNTER — OFFICE VISIT (OUTPATIENT)
Dept: UROLOGY | Facility: CLINIC | Age: 86
End: 2021-12-02

## 2021-12-02 DIAGNOSIS — R31.0 GROSS HEMATURIA: ICD-10-CM

## 2021-12-02 DIAGNOSIS — N13.8 BPH WITH OBSTRUCTION/LOWER URINARY TRACT SYMPTOMS: Primary | ICD-10-CM

## 2021-12-02 DIAGNOSIS — N40.1 BPH WITH OBSTRUCTION/LOWER URINARY TRACT SYMPTOMS: Primary | ICD-10-CM

## 2021-12-02 PROCEDURE — 99213 OFFICE O/P EST LOW 20 MIN: CPT | Performed by: UROLOGY

## 2021-12-02 NOTE — PROGRESS NOTES
Chief Complaint  Hematuria    Subjective          Wild Bravo presents to [facility] UROLOGY for follow-up of hematuria. 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  He does have significant bladder outlet obstruction symptoms.  He currently takes tamsulosin and dutasteride.  He had some intractable urinary retention a couple years ago but had done well since.  He developed acute onset of retention about 3 to 4 days ago.  He tried to catheterize himself.  He has been having significant gross hematuria since.  Yesterday a Loera catheter was placed in him.  He has had quite a bit of hematuria overnight.      Current Outpatient Medications:   •  aspirin 81 MG EC tablet, Take 81 mg by mouth Daily., Disp: , Rfl:   •  atorvastatin (LIPITOR) 10 MG tablet, Take 1 tablet by mouth Every Night., Disp: , Rfl:   •  bumetanide (BUMEX) 0.5 MG tablet, TAKE 1 TABLET BY MOUTH 2 (TWO) TIMES A DAY. TAKE AN EXTRA TABLET DAILY AS NEEDED FOR INCREASED SHORTNESS OF BREATH, EDEMA AND/OR WEIG<MORE>, Disp: 270 tablet, Rfl: 4  •  dutasteride (AVODART) 0.5 MG capsule, Take 0.5 mg by mouth Daily. Patient takes at noon, Disp: , Rfl:   •  furosemide (LASIX) 20 MG tablet, Take 20 mg by mouth As Needed., Disp: , Rfl:   •  gabapentin (NEURONTIN) 300 MG capsule, Take 300 mg by mouth Daily., Disp: , Rfl:   •  HYDROcodone-acetaminophen (NORCO) 5-325 MG per tablet, Take 1 tablet by mouth Every 6 (Six) Hours As Needed for Moderate Pain ., Disp: , Rfl:   •  LORazepam (ATIVAN) 0.5 MG tablet, Take 0.5 mg by mouth 3 (Three) Times a Day As Needed., Disp: , Rfl:   •  metoprolol succinate XL (TOPROL-XL) 25 MG 24 hr tablet, Take 1 tablet by mouth Every Night., Disp: 90 tablet, Rfl: 4  •  nitroglycerin (NITROSTAT) 0.4 MG SL tablet, PLACE 1 TABLET UNDER THE TONGUE AS NEEDED FOR ANGINA, MAY REPEAT EVERY 5 MINS FOR UP THREE DOSES, Disp: 25 tablet, Rfl: 3  •  sacubitril-valsartan (ENTRESTO) 24-26 MG tablet, Take 1 tablet by mouth 2 (Two)  Times a Day., Disp: 180 tablet, Rfl: 1  •  tamsulosin (FLOMAX) 0.4 MG capsule 24 hr capsule, Take 1 capsule by mouth Every Night., Disp: , Rfl:   Past Medical History:   Diagnosis Date   • Abdominal aortic aneurysm (HCC)    • Anxiety    • Atrial fibrillation (HCC)    • Biventricular ICD (implantable cardioverter-defibrillator) in place    • BPH (benign prostatic hypertrophy)    • Carotid artery stenosis    • CHF (congestive heart failure) (HCC)    • Chronic kidney disease     Chronic kidney disease, stage 4 (severe)   • Chronic systolic (congestive) heart failure (HCC)     limited echo 7/2016 EF was 40-45%. Patient has BiV AICD   • Colon obstruction (HCC)    • Coronary arteriosclerosis     MI x2   • Hearing loss    • Iron deficiency anemia    • Ischemic cardiomyopathy    • Mixed hyperlipidemia    • Myocardial infarction (HCC)    • Stroke (HCC)      Past Surgical History:   Procedure Laterality Date   • ABLATION OF DYSRHYTHMIC FOCUS  2008    MAZE at time of CABG/MVR   • CARDIAC ABLATION     • CARDIAC CATHETERIZATION     • CARDIAC DEFIBRILLATOR PLACEMENT     • CARDIAC ELECTROPHYSIOLOGY PROCEDURE N/A 6/9/2020    Procedure: ICD battery change;  Surgeon: Raleigh Enciso MD;  Location:  [facility] CATH INVASIVE LOCATION;  Service: Cardiovascular;  Laterality: N/A;   • CARDIAC PACEMAKER PLACEMENT     • CARDIOVERSION     • CAROTID ENDARTERECTOMY     • CAROTID ENDARTERECTOMY     • CORONARY ARTERY BYPASS GRAFT  2008    X 1   • CORONARY STENT PLACEMENT  2008    X 2   • LAPAROSCOPIC LYSIS OF ADHESIONS N/A 1/28/2020    Procedure: LAPAROSCOPIC LYSIS OF ADHESIONS;  Surgeon: Kim Almeida MD;  Location: [facility] OR;  Service: General   • MITRAL VALVE REPAIR/REPLACEMENT  2008   • TOTAL KNEE ARTHROPLASTY             Review  of systems  Constitutional: Negative for chills or fever.   Gastrointestinal: Negative for abdominal pain, anal bleeding or blood in stool.   Genitourinary: Negative for flank or genital pain.        Objective    PHYSICAL EXAM  Vital Signs:   There were no vitals taken for this visit.    Constitutional: Patient is without distress or deformity.  Vital signs are reviewed as above.    Neuro: No confusion; No disorientation; Alert and oriented  Pulmonary: No respiratory distress.   Skin: No pallor or diaphoresis  GI: Soft. The patient exhibits no distension and no mass. There is no tenderness. There is no rebound and no guarding. No hernia.   : No CVA tenderness over kidneys.  Bladder not palpably distended.  Psychiatric:  normal mood and affect. Not agitated.           DATA  Result Review :              Results for orders placed or performed in visit on 01/14/21   POC Urinalysis Dipstick, Multipro    Specimen: Urine   Result Value Ref Range    Color Straw Yellow, Straw, Dark Yellow, Aminta    Clarity, UA Cloudy (A) Clear    Glucose, UA Negative Negative, 1000 mg/dL (3+) mg/dL    Bilirubin Negative Negative    Ketones, UA Negative Negative    Specific Gravity  1.010 1.005 - 1.030    Blood, UA Large (A) Negative    pH, Urine 6.5 5.0 - 8.0    Protein,  mg/dL (A) Negative mg/dL    Urobilinogen, UA Normal Normal    Nitrite, UA Negative Negative    Leukocytes Large (3+) (A) Negative                    ASSESSMENT AND PLAN          Problem List Items Addressed This Visit        Genitourinary and Reproductive     Gross hematuria      Other Visit Diagnoses     BPH with obstruction/lower urinary tract symptoms    -  Primary        Hematuria has improved.  I irrigated the catheter several times and did not get any clot.  We will leave the catheter through the weekend and will have him come in for a voiding trial in 5 days.        FOLLOW UP   {Instructions Charge Capture  Follow-up Communications :23}  No follow-ups on file.        (Please note that portions of this note were completed with a voice recognition program.)  Octavio Bain MD  12/02/21  13:19 CST

## 2021-12-04 DIAGNOSIS — N13.8 BPH WITH URINARY OBSTRUCTION: Primary | ICD-10-CM

## 2021-12-04 DIAGNOSIS — N40.1 BPH WITH URINARY OBSTRUCTION: Primary | ICD-10-CM

## 2021-12-06 ENCOUNTER — TELEPHONE (OUTPATIENT)
Dept: INTERNAL MEDICINE | Age: 86
End: 2021-12-06

## 2021-12-06 DIAGNOSIS — G89.4 CHRONIC PAIN SYNDROME: ICD-10-CM

## 2021-12-06 DIAGNOSIS — G60.9 IDIOPATHIC NEUROPATHY: ICD-10-CM

## 2021-12-06 NOTE — TELEPHONE ENCOUNTER
Estefania Meyer called to request a refill on his medication.       Last office visit : 7/20/2021   Next office visit : 12/9/2021     Requested Prescriptions     Pending Prescriptions Disp Refills    tamsulosin (FLOMAX) 0.4 MG capsule [Pharmacy Med Name: TAMSULOSIN HCL 0.4 MG CAPS 0.4 Capsule] 90 capsule 3     Sig: TAKE ONE CAPSULE BY MOUTH EVERY DAY            Hope Ho MA

## 2021-12-06 NOTE — TELEPHONE ENCOUNTER
Pt needs refill on Hydrocodone sent to [facility] by [facility].  ([facility] does his pill box)

## 2021-12-07 ENCOUNTER — PROCEDURE VISIT (OUTPATIENT)
Dept: UROLOGY | Facility: CLINIC | Age: 86
End: 2021-12-07

## 2021-12-07 DIAGNOSIS — R33.9 URINARY RETENTION: Primary | ICD-10-CM

## 2021-12-07 RX ORDER — TAMSULOSIN HYDROCHLORIDE 0.4 MG/1
CAPSULE ORAL
Qty: 90 CAPSULE | Refills: 3 | Status: SHIPPED | OUTPATIENT
Start: 2021-12-07

## 2021-12-07 RX ORDER — HYDROCODONE BITARTRATE AND ACETAMINOPHEN 5; 325 MG/1; MG/1
1 TABLET ORAL 3 TIMES DAILY PRN
Qty: 90 TABLET | Refills: 0 | Status: SHIPPED | OUTPATIENT
Start: 2021-12-07 | End: 2021-12-30 | Stop reason: SDUPTHER

## 2021-12-07 NOTE — PROGRESS NOTES
Wild Bravo is a 87 y.o. male who is here today for catheter removal. Patient of Dr. Bani. 10cc syringe used to deflate the balloon and the catheter was removed without difficulty.  The patient tolerated this well and will return in 6 weeks to see Dr. Bain in the office for follow up. Dr. Bain was in the office at the time of procedure.     Patient was advised to drink clear fluids for the next couple hours and urinate. The patient was also advised he may experience some blood in the urine and burning with urination for the next couple days. If the patient is unable to urinate or develops fever, chills, N&V or suprapubic pain he will call to return for an appt at clinic or seek medical treatment at [facility] ER, PCP or Urgent Care after hours. Patient verbalized understanding and all questions were answered. ELENA Fajardo    I have reviewed and agree with medical assistance documentation above    Electronically signed by Octavio Bain MD, 12/08/21, 12:23 PM CST.

## 2021-12-09 ENCOUNTER — OFFICE VISIT (OUTPATIENT)
Dept: INTERNAL MEDICINE | Age: 86
End: 2021-12-09
Payer: MEDICARE

## 2021-12-09 ENCOUNTER — NURSE ONLY (OUTPATIENT)
Dept: PRIMARY CARE CLINIC | Age: 86
End: 2021-12-09
Payer: MEDICARE

## 2021-12-09 VITALS
HEIGHT: 72 IN | HEART RATE: 74 BPM | BODY MASS INDEX: 21.13 KG/M2 | SYSTOLIC BLOOD PRESSURE: 110 MMHG | OXYGEN SATURATION: 94 % | DIASTOLIC BLOOD PRESSURE: 60 MMHG | RESPIRATION RATE: 16 BRPM | WEIGHT: 156 LBS

## 2021-12-09 DIAGNOSIS — G60.9 IDIOPATHIC NEUROPATHY: ICD-10-CM

## 2021-12-09 DIAGNOSIS — F41.9 ANXIETY: ICD-10-CM

## 2021-12-09 DIAGNOSIS — L97.411 SKIN ULCER OF RIGHT HEEL, LIMITED TO BREAKDOWN OF SKIN (HCC): ICD-10-CM

## 2021-12-09 DIAGNOSIS — I50.22 CHRONIC SYSTOLIC CHF (CONGESTIVE HEART FAILURE) (HCC): Primary | ICD-10-CM

## 2021-12-09 DIAGNOSIS — I48.0 PAROXYSMAL ATRIAL FIBRILLATION (HCC): ICD-10-CM

## 2021-12-09 DIAGNOSIS — I50.22 CHRONIC SYSTOLIC CHF (CONGESTIVE HEART FAILURE) (HCC): ICD-10-CM

## 2021-12-09 DIAGNOSIS — N18.4 STAGE 4 CHRONIC KIDNEY DISEASE (HCC): ICD-10-CM

## 2021-12-09 DIAGNOSIS — M15.9 PRIMARY OSTEOARTHRITIS INVOLVING MULTIPLE JOINTS: ICD-10-CM

## 2021-12-09 DIAGNOSIS — N40.1 BPH WITH URINARY OBSTRUCTION: ICD-10-CM

## 2021-12-09 DIAGNOSIS — N13.8 BPH WITH URINARY OBSTRUCTION: ICD-10-CM

## 2021-12-09 DIAGNOSIS — Z23 COVID-19 VACCINE ADMINISTERED: Primary | ICD-10-CM

## 2021-12-09 DIAGNOSIS — I10 ESSENTIAL HYPERTENSION: ICD-10-CM

## 2021-12-09 LAB
ALBUMIN SERPL-MCNC: 3.9 G/DL (ref 3.5–5.2)
ALP BLD-CCNC: 104 U/L (ref 40–130)
ALT SERPL-CCNC: 6 U/L (ref 5–41)
ANION GAP SERPL CALCULATED.3IONS-SCNC: 14 MMOL/L (ref 7–19)
AST SERPL-CCNC: 13 U/L (ref 5–40)
BILIRUB SERPL-MCNC: 0.8 MG/DL (ref 0.2–1.2)
BUN BLDV-MCNC: 51 MG/DL (ref 8–23)
CALCIUM SERPL-MCNC: 9.3 MG/DL (ref 8.8–10.2)
CHLORIDE BLD-SCNC: 101 MMOL/L (ref 98–111)
CO2: 27 MMOL/L (ref 22–29)
CREAT SERPL-MCNC: 2.7 MG/DL (ref 0.5–1.2)
GFR AFRICAN AMERICAN: 27
GFR NON-AFRICAN AMERICAN: 22
GLUCOSE BLD-MCNC: 77 MG/DL (ref 74–109)
HCT VFR BLD CALC: 38.7 % (ref 42–52)
HEMOGLOBIN: 11.3 G/DL (ref 14–18)
MCH RBC QN AUTO: 28.5 PG (ref 27–31)
MCHC RBC AUTO-ENTMCNC: 29.2 G/DL (ref 33–37)
MCV RBC AUTO: 97.7 FL (ref 80–94)
PDW BLD-RTO: 13.7 % (ref 11.5–14.5)
PLATELET # BLD: 163 K/UL (ref 130–400)
PMV BLD AUTO: 12 FL (ref 9.4–12.4)
POTASSIUM SERPL-SCNC: 4.5 MMOL/L (ref 3.5–5)
PRO-BNP: 7244 PG/ML (ref 0–1800)
RBC # BLD: 3.96 M/UL (ref 4.7–6.1)
SODIUM BLD-SCNC: 142 MMOL/L (ref 136–145)
TOTAL PROTEIN: 7 G/DL (ref 6.6–8.7)
TSH SERPL DL<=0.05 MIU/L-ACNC: 3.4 UIU/ML (ref 0.27–4.2)
WBC # BLD: 6 K/UL (ref 4.8–10.8)

## 2021-12-09 PROCEDURE — 1036F TOBACCO NON-USER: CPT | Performed by: INTERNAL MEDICINE

## 2021-12-09 PROCEDURE — 4040F PNEUMOC VAC/ADMIN/RCVD: CPT | Performed by: INTERNAL MEDICINE

## 2021-12-09 PROCEDURE — 1123F ACP DISCUSS/DSCN MKR DOCD: CPT | Performed by: INTERNAL MEDICINE

## 2021-12-09 PROCEDURE — G8427 DOCREV CUR MEDS BY ELIG CLIN: HCPCS | Performed by: INTERNAL MEDICINE

## 2021-12-09 PROCEDURE — 91300 COVID-19, PFIZER VACCINE 30MCG/0.3ML DOSE: CPT | Performed by: NURSE PRACTITIONER

## 2021-12-09 PROCEDURE — G8482 FLU IMMUNIZE ORDER/ADMIN: HCPCS | Performed by: INTERNAL MEDICINE

## 2021-12-09 PROCEDURE — 99214 OFFICE O/P EST MOD 30 MIN: CPT | Performed by: INTERNAL MEDICINE

## 2021-12-09 PROCEDURE — G8420 CALC BMI NORM PARAMETERS: HCPCS | Performed by: INTERNAL MEDICINE

## 2021-12-09 PROCEDURE — 0004A COVID-19, PFIZER VACCINE 30MCG/0.3ML DOSE: CPT | Performed by: NURSE PRACTITIONER

## 2021-12-09 NOTE — PROGRESS NOTES
After obtaining consent,  injection of pfizer booster given in Right deltoid by Vishal Solitario MA. Patient instructed to remain in clinic for 20 minutes afterwards, and to report any adverse reaction to me immediately.

## 2021-12-10 PROBLEM — L97.411 SKIN ULCER OF RIGHT HEEL, LIMITED TO BREAKDOWN OF SKIN (HCC): Status: ACTIVE | Noted: 2021-12-10

## 2021-12-18 PROCEDURE — G2066 INTER DEVC REMOTE 30D: HCPCS | Performed by: INTERNAL MEDICINE

## 2021-12-18 PROCEDURE — 93297 REM INTERROG DEV EVAL ICPMS: CPT | Performed by: INTERNAL MEDICINE

## 2021-12-21 DIAGNOSIS — F41.9 ANXIETY: ICD-10-CM

## 2021-12-21 RX ORDER — LORAZEPAM 0.5 MG/1
TABLET ORAL
Qty: 120 TABLET | Refills: 0 | Status: SHIPPED | OUTPATIENT
Start: 2021-12-21 | End: 2022-01-21

## 2021-12-21 NOTE — TELEPHONE ENCOUNTER
Yolanda Landaverde called requesting a refill of the below medication which has been pended for you:     Requested Prescriptions     Pending Prescriptions Disp Refills    LORazepam (ATIVAN) 0.5 MG tablet [Pharmacy Med Name: LORAZEPAM 0.5 MG TABS 0.5 Tablet] 120 tablet 0     Sig: TAKE ONE TABLET BY MOUTH FOUR TIMES A DAY **MAY MAKE DROWSY**.        Last Appointment Date: 12/9/2021  Next Appointment Date: 4/14/2022    Allergies   Allergen Reactions    Keflex [Cephalexin] Rash     pruritius

## 2021-12-29 DIAGNOSIS — I50.22 CHRONIC SYSTOLIC CHF (CONGESTIVE HEART FAILURE) (HCC): ICD-10-CM

## 2021-12-29 DIAGNOSIS — G89.4 CHRONIC PAIN SYNDROME: ICD-10-CM

## 2021-12-29 DIAGNOSIS — G60.9 IDIOPATHIC NEUROPATHY: ICD-10-CM

## 2021-12-29 RX ORDER — FUROSEMIDE 40 MG/1
40 TABLET ORAL DAILY PRN
Qty: 60 TABLET | Refills: 11 | OUTPATIENT
Start: 2021-12-29

## 2021-12-29 NOTE — TELEPHONE ENCOUNTER
Je Flores called requesting a refill of the below medication which has been pended for you:     Requested Prescriptions     Pending Prescriptions Disp Refills    HYDROcodone-acetaminophen (NORCO) 5-325 MG per tablet 90 tablet 0     Sig: Take 1 tablet by mouth 3 times daily as needed for Pain for up to 30 days.        Last Appointment Date: 12/9/2021  Next Appointment Date: 4/14/2022    Allergies   Allergen Reactions    Keflex [Cephalexin] Rash     pr[facility]ius

## 2021-12-30 RX ORDER — HYDROCODONE BITARTRATE AND ACETAMINOPHEN 5; 325 MG/1; MG/1
1 TABLET ORAL 3 TIMES DAILY PRN
Qty: 90 TABLET | Refills: 0 | Status: SHIPPED | OUTPATIENT
Start: 2021-12-30 | End: 2022-01-28 | Stop reason: SDUPTHER

## 2022-01-01 ENCOUNTER — TELEPHONE (OUTPATIENT)
Dept: CARDIOLOGY | Facility: CLINIC | Age: 87
End: 2022-01-01

## 2022-01-01 ENCOUNTER — TELEPHONE (OUTPATIENT)
Dept: PODIATRY | Facility: CLINIC | Age: 87
End: 2022-01-01

## 2022-01-01 ENCOUNTER — OFFICE VISIT (OUTPATIENT)
Dept: CARDIOLOGY | Facility: CLINIC | Age: 87
End: 2022-01-01

## 2022-01-01 ENCOUNTER — OFFICE VISIT (OUTPATIENT)
Dept: PODIATRY | Facility: CLINIC | Age: 87
End: 2022-01-01

## 2022-01-01 ENCOUNTER — OFFICE VISIT (OUTPATIENT)
Dept: UROLOGY | Facility: CLINIC | Age: 87
End: 2022-01-01

## 2022-01-01 ENCOUNTER — CLINICAL SUPPORT NO REQUIREMENTS (OUTPATIENT)
Dept: CARDIOLOGY | Facility: CLINIC | Age: 87
End: 2022-01-01

## 2022-01-01 ENCOUNTER — APPOINTMENT (OUTPATIENT)
Dept: CT IMAGING | Facility: HOSPITAL | Age: 87
End: 2022-01-01

## 2022-01-01 ENCOUNTER — PROCEDURE VISIT (OUTPATIENT)
Dept: UROLOGY | Facility: CLINIC | Age: 87
End: 2022-01-01

## 2022-01-01 ENCOUNTER — HOSPITAL ENCOUNTER (EMERGENCY)
Facility: HOSPITAL | Age: 87
Discharge: HOME | End: 2022-04-23
Admitting: EMERGENCY MEDICINE

## 2022-01-01 ENCOUNTER — APPOINTMENT (OUTPATIENT)
Dept: GENERAL RADIOLOGY | Facility: HOSPITAL | Age: 87
End: 2022-01-01

## 2022-01-01 VITALS
HEIGHT: 74 IN | HEART RATE: 78 BPM | OXYGEN SATURATION: 99 % | WEIGHT: 140 LBS | DIASTOLIC BLOOD PRESSURE: 64 MMHG | SYSTOLIC BLOOD PRESSURE: 112 MMHG | BODY MASS INDEX: 17.97 KG/M2

## 2022-01-01 VITALS
HEIGHT: 74 IN | DIASTOLIC BLOOD PRESSURE: 72 MMHG | WEIGHT: 145.7 LBS | HEART RATE: 78 BPM | BODY MASS INDEX: 18.7 KG/M2 | SYSTOLIC BLOOD PRESSURE: 106 MMHG | OXYGEN SATURATION: 100 %

## 2022-01-01 VITALS
WEIGHT: 149 LBS | DIASTOLIC BLOOD PRESSURE: 75 MMHG | HEIGHT: 74 IN | OXYGEN SATURATION: 94 % | SYSTOLIC BLOOD PRESSURE: 109 MMHG | RESPIRATION RATE: 16 BRPM | TEMPERATURE: 98 F | HEART RATE: 116 BPM | BODY MASS INDEX: 19.12 KG/M2

## 2022-01-01 DIAGNOSIS — N13.8 BPH WITH OBSTRUCTION/LOWER URINARY TRACT SYMPTOMS: ICD-10-CM

## 2022-01-01 DIAGNOSIS — N40.1 BPH WITH OBSTRUCTION/LOWER URINARY TRACT SYMPTOMS: ICD-10-CM

## 2022-01-01 DIAGNOSIS — N30.00 ACUTE CYSTITIS WITHOUT HEMATURIA: Primary | ICD-10-CM

## 2022-01-01 DIAGNOSIS — I50.22 CHRONIC SYSTOLIC CONGESTIVE HEART FAILURE: ICD-10-CM

## 2022-01-01 DIAGNOSIS — M79.672 FOOT PAIN, BILATERAL: ICD-10-CM

## 2022-01-01 DIAGNOSIS — M79.671 FOOT PAIN, BILATERAL: ICD-10-CM

## 2022-01-01 DIAGNOSIS — N39.0 URINARY TRACT INFECTION WITH HEMATURIA, SITE UNSPECIFIED: ICD-10-CM

## 2022-01-01 DIAGNOSIS — R33.9 URINARY RETENTION: Primary | ICD-10-CM

## 2022-01-01 DIAGNOSIS — Z95.810 BIVENTRICULAR ICD (IMPLANTABLE CARDIOVERTER-DEFIBRILLATOR) IN PLACE: Primary | ICD-10-CM

## 2022-01-01 DIAGNOSIS — R54 ADVANCED AGE: ICD-10-CM

## 2022-01-01 DIAGNOSIS — I50.813 ACUTE ON CHRONIC RIGHT-SIDED CONGESTIVE HEART FAILURE: Primary | ICD-10-CM

## 2022-01-01 DIAGNOSIS — I47.20 VENTRICULAR TACHYCARDIA: ICD-10-CM

## 2022-01-01 DIAGNOSIS — I25.119 CORONARY ARTERY DISEASE INVOLVING NATIVE CORONARY ARTERY OF NATIVE HEART WITH ANGINA PECTORIS: Primary | ICD-10-CM

## 2022-01-01 DIAGNOSIS — N13.9 URINARY OBSTRUCTION: Primary | ICD-10-CM

## 2022-01-01 DIAGNOSIS — G60.9 IDIOPATHIC NEUROPATHY: ICD-10-CM

## 2022-01-01 DIAGNOSIS — B35.1 ONYCHOMYCOSIS: Primary | ICD-10-CM

## 2022-01-01 DIAGNOSIS — R31.9 URINARY TRACT INFECTION WITH HEMATURIA, SITE UNSPECIFIED: ICD-10-CM

## 2022-01-01 DIAGNOSIS — I48.0 PAROXYSMAL ATRIAL FIBRILLATION: ICD-10-CM

## 2022-01-01 DIAGNOSIS — I10 ESSENTIAL HYPERTENSION: ICD-10-CM

## 2022-01-01 DIAGNOSIS — Z98.890 H/O MITRAL VALVE REPAIR: ICD-10-CM

## 2022-01-01 LAB
ALBUMIN SERPL-MCNC: 3.9 G/DL (ref 3.5–5.2)
ALBUMIN/GLOB SERPL: 1.5 G/DL
ALP SERPL-CCNC: 101 U/L (ref 39–117)
ALT SERPL W P-5'-P-CCNC: 7 U/L (ref 1–41)
ANION GAP SERPL CALCULATED.3IONS-SCNC: 9 MMOL/L (ref 5–15)
ARTERIAL PATENCY WRIST A: POSITIVE
AST SERPL-CCNC: 11 U/L (ref 1–40)
ATMOSPHERIC PRESS: 750 MMHG
BACTERIA SPEC AEROBE CULT: NO GROWTH
BACTERIA SPEC AEROBE CULT: NORMAL
BACTERIA SPEC AEROBE CULT: NORMAL
BACTERIA UR QL AUTO: ABNORMAL /HPF
BASE EXCESS BLDA CALC-SCNC: 3.7 MMOL/L (ref 0–2)
BASOPHILS # BLD AUTO: 0.03 10*3/MM3 (ref 0–0.2)
BASOPHILS NFR BLD AUTO: 0.7 % (ref 0–1.5)
BDY SITE: ABNORMAL
BILIRUB SERPL-MCNC: 0.9 MG/DL (ref 0–1.2)
BILIRUB UR QL STRIP: NEGATIVE
BODY TEMPERATURE: 37 C
BUN SERPL-MCNC: 24 MG/DL (ref 8–23)
BUN/CREAT SERPL: 12.3 (ref 7–25)
CALCIUM SPEC-SCNC: 9.7 MG/DL (ref 8.6–10.5)
CHLORIDE SERPL-SCNC: 107 MMOL/L (ref 98–107)
CLARITY UR: ABNORMAL
CO2 SERPL-SCNC: 29 MMOL/L (ref 22–29)
COLOR UR: YELLOW
CREAT SERPL-MCNC: 1.95 MG/DL (ref 0.76–1.27)
CRP SERPL-MCNC: <0.3 MG/DL (ref 0–0.5)
D DIMER PPP FEU-MCNC: 1.99 MG/L (FEU) (ref 0–0.5)
D-LACTATE SERPL-SCNC: 0.8 MMOL/L (ref 0.5–2)
DEPRECATED RDW RBC AUTO: 53 FL (ref 37–54)
EGFRCR SERPLBLD CKD-EPI 2021: 32.7 ML/MIN/1.73
EOSINOPHIL # BLD AUTO: 0.22 10*3/MM3 (ref 0–0.4)
EOSINOPHIL NFR BLD AUTO: 5.3 % (ref 0.3–6.2)
ERYTHROCYTE [DISTWIDTH] IN BLOOD BY AUTOMATED COUNT: 14.9 % (ref 12.3–15.4)
FLUAV RNA RESP QL NAA+PROBE: NOT DETECTED
FLUBV RNA RESP QL NAA+PROBE: NOT DETECTED
GLOBULIN UR ELPH-MCNC: 2.6 GM/DL
GLUCOSE SERPL-MCNC: 105 MG/DL (ref 65–99)
GLUCOSE UR STRIP-MCNC: NEGATIVE MG/DL
HCO3 BLDA-SCNC: 29 MMOL/L (ref 20–26)
HCT VFR BLD AUTO: 37.6 % (ref 37.5–51)
HGB BLD-MCNC: 11.2 G/DL (ref 13–17.7)
HGB UR QL STRIP.AUTO: ABNORMAL
HOLD SPECIMEN: NORMAL
HYALINE CASTS UR QL AUTO: ABNORMAL /LPF
IMM GRANULOCYTES # BLD AUTO: 0.01 10*3/MM3 (ref 0–0.05)
IMM GRANULOCYTES NFR BLD AUTO: 0.2 % (ref 0–0.5)
INR PPP: 1.15 (ref 0.91–1.09)
KETONES UR QL STRIP: NEGATIVE
LEUKOCYTE ESTERASE UR QL STRIP.AUTO: ABNORMAL
LIPASE SERPL-CCNC: 13 U/L (ref 13–60)
LYMPHOCYTES # BLD AUTO: 0.58 10*3/MM3 (ref 0.7–3.1)
LYMPHOCYTES NFR BLD AUTO: 14 % (ref 19.6–45.3)
Lab: ABNORMAL
MCH RBC QN AUTO: 28.8 PG (ref 26.6–33)
MCHC RBC AUTO-ENTMCNC: 29.8 G/DL (ref 31.5–35.7)
MCV RBC AUTO: 96.7 FL (ref 79–97)
MODALITY: ABNORMAL
MONOCYTES # BLD AUTO: 0.34 10*3/MM3 (ref 0.1–0.9)
MONOCYTES NFR BLD AUTO: 8.2 % (ref 5–12)
NEUTROPHILS NFR BLD AUTO: 2.96 10*3/MM3 (ref 1.7–7)
NEUTROPHILS NFR BLD AUTO: 71.6 % (ref 42.7–76)
NITRITE UR QL STRIP: POSITIVE
NRBC BLD AUTO-RTO: 0 /100 WBC (ref 0–0.2)
NT-PROBNP SERPL-MCNC: ABNORMAL PG/ML (ref 0–1800)
PCO2 BLDA: 46.2 MM HG (ref 35–45)
PCO2 TEMP ADJ BLD: 46.2 MM HG (ref 35–45)
PH BLDA: 7.41 PH UNITS (ref 7.35–7.45)
PH UR STRIP.AUTO: 6 [PH] (ref 5–8)
PH, TEMP CORRECTED: 7.41 PH UNITS (ref 7.35–7.45)
PLATELET # BLD AUTO: 113 10*3/MM3 (ref 140–450)
PMV BLD AUTO: 12.4 FL (ref 6–12)
PO2 BLDA: 63.7 MM HG (ref 83–108)
PO2 TEMP ADJ BLD: 63.7 MM HG (ref 83–108)
POTASSIUM SERPL-SCNC: 4.7 MMOL/L (ref 3.5–5.2)
PROCALCITONIN SERPL-MCNC: 0.06 NG/ML (ref 0–0.25)
PROT SERPL-MCNC: 6.5 G/DL (ref 6–8.5)
PROT UR QL STRIP: ABNORMAL
PROTHROMBIN TIME: 14.3 SECONDS (ref 11.9–14.6)
QT INTERVAL: 312 MS
QTC INTERVAL: 420 MS
RBC # BLD AUTO: 3.89 10*6/MM3 (ref 4.14–5.8)
RBC # UR STRIP: ABNORMAL /HPF
REF LAB TEST METHOD: ABNORMAL
SAO2 % BLDCOA: 94.7 % (ref 94–99)
SARS-COV-2 RNA RESP QL NAA+PROBE: NOT DETECTED
SODIUM SERPL-SCNC: 145 MMOL/L (ref 136–145)
SP GR UR STRIP: 1.02 (ref 1–1.03)
SQUAMOUS #/AREA URNS HPF: ABNORMAL /HPF
TROPONIN T SERPL-MCNC: <0.01 NG/ML (ref 0–0.03)
UROBILINOGEN UR QL STRIP: ABNORMAL
VENTILATOR MODE: ABNORMAL
WBC # UR STRIP: ABNORMAL /HPF
WBC NRBC COR # BLD: 4.14 10*3/MM3 (ref 3.4–10.8)
WHOLE BLOOD HOLD SPECIMEN: NORMAL
WHOLE BLOOD HOLD SPECIMEN: NORMAL

## 2022-01-01 PROCEDURE — 83880 ASSAY OF NATRIURETIC PEPTIDE: CPT | Performed by: NURSE PRACTITIONER

## 2022-01-01 PROCEDURE — 71045 X-RAY EXAM CHEST 1 VIEW: CPT

## 2022-01-01 PROCEDURE — 93296 REM INTERROG EVL PM/IDS: CPT | Performed by: INTERNAL MEDICINE

## 2022-01-01 PROCEDURE — 74177 CT ABD & PELVIS W/CONTRAST: CPT

## 2022-01-01 PROCEDURE — 93005 ELECTROCARDIOGRAM TRACING: CPT | Performed by: FAMILY MEDICINE

## 2022-01-01 PROCEDURE — 93297 REM INTERROG DEV EVAL ICPMS: CPT | Performed by: INTERNAL MEDICINE

## 2022-01-01 PROCEDURE — 71275 CT ANGIOGRAPHY CHEST: CPT

## 2022-01-01 PROCEDURE — 87636 SARSCOV2 & INF A&B AMP PRB: CPT | Performed by: NURSE PRACTITIONER

## 2022-01-01 PROCEDURE — 85610 PROTHROMBIN TIME: CPT | Performed by: NURSE PRACTITIONER

## 2022-01-01 PROCEDURE — 93295 DEV INTERROG REMOTE 1/2/MLT: CPT | Performed by: INTERNAL MEDICINE

## 2022-01-01 PROCEDURE — 96374 THER/PROPH/DIAG INJ IV PUSH: CPT

## 2022-01-01 PROCEDURE — G2066 INTER DEVC REMOTE 30D: HCPCS | Performed by: INTERNAL MEDICINE

## 2022-01-01 PROCEDURE — 36600 WITHDRAWAL OF ARTERIAL BLOOD: CPT

## 2022-01-01 PROCEDURE — 99213 OFFICE O/P EST LOW 20 MIN: CPT

## 2022-01-01 PROCEDURE — 51798 US URINE CAPACITY MEASURE: CPT

## 2022-01-01 PROCEDURE — 85379 FIBRIN DEGRADATION QUANT: CPT | Performed by: NURSE PRACTITIONER

## 2022-01-01 PROCEDURE — 99211 OFF/OP EST MAY X REQ PHY/QHP: CPT | Performed by: UROLOGY

## 2022-01-01 PROCEDURE — 93010 ELECTROCARDIOGRAM REPORT: CPT | Performed by: INTERNAL MEDICINE

## 2022-01-01 PROCEDURE — 85025 COMPLETE CBC W/AUTO DIFF WBC: CPT | Performed by: NURSE PRACTITIONER

## 2022-01-01 PROCEDURE — 87086 URINE CULTURE/COLONY COUNT: CPT | Performed by: NURSE PRACTITIONER

## 2022-01-01 PROCEDURE — 0 IOPAMIDOL PER 1 ML: Performed by: NURSE PRACTITIONER

## 2022-01-01 PROCEDURE — 99214 OFFICE O/P EST MOD 30 MIN: CPT | Performed by: INTERNAL MEDICINE

## 2022-01-01 PROCEDURE — 99284 EMERGENCY DEPT VISIT MOD MDM: CPT

## 2022-01-01 PROCEDURE — 80053 COMPREHEN METABOLIC PANEL: CPT | Performed by: NURSE PRACTITIONER

## 2022-01-01 PROCEDURE — 87040 BLOOD CULTURE FOR BACTERIA: CPT | Performed by: NURSE PRACTITIONER

## 2022-01-01 PROCEDURE — 11721 DEBRIDE NAIL 6 OR MORE: CPT | Performed by: NURSE PRACTITIONER

## 2022-01-01 PROCEDURE — 83690 ASSAY OF LIPASE: CPT | Performed by: NURSE PRACTITIONER

## 2022-01-01 PROCEDURE — 82803 BLOOD GASES ANY COMBINATION: CPT

## 2022-01-01 PROCEDURE — 93000 ELECTROCARDIOGRAM COMPLETE: CPT | Performed by: INTERNAL MEDICINE

## 2022-01-01 PROCEDURE — 93283 PRGRMG EVAL IMPLANTABLE DFB: CPT | Performed by: INTERNAL MEDICINE

## 2022-01-01 PROCEDURE — 84145 PROCALCITONIN (PCT): CPT | Performed by: NURSE PRACTITIONER

## 2022-01-01 PROCEDURE — 83605 ASSAY OF LACTIC ACID: CPT | Performed by: NURSE PRACTITIONER

## 2022-01-01 PROCEDURE — 81001 URINALYSIS AUTO W/SCOPE: CPT | Performed by: NURSE PRACTITIONER

## 2022-01-01 PROCEDURE — 84484 ASSAY OF TROPONIN QUANT: CPT | Performed by: NURSE PRACTITIONER

## 2022-01-01 PROCEDURE — 86140 C-REACTIVE PROTEIN: CPT | Performed by: NURSE PRACTITIONER

## 2022-01-01 RX ORDER — BUMETANIDE 1 MG/1
1 TABLET ORAL DAILY
Refills: 0
Start: 2022-01-01 | End: 2022-01-01 | Stop reason: SDUPTHER

## 2022-01-01 RX ORDER — BUMETANIDE 0.5 MG/1
TABLET ORAL
Qty: 90 TABLET | Refills: 4 | OUTPATIENT
Start: 2022-01-01

## 2022-01-01 RX ORDER — SULFAMETHOXAZOLE AND TRIMETHOPRIM 400; 80 MG/1; MG/1
1 TABLET ORAL ONCE
Status: COMPLETED | OUTPATIENT
Start: 2022-01-01 | End: 2022-01-01

## 2022-01-01 RX ORDER — BUMETANIDE 1 MG/1
1 TABLET ORAL 2 TIMES DAILY
Qty: 4 TABLET | Refills: 0 | Status: SHIPPED | OUTPATIENT
Start: 2022-01-01 | End: 2022-01-01

## 2022-01-01 RX ORDER — BUMETANIDE 0.25 MG/ML
1 INJECTION INTRAMUSCULAR; INTRAVENOUS ONCE
Status: COMPLETED | OUTPATIENT
Start: 2022-01-01 | End: 2022-01-01

## 2022-01-01 RX ORDER — SODIUM CHLORIDE 0.9 % (FLUSH) 0.9 %
10 SYRINGE (ML) INJECTION AS NEEDED
Status: DISCONTINUED | OUTPATIENT
Start: 2022-01-01 | End: 2022-01-01 | Stop reason: HOSPADM

## 2022-01-01 RX ORDER — SULFAMETHOXAZOLE AND TRIMETHOPRIM 400; 80 MG/1; MG/1
1 TABLET ORAL 2 TIMES DAILY
Qty: 20 TABLET | Refills: 0 | Status: SHIPPED | OUTPATIENT
Start: 2022-01-01 | End: 2022-01-01

## 2022-01-01 RX ORDER — SULFAMETHOXAZOLE AND TRIMETHOPRIM 800; 160 MG/1; MG/1
1 TABLET ORAL 2 TIMES DAILY PRN
Qty: 30 TABLET | Refills: 0 | Status: SHIPPED | OUTPATIENT
Start: 2022-01-01

## 2022-01-01 RX ORDER — BUMETANIDE 1 MG/1
1 TABLET ORAL DAILY
Qty: 2 TABLET | Refills: 0 | Status: SHIPPED | OUTPATIENT
Start: 2022-01-01 | End: 2022-01-01 | Stop reason: SDUPTHER

## 2022-01-01 RX ORDER — BUMETANIDE 1 MG/1
1 TABLET ORAL DAILY
Qty: 60 TABLET | Refills: 11 | Status: SHIPPED | OUTPATIENT
Start: 2022-01-01

## 2022-01-01 RX ORDER — LORAZEPAM 0.5 MG/1
0.5 TABLET ORAL ONCE
Status: COMPLETED | OUTPATIENT
Start: 2022-01-01 | End: 2022-01-01

## 2022-01-01 RX ORDER — DUTASTERIDE 0.5 MG/1
0.5 CAPSULE, LIQUID FILLED ORAL DAILY
Qty: 90 CAPSULE | Refills: 3 | Status: SHIPPED | OUTPATIENT
Start: 2022-01-01

## 2022-01-01 RX ADMIN — LORAZEPAM 0.5 MG: 0.5 TABLET ORAL at 16:56

## 2022-01-01 RX ADMIN — BUMETANIDE 1 MG: 0.25 INJECTION, SOLUTION INTRAMUSCULAR; INTRAVENOUS at 18:35

## 2022-01-01 RX ADMIN — IOPAMIDOL 100 ML: 755 INJECTION, SOLUTION INTRAVENOUS at 16:53

## 2022-01-01 RX ADMIN — SODIUM CHLORIDE, POTASSIUM CHLORIDE, SODIUM LACTATE AND CALCIUM CHLORIDE 1000 ML: 600; 310; 30; 20 INJECTION, SOLUTION INTRAVENOUS at 15:47

## 2022-01-01 RX ADMIN — SULFAMETHOXAZOLE AND TRIMETHOPRIM 1 TABLET: 400; 80 TABLET ORAL at 19:44

## 2022-01-01 RX ADMIN — SODIUM CHLORIDE, POTASSIUM CHLORIDE, SODIUM LACTATE AND CALCIUM CHLORIDE 500 ML: 600; 310; 30; 20 INJECTION, SOLUTION INTRAVENOUS at 16:57

## 2022-01-20 DIAGNOSIS — F41.9 ANXIETY: ICD-10-CM

## 2022-01-21 RX ORDER — LORAZEPAM 0.5 MG/1
TABLET ORAL
Qty: 120 TABLET | Refills: 0 | Status: SHIPPED | OUTPATIENT
Start: 2022-01-21 | End: 2022-02-21

## 2022-01-28 ENCOUNTER — TELEPHONE (OUTPATIENT)
Dept: INTERNAL MEDICINE | Age: 87
End: 2022-01-28

## 2022-01-28 DIAGNOSIS — G89.4 CHRONIC PAIN SYNDROME: ICD-10-CM

## 2022-01-28 DIAGNOSIS — G60.9 IDIOPATHIC NEUROPATHY: ICD-10-CM

## 2022-01-28 RX ORDER — HYDROCODONE BITARTRATE AND ACETAMINOPHEN 5; 325 MG/1; MG/1
1 TABLET ORAL 3 TIMES DAILY PRN
Qty: 90 TABLET | Refills: 0 | Status: SHIPPED | OUTPATIENT
Start: 2022-01-28 | End: 2022-03-03 | Stop reason: SDUPTHER

## 2022-02-14 NOTE — TELEPHONE ENCOUNTER
Notified Munira that her father's patient assistance for Entresto is approved through 12/31/2022. She voiced understanding. Alma Mendez MA

## 2022-02-17 DIAGNOSIS — F41.9 ANXIETY: ICD-10-CM

## 2022-02-21 RX ORDER — LORAZEPAM 0.5 MG/1
TABLET ORAL
Qty: 120 TABLET | Refills: 0 | Status: SHIPPED | OUTPATIENT
Start: 2022-02-21 | End: 2022-03-23

## 2022-02-28 ENCOUNTER — TELEPHONE (OUTPATIENT)
Dept: INTERNAL MEDICINE | Age: 87
End: 2022-02-28

## 2022-02-28 DIAGNOSIS — G89.4 CHRONIC PAIN SYNDROME: ICD-10-CM

## 2022-02-28 DIAGNOSIS — G60.9 IDIOPATHIC NEUROPATHY: ICD-10-CM

## 2022-02-28 NOTE — TELEPHONE ENCOUNTER
Outbound call to pt for rescheduling of cancelled appt on 02/10/22 with Ted.  If pt returns call please reschedule at that time.  Letter is being sent with scheduling instructions.

## 2022-02-28 NOTE — TELEPHONE ENCOUNTER
[facility] is calling and asking to have a prescription for hydrocodone 5-325mg    Pharmacy is requesting a refill to [facility] in [facility] patient does not like [facility]'s hydrocone?

## 2022-03-03 RX ORDER — HYDROCODONE BITARTRATE AND ACETAMINOPHEN 5; 325 MG/1; MG/1
1 TABLET ORAL 3 TIMES DAILY PRN
Qty: 90 TABLET | Refills: 0 | Status: SHIPPED | OUTPATIENT
Start: 2022-03-03 | End: 2022-04-04 | Stop reason: SDUPTHER

## 2022-03-15 NOTE — TELEPHONE ENCOUNTER
Patient's Samplesaint AICD monitor is disconnected from the network.  RN left a message for patient's daughter instructing her to reset the monitor.  RN's direct line left for any questions/concerns.

## 2022-03-21 NOTE — TELEPHONE ENCOUNTER
Daughter returned call.  RN provided instructions on how to reset monitor and will call daughter back when transmission has been received.

## 2022-03-25 DIAGNOSIS — F41.9 ANXIETY: ICD-10-CM

## 2022-03-28 ENCOUNTER — TELEPHONE (OUTPATIENT)
Dept: INTERNAL MEDICINE | Age: 87
End: 2022-03-28

## 2022-04-04 DIAGNOSIS — G89.4 CHRONIC PAIN SYNDROME: ICD-10-CM

## 2022-04-04 DIAGNOSIS — G60.9 IDIOPATHIC NEUROPATHY: ICD-10-CM

## 2022-04-04 RX ORDER — HYDROCODONE BITARTRATE AND ACETAMINOPHEN 5; 325 MG/1; MG/1
1 TABLET ORAL 3 TIMES DAILY PRN
Qty: 90 TABLET | Refills: 0 | Status: SHIPPED | OUTPATIENT
Start: 2022-04-04 | End: 2022-04-04 | Stop reason: SDUPTHER

## 2022-04-04 RX ORDER — HYDROCODONE BITARTRATE AND ACETAMINOPHEN 5; 325 MG/1; MG/1
1 TABLET ORAL 3 TIMES DAILY PRN
Qty: 90 TABLET | Refills: 0 | Status: SHIPPED | OUTPATIENT
Start: 2022-04-04 | End: 2022-04-29 | Stop reason: SDUPTHER

## 2022-04-04 RX ORDER — LORAZEPAM 0.5 MG/1
TABLET ORAL
Qty: 120 TABLET | Refills: 0 | Status: SHIPPED | OUTPATIENT
Start: 2022-04-04 | End: 2022-04-25

## 2022-04-04 RX ORDER — LORAZEPAM 0.5 MG/1
TABLET ORAL
Qty: 12 TABLET | Refills: 0 | OUTPATIENT
Start: 2022-04-04 | End: 2022-05-04

## 2022-04-04 NOTE — TELEPHONE ENCOUNTER
Patient's HeartLogic Heart Failure Index is elevated at 29.  RN attempted to call daughter, Heide, to evaluate patient for heart failure symptoms.  Message left for her to return call.

## 2022-04-12 RX ORDER — METOPROLOL SUCCINATE 25 MG/1
TABLET, EXTENDED RELEASE ORAL
Qty: 135 TABLET | Refills: 2 | Status: SHIPPED | OUTPATIENT
Start: 2022-04-12

## 2022-04-23 NOTE — ED PROVIDER NOTES
Subjective   87 yom with PMH of AAA, anxiety, atrial fibrillation, ICD, BPH, carotid artery stenosis, CHF, CKD, bowel obstruction, CAD, hyperlipidemia and stroke presents with son with c/o shortness of breath and upper abdomen pain.  He states he has chronic SOB but it has worsened the last week.  He wears oxygen prn but has had to increase his usage this week. He denies fever or cough.  He denies n/v/d.  He states he has been constipated and took laxatives. He had a small BM earlier. He states he has not been urinating as much.  He has swelling in his lower legs which is chronic.      Per medical record, last EF 25-30% on echocardiogram here in January 2020          Review of Systems   Constitutional: Negative for activity change, appetite change, fatigue and fever.   HENT: Negative for congestion, ear pain, facial swelling and sore throat.    Eyes: Negative for discharge and visual disturbance.   Respiratory: Positive for shortness of breath. Negative for apnea, chest tightness, wheezing and stridor.    Cardiovascular: Negative for chest pain and palpitations.   Gastrointestinal: Positive for abdominal pain. Negative for abdominal distention, diarrhea, nausea and vomiting.   Genitourinary: Negative for difficulty urinating and dysuria.   Musculoskeletal: Negative for arthralgias and myalgias.   Skin: Negative for rash and wound.   Neurological: Negative for dizziness and seizures.   Psychiatric/Behavioral: Negative for agitation and confusion.       Past Medical History:   Diagnosis Date   • Abdominal aortic aneurysm (HCC)    • Anxiety    • Atrial fibrillation (HCC)    • Biventricular ICD (implantable cardioverter-defibrillator) in place    • BPH (benign prostatic hypertrophy)    • Carotid artery stenosis    • CHF (congestive heart failure) (HCC)    • Chronic kidney disease     Chronic kidney disease, stage 4 (severe)   • Chronic systolic (congestive) heart failure (HCC)     limited echo 7/2016 EF was 40-45%.  Patient has BiV AICD   • Colon obstruction (HCC)    • Coronary arteriosclerosis     MI x2   • Hearing loss    • Iron deficiency anemia    • Ischemic cardiomyopathy    • Mixed hyperlipidemia    • Myocardial infarction (HCC)    • Obstruction, colon (HCC)    • Stroke (HCC)        Allergies   Allergen Reactions   • Keflex [Cephalexin]      CEPHALOSPORINS       Past Surgical History:   Procedure Laterality Date   • ABDOMINAL SURGERY      obstruction   • ABLATION OF DYSRHYTHMIC FOCUS  2008    MAZE at time of CABG/MVR   • CARDIAC ABLATION     • CARDIAC CATHETERIZATION     • CARDIAC DEFIBRILLATOR PLACEMENT     • CARDIAC ELECTROPHYSIOLOGY PROCEDURE N/A 06/09/2020    Procedure: ICD battery change;  Surgeon: Raleigh Enciso MD;  Location:  [facility];  Service: Cardiovascular;  Laterality: N/A;   • CARDIAC PACEMAKER PLACEMENT     • CARDIOVERSION     • CAROTID ENDARTERECTOMY     • CAROTID ENDARTERECTOMY     • CORONARY ARTERY BYPASS GRAFT  2008    X 1   • CORONARY STENT PLACEMENT  2008    X 2   • LAPAROSCOPIC LYSIS OF ADHESIONS N/A 01/28/2020    Procedure: LAPAROSCOPIC LYSIS OF ADHESIONS;  Surgeon: Kim Almeida MD;  Location:  [facility] OR;  Service: General   • MITRAL VALVE REPAIR/REPLACEMENT  2008   • TOTAL KNEE ARTHROPLASTY         Family History   Problem Relation Age of Onset   • Stroke Mother    • Heart disease Mother    • Diabetes Father        Social History     Socioeconomic History   • Marital status:    Tobacco Use   • Smoking status: Former Smoker     Years: 45.00     Types: Cigarettes   • Smokeless tobacco: Never Used   Vaping Use   • Vaping Use: Never used   Substance and Sexual Activity   • Alcohol use: No   • Drug use: No   • Sexual activity: Defer           Objective   Physical Exam  Constitutional:       General: He is not in acute distress.     Appearance: He is not toxic-appearing or diaphoretic.   HENT:      Head: Normocephalic.   Eyes:      Pupils: Pupils are equal, round, and  reactive to light.   Cardiovascular:      Rate and Rhythm: Normal rate and regular rhythm.   Pulmonary:      Effort: Pulmonary effort is normal.      Breath sounds: Decreased breath sounds present. No wheezing, rhonchi or rales.   Abdominal:      General: Bowel sounds are normal.      Palpations: Abdomen is soft.   Musculoskeletal:         General: Normal range of motion.      Cervical back: Normal range of motion and neck supple.      Right lower leg: Edema present.      Left lower leg: Edema present.   Skin:     General: Skin is warm and dry.   Neurological:      Mental Status: He is alert and oriented to person, place, and time.         Procedures           ED Course  ED Course as of 04/24/22 0827   Sat Apr 23, 2022   1623 Chest xray - IMPRESSION: 1.. Bibasilar atelectasis or scarring. No evidence of lobar pneumonia or effusion. 2. Ectatic thoracic aorta with mild cardiomegaly. His BNP was elevated at 10,000.  Initial bolus dc'd.  I am going to scan his chest and abdomen so we will give him some fluid. [KS]   1734 CTA of the chest - IMPRESSION: 1. Suboptimal contrast bolus with no convincing evidence of pulmonary embolism. Dilation of the pulmonary arteries compatible with pulmonary arterial hypertension. There is ectasia of the thoracic aorta. There is reflux of contrast into the hepatic veins which can be a sign of right heart failure and small bilateral pleural effusions are present. There is mild to moderate bibasilar atelectasis.  2. Multiple cysts within the kidneys, the upper poles are visualized in the field-of-view. 3. Heavy calcified plaques demonstrated within the coronary arteries. There is previous median sternotomy. [KS]   5415 CT of the abdomen/pelvis - IMPRESSION: 1. Findings suggestive of mild CHF with small bilateral pleural effusions, vascular congestion in the lung bases and mild cardiomegaly. 2. Previous mitral valve replacement. Pacemaker wires are present. 3. Mild fatty infiltration of the  liver. No acute intra-abdominal abnormality. Gallstones are present without evidence of cholecystitis. 4. Multiple benign-appearing bilateral renal cysts are identified. There is marked enlargement the prostate gland, which is lobular in shape and has mass effect on the base of the bladder. Clinical correlation is recommended. 5. Aneurysmal dilation of the distal abdominal aorta with maximum diameter of 4.1 cm no evidence of aneurysm rupture.   [KS]   1810 His device was interrogated. It was found to be functioning normally.  He has had a couple of 3-4 second episodes of ventricular tachycardia.  His hear logic score is 28 (>16 trending for heart failure). Dr Kaplan reviewed his testing results as well as his history and assessment. His testing results are similar to his baseline.  We will give him a dose of bumex as well as antibiotics.   [KS]   1830 I had an extensive discussion with the patient and his son.  The patient and his son state they are unsure what type of reaction he has to cephalosporins. Son states he typically takes bactrim 400/80mg for UTI. I will give him a dose prior to discharge and send in an RX.  Antibiotics might need to be changed based on his urine culture.  We will increase his bumex to 1mg from 0.5mg bid for a couple of days.  I will write him a few.  I do not want him to increase it for any extended amount of time as his renal function is slightly improved today.  I also told them his sp02 was lower on his abg.  He has oxygen as needed.  I told them he may need to increase his oxygen usage which they voiced understanding of.  I gave them strict return instructions which they voiced understanding of. They also voiced understanding of results.   [KS]      ED Course User Index  [KS] Taz Wilde, APRN                                                 MDM  Number of Diagnoses or Management Options     Amount and/or Complexity of Data Reviewed  Clinical lab tests: ordered and  reviewed  Tests in the radiology section of CPT®: ordered and reviewed  Decide to obtain previous medical records or to obtain history from someone other than the patient: yes  Discuss the patient with other providers: yes    Risk of Complications, Morbidity, and/or Mortality  Presenting problems: moderate  Diagnostic procedures: low  Management options: low    Patient Progress  Patient progress: stable      Final diagnoses:   Acute on chronic right-sided congestive heart failure (HCC)   Urinary tract infection with hematuria, site unspecified       ED Disposition  ED Disposition     ED Disposition   Discharge    Condition   Stable    Comment   --             Alison Hercules MD  [address] [place] [address] [address]  [facility] [address]  [phone]    Schedule an appointment as soon as possible for a visit in 2 days           Medication List      New Prescriptions    sulfamethoxazole-trimethoprim 400-80 MG tablet  Commonly known as: BACTRIM,SEPTRA  Take 1 tablet by mouth 2 (Two) Times a Day for 10 days.        Changed    * bumetanide 0.5 MG tablet  Commonly known as: BUMEX  TAKE 1 TABLET BY MOUTH 2 (TWO) TIMES A DAY. TAKE AN EXTRA TABLET DAILY AS NEEDED FOR INCREASED SHORTNESS OF BREATH, EDEMA AND/OR WEIG<MORE>  What changed: Another medication with the same name was added. Make sure you understand how and when to take each.     * bumetanide 1 MG tablet  Commonly known as: Bumex  Take 1 tablet by mouth 2 (Two) Times a Day for 2 days.  What changed: You were already taking a medication with the same name, and this prescription was added. Make sure you understand how and when to take each.     metoprolol succinate XL 25 MG 24 hr tablet  Commonly known as: TOPROL-XL  Take 1 tablet by mouth Every Night.  What changed: additional instructions         * This list has 2 medication(s) that are the same as other medications prescribed for you. Read the directions carefully, and ask your doctor or other care provider to review them  with you.               Where to Get Your Medications      These medications were sent to [facility]/pharmacy #[id number] - [place], [place] - [address] - [phone]  - [phone]   [address] [facility] [address]    Phone: [phone]   · bumetanide 1 MG tablet  · sulfamethoxazole-trimethoprim 400-80 MG tablet          Taz Wilde, OUSMANE  04/24/22 0859

## 2022-04-24 NOTE — DISCHARGE INSTRUCTIONS
Increase fluid intake.  Antibiotic as ordered.  It may need to be changed based on urine culture.  Increase bumex to 1mg twice a day for 2 days, then 0.5mg twice a day as previously prescribed.  Increase oxygen as needed.  Follow up with PCP - call Monday for appointment. Follow up with urology and cardiology - call Monday for appointment. Return to ED if condition does not improve or worsens

## 2022-04-25 DIAGNOSIS — F41.9 ANXIETY: ICD-10-CM

## 2022-04-25 RX ORDER — LORAZEPAM 0.5 MG/1
TABLET ORAL
Qty: 12 TABLET | Refills: 0 | Status: SHIPPED | OUTPATIENT
Start: 2022-04-25 | End: 2022-04-27

## 2022-04-25 NOTE — PROGRESS NOTES
The patient states He is here today for catheter insertion.  Patient of Dr. Trjeo. Using sterile technique a new 20fr Coude catheter was placed, balloon inflated using 10cc sterile water,  1050cc of urine drained from patients bladder via catheter then catheter was connected to leg bag.  Patient tolerated the procedure well.  The patient was advised to return in 1 month for next catheter change. Patient verbalized understanding. Dr. Perez was in the office at the time of procedure.  Lucia TOBIAS MA    Bladder Scan interpretation  Estimation of residual urine via abdominal ultrasound  Residual Urine: 999+ ml  Indication: Urinary retention  Position: Supine  Examination: Incremental scanning of the suprapubic area using 3 MHz transducer using copious amounts of acoustic gel.   Findings: An anechoic area was demonstrated which represented the bladder, with measurement of residual urine as noted. I inspected this myself. In that the residual urine was stable or insignificant, no treatment will be necessary at this time.       Reviewed and agree with medical assistance documentation above

## 2022-04-26 DIAGNOSIS — F41.9 ANXIETY: ICD-10-CM

## 2022-04-27 RX ORDER — LORAZEPAM 0.5 MG/1
TABLET ORAL
Qty: 120 TABLET | Refills: 0 | Status: SHIPPED | OUTPATIENT
Start: 2022-04-27 | End: 2022-05-27

## 2022-04-29 ENCOUNTER — TELEPHONE (OUTPATIENT)
Dept: INTERNAL MEDICINE | Age: 87
End: 2022-04-29

## 2022-04-29 DIAGNOSIS — G60.9 IDIOPATHIC NEUROPATHY: ICD-10-CM

## 2022-04-29 DIAGNOSIS — G89.4 CHRONIC PAIN SYNDROME: ICD-10-CM

## 2022-04-29 RX ORDER — HYDROCODONE BITARTRATE AND ACETAMINOPHEN 5; 325 MG/1; MG/1
1 TABLET ORAL 3 TIMES DAILY PRN
Qty: 90 TABLET | Refills: 0 | Status: SHIPPED | OUTPATIENT
Start: 2022-04-29 | End: 2022-05-19 | Stop reason: SDUPTHER

## 2022-04-29 NOTE — TELEPHONE ENCOUNTER
S/w [facility], pt needs a refill on his Hydrocodone but he needs it sent to [facility] b/c he does not like the Hydrocodone that [facility] has.

## 2022-05-02 DIAGNOSIS — G89.4 CHRONIC PAIN SYNDROME: ICD-10-CM

## 2022-05-02 DIAGNOSIS — G60.9 IDIOPATHIC NEUROPATHY: ICD-10-CM

## 2022-05-03 RX ORDER — GABAPENTIN 300 MG/1
CAPSULE ORAL
Qty: 90 CAPSULE | Refills: 1 | Status: SHIPPED | OUTPATIENT
Start: 2022-05-03 | End: 2022-10-28

## 2022-05-09 NOTE — TELEPHONE ENCOUNTER
Outbound call to pt for rescheduling of cancelled appt on 04/26/2022 with Shellie.  If pt returns call please reschedule at that time

## 2022-05-13 NOTE — PROGRESS NOTES
Subjective    Mr. Bravo is 87 y.o. male    Chief Complaint: Unable to urinate    History of Present Illness    87-year-old male established patient in for complaints of inability to urinate since Loera catheter was removed 1-1/2 days ago.  Patient was last seen on 4/25/2022 in our office for the inability to urinate history markedly enlarged prostate with significant bladder outlet obstruction symptoms.  At that time 20 French coudé Loera catheter was placed by Lucia PARKER.  Patient is currently on Flomax and finasteride daily.  At this time bladder scan showing 599 mL.    The following portions of the patient's history were reviewed and updated as appropriate: allergies, current medications, past family history, past medical history, past social history, past surgical history and problem list.    Review of Systems   Constitutional: Negative for chills, fatigue and fever.   Gastrointestinal: Negative for nausea and vomiting.   Genitourinary: Positive for decreased urine volume, difficulty urinating and urgency. Negative for flank pain and hematuria.         Current Outpatient Medications:   •  aspirin 81 MG EC tablet, Take 81 mg by mouth Daily., Disp: , Rfl:   •  atorvastatin (LIPITOR) 10 MG tablet, Take 1 tablet by mouth Every Night., Disp: , Rfl:   •  bumetanide (BUMEX) 0.5 MG tablet, TAKE 1 TABLET BY MOUTH 2 (TWO) TIMES A DAY. TAKE AN EXTRA TABLET DAILY AS NEEDED FOR INCREASED SHORTNESS OF BREATH, EDEMA AND/OR WEIG<MORE>, Disp: 270 tablet, Rfl: 4  •  bumetanide (Bumex) 1 MG tablet, Take 1 tablet by mouth 2 (Two) Times a Day for 2 days., Disp: 4 tablet, Rfl: 0  •  dutasteride (AVODART) 0.5 MG capsule, Take 0.5 mg by mouth Daily. Patient takes at noon, Disp: , Rfl:   •  furosemide (LASIX) 20 MG tablet, Take 20 mg by mouth As Needed., Disp: , Rfl:   •  gabapentin (NEURONTIN) 300 MG capsule, Take 300 mg by mouth Daily., Disp: , Rfl:   •  HYDROcodone-acetaminophen (NORCO) 5-325 MG per tablet, Take 1 tablet by mouth Every  6 (Six) Hours As Needed for Moderate Pain ., Disp: , Rfl:   •  LORazepam (ATIVAN) 0.5 MG tablet, Take 0.5 mg by mouth 3 (Three) Times a Day As Needed., Disp: , Rfl:   •  metoprolol succinate XL (TOPROL-XL) 25 MG 24 hr tablet, Take 1 tablet by mouth Every Night. (Patient taking differently: Take 25 mg by mouth Every Night. 1.5 tablet), Disp: 90 tablet, Rfl: 4  •  nitroglycerin (NITROSTAT) 0.4 MG SL tablet, PLACE 1 TABLET UNDER THE TONGUE AS NEEDED FOR ANGINA, MAY REPEAT EVERY 5 MINS FOR UP THREE DOSES, Disp: 25 tablet, Rfl: 3  •  sacubitril-valsartan (ENTRESTO) 24-26 MG tablet, Take 1 tablet by mouth 2 (Two) Times a Day., Disp: 180 tablet, Rfl: 1  •  tamsulosin (FLOMAX) 0.4 MG capsule 24 hr capsule, Take 1 capsule by mouth Every Night., Disp: , Rfl:     Past Medical History:   Diagnosis Date   • Abdominal aortic aneurysm (HCC)    • Anxiety    • Atrial fibrillation (HCC)    • Biventricular ICD (implantable cardioverter-defibrillator) in place    • BPH (benign prostatic hypertrophy)    • Carotid artery stenosis    • CHF (congestive heart failure) (HCC)    • Chronic kidney disease     Chronic kidney disease, stage 4 (severe)   • Chronic systolic (congestive) heart failure (HCC)     limited echo 7/2016 EF was 40-45%. Patient has BiV AICD   • Colon obstruction (HCC)    • Coronary arteriosclerosis     MI x2   • Hearing loss    • Iron deficiency anemia    • Ischemic cardiomyopathy    • Mixed hyperlipidemia    • Myocardial infarction (HCC)    • Obstruction, colon (HCC)    • Stroke (HCC)        Past Surgical History:   Procedure Laterality Date   • ABDOMINAL SURGERY      obstruction   • ABLATION OF DYSRHYTHMIC FOCUS  2008    MAZE at time of CABG/MVR   • CARDIAC ABLATION     • CARDIAC CATHETERIZATION     • CARDIAC DEFIBRILLATOR PLACEMENT     • CARDIAC ELECTROPHYSIOLOGY PROCEDURE N/A 06/09/2020    Procedure: ICD battery change;  Surgeon: Raleigh Enciso MD;  Location:  [facility];  Service: Cardiovascular;   Laterality: N/A;   • CARDIAC PACEMAKER PLACEMENT     • CARDIOVERSION     • CAROTID ENDARTERECTOMY     • CAROTID ENDARTERECTOMY     • CORONARY ARTERY BYPASS GRAFT  2008    X 1   • CORONARY STENT PLACEMENT  2008    X 2   • LAPAROSCOPIC LYSIS OF ADHESIONS N/A 01/28/2020    Procedure: LAPAROSCOPIC LYSIS OF ADHESIONS;  Surgeon: Kim Almeida MD;  Location: [facility] OR;  Service: General   • MITRAL VALVE REPAIR/REPLACEMENT  2008   • TOTAL KNEE ARTHROPLASTY         Social History     Socioeconomic History   • Marital status:    Tobacco Use   • Smoking status: Former Smoker     Years: 45.00     Types: Cigarettes   • Smokeless tobacco: Never Used   Vaping Use   • Vaping Use: Never used   Substance and Sexual Activity   • Alcohol use: No   • Drug use: No   • Sexual activity: Defer       Family History   Problem Relation Age of Onset   • Stroke Mother    • Heart disease Mother    • Diabetes Father        Objective    There were no vitals taken for this visit.    Physical Exam  Constitutional:       Appearance: Normal appearance.   Abdominal:      Tenderness: There is abdominal tenderness in the suprapubic area. There is no right CVA tenderness or left CVA tenderness.   Skin:     General: Skin is warm and dry.   Neurological:      Mental Status: He is alert and oriented to person, place, and time.   Psychiatric:         Mood and Affect: Mood normal.         Behavior: Behavior normal.             Results for orders placed or performed during the hospital encounter of 04/23/22   Blood Culture - Blood, Arm, Right    Specimen: Arm, Right; Blood   Result Value Ref Range    Blood Culture No growth at 5 days    Blood Culture - Blood, Arm, Right    Specimen: Arm, Right; Blood   Result Value Ref Range    Blood Culture No growth at 5 days    COVID-19 and FLU A/B PCR - Swab, Nasopharynx    Specimen: Nasopharynx; Swab   Result Value Ref Range    COVID19 Not Detected Not Detected - Ref. Range    Influenza A PCR Not Detected Not  Detected    Influenza B PCR Not Detected Not Detected   Urine Culture - Urine, Urine, Clean Catch    Specimen: Urine, Clean Catch   Result Value Ref Range    Urine Culture No growth    Comprehensive Metabolic Panel    Specimen: Blood   Result Value Ref Range    Glucose 105 (H) 65 - 99 mg/dL    BUN 24 (H) 8 - 23 mg/dL    Creatinine 1.95 (H) 0.76 - 1.27 mg/dL    Sodium 145 136 - 145 mmol/L    Potassium 4.7 3.5 - 5.2 mmol/L    Chloride 107 98 - 107 mmol/L    CO2 29.0 22.0 - 29.0 mmol/L    Calcium 9.7 8.6 - 10.5 mg/dL    Total Protein 6.5 6.0 - 8.5 g/dL    Albumin 3.90 3.50 - 5.20 g/dL    ALT (SGPT) 7 1 - 41 U/L    AST (SGOT) 11 1 - 40 U/L    Alkaline Phosphatase 101 39 - 117 U/L    Total Bilirubin 0.9 0.0 - 1.2 mg/dL    Globulin 2.6 gm/dL    A/G Ratio 1.5 g/dL    BUN/Creatinine Ratio 12.3 7.0 - 25.0    Anion Gap 9.0 5.0 - 15.0 mmol/L    eGFR 32.7 (L) >60.0 mL/min/1.73   Protime-INR    Specimen: Blood   Result Value Ref Range    Protime 14.3 11.9 - 14.6 Seconds    INR 1.15 (H) 0.91 - 1.09   Lipase    Specimen: Blood   Result Value Ref Range    Lipase 13 13 - 60 U/L   Urinalysis With Culture If Indicated - Urine, Clean Catch    Specimen: Urine, Clean Catch   Result Value Ref Range    Color, UA Yellow Yellow, Straw    Appearance, UA Turbid (A) Clear    pH, UA 6.0 5.0 - 8.0    Specific Gravity, UA 1.019 1.005 - 1.030    Glucose, UA Negative Negative    Ketones, UA Negative Negative    Bilirubin, UA Negative Negative    Blood, UA Small (1+) (A) Negative    Protein,  mg/dL (2+) (A) Negative    Leuk Esterase, UA Large (3+) (A) Negative    Nitrite, UA Positive (A) Negative    Urobilinogen, UA 0.2 E.U./dL 0.2 - 1.0 E.U./dL   D-dimer, Quantitative    Specimen: Blood   Result Value Ref Range    D-Dimer, Quantitative 1.99 (H) 0.00 - 0.50 mg/L (FEU)   BNP    Specimen: Blood   Result Value Ref Range    proBNP 10,076.0 (H) 0.0 - 1,800.0 pg/mL   Troponin    Specimen: Blood   Result Value Ref Range    Troponin T <0.010 0.000 -  0.030 ng/mL   Lactic Acid, Plasma    Specimen: Blood   Result Value Ref Range    Lactate 0.8 0.5 - 2.0 mmol/L   Procalcitonin    Specimen: Blood   Result Value Ref Range    Procalcitonin 0.06 0.00 - 0.25 ng/mL   C-reactive Protein    Specimen: Blood   Result Value Ref Range    C-Reactive Protein <0.30 0.00 - 0.50 mg/dL   CBC Auto Differential    Specimen: Blood   Result Value Ref Range    WBC 4.14 3.40 - 10.80 10*3/mm3    RBC 3.89 (L) 4.14 - 5.80 10*6/mm3    Hemoglobin 11.2 (L) 13.0 - 17.7 g/dL    Hematocrit 37.6 37.5 - 51.0 %    MCV 96.7 79.0 - 97.0 fL    MCH 28.8 26.6 - 33.0 pg    MCHC 29.8 (L) 31.5 - 35.7 g/dL    RDW 14.9 12.3 - 15.4 %    RDW-SD 53.0 37.0 - 54.0 fl    MPV 12.4 (H) 6.0 - 12.0 fL    Platelets 113 (L) 140 - 450 10*3/mm3    Neutrophil % 71.6 42.7 - 76.0 %    Lymphocyte % 14.0 (L) 19.6 - 45.3 %    Monocyte % 8.2 5.0 - 12.0 %    Eosinophil % 5.3 0.3 - 6.2 %    Basophil % 0.7 0.0 - 1.5 %    Immature Grans % 0.2 0.0 - 0.5 %    Neutrophils, Absolute 2.96 1.70 - 7.00 10*3/mm3    Lymphocytes, Absolute 0.58 (L) 0.70 - 3.10 10*3/mm3    Monocytes, Absolute 0.34 0.10 - 0.90 10*3/mm3    Eosinophils, Absolute 0.22 0.00 - 0.40 10*3/mm3    Basophils, Absolute 0.03 0.00 - 0.20 10*3/mm3    Immature Grans, Absolute 0.01 0.00 - 0.05 10*3/mm3    nRBC 0.0 0.0 - 0.2 /100 WBC   Blood Gas, Arterial -    Specimen: Arterial Blood   Result Value Ref Range    Site Right Radial     Arthur's Test Positive     pH, Arterial 7.406 7.350 - 7.450 pH units    pCO2, Arterial 46.2 (H) 35.0 - 45.0 mm Hg    pO2, Arterial 63.7 (L) 83.0 - 108.0 mm Hg    HCO3, Arterial 29.0 (H) 20.0 - 26.0 mmol/L    Base Excess, Arterial 3.7 (H) 0.0 - 2.0 mmol/L    O2 Saturation, Arterial 94.7 94.0 - 99.0 %    Temperature 37.0 C    Barometric Pressure for Blood Gas 750 mmHg    Modality Room Air     Ventilator Mode NA     Collected by [id number]     pCO2, Temperature Corrected 46.2 (H) 35 - 45 mm Hg    pH, Temp Corrected 7.406 7.350 - 7.450 pH Units    pO2,  Temperature Corrected 63.7 (L) 83 - 108 mm Hg   Urinalysis, Microscopic Only - Urine, Clean Catch    Specimen: Urine, Clean Catch   Result Value Ref Range    RBC, UA 0-2 (A) None Seen /HPF    WBC, UA Too Numerous to Count (A) None Seen /HPF    Bacteria, UA Trace (A) None Seen /HPF    Squamous Epithelial Cells, UA 0-2 None Seen, 0-2 /HPF    Hyaline Casts, UA None Seen None Seen /LPF    Methodology Manual Light Microscopy    ECG 12 Lead   Result Value Ref Range    QT Interval 312 ms    QTC Interval 420 ms   Green Top (Gel)   Result Value Ref Range    Extra Tube Hold for add-ons.    Lavender Top   Result Value Ref Range    Extra Tube hold for add-on    Red Top   Result Value Ref Range    Extra Tube Hold for add-ons.    [place] Blood Culture Bottle Set    Specimen: Arm, Right; Blood   Result Value Ref Range    Extra Tube Hold for add-ons.    Gray Top   Result Value Ref Range    Extra Tube Hold for add-ons.    Light Blue Top   Result Value Ref Range    Extra Tube hold for add-on      Bladder Scan interpretation  Estimation of residual urine via abdominal ultrasound  Residual Urine: 599ml  Indication: Retention  Position: Supine  Examination: Incremental scanning of the suprapubic area using 3 MHz transducer using copious amounts of acoustic gel.   Findings: An anechoic area was demonstrated which represented the bladder, with measurement of residual urine as noted. I inspected this myself. In that the residual urine was stable or insignificant, no treatment will be necessary at this time.       Assessment and Plan    Diagnoses and all orders for this visit:    1. Urinary obstruction (Primary)    2. BPH with obstruction/lower urinary tract symptoms        87-year-old male established patient in for complaints of inability to urinate since Loera catheter was removed 1-1/2 days ago.  Patient was last seen on 4/25/2022 in our office for the inability to urinate history markedly enlarged prostate with significant bladder  outlet obstruction symptoms.  At that time 20 Croatian coudé Loera catheter was placed by Lucia PARKER.  Patient is currently on Flomax and finasteride daily.  At this time bladder scan showing 599 mL.    20 Croatian coudé indwelling Loera catheter placed per Gisselle PARKER at this time without difficulty.  Approximately 550 mL drained from patient bladder.  We will send patient home with indwelling Loera catheter with leg bag at this time.  After discussion with patient patient is adamant that he does not want to keep the Loera catheter in long-term he would like to follow-up in 1 week to have the Loera catheter removed again and see if he is able to urinate on his own.  I did explain to the patient that at this point due to the size of his prostate this may be an ongoing problem and he may eventually have to have a Loera catheter at all times as he is a poor candidate for surgical intervention.    Will have patient return in 1 week for a fill and pull.

## 2022-05-16 NOTE — TELEPHONE ENCOUNTER
"Patient's [employer] Heart Failure Index is elevated at 25 and has been trending up since the beginning of May.  Patient's daughter, Munira, states that patient is \"hanging on\" and doing fairly well.  However, she wonders if his medications need adjusted at all for his heart failure.  No follow up is scheduled with Dr. Enciso (LOV 12/2020) and patient prefers to see MD over APRN.  RN will ask Dr. Enciso if patient can be added on to his schedule in the coming weeks and will notify daughter of plan.       "

## 2022-05-18 NOTE — TELEPHONE ENCOUNTER
"RN spoke with daughter and explained to her that we are monitoring the Heart[employer]ic Heart Failure Index, not patient's EF.  Patient's other daughter is an APRN and wanted to know what value we were monitoring.   RN encouraged Munira to have her sister call RN to discuss if she would like more information.  Discussed possibility of a hospice consult with patient's daughter, as they are considering that as patient is having more bad days than good.  Daughter was fearful that hospice would make them \"turn off the pacemaker\".  RN explained that hospice requires a conversation about turning off the defibrillator function of the device, not the entire pacemaker, and that, oftentimes, that doesn't have to happen immediately when patient seeks hospice care.  Plan is for patient to see Dr. Enciso on 6/2/22 at 12:30.  They will call with any issues in the interim.      "

## 2022-05-18 NOTE — TELEPHONE ENCOUNTER
Caller: Bacilio Salgado    Relationship: Emergency Contact    Best call back number: [phone]    What is the best time to reach you: ANY    Who are you requesting to speak with (clinical staff, provider,  specific staff member): AIDE BULL    Do you know the name of the person who called: BACILIO SALGADO    What was the call regarding: BACILIO CALLED IN TO REQUEST INFORMATION REGARDING HER FATHERS PACEMAKER LEVELS, THAT SHE HAS BEEN PREVIOUSLY DISCUSSING WITH AIDE BULL    Do you require a callback: YES

## 2022-05-19 ENCOUNTER — OFFICE VISIT (OUTPATIENT)
Dept: INTERNAL MEDICINE | Age: 87
End: 2022-05-19
Payer: MEDICARE

## 2022-05-19 VITALS
WEIGHT: 147 LBS | SYSTOLIC BLOOD PRESSURE: 100 MMHG | HEIGHT: 72 IN | OXYGEN SATURATION: 98 % | HEART RATE: 104 BPM | DIASTOLIC BLOOD PRESSURE: 60 MMHG | BODY MASS INDEX: 19.91 KG/M2

## 2022-05-19 DIAGNOSIS — N18.4 STAGE 4 CHRONIC KIDNEY DISEASE (HCC): ICD-10-CM

## 2022-05-19 DIAGNOSIS — I25.119 CORONARY ARTERY DISEASE INVOLVING NATIVE HEART WITH ANGINA PECTORIS, UNSPECIFIED VESSEL OR LESION TYPE (HCC): ICD-10-CM

## 2022-05-19 DIAGNOSIS — I50.22 CHRONIC SYSTOLIC CHF (CONGESTIVE HEART FAILURE) (HCC): ICD-10-CM

## 2022-05-19 DIAGNOSIS — L97.411 SKIN ULCER OF RIGHT HEEL, LIMITED TO BREAKDOWN OF SKIN (HCC): ICD-10-CM

## 2022-05-19 DIAGNOSIS — I48.0 PAROXYSMAL ATRIAL FIBRILLATION (HCC): ICD-10-CM

## 2022-05-19 DIAGNOSIS — Z00.00 MEDICARE ANNUAL WELLNESS VISIT, SUBSEQUENT: Primary | ICD-10-CM

## 2022-05-19 DIAGNOSIS — G89.4 CHRONIC PAIN SYNDROME: ICD-10-CM

## 2022-05-19 DIAGNOSIS — G60.9 IDIOPATHIC NEUROPATHY: ICD-10-CM

## 2022-05-19 LAB
ALBUMIN SERPL-MCNC: 3.9 G/DL (ref 3.5–5.2)
ALP BLD-CCNC: 102 U/L (ref 40–130)
ALT SERPL-CCNC: 6 U/L (ref 5–41)
ANION GAP SERPL CALCULATED.3IONS-SCNC: 10 MMOL/L (ref 7–19)
AST SERPL-CCNC: 12 U/L (ref 5–40)
BILIRUB SERPL-MCNC: 0.9 MG/DL (ref 0.2–1.2)
BUN BLDV-MCNC: 23 MG/DL (ref 8–23)
CALCIUM SERPL-MCNC: 9.4 MG/DL (ref 8.8–10.2)
CHLORIDE BLD-SCNC: 104 MMOL/L (ref 98–111)
CO2: 28 MMOL/L (ref 22–29)
CREAT SERPL-MCNC: 1.5 MG/DL (ref 0.5–1.2)
GFR AFRICAN AMERICAN: 53
GFR NON-AFRICAN AMERICAN: 44
GLUCOSE BLD-MCNC: 87 MG/DL (ref 74–109)
HCT VFR BLD CALC: 41.2 % (ref 42–52)
HEMOGLOBIN: 12 G/DL (ref 14–18)
MCH RBC QN AUTO: 28.5 PG (ref 27–31)
MCHC RBC AUTO-ENTMCNC: 29.1 G/DL (ref 33–37)
MCV RBC AUTO: 97.9 FL (ref 80–94)
PDW BLD-RTO: 15.5 % (ref 11.5–14.5)
PLATELET # BLD: 135 K/UL (ref 130–400)
PMV BLD AUTO: 12.6 FL (ref 9.4–12.4)
POTASSIUM SERPL-SCNC: 5.1 MMOL/L (ref 3.5–5)
PRO-BNP: 9709 PG/ML (ref 0–1800)
RBC # BLD: 4.21 M/UL (ref 4.7–6.1)
SODIUM BLD-SCNC: 142 MMOL/L (ref 136–145)
TOTAL PROTEIN: 6.4 G/DL (ref 6.6–8.7)
WBC # BLD: 4.8 K/UL (ref 4.8–10.8)

## 2022-05-19 PROCEDURE — 1123F ACP DISCUSS/DSCN MKR DOCD: CPT | Performed by: INTERNAL MEDICINE

## 2022-05-19 PROCEDURE — G0439 PPPS, SUBSEQ VISIT: HCPCS | Performed by: INTERNAL MEDICINE

## 2022-05-19 PROCEDURE — 4004F PT TOBACCO SCREEN RCVD TLK: CPT | Performed by: INTERNAL MEDICINE

## 2022-05-19 PROCEDURE — G8427 DOCREV CUR MEDS BY ELIG CLIN: HCPCS | Performed by: INTERNAL MEDICINE

## 2022-05-19 PROCEDURE — G8420 CALC BMI NORM PARAMETERS: HCPCS | Performed by: INTERNAL MEDICINE

## 2022-05-19 PROCEDURE — 99213 OFFICE O/P EST LOW 20 MIN: CPT | Performed by: INTERNAL MEDICINE

## 2022-05-19 RX ORDER — HYDROCODONE BITARTRATE AND ACETAMINOPHEN 5; 325 MG/1; MG/1
1 TABLET ORAL 4 TIMES DAILY PRN
Qty: 120 TABLET | Refills: 0 | Status: SHIPPED | OUTPATIENT
Start: 2022-05-19 | End: 2022-07-01 | Stop reason: SDUPTHER

## 2022-05-19 SDOH — ECONOMIC STABILITY: FOOD INSECURITY: WITHIN THE PAST 12 MONTHS, THE FOOD YOU BOUGHT JUST DIDN'T LAST AND YOU DIDN'T HAVE MONEY TO GET MORE.: NEVER TRUE

## 2022-05-19 SDOH — ECONOMIC STABILITY: FOOD INSECURITY: WITHIN THE PAST 12 MONTHS, YOU WORRIED THAT YOUR FOOD WOULD RUN OUT BEFORE YOU GOT MONEY TO BUY MORE.: NEVER TRUE

## 2022-05-19 ASSESSMENT — PATIENT HEALTH QUESTIONNAIRE - PHQ9
SUM OF ALL RESPONSES TO PHQ9 QUESTIONS 1 & 2: 2
2. FEELING DOWN, DEPRESSED OR HOPELESS: 1
1. LITTLE INTEREST OR PLEASURE IN DOING THINGS: 1
SUM OF ALL RESPONSES TO PHQ QUESTIONS 1-9: 2

## 2022-05-19 ASSESSMENT — SOCIAL DETERMINANTS OF HEALTH (SDOH): HOW HARD IS IT FOR YOU TO PAY FOR THE VERY BASICS LIKE FOOD, HOUSING, MEDICAL CARE, AND HEATING?: NOT VERY HARD

## 2022-05-19 ASSESSMENT — LIFESTYLE VARIABLES: HOW OFTEN DO YOU HAVE A DRINK CONTAINING ALCOHOL: NEVER

## 2022-05-19 NOTE — PROGRESS NOTES
Chief Complaint   Patient presents with    Medicare AWV       HPI: She is here today for Medicare annual wellness visit in general he is declining he has CKD 4 with relation coronary artery disease chronic pain just in general declining weaker more fragile more episodes of abdominal pain fatigue malaise    Past Medical History:   Diagnosis Date    AICD (automatic cardioverter/defibrillator) present     followed by doctor in [address] pain 2/3/2012    Atrial fibrillation ([address]     Benign prostatic hyperplasia with lower urinary tract symptoms 11/16/2017    Benign prostatic hypertrophy     CAD (coronary artery disease)     Chronic kidney disease     Chronic pain 11/7/2017    Gallstones     Hyperlipidemia     Hypertension     Hypoxia 9/6/2020    Idiopathic neuropathy     Insomnia     Osteoarthritis of right knee     Restless legs     Right rotator cuff tear     Ventricular tachycardia ([address]        Past Surgical History:   Procedure Laterality Date    APPENDECTOMY      CARDIAC PACEMAKER PLACEMENT      Bi ventricular pacemaker. [place] CARDIAC SURGERY      mitral valve, maze procedure, 1 vessel bypass -2008    CAROTID ENDARTERECTOMY      Right carotid endarterectomy.  COLONOSCOPY      CORONARY ARTERY BYPASS GRAFT      1 vessel 2008    MOUTH SURGERY      Oral implants.     PACEMAKER INSERTION      biventricular pacemaker ([employer])       Family History   Problem Relation Age of Onset    Stroke Mother     Heart Attack Mother 80    Diabetes Father     COPD Sister     Arthritis Sister     Stroke Brother     Heart Disease Brother        Social History     Socioeconomic History    Marital status:      Spouse name: Not on file    Number of children: Not on file    Years of education: Not on file    Highest education level: Not on file   Occupational History    Not on file   Tobacco Use    Smoking status: Former Smoker    Smokeless tobacco: Never Used   Substance and Sexual Activity    Alcohol use: No    Drug use: No    Sexual activity: Never     Comment: Marital status - . Other Topics Concern    Not on file   Social History Narrative    Not on file     Social Determinants of Health     Financial Resource Strain: Low Risk     Difficulty of Paying Living Expenses: Not very hard   Food Insecurity: No Food Insecurity    Worried About Running Out of Food in the Last Year: Never true    Claudia of Food in the Last Year: Never true   Transportation Needs:     Lack of Transportation (Medical): Not on file    Lack of Transportation (Non-Medical): Not on file   Physical Activity: Unknown    Days of Exercise per Week: 0 days    Minutes of Exercise per Session: Not on file   Stress:     Feeling of Stress : Not on file   Social Connections:     Frequency of Communication with Friends and Family: Not on file    Frequency of Social Gatherings with Friends and Family: Not on file    Attends Yazidi Services: Not on file    Active Member of [address] [place] [address] or Organizations: Not on file    Attends Club or Organization Meetings: Not on file    Marital Status: Not on file   Intimate Partner Violence:     Fear of Current or Ex-Partner: Not on file    Emotionally Abused: Not on file    Physically Abused: Not on file    Sexually Abused: Not on file   Housing Stability:     Unable to Pay for Housing in the Last Year: Not on file    Number of [place] in the Last Year: Not on file    Unstable Housing in the Last Year: Not on file       Allergies   Allergen Reactions    Keflex [Cephalexin] Rash     pruritius       Current Outpatient Medications   Medication Sig Dispense Refill    HYDROcodone-acetaminophen (NORCO) 5-325 MG per tablet Take 1 tablet by mouth 4 times daily as needed for Pain for up to 30 days.  120 tablet 0    gabapentin (NEURONTIN) 300 MG capsule TAKE ONE CAPSULE AT 5 PM AND 2 CAPSULES AT BEDTIME 90 capsule 1    metoprolol succinate (TOPROL XL) 25 MG extended release tablet TAKE 1 & 1/2 TABLET BY MOUTH EVERY  tablet 2    furosemide (LASIX) 40 MG tablet TAKE 1 TABLET BY MOUTH DAILY AS NEEDED (FOR FLUID) 90 tablet 3    tamsulosin (FLOMAX) 0.4 MG capsule TAKE ONE CAPSULE BY MOUTH EVERY DAY 90 capsule 3    atorvastatin (LIPITOR) 10 MG tablet TAKE ONE TABLET BY MOUTH EVERY DAY 90 tablet 3    nitroGLYCERIN (NITROSTAT) 0.4 MG SL tablet Place 1 tablet under the tongue every 5 minutes as needed for Chest pain 25 tablet 1    dutasteride (AVODART) 0.5 MG capsule TAKE 1 CAPSULE BY MOUTH DAILY 90 capsule 3    sacubitril-valsartan (ENTRESTO) 24-26 MG per tablet Take 1 tablet by mouth 2 times daily 60 tablet 5    bumetanide (BUMEX) 0.5 MG tablet Take 0.5 mg by mouth 2 times daily      vitamin D (CHOLECALCIFEROL) 1000 UNIT TABS tablet Take 1,000 Units by mouth daily      aspirin 81 MG tablet Take 81 mg by mouth daily. No current facility-administered medications for this visit. Review of Systems   Constitutional: Positive for fatigue. Negative for chills and fever. HENT: Positive for hearing loss. Negative for congestion and sinus pressure. Eyes: Negative for discharge and redness. Respiratory: Positive for shortness of breath. Negative for cough. Cardiovascular: Positive for chest pain. Negative for palpitations and leg swelling. Gastrointestinal: Positive for abdominal distention and abdominal pain. Genitourinary: Positive for frequency and urgency. Negative for dysuria. Musculoskeletal: Positive for arthralgias, back pain, gait problem and myalgias. Skin: Negative for rash and wound. Neurological: Positive for weakness. Negative for dizziness, light-headedness and headaches. Psychiatric/Behavioral: Negative for dysphoric mood and sleep disturbance. The patient is nervous/anxious.         /60   Pulse 104   Ht 6' (1.829 m)   Wt 147 lb (66.7 kg)   SpO2 98%   BMI 19.94 kg/m²   BP Readings from Last 7 Encounters: 05/19/22 100/60   12/09/21 110/60   07/20/21 (!) 84/50   03/22/21 (!) 84/58   12/15/20 98/60   08/25/20 120/76   02/18/20 90/60     Wt Readings from Last 7 Encounters:   05/19/22 147 lb (66.7 kg)   12/09/21 156 lb (70.8 kg)   07/20/21 152 lb (68.9 kg)   03/22/21 162 lb (73.5 kg)   12/15/20 165 lb (74.8 kg)   08/25/20 163 lb (73.9 kg)   03/13/20 161 lb (73 kg)     BMI Readings from Last 7 Encounters:   05/19/22 19.94 kg/m²   12/09/21 21.16 kg/m²   07/20/21 20.61 kg/m²   03/22/21 21.97 kg/m²   12/15/20 22.38 kg/m²   08/25/20 22.11 kg/m²   03/13/20 21.84 kg/m²     Resp Readings from Last 7 Encounters:   12/09/21 16   08/26/18 16   11/09/17 18   01/31/16 20       Physical Exam  Constitutional:       General: He is not in acute distress. Appearance: He is well-developed. He is ill-appearing. Comments: Pale and  fragile ill-appearing   HENT:      Right Ear: External ear normal. Tympanic membrane is not injected. Left Ear: External ear normal. Tympanic membrane is not injected. Mouth/Throat:      Pharynx: No oropharyngeal exudate. Eyes:      General: No scleral icterus. Conjunctiva/sclera: Conjunctivae normal.   Neck:      Thyroid: No thyroid mass or thyromegaly. Vascular: No carotid bruit. Cardiovascular:      Rate and Rhythm: Normal rate and regular rhythm. Heart sounds: S1 normal and S2 normal. No murmur heard. No S3 or S4 sounds. Pulmonary:      Effort: Pulmonary effort is normal. No respiratory distress. Breath sounds: Normal breath sounds. No wheezing or rales. Chest:   Breasts:      Right: No supraclavicular adenopathy. Left: No supraclavicular adenopathy. Abdominal:      General: Bowel sounds are normal. There is no distension. Palpations: Abdomen is soft. There is no mass. Tenderness: There is no abdominal tenderness. Musculoskeletal:      Cervical back: Neck supple. Lymphadenopathy:      Cervical: No cervical adenopathy.       Upper Body: Right upper body: No supraclavicular adenopathy. Left upper body: No supraclavicular adenopathy. Skin:     Findings: No rash. Comments: Thin fragile bruising wounds   Neurological:      Mental Status: He is alert and oriented to person, place, and time. Cranial Nerves: No cranial nerve deficit. Psychiatric:      Comments: Mood is okay today tired has intermittent pain but he seems okay with mood today. He has great family support         Results for orders placed or performed in visit on 12/09/21   Brain Natriuretic Peptide   Result Value Ref Range    Pro-BNP 7,244 (H) 0 - 1,800 pg/mL   TSH without Reflex   Result Value Ref Range    TSH 3.400 0.270 - 4.200 uIU/mL   Comprehensive Metabolic Panel   Result Value Ref Range    Sodium 142 136 - 145 mmol/L    Potassium 4.5 3.5 - 5.0 mmol/L    Chloride 101 98 - 111 mmol/L    CO2 27 22 - 29 mmol/L    Anion Gap 14 7 - 19 mmol/L    Glucose 77 74 - 109 mg/dL    BUN 51 (H) 8 - 23 mg/dL    CREATININE 2.7 (H) 0.5 - 1.2 mg/dL    GFR Non-African American 22 (A) >60    GFR  27 (L) >59    Calcium 9.3 8.8 - 10.2 mg/dL    Total Protein 7.0 6.6 - 8.7 g/dL    Albumin 3.9 3.5 - 5.2 g/dL    Total Bilirubin 0.8 0.2 - 1.2 mg/dL    Alkaline Phosphatase 104 40 - 130 U/L    ALT 6 5 - 41 U/L    AST 13 5 - 40 U/L   CBC   Result Value Ref Range    WBC 6.0 4.8 - 10.8 K/uL    RBC 3.96 (L) 4.70 - 6.10 M/uL    Hemoglobin 11.3 (L) 14.0 - 18.0 g/dL    Hematocrit 38.7 (L) 42.0 - 52.0 %    MCV 97.7 (H) 80.0 - 94.0 fL    MCH 28.5 27.0 - 31.0 pg    MCHC 29.2 (L) 33.0 - 37.0 g/dL    RDW 13.7 11.5 - 14.5 %    Platelets 594 535 - 178 K/uL    MPV 12.0 9.4 - 12.4 fL       ASSESSMENT/ PLAN:  1. Medicare annual wellness visit, subsequent  Chart, medications, labs, vaccines reviewed. Keep up to date with routine care and follow up. Call with any problems or complaints. Keep up to date with routine screening recomendations and vaccines.        2. Stage 4 chronic kidney disease ([facility]    - CBC; Future  - Comprehensive Metabolic Panel; Future    3. Idiopathic neuropathy  Again we rewrote for his hydrocodone  - HYDROcodone-acetaminophen (NORCO) 5-325 MG per tablet; Take 1 tablet by mouth 4 times daily as needed for Pain for up to 30 days. Dispense: 120 tablet; Refill: 0    4. Chronic pain syndrome  I am okay to increase his pain medication with more issues with pain and anxiety and considering hospice care  - HYDROcodone-acetaminophen (NORCO) 5-325 MG per tablet; Take 1 tablet by mouth 4 times daily as needed for Pain for up to 30 days. Dispense: 120 tablet; Refill: 0    5. Skin ulcer of right heel, limited to breakdown of skin [facility])  His son in law is a PA and managing this wound either we could order home health or hospice can also help with her can I think about hospice and let us know    6. Chronic systolic CHF (congestive heart failure) (HCC)  Good BMP a course this is impacted by his CKD 4 we explained that but we still can get some information with this number  - Brain Natriuretic Peptide; Future    7. Paroxysmal atrial fibrillation (HCC)  Watching with the current plan is on aspirin therapy based on risk and benefits    8. Coronary artery disease involving native heart with angina pectoris, unspecified vessel or lesion type [facility])  Patient on maximal therapy that he can tolerate not a surgical candidate of any sort follows with cardiology we will get their opinion about hospice his family brought up that they feel like he is ready for that and we agree they will let me know when when they are ready for me to place that order    We will get lab today to review status- we will review and let them know- keep f/u with urology and sometimes has a catheter. Medicare Annual Wellness Visit    Dread Monroy is here for Medicare AWV    Assessment & Plan   Medicare annual wellness visit, subsequent  Stage 4 chronic kidney disease ([facility] [address]  -     CBC;  Future  - Comprehensive Metabolic Panel; Future  Idiopathic neuropathy  -     HYDROcodone-acetaminophen (NORCO) 5-325 MG per tablet; Take 1 tablet by mouth 4 times daily as needed for Pain for up to 30 days. , Disp-120 tablet, R-0Normal  Chronic pain syndrome  -     HYDROcodone-acetaminophen (NORCO) 5-325 MG per tablet; Take 1 tablet by mouth 4 times daily as needed for Pain for up to 30 days. , Disp-120 tablet, R-0Normal  Skin ulcer of right heel, limited to breakdown of skin (HCC)  Chronic systolic CHF (congestive heart failure) (HCC)  -     Brain Natriuretic Peptide; Future  Paroxysmal atrial fibrillation (HCC)  Coronary artery disease involving native heart with angina pectoris, unspecified vessel or lesion type [facility])      Recommendations for Preventive Services Due: see orders and patient instructions/AVS.  Recommended screening schedule for the next 5-10 years is provided to the patient in written form: see Patient Instructions/AVS.     Return in about 3 months (around 8/19/2022). Subjective     Patient's complete Health Risk Assessment and screening values have been reviewed and are found in Flowsheets. The following problems were reviewed today and where indicated follow up appointments were made and/or referrals ordered. Controlled Substance Monitoring:    Acute and Chronic Pain Monitoring:   RX Monitoring 5/30/2022   Acute Pain Prescriptions Severe pain not adequately treated with lower dose. Periodic Controlled Substance Monitoring Possible medication side effects, risk of tolerance/dependence & alternative treatments discussed. Chronic Pain > 50 MEDD Obtained or confirmed \"Consent for Opioid Use\" on file.        Positive Risk Factor Screenings with Interventions:    Fall Risk:  Do you feel unsteady or are you worried about falling? : (!) yes  2 or more falls in past year?: (!) yes  Fall with injury in past year?: (!) yes     Fall Risk Interventions:    · Caution against falling              Opioid Risk: (Low risk score <55) Opioid risk score: 23    Patient is low risk for opioid use disorder or overdose.   Last PDMP [place] as Reviewed:  Review User Review Instant Review Result           General Health and ACP:  General  In general, how would you say your health is?: (!) Poor  In the past 7 days, have you experienced any of the following: New or Increased Pain, New or Increased Fatigue, Loneliness, Social Isolation, Stress or Anger?: (!) Yes  Select all that apply: (!) New or Increased Fatigue  Do you get the social and emotional support that you need?: Yes  Do you have a Living Will?: Yes    Advance Directives     Power of  Living Will ACP-Advance Directive ACP-Power of     Not on File Not on File Not on File Not on File      General Health Risk Interventions:  · Pt has a living will     Health Habits/Nutrition:     Physical Activity: Unknown    Days of Exercise per Week: 0 days    Minutes of Exercise per Session: Not on file     Have you lost any weight without trying in the past 3 months?: (!) Yes  Body mass index: 19.93  Have you seen the dentist within the past year?: (!) No    Health Habits/Nutrition Interventions:  · Up to date    Hearing/Vision:  Do you or your family notice any trouble with your hearing that hasn't been managed with hearing aids?: (!) Yes  Do you have difficulty driving, watching TV, or doing any of your daily activities because of your eyesight?: No  Have you had an eye exam within the past year?: (!) No  No exam data present    Hearing/Vision Interventions:  · Keep up to date with eye exam     ADLs:  In the past 7 days, did you need help from others to perform any of the following everyday activities: Eating, dressing, grooming, bathing, toileting, or walking/balance?: (!) Yes  Select all that apply: (!) Dressing,Walking/Balance  In the past 7 days, did you need help from others to take care of any of the following: Laundry, housekeeping, banking/finances, shopping, telephone use, food preparation, transportation, or taking medications?: (!) Yes  Select all that apply: (!) Laundry,Housekeeping,Banking/Finances,Shopping,Telephone Use,Food Preparation,Transportation    ADL Interventions:   pt has wonderful support from his family          Objective   Vitals:    05/19/22 1230   BP: 100/60   Pulse: 104   SpO2: 98%   Weight: 147 lb (66.7 kg)   Height: 6' (1.829 m)      Body mass index is 19.94 kg/m². Allergies   Allergen Reactions    Keflex [Cephalexin] Rash     pruritius     Prior to Visit Medications    Medication Sig Taking? Authorizing Provider   HYDROcodone-acetaminophen (NORCO) 5-325 MG per tablet Take 1 tablet by mouth 4 times daily as needed for Pain for up to 30 days. Yes Batool Rubio MD   gabapentin (NEURONTIN) 300 MG capsule TAKE ONE CAPSULE AT 5 PM AND 2 CAPSULES AT BEDTIME  Batool Rubio MD   metoprolol succinate (TOPROL XL) 25 MG extended release tablet TAKE 1 & 1/2 TABLET BY MOUTH EVERY DAY  Batool Rubio MD   furosemide (LASIX) 40 MG tablet TAKE 1 TABLET BY MOUTH DAILY AS NEEDED (FOR FLUID)  Batool Rubio MD   tamsulosin (FLOMAX) 0.4 MG capsule TAKE ONE CAPSULE BY MOUTH EVERY DAY  Batool Rubio MD   atorvastatin (LIPITOR) 10 MG tablet TAKE ONE TABLET BY MOUTH EVERY DAY  Batool Rubio MD   nitroGLYCERIN (NITROSTAT) 0.4 MG SL tablet Place 1 tablet under the tongue every 5 minutes as needed for Chest pain  Batool Rubio MD   dutasteride (AVODART) 0.5 MG capsule TAKE 1 CAPSULE BY MOUTH DAILY  Batool Rubio MD   sacubitril-valsartan (ENTRESTO) 24-26 MG per tablet Take 1 tablet by mouth 2 times daily  Batool Rubio MD   bumetanide (BUMEX) 0.5 MG tablet Take 0.5 mg by mouth 2 times daily  Historical Provider, MD   vitamin D (CHOLECALCIFEROL) 1000 UNIT TABS tablet Take 1,000 Units by mouth daily  Historical Provider, MD   aspirin 81 MG tablet Take 81 mg by mouth daily.     Historical Provider, MD [place] (Including outside providers/suppliers regularly involved in providing care):   Patient Care Team:  David Gonsales MD as PCP - General (Internal Medicine)  David Gonsales MD as PCP - [facility]  Hortencia Granado MD (General Surgery)  Tyrel Herrera MD (Orthopedic Surgery)  CYDNEY Nunn MD (Cardiology)     Reviewed and updated this visit:  Allergies  Meds

## 2022-05-20 NOTE — PROGRESS NOTES
87-year-old male with history of markedly enlarged prostate presents with recurrent acute urinary retention.  He removed his catheter at home this morning and has been unable to void since.  He is uncomfortable and would like the catheter replaced.    The patient states He is here today for catheter insertion.  Patient of Dr. Bain. Using sterile technique a new 20fr Coude catheter was placed, balloon inflated using 10cc sterile water,  350cc of urine drained from patients bladder via catheter then catheter was connected to leg bag.  Patient tolerated the procedure well.  The patient was advised to return in 1 month for next catheter change. Patient verbalized understanding. Dr. Perez was in the office at the time of procedure. Lucia TOBIAS MA    I reviewed and agree with medical assistance documentation above.    Patient needs to follow-up with his urologist Dr. Bain regarding whether he would like to continue chronic Loera catheterization, intermittent catheterization, or SP tube.

## 2022-05-30 PROBLEM — N18.30 CHRONIC RENAL DISEASE, STAGE III (HCC): Status: ACTIVE | Noted: 2022-01-01

## 2022-05-30 ASSESSMENT — ENCOUNTER SYMPTOMS
BACK PAIN: 1
COUGH: 0
SHORTNESS OF BREATH: 1
ABDOMINAL PAIN: 1
EYE REDNESS: 0
SINUS PRESSURE: 0
EYE DISCHARGE: 0
ABDOMINAL DISTENTION: 1

## 2022-05-31 NOTE — PATIENT INSTRUCTIONS
Personalized Preventive Plan for Sumaya Kendrick - 5/19/2022  Medicare offers a range of preventive health benefits. Some of the tests and screenings are paid in full while other may be subject to a deductible, co-insurance, and/or copay. Some of these benefits include a comprehensive review of your medical history including lifestyle, illnesses that may run in your family, and various assessments and screenings as appropriate. After reviewing your medical record and screening and assessments performed today your provider may have ordered immunizations, labs, imaging, and/or referrals for you. A list of these orders (if applicable) as well as your Preventive Care list are included within your After Visit Summary for your review. Other Preventive Recommendations:    · A preventive eye exam performed by an eye specialist is recommended every 1-2 years to screen for glaucoma; cataracts, macular degeneration, and other eye disorders. · A preventive dental visit is recommended every 6 months. · Try to get at least 150 minutes of exercise per week or 10,000 steps per day on a pedometer . · Order or download the FREE \"Exercise & Physical Activity: Your Everyday Guide\" from [employer]. Call 6-470.275.7042 or search [employer] online. · You need 8767-5694 mg of calcium and 6514-1407 IU of vitamin D per day. It is possible to meet your calcium requirement with diet alone, but a vitamin D supplement is usually necessary to meet this goal.  · When exposed to the sun, use a sunscreen that protects against both UVA and UVB radiation with an SPF of 30 or greater. Reapply every 2 to 3 hours or after sweating, drying off with a towel, or swimming. · Always wear a seat belt when traveling in a car. Always wear a helmet when riding a bicycle or motorcycle.

## 2022-06-02 DIAGNOSIS — F41.9 ANXIETY: ICD-10-CM

## 2022-06-02 RX ORDER — LORAZEPAM 0.5 MG/1
TABLET ORAL
Qty: 120 TABLET | Refills: 0 | Status: SHIPPED | OUTPATIENT
Start: 2022-06-02 | End: 2022-06-27

## 2022-06-20 RX ORDER — DUTASTERIDE 0.5 MG/1
0.5 CAPSULE, LIQUID FILLED ORAL DAILY
Qty: 30 CAPSULE | Refills: 3 | Status: SHIPPED | OUTPATIENT
Start: 2022-06-20

## 2022-06-27 DIAGNOSIS — F41.9 ANXIETY: ICD-10-CM

## 2022-06-27 RX ORDER — LORAZEPAM 0.5 MG/1
TABLET ORAL
Qty: 120 TABLET | Refills: 0 | Status: SHIPPED | OUTPATIENT
Start: 2022-06-27 | End: 2022-07-28

## 2022-06-30 DIAGNOSIS — G89.4 CHRONIC PAIN SYNDROME: ICD-10-CM

## 2022-06-30 DIAGNOSIS — G60.9 IDIOPATHIC NEUROPATHY: ICD-10-CM

## 2022-07-01 DIAGNOSIS — G89.4 CHRONIC PAIN SYNDROME: ICD-10-CM

## 2022-07-01 DIAGNOSIS — G60.9 IDIOPATHIC NEUROPATHY: ICD-10-CM

## 2022-07-01 RX ORDER — HYDROCODONE BITARTRATE AND ACETAMINOPHEN 5; 325 MG/1; MG/1
1 TABLET ORAL 4 TIMES DAILY PRN
Qty: 28 TABLET | Refills: 0 | Status: SHIPPED | OUTPATIENT
Start: 2022-07-01 | End: 2022-07-29

## 2022-07-01 RX ORDER — HYDROCODONE BITARTRATE AND ACETAMINOPHEN 5; 325 MG/1; MG/1
1 TABLET ORAL 4 TIMES DAILY PRN
Qty: 120 TABLET | Refills: 0 | Status: SHIPPED | OUTPATIENT
Start: 2022-07-01 | End: 2022-07-28 | Stop reason: SDUPTHER

## 2022-07-27 DIAGNOSIS — G89.4 CHRONIC PAIN SYNDROME: ICD-10-CM

## 2022-07-27 DIAGNOSIS — G60.9 IDIOPATHIC NEUROPATHY: ICD-10-CM

## 2022-07-27 DIAGNOSIS — F41.9 ANXIETY: ICD-10-CM

## 2022-07-28 RX ORDER — HYDROCODONE BITARTRATE AND ACETAMINOPHEN 5; 325 MG/1; MG/1
1 TABLET ORAL 4 TIMES DAILY PRN
Qty: 120 TABLET | Refills: 0 | Status: SHIPPED | OUTPATIENT
Start: 2022-07-28 | End: 2022-08-30 | Stop reason: SDUPTHER

## 2022-07-28 RX ORDER — LORAZEPAM 0.5 MG/1
TABLET ORAL
Qty: 120 TABLET | Refills: 0 | Status: SHIPPED | OUTPATIENT
Start: 2022-07-28 | End: 2022-08-09

## 2022-07-29 NOTE — TELEPHONE ENCOUNTER
Patient's [employer] Heart Failure Index is elevated at 18.  RN spoke with daughter who states that patient has good days and bad but is overall doing well.  She will monitor for worsening shortness of breath, edema, weight gain, increased fatigue.  She will notify staff of any changes in his condition.  Appointments made for follow up in September and reminders mailed to patient.

## 2022-08-04 NOTE — TELEPHONE ENCOUNTER
TRIED CALLING PT BACK TO NUNU HIS APPT THAT HE HAS WITH [facility] ON 08/29/2022. WAS STATED IN TELEPHONE MESSAGE THAT HE NEEDED SOMETHING AFTER 08/30/2022. APPT FOR 08/29/2022 HAS BEEN CANCELED DUE TO TRANSPORTATION ISSUES

## 2022-08-04 NOTE — TELEPHONE ENCOUNTER
Caller: Munira Cai    Relationship to patient: Emergency Contact    Best call back number:     Chief complaint: RFC     Type of visit: FUP     Requested date: 8/30/22 AFTER 130PM     If rescheduling, when is the original appointment: 8/29/22    Additional notes:HAS AN APPT FOR 8/29 BUT FAMILY MEMBER BOOKED IT AND SHE HAS TO WORK AND SHE TRANSPORTS PATIENT

## 2022-08-06 DIAGNOSIS — F41.9 ANXIETY: ICD-10-CM

## 2022-08-09 RX ORDER — LORAZEPAM 0.5 MG/1
TABLET ORAL
Qty: 120 TABLET | Refills: 0 | Status: SHIPPED | OUTPATIENT
Start: 2022-08-09 | End: 2022-09-08

## 2022-08-11 ENCOUNTER — OFFICE VISIT (OUTPATIENT)
Dept: INTERNAL MEDICINE | Age: 87
End: 2022-08-11
Payer: MEDICARE

## 2022-08-11 VITALS
DIASTOLIC BLOOD PRESSURE: 52 MMHG | HEART RATE: 56 BPM | WEIGHT: 139 LBS | BODY MASS INDEX: 18.83 KG/M2 | SYSTOLIC BLOOD PRESSURE: 90 MMHG | OXYGEN SATURATION: 97 % | HEIGHT: 72 IN

## 2022-08-11 DIAGNOSIS — R06.00 DYSPNEA, UNSPECIFIED TYPE: ICD-10-CM

## 2022-08-11 DIAGNOSIS — I25.5 ISCHEMIC CARDIOMYOPATHY: Primary | ICD-10-CM

## 2022-08-11 DIAGNOSIS — R33.9 RETENTION OF URINE: ICD-10-CM

## 2022-08-11 DIAGNOSIS — G89.4 PAIN SYNDROME, CHRONIC: ICD-10-CM

## 2022-08-11 DIAGNOSIS — N18.4 STAGE 4 CHRONIC KIDNEY DISEASE (HCC): ICD-10-CM

## 2022-08-11 DIAGNOSIS — F41.1 GENERALIZED ANXIETY DISORDER: ICD-10-CM

## 2022-08-11 PROCEDURE — 4004F PT TOBACCO SCREEN RCVD TLK: CPT | Performed by: INTERNAL MEDICINE

## 2022-08-11 PROCEDURE — G8420 CALC BMI NORM PARAMETERS: HCPCS | Performed by: INTERNAL MEDICINE

## 2022-08-11 PROCEDURE — 1123F ACP DISCUSS/DSCN MKR DOCD: CPT | Performed by: INTERNAL MEDICINE

## 2022-08-11 PROCEDURE — G8427 DOCREV CUR MEDS BY ELIG CLIN: HCPCS | Performed by: INTERNAL MEDICINE

## 2022-08-11 PROCEDURE — 99214 OFFICE O/P EST MOD 30 MIN: CPT | Performed by: INTERNAL MEDICINE

## 2022-08-26 DIAGNOSIS — G60.9 IDIOPATHIC NEUROPATHY: ICD-10-CM

## 2022-08-26 DIAGNOSIS — G89.4 CHRONIC PAIN SYNDROME: ICD-10-CM

## 2022-08-28 NOTE — PROGRESS NOTES
name: Not on file    Number of children: Not on file    Years of education: Not on file    Highest education level: Not on file   Occupational History    Not on file   Tobacco Use    Smoking status: Former    Smokeless tobacco: Never   Substance and Sexual Activity    Alcohol use: No    Drug use: No    Sexual activity: Never     Comment: Marital status - . Other Topics Concern    Not on file   Social History Narrative    Not on file     Social Determinants of Health     Financial Resource Strain: Low Risk     Difficulty of Paying Living Expenses: Not very hard   Food Insecurity: No Food Insecurity    Worried About Running Out of Food in the Last Year: Never true    Ran Out of Food in the Last Year: Never true   Transportation Needs: Not on file   Physical Activity: Unknown    Days of Exercise per Week: 0 days    Minutes of Exercise per Session: Not on file   Stress: Not on file   Social Connections: Not on file   Intimate Partner Violence: Not on file   Housing Stability: Not on file       Allergies   Allergen Reactions    Keflex [Cephalexin] Rash     pruritius       Current Outpatient Medications   Medication Sig Dispense Refill    LORazepam (ATIVAN) 0.5 MG tablet TAKE ONE TABLET BY MOUTH FOUR TIMES A DAY **MAY MAKE DROWSY**. 120 tablet 0    HYDROcodone-acetaminophen (NORCO) 5-325 MG per tablet Take 1 tablet by mouth 4 times daily as needed for Pain for up to 30 days.  120 tablet 0    dutasteride (AVODART) 0.5 MG capsule TAKE 1 CAPSULE BY MOUTH DAILY 30 capsule 3    gabapentin (NEURONTIN) 300 MG capsule TAKE ONE CAPSULE AT 5 PM AND 2 CAPSULES AT BEDTIME 90 capsule 1    metoprolol succinate (TOPROL XL) 25 MG extended release tablet TAKE 1 & 1/2 TABLET BY MOUTH EVERY  tablet 2    furosemide (LASIX) 40 MG tablet TAKE 1 TABLET BY MOUTH DAILY AS NEEDED (FOR FLUID) 90 tablet 3    tamsulosin (FLOMAX) 0.4 MG capsule TAKE ONE CAPSULE BY MOUTH EVERY DAY 90 capsule 3    atorvastatin (LIPITOR) 10 MG tablet TAKE ONE TABLET BY MOUTH EVERY DAY 90 tablet 3    nitroGLYCERIN (NITROSTAT) 0.4 MG SL tablet Place 1 tablet under the tongue every 5 minutes as needed for Chest pain 25 tablet 1    sacubitril-valsartan (ENTRESTO) 24-26 MG per tablet Take 1 tablet by mouth 2 times daily 60 tablet 5    bumetanide (BUMEX) 0.5 MG tablet Take 0.5 mg by mouth 2 times daily      vitamin D (CHOLECALCIFEROL) 1000 UNIT TABS tablet Take 1,000 Units by mouth daily      aspirin 81 MG tablet Take 81 mg by mouth daily. No current facility-administered medications for this visit. Review of Systems    BP (!) 90/52   Pulse 56   Ht 6' (1.829 m)   Wt 139 lb (63 kg)   SpO2 97%   BMI 18.85 kg/m²   BP Readings from Last 7 Encounters:   08/11/22 (!) 90/52   05/19/22 100/60   12/09/21 110/60   07/20/21 (!) 84/50   03/22/21 (!) 84/58   12/15/20 98/60   08/25/20 120/76     Wt Readings from Last 7 Encounters:   08/11/22 139 lb (63 kg)   05/19/22 147 lb (66.7 kg)   12/09/21 156 lb (70.8 kg)   07/20/21 152 lb (68.9 kg)   03/22/21 162 lb (73.5 kg)   12/15/20 165 lb (74.8 kg)   08/25/20 163 lb (73.9 kg)     BMI Readings from Last 7 Encounters:   08/11/22 18.85 kg/m²   05/19/22 19.94 kg/m²   12/09/21 21.16 kg/m²   07/20/21 20.61 kg/m²   03/22/21 21.97 kg/m²   12/15/20 22.38 kg/m²   08/25/20 22.11 kg/m²     Resp Readings from Last 7 Encounters:   12/09/21 16   08/26/18 16   11/09/17 18   01/31/16 20       Physical Exam  Constitutional:       General: He is not in acute distress. Appearance: He is ill-appearing. Comments: Thin, cachectic   Eyes:      General: No scleral icterus. Cardiovascular:      Comments: Distant heart ad lung sounds  Musculoskeletal:      Cervical back: Neck supple. Lymphadenopathy:      Cervical: No cervical adenopathy. Skin:     Findings: No rash.        Results for orders placed or performed in visit on 05/19/22   Brain Natriuretic Peptide   Result Value Ref Range    Pro-BNP 9,709 (H) 0 - 1,800 pg/mL   Comprehensive Metabolic Panel   Result Value Ref Range    Sodium 142 136 - 145 mmol/L    Potassium 5.1 (H) 3.5 - 5.0 mmol/L    Chloride 104 98 - 111 mmol/L    CO2 28 22 - 29 mmol/L    Anion Gap 10 7 - 19 mmol/L    Glucose 87 74 - 109 mg/dL    BUN 23 8 - 23 mg/dL    Creatinine 1.5 (H) 0.5 - 1.2 mg/dL    GFR Non- 44 (A) >60    GFR  53 (L) >59    Calcium 9.4 8.8 - 10.2 mg/dL    Total Protein 6.4 (L) 6.6 - 8.7 g/dL    Albumin 3.9 3.5 - 5.2 g/dL    Total Bilirubin 0.9 0.2 - 1.2 mg/dL    Alkaline Phosphatase 102 40 - 130 U/L    ALT 6 5 - 41 U/L    AST 12 5 - 40 U/L   CBC   Result Value Ref Range    WBC 4.8 4.8 - 10.8 K/uL    RBC 4.21 (L) 4.70 - 6.10 M/uL    Hemoglobin 12.0 (L) 14.0 - 18.0 g/dL    Hematocrit 41.2 (L) 42.0 - 52.0 %    MCV 97.9 (H) 80.0 - 94.0 fL    MCH 28.5 27.0 - 31.0 pg    MCHC 29.1 (L) 33.0 - 37.0 g/dL    RDW 15.5 (H) 11.5 - 14.5 %    Platelets 895 674 - 245 K/uL    MPV 12.6 (H) 9.4 - 12.4 fL       ASSESSMENT/ PLAN:  1. Ischemic cardiomyopathy  Fragile but has managed has much family support. Continue this current regimen. He also follows with cardiology. Has not had labs in a while we put in an order. Reassess the status of that but we have also talked about hospice palliative care. Reviewed cardiology record. - CBC; Future  - Comprehensive Metabolic Panel; Future  - TSH; Future  - Brain Natriuretic Peptide; Future    2. Generalized anxiety disorder   Generalized anxiety for years has been on a rather high dose of benzos when we first saw him he was on a combination of narcotics and benzodiazepines we are able to taper them some is managed and done okay. Anxiety really seems to be in control  - TSH; Future    3. Dyspnea, unspecified type   Neck dyspnea with ischemic cardiomyopathy CKD 4. Comfort is the main goal and his family are considering hospice  - Brain Natriuretic Peptide; Future    4.  Stage 4 chronic kidney disease (HCC)  We will check labs just to further generalize her status and consider hospice    5. Retention of urine  No longer works with the urology group indwelling Kovacs catheter intermittently and close follow-up with urology    6. Pain syndrome, chronic patient on long-term narcotics ask them about increasing this.   Really at this point I would rather not increase any of these meds if he goes on hospice we would reconsider I do not want to cause him to fall down he seems to be managing okay with the current plan    We discussed reviewed the plan of care we will check labs review proceed from there we offered palliative care consult or hospice consult family are thinking about this and will let us know

## 2022-08-30 RX ORDER — HYDROCODONE BITARTRATE AND ACETAMINOPHEN 5; 325 MG/1; MG/1
1 TABLET ORAL 4 TIMES DAILY PRN
Qty: 120 TABLET | Refills: 0 | Status: SHIPPED | OUTPATIENT
Start: 2022-08-30 | End: 2022-09-26 | Stop reason: SDUPTHER

## 2022-08-30 NOTE — TELEPHONE ENCOUNTER
Patient's [employer] Heart Failure Index is elevated at 20.  Per patient's daughter, Munira, patient is doing well without issue at this time.  He sees Dr. Enciso in a few weeks.  RN will continue to monitor [employer] and Munira will notify clinic if patient develops symptoms.

## 2022-08-31 NOTE — TELEPHONE ENCOUNTER
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    Action desired: call back.    Return call needed: Yes    OK to leave a message on voice mail? Yes    Advised patient to response may take up to 2 business days: Yes    Primary language: English      needed? No    Call taken on September 8, 2020 at 9:40 AM by Raffi Pacheco   Raleigh Enciso MD  You 9 hours ago (9:46 PM)         Sounds good - thanks    Message text

## 2022-09-21 PROBLEM — I10 ESSENTIAL HYPERTENSION: Status: RESOLVED | Noted: 2017-02-14 | Resolved: 2022-01-01

## 2022-09-21 NOTE — PROGRESS NOTES
Subjective:     Encounter Date:09/21/2022      Patient ID: Wild Bravo is a 88 y.o. male.    Chief Complaint: Follow-up of CAD, CHF, and arrhythmias.  History of Present Illness    Mr. Bravo presents to the clinic today accompanied by his daughters, who contribute to his history.    The patient reports that he does not feel well every day. He states that he usually does not feel very well. He reports that he has good days and bad days, and then he is mostly lousy. His daughter states that he does remarkably well. He denies being bothered by rapid heartbeats or spells where he feels like he is going to pass out. He reports that he has had 1 episode where he needed to use oxygen to get more oxygen. He denies having spells of feeling like he is going to faint. His daughter states that he is sick to his stomach when he is moaning. He reports that he has lost a lot of weight. He states that he has come back from 136 to 140 pounds. His daughter states that he likes his donuts. She reports that he drinks more Sprite and barely finishes his water because he thinks he gets bloated.    He reports that he has had chest pain on occasion. He states that he has had to use nitroglycerin 3 to 4 times before he goes and it works. He reports that he uses the nitroglycerin approximately once every couple of months.    He reports that he takes Bumex daily. His daughter states that he does not take the Lasix very often. The patient states his swelling is more in his feet.     The following portions of the patient's history were reviewed and updated as appropriate: allergies, current medications, past family history, past medical history, past social history, past surgical history and problem list.    Review of Systems   Constitutional: Positive for decreased appetite. Negative for malaise/fatigue.        Positive weakness.   Cardiovascular: Negative for chest pain, claudication, dyspnea on exertion, leg swelling,  near-syncope, orthopnea, palpitations, paroxysmal nocturnal dyspnea and syncope.   Respiratory: Positive for shortness of breath.    Hematologic/Lymphatic: Does not bruise/bleed easily.   Musculoskeletal: Positive for back pain.           Current Outpatient Medications:   •  aspirin 81 MG EC tablet, Take 81 mg by mouth Daily., Disp: , Rfl:   •  atorvastatin (LIPITOR) 10 MG tablet, Take 1 tablet by mouth Every Night., Disp: , Rfl:   •  bumetanide (Bumex) 1 MG tablet, Take 1 tablet by mouth Daily. Can take additional 1mg tablet in the afternoon if extra fluid retention/swelling, Disp: , Rfl: 0  •  dutasteride (AVODART) 0.5 MG capsule, Take 0.5 mg by mouth Daily. Patient takes at noon, Disp: , Rfl:   •  furosemide (LASIX) 20 MG tablet, Take 20 mg by mouth As Needed., Disp: , Rfl:   •  gabapentin (NEURONTIN) 300 MG capsule, Take 300 mg by mouth Daily., Disp: , Rfl:   •  HYDROcodone-acetaminophen (NORCO) 5-325 MG per tablet, Take 1 tablet by mouth Every 6 (Six) Hours As Needed for Moderate Pain ., Disp: , Rfl:   •  LORazepam (ATIVAN) 0.5 MG tablet, Take 0.5 mg by mouth 3 (Three) Times a Day As Needed., Disp: , Rfl:   •  metoprolol succinate XL (TOPROL-XL) 25 MG 24 hr tablet, Take 1 tablet by mouth Every Night. (Patient taking differently: Take 25 mg by mouth Every Night. 1.5 tablet), Disp: 90 tablet, Rfl: 4  •  nitroglycerin (NITROSTAT) 0.4 MG SL tablet, PLACE 1 TABLET UNDER THE TONGUE AS NEEDED FOR ANGINA, MAY REPEAT EVERY 5 MINS FOR UP THREE DOSES, Disp: 25 tablet, Rfl: 3  •  sacubitril-valsartan (ENTRESTO) 24-26 MG tablet, Take 1 tablet by mouth 2 (Two) Times a Day., Disp: 180 tablet, Rfl: 1  •  tamsulosin (FLOMAX) 0.4 MG capsule 24 hr capsule, Take 1 capsule by mouth Every Night., Disp: , Rfl:        Objective:      Vitals:    09/21/22 1554   BP: 112/64   Pulse: 78   SpO2: 99%     Vitals and nursing note reviewed.   Constitutional:       General: Not in acute distress.     Appearance: Not in distress. Frail.  Chronically ill-appearing.   Neck:      Vascular: No JVD or JVR. JVD normal.   Pulmonary:      Effort: Pulmonary effort is normal.      Breath sounds: Normal breath sounds.   Cardiovascular:      Normal rate. Regular rhythm.      Murmurs: There is no murmur.      No gallop.   Pulses:     Intact distal pulses.   Abdominal:      Palpations: Abdomen is soft.      Tenderness: There is no abdominal tenderness.   Skin:     General: Skin is warm and dry.      Comments: +increased turgor.  Very dry   Neurological:      Mental Status: Alert, oriented to person, place, and time and oriented to person, place and time.         Lab Review:         ECG 12 Lead    Date/Time: 9/21/2022 3:58 PM  Performed by: Raleigh Enciso MD  Authorized by: Raleigh Enciso MD   Comparison: compared with previous ECG from 4/23/2022  Comparison to previous ECG: No significant change.    Clinical impression: abnormal EKG  Comments: EKG shows electronic ventricular pacemaker with PVCs.              Assessment/Plan:     Problem List Items Addressed This Visit        Cardiac and Vasculature    Chronic systolic congestive heart failure (HCC)    Overview     LVEF 35% on echo here in 2017, but was reported as 25% on echocardiogram performed during admission at [facility] in September 2018; has BiV-ICD.  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  9/21/2011  lexiscan Inferior - lateral MI, EF 27%  9/21/2011  Echo  EF 35 - 40 %, calculated 37%, moderate AI, MV annuloplasty ring  12/21/2012  lexiscan  Positive for inferior MI +  myocardial ischemia, EF 43%, 22% ischemic myocardium on stress, intermediate to high risk  findings, AUC indication 16, AUC score 7         Paroxysmal atrial fibrillation (HCC)            Recommendations/plans:  1.  Chronic systolic heart failure: Stage C, NYHA II.  Stable.  Euvolemic on exam.  -Continue current medical therapies (Entresto 24/26 p.o. twice daily, Toprol-XL 25 daily)  -Advised to reduce diuretic doses including reduction of daily Bumex to once daily, with close follow-up of daily weights and only taking second dose of Lasix if increased weight/swelling/symptoms    2. Paroxysmal atrial fibrillation: Stable.  In sinus rhythm today.  Continue current regimen.    3.  Ventricular tachycardia: Stable.  No recent ICD shocks.  Multiple episodes of nonsustained VT requiring ATP from device.  -Continue current beta-blocker    4.  Essential hypertension: Well-controlled.  Continue current regimen.    5.  Valvular heart disease: Status post mitral valve repair: Stable.    Transcribed from ambient dictation for Raleigh Enciso MD by CELINA LOBATO.  09/21/22   18:01 CDT    Patient verbalized consent to the visit recording.   I Raleigh Enciso MD have personally performed the services described in this document as scribed by the above individual, and it is both accurate and complete.   I have edited each component as needed.    Raleigh Enciso MD  9/22/2022  14:40 CDT

## 2022-09-21 NOTE — PROGRESS NOTES
[facility] - PODIATRY    Today's Date: 09/22/22    Patient Name: Wild Bravo  MRN: [id number]  CSN: [id number]  PCP: Alison Hercules MD  Referring Provider: No ref. provider found    SUBJECTIVE     Chief Complaint   Patient presents with   • Follow-up     Alison Hercules MD  - PCP07/02/2022 FOLLOW UP - ROUTINE NAIL CARE -- pt states he is here today for a fu for his toenails. Pain @ 10. Pt presents with long thick toenails.      HPI: Wild Bravo, a 88 y.o.male, comes to clinic as a(n) established patient complaining of painful toenails and complaining of thickened, tender hallux nails. Patient has h/o AAA, Anxiety, AF, BPH, Carotid Stenosis, CHF, CKD, MI, Hearing Loss, CVA. Patient presents for nail care for thickened, dystrophic, and irregular toenails of both feet.  Patient was last seen approximately 1 year ago for nail care.  Family states that they are unable to care for patient's nails due to thickness, shape, and length.  Patient does have history of idiopathic neuropathy for which she takes gabapentin. Admits pain at 10/10 level and described as aching, nagging and throbbing. Relates previous treatment(s) including foot care by podiatry. Denies any constitutional symptoms. No other pedal complaints at this time.    Past Medical History:   Diagnosis Date   • Abdominal aortic aneurysm (HCC)    • Anxiety    • Atrial fibrillation (HCC)    • Biventricular ICD (implantable cardioverter-defibrillator) in place    • BPH (benign prostatic hypertrophy)    • Carotid artery stenosis    • CHF (congestive heart failure) (HCC)    • Chronic kidney disease     Chronic kidney disease, stage 4 (severe)   • Chronic systolic (congestive) heart failure (HCC)     limited echo 7/2016 EF was 40-45%. Patient has BiV AICD   • Colon obstruction (HCC)    • Coronary arteriosclerosis     MI x2   • Hearing loss    • Iron deficiency anemia    • Ischemic cardiomyopathy    • Mixed hyperlipidemia    • Myocardial  infarction (HCC)    • Obstruction, colon (HCC)    • Stroke (HCC)      Past Surgical History:   Procedure Laterality Date   • ABDOMINAL SURGERY      obstruction   • ABLATION OF DYSRHYTHMIC FOCUS  2008    MAZE at time of CABG/MVR   • CARDIAC ABLATION     • CARDIAC CATHETERIZATION     • CARDIAC DEFIBRILLATOR PLACEMENT     • CARDIAC ELECTROPHYSIOLOGY PROCEDURE N/A 06/09/2020    Procedure: ICD battery change;  Surgeon: Raleigh Enciso MD;  Location:  [facility] CATH INVASIVE LOCATION;  Service: Cardiovascular;  Laterality: N/A;   • CARDIAC PACEMAKER PLACEMENT     • CARDIOVERSION     • CAROTID ENDARTERECTOMY     • CAROTID ENDARTERECTOMY     • CORONARY ARTERY BYPASS GRAFT  2008    X 1   • CORONARY STENT PLACEMENT  2008    X 2   • LAPAROSCOPIC LYSIS OF ADHESIONS N/A 01/28/2020    Procedure: LAPAROSCOPIC LYSIS OF ADHESIONS;  Surgeon: Kim Almeida MD;  Location:  [facility] OR;  Service: General   • MITRAL VALVE REPAIR/REPLACEMENT  2008   • TOTAL KNEE ARTHROPLASTY       Family History   Problem Relation Age of Onset   • Stroke Mother    • Heart disease Mother    • Diabetes Father      Social History     Socioeconomic History   • Marital status:    Tobacco Use   • Smoking status: Former Smoker     Years: 45.00     Types: Cigarettes   • Smokeless tobacco: Never Used   Vaping Use   • Vaping Use: Never used   Substance and Sexual Activity   • Alcohol use: No   • Drug use: Never   • Sexual activity: Defer     Allergies   Allergen Reactions   • Keflex [Cephalexin]      CEPHALOSPORINS     Current Outpatient Medications   Medication Sig Dispense Refill   • aspirin 81 MG EC tablet Take 81 mg by mouth Daily.     • atorvastatin (LIPITOR) 10 MG tablet Take 1 tablet by mouth Every Night.     • bumetanide (Bumex) 1 MG tablet Take 1 tablet by mouth Daily. Can take additional 1mg tablet in the afternoon if extra fluid retention/swelling  0   • dutasteride (AVODART) 0.5 MG capsule Take 0.5 mg by mouth Daily. Patient takes at noon     •  furosemide (LASIX) 20 MG tablet Take 20 mg by mouth As Needed.     • gabapentin (NEURONTIN) 300 MG capsule Take 300 mg by mouth Daily.     • HYDROcodone-acetaminophen (NORCO) 5-325 MG per tablet Take 1 tablet by mouth Every 6 (Six) Hours As Needed for Moderate Pain .     • LORazepam (ATIVAN) 0.5 MG tablet Take 0.5 mg by mouth 3 (Three) Times a Day As Needed.     • metoprolol succinate XL (TOPROL-XL) 25 MG 24 hr tablet Take 1 tablet by mouth Every Night. (Patient taking differently: Take 25 mg by mouth Every Night. 1.5 tablet) 90 tablet 4   • nitroglycerin (NITROSTAT) 0.4 MG SL tablet PLACE 1 TABLET UNDER THE TONGUE AS NEEDED FOR ANGINA, MAY REPEAT EVERY 5 MINS FOR UP THREE DOSES 25 tablet 3   • sacubitril-valsartan (ENTRESTO) 24-26 MG tablet Take 1 tablet by mouth 2 (Two) Times a Day. 180 tablet 1   • tamsulosin (FLOMAX) 0.4 MG capsule 24 hr capsule Take 1 capsule by mouth Every Night.       No current facility-administered medications for this visit.     Review of Systems   Constitutional: Negative for chills and fever.   HENT: Positive for hearing loss. Negative for congestion.    Respiratory: Negative for shortness of breath.    Cardiovascular: Negative for chest pain and leg swelling.   Gastrointestinal: Negative for constipation, diarrhea, nausea and vomiting.   Musculoskeletal: Positive for arthralgias. Negative for myalgias.   Skin: Negative for wound.        Thickened, irregular toenails   Neurological: Positive for numbness.       OBJECTIVE     Vitals:    09/22/22 1116   BP: 106/72   Pulse: 78   SpO2: 100%       PHYSICAL EXAM  GEN:   Accompanied by grandson.     Foot/Ankle Exam:       General:   Appearance: appears stated age and healthy and elderly    Orientation: AAOx3    Orientation comment:  Very [place]  Affect: appropriate    Gait: unimpaired    Assistance: cane    Shoe Gear:  Casual shoes    VASCULAR      Right Foot Vascularity   Dorsalis pedis:  2+  Posterior tibial:  2+  Skin Temperature: warm    Edema  Grading:  Trace  CFT:  4  Pedal Hair Growth:  Present  Varicosities: mild varicosities       Left Foot Vascularity   Dorsalis pedis:  2+  Posterior tibial:  2+  Skin Temperature: warm    Edema Grading:  Trace  CFT:  4  Pedal Hair Growth:  Present  Varicosities: mild varicosities        NEUROLOGIC     Right Foot Neurologic   Light touch sensation:  Diminished  Vibratory sensation:  Normal  Hot/Cold sensation: normal    Protective Sensation using [place]ofilament:  9     Left Foot Neurologic   Light touch sensation:  Diminished  Vibratory sensation:  Normal  Hot/cold sensation: normal    Protective Sensation using [place]ofilament:  9     MUSCULOSKELETAL      Right Foot Musculoskeletal   Ecchymosis:  None  Tenderness: toenails and toe 1    Arch:  Normal  Hallux valgus: No    Hallux limitus: No       Left Foot Musculoskeletal   Ecchymosis:  None  Tenderness: toenails and toe 1    Arch:  Normal  Hallux valgus: No    Hallux limitus: No       MUSCLE STRENGTH     Right Foot Muscle Strength   Foot dorsiflexion:  4+  Foot plantar flexion:  4+  Foot inversion:  4+  Foot eversion:  4+     Left Foot Muscle Strength   Foot dorsiflexion:  4+  Foot plantar flexion:  4+  Foot inversion:  4+  Foot eversion:  4+     RANGE OF MOTION      Right Foot Range of Motion   Foot and ankle ROM within normal limits       Left Foot Range of Motion   Foot and ankle ROM within normal limits       DERMATOLOGIC     Right Foot Dermatologic   Skin: skin intact and atrophic    Nails: onychomycosis, abnormally thick, subungual debris, dystrophic nails and ingrown toenail (hallux)       Left Foot Dermatologic   Skin: skin intact and atrophic    Nails: onychomycosis, abnormally thick, subungual debris, dystrophic nails and ingrown toenail (hallux)        RADIOLOGY/NUCLEAR:  No results found.    LABORATORY/CULTURE RESULTS:      PATHOLOGY RESULTS:       ASSESSMENT/PLAN     Diagnoses and all orders for this visit:    1.  Onychomycosis (Primary)    2. Advanced age    3. Foot pain, bilateral    4. Idiopathic neuropathy      Comprehensive lower extremity examination and evaluation was performed.  Discussed findings and treatment plan including risks, benefits, and treatment options with patient in detail. Patient agreed with treatment plan.  After verbal consent obtained, nail(s) x10 debrided of length and thickness with nail nipper without incidence  After verbal consent obtained, nail(s) x2 debrided of offending borders with nail nipper without incidence  Patient may maintain nails and calluses at home utilizing emery board or pumice stone between visits as needed   Moisturize skin on a regular basis.  An After Visit Summary was printed and given to the patient at discharge, including (if requested) any available informative/educational handouts regarding diagnosis, treatment, or medications. All questions were answered to patient/family satisfaction. Should symptoms fail to improve or worsen they agree to call or return to clinic or to go to the Emergency Department. Discussed the importance of following up with any needed screening tests/labs/specialist appointments and any requested follow-up recommended by me today. Importance of maintaining follow-up discussed and patient accepts that missed appointments can delay diagnosis and potentially lead to worsening of conditions.  Return if symptoms worsen or fail to improve., or sooner if acute issues arise.    Lab Frequency Next Occurrence   Instruct Patient To Stop Heparin or Lovenox 24 Hours Before Scheduled Implant Procedure Once 05/13/2020   Instruct Patient To Stop Apixaban, Rivaroxaban, Edoxaban, Dibigatran, Vorapaxar, Atopaxar 48 Hours Before Scheduled Implant Procedure Once 05/13/2020       This document has been electronically signed by OUSMANE Park on September 22, 2022 12:10 CDT

## 2022-09-23 NOTE — PROGRESS NOTES
Bi-V AICD Evaluation Report  CLINIC BY DEVICE REP    September 23, 2022    Primary Cardiologist: Zaria  : Guidant Model: Momentum CRT-D G125  Implant date: 6/9/2020     Reason for evaluation: routine  Indication for AICD: Ventricular tachycardia    Measurements  Atrial sensing:  P wave: 0.7 mV  Atrial threshold: N/R   Atrial lead impedance: 548 ohms  Ventricular sensing:  R wave: 8.7 mV  RV Threshold:  1.2V @ 0.4 ms  RV Impedance:  374 ohms  Shock impedance:  RV 34 ohms     LV Threshold:  0.8V @ 0.4ms  LV Impedance:  781ohms      Diagnostic Data  Atrial paced: <1 %  Ventricular paced: RV 84% LV 96%  Other: Normal device function. Heartlogic index 17. 93 % AF burden. 96 NSVT epiosdes since 12/2020 - 30 treated with ATP. Last shock March 2021  Battery status: satisfactory   6 years      Final Parameters  Mode: DDDR     Lower rate: 70 bpm   Upper rate: 120 bpm  AV Delay: 170-180 ms paced    115-120 ms sensed   Atrial - Amplitude: 2.2 V   Pulse width: 0.4 ms   Sensitivity: 1.5 mV   Ventricular:  RV Amplitude: 2.8 V @ 0.4 ms   Sensitivity: 0.6 mV            LV Amplitude:  1.5V @ 0.4ms Sensitivity: 1.5 mV  Changes made: None  Conclusions: normal AICD function, no therapy delivered, stable pacing and sensing thresholds and adequate battery reserve    Follow up: [facility], Yearly in office

## 2022-09-26 DIAGNOSIS — G60.9 IDIOPATHIC NEUROPATHY: ICD-10-CM

## 2022-09-26 DIAGNOSIS — G89.4 CHRONIC PAIN SYNDROME: ICD-10-CM

## 2022-09-26 RX ORDER — HYDROCODONE BITARTRATE AND ACETAMINOPHEN 5; 325 MG/1; MG/1
1 TABLET ORAL 4 TIMES DAILY PRN
Qty: 120 TABLET | Refills: 0 | Status: SHIPPED | OUTPATIENT
Start: 2022-09-26 | End: 2022-10-26 | Stop reason: SDUPTHER

## 2022-09-28 NOTE — TELEPHONE ENCOUNTER
Alber John called requesting a refill of the below medication which has been pended for you:     Requested Prescriptions     Pending Prescriptions Disp Refills    atorvastatin (LIPITOR) 10 MG tablet [Pharmacy Med Name: ATORVASTATIN CALCIUM 10 MG 10 Tablet] 90 tablet 1     Sig: TAKE ONE TABLET BY MOUTH EVERY DAY       Last Appointment Date: 8/11/2022  Next Appointment Date: 2/14/2023    Allergies   Allergen Reactions    Keflex [Cephalexin] Rash     pruritius

## 2022-09-29 RX ORDER — ATORVASTATIN CALCIUM 10 MG/1
TABLET, FILM COATED ORAL
Qty: 90 TABLET | Refills: 1 | Status: SHIPPED | OUTPATIENT
Start: 2022-09-29

## 2022-10-03 DIAGNOSIS — F41.9 ANXIETY: ICD-10-CM

## 2022-10-03 RX ORDER — LORAZEPAM 0.5 MG/1
TABLET ORAL
Qty: 120 TABLET | Refills: 0 | Status: SHIPPED | OUTPATIENT
Start: 2022-10-03 | End: 2022-10-31

## 2022-10-03 NOTE — TELEPHONE ENCOUNTER
Last Appointment Date: 8/11/2022  Next Appointment Date: 2/14/2023    Allergies   Allergen Reactions    Keflex [Cephalexin] Rash     pruritius       Patient needs refill on   Requested Prescriptions     Pending Prescriptions Disp Refills    LORazepam (ATIVAN) 0.5 MG tablet [Pharmacy Med Name: LORAZEPAM 0.5 MG TABS 0.5 Tablet] 120 tablet 0     Sig: TAKE ONE TABLET BY MOUTH FOUR TIMES A DAY **MAY MAKE DROWSY**.

## 2022-10-03 NOTE — TELEPHONE ENCOUNTER
Patient's HeartLogic Heart Failure Index is elevated at 20.  RN called to assess for signs/symptoms of worsening heart failure.  Message left for daughter to return call.

## 2022-10-05 NOTE — TELEPHONE ENCOUNTER
RN spoke with patient's daughter, who reports no new symptoms.  Reports he has good days and bad, mostly bad lately.  Denies dyspnea, edema, orthopnea, PND.  She will discuss with patient tonight and will let us know if any new symptoms develop.

## 2022-10-10 RX ORDER — NITROGLYCERIN 0.4 MG/1
0.4 TABLET SUBLINGUAL EVERY 5 MIN PRN
Qty: 25 TABLET | Refills: 1 | Status: SHIPPED | OUTPATIENT
Start: 2022-10-10

## 2022-10-24 DIAGNOSIS — G89.4 CHRONIC PAIN SYNDROME: ICD-10-CM

## 2022-10-24 DIAGNOSIS — G60.9 IDIOPATHIC NEUROPATHY: ICD-10-CM

## 2022-10-26 RX ORDER — HYDROCODONE BITARTRATE AND ACETAMINOPHEN 5; 325 MG/1; MG/1
1 TABLET ORAL 4 TIMES DAILY PRN
Qty: 120 TABLET | Refills: 0 | Status: SHIPPED | OUTPATIENT
Start: 2022-10-26 | End: 2022-11-21 | Stop reason: SDUPTHER

## 2022-10-28 DIAGNOSIS — G60.9 IDIOPATHIC NEUROPATHY: ICD-10-CM

## 2022-10-28 DIAGNOSIS — G89.4 CHRONIC PAIN SYNDROME: ICD-10-CM

## 2022-10-28 RX ORDER — GABAPENTIN 300 MG/1
CAPSULE ORAL
Qty: 90 CAPSULE | Refills: 1 | Status: SHIPPED | OUTPATIENT
Start: 2022-10-28 | End: 2022-11-22

## 2022-10-31 DIAGNOSIS — F41.9 ANXIETY: ICD-10-CM

## 2022-10-31 RX ORDER — LORAZEPAM 0.5 MG/1
TABLET ORAL
Qty: 120 TABLET | Refills: 0 | Status: SHIPPED | OUTPATIENT
Start: 2022-10-31 | End: 2022-11-30

## 2022-10-31 NOTE — TELEPHONE ENCOUNTER
S/w G&O, pt needs a refill on his Hydrocodone but does not like the ones they keep in stock. Can these be sent to [facility] by [facility]? Composite Graft Text: The defect edges were debeveled with a #15 scalpel blade.  Given the location of the defect, shape of the defect, the proximity to free margins and the fact the defect was full thickness a composite graft was deemed most appropriate.  The defect was outline and then transferred to the donor site.  A full thickness graft was then excised from the donor site. The graft was then placed in the primary defect, oriented appropriately and then sutured into place.  The secondary defect was then repaired using a primary closure.

## 2022-11-18 NOTE — TELEPHONE ENCOUNTER
Plan from LOV 09/21/22:  -Advised to reduce diuretic doses including reduction of daily Bumex to once daily, with close follow-up of daily weights and only taking second dose of Lasix if increased weight/swelling/symptoms    Medication changes at that time:   Bumetanide             Protocol Details     1 mg Oral Daily, Can take additional 1mg tablet in the afternoon if extra fluid retention/swelling       Bumex 1 mg listed active on med list with no quantity or refills. Refused Bumex 0.5 mg refill request   Please place order for 1 mg if patient is to take. Thank you.

## 2022-11-21 DIAGNOSIS — G60.9 IDIOPATHIC NEUROPATHY: ICD-10-CM

## 2022-11-21 DIAGNOSIS — G89.4 CHRONIC PAIN SYNDROME: ICD-10-CM

## 2022-11-21 RX ORDER — HYDROCODONE BITARTRATE AND ACETAMINOPHEN 5; 325 MG/1; MG/1
1 TABLET ORAL 4 TIMES DAILY PRN
Qty: 120 TABLET | Refills: 0 | Status: SHIPPED | OUTPATIENT
Start: 2022-11-21 | End: 2022-12-21

## 2022-12-02 DIAGNOSIS — N13.8 BPH WITH URINARY OBSTRUCTION: ICD-10-CM

## 2022-12-02 DIAGNOSIS — N40.1 BPH WITH URINARY OBSTRUCTION: ICD-10-CM

## 2022-12-02 RX ORDER — TAMSULOSIN HYDROCHLORIDE 0.4 MG/1
CAPSULE ORAL
Qty: 90 CAPSULE | Refills: 3 | Status: SHIPPED | OUTPATIENT
Start: 2022-12-02

## 2022-12-07 ENCOUNTER — TELEPHONE (OUTPATIENT)
Dept: INTERNAL MEDICINE | Age: 87
End: 2022-12-07

## 2022-12-07 DIAGNOSIS — N18.4 STAGE 4 CHRONIC KIDNEY DISEASE (HCC): Primary | ICD-10-CM

## 2022-12-07 DIAGNOSIS — F41.9 ANXIETY: ICD-10-CM

## 2022-12-07 DIAGNOSIS — I50.22 CHRONIC SYSTOLIC CONGESTIVE HEART FAILURE (HCC): ICD-10-CM

## 2022-12-07 NOTE — TELEPHONE ENCOUNTER
[facility] said that they are ready to discuss Hospice and would like a referral.  Please have Hospice nurse call her at [phone]

## 2022-12-08 RX ORDER — LORAZEPAM 0.5 MG/1
TABLET ORAL
Qty: 120 TABLET | Refills: 0 | Status: SHIPPED | OUTPATIENT
Start: 2022-12-08 | End: 2023-01-07

## 2022-12-15 NOTE — TELEPHONE ENCOUNTER
RN spoke with BROOK Contreras at [facility].  Patient's family has decided to turn off patient's AICD.  RN verified with daughter, Munira, and obtained order from Dr. Enciso.  Order faxed to [facility].  [employer] device reps Chely Scruggs and Mat Mehta notified.

## 2023-01-06 ENCOUNTER — TELEPHONE (OUTPATIENT)
Dept: CARDIOLOGY | Facility: CLINIC | Age: 88
End: 2023-01-06
Payer: MEDICARE

## 2023-01-06 ENCOUNTER — TELEPHONE (OUTPATIENT)
Dept: CARDIOLOGY | Facility: CLINIC | Age: 88
End: 2023-01-06

## 2023-01-06 NOTE — TELEPHONE ENCOUNTER
Caller: Ochoa Cai    Relationship: Emergency Contact    Best call back number: [phone]    What is the best time to reach you: ANYTIME      What was the call regarding: PT'S SON CALLED REPORT PT IS  AND THAT HE IS UNSURE OF WHAT TO DO WITH HIS PACEMAKER EQUIPMENT. HE WAS NOT SURE IF HE SHOULD RETURN IT OR DISCARD IT OR SOMETHING ELSE.   ATTEMPTED TO CONTACT OFFICE WITH NO ANSWER    Do you require a callback: YES

## 2023-01-06 NOTE — TELEPHONE ENCOUNTER
"“Please be informed that patient has passed. Patient has been marked  in the system. The date of death is: 22\".    Caller: Ochoa Cai    Relationship: Emergency Contact    Best call back number: [phone]  "

## 2023-01-06 NOTE — TELEPHONE ENCOUNTER
Message left for son advising him that he can throw away patient's home monitor.  RN will notify [employer] of patient's death.  RN's direct line provided for any additional questions or concerns.

## (undated) DEVICE — ELECTRD PAD DEFIB A/

## (undated) DEVICE — PAD LAP CHOLE: Brand: MEDLINE INDUSTRIES, INC.

## (undated) DEVICE — SUT SILK 3/0 SUTUPAK TIES 24IN SA74H

## (undated) DEVICE — SOL IRR NACL 0.9PCT BT 1000ML

## (undated) DEVICE — MONOPOLAR METZENBAUM SCISSOR, MINI BLADE TIP, DISPOSABLE: Brand: MONOPOLAR METZENBAUM SCISSOR, MINI BLADE TIP, DISPOSABLE

## (undated) DEVICE — ANTIBACTERIAL UNDYED BRAIDED (POLYGLACTIN 910), SYNTHETIC ABSORBABLE SUTURE: Brand: COATED VICRYL

## (undated) DEVICE — SUT VIC 0 UR6 27IN VCP603H

## (undated) DEVICE — CLTH CLENS READYCLEANSE PERI CARE PK/5

## (undated) DEVICE — GLV SURG BIOGEL M LTX PF 7 1/2

## (undated) DEVICE — SUT SILK 2/0 SH CR8 18IN CR8 C012D

## (undated) DEVICE — DRSNG SURESITE123 4X10IN

## (undated) DEVICE — ENDOPATH PNEUMONEEDLE INSUFFLATION NEEDLES WITH LUER LOCK CONNECTORS 120MM: Brand: ENDOPATH

## (undated) DEVICE — SUT SILK 2/0 SUTUPAK TIES 24IN SA75H

## (undated) DEVICE — 3M™ IOBAN™ 2 ANTIMICROBIAL INCISE DRAPE 6650EZ: Brand: IOBAN™ 2

## (undated) DEVICE — SKIN AFFIX SURG ADHESIVE 72/CS 0.55ML: Brand: MEDLINE

## (undated) DEVICE — PK PM 30

## (undated) DEVICE — PAD MAJOR: Brand: MEDLINE INDUSTRIES, INC.

## (undated) DEVICE — APPL CHLORAPREP W/TINT 26ML ORNG

## (undated) DEVICE — SOL NS 500ML

## (undated) DEVICE — SUT PDS 1 CTX 36IN VIO PDP371T

## (undated) DEVICE — ENDOPATH XCEL WITH OPTIVIEW TECHNOLOGY DILATING TIP TROCARS WITH STABILITY SLEEVES: Brand: ENDOPATH XCEL OPTIVIEW

## (undated) DEVICE — ENDOPATH XCEL WITH OPTIVIEW TECHNOLOGY UNIVERSAL TROCAR STABILITY SLEEVES: Brand: ENDOPATH XCEL OPTIVIEW

## (undated) DEVICE — PK TURNOVER RM ADV

## (undated) DEVICE — LAPAROSCOPIC MONOPOLAR CORD: Brand: VALLEYLAB

## (undated) DEVICE — 2, DISPOSABLE SUCTION/IRRIGATOR WITHOUT DISPOSABLE TIP: Brand: STRYKEFLOW